# Patient Record
Sex: FEMALE | Race: WHITE | NOT HISPANIC OR LATINO | Employment: OTHER | URBAN - METROPOLITAN AREA
[De-identification: names, ages, dates, MRNs, and addresses within clinical notes are randomized per-mention and may not be internally consistent; named-entity substitution may affect disease eponyms.]

---

## 2017-05-17 ENCOUNTER — HOSPITAL ENCOUNTER (OUTPATIENT)
Dept: FAMILY MEDICINE CLINIC | Facility: CLINIC | Age: 67
Setting detail: SPECIMEN
Discharge: HOME | End: 2017-05-17
Attending: INTERNAL MEDICINE | Admitting: INTERNAL MEDICINE

## 2017-05-17 LAB
ALBUMIN SERPL-MCNC: 3.9 G/DL (ref 3.5–4.8)
ALBUMIN/GLOB SERPL: 1.1 {RATIO} (ref 1–1.7)
ALP SERPL-CCNC: 49 IU/L (ref 32–91)
ALT SERPL-CCNC: 26 IU/L (ref 14–54)
ANION GAP SERPL CALC-SCNC: 17.4 MMOL/L (ref 10–20)
AST SERPL-CCNC: 23 IU/L (ref 15–41)
BILIRUB SERPL-MCNC: 0.3 MG/DL (ref 0.3–1.2)
BUN SERPL-MCNC: 27 MG/DL (ref 8–20)
BUN/CREAT SERPL: 20.8 (ref 5.4–26.2)
CALCIUM SERPL-MCNC: 9.5 MG/DL (ref 8.9–10.3)
CHLORIDE SERPL-SCNC: 101 MMOL/L (ref 101–111)
CONV CO2: 24 MMOL/L (ref 22–32)
CONV TOTAL PROTEIN: 7.3 G/DL (ref 6.1–7.9)
CREAT UR-MCNC: 1.3 MG/DL (ref 0.4–1)
GLOBULIN UR ELPH-MCNC: 3.4 G/DL (ref 2.5–3.8)
GLUCOSE SERPL-MCNC: 134 MG/DL (ref 65–99)
POTASSIUM SERPL-SCNC: 4.4 MMOL/L (ref 3.6–5.1)
SODIUM SERPL-SCNC: 138 MMOL/L (ref 136–144)

## 2017-08-22 ENCOUNTER — HOSPITAL ENCOUNTER (OUTPATIENT)
Dept: FAMILY MEDICINE CLINIC | Facility: CLINIC | Age: 67
Setting detail: SPECIMEN
Discharge: HOME | End: 2017-08-22
Attending: INTERNAL MEDICINE | Admitting: INTERNAL MEDICINE

## 2017-08-22 LAB
ALBUMIN SERPL-MCNC: 3.9 G/DL (ref 3.5–4.8)
ALBUMIN/GLOB SERPL: 1.3 {RATIO} (ref 1–1.7)
ALP SERPL-CCNC: 56 IU/L (ref 32–91)
ALT SERPL-CCNC: 24 IU/L (ref 14–54)
ANION GAP SERPL CALC-SCNC: 16.5 MMOL/L (ref 10–20)
AST SERPL-CCNC: 23 IU/L (ref 15–41)
BASOPHILS # BLD AUTO: 0.1 10*3/UL (ref 0–0.2)
BASOPHILS NFR BLD AUTO: 1 % (ref 0–2)
BILIRUB SERPL-MCNC: 0.5 MG/DL (ref 0.3–1.2)
BUN SERPL-MCNC: 24 MG/DL (ref 8–20)
BUN/CREAT SERPL: 18.5 (ref 5.4–26.2)
CALCIUM SERPL-MCNC: 9.1 MG/DL (ref 8.9–10.3)
CHLORIDE SERPL-SCNC: 97 MMOL/L (ref 101–111)
CONV CO2: 26 MMOL/L (ref 22–32)
CONV TOTAL PROTEIN: 6.8 G/DL (ref 6.1–7.9)
CREAT UR-MCNC: 1.3 MG/DL (ref 0.4–1)
DIFFERENTIAL METHOD BLD: (no result)
EOSINOPHIL # BLD AUTO: 0.3 10*3/UL (ref 0–0.3)
EOSINOPHIL # BLD AUTO: 4 % (ref 0–3)
ERYTHROCYTE [DISTWIDTH] IN BLOOD BY AUTOMATED COUNT: 20.2 % (ref 11.5–14.5)
GLOBULIN UR ELPH-MCNC: 2.9 G/DL (ref 2.5–3.8)
GLUCOSE SERPL-MCNC: 164 MG/DL (ref 65–99)
HCT VFR BLD AUTO: 30.1 % (ref 35–49)
HGB BLD-MCNC: 9.3 G/DL (ref 12–15)
LYMPHOCYTES # BLD AUTO: 1.8 10*3/UL (ref 0.8–4.8)
LYMPHOCYTES NFR BLD AUTO: 21 % (ref 18–42)
MCH RBC QN AUTO: 23.1 PG (ref 26–32)
MCHC RBC AUTO-ENTMCNC: 30.8 G/DL (ref 32–36)
MCV RBC AUTO: 74.9 FL (ref 80–94)
MONOCYTES # BLD AUTO: 0.8 10*3/UL (ref 0.1–1.3)
MONOCYTES NFR BLD AUTO: 10 % (ref 2–11)
NEUTROPHILS # BLD AUTO: 5.6 10*3/UL (ref 2.3–8.6)
NEUTROPHILS NFR BLD AUTO: 64 % (ref 50–75)
NRBC BLD AUTO-RTO: 0 /100{WBCS}
NRBC/RBC NFR BLD MANUAL: 0 10*3/UL
PLATELET # BLD AUTO: 529 10*3/UL (ref 150–450)
PMV BLD AUTO: 7.6 FL (ref 7.4–10.4)
POTASSIUM SERPL-SCNC: 4.5 MMOL/L (ref 3.6–5.1)
RBC # BLD AUTO: 4.01 10*6/UL (ref 4–5.4)
SODIUM SERPL-SCNC: 135 MMOL/L (ref 136–144)
WBC # BLD AUTO: 8.7 10*3/UL (ref 4.5–11.5)

## 2017-09-27 ENCOUNTER — HOSPITAL ENCOUNTER (OUTPATIENT)
Dept: FAMILY MEDICINE CLINIC | Facility: CLINIC | Age: 67
Setting detail: SPECIMEN
Discharge: HOME | End: 2017-09-27
Attending: INTERNAL MEDICINE | Admitting: INTERNAL MEDICINE

## 2017-10-30 ENCOUNTER — HOSPITAL ENCOUNTER (OUTPATIENT)
Dept: INFUSION THERAPY | Facility: HOSPITAL | Age: 67
Discharge: HOME | End: 2017-10-30
Attending: INTERNAL MEDICINE | Admitting: INTERNAL MEDICINE

## 2017-11-03 ENCOUNTER — HOSPITAL ENCOUNTER (OUTPATIENT)
Dept: INFUSION THERAPY | Facility: HOSPITAL | Age: 67
Discharge: HOME | End: 2017-11-03
Attending: INTERNAL MEDICINE | Admitting: INTERNAL MEDICINE

## 2018-02-01 ENCOUNTER — HOSPITAL ENCOUNTER (OUTPATIENT)
Dept: FAMILY MEDICINE CLINIC | Facility: CLINIC | Age: 68
Setting detail: SPECIMEN
Discharge: HOME | End: 2018-02-01
Attending: INTERNAL MEDICINE | Admitting: INTERNAL MEDICINE

## 2018-02-01 LAB
ALBUMIN SERPL-MCNC: 3.8 G/DL (ref 3.5–4.8)
ALBUMIN/GLOB SERPL: 1.2 {RATIO} (ref 1–1.7)
ALP SERPL-CCNC: 56 IU/L (ref 32–91)
ALT SERPL-CCNC: 23 IU/L (ref 14–54)
ANION GAP SERPL CALC-SCNC: 12.6 MMOL/L (ref 10–20)
AST SERPL-CCNC: 22 IU/L (ref 15–41)
BASOPHILS # BLD AUTO: 0.1 10*3/UL (ref 0–0.2)
BASOPHILS NFR BLD AUTO: 1 % (ref 0–2)
BILIRUB SERPL-MCNC: 0.6 MG/DL (ref 0.3–1.2)
BUN SERPL-MCNC: 20 MG/DL (ref 8–20)
BUN/CREAT SERPL: 13.3 (ref 5.4–26.2)
CALCIUM SERPL-MCNC: 9.4 MG/DL (ref 8.9–10.3)
CHLORIDE SERPL-SCNC: 102 MMOL/L (ref 101–111)
CONV ANISOCYTES: (no result)
CONV CO2: 27 MMOL/L (ref 22–32)
CONV PLATELETS GIANT IN BLOOD BY LIGHT MICROSCOPY: (no result)
CONV SMEAR REVIEW: (no result)
CONV TOTAL PROTEIN: 7.1 G/DL (ref 6.1–7.9)
CREAT UR-MCNC: 1.5 MG/DL (ref 0.4–1)
DIFFERENTIAL METHOD BLD: (no result)
EOSINOPHIL # BLD AUTO: 0.2 10*3/UL (ref 0–0.3)
EOSINOPHIL # BLD AUTO: 3 % (ref 0–3)
ERYTHROCYTE [DISTWIDTH] IN BLOOD BY AUTOMATED COUNT: 20.6 % (ref 11.5–14.5)
GLOBULIN UR ELPH-MCNC: 3.3 G/DL (ref 2.5–3.8)
GLUCOSE SERPL-MCNC: 177 MG/DL (ref 65–99)
HCT VFR BLD AUTO: 42.8 % (ref 35–49)
HGB BLD-MCNC: 14.2 G/DL (ref 12–15)
IRON SATN MFR SERPL: 27 % (ref 15–50)
IRON SERPL-MCNC: 97 UG/DL (ref 28–170)
LYMPHOCYTES # BLD AUTO: 1.6 10*3/UL (ref 0.8–4.8)
LYMPHOCYTES NFR BLD AUTO: 21 % (ref 18–42)
MCH RBC QN AUTO: 31.8 PG (ref 26–32)
MCHC RBC AUTO-ENTMCNC: 33.2 G/DL (ref 32–36)
MCV RBC AUTO: 95.7 FL (ref 80–94)
MONOCYTES # BLD AUTO: 0.6 10*3/UL (ref 0.1–1.3)
MONOCYTES NFR BLD AUTO: 8 % (ref 2–11)
NEUTROPHILS # BLD AUTO: 5.1 10*3/UL (ref 2.3–8.6)
NEUTROPHILS NFR BLD AUTO: 67 % (ref 50–75)
NRBC BLD AUTO-RTO: 0 /100{WBCS}
PLATELET # BLD AUTO: 331 10*3/UL (ref 150–450)
PMV BLD AUTO: 9 FL (ref 7.4–10.4)
POTASSIUM SERPL-SCNC: 4.6 MMOL/L (ref 3.6–5.1)
RBC # BLD AUTO: 4.48 10*6/UL (ref 4–5.4)
SODIUM SERPL-SCNC: 137 MMOL/L (ref 136–144)
TIBC SERPL-MCNC: 364 UG/DL (ref 228–428)
WBC # BLD AUTO: 7.6 10*3/UL (ref 4.5–11.5)

## 2018-11-15 ENCOUNTER — HOSPITAL ENCOUNTER (OUTPATIENT)
Dept: FAMILY MEDICINE CLINIC | Facility: CLINIC | Age: 68
Setting detail: SPECIMEN
Discharge: HOME | End: 2018-11-15
Attending: INTERNAL MEDICINE | Admitting: INTERNAL MEDICINE

## 2018-11-15 LAB
ALBUMIN SERPL-MCNC: 3.7 G/DL (ref 3.5–4.8)
ALBUMIN/GLOB SERPL: 1.3 {RATIO} (ref 1–1.7)
ALP SERPL-CCNC: 60 IU/L (ref 32–91)
ALT SERPL-CCNC: 16 IU/L (ref 14–54)
ANION GAP SERPL CALC-SCNC: 15.8 MMOL/L (ref 10–20)
AST SERPL-CCNC: 25 IU/L (ref 15–41)
BASOPHILS # BLD AUTO: 0.1 10*3/UL (ref 0–0.2)
BASOPHILS NFR BLD AUTO: 1 % (ref 0–2)
BILIRUB SERPL-MCNC: 0.6 MG/DL (ref 0.3–1.2)
BUN SERPL-MCNC: 21 MG/DL (ref 8–20)
BUN/CREAT SERPL: 12.4 (ref 5.4–26.2)
CALCIUM SERPL-MCNC: 9.1 MG/DL (ref 8.9–10.3)
CHLORIDE SERPL-SCNC: 105 MMOL/L (ref 101–111)
CHOLEST SERPL-MCNC: 236 MG/DL
CHOLEST/HDLC SERPL: 4.3 {RATIO}
CONV CO2: 24 MMOL/L (ref 22–32)
CONV LDL CHOLESTEROL DIRECT: 160 MG/DL (ref 0–100)
CONV TOTAL PROTEIN: 6.6 G/DL (ref 6.1–7.9)
CREAT UR-MCNC: 1.7 MG/DL (ref 0.4–1)
DIFFERENTIAL METHOD BLD: (no result)
EOSINOPHIL # BLD AUTO: 0.3 10*3/UL (ref 0–0.3)
EOSINOPHIL # BLD AUTO: 3 % (ref 0–3)
ERYTHROCYTE [DISTWIDTH] IN BLOOD BY AUTOMATED COUNT: 13.7 % (ref 11.5–14.5)
GLOBULIN UR ELPH-MCNC: 2.9 G/DL (ref 2.5–3.8)
GLUCOSE SERPL-MCNC: 112 MG/DL (ref 65–99)
HCT VFR BLD AUTO: 40.9 % (ref 35–49)
HDLC SERPL-MCNC: 55 MG/DL
HGB BLD-MCNC: 13.6 G/DL (ref 12–15)
IRON SATN MFR SERPL: 25 % (ref 15–50)
IRON SERPL-MCNC: 86 UG/DL (ref 28–170)
LDLC/HDLC SERPL: 2.9 {RATIO}
LIPID INTERPRETATION: ABNORMAL
LYMPHOCYTES # BLD AUTO: 1.7 10*3/UL (ref 0.8–4.8)
LYMPHOCYTES NFR BLD AUTO: 20 % (ref 18–42)
MCH RBC QN AUTO: 33.2 PG (ref 26–32)
MCHC RBC AUTO-ENTMCNC: 33.2 G/DL (ref 32–36)
MCV RBC AUTO: 100.2 FL (ref 80–94)
MONOCYTES # BLD AUTO: 0.7 10*3/UL (ref 0.1–1.3)
MONOCYTES NFR BLD AUTO: 8 % (ref 2–11)
NEUTROPHILS # BLD AUTO: 5.7 10*3/UL (ref 2.3–8.6)
NEUTROPHILS NFR BLD AUTO: 68 % (ref 50–75)
NRBC BLD AUTO-RTO: 0 /100{WBCS}
NRBC/RBC NFR BLD MANUAL: 0 10*3/UL
PLATELET # BLD AUTO: 379 10*3/UL (ref 150–450)
PMV BLD AUTO: 8.5 FL (ref 7.4–10.4)
POTASSIUM SERPL-SCNC: 4.8 MMOL/L (ref 3.6–5.1)
RBC # BLD AUTO: 4.09 10*6/UL (ref 4–5.4)
SODIUM SERPL-SCNC: 140 MMOL/L (ref 136–144)
TIBC SERPL-MCNC: 346 UG/DL (ref 228–428)
TRIGL SERPL-MCNC: 244 MG/DL
VLDLC SERPL CALC-MCNC: 20.8 MG/DL
WBC # BLD AUTO: 8.5 10*3/UL (ref 4.5–11.5)

## 2019-01-02 ENCOUNTER — HOSPITAL ENCOUNTER (OUTPATIENT)
Dept: FAMILY MEDICINE CLINIC | Facility: CLINIC | Age: 69
Setting detail: SPECIMEN
Discharge: HOME | End: 2019-01-02
Attending: INTERNAL MEDICINE | Admitting: INTERNAL MEDICINE

## 2019-01-02 LAB
ALBUMIN SERPL-MCNC: 3.7 G/DL (ref 3.5–4.8)
ALBUMIN/GLOB SERPL: 1.2 {RATIO} (ref 1–1.7)
ALP SERPL-CCNC: 59 IU/L (ref 32–91)
ALT SERPL-CCNC: 14 IU/L (ref 14–54)
ANION GAP SERPL CALC-SCNC: 18.1 MMOL/L (ref 10–20)
AST SERPL-CCNC: 22 IU/L (ref 15–41)
BILIRUB SERPL-MCNC: 0.4 MG/DL (ref 0.3–1.2)
BUN SERPL-MCNC: 35 MG/DL (ref 8–20)
BUN/CREAT SERPL: 19.4 (ref 5.4–26.2)
CALCIUM SERPL-MCNC: 8.9 MG/DL (ref 8.9–10.3)
CHLORIDE SERPL-SCNC: 103 MMOL/L (ref 101–111)
CONV CO2: 22 MMOL/L (ref 22–32)
CONV TOTAL PROTEIN: 6.7 G/DL (ref 6.1–7.9)
CREAT UR-MCNC: 1.8 MG/DL (ref 0.4–1)
GLOBULIN UR ELPH-MCNC: 3 G/DL (ref 2.5–3.8)
GLUCOSE SERPL-MCNC: 69 MG/DL (ref 65–99)
IRON SERPL-MCNC: 80 UG/DL (ref 28–170)
POTASSIUM SERPL-SCNC: 5.1 MMOL/L (ref 3.6–5.1)
SODIUM SERPL-SCNC: 138 MMOL/L (ref 136–144)

## 2019-01-04 ENCOUNTER — HOSPITAL ENCOUNTER (OUTPATIENT)
Dept: FAMILY MEDICINE CLINIC | Facility: CLINIC | Age: 69
Setting detail: SPECIMEN
Discharge: HOME | End: 2019-01-04
Attending: INTERNAL MEDICINE | Admitting: INTERNAL MEDICINE

## 2019-01-04 LAB
CHOLEST SERPL-MCNC: 225 MG/DL
CHOLEST/HDLC SERPL: 4.7 {RATIO}
CONV LDL CHOLESTEROL DIRECT: 156 MG/DL (ref 0–100)
HDLC SERPL-MCNC: 48 MG/DL
LDLC/HDLC SERPL: 3.2 {RATIO}
LIPID INTERPRETATION: ABNORMAL
TRIGL SERPL-MCNC: 250 MG/DL
VLDLC SERPL CALC-MCNC: 20.5 MG/DL

## 2019-01-05 ENCOUNTER — HOSPITAL ENCOUNTER (OUTPATIENT)
Dept: LAB | Facility: HOSPITAL | Age: 69
Discharge: HOME | End: 2019-01-05
Attending: INTERNAL MEDICINE | Admitting: INTERNAL MEDICINE

## 2019-01-15 LAB
Lab: 24
PROT 24H UR-MRATE: 504 MG/(24.H) (ref 0–150)
PROT UR-MCNC: 56 MG/DL
URINE VOLUME: 900 ML

## 2019-01-19 ENCOUNTER — HOSPITAL ENCOUNTER (OUTPATIENT)
Dept: ULTRASOUND IMAGING | Facility: HOSPITAL | Age: 69
Discharge: HOME | End: 2019-01-19
Attending: INTERNAL MEDICINE | Admitting: INTERNAL MEDICINE

## 2019-07-11 PROBLEM — J44.9 CHRONIC OBSTRUCTIVE PULMONARY DISEASE: Status: ACTIVE | Noted: 2019-07-11

## 2019-07-11 PROBLEM — E11.21 DIABETIC NEPHROPATHY: Status: ACTIVE | Noted: 2019-01-15

## 2019-07-11 PROBLEM — G25.0 BENIGN ESSENTIAL TREMOR: Status: ACTIVE | Noted: 2019-01-04

## 2019-07-11 PROBLEM — N28.9 RENAL INSUFFICIENCY: Status: ACTIVE | Noted: 2019-01-02

## 2019-07-11 RX ORDER — GLIMEPIRIDE 1 MG/1
TABLET ORAL
COMMUNITY
Start: 2018-11-15 | End: 2019-12-30

## 2019-07-11 RX ORDER — AMLODIPINE BESYLATE 5 MG/1
TABLET ORAL EVERY 24 HOURS
COMMUNITY
Start: 2018-08-16 | End: 2019-11-14 | Stop reason: SDUPTHER

## 2019-07-11 RX ORDER — DULOXETIN HYDROCHLORIDE 60 MG/1
1 CAPSULE, DELAYED RELEASE ORAL EVERY 12 HOURS
COMMUNITY
Start: 2018-08-16 | End: 2019-11-14 | Stop reason: SDUPTHER

## 2019-07-11 RX ORDER — TEMAZEPAM 30 MG/1
CAPSULE ORAL
COMMUNITY
Start: 2015-04-29 | End: 2019-08-09 | Stop reason: SDUPTHER

## 2019-07-11 RX ORDER — TRIAMTERENE AND HYDROCHLOROTHIAZIDE 37.5; 25 MG/1; MG/1
1 CAPSULE ORAL EVERY 24 HOURS
COMMUNITY
Start: 2018-08-16 | End: 2019-11-14 | Stop reason: SDUPTHER

## 2019-07-11 RX ORDER — PRIMIDONE 50 MG/1
TABLET ORAL EVERY 24 HOURS
COMMUNITY
Start: 2019-01-04 | End: 2020-01-14 | Stop reason: SDUPTHER

## 2019-07-11 RX ORDER — MULTIVITAMIN
1 CAPSULE ORAL DAILY
COMMUNITY
Start: 2015-04-29 | End: 2022-05-02

## 2019-07-11 RX ORDER — ROSUVASTATIN CALCIUM 10 MG/1
TABLET, COATED ORAL
COMMUNITY
Start: 2019-01-04 | End: 2019-08-13 | Stop reason: SDUPTHER

## 2019-07-12 ENCOUNTER — OFFICE VISIT (OUTPATIENT)
Dept: FAMILY MEDICINE CLINIC | Facility: CLINIC | Age: 69
End: 2019-07-12

## 2019-07-12 VITALS
OXYGEN SATURATION: 95 % | WEIGHT: 197.8 LBS | RESPIRATION RATE: 24 BRPM | HEART RATE: 97 BPM | DIASTOLIC BLOOD PRESSURE: 92 MMHG | BODY MASS INDEX: 31.79 KG/M2 | HEIGHT: 66 IN | TEMPERATURE: 98 F | SYSTOLIC BLOOD PRESSURE: 134 MMHG

## 2019-07-12 DIAGNOSIS — Z63.4 GRIEF AT LOSS OF CHILD: ICD-10-CM

## 2019-07-12 DIAGNOSIS — D50.8 IRON DEFICIENCY ANEMIA SECONDARY TO INADEQUATE DIETARY IRON INTAKE: ICD-10-CM

## 2019-07-12 DIAGNOSIS — E11.65 TYPE 2 DIABETES MELLITUS WITH HYPERGLYCEMIA, WITHOUT LONG-TERM CURRENT USE OF INSULIN (HCC): ICD-10-CM

## 2019-07-12 DIAGNOSIS — R11.0 NAUSEA: ICD-10-CM

## 2019-07-12 DIAGNOSIS — D50.0 IRON DEFICIENCY ANEMIA DUE TO CHRONIC BLOOD LOSS: Primary | ICD-10-CM

## 2019-07-12 DIAGNOSIS — F43.21 GRIEF AT LOSS OF CHILD: ICD-10-CM

## 2019-07-12 LAB
ALBUMIN SERPL-MCNC: 3.6 G/DL (ref 3.5–4.8)
ALBUMIN UR-MCNC: 234 MG/L
ALBUMIN/GLOB SERPL: 1.2 G/DL (ref 1–1.7)
ALP SERPL-CCNC: 45 U/L (ref 32–91)
ALT SERPL W P-5'-P-CCNC: 65 U/L (ref 14–54)
ANION GAP SERPL CALCULATED.3IONS-SCNC: 17.1 MMOL/L (ref 5–15)
AST SERPL-CCNC: 49 U/L (ref 15–41)
BASOPHILS # BLD AUTO: 0.1 10*3/MM3 (ref 0–0.2)
BASOPHILS NFR BLD AUTO: 1 % (ref 0–1.5)
BILIRUB SERPL-MCNC: 0.6 MG/DL (ref 0.3–1.2)
BUN BLD-MCNC: 24 MG/DL (ref 8–20)
BUN/CREAT SERPL: 14.1 (ref 5.4–26.2)
CALCIUM SPEC-SCNC: 8.8 MG/DL (ref 8.9–10.3)
CHLORIDE SERPL-SCNC: 102 MMOL/L (ref 101–111)
CO2 SERPL-SCNC: 23 MMOL/L (ref 22–32)
CREAT BLD-MCNC: 1.7 MG/DL (ref 0.4–1)
DEPRECATED RDW RBC AUTO: 49 FL (ref 37–54)
EOSINOPHIL # BLD AUTO: 0.2 10*3/MM3 (ref 0–0.4)
EOSINOPHIL NFR BLD AUTO: 2.1 % (ref 0.3–6.2)
ERYTHROCYTE [DISTWIDTH] IN BLOOD BY AUTOMATED COUNT: 14 % (ref 12.3–15.4)
GFR SERPL CREATININE-BSD FRML MDRD: 30 ML/MIN/1.73
GLOBULIN UR ELPH-MCNC: 3 GM/DL (ref 2.5–3.8)
GLUCOSE BLD-MCNC: 113 MG/DL (ref 65–99)
HBA1C MFR BLD: 6.2 % (ref 3.5–5.6)
HCT VFR BLD AUTO: 39.3 % (ref 34–46.6)
HGB BLD-MCNC: 12.9 G/DL (ref 12–15.9)
LIPASE SERPL-CCNC: 35 U/L (ref 22–51)
LYMPHOCYTES # BLD AUTO: 1.7 10*3/MM3 (ref 0.7–3.1)
LYMPHOCYTES NFR BLD AUTO: 21.5 % (ref 19.6–45.3)
MCH RBC QN AUTO: 32.8 PG (ref 26.6–33)
MCHC RBC AUTO-ENTMCNC: 32.8 G/DL (ref 31.5–35.7)
MCV RBC AUTO: 100 FL (ref 79–97)
MONOCYTES # BLD AUTO: 0.7 10*3/MM3 (ref 0.1–0.9)
MONOCYTES NFR BLD AUTO: 8.9 % (ref 5–12)
NEUTROPHILS # BLD AUTO: 5.3 10*3/MM3 (ref 1.7–7)
NEUTROPHILS NFR BLD AUTO: 66.5 % (ref 42.7–76)
NRBC BLD AUTO-RTO: 0.1 /100 WBC (ref 0–0.2)
PLATELET # BLD AUTO: 341 10*3/MM3 (ref 140–450)
PMV BLD AUTO: 8.3 FL (ref 6–12)
POTASSIUM BLD-SCNC: 4.1 MMOL/L (ref 3.6–5.1)
PROT SERPL-MCNC: 6.6 G/DL (ref 6.1–7.9)
RBC # BLD AUTO: 3.93 10*6/MM3 (ref 3.77–5.28)
SODIUM BLD-SCNC: 138 MMOL/L (ref 136–144)
WBC NRBC COR # BLD: 7.9 10*3/MM3 (ref 3.4–10.8)

## 2019-07-12 PROCEDURE — 80053 COMPREHEN METABOLIC PANEL: CPT | Performed by: INTERNAL MEDICINE

## 2019-07-12 PROCEDURE — 82043 UR ALBUMIN QUANTITATIVE: CPT | Performed by: INTERNAL MEDICINE

## 2019-07-12 PROCEDURE — 83036 HEMOGLOBIN GLYCOSYLATED A1C: CPT | Performed by: INTERNAL MEDICINE

## 2019-07-12 PROCEDURE — 85025 COMPLETE CBC W/AUTO DIFF WBC: CPT | Performed by: INTERNAL MEDICINE

## 2019-07-12 PROCEDURE — 99214 OFFICE O/P EST MOD 30 MIN: CPT | Performed by: INTERNAL MEDICINE

## 2019-07-12 PROCEDURE — 83690 ASSAY OF LIPASE: CPT | Performed by: INTERNAL MEDICINE

## 2019-07-12 RX ORDER — RANITIDINE 300 MG/1
300 TABLET ORAL NIGHTLY
Qty: 30 TABLET | Refills: 6 | Status: SHIPPED | OUTPATIENT
Start: 2019-07-12 | End: 2020-01-14

## 2019-07-12 RX ORDER — METFORMIN HYDROCHLORIDE 500 MG/1
500 TABLET, EXTENDED RELEASE ORAL
Qty: 90 TABLET | Refills: 1 | Status: SHIPPED | OUTPATIENT
Start: 2019-07-12 | End: 2019-08-09

## 2019-07-12 SDOH — SOCIAL STABILITY - SOCIAL INSECURITY: DISSAPEARANCE AND DEATH OF FAMILY MEMBER: Z63.4

## 2019-08-09 ENCOUNTER — OFFICE VISIT (OUTPATIENT)
Dept: FAMILY MEDICINE CLINIC | Facility: CLINIC | Age: 69
End: 2019-08-09

## 2019-08-09 VITALS
OXYGEN SATURATION: 96 % | HEIGHT: 66 IN | WEIGHT: 200.2 LBS | RESPIRATION RATE: 16 BRPM | SYSTOLIC BLOOD PRESSURE: 140 MMHG | TEMPERATURE: 97.8 F | BODY MASS INDEX: 32.17 KG/M2 | HEART RATE: 94 BPM | DIASTOLIC BLOOD PRESSURE: 82 MMHG

## 2019-08-09 DIAGNOSIS — E11.21 DIABETIC NEPHROPATHY ASSOCIATED WITH TYPE 2 DIABETES MELLITUS (HCC): ICD-10-CM

## 2019-08-09 DIAGNOSIS — N28.9 RENAL INSUFFICIENCY: ICD-10-CM

## 2019-08-09 DIAGNOSIS — E11.65 TYPE 2 DIABETES MELLITUS WITH HYPERGLYCEMIA, WITHOUT LONG-TERM CURRENT USE OF INSULIN (HCC): Primary | ICD-10-CM

## 2019-08-09 PROCEDURE — 99213 OFFICE O/P EST LOW 20 MIN: CPT | Performed by: INTERNAL MEDICINE

## 2019-08-09 RX ORDER — METFORMIN HYDROCHLORIDE 500 MG/1
1000 TABLET, EXTENDED RELEASE ORAL
Qty: 180 TABLET | Refills: 1 | Status: SHIPPED | OUTPATIENT
Start: 2019-08-09 | End: 2019-10-09 | Stop reason: SDUPTHER

## 2019-08-09 RX ORDER — TEMAZEPAM 30 MG/1
30 CAPSULE ORAL NIGHTLY PRN
Qty: 90 CAPSULE | Refills: 3 | Status: SHIPPED | OUTPATIENT
Start: 2019-08-09 | End: 2019-11-14 | Stop reason: SDUPTHER

## 2019-08-13 RX ORDER — ROSUVASTATIN CALCIUM 10 MG/1
TABLET, COATED ORAL
Qty: 90 TABLET | Refills: 2 | Status: SHIPPED | OUTPATIENT
Start: 2019-08-13 | End: 2020-01-14 | Stop reason: SDUPTHER

## 2019-09-24 ENCOUNTER — OFFICE VISIT (OUTPATIENT)
Dept: FAMILY MEDICINE CLINIC | Facility: CLINIC | Age: 69
End: 2019-09-24

## 2019-09-24 VITALS
HEIGHT: 65 IN | SYSTOLIC BLOOD PRESSURE: 134 MMHG | HEART RATE: 94 BPM | OXYGEN SATURATION: 99 % | DIASTOLIC BLOOD PRESSURE: 78 MMHG | WEIGHT: 194.4 LBS | BODY MASS INDEX: 32.39 KG/M2 | TEMPERATURE: 97.5 F | RESPIRATION RATE: 16 BRPM

## 2019-09-24 DIAGNOSIS — Z12.11 SCREENING FOR COLON CANCER: ICD-10-CM

## 2019-09-24 DIAGNOSIS — E11.65 TYPE 2 DIABETES MELLITUS WITH HYPERGLYCEMIA, WITHOUT LONG-TERM CURRENT USE OF INSULIN (HCC): ICD-10-CM

## 2019-09-24 DIAGNOSIS — Z12.31 ENCOUNTER FOR SCREENING MAMMOGRAM FOR MALIGNANT NEOPLASM OF BREAST: ICD-10-CM

## 2019-09-24 DIAGNOSIS — Z23 NEED FOR IMMUNIZATION AGAINST INFLUENZA: Primary | ICD-10-CM

## 2019-09-24 PROCEDURE — 90653 IIV ADJUVANT VACCINE IM: CPT | Performed by: INTERNAL MEDICINE

## 2019-09-24 PROCEDURE — 99213 OFFICE O/P EST LOW 20 MIN: CPT | Performed by: INTERNAL MEDICINE

## 2019-09-24 PROCEDURE — G0008 ADMIN INFLUENZA VIRUS VAC: HCPCS | Performed by: INTERNAL MEDICINE

## 2019-10-09 ENCOUNTER — TELEPHONE (OUTPATIENT)
Dept: FAMILY MEDICINE CLINIC | Facility: CLINIC | Age: 69
End: 2019-10-09

## 2019-10-09 RX ORDER — METFORMIN HYDROCHLORIDE 500 MG/1
1000 TABLET, EXTENDED RELEASE ORAL
Qty: 180 TABLET | Refills: 3 | Status: SHIPPED | OUTPATIENT
Start: 2019-10-09 | End: 2020-01-14 | Stop reason: SDUPTHER

## 2019-11-15 RX ORDER — TRIAMTERENE AND HYDROCHLOROTHIAZIDE 37.5; 25 MG/1; MG/1
CAPSULE ORAL
Qty: 90 CAPSULE | Refills: 1 | Status: SHIPPED | OUTPATIENT
Start: 2019-11-15 | End: 2020-01-14 | Stop reason: SDUPTHER

## 2019-11-15 RX ORDER — DULOXETIN HYDROCHLORIDE 60 MG/1
CAPSULE, DELAYED RELEASE ORAL
Qty: 180 CAPSULE | Refills: 1 | Status: SHIPPED | OUTPATIENT
Start: 2019-11-15 | End: 2020-04-14

## 2019-11-15 RX ORDER — AMLODIPINE BESYLATE 5 MG/1
TABLET ORAL
Qty: 90 TABLET | Refills: 1 | Status: SHIPPED | OUTPATIENT
Start: 2019-11-15 | End: 2020-04-14

## 2019-11-16 RX ORDER — TEMAZEPAM 30 MG/1
30 CAPSULE ORAL NIGHTLY PRN
Qty: 90 CAPSULE | Refills: 1 | Status: SHIPPED | OUTPATIENT
Start: 2019-11-16 | End: 2020-01-14 | Stop reason: SDUPTHER

## 2019-12-30 ENCOUNTER — TELEPHONE (OUTPATIENT)
Dept: FAMILY MEDICINE CLINIC | Facility: CLINIC | Age: 69
End: 2019-12-30

## 2019-12-30 RX ORDER — GLIMEPIRIDE 2 MG/1
2 TABLET ORAL 2 TIMES DAILY
Qty: 60 TABLET | Refills: 2 | Status: SHIPPED | OUTPATIENT
Start: 2019-12-30 | End: 2020-01-14 | Stop reason: SDUPTHER

## 2020-01-14 ENCOUNTER — OFFICE VISIT (OUTPATIENT)
Dept: FAMILY MEDICINE CLINIC | Facility: CLINIC | Age: 70
End: 2020-01-14

## 2020-01-14 VITALS
HEART RATE: 72 BPM | TEMPERATURE: 97.5 F | OXYGEN SATURATION: 96 % | WEIGHT: 192.4 LBS | SYSTOLIC BLOOD PRESSURE: 146 MMHG | BODY MASS INDEX: 32.02 KG/M2 | RESPIRATION RATE: 16 BRPM | DIASTOLIC BLOOD PRESSURE: 76 MMHG

## 2020-01-14 DIAGNOSIS — J41.0 SIMPLE CHRONIC BRONCHITIS (HCC): ICD-10-CM

## 2020-01-14 DIAGNOSIS — F51.01 PRIMARY INSOMNIA: ICD-10-CM

## 2020-01-14 DIAGNOSIS — H61.21 IMPACTED CERUMEN OF RIGHT EAR: ICD-10-CM

## 2020-01-14 DIAGNOSIS — E11.65 TYPE 2 DIABETES MELLITUS WITH HYPERGLYCEMIA, WITHOUT LONG-TERM CURRENT USE OF INSULIN (HCC): Primary | ICD-10-CM

## 2020-01-14 DIAGNOSIS — E78.41 ELEVATED LIPOPROTEIN(A): ICD-10-CM

## 2020-01-14 DIAGNOSIS — E11.21 DIABETIC NEPHROPATHY ASSOCIATED WITH TYPE 2 DIABETES MELLITUS (HCC): ICD-10-CM

## 2020-01-14 LAB — HBA1C MFR BLD: 9.2 % (ref 3.5–5.6)

## 2020-01-14 PROCEDURE — 83036 HEMOGLOBIN GLYCOSYLATED A1C: CPT | Performed by: INTERNAL MEDICINE

## 2020-01-14 PROCEDURE — 69209 REMOVE IMPACTED EAR WAX UNI: CPT | Performed by: INTERNAL MEDICINE

## 2020-01-14 PROCEDURE — 99214 OFFICE O/P EST MOD 30 MIN: CPT | Performed by: INTERNAL MEDICINE

## 2020-01-14 PROCEDURE — 80053 COMPREHEN METABOLIC PANEL: CPT | Performed by: INTERNAL MEDICINE

## 2020-01-14 RX ORDER — METFORMIN HYDROCHLORIDE 500 MG/1
1000 TABLET, EXTENDED RELEASE ORAL
Qty: 180 TABLET | Refills: 3 | Status: SHIPPED | OUTPATIENT
Start: 2020-01-14 | End: 2021-01-12

## 2020-01-14 RX ORDER — GLIMEPIRIDE 2 MG/1
2 TABLET ORAL 2 TIMES DAILY
Qty: 180 TABLET | Refills: 2 | Status: SHIPPED | OUTPATIENT
Start: 2020-01-14 | End: 2021-01-25 | Stop reason: SDUPTHER

## 2020-01-14 RX ORDER — TRIAMTERENE AND HYDROCHLOROTHIAZIDE 37.5; 25 MG/1; MG/1
1 CAPSULE ORAL DAILY
Qty: 90 CAPSULE | Refills: 3 | Status: SHIPPED | OUTPATIENT
Start: 2020-01-14 | End: 2021-05-03 | Stop reason: HOSPADM

## 2020-01-14 RX ORDER — ROSUVASTATIN CALCIUM 10 MG/1
10 TABLET, COATED ORAL DAILY
Qty: 90 TABLET | Refills: 2 | Status: SHIPPED | OUTPATIENT
Start: 2020-01-14 | End: 2020-09-28

## 2020-01-14 RX ORDER — TEMAZEPAM 30 MG/1
30 CAPSULE ORAL NIGHTLY PRN
Qty: 90 CAPSULE | Refills: 1 | Status: SHIPPED | OUTPATIENT
Start: 2020-01-14 | End: 2020-07-02 | Stop reason: SDUPTHER

## 2020-01-14 RX ORDER — PRIMIDONE 50 MG/1
50 TABLET ORAL NIGHTLY
Qty: 90 TABLET | Refills: 3 | Status: SHIPPED | OUTPATIENT
Start: 2020-01-14 | End: 2020-12-16

## 2020-01-15 LAB
ALBUMIN SERPL-MCNC: 4.1 G/DL (ref 3.5–5.2)
ALBUMIN/GLOB SERPL: 1.2 G/DL
ALP SERPL-CCNC: 58 U/L (ref 39–117)
ALT SERPL W P-5'-P-CCNC: 16 U/L (ref 1–33)
ANION GAP SERPL CALCULATED.3IONS-SCNC: 16 MMOL/L (ref 5–15)
AST SERPL-CCNC: 15 U/L (ref 1–32)
BILIRUB SERPL-MCNC: <0.2 MG/DL (ref 0.2–1.2)
BUN BLD-MCNC: 34 MG/DL (ref 8–23)
BUN/CREAT SERPL: 25.6 (ref 7–25)
CALCIUM SPEC-SCNC: 9.3 MG/DL (ref 8.6–10.5)
CHLORIDE SERPL-SCNC: 100 MMOL/L (ref 98–107)
CO2 SERPL-SCNC: 26 MMOL/L (ref 22–29)
CREAT BLD-MCNC: 1.33 MG/DL (ref 0.57–1)
GFR SERPL CREATININE-BSD FRML MDRD: 39 ML/MIN/1.73
GLOBULIN UR ELPH-MCNC: 3.4 GM/DL
GLUCOSE BLD-MCNC: 150 MG/DL (ref 65–99)
POTASSIUM BLD-SCNC: 4.4 MMOL/L (ref 3.5–5.2)
PROT SERPL-MCNC: 7.5 G/DL (ref 6–8.5)
SODIUM BLD-SCNC: 142 MMOL/L (ref 136–145)

## 2020-02-07 ENCOUNTER — TELEPHONE (OUTPATIENT)
Dept: FAMILY MEDICINE CLINIC | Facility: CLINIC | Age: 70
End: 2020-02-07

## 2020-02-07 RX ORDER — ONDANSETRON 4 MG/1
4 TABLET, FILM COATED ORAL EVERY 8 HOURS PRN
Qty: 20 TABLET | Refills: 0 | Status: SHIPPED | OUTPATIENT
Start: 2020-02-07 | End: 2021-06-03

## 2020-03-01 ENCOUNTER — APPOINTMENT (OUTPATIENT)
Dept: GENERAL RADIOLOGY | Facility: HOSPITAL | Age: 70
End: 2020-03-01

## 2020-03-01 ENCOUNTER — HOSPITAL ENCOUNTER (INPATIENT)
Facility: HOSPITAL | Age: 70
LOS: 6 days | Discharge: NURSING, REHAB OR LONG-TERM CARE | End: 2020-03-08
Attending: INTERNAL MEDICINE | Admitting: INTERNAL MEDICINE

## 2020-03-01 DIAGNOSIS — S72.011A CLOSED SUBCAPITAL FRACTURE OF RIGHT FEMUR, INITIAL ENCOUNTER (HCC): Primary | ICD-10-CM

## 2020-03-01 PROBLEM — D64.9 ANEMIA: Status: ACTIVE | Noted: 2020-03-01

## 2020-03-01 PROBLEM — N18.9 CHRONIC KIDNEY DISEASE: Chronic | Status: ACTIVE | Noted: 2020-03-01

## 2020-03-01 PROBLEM — I10 ESSENTIAL HYPERTENSION: Chronic | Status: ACTIVE | Noted: 2020-03-01

## 2020-03-01 LAB
ALBUMIN SERPL-MCNC: 3.9 G/DL (ref 3.5–5.2)
ALBUMIN/GLOB SERPL: 1.3 G/DL
ALP SERPL-CCNC: 52 U/L (ref 39–117)
ALT SERPL W P-5'-P-CCNC: 14 U/L (ref 1–33)
ANION GAP SERPL CALCULATED.3IONS-SCNC: 14 MMOL/L (ref 5–15)
AST SERPL-CCNC: 16 U/L (ref 1–32)
BASOPHILS # BLD AUTO: 0.1 10*3/MM3 (ref 0–0.2)
BASOPHILS NFR BLD AUTO: 0.4 % (ref 0–1.5)
BILIRUB SERPL-MCNC: 0.2 MG/DL (ref 0.2–1.2)
BUN BLD-MCNC: 39 MG/DL (ref 8–23)
BUN/CREAT SERPL: 25.7 (ref 7–25)
CALCIUM SPEC-SCNC: 8.8 MG/DL (ref 8.6–10.5)
CHLORIDE SERPL-SCNC: 103 MMOL/L (ref 98–107)
CO2 SERPL-SCNC: 25 MMOL/L (ref 22–29)
CREAT BLD-MCNC: 1.52 MG/DL (ref 0.57–1)
DEPRECATED RDW RBC AUTO: 46.8 FL (ref 37–54)
EOSINOPHIL # BLD AUTO: 0.1 10*3/MM3 (ref 0–0.4)
EOSINOPHIL NFR BLD AUTO: 0.8 % (ref 0.3–6.2)
ERYTHROCYTE [DISTWIDTH] IN BLOOD BY AUTOMATED COUNT: 15 % (ref 12.3–15.4)
GFR SERPL CREATININE-BSD FRML MDRD: 34 ML/MIN/1.73
GLOBULIN UR ELPH-MCNC: 3 GM/DL
GLUCOSE BLD-MCNC: 55 MG/DL (ref 65–99)
GLUCOSE BLDC GLUCOMTR-MCNC: 269 MG/DL (ref 70–105)
GLUCOSE BLDC GLUCOMTR-MCNC: 47 MG/DL (ref 70–105)
GLUCOSE BLDC GLUCOMTR-MCNC: 47 MG/DL (ref 70–105)
HCT VFR BLD AUTO: 34.6 % (ref 34–46.6)
HGB BLD-MCNC: 11.6 G/DL (ref 12–15.9)
LYMPHOCYTES # BLD AUTO: 2.4 10*3/MM3 (ref 0.7–3.1)
LYMPHOCYTES NFR BLD AUTO: 16.5 % (ref 19.6–45.3)
MCH RBC QN AUTO: 29.4 PG (ref 26.6–33)
MCHC RBC AUTO-ENTMCNC: 33.5 G/DL (ref 31.5–35.7)
MCV RBC AUTO: 87.6 FL (ref 79–97)
MONOCYTES # BLD AUTO: 1.2 10*3/MM3 (ref 0.1–0.9)
MONOCYTES NFR BLD AUTO: 8.4 % (ref 5–12)
NEUTROPHILS # BLD AUTO: 10.9 10*3/MM3 (ref 1.7–7)
NEUTROPHILS NFR BLD AUTO: 73.9 % (ref 42.7–76)
NRBC BLD AUTO-RTO: 0 /100 WBC (ref 0–0.2)
PLATELET # BLD AUTO: 487 10*3/MM3 (ref 140–450)
PMV BLD AUTO: 7.2 FL (ref 6–12)
POTASSIUM BLD-SCNC: 3.9 MMOL/L (ref 3.5–5.2)
POTASSIUM BLD-SCNC: 4.5 MMOL/L (ref 3.5–5.2)
PROT SERPL-MCNC: 6.9 G/DL (ref 6–8.5)
RBC # BLD AUTO: 3.95 10*6/MM3 (ref 3.77–5.28)
SODIUM BLD-SCNC: 142 MMOL/L (ref 136–145)
WBC NRBC COR # BLD: 14.8 10*3/MM3 (ref 3.4–10.8)

## 2020-03-01 PROCEDURE — 80053 COMPREHEN METABOLIC PANEL: CPT | Performed by: NURSE PRACTITIONER

## 2020-03-01 PROCEDURE — G0378 HOSPITAL OBSERVATION PER HR: HCPCS

## 2020-03-01 PROCEDURE — 25010000002 HYDROMORPHONE PER 4 MG: Performed by: NURSE PRACTITIONER

## 2020-03-01 PROCEDURE — 25010000002 ONDANSETRON PER 1 MG: Performed by: NURSE PRACTITIONER

## 2020-03-01 PROCEDURE — 63710000001 INSULIN LISPRO (HUMAN) PER 5 UNITS: Performed by: PHYSICIAN ASSISTANT

## 2020-03-01 PROCEDURE — 84132 ASSAY OF SERUM POTASSIUM: CPT | Performed by: PHYSICIAN ASSISTANT

## 2020-03-01 PROCEDURE — 25010000002 KETOROLAC TROMETHAMINE PER 15 MG: Performed by: NURSE PRACTITIONER

## 2020-03-01 PROCEDURE — 73502 X-RAY EXAM HIP UNI 2-3 VIEWS: CPT

## 2020-03-01 PROCEDURE — 85025 COMPLETE CBC W/AUTO DIFF WBC: CPT | Performed by: NURSE PRACTITIONER

## 2020-03-01 PROCEDURE — 99283 EMERGENCY DEPT VISIT LOW MDM: CPT

## 2020-03-01 PROCEDURE — 82962 GLUCOSE BLOOD TEST: CPT

## 2020-03-01 PROCEDURE — 99223 1ST HOSP IP/OBS HIGH 75: CPT | Performed by: HOSPITALIST

## 2020-03-01 RX ORDER — DEXTROSE MONOHYDRATE 25 G/50ML
25 INJECTION, SOLUTION INTRAVENOUS
Status: DISCONTINUED | OUTPATIENT
Start: 2020-03-01 | End: 2020-03-02 | Stop reason: HOSPADM

## 2020-03-01 RX ORDER — NICOTINE POLACRILEX 4 MG
15 LOZENGE BUCCAL
Status: DISCONTINUED | OUTPATIENT
Start: 2020-03-01 | End: 2020-03-02 | Stop reason: HOSPADM

## 2020-03-01 RX ORDER — CALCIUM GLUCONATE 20 MG/ML
2 INJECTION, SOLUTION INTRAVENOUS AS NEEDED
Status: DISCONTINUED | OUTPATIENT
Start: 2020-03-01 | End: 2020-03-02

## 2020-03-01 RX ORDER — ROSUVASTATIN CALCIUM 10 MG/1
10 TABLET, COATED ORAL DAILY
Status: DISCONTINUED | OUTPATIENT
Start: 2020-03-02 | End: 2020-03-08 | Stop reason: HOSPADM

## 2020-03-01 RX ORDER — PRIMIDONE 50 MG/1
50 TABLET ORAL NIGHTLY
Status: DISCONTINUED | OUTPATIENT
Start: 2020-03-01 | End: 2020-03-01

## 2020-03-01 RX ORDER — ALUMINA, MAGNESIA, AND SIMETHICONE 2400; 2400; 240 MG/30ML; MG/30ML; MG/30ML
15 SUSPENSION ORAL EVERY 6 HOURS PRN
Status: DISCONTINUED | OUTPATIENT
Start: 2020-03-01 | End: 2020-03-08 | Stop reason: HOSPADM

## 2020-03-01 RX ORDER — AMLODIPINE BESYLATE 5 MG/1
5 TABLET ORAL DAILY
Status: DISCONTINUED | OUTPATIENT
Start: 2020-03-02 | End: 2020-03-08 | Stop reason: HOSPADM

## 2020-03-01 RX ORDER — MAGNESIUM SULFATE HEPTAHYDRATE 40 MG/ML
4 INJECTION, SOLUTION INTRAVENOUS AS NEEDED
Status: DISCONTINUED | OUTPATIENT
Start: 2020-03-01 | End: 2020-03-02 | Stop reason: HOSPADM

## 2020-03-01 RX ORDER — SODIUM CHLORIDE 0.9 % (FLUSH) 0.9 %
10 SYRINGE (ML) INJECTION AS NEEDED
Status: DISCONTINUED | OUTPATIENT
Start: 2020-03-01 | End: 2020-03-02 | Stop reason: HOSPADM

## 2020-03-01 RX ORDER — POTASSIUM CHLORIDE 1.5 G/1.77G
40 POWDER, FOR SOLUTION ORAL AS NEEDED
Status: DISCONTINUED | OUTPATIENT
Start: 2020-03-01 | End: 2020-03-02 | Stop reason: HOSPADM

## 2020-03-01 RX ORDER — CHOLECALCIFEROL (VITAMIN D3) 125 MCG
5 CAPSULE ORAL NIGHTLY PRN
Status: DISCONTINUED | OUTPATIENT
Start: 2020-03-01 | End: 2020-03-08 | Stop reason: HOSPADM

## 2020-03-01 RX ORDER — HYDROMORPHONE HCL 110MG/55ML
0.5 PATIENT CONTROLLED ANALGESIA SYRINGE INTRAVENOUS ONCE
Status: COMPLETED | OUTPATIENT
Start: 2020-03-01 | End: 2020-03-01

## 2020-03-01 RX ORDER — HYDROCODONE BITARTRATE AND ACETAMINOPHEN 5; 325 MG/1; MG/1
1 TABLET ORAL EVERY 6 HOURS PRN
Status: DISCONTINUED | OUTPATIENT
Start: 2020-03-01 | End: 2020-03-02 | Stop reason: HOSPADM

## 2020-03-01 RX ORDER — ONDANSETRON 2 MG/ML
4 INJECTION INTRAMUSCULAR; INTRAVENOUS EVERY 6 HOURS PRN
Status: DISCONTINUED | OUTPATIENT
Start: 2020-03-01 | End: 2020-03-02 | Stop reason: HOSPADM

## 2020-03-01 RX ORDER — CALCIUM GLUCONATE 20 MG/ML
1 INJECTION, SOLUTION INTRAVENOUS AS NEEDED
Status: DISCONTINUED | OUTPATIENT
Start: 2020-03-01 | End: 2020-03-02

## 2020-03-01 RX ORDER — KETOROLAC TROMETHAMINE 30 MG/ML
30 INJECTION, SOLUTION INTRAMUSCULAR; INTRAVENOUS ONCE
Status: COMPLETED | OUTPATIENT
Start: 2020-03-01 | End: 2020-03-01

## 2020-03-01 RX ORDER — ONDANSETRON 4 MG/1
4 TABLET, FILM COATED ORAL EVERY 6 HOURS PRN
Status: DISCONTINUED | OUTPATIENT
Start: 2020-03-01 | End: 2020-03-02 | Stop reason: HOSPADM

## 2020-03-01 RX ORDER — POTASSIUM CHLORIDE 20 MEQ/1
40 TABLET, EXTENDED RELEASE ORAL AS NEEDED
Status: DISCONTINUED | OUTPATIENT
Start: 2020-03-01 | End: 2020-03-02 | Stop reason: HOSPADM

## 2020-03-01 RX ORDER — SODIUM CHLORIDE 0.9 % (FLUSH) 0.9 %
10 SYRINGE (ML) INJECTION EVERY 12 HOURS SCHEDULED
Status: DISCONTINUED | OUTPATIENT
Start: 2020-03-01 | End: 2020-03-02 | Stop reason: HOSPADM

## 2020-03-01 RX ORDER — DOCUSATE SODIUM 100 MG/1
100 CAPSULE, LIQUID FILLED ORAL 2 TIMES DAILY PRN
Status: DISCONTINUED | OUTPATIENT
Start: 2020-03-01 | End: 2020-03-03

## 2020-03-01 RX ORDER — PRIMIDONE 50 MG/1
50 TABLET ORAL NIGHTLY
Status: DISCONTINUED | OUTPATIENT
Start: 2020-03-02 | End: 2020-03-08 | Stop reason: HOSPADM

## 2020-03-01 RX ORDER — TRIAMTERENE AND HYDROCHLOROTHIAZIDE 37.5; 25 MG/1; MG/1
1 TABLET ORAL DAILY
Status: DISCONTINUED | OUTPATIENT
Start: 2020-03-02 | End: 2020-03-08 | Stop reason: HOSPADM

## 2020-03-01 RX ORDER — MAGNESIUM SULFATE HEPTAHYDRATE 40 MG/ML
2 INJECTION, SOLUTION INTRAVENOUS AS NEEDED
Status: DISCONTINUED | OUTPATIENT
Start: 2020-03-01 | End: 2020-03-02 | Stop reason: HOSPADM

## 2020-03-01 RX ORDER — MULTIPLE VITAMINS W/ MINERALS TAB 2148-113
1 TAB ORAL DAILY
Status: DISCONTINUED | OUTPATIENT
Start: 2020-03-02 | End: 2020-03-08 | Stop reason: HOSPADM

## 2020-03-01 RX ORDER — NITROGLYCERIN 0.4 MG/1
0.4 TABLET SUBLINGUAL
Status: DISCONTINUED | OUTPATIENT
Start: 2020-03-01 | End: 2020-03-08 | Stop reason: HOSPADM

## 2020-03-01 RX ORDER — TEMAZEPAM 15 MG/1
30 CAPSULE ORAL NIGHTLY PRN
Status: DISCONTINUED | OUTPATIENT
Start: 2020-03-01 | End: 2020-03-07 | Stop reason: CLARIF

## 2020-03-01 RX ORDER — ONDANSETRON 2 MG/ML
4 INJECTION INTRAMUSCULAR; INTRAVENOUS ONCE
Status: COMPLETED | OUTPATIENT
Start: 2020-03-01 | End: 2020-03-01

## 2020-03-01 RX ORDER — DULOXETIN HYDROCHLORIDE 30 MG/1
60 CAPSULE, DELAYED RELEASE ORAL 2 TIMES DAILY
Status: DISCONTINUED | OUTPATIENT
Start: 2020-03-01 | End: 2020-03-08 | Stop reason: HOSPADM

## 2020-03-01 RX ADMIN — DULOXETINE HYDROCHLORIDE 60 MG: 30 CAPSULE, DELAYED RELEASE ORAL at 22:44

## 2020-03-01 RX ADMIN — INSULIN LISPRO 6 UNITS: 100 INJECTION, SOLUTION INTRAVENOUS; SUBCUTANEOUS at 22:44

## 2020-03-01 RX ADMIN — KETOROLAC TROMETHAMINE 30 MG: 30 INJECTION, SOLUTION INTRAMUSCULAR at 14:22

## 2020-03-01 RX ADMIN — Medication 10 ML: at 15:36

## 2020-03-01 RX ADMIN — HYDROMORPHONE HYDROCHLORIDE 0.5 MG: 2 INJECTION, SOLUTION INTRAMUSCULAR; INTRAVENOUS; SUBCUTANEOUS at 15:37

## 2020-03-01 RX ADMIN — Medication 10 ML: at 22:45

## 2020-03-01 RX ADMIN — ONDANSETRON 4 MG: 2 INJECTION INTRAMUSCULAR; INTRAVENOUS at 15:36

## 2020-03-01 RX ADMIN — HYDROCODONE BITARTRATE AND ACETAMINOPHEN 1 TABLET: 5; 325 TABLET ORAL at 22:50

## 2020-03-01 RX ADMIN — TEMAZEPAM 30 MG: 15 CAPSULE ORAL at 22:50

## 2020-03-02 ENCOUNTER — APPOINTMENT (OUTPATIENT)
Dept: GENERAL RADIOLOGY | Facility: HOSPITAL | Age: 70
End: 2020-03-02

## 2020-03-02 ENCOUNTER — ANESTHESIA EVENT (OUTPATIENT)
Dept: PERIOP | Facility: HOSPITAL | Age: 70
End: 2020-03-02

## 2020-03-02 ENCOUNTER — ANESTHESIA (OUTPATIENT)
Dept: PERIOP | Facility: HOSPITAL | Age: 70
End: 2020-03-02

## 2020-03-02 LAB
ABO GROUP BLD: NORMAL
ALBUMIN SERPL-MCNC: 3.7 G/DL (ref 3.5–5.2)
ALBUMIN/GLOB SERPL: 1.4 G/DL
ALP SERPL-CCNC: 60 U/L (ref 39–117)
ALT SERPL W P-5'-P-CCNC: 53 U/L (ref 1–33)
ANION GAP SERPL CALCULATED.3IONS-SCNC: 12 MMOL/L (ref 5–15)
AST SERPL-CCNC: 73 U/L (ref 1–32)
BASOPHILS # BLD AUTO: 0 10*3/MM3 (ref 0–0.2)
BASOPHILS NFR BLD AUTO: 0.4 % (ref 0–1.5)
BILIRUB SERPL-MCNC: 0.2 MG/DL (ref 0.2–1.2)
BLD GP AB SCN SERPL QL: NEGATIVE
BUN BLD-MCNC: 38 MG/DL (ref 8–23)
BUN/CREAT SERPL: 25.3 (ref 7–25)
CALCIUM SPEC-SCNC: 8.6 MG/DL (ref 8.6–10.5)
CHLORIDE SERPL-SCNC: 102 MMOL/L (ref 98–107)
CK SERPL-CCNC: 38 U/L (ref 20–180)
CO2 SERPL-SCNC: 26 MMOL/L (ref 22–29)
CREAT BLD-MCNC: 1.5 MG/DL (ref 0.57–1)
DEPRECATED RDW RBC AUTO: 46.4 FL (ref 37–54)
EOSINOPHIL # BLD AUTO: 0.2 10*3/MM3 (ref 0–0.4)
EOSINOPHIL NFR BLD AUTO: 1.8 % (ref 0.3–6.2)
ERYTHROCYTE [DISTWIDTH] IN BLOOD BY AUTOMATED COUNT: 14.7 % (ref 12.3–15.4)
FERRITIN SERPL-MCNC: 11.41 NG/ML (ref 13–150)
GFR SERPL CREATININE-BSD FRML MDRD: 34 ML/MIN/1.73
GLOBULIN UR ELPH-MCNC: 2.7 GM/DL
GLUCOSE BLD-MCNC: 60 MG/DL (ref 65–99)
GLUCOSE BLDC GLUCOMTR-MCNC: 103 MG/DL (ref 70–105)
GLUCOSE BLDC GLUCOMTR-MCNC: 105 MG/DL (ref 70–105)
GLUCOSE BLDC GLUCOMTR-MCNC: 110 MG/DL (ref 70–105)
GLUCOSE BLDC GLUCOMTR-MCNC: 111 MG/DL (ref 70–105)
GLUCOSE BLDC GLUCOMTR-MCNC: 278 MG/DL (ref 70–105)
GLUCOSE BLDC GLUCOMTR-MCNC: 55 MG/DL (ref 70–105)
GLUCOSE BLDC GLUCOMTR-MCNC: 60 MG/DL (ref 70–105)
GLUCOSE BLDC GLUCOMTR-MCNC: 60 MG/DL (ref 70–105)
HBA1C MFR BLD: 7.5 % (ref 3.5–5.6)
HCT VFR BLD AUTO: 33.3 % (ref 34–46.6)
HGB BLD-MCNC: 10.8 G/DL (ref 12–15.9)
LYMPHOCYTES # BLD AUTO: 1.4 10*3/MM3 (ref 0.7–3.1)
LYMPHOCYTES NFR BLD AUTO: 13.2 % (ref 19.6–45.3)
MAGNESIUM SERPL-MCNC: 2.3 MG/DL (ref 1.6–2.4)
MCH RBC QN AUTO: 29 PG (ref 26.6–33)
MCHC RBC AUTO-ENTMCNC: 32.4 G/DL (ref 31.5–35.7)
MCV RBC AUTO: 89.4 FL (ref 79–97)
MONOCYTES # BLD AUTO: 1 10*3/MM3 (ref 0.1–0.9)
MONOCYTES NFR BLD AUTO: 9.3 % (ref 5–12)
MRSA DNA SPEC QL NAA+PROBE: NORMAL
NEUTROPHILS # BLD AUTO: 7.9 10*3/MM3 (ref 1.7–7)
NEUTROPHILS NFR BLD AUTO: 75.3 % (ref 42.7–76)
NRBC BLD AUTO-RTO: 0 /100 WBC (ref 0–0.2)
NT-PROBNP SERPL-MCNC: 57.4 PG/ML (ref 5–900)
PHOSPHATE SERPL-MCNC: 5.1 MG/DL (ref 2.5–4.5)
PLATELET # BLD AUTO: 423 10*3/MM3 (ref 140–450)
PMV BLD AUTO: 7.6 FL (ref 6–12)
POTASSIUM BLD-SCNC: 3.9 MMOL/L (ref 3.5–5.2)
PROT SERPL-MCNC: 6.4 G/DL (ref 6–8.5)
RBC # BLD AUTO: 3.73 10*6/MM3 (ref 3.77–5.28)
RH BLD: POSITIVE
SODIUM BLD-SCNC: 140 MMOL/L (ref 136–145)
T&S EXPIRATION DATE: NORMAL
TROPONIN T SERPL-MCNC: <0.01 NG/ML (ref 0–0.03)
WBC NRBC COR # BLD: 10.6 10*3/MM3 (ref 3.4–10.8)

## 2020-03-02 PROCEDURE — 25010000002 FENTANYL CITRATE (PF) 100 MCG/2ML SOLUTION: Performed by: ANESTHESIOLOGY

## 2020-03-02 PROCEDURE — 93005 ELECTROCARDIOGRAM TRACING: CPT | Performed by: ORTHOPAEDIC SURGERY

## 2020-03-02 PROCEDURE — 86900 BLOOD TYPING SEROLOGIC ABO: CPT

## 2020-03-02 PROCEDURE — 80053 COMPREHEN METABOLIC PANEL: CPT | Performed by: PHYSICIAN ASSISTANT

## 2020-03-02 PROCEDURE — 84484 ASSAY OF TROPONIN QUANT: CPT | Performed by: PHYSICIAN ASSISTANT

## 2020-03-02 PROCEDURE — C1713 ANCHOR/SCREW BN/BN,TIS/BN: HCPCS | Performed by: ORTHOPAEDIC SURGERY

## 2020-03-02 PROCEDURE — 83735 ASSAY OF MAGNESIUM: CPT | Performed by: PHYSICIAN ASSISTANT

## 2020-03-02 PROCEDURE — 71045 X-RAY EXAM CHEST 1 VIEW: CPT

## 2020-03-02 PROCEDURE — 0QS604Z REPOSITION RIGHT UPPER FEMUR WITH INTERNAL FIXATION DEVICE, OPEN APPROACH: ICD-10-PCS | Performed by: ORTHOPAEDIC SURGERY

## 2020-03-02 PROCEDURE — 83880 ASSAY OF NATRIURETIC PEPTIDE: CPT | Performed by: PHYSICIAN ASSISTANT

## 2020-03-02 PROCEDURE — 83036 HEMOGLOBIN GLYCOSYLATED A1C: CPT | Performed by: PHYSICIAN ASSISTANT

## 2020-03-02 PROCEDURE — 86901 BLOOD TYPING SEROLOGIC RH(D): CPT

## 2020-03-02 PROCEDURE — 82962 GLUCOSE BLOOD TEST: CPT

## 2020-03-02 PROCEDURE — 73502 X-RAY EXAM HIP UNI 2-3 VIEWS: CPT

## 2020-03-02 PROCEDURE — 99232 SBSQ HOSP IP/OBS MODERATE 35: CPT | Performed by: HOSPITALIST

## 2020-03-02 PROCEDURE — 25010000002 HYDROMORPHONE PER 4 MG: Performed by: ANESTHESIOLOGY

## 2020-03-02 PROCEDURE — 25010000002 MIDAZOLAM PER 1 MG: Performed by: ANESTHESIOLOGY

## 2020-03-02 PROCEDURE — 86850 RBC ANTIBODY SCREEN: CPT | Performed by: ORTHOPAEDIC SURGERY

## 2020-03-02 PROCEDURE — 86901 BLOOD TYPING SEROLOGIC RH(D): CPT | Performed by: ORTHOPAEDIC SURGERY

## 2020-03-02 PROCEDURE — 25010000002 PROPOFOL 10 MG/ML EMULSION: Performed by: ANESTHESIOLOGY

## 2020-03-02 PROCEDURE — 87641 MR-STAPH DNA AMP PROBE: CPT | Performed by: ORTHOPAEDIC SURGERY

## 2020-03-02 PROCEDURE — 82728 ASSAY OF FERRITIN: CPT | Performed by: PHYSICIAN ASSISTANT

## 2020-03-02 PROCEDURE — 82550 ASSAY OF CK (CPK): CPT | Performed by: PHYSICIAN ASSISTANT

## 2020-03-02 PROCEDURE — 86900 BLOOD TYPING SEROLOGIC ABO: CPT | Performed by: ORTHOPAEDIC SURGERY

## 2020-03-02 PROCEDURE — 85025 COMPLETE CBC W/AUTO DIFF WBC: CPT | Performed by: PHYSICIAN ASSISTANT

## 2020-03-02 PROCEDURE — 84100 ASSAY OF PHOSPHORUS: CPT | Performed by: PHYSICIAN ASSISTANT

## 2020-03-02 PROCEDURE — 76000 FLUOROSCOPY <1 HR PHYS/QHP: CPT

## 2020-03-02 DEVICE — CANNULATED SCREW
Type: IMPLANTABLE DEVICE | Site: HIP | Status: FUNCTIONAL
Brand: ASNIS

## 2020-03-02 RX ORDER — MIDAZOLAM HYDROCHLORIDE 1 MG/ML
INJECTION INTRAMUSCULAR; INTRAVENOUS
Status: COMPLETED | OUTPATIENT
Start: 2020-03-02 | End: 2020-03-02

## 2020-03-02 RX ORDER — SODIUM CHLORIDE 9 MG/ML
125 INJECTION, SOLUTION INTRAVENOUS CONTINUOUS
Status: DISPENSED | OUTPATIENT
Start: 2020-03-02 | End: 2020-03-03

## 2020-03-02 RX ORDER — ROCURONIUM BROMIDE 10 MG/ML
INJECTION, SOLUTION INTRAVENOUS AS NEEDED
Status: DISCONTINUED | OUTPATIENT
Start: 2020-03-02 | End: 2020-03-02 | Stop reason: SURG

## 2020-03-02 RX ORDER — FENTANYL CITRATE 50 UG/ML
INJECTION, SOLUTION INTRAMUSCULAR; INTRAVENOUS AS NEEDED
Status: DISCONTINUED | OUTPATIENT
Start: 2020-03-02 | End: 2020-03-02 | Stop reason: SURG

## 2020-03-02 RX ORDER — DIAPER,BRIEF,INFANT-TODD,DISP
EACH MISCELLANEOUS EVERY 12 HOURS SCHEDULED
Status: DISCONTINUED | OUTPATIENT
Start: 2020-03-02 | End: 2020-03-08 | Stop reason: HOSPADM

## 2020-03-02 RX ORDER — KETOROLAC TROMETHAMINE 15 MG/ML
15 INJECTION, SOLUTION INTRAMUSCULAR; INTRAVENOUS EVERY 6 HOURS PRN
Status: DISCONTINUED | OUTPATIENT
Start: 2020-03-02 | End: 2020-03-02 | Stop reason: HOSPADM

## 2020-03-02 RX ORDER — ASPIRIN 325 MG
325 TABLET, DELAYED RELEASE (ENTERIC COATED) ORAL EVERY 12 HOURS SCHEDULED
Status: DISCONTINUED | OUTPATIENT
Start: 2020-03-03 | End: 2020-03-08 | Stop reason: HOSPADM

## 2020-03-02 RX ORDER — LIDOCAINE HYDROCHLORIDE 10 MG/ML
INJECTION, SOLUTION EPIDURAL; INFILTRATION; INTRACAUDAL; PERINEURAL AS NEEDED
Status: DISCONTINUED | OUTPATIENT
Start: 2020-03-02 | End: 2020-03-02 | Stop reason: SURG

## 2020-03-02 RX ORDER — ONDANSETRON 2 MG/ML
4 INJECTION INTRAMUSCULAR; INTRAVENOUS EVERY 6 HOURS PRN
Status: DISCONTINUED | OUTPATIENT
Start: 2020-03-02 | End: 2020-03-08 | Stop reason: HOSPADM

## 2020-03-02 RX ORDER — DOCUSATE SODIUM 100 MG/1
100 CAPSULE, LIQUID FILLED ORAL 2 TIMES DAILY PRN
Status: DISCONTINUED | OUTPATIENT
Start: 2020-03-02 | End: 2020-03-08 | Stop reason: HOSPADM

## 2020-03-02 RX ORDER — SODIUM CHLORIDE, SODIUM LACTATE, POTASSIUM CHLORIDE, CALCIUM CHLORIDE 600; 310; 30; 20 MG/100ML; MG/100ML; MG/100ML; MG/100ML
INJECTION, SOLUTION INTRAVENOUS CONTINUOUS PRN
Status: DISCONTINUED | OUTPATIENT
Start: 2020-03-02 | End: 2020-03-02 | Stop reason: SURG

## 2020-03-02 RX ORDER — BUPIVACAINE HYDROCHLORIDE 2.5 MG/ML
INJECTION, SOLUTION EPIDURAL; INFILTRATION; INTRACAUDAL
Status: COMPLETED | OUTPATIENT
Start: 2020-03-02 | End: 2020-03-02

## 2020-03-02 RX ORDER — BISACODYL 5 MG/1
10 TABLET, DELAYED RELEASE ORAL DAILY PRN
Status: DISCONTINUED | OUTPATIENT
Start: 2020-03-02 | End: 2020-03-08 | Stop reason: HOSPADM

## 2020-03-02 RX ORDER — HYDROMORPHONE HCL 110MG/55ML
0.25 PATIENT CONTROLLED ANALGESIA SYRINGE INTRAVENOUS
Status: DISCONTINUED | OUTPATIENT
Start: 2020-03-02 | End: 2020-03-02 | Stop reason: HOSPADM

## 2020-03-02 RX ORDER — PROPOFOL 10 MG/ML
VIAL (ML) INTRAVENOUS AS NEEDED
Status: DISCONTINUED | OUTPATIENT
Start: 2020-03-02 | End: 2020-03-02 | Stop reason: SURG

## 2020-03-02 RX ORDER — SODIUM CHLORIDE 0.9 % (FLUSH) 0.9 %
1-10 SYRINGE (ML) INJECTION AS NEEDED
Status: DISCONTINUED | OUTPATIENT
Start: 2020-03-02 | End: 2020-03-08 | Stop reason: HOSPADM

## 2020-03-02 RX ORDER — SODIUM CHLORIDE 9 MG/ML
75 INJECTION, SOLUTION INTRAVENOUS CONTINUOUS
Status: DISCONTINUED | OUTPATIENT
Start: 2020-03-02 | End: 2020-03-02 | Stop reason: HOSPADM

## 2020-03-02 RX ORDER — ACETAMINOPHEN 325 MG/1
325 TABLET ORAL EVERY 4 HOURS PRN
Status: DISCONTINUED | OUTPATIENT
Start: 2020-03-02 | End: 2020-03-08 | Stop reason: HOSPADM

## 2020-03-02 RX ORDER — OXYCODONE HYDROCHLORIDE 5 MG/1
10 TABLET ORAL EVERY 4 HOURS PRN
Status: DISCONTINUED | OUTPATIENT
Start: 2020-03-02 | End: 2020-03-08 | Stop reason: HOSPADM

## 2020-03-02 RX ORDER — PHENYLEPHRINE HCL IN 0.9% NACL 0.5 MG/5ML
SYRINGE (ML) INTRAVENOUS AS NEEDED
Status: DISCONTINUED | OUTPATIENT
Start: 2020-03-02 | End: 2020-03-02 | Stop reason: SURG

## 2020-03-02 RX ORDER — LIDOCAINE HYDROCHLORIDE AND EPINEPHRINE 10; 10 MG/ML; UG/ML
INJECTION, SOLUTION INFILTRATION; PERINEURAL AS NEEDED
Status: DISCONTINUED | OUTPATIENT
Start: 2020-03-02 | End: 2020-03-02 | Stop reason: HOSPADM

## 2020-03-02 RX ORDER — ONDANSETRON 4 MG/1
4 TABLET, FILM COATED ORAL EVERY 6 HOURS PRN
Status: DISCONTINUED | OUTPATIENT
Start: 2020-03-02 | End: 2020-03-08 | Stop reason: HOSPADM

## 2020-03-02 RX ORDER — HYDROCODONE BITARTRATE AND ACETAMINOPHEN 7.5; 325 MG/1; MG/1
1 TABLET ORAL EVERY 4 HOURS PRN
Status: DISCONTINUED | OUTPATIENT
Start: 2020-03-02 | End: 2020-03-08 | Stop reason: HOSPADM

## 2020-03-02 RX ORDER — BISACODYL 10 MG
10 SUPPOSITORY, RECTAL RECTAL DAILY PRN
Status: DISCONTINUED | OUTPATIENT
Start: 2020-03-02 | End: 2020-03-08 | Stop reason: HOSPADM

## 2020-03-02 RX ADMIN — DULOXETINE HYDROCHLORIDE 60 MG: 30 CAPSULE, DELAYED RELEASE ORAL at 08:21

## 2020-03-02 RX ADMIN — MIDAZOLAM 2 MG: 1 INJECTION INTRAMUSCULAR; INTRAVENOUS at 16:40

## 2020-03-02 RX ADMIN — CEFAZOLIN SODIUM 2 G: 1 INJECTION, POWDER, FOR SOLUTION INTRAMUSCULAR; INTRAVENOUS at 16:55

## 2020-03-02 RX ADMIN — Medication 10 ML: at 22:03

## 2020-03-02 RX ADMIN — HYDROCODONE BITARTRATE AND ACETAMINOPHEN 1 TABLET: 5; 325 TABLET ORAL at 07:27

## 2020-03-02 RX ADMIN — BUPIVACAINE HYDROCHLORIDE 30 ML: 2.5 INJECTION, SOLUTION EPIDURAL; INFILTRATION; INTRACAUDAL; PERINEURAL at 16:40

## 2020-03-02 RX ADMIN — PROPOFOL 150 MG: 10 INJECTION, EMULSION INTRAVENOUS at 16:49

## 2020-03-02 RX ADMIN — Medication 1 TABLET: at 08:21

## 2020-03-02 RX ADMIN — OXYCODONE HYDROCHLORIDE 10 MG: 5 TABLET ORAL at 22:19

## 2020-03-02 RX ADMIN — TRIAMTERENE AND HYDROCHLOROTHIAZIDE 1 TABLET: 37.5; 25 TABLET ORAL at 08:21

## 2020-03-02 RX ADMIN — HYDROMORPHONE HYDROCHLORIDE 0.5 MG: 2 INJECTION, SOLUTION INTRAMUSCULAR; INTRAVENOUS; SUBCUTANEOUS at 18:10

## 2020-03-02 RX ADMIN — AMLODIPINE BESYLATE 5 MG: 5 TABLET ORAL at 08:21

## 2020-03-02 RX ADMIN — ROSUVASTATIN CALCIUM 10 MG: 10 TABLET, FILM COATED ORAL at 08:21

## 2020-03-02 RX ADMIN — FENTANYL CITRATE 50 MCG: 50 INJECTION, SOLUTION INTRAMUSCULAR; INTRAVENOUS at 16:49

## 2020-03-02 RX ADMIN — TEMAZEPAM 30 MG: 15 CAPSULE ORAL at 22:27

## 2020-03-02 RX ADMIN — PHENYLEPHRINE HYDROCHLORIDE 200 MCG: 10 INJECTION INTRAVENOUS at 17:07

## 2020-03-02 RX ADMIN — LIDOCAINE HYDROCHLORIDE 50 MG: 10 INJECTION, SOLUTION EPIDURAL; INFILTRATION; INTRACAUDAL; PERINEURAL at 16:49

## 2020-03-02 RX ADMIN — DEXTROSE 50 % IN WATER (D50W) INTRAVENOUS SYRINGE 25 G: at 15:30

## 2020-03-02 RX ADMIN — PRIMIDONE 50 MG: 50 TABLET ORAL at 21:12

## 2020-03-02 RX ADMIN — ROCURONIUM BROMIDE 10 MG: 10 INJECTION, SOLUTION INTRAVENOUS at 17:30

## 2020-03-02 RX ADMIN — PHENYLEPHRINE HYDROCHLORIDE 200 MCG: 10 INJECTION INTRAVENOUS at 17:17

## 2020-03-02 RX ADMIN — HYDROMORPHONE HYDROCHLORIDE 0.5 MG: 2 INJECTION, SOLUTION INTRAMUSCULAR; INTRAVENOUS; SUBCUTANEOUS at 18:30

## 2020-03-02 RX ADMIN — SODIUM CHLORIDE 125 ML/HR: 0.9 INJECTION, SOLUTION INTRAVENOUS at 21:59

## 2020-03-02 RX ADMIN — ROCURONIUM BROMIDE 10 MG: 10 INJECTION, SOLUTION INTRAVENOUS at 17:23

## 2020-03-02 RX ADMIN — BACITRACIN 1 APPLICATION: 500 OINTMENT TOPICAL at 22:21

## 2020-03-02 RX ADMIN — ROCURONIUM BROMIDE 30 MG: 10 INJECTION, SOLUTION INTRAVENOUS at 16:49

## 2020-03-02 RX ADMIN — SUGAMMADEX 200 MG: 100 INJECTION, SOLUTION INTRAVENOUS at 17:37

## 2020-03-02 RX ADMIN — DULOXETINE HYDROCHLORIDE 60 MG: 30 CAPSULE, DELAYED RELEASE ORAL at 21:12

## 2020-03-02 RX ADMIN — FENTANYL CITRATE 50 MCG: 50 INJECTION, SOLUTION INTRAMUSCULAR; INTRAVENOUS at 17:39

## 2020-03-02 RX ADMIN — PROPOFOL 150 MCG/KG/MIN: 10 INJECTION, EMULSION INTRAVENOUS at 16:49

## 2020-03-02 RX ADMIN — Medication 10 ML: at 21:12

## 2020-03-02 RX ADMIN — SODIUM CHLORIDE, SODIUM LACTATE, POTASSIUM CHLORIDE, AND CALCIUM CHLORIDE: .6; .31; .03; .02 INJECTION, SOLUTION INTRAVENOUS at 16:45

## 2020-03-02 RX ADMIN — Medication 10 ML: at 08:21

## 2020-03-02 RX ADMIN — DEXTROSE 15 G: 15 GEL ORAL at 07:24

## 2020-03-03 LAB
ANION GAP SERPL CALCULATED.3IONS-SCNC: 11 MMOL/L (ref 5–15)
BUN BLD-MCNC: 31 MG/DL (ref 8–23)
BUN/CREAT SERPL: 22 (ref 7–25)
CALCIUM SPEC-SCNC: 7.8 MG/DL (ref 8.6–10.5)
CHLORIDE SERPL-SCNC: 99 MMOL/L (ref 98–107)
CO2 SERPL-SCNC: 25 MMOL/L (ref 22–29)
CREAT BLD-MCNC: 1.41 MG/DL (ref 0.57–1)
GFR SERPL CREATININE-BSD FRML MDRD: 37 ML/MIN/1.73
GLUCOSE BLD-MCNC: 182 MG/DL (ref 65–99)
GLUCOSE BLDC GLUCOMTR-MCNC: 101 MG/DL (ref 70–105)
GLUCOSE BLDC GLUCOMTR-MCNC: 113 MG/DL (ref 70–105)
GLUCOSE BLDC GLUCOMTR-MCNC: 140 MG/DL (ref 70–105)
GLUCOSE BLDC GLUCOMTR-MCNC: 147 MG/DL (ref 70–105)
GLUCOSE BLDC GLUCOMTR-MCNC: 183 MG/DL (ref 70–105)
HCT VFR BLD AUTO: 31.5 % (ref 34–46.6)
HGB BLD-MCNC: 10.5 G/DL (ref 12–15.9)
POTASSIUM BLD-SCNC: 3.9 MMOL/L (ref 3.5–5.2)
SODIUM BLD-SCNC: 135 MMOL/L (ref 136–145)

## 2020-03-03 PROCEDURE — 82962 GLUCOSE BLOOD TEST: CPT

## 2020-03-03 PROCEDURE — 25010000002 ONDANSETRON PER 1 MG: Performed by: ORTHOPAEDIC SURGERY

## 2020-03-03 PROCEDURE — 99232 SBSQ HOSP IP/OBS MODERATE 35: CPT | Performed by: HOSPITALIST

## 2020-03-03 PROCEDURE — 25010000002 CEFAZOLIN PER 500 MG: Performed by: ORTHOPAEDIC SURGERY

## 2020-03-03 PROCEDURE — 85014 HEMATOCRIT: CPT | Performed by: ORTHOPAEDIC SURGERY

## 2020-03-03 PROCEDURE — 85018 HEMOGLOBIN: CPT | Performed by: ORTHOPAEDIC SURGERY

## 2020-03-03 PROCEDURE — 80048 BASIC METABOLIC PNL TOTAL CA: CPT | Performed by: ORTHOPAEDIC SURGERY

## 2020-03-03 PROCEDURE — 97162 PT EVAL MOD COMPLEX 30 MIN: CPT

## 2020-03-03 PROCEDURE — 97535 SELF CARE MNGMENT TRAINING: CPT

## 2020-03-03 RX ORDER — DOCUSATE SODIUM 100 MG/1
100 CAPSULE, LIQUID FILLED ORAL 2 TIMES DAILY
Status: DISCONTINUED | OUTPATIENT
Start: 2020-03-03 | End: 2020-03-08 | Stop reason: HOSPADM

## 2020-03-03 RX ADMIN — OXYCODONE HYDROCHLORIDE 10 MG: 5 TABLET ORAL at 21:05

## 2020-03-03 RX ADMIN — AMLODIPINE BESYLATE 5 MG: 5 TABLET ORAL at 08:51

## 2020-03-03 RX ADMIN — TRIAMTERENE AND HYDROCHLOROTHIAZIDE 1 TABLET: 37.5; 25 TABLET ORAL at 08:50

## 2020-03-03 RX ADMIN — BACITRACIN: 500 OINTMENT TOPICAL at 21:06

## 2020-03-03 RX ADMIN — Medication 1 TABLET: at 08:50

## 2020-03-03 RX ADMIN — OXYCODONE HYDROCHLORIDE 10 MG: 5 TABLET ORAL at 12:43

## 2020-03-03 RX ADMIN — OXYCODONE HYDROCHLORIDE 10 MG: 5 TABLET ORAL at 07:38

## 2020-03-03 RX ADMIN — DULOXETINE HYDROCHLORIDE 60 MG: 30 CAPSULE, DELAYED RELEASE ORAL at 08:50

## 2020-03-03 RX ADMIN — CEFAZOLIN SODIUM 2 G: 10 INJECTION, POWDER, FOR SOLUTION INTRAVENOUS at 08:50

## 2020-03-03 RX ADMIN — Medication 10 ML: at 08:50

## 2020-03-03 RX ADMIN — POLYETHYLENE GLYCOL 3350 17 G: 17 POWDER, FOR SOLUTION ORAL at 08:50

## 2020-03-03 RX ADMIN — ROSUVASTATIN CALCIUM 10 MG: 10 TABLET, FILM COATED ORAL at 08:50

## 2020-03-03 RX ADMIN — TEMAZEPAM 30 MG: 15 CAPSULE ORAL at 21:13

## 2020-03-03 RX ADMIN — ASPIRIN 325 MG: 325 TABLET ORAL at 21:05

## 2020-03-03 RX ADMIN — ASPIRIN 325 MG: 325 TABLET ORAL at 08:50

## 2020-03-03 RX ADMIN — DULOXETINE HYDROCHLORIDE 60 MG: 30 CAPSULE, DELAYED RELEASE ORAL at 21:06

## 2020-03-03 RX ADMIN — PRIMIDONE 50 MG: 50 TABLET ORAL at 21:06

## 2020-03-03 RX ADMIN — BACITRACIN: 500 OINTMENT TOPICAL at 08:50

## 2020-03-03 RX ADMIN — ONDANSETRON 4 MG: 2 INJECTION INTRAMUSCULAR; INTRAVENOUS at 01:23

## 2020-03-03 RX ADMIN — DOCUSATE SODIUM 100 MG: 100 CAPSULE, LIQUID FILLED ORAL at 21:07

## 2020-03-03 RX ADMIN — DOCUSATE SODIUM 100 MG: 100 CAPSULE, LIQUID FILLED ORAL at 12:43

## 2020-03-03 RX ADMIN — CEFAZOLIN SODIUM 2 G: 10 INJECTION, POWDER, FOR SOLUTION INTRAVENOUS at 01:32

## 2020-03-04 ENCOUNTER — APPOINTMENT (OUTPATIENT)
Dept: CT IMAGING | Facility: HOSPITAL | Age: 70
End: 2020-03-04

## 2020-03-04 ENCOUNTER — APPOINTMENT (OUTPATIENT)
Dept: GENERAL RADIOLOGY | Facility: HOSPITAL | Age: 70
End: 2020-03-04

## 2020-03-04 LAB
ANION GAP SERPL CALCULATED.3IONS-SCNC: 11 MMOL/L (ref 5–15)
ARTERIAL PATENCY WRIST A: POSITIVE
ATMOSPHERIC PRESS: ABNORMAL MM[HG]
BASE EXCESS BLDA CALC-SCNC: 1.8 MMOL/L (ref 0–3)
BASOPHILS # BLD AUTO: 0.1 10*3/MM3 (ref 0–0.2)
BASOPHILS NFR BLD AUTO: 0.8 % (ref 0–1.5)
BDY SITE: ABNORMAL
BUN BLD-MCNC: 27 MG/DL (ref 8–23)
BUN/CREAT SERPL: 19.7 (ref 7–25)
CA-I BLDA-SCNC: 1.13 MMOL/L (ref 1.15–1.33)
CALCIUM SPEC-SCNC: 8 MG/DL (ref 8.6–10.5)
CHLORIDE SERPL-SCNC: 99 MMOL/L (ref 98–107)
CO2 BLDA-SCNC: 28.7 MMOL/L (ref 22–29)
CO2 SERPL-SCNC: 26 MMOL/L (ref 22–29)
CREAT BLD-MCNC: 1.37 MG/DL (ref 0.57–1)
D-LACTATE SERPL-SCNC: 1 MMOL/L (ref 0.5–2)
DEPRECATED RDW RBC AUTO: 47.3 FL (ref 37–54)
EOSINOPHIL # BLD AUTO: 0.4 10*3/MM3 (ref 0–0.4)
EOSINOPHIL NFR BLD AUTO: 4.3 % (ref 0.3–6.2)
ERYTHROCYTE [DISTWIDTH] IN BLOOD BY AUTOMATED COUNT: 15.2 % (ref 12.3–15.4)
GFR SERPL CREATININE-BSD FRML MDRD: 38 ML/MIN/1.73
GLUCOSE BLD-MCNC: 151 MG/DL (ref 65–99)
GLUCOSE BLDC GLUCOMTR-MCNC: 124 MG/DL (ref 70–105)
GLUCOSE BLDC GLUCOMTR-MCNC: 131 MG/DL (ref 70–105)
GLUCOSE BLDC GLUCOMTR-MCNC: 133 MG/DL (ref 74–100)
GLUCOSE BLDC GLUCOMTR-MCNC: 137 MG/DL (ref 70–105)
GLUCOSE BLDC GLUCOMTR-MCNC: 154 MG/DL (ref 70–105)
GLUCOSE BLDC GLUCOMTR-MCNC: 192 MG/DL (ref 70–105)
GLUCOSE BLDC GLUCOMTR-MCNC: 97 MG/DL (ref 70–105)
HCO3 BLDA-SCNC: 27.3 MMOL/L (ref 21–28)
HCT VFR BLD AUTO: 31.8 % (ref 34–46.6)
HCT VFR BLDA CALC: 32 % (ref 38–51)
HEMODILUTION: NO
HGB BLD-MCNC: 10.6 G/DL (ref 12–15.9)
HGB BLDA-MCNC: 10.9 G/DL (ref 12–17)
HOROWITZ INDEX BLD+IHG-RTO: <21 %
INR PPP: 1.02 (ref 0.9–1.1)
LYMPHOCYTES # BLD AUTO: 1.5 10*3/MM3 (ref 0.7–3.1)
LYMPHOCYTES NFR BLD AUTO: 17.3 % (ref 19.6–45.3)
MCH RBC QN AUTO: 29.6 PG (ref 26.6–33)
MCHC RBC AUTO-ENTMCNC: 33.5 G/DL (ref 31.5–35.7)
MCV RBC AUTO: 88.4 FL (ref 79–97)
MODALITY: ABNORMAL
MONOCYTES # BLD AUTO: 0.9 10*3/MM3 (ref 0.1–0.9)
MONOCYTES NFR BLD AUTO: 10 % (ref 5–12)
NEUTROPHILS # BLD AUTO: 6 10*3/MM3 (ref 1.7–7)
NEUTROPHILS NFR BLD AUTO: 67.6 % (ref 42.7–76)
NRBC BLD AUTO-RTO: 0 /100 WBC (ref 0–0.2)
PCO2 BLDA: 45.6 MM HG (ref 35–48)
PH BLDA: 7.38 PH UNITS (ref 7.35–7.45)
PLATELET # BLD AUTO: 409 10*3/MM3 (ref 140–450)
PMV BLD AUTO: 8 FL (ref 6–12)
PO2 BLDA: 112.5 MM HG (ref 83–108)
POTASSIUM BLD-SCNC: 4 MMOL/L (ref 3.5–5.2)
POTASSIUM BLDA-SCNC: 3.4 MMOL/L (ref 3.5–4.5)
PROTHROMBIN TIME: 10.7 SECONDS (ref 9.6–11.7)
RBC # BLD AUTO: 3.59 10*6/MM3 (ref 3.77–5.28)
SAO2 % BLDCOA: 98.3 % (ref 94–98)
SODIUM BLD-SCNC: 136 MMOL/L (ref 136–145)
SODIUM BLDA-SCNC: 138 MMOL/L (ref 138–146)
WBC NRBC COR # BLD: 8.8 10*3/MM3 (ref 3.4–10.8)

## 2020-03-04 PROCEDURE — 85610 PROTHROMBIN TIME: CPT | Performed by: HOSPITALIST

## 2020-03-04 PROCEDURE — 99232 SBSQ HOSP IP/OBS MODERATE 35: CPT | Performed by: HOSPITALIST

## 2020-03-04 PROCEDURE — 36600 WITHDRAWAL OF ARTERIAL BLOOD: CPT

## 2020-03-04 PROCEDURE — 63710000001 INSULIN LISPRO (HUMAN) PER 5 UNITS: Performed by: ORTHOPAEDIC SURGERY

## 2020-03-04 PROCEDURE — 82962 GLUCOSE BLOOD TEST: CPT

## 2020-03-04 PROCEDURE — 80051 ELECTROLYTE PANEL: CPT

## 2020-03-04 PROCEDURE — 80048 BASIC METABOLIC PNL TOTAL CA: CPT | Performed by: ORTHOPAEDIC SURGERY

## 2020-03-04 PROCEDURE — 85025 COMPLETE CBC W/AUTO DIFF WBC: CPT | Performed by: HOSPITALIST

## 2020-03-04 PROCEDURE — 70450 CT HEAD/BRAIN W/O DYE: CPT

## 2020-03-04 PROCEDURE — 85018 HEMOGLOBIN: CPT

## 2020-03-04 PROCEDURE — 82803 BLOOD GASES ANY COMBINATION: CPT

## 2020-03-04 PROCEDURE — 82330 ASSAY OF CALCIUM: CPT

## 2020-03-04 PROCEDURE — 97110 THERAPEUTIC EXERCISES: CPT

## 2020-03-04 PROCEDURE — 72125 CT NECK SPINE W/O DYE: CPT

## 2020-03-04 PROCEDURE — 97165 OT EVAL LOW COMPLEX 30 MIN: CPT

## 2020-03-04 PROCEDURE — 94799 UNLISTED PULMONARY SVC/PX: CPT

## 2020-03-04 PROCEDURE — 97530 THERAPEUTIC ACTIVITIES: CPT

## 2020-03-04 PROCEDURE — 97535 SELF CARE MNGMENT TRAINING: CPT

## 2020-03-04 PROCEDURE — 83605 ASSAY OF LACTIC ACID: CPT

## 2020-03-04 RX ORDER — MECLIZINE HYDROCHLORIDE 25 MG/1
25 TABLET ORAL 3 TIMES DAILY PRN
Status: DISCONTINUED | OUTPATIENT
Start: 2020-03-04 | End: 2020-03-08 | Stop reason: HOSPADM

## 2020-03-04 RX ADMIN — POLYETHYLENE GLYCOL 3350 17 G: 17 POWDER, FOR SOLUTION ORAL at 08:17

## 2020-03-04 RX ADMIN — TRIAMTERENE AND HYDROCHLOROTHIAZIDE 1 TABLET: 37.5; 25 TABLET ORAL at 08:17

## 2020-03-04 RX ADMIN — DULOXETINE HYDROCHLORIDE 60 MG: 30 CAPSULE, DELAYED RELEASE ORAL at 21:55

## 2020-03-04 RX ADMIN — ALUMINUM HYDROXIDE, MAGNESIUM HYDROXIDE, AND DIMETHICONE 15 ML: 400; 400; 40 SUSPENSION ORAL at 22:13

## 2020-03-04 RX ADMIN — OXYCODONE HYDROCHLORIDE 10 MG: 5 TABLET ORAL at 21:55

## 2020-03-04 RX ADMIN — OXYCODONE HYDROCHLORIDE 10 MG: 5 TABLET ORAL at 12:53

## 2020-03-04 RX ADMIN — ASPIRIN 325 MG: 325 TABLET ORAL at 08:17

## 2020-03-04 RX ADMIN — ASPIRIN 325 MG: 325 TABLET ORAL at 21:55

## 2020-03-04 RX ADMIN — OXYCODONE HYDROCHLORIDE 10 MG: 5 TABLET ORAL at 03:28

## 2020-03-04 RX ADMIN — DOCUSATE SODIUM 100 MG: 100 CAPSULE, LIQUID FILLED ORAL at 08:16

## 2020-03-04 RX ADMIN — Medication 1 TABLET: at 08:16

## 2020-03-04 RX ADMIN — BACITRACIN: 500 OINTMENT TOPICAL at 21:55

## 2020-03-04 RX ADMIN — BACITRACIN: 500 OINTMENT TOPICAL at 08:17

## 2020-03-04 RX ADMIN — INSULIN LISPRO 2 UNITS: 100 INJECTION, SOLUTION INTRAVENOUS; SUBCUTANEOUS at 22:12

## 2020-03-04 RX ADMIN — ROSUVASTATIN CALCIUM 10 MG: 10 TABLET, FILM COATED ORAL at 08:17

## 2020-03-04 RX ADMIN — AMLODIPINE BESYLATE 5 MG: 5 TABLET ORAL at 08:16

## 2020-03-04 RX ADMIN — DULOXETINE HYDROCHLORIDE 60 MG: 30 CAPSULE, DELAYED RELEASE ORAL at 08:16

## 2020-03-04 RX ADMIN — DOCUSATE SODIUM 100 MG: 100 CAPSULE, LIQUID FILLED ORAL at 21:55

## 2020-03-04 RX ADMIN — Medication 10 ML: at 08:17

## 2020-03-04 RX ADMIN — TEMAZEPAM 30 MG: 15 CAPSULE ORAL at 22:13

## 2020-03-04 RX ADMIN — PRIMIDONE 50 MG: 50 TABLET ORAL at 21:55

## 2020-03-04 RX ADMIN — MECLIZINE HYDROCHLORIDE 25 MG: 25 TABLET ORAL at 16:01

## 2020-03-05 LAB
GLUCOSE BLDC GLUCOMTR-MCNC: 103 MG/DL (ref 70–105)
GLUCOSE BLDC GLUCOMTR-MCNC: 163 MG/DL (ref 70–105)
GLUCOSE BLDC GLUCOMTR-MCNC: 175 MG/DL (ref 70–105)
GLUCOSE BLDC GLUCOMTR-MCNC: 236 MG/DL (ref 70–105)

## 2020-03-05 PROCEDURE — 82962 GLUCOSE BLOOD TEST: CPT

## 2020-03-05 PROCEDURE — 97530 THERAPEUTIC ACTIVITIES: CPT

## 2020-03-05 PROCEDURE — 63710000001 INSULIN LISPRO (HUMAN) PER 5 UNITS: Performed by: ORTHOPAEDIC SURGERY

## 2020-03-05 PROCEDURE — 99232 SBSQ HOSP IP/OBS MODERATE 35: CPT | Performed by: INTERNAL MEDICINE

## 2020-03-05 RX ADMIN — BISACODYL 10 MG: 5 TABLET, COATED ORAL at 21:30

## 2020-03-05 RX ADMIN — HYDROCODONE BITARTRATE AND ACETAMINOPHEN 1 TABLET: 7.5; 325 TABLET ORAL at 09:47

## 2020-03-05 RX ADMIN — ASPIRIN 325 MG: 325 TABLET ORAL at 21:30

## 2020-03-05 RX ADMIN — DULOXETINE HYDROCHLORIDE 60 MG: 30 CAPSULE, DELAYED RELEASE ORAL at 21:31

## 2020-03-05 RX ADMIN — Medication 1 TABLET: at 09:46

## 2020-03-05 RX ADMIN — POLYETHYLENE GLYCOL 3350 17 G: 17 POWDER, FOR SOLUTION ORAL at 09:47

## 2020-03-05 RX ADMIN — OXYCODONE HYDROCHLORIDE 10 MG: 5 TABLET ORAL at 17:20

## 2020-03-05 RX ADMIN — INSULIN LISPRO 2 UNITS: 100 INJECTION, SOLUTION INTRAVENOUS; SUBCUTANEOUS at 17:18

## 2020-03-05 RX ADMIN — ASPIRIN 325 MG: 325 TABLET ORAL at 09:47

## 2020-03-05 RX ADMIN — BACITRACIN: 500 OINTMENT TOPICAL at 21:31

## 2020-03-05 RX ADMIN — INSULIN LISPRO 2 UNITS: 100 INJECTION, SOLUTION INTRAVENOUS; SUBCUTANEOUS at 21:38

## 2020-03-05 RX ADMIN — AMLODIPINE BESYLATE 5 MG: 5 TABLET ORAL at 09:47

## 2020-03-05 RX ADMIN — TRIAMTERENE AND HYDROCHLOROTHIAZIDE 1 TABLET: 37.5; 25 TABLET ORAL at 09:46

## 2020-03-05 RX ADMIN — ROSUVASTATIN CALCIUM 10 MG: 10 TABLET, FILM COATED ORAL at 09:47

## 2020-03-05 RX ADMIN — BACITRACIN: 500 OINTMENT TOPICAL at 09:47

## 2020-03-05 RX ADMIN — PRIMIDONE 50 MG: 50 TABLET ORAL at 21:31

## 2020-03-05 RX ADMIN — DOCUSATE SODIUM 100 MG: 100 CAPSULE, LIQUID FILLED ORAL at 09:47

## 2020-03-05 RX ADMIN — DOCUSATE SODIUM 100 MG: 100 CAPSULE, LIQUID FILLED ORAL at 21:31

## 2020-03-05 RX ADMIN — DULOXETINE HYDROCHLORIDE 60 MG: 30 CAPSULE, DELAYED RELEASE ORAL at 09:47

## 2020-03-05 RX ADMIN — TEMAZEPAM 15 MG: 15 CAPSULE ORAL at 21:39

## 2020-03-06 LAB
GLUCOSE BLDC GLUCOMTR-MCNC: 120 MG/DL (ref 70–105)
GLUCOSE BLDC GLUCOMTR-MCNC: 133 MG/DL (ref 70–105)
GLUCOSE BLDC GLUCOMTR-MCNC: 145 MG/DL (ref 70–105)
GLUCOSE BLDC GLUCOMTR-MCNC: 292 MG/DL (ref 70–105)

## 2020-03-06 PROCEDURE — 97116 GAIT TRAINING THERAPY: CPT

## 2020-03-06 PROCEDURE — 97530 THERAPEUTIC ACTIVITIES: CPT

## 2020-03-06 PROCEDURE — 97535 SELF CARE MNGMENT TRAINING: CPT

## 2020-03-06 PROCEDURE — 82962 GLUCOSE BLOOD TEST: CPT

## 2020-03-06 PROCEDURE — 99231 SBSQ HOSP IP/OBS SF/LOW 25: CPT | Performed by: INTERNAL MEDICINE

## 2020-03-06 PROCEDURE — 63710000001 INSULIN LISPRO (HUMAN) PER 5 UNITS: Performed by: ORTHOPAEDIC SURGERY

## 2020-03-06 PROCEDURE — 63710000001 ONDANSETRON PER 8 MG: Performed by: ORTHOPAEDIC SURGERY

## 2020-03-06 PROCEDURE — 97112 NEUROMUSCULAR REEDUCATION: CPT

## 2020-03-06 RX ADMIN — Medication 10 ML: at 21:22

## 2020-03-06 RX ADMIN — TEMAZEPAM 30 MG: 15 CAPSULE ORAL at 21:38

## 2020-03-06 RX ADMIN — DOCUSATE SODIUM 100 MG: 100 CAPSULE, LIQUID FILLED ORAL at 21:21

## 2020-03-06 RX ADMIN — ASPIRIN 325 MG: 325 TABLET ORAL at 09:31

## 2020-03-06 RX ADMIN — TRIAMTERENE AND HYDROCHLOROTHIAZIDE 1 TABLET: 37.5; 25 TABLET ORAL at 09:31

## 2020-03-06 RX ADMIN — PRIMIDONE 50 MG: 50 TABLET ORAL at 21:20

## 2020-03-06 RX ADMIN — INSULIN LISPRO 6 UNITS: 100 INJECTION, SOLUTION INTRAVENOUS; SUBCUTANEOUS at 23:14

## 2020-03-06 RX ADMIN — HYDROCODONE BITARTRATE AND ACETAMINOPHEN 1 TABLET: 7.5; 325 TABLET ORAL at 21:38

## 2020-03-06 RX ADMIN — BACITRACIN 1 APPLICATION: 500 OINTMENT TOPICAL at 21:39

## 2020-03-06 RX ADMIN — OXYCODONE HYDROCHLORIDE 10 MG: 5 TABLET ORAL at 01:22

## 2020-03-06 RX ADMIN — Medication 1 TABLET: at 09:31

## 2020-03-06 RX ADMIN — ROSUVASTATIN CALCIUM 10 MG: 10 TABLET, FILM COATED ORAL at 09:31

## 2020-03-06 RX ADMIN — POLYETHYLENE GLYCOL 3350 17 G: 17 POWDER, FOR SOLUTION ORAL at 09:31

## 2020-03-06 RX ADMIN — BACITRACIN: 500 OINTMENT TOPICAL at 09:32

## 2020-03-06 RX ADMIN — DULOXETINE HYDROCHLORIDE 60 MG: 30 CAPSULE, DELAYED RELEASE ORAL at 09:31

## 2020-03-06 RX ADMIN — ASPIRIN 325 MG: 325 TABLET ORAL at 21:21

## 2020-03-06 RX ADMIN — DOCUSATE SODIUM 100 MG: 100 CAPSULE, LIQUID FILLED ORAL at 09:31

## 2020-03-06 RX ADMIN — AMLODIPINE BESYLATE 5 MG: 5 TABLET ORAL at 09:31

## 2020-03-06 RX ADMIN — OXYCODONE HYDROCHLORIDE 10 MG: 5 TABLET ORAL at 09:37

## 2020-03-06 RX ADMIN — ONDANSETRON HYDROCHLORIDE 4 MG: 4 TABLET, FILM COATED ORAL at 13:30

## 2020-03-06 RX ADMIN — DULOXETINE HYDROCHLORIDE 60 MG: 30 CAPSULE, DELAYED RELEASE ORAL at 21:20

## 2020-03-07 LAB
GLUCOSE BLDC GLUCOMTR-MCNC: 136 MG/DL (ref 70–105)
GLUCOSE BLDC GLUCOMTR-MCNC: 170 MG/DL (ref 70–105)
GLUCOSE BLDC GLUCOMTR-MCNC: 198 MG/DL (ref 70–105)
GLUCOSE BLDC GLUCOMTR-MCNC: 227 MG/DL (ref 70–105)

## 2020-03-07 PROCEDURE — 63710000001 INSULIN LISPRO (HUMAN) PER 5 UNITS: Performed by: ORTHOPAEDIC SURGERY

## 2020-03-07 PROCEDURE — 97530 THERAPEUTIC ACTIVITIES: CPT

## 2020-03-07 PROCEDURE — 82962 GLUCOSE BLOOD TEST: CPT

## 2020-03-07 PROCEDURE — 97110 THERAPEUTIC EXERCISES: CPT

## 2020-03-07 PROCEDURE — 99232 SBSQ HOSP IP/OBS MODERATE 35: CPT | Performed by: INTERNAL MEDICINE

## 2020-03-07 RX ORDER — GLIPIZIDE 5 MG/1
5 TABLET ORAL
Status: DISCONTINUED | OUTPATIENT
Start: 2020-03-07 | End: 2020-03-08 | Stop reason: HOSPADM

## 2020-03-07 RX ORDER — GLIPIZIDE 5 MG/1
5 TABLET ORAL
Status: DISCONTINUED | OUTPATIENT
Start: 2020-03-08 | End: 2020-03-07

## 2020-03-07 RX ORDER — MAGNESIUM CARB/ALUMINUM HYDROX 105-160MG
296 TABLET,CHEWABLE ORAL ONCE
Status: COMPLETED | OUTPATIENT
Start: 2020-03-07 | End: 2020-03-07

## 2020-03-07 RX ORDER — BISACODYL 10 MG
10 SUPPOSITORY, RECTAL RECTAL ONCE
Status: COMPLETED | OUTPATIENT
Start: 2020-03-07 | End: 2020-03-07

## 2020-03-07 RX ORDER — BISACODYL 5 MG/1
10 TABLET, DELAYED RELEASE ORAL ONCE
Status: COMPLETED | OUTPATIENT
Start: 2020-03-07 | End: 2020-03-07

## 2020-03-07 RX ORDER — ZOLPIDEM TARTRATE 5 MG/1
5 TABLET ORAL NIGHTLY PRN
Status: DISCONTINUED | OUTPATIENT
Start: 2020-03-07 | End: 2020-03-07

## 2020-03-07 RX ORDER — TEMAZEPAM 30 MG/1
30 CAPSULE ORAL NIGHTLY PRN
Status: DISCONTINUED | OUTPATIENT
Start: 2020-03-07 | End: 2020-03-08 | Stop reason: HOSPADM

## 2020-03-07 RX ADMIN — ROSUVASTATIN CALCIUM 10 MG: 10 TABLET, FILM COATED ORAL at 09:00

## 2020-03-07 RX ADMIN — MAGNESIUM CITRATE 296 ML: 1.75 LIQUID ORAL at 10:49

## 2020-03-07 RX ADMIN — OXYCODONE HYDROCHLORIDE 10 MG: 5 TABLET ORAL at 22:15

## 2020-03-07 RX ADMIN — Medication 1 TABLET: at 09:00

## 2020-03-07 RX ADMIN — POLYETHYLENE GLYCOL 3350 17 G: 17 POWDER, FOR SOLUTION ORAL at 10:37

## 2020-03-07 RX ADMIN — BISACODYL 10 MG: 5 TABLET, COATED ORAL at 10:47

## 2020-03-07 RX ADMIN — TRIAMTERENE AND HYDROCHLOROTHIAZIDE 1 TABLET: 37.5; 25 TABLET ORAL at 09:00

## 2020-03-07 RX ADMIN — INSULIN LISPRO 4 UNITS: 100 INJECTION, SOLUTION INTRAVENOUS; SUBCUTANEOUS at 13:16

## 2020-03-07 RX ADMIN — BACITRACIN: 500 OINTMENT TOPICAL at 09:00

## 2020-03-07 RX ADMIN — Medication 10 ML: at 21:28

## 2020-03-07 RX ADMIN — DULOXETINE HYDROCHLORIDE 60 MG: 30 CAPSULE, DELAYED RELEASE ORAL at 09:00

## 2020-03-07 RX ADMIN — GLIPIZIDE 5 MG: 5 TABLET ORAL at 15:52

## 2020-03-07 RX ADMIN — INSULIN LISPRO 2 UNITS: 100 INJECTION, SOLUTION INTRAVENOUS; SUBCUTANEOUS at 18:17

## 2020-03-07 RX ADMIN — TEMAZEPAM 30 MG: 30 CAPSULE ORAL at 23:14

## 2020-03-07 RX ADMIN — BISACODYL 10 MG: 10 SUPPOSITORY RECTAL at 10:47

## 2020-03-07 RX ADMIN — METFORMIN HYDROCHLORIDE 500 MG: 500 TABLET, FILM COATED ORAL at 15:52

## 2020-03-07 RX ADMIN — BACITRACIN 1 APPLICATION: 500 OINTMENT TOPICAL at 21:30

## 2020-03-07 RX ADMIN — PRIMIDONE 50 MG: 50 TABLET ORAL at 21:28

## 2020-03-07 RX ADMIN — ASPIRIN 325 MG: 325 TABLET ORAL at 09:00

## 2020-03-07 RX ADMIN — INSULIN LISPRO 2 UNITS: 100 INJECTION, SOLUTION INTRAVENOUS; SUBCUTANEOUS at 23:12

## 2020-03-07 RX ADMIN — ASPIRIN 325 MG: 325 TABLET ORAL at 21:29

## 2020-03-07 RX ADMIN — DULOXETINE HYDROCHLORIDE 60 MG: 30 CAPSULE, DELAYED RELEASE ORAL at 21:28

## 2020-03-07 RX ADMIN — AMLODIPINE BESYLATE 5 MG: 5 TABLET ORAL at 09:00

## 2020-03-07 RX ADMIN — DOCUSATE SODIUM 100 MG: 100 CAPSULE, LIQUID FILLED ORAL at 09:00

## 2020-03-08 VITALS
HEART RATE: 96 BPM | OXYGEN SATURATION: 94 % | HEIGHT: 66 IN | BODY MASS INDEX: 29.97 KG/M2 | SYSTOLIC BLOOD PRESSURE: 140 MMHG | RESPIRATION RATE: 14 BRPM | TEMPERATURE: 98.5 F | DIASTOLIC BLOOD PRESSURE: 74 MMHG | WEIGHT: 186.51 LBS

## 2020-03-08 LAB
ANION GAP SERPL CALCULATED.3IONS-SCNC: 10 MMOL/L (ref 5–15)
BUN BLD-MCNC: 36 MG/DL (ref 8–23)
BUN/CREAT SERPL: 30 (ref 7–25)
CALCIUM SPEC-SCNC: 8.4 MG/DL (ref 8.6–10.5)
CHLORIDE SERPL-SCNC: 99 MMOL/L (ref 98–107)
CO2 SERPL-SCNC: 29 MMOL/L (ref 22–29)
CREAT BLD-MCNC: 1.2 MG/DL (ref 0.57–1)
GFR SERPL CREATININE-BSD FRML MDRD: 44 ML/MIN/1.73
GLUCOSE BLD-MCNC: 143 MG/DL (ref 65–99)
GLUCOSE BLDC GLUCOMTR-MCNC: 144 MG/DL (ref 70–105)
GLUCOSE BLDC GLUCOMTR-MCNC: 157 MG/DL (ref 70–105)
POTASSIUM BLD-SCNC: 4.1 MMOL/L (ref 3.5–5.2)
SODIUM BLD-SCNC: 138 MMOL/L (ref 136–145)

## 2020-03-08 PROCEDURE — 93010 ELECTROCARDIOGRAM REPORT: CPT | Performed by: INTERNAL MEDICINE

## 2020-03-08 PROCEDURE — 99239 HOSP IP/OBS DSCHRG MGMT >30: CPT | Performed by: INTERNAL MEDICINE

## 2020-03-08 PROCEDURE — 80048 BASIC METABOLIC PNL TOTAL CA: CPT | Performed by: INTERNAL MEDICINE

## 2020-03-08 PROCEDURE — 82962 GLUCOSE BLOOD TEST: CPT

## 2020-03-08 PROCEDURE — 63710000001 INSULIN LISPRO (HUMAN) PER 5 UNITS: Performed by: ORTHOPAEDIC SURGERY

## 2020-03-08 RX ORDER — BISACODYL 10 MG
10 SUPPOSITORY, RECTAL RECTAL DAILY PRN
Start: 2020-03-08 | End: 2020-03-26

## 2020-03-08 RX ORDER — BISACODYL 5 MG/1
10 TABLET, DELAYED RELEASE ORAL DAILY PRN
Start: 2020-03-08 | End: 2020-03-26

## 2020-03-08 RX ORDER — ACETAMINOPHEN 325 MG/1
650 TABLET ORAL EVERY 4 HOURS PRN
Start: 2020-03-08 | End: 2022-07-01 | Stop reason: HOSPADM

## 2020-03-08 RX ORDER — PSEUDOEPHEDRINE HCL 30 MG
100 TABLET ORAL 2 TIMES DAILY
Start: 2020-03-08 | End: 2020-03-26

## 2020-03-08 RX ADMIN — AMLODIPINE BESYLATE 5 MG: 5 TABLET ORAL at 08:39

## 2020-03-08 RX ADMIN — GLIPIZIDE 5 MG: 5 TABLET ORAL at 08:39

## 2020-03-08 RX ADMIN — POLYETHYLENE GLYCOL 3350 17 G: 17 POWDER, FOR SOLUTION ORAL at 08:39

## 2020-03-08 RX ADMIN — HYDROCODONE BITARTRATE AND ACETAMINOPHEN 1 TABLET: 7.5; 325 TABLET ORAL at 01:11

## 2020-03-08 RX ADMIN — ASPIRIN 325 MG: 325 TABLET ORAL at 08:39

## 2020-03-08 RX ADMIN — DULOXETINE HYDROCHLORIDE 60 MG: 30 CAPSULE, DELAYED RELEASE ORAL at 08:39

## 2020-03-08 RX ADMIN — INSULIN LISPRO 2 UNITS: 100 INJECTION, SOLUTION INTRAVENOUS; SUBCUTANEOUS at 12:05

## 2020-03-08 RX ADMIN — Medication 1 TABLET: at 08:40

## 2020-03-08 RX ADMIN — DOCUSATE SODIUM 100 MG: 100 CAPSULE, LIQUID FILLED ORAL at 08:40

## 2020-03-08 RX ADMIN — Medication 10 ML: at 08:40

## 2020-03-08 RX ADMIN — ROSUVASTATIN CALCIUM 10 MG: 10 TABLET, FILM COATED ORAL at 08:39

## 2020-03-08 RX ADMIN — BACITRACIN: 500 OINTMENT TOPICAL at 08:42

## 2020-03-08 RX ADMIN — METFORMIN HYDROCHLORIDE 500 MG: 500 TABLET, FILM COATED ORAL at 08:39

## 2020-03-08 RX ADMIN — TRIAMTERENE AND HYDROCHLOROTHIAZIDE 1 TABLET: 37.5; 25 TABLET ORAL at 08:39

## 2020-03-09 ENCOUNTER — LAB REQUISITION (OUTPATIENT)
Dept: LAB | Facility: HOSPITAL | Age: 70
End: 2020-03-09

## 2020-03-09 DIAGNOSIS — Z00.00 ENCOUNTER FOR GENERAL ADULT MEDICAL EXAMINATION WITHOUT ABNORMAL FINDINGS: ICD-10-CM

## 2020-03-09 LAB
ALBUMIN SERPL-MCNC: 3 G/DL (ref 3.5–5.2)
ALBUMIN/GLOB SERPL: 1 G/DL
ALP SERPL-CCNC: 104 U/L (ref 39–117)
ALT SERPL W P-5'-P-CCNC: 13 U/L (ref 1–33)
ANION GAP SERPL CALCULATED.3IONS-SCNC: 10 MMOL/L (ref 5–15)
AST SERPL-CCNC: 19 U/L (ref 1–32)
BASOPHILS # BLD AUTO: 0.1 10*3/MM3 (ref 0–0.2)
BASOPHILS NFR BLD AUTO: 0.8 % (ref 0–1.5)
BILIRUB SERPL-MCNC: 0.2 MG/DL (ref 0.2–1.2)
BUN BLD-MCNC: 31 MG/DL (ref 8–23)
BUN/CREAT SERPL: 27.2 (ref 7–25)
CALCIUM SPEC-SCNC: 8.6 MG/DL (ref 8.6–10.5)
CHLORIDE SERPL-SCNC: 99 MMOL/L (ref 98–107)
CO2 SERPL-SCNC: 29 MMOL/L (ref 22–29)
CREAT BLD-MCNC: 1.14 MG/DL (ref 0.57–1)
DEPRECATED RDW RBC AUTO: 49.4 FL (ref 37–54)
EOSINOPHIL # BLD AUTO: 0.3 10*3/MM3 (ref 0–0.4)
EOSINOPHIL NFR BLD AUTO: 5 % (ref 0.3–6.2)
ERYTHROCYTE [DISTWIDTH] IN BLOOD BY AUTOMATED COUNT: 15.6 % (ref 12.3–15.4)
GFR SERPL CREATININE-BSD FRML MDRD: 47 ML/MIN/1.73
GLOBULIN UR ELPH-MCNC: 2.9 GM/DL
GLUCOSE BLD-MCNC: 153 MG/DL (ref 65–99)
HCT VFR BLD AUTO: 31.9 % (ref 34–46.6)
HGB BLD-MCNC: 10.2 G/DL (ref 12–15.9)
LYMPHOCYTES # BLD AUTO: 1.3 10*3/MM3 (ref 0.7–3.1)
LYMPHOCYTES NFR BLD AUTO: 19.1 % (ref 19.6–45.3)
MAGNESIUM SERPL-MCNC: 2.5 MG/DL (ref 1.6–2.4)
MCH RBC QN AUTO: 28.9 PG (ref 26.6–33)
MCHC RBC AUTO-ENTMCNC: 32 G/DL (ref 31.5–35.7)
MCV RBC AUTO: 90.2 FL (ref 79–97)
MONOCYTES # BLD AUTO: 0.7 10*3/MM3 (ref 0.1–0.9)
MONOCYTES NFR BLD AUTO: 9.9 % (ref 5–12)
NEUTROPHILS # BLD AUTO: 4.4 10*3/MM3 (ref 1.7–7)
NEUTROPHILS NFR BLD AUTO: 65.2 % (ref 42.7–76)
NRBC BLD AUTO-RTO: 0.1 /100 WBC (ref 0–0.2)
PHOSPHATE SERPL-MCNC: 3.2 MG/DL (ref 2.5–4.5)
PLATELET # BLD AUTO: 490 10*3/MM3 (ref 140–450)
PMV BLD AUTO: 7.8 FL (ref 6–12)
POTASSIUM BLD-SCNC: 4.1 MMOL/L (ref 3.5–5.2)
PROT SERPL-MCNC: 5.9 G/DL (ref 6–8.5)
RBC # BLD AUTO: 3.54 10*6/MM3 (ref 3.77–5.28)
SODIUM BLD-SCNC: 138 MMOL/L (ref 136–145)
WBC NRBC COR # BLD: 6.7 10*3/MM3 (ref 3.4–10.8)

## 2020-03-09 PROCEDURE — 85025 COMPLETE CBC W/AUTO DIFF WBC: CPT

## 2020-03-09 PROCEDURE — 80053 COMPREHEN METABOLIC PANEL: CPT

## 2020-03-09 PROCEDURE — 83735 ASSAY OF MAGNESIUM: CPT

## 2020-03-09 PROCEDURE — 84100 ASSAY OF PHOSPHORUS: CPT

## 2020-03-10 ENCOUNTER — LAB REQUISITION (OUTPATIENT)
Dept: LAB | Facility: HOSPITAL | Age: 70
End: 2020-03-10

## 2020-03-10 DIAGNOSIS — N18.9 CHRONIC KIDNEY DISEASE, UNSPECIFIED: ICD-10-CM

## 2020-03-10 LAB
ANION GAP SERPL CALCULATED.3IONS-SCNC: 13 MMOL/L (ref 5–15)
BUN BLD-MCNC: 34 MG/DL (ref 8–23)
BUN/CREAT SERPL: 26 (ref 7–25)
CALCIUM SPEC-SCNC: 8.8 MG/DL (ref 8.6–10.5)
CHLORIDE SERPL-SCNC: 98 MMOL/L (ref 98–107)
CO2 SERPL-SCNC: 27 MMOL/L (ref 22–29)
CREAT BLD-MCNC: 1.31 MG/DL (ref 0.57–1)
GFR SERPL CREATININE-BSD FRML MDRD: 40 ML/MIN/1.73
GLUCOSE BLD-MCNC: 168 MG/DL (ref 65–99)
POTASSIUM BLD-SCNC: 3.9 MMOL/L (ref 3.5–5.2)
SODIUM BLD-SCNC: 138 MMOL/L (ref 136–145)

## 2020-03-10 PROCEDURE — 80048 BASIC METABOLIC PNL TOTAL CA: CPT

## 2020-03-12 ENCOUNTER — LAB REQUISITION (OUTPATIENT)
Dept: LAB | Facility: HOSPITAL | Age: 70
End: 2020-03-12

## 2020-03-12 DIAGNOSIS — Z00.00 ENCOUNTER FOR GENERAL ADULT MEDICAL EXAMINATION WITHOUT ABNORMAL FINDINGS: ICD-10-CM

## 2020-03-12 LAB
ANION GAP SERPL CALCULATED.3IONS-SCNC: 12 MMOL/L (ref 5–15)
BUN BLD-MCNC: 48 MG/DL (ref 8–23)
BUN/CREAT SERPL: 33.8 (ref 7–25)
CALCIUM SPEC-SCNC: 9 MG/DL (ref 8.6–10.5)
CHLORIDE SERPL-SCNC: 96 MMOL/L (ref 98–107)
CO2 SERPL-SCNC: 29 MMOL/L (ref 22–29)
CREAT BLD-MCNC: 1.42 MG/DL (ref 0.57–1)
GFR SERPL CREATININE-BSD FRML MDRD: 36 ML/MIN/1.73
GLUCOSE BLD-MCNC: 126 MG/DL (ref 65–99)
POTASSIUM BLD-SCNC: 4.4 MMOL/L (ref 3.5–5.2)
SODIUM BLD-SCNC: 137 MMOL/L (ref 136–145)

## 2020-03-12 PROCEDURE — 80048 BASIC METABOLIC PNL TOTAL CA: CPT | Performed by: PHYSICAL MEDICINE & REHABILITATION

## 2020-03-14 ENCOUNTER — LAB REQUISITION (OUTPATIENT)
Dept: LAB | Facility: HOSPITAL | Age: 70
End: 2020-03-14

## 2020-03-14 DIAGNOSIS — D64.9 ANEMIA, UNSPECIFIED: ICD-10-CM

## 2020-03-14 DIAGNOSIS — N18.9 CHRONIC KIDNEY DISEASE, UNSPECIFIED: ICD-10-CM

## 2020-03-14 DIAGNOSIS — S72.011A: ICD-10-CM

## 2020-03-14 LAB
ANION GAP SERPL CALCULATED.3IONS-SCNC: 12 MMOL/L (ref 5–15)
BACTERIA UR QL AUTO: ABNORMAL /HPF
BILIRUB UR QL STRIP: NEGATIVE
BUN BLD-MCNC: 41 MG/DL (ref 8–23)
BUN/CREAT SERPL: 29.9 (ref 7–25)
CALCIUM SPEC-SCNC: 8.8 MG/DL (ref 8.6–10.5)
CHLORIDE SERPL-SCNC: 98 MMOL/L (ref 98–107)
CK SERPL-CCNC: 26 U/L (ref 20–180)
CLARITY UR: ABNORMAL
CO2 SERPL-SCNC: 29 MMOL/L (ref 22–29)
COLOR UR: YELLOW
CREAT BLD-MCNC: 1.37 MG/DL (ref 0.57–1)
DEPRECATED RDW RBC AUTO: 49 FL (ref 37–54)
ERYTHROCYTE [DISTWIDTH] IN BLOOD BY AUTOMATED COUNT: 15.7 % (ref 12.3–15.4)
GFR SERPL CREATININE-BSD FRML MDRD: 38 ML/MIN/1.73
GLUCOSE BLD-MCNC: 147 MG/DL (ref 65–99)
GLUCOSE UR STRIP-MCNC: NEGATIVE MG/DL
HCT VFR BLD AUTO: 30.3 % (ref 34–46.6)
HGB BLD-MCNC: 10.2 G/DL (ref 12–15.9)
HGB UR QL STRIP.AUTO: NEGATIVE
HYALINE CASTS UR QL AUTO: ABNORMAL /LPF
KETONES UR QL STRIP: NEGATIVE
LEUKOCYTE ESTERASE UR QL STRIP.AUTO: ABNORMAL
MCH RBC QN AUTO: 29.9 PG (ref 26.6–33)
MCHC RBC AUTO-ENTMCNC: 33.6 G/DL (ref 31.5–35.7)
MCV RBC AUTO: 89.2 FL (ref 79–97)
NITRITE UR QL STRIP: NEGATIVE
PH UR STRIP.AUTO: 6 [PH] (ref 5–8)
PLATELET # BLD AUTO: 441 10*3/MM3 (ref 140–450)
PMV BLD AUTO: 8 FL (ref 6–12)
POTASSIUM BLD-SCNC: 4 MMOL/L (ref 3.5–5.2)
PROT UR QL STRIP: ABNORMAL
RBC # BLD AUTO: 3.4 10*6/MM3 (ref 3.77–5.28)
RBC # UR: ABNORMAL /HPF
REF LAB TEST METHOD: ABNORMAL
SODIUM BLD-SCNC: 139 MMOL/L (ref 136–145)
SP GR UR STRIP: 1.01 (ref 1–1.03)
SQUAMOUS #/AREA URNS HPF: ABNORMAL /HPF
TSH SERPL DL<=0.05 MIU/L-ACNC: 1.81 UIU/ML (ref 0.27–4.2)
URATE SERPL-MCNC: 7 MG/DL (ref 2.4–5.7)
UROBILINOGEN UR QL STRIP: ABNORMAL
WBC NRBC COR # BLD: 7.5 10*3/MM3 (ref 3.4–10.8)
WBC UR QL AUTO: ABNORMAL /HPF

## 2020-03-14 PROCEDURE — 84443 ASSAY THYROID STIM HORMONE: CPT | Performed by: PHYSICAL MEDICINE & REHABILITATION

## 2020-03-14 PROCEDURE — 87186 SC STD MICRODIL/AGAR DIL: CPT

## 2020-03-14 PROCEDURE — 87086 URINE CULTURE/COLONY COUNT: CPT

## 2020-03-14 PROCEDURE — 87088 URINE BACTERIA CULTURE: CPT

## 2020-03-14 PROCEDURE — 85027 COMPLETE CBC AUTOMATED: CPT | Performed by: PHYSICAL MEDICINE & REHABILITATION

## 2020-03-14 PROCEDURE — 81001 URINALYSIS AUTO W/SCOPE: CPT

## 2020-03-14 PROCEDURE — 84550 ASSAY OF BLOOD/URIC ACID: CPT | Performed by: PHYSICAL MEDICINE & REHABILITATION

## 2020-03-14 PROCEDURE — 82550 ASSAY OF CK (CPK): CPT | Performed by: PHYSICAL MEDICINE & REHABILITATION

## 2020-03-14 PROCEDURE — 80048 BASIC METABOLIC PNL TOTAL CA: CPT | Performed by: PHYSICAL MEDICINE & REHABILITATION

## 2020-03-15 ENCOUNTER — LAB REQUISITION (OUTPATIENT)
Dept: LAB | Facility: HOSPITAL | Age: 70
End: 2020-03-15

## 2020-03-15 DIAGNOSIS — S72.011A: ICD-10-CM

## 2020-03-15 DIAGNOSIS — N18.9 CHRONIC KIDNEY DISEASE, UNSPECIFIED: ICD-10-CM

## 2020-03-15 DIAGNOSIS — D64.9 ANEMIA, UNSPECIFIED: ICD-10-CM

## 2020-03-15 LAB
ANION GAP SERPL CALCULATED.3IONS-SCNC: 13 MMOL/L (ref 5–15)
BUN BLD-MCNC: 42 MG/DL (ref 8–23)
BUN/CREAT SERPL: 29 (ref 7–25)
CALCIUM SPEC-SCNC: 8.3 MG/DL (ref 8.6–10.5)
CHLORIDE SERPL-SCNC: 99 MMOL/L (ref 98–107)
CO2 SERPL-SCNC: 30 MMOL/L (ref 22–29)
CREAT BLD-MCNC: 1.45 MG/DL (ref 0.57–1)
DEPRECATED RDW RBC AUTO: 48.6 FL (ref 37–54)
ERYTHROCYTE [DISTWIDTH] IN BLOOD BY AUTOMATED COUNT: 15.7 % (ref 12.3–15.4)
GFR SERPL CREATININE-BSD FRML MDRD: 36 ML/MIN/1.73
GLUCOSE BLD-MCNC: 139 MG/DL (ref 65–99)
HCT VFR BLD AUTO: 30.6 % (ref 34–46.6)
HGB BLD-MCNC: 9.9 G/DL (ref 12–15.9)
MCH RBC QN AUTO: 28.8 PG (ref 26.6–33)
MCHC RBC AUTO-ENTMCNC: 32.5 G/DL (ref 31.5–35.7)
MCV RBC AUTO: 88.6 FL (ref 79–97)
PLATELET # BLD AUTO: 440 10*3/MM3 (ref 140–450)
PMV BLD AUTO: 7.7 FL (ref 6–12)
POTASSIUM BLD-SCNC: 3.9 MMOL/L (ref 3.5–5.2)
RBC # BLD AUTO: 3.45 10*6/MM3 (ref 3.77–5.28)
SODIUM BLD-SCNC: 142 MMOL/L (ref 136–145)
WBC NRBC COR # BLD: 8 10*3/MM3 (ref 3.4–10.8)

## 2020-03-15 PROCEDURE — 85027 COMPLETE CBC AUTOMATED: CPT | Performed by: PHYSICAL MEDICINE & REHABILITATION

## 2020-03-15 PROCEDURE — 80048 BASIC METABOLIC PNL TOTAL CA: CPT | Performed by: PHYSICAL MEDICINE & REHABILITATION

## 2020-03-16 ENCOUNTER — LAB REQUISITION (OUTPATIENT)
Dept: LAB | Facility: HOSPITAL | Age: 70
End: 2020-03-16

## 2020-03-16 DIAGNOSIS — S72.011A: ICD-10-CM

## 2020-03-16 DIAGNOSIS — N18.9 CHRONIC KIDNEY DISEASE, UNSPECIFIED: ICD-10-CM

## 2020-03-16 DIAGNOSIS — D64.9 ANEMIA, UNSPECIFIED: ICD-10-CM

## 2020-03-16 LAB
ANION GAP SERPL CALCULATED.3IONS-SCNC: 14 MMOL/L (ref 5–15)
BACTERIA SPEC AEROBE CULT: ABNORMAL
BUN BLD-MCNC: 41 MG/DL (ref 8–23)
BUN/CREAT SERPL: 32.3 (ref 7–25)
CALCIUM SPEC-SCNC: 8.8 MG/DL (ref 8.6–10.5)
CHLORIDE SERPL-SCNC: 102 MMOL/L (ref 98–107)
CO2 SERPL-SCNC: 23 MMOL/L (ref 22–29)
CREAT BLD-MCNC: 1.27 MG/DL (ref 0.57–1)
DEPRECATED RDW RBC AUTO: 55.6 FL (ref 37–54)
ERYTHROCYTE [DISTWIDTH] IN BLOOD BY AUTOMATED COUNT: 16.8 % (ref 12.3–15.4)
GFR SERPL CREATININE-BSD FRML MDRD: 41 ML/MIN/1.73
GLUCOSE BLD-MCNC: 145 MG/DL (ref 65–99)
HCT VFR BLD AUTO: 33.1 % (ref 34–46.6)
HGB BLD-MCNC: 10.4 G/DL (ref 12–15.9)
MCH RBC QN AUTO: 29.2 PG (ref 26.6–33)
MCHC RBC AUTO-ENTMCNC: 31.5 G/DL (ref 31.5–35.7)
MCV RBC AUTO: 92.8 FL (ref 79–97)
PLATELET # BLD AUTO: 460 10*3/MM3 (ref 140–450)
PMV BLD AUTO: 8.1 FL (ref 6–12)
POTASSIUM BLD-SCNC: 4.3 MMOL/L (ref 3.5–5.2)
RBC # BLD AUTO: 3.57 10*6/MM3 (ref 3.77–5.28)
SODIUM BLD-SCNC: 139 MMOL/L (ref 136–145)
WBC NRBC COR # BLD: 7.4 10*3/MM3 (ref 3.4–10.8)

## 2020-03-16 PROCEDURE — 80048 BASIC METABOLIC PNL TOTAL CA: CPT

## 2020-03-16 PROCEDURE — 85027 COMPLETE CBC AUTOMATED: CPT

## 2020-03-17 ENCOUNTER — LAB REQUISITION (OUTPATIENT)
Dept: LAB | Facility: HOSPITAL | Age: 70
End: 2020-03-17

## 2020-03-17 DIAGNOSIS — N18.9 CHRONIC KIDNEY DISEASE, UNSPECIFIED: ICD-10-CM

## 2020-03-17 LAB
ANION GAP SERPL CALCULATED.3IONS-SCNC: 10 MMOL/L (ref 5–15)
BUN BLD-MCNC: 42 MG/DL (ref 8–23)
BUN/CREAT SERPL: 30.2 (ref 7–25)
CALCIUM SPEC-SCNC: 8.8 MG/DL (ref 8.6–10.5)
CHLORIDE SERPL-SCNC: 101 MMOL/L (ref 98–107)
CO2 SERPL-SCNC: 29 MMOL/L (ref 22–29)
CREAT BLD-MCNC: 1.39 MG/DL (ref 0.57–1)
DEPRECATED RDW RBC AUTO: 49.9 FL (ref 37–54)
ERYTHROCYTE [DISTWIDTH] IN BLOOD BY AUTOMATED COUNT: 15.8 % (ref 12.3–15.4)
GFR SERPL CREATININE-BSD FRML MDRD: 37 ML/MIN/1.73
GLUCOSE BLD-MCNC: 133 MG/DL (ref 65–99)
HCT VFR BLD AUTO: 30.1 % (ref 34–46.6)
HGB BLD-MCNC: 9.8 G/DL (ref 12–15.9)
MCH RBC QN AUTO: 29 PG (ref 26.6–33)
MCHC RBC AUTO-ENTMCNC: 32.6 G/DL (ref 31.5–35.7)
MCV RBC AUTO: 89.1 FL (ref 79–97)
PLATELET # BLD AUTO: 448 10*3/MM3 (ref 140–450)
PMV BLD AUTO: 8.1 FL (ref 6–12)
POTASSIUM BLD-SCNC: 4.8 MMOL/L (ref 3.5–5.2)
RBC # BLD AUTO: 3.38 10*6/MM3 (ref 3.77–5.28)
SODIUM BLD-SCNC: 140 MMOL/L (ref 136–145)
WBC NRBC COR # BLD: 6.6 10*3/MM3 (ref 3.4–10.8)

## 2020-03-17 PROCEDURE — 80048 BASIC METABOLIC PNL TOTAL CA: CPT

## 2020-03-17 PROCEDURE — 85027 COMPLETE CBC AUTOMATED: CPT

## 2020-03-18 ENCOUNTER — LAB REQUISITION (OUTPATIENT)
Dept: LAB | Facility: HOSPITAL | Age: 70
End: 2020-03-18

## 2020-03-18 DIAGNOSIS — Z00.00 ENCOUNTER FOR GENERAL ADULT MEDICAL EXAMINATION WITHOUT ABNORMAL FINDINGS: ICD-10-CM

## 2020-03-18 LAB
ANION GAP SERPL CALCULATED.3IONS-SCNC: 12 MMOL/L (ref 5–15)
BUN BLD-MCNC: 46 MG/DL (ref 8–23)
BUN/CREAT SERPL: 32.4 (ref 7–25)
CALCIUM SPEC-SCNC: 9 MG/DL (ref 8.6–10.5)
CHLORIDE SERPL-SCNC: 102 MMOL/L (ref 98–107)
CO2 SERPL-SCNC: 26 MMOL/L (ref 22–29)
CREAT BLD-MCNC: 1.42 MG/DL (ref 0.57–1)
DEPRECATED RDW RBC AUTO: 50.8 FL (ref 37–54)
ERYTHROCYTE [DISTWIDTH] IN BLOOD BY AUTOMATED COUNT: 16.1 % (ref 12.3–15.4)
GFR SERPL CREATININE-BSD FRML MDRD: 36 ML/MIN/1.73
GLUCOSE BLD-MCNC: 168 MG/DL (ref 65–99)
HCT VFR BLD AUTO: 30.1 % (ref 34–46.6)
HGB BLD-MCNC: 10.1 G/DL (ref 12–15.9)
MCH RBC QN AUTO: 30.1 PG (ref 26.6–33)
MCHC RBC AUTO-ENTMCNC: 33.7 G/DL (ref 31.5–35.7)
MCV RBC AUTO: 89.5 FL (ref 79–97)
PLATELET # BLD AUTO: 451 10*3/MM3 (ref 140–450)
PMV BLD AUTO: 8.4 FL (ref 6–12)
POTASSIUM BLD-SCNC: 4.4 MMOL/L (ref 3.5–5.2)
RBC # BLD AUTO: 3.36 10*6/MM3 (ref 3.77–5.28)
SODIUM BLD-SCNC: 140 MMOL/L (ref 136–145)
WBC NRBC COR # BLD: 7.7 10*3/MM3 (ref 3.4–10.8)

## 2020-03-18 PROCEDURE — 80048 BASIC METABOLIC PNL TOTAL CA: CPT

## 2020-03-18 PROCEDURE — 85027 COMPLETE CBC AUTOMATED: CPT

## 2020-03-19 ENCOUNTER — LAB REQUISITION (OUTPATIENT)
Dept: LAB | Facility: HOSPITAL | Age: 70
End: 2020-03-19

## 2020-03-19 DIAGNOSIS — Z00.00 ENCOUNTER FOR GENERAL ADULT MEDICAL EXAMINATION WITHOUT ABNORMAL FINDINGS: ICD-10-CM

## 2020-03-19 LAB
ANION GAP SERPL CALCULATED.3IONS-SCNC: 12 MMOL/L (ref 5–15)
BUN BLD-MCNC: 44 MG/DL (ref 8–23)
BUN/CREAT SERPL: 36.4 (ref 7–25)
CALCIUM SPEC-SCNC: 9 MG/DL (ref 8.6–10.5)
CHLORIDE SERPL-SCNC: 104 MMOL/L (ref 98–107)
CO2 SERPL-SCNC: 24 MMOL/L (ref 22–29)
CREAT BLD-MCNC: 1.21 MG/DL (ref 0.57–1)
DEPRECATED RDW RBC AUTO: 52.5 FL (ref 37–54)
ERYTHROCYTE [DISTWIDTH] IN BLOOD BY AUTOMATED COUNT: 16.4 % (ref 12.3–15.4)
GFR SERPL CREATININE-BSD FRML MDRD: 44 ML/MIN/1.73
GLUCOSE BLD-MCNC: 134 MG/DL (ref 65–99)
HCT VFR BLD AUTO: 30.7 % (ref 34–46.6)
HGB BLD-MCNC: 10.2 G/DL (ref 12–15.9)
MCH RBC QN AUTO: 30 PG (ref 26.6–33)
MCHC RBC AUTO-ENTMCNC: 33.3 G/DL (ref 31.5–35.7)
MCV RBC AUTO: 90.2 FL (ref 79–97)
PLATELET # BLD AUTO: 459 10*3/MM3 (ref 140–450)
PMV BLD AUTO: 8.6 FL (ref 6–12)
POTASSIUM BLD-SCNC: 4.7 MMOL/L (ref 3.5–5.2)
RBC # BLD AUTO: 3.41 10*6/MM3 (ref 3.77–5.28)
SODIUM BLD-SCNC: 140 MMOL/L (ref 136–145)
WBC NRBC COR # BLD: 7 10*3/MM3 (ref 3.4–10.8)

## 2020-03-19 PROCEDURE — 85027 COMPLETE CBC AUTOMATED: CPT

## 2020-03-19 PROCEDURE — 80048 BASIC METABOLIC PNL TOTAL CA: CPT

## 2020-03-20 ENCOUNTER — LAB REQUISITION (OUTPATIENT)
Dept: LAB | Facility: HOSPITAL | Age: 70
End: 2020-03-20

## 2020-03-20 ENCOUNTER — TELEPHONE (OUTPATIENT)
Dept: FAMILY MEDICINE CLINIC | Facility: CLINIC | Age: 70
End: 2020-03-20

## 2020-03-20 DIAGNOSIS — N18.9 CHRONIC KIDNEY DISEASE, UNSPECIFIED: ICD-10-CM

## 2020-03-20 LAB
ANION GAP SERPL CALCULATED.3IONS-SCNC: 13 MMOL/L (ref 5–15)
BUN BLD-MCNC: 44 MG/DL (ref 8–23)
BUN/CREAT SERPL: 34.4 (ref 7–25)
CALCIUM SPEC-SCNC: 9.3 MG/DL (ref 8.6–10.5)
CHLORIDE SERPL-SCNC: 102 MMOL/L (ref 98–107)
CO2 SERPL-SCNC: 24 MMOL/L (ref 22–29)
CREAT BLD-MCNC: 1.28 MG/DL (ref 0.57–1)
DEPRECATED RDW RBC AUTO: 50.8 FL (ref 37–54)
ERYTHROCYTE [DISTWIDTH] IN BLOOD BY AUTOMATED COUNT: 16.1 % (ref 12.3–15.4)
GFR SERPL CREATININE-BSD FRML MDRD: 41 ML/MIN/1.73
GLUCOSE BLD-MCNC: 150 MG/DL (ref 65–99)
HCT VFR BLD AUTO: 32.8 % (ref 34–46.6)
HGB BLD-MCNC: 10.9 G/DL (ref 12–15.9)
MCH RBC QN AUTO: 29.2 PG (ref 26.6–33)
MCHC RBC AUTO-ENTMCNC: 33.2 G/DL (ref 31.5–35.7)
MCV RBC AUTO: 88 FL (ref 79–97)
PLATELET # BLD AUTO: 530 10*3/MM3 (ref 140–450)
PMV BLD AUTO: 8.3 FL (ref 6–12)
POTASSIUM BLD-SCNC: 4.5 MMOL/L (ref 3.5–5.2)
RBC # BLD AUTO: 3.72 10*6/MM3 (ref 3.77–5.28)
SODIUM BLD-SCNC: 139 MMOL/L (ref 136–145)
WBC NRBC COR # BLD: 5.7 10*3/MM3 (ref 3.4–10.8)

## 2020-03-20 PROCEDURE — 85027 COMPLETE CBC AUTOMATED: CPT

## 2020-03-20 PROCEDURE — 80048 BASIC METABOLIC PNL TOTAL CA: CPT

## 2020-03-24 ENCOUNTER — TRANSITIONAL CARE MANAGEMENT TELEPHONE ENCOUNTER (OUTPATIENT)
Dept: FAMILY MEDICINE CLINIC | Facility: CLINIC | Age: 70
End: 2020-03-24

## 2020-03-24 ENCOUNTER — TELEPHONE (OUTPATIENT)
Dept: FAMILY MEDICINE CLINIC | Facility: CLINIC | Age: 70
End: 2020-03-24

## 2020-03-25 ENCOUNTER — TELEPHONE (OUTPATIENT)
Dept: FAMILY MEDICINE CLINIC | Facility: CLINIC | Age: 70
End: 2020-03-25

## 2020-03-26 ENCOUNTER — OFFICE VISIT (OUTPATIENT)
Dept: FAMILY MEDICINE CLINIC | Facility: CLINIC | Age: 70
End: 2020-03-26

## 2020-03-26 VITALS
WEIGHT: 192 LBS | SYSTOLIC BLOOD PRESSURE: 136 MMHG | HEART RATE: 106 BPM | BODY MASS INDEX: 30.86 KG/M2 | RESPIRATION RATE: 16 BRPM | OXYGEN SATURATION: 96 % | HEIGHT: 66 IN | DIASTOLIC BLOOD PRESSURE: 82 MMHG | TEMPERATURE: 97.5 F

## 2020-03-26 DIAGNOSIS — F41.9 ANXIETY: ICD-10-CM

## 2020-03-26 DIAGNOSIS — IMO0001 TRANSITION OF CARE PERFORMED WITH SHARING OF CLINICAL SUMMARY: Primary | ICD-10-CM

## 2020-03-26 DIAGNOSIS — S72.011S: ICD-10-CM

## 2020-03-26 DIAGNOSIS — G25.0 BENIGN ESSENTIAL TREMOR: ICD-10-CM

## 2020-03-26 PROCEDURE — 99495 TRANSJ CARE MGMT MOD F2F 14D: CPT | Performed by: INTERNAL MEDICINE

## 2020-03-26 RX ORDER — DOXYCYCLINE HYCLATE 100 MG/1
CAPSULE ORAL
COMMUNITY
Start: 2020-03-21 | End: 2020-08-21

## 2020-03-26 RX ORDER — ALPRAZOLAM 0.5 MG/1
0.5 TABLET ORAL 3 TIMES DAILY PRN
Qty: 21 TABLET | Refills: 0 | Status: SHIPPED | OUTPATIENT
Start: 2020-03-26 | End: 2020-08-21 | Stop reason: SDUPTHER

## 2020-03-26 RX ORDER — HYDROCODONE BITARTRATE AND ACETAMINOPHEN 5; 325 MG/1; MG/1
TABLET ORAL
COMMUNITY
Start: 2020-03-21 | End: 2021-06-03

## 2020-03-26 RX ORDER — ALPRAZOLAM 0.5 MG/1
0.5 TABLET ORAL 3 TIMES DAILY PRN
Qty: 21 TABLET | Refills: 0 | Status: SHIPPED | OUTPATIENT
Start: 2020-03-26 | End: 2020-03-26

## 2020-03-26 RX ORDER — ALPRAZOLAM 0.25 MG/1
TABLET ORAL
COMMUNITY
Start: 2020-03-21 | End: 2020-03-26

## 2020-03-29 PROBLEM — Z78.9 TRANSITION OF CARE PERFORMED WITH SHARING OF CLINICAL SUMMARY: Status: ACTIVE | Noted: 2020-03-29

## 2020-03-29 PROBLEM — IMO0001 TRANSITION OF CARE PERFORMED WITH SHARING OF CLINICAL SUMMARY: Status: ACTIVE | Noted: 2020-03-29

## 2020-03-30 ENCOUNTER — LAB (OUTPATIENT)
Dept: LAB | Facility: HOSPITAL | Age: 70
End: 2020-03-30

## 2020-03-30 ENCOUNTER — TRANSCRIBE ORDERS (OUTPATIENT)
Dept: LAB | Facility: HOSPITAL | Age: 70
End: 2020-03-30

## 2020-03-30 DIAGNOSIS — N28.9 KIDNEY DISEASE: Primary | ICD-10-CM

## 2020-03-30 DIAGNOSIS — N28.9 KIDNEY DISEASE: ICD-10-CM

## 2020-03-30 LAB
ANION GAP SERPL CALCULATED.3IONS-SCNC: 11.2 MMOL/L (ref 5–15)
BUN BLD-MCNC: 39 MG/DL (ref 8–23)
BUN/CREAT SERPL: 28.3 (ref 7–25)
CALCIUM SPEC-SCNC: 9 MG/DL (ref 8.6–10.5)
CHLORIDE SERPL-SCNC: 105 MMOL/L (ref 98–107)
CO2 SERPL-SCNC: 20.8 MMOL/L (ref 22–29)
CREAT BLD-MCNC: 1.38 MG/DL (ref 0.57–1)
GFR SERPL CREATININE-BSD FRML MDRD: 38 ML/MIN/1.73
GLUCOSE BLD-MCNC: 164 MG/DL (ref 65–99)
POTASSIUM BLD-SCNC: 4.9 MMOL/L (ref 3.5–5.2)
SODIUM BLD-SCNC: 137 MMOL/L (ref 136–145)

## 2020-03-30 PROCEDURE — 80048 BASIC METABOLIC PNL TOTAL CA: CPT

## 2020-03-30 PROCEDURE — 36415 COLL VENOUS BLD VENIPUNCTURE: CPT

## 2020-04-14 RX ORDER — AMLODIPINE BESYLATE 5 MG/1
TABLET ORAL
Qty: 90 TABLET | Refills: 1 | Status: SHIPPED | OUTPATIENT
Start: 2020-04-14 | End: 2020-06-30

## 2020-04-14 RX ORDER — DULOXETIN HYDROCHLORIDE 60 MG/1
CAPSULE, DELAYED RELEASE ORAL
Qty: 180 CAPSULE | Refills: 1 | Status: SHIPPED | OUTPATIENT
Start: 2020-04-14 | End: 2020-06-30

## 2020-04-20 ENCOUNTER — TELEPHONE (OUTPATIENT)
Dept: FAMILY MEDICINE CLINIC | Facility: CLINIC | Age: 70
End: 2020-04-20

## 2020-04-20 DIAGNOSIS — S72.011S: Primary | ICD-10-CM

## 2020-04-20 RX ORDER — TRAMADOL HYDROCHLORIDE 50 MG/1
50 TABLET ORAL 2 TIMES DAILY PRN
Qty: 45 TABLET | Refills: 0 | Status: SHIPPED | OUTPATIENT
Start: 2020-04-20 | End: 2021-05-03 | Stop reason: HOSPADM

## 2020-06-30 RX ORDER — AMLODIPINE BESYLATE 5 MG/1
TABLET ORAL
Qty: 90 TABLET | Refills: 1 | Status: SHIPPED | OUTPATIENT
Start: 2020-06-30 | End: 2021-01-25 | Stop reason: SDUPTHER

## 2020-06-30 RX ORDER — DULOXETIN HYDROCHLORIDE 60 MG/1
CAPSULE, DELAYED RELEASE ORAL
Qty: 180 CAPSULE | Refills: 1 | Status: SHIPPED | OUTPATIENT
Start: 2020-06-30 | End: 2021-01-25 | Stop reason: SDUPTHER

## 2020-07-02 DIAGNOSIS — F51.01 PRIMARY INSOMNIA: ICD-10-CM

## 2020-07-07 ENCOUNTER — TELEPHONE (OUTPATIENT)
Dept: FAMILY MEDICINE CLINIC | Facility: CLINIC | Age: 70
End: 2020-07-07

## 2020-07-07 DIAGNOSIS — F51.01 PRIMARY INSOMNIA: ICD-10-CM

## 2020-07-07 RX ORDER — TEMAZEPAM 30 MG/1
30 CAPSULE ORAL NIGHTLY PRN
Qty: 90 CAPSULE | Refills: 1 | Status: SHIPPED | OUTPATIENT
Start: 2020-07-07 | End: 2020-07-08 | Stop reason: SDUPTHER

## 2020-07-08 RX ORDER — TEMAZEPAM 30 MG/1
30 CAPSULE ORAL NIGHTLY PRN
Qty: 90 CAPSULE | Refills: 1 | Status: SHIPPED | OUTPATIENT
Start: 2020-07-08 | End: 2021-03-16 | Stop reason: SDUPTHER

## 2020-08-21 ENCOUNTER — LAB (OUTPATIENT)
Dept: FAMILY MEDICINE CLINIC | Facility: CLINIC | Age: 70
End: 2020-08-21

## 2020-08-21 ENCOUNTER — RESULTS ENCOUNTER (OUTPATIENT)
Dept: FAMILY MEDICINE CLINIC | Facility: CLINIC | Age: 70
End: 2020-08-21

## 2020-08-21 ENCOUNTER — OFFICE VISIT (OUTPATIENT)
Dept: FAMILY MEDICINE CLINIC | Facility: CLINIC | Age: 70
End: 2020-08-21

## 2020-08-21 VITALS
RESPIRATION RATE: 10 BRPM | SYSTOLIC BLOOD PRESSURE: 149 MMHG | HEART RATE: 103 BPM | DIASTOLIC BLOOD PRESSURE: 83 MMHG | WEIGHT: 195.4 LBS | BODY MASS INDEX: 31.54 KG/M2 | TEMPERATURE: 98.2 F

## 2020-08-21 DIAGNOSIS — M81.0 AGE-RELATED OSTEOPOROSIS WITHOUT CURRENT PATHOLOGICAL FRACTURE: ICD-10-CM

## 2020-08-21 DIAGNOSIS — E11.21 DIABETIC NEPHROPATHY ASSOCIATED WITH TYPE 2 DIABETES MELLITUS (HCC): ICD-10-CM

## 2020-08-21 DIAGNOSIS — Z12.31 ENCOUNTER FOR SCREENING MAMMOGRAM FOR MALIGNANT NEOPLASM OF BREAST: ICD-10-CM

## 2020-08-21 DIAGNOSIS — F41.9 ANXIETY: ICD-10-CM

## 2020-08-21 DIAGNOSIS — Z12.11 SCREENING FOR COLON CANCER: ICD-10-CM

## 2020-08-21 DIAGNOSIS — Z12.39 SCREENING FOR BREAST CANCER: ICD-10-CM

## 2020-08-21 DIAGNOSIS — Z00.00 ENCOUNTER FOR GENERAL ADULT MEDICAL EXAMINATION WITHOUT ABNORMAL FINDINGS: Primary | ICD-10-CM

## 2020-08-21 DIAGNOSIS — E55.9 VITAMIN D DEFICIENCY: ICD-10-CM

## 2020-08-21 DIAGNOSIS — E78.41 ELEVATED LIPOPROTEIN(A): ICD-10-CM

## 2020-08-21 PROCEDURE — 99213 OFFICE O/P EST LOW 20 MIN: CPT | Performed by: INTERNAL MEDICINE

## 2020-08-21 PROCEDURE — G0009 ADMIN PNEUMOCOCCAL VACCINE: HCPCS | Performed by: INTERNAL MEDICINE

## 2020-08-21 PROCEDURE — 80061 LIPID PANEL: CPT | Performed by: INTERNAL MEDICINE

## 2020-08-21 PROCEDURE — 82306 VITAMIN D 25 HYDROXY: CPT | Performed by: INTERNAL MEDICINE

## 2020-08-21 PROCEDURE — 84443 ASSAY THYROID STIM HORMONE: CPT | Performed by: INTERNAL MEDICINE

## 2020-08-21 PROCEDURE — 90732 PPSV23 VACC 2 YRS+ SUBQ/IM: CPT | Performed by: INTERNAL MEDICINE

## 2020-08-21 PROCEDURE — G0439 PPPS, SUBSEQ VISIT: HCPCS | Performed by: INTERNAL MEDICINE

## 2020-08-21 PROCEDURE — 80053 COMPREHEN METABOLIC PANEL: CPT | Performed by: INTERNAL MEDICINE

## 2020-08-21 PROCEDURE — 83036 HEMOGLOBIN GLYCOSYLATED A1C: CPT | Performed by: INTERNAL MEDICINE

## 2020-08-21 RX ORDER — ALPRAZOLAM 0.5 MG/1
0.5 TABLET ORAL 3 TIMES DAILY PRN
Qty: 28 TABLET | Refills: 0 | Status: SHIPPED | OUTPATIENT
Start: 2020-08-21 | End: 2020-10-06

## 2020-08-22 LAB
25(OH)D3 SERPL-MCNC: 13.5 NG/ML (ref 30–100)
ALBUMIN SERPL-MCNC: 4.2 G/DL (ref 3.5–5.2)
ALBUMIN/GLOB SERPL: 1.6 G/DL
ALP SERPL-CCNC: 87 U/L (ref 39–117)
ALT SERPL W P-5'-P-CCNC: 123 U/L (ref 1–33)
ANION GAP SERPL CALCULATED.3IONS-SCNC: 11.3 MMOL/L (ref 5–15)
AST SERPL-CCNC: 83 U/L (ref 1–32)
BILIRUB SERPL-MCNC: 0.2 MG/DL (ref 0–1.2)
BUN SERPL-MCNC: 29 MG/DL (ref 8–23)
BUN/CREAT SERPL: 20.9 (ref 7–25)
CALCIUM SPEC-SCNC: 9.2 MG/DL (ref 8.6–10.5)
CHLORIDE SERPL-SCNC: 101 MMOL/L (ref 98–107)
CHOLEST SERPL-MCNC: 151 MG/DL (ref 0–200)
CO2 SERPL-SCNC: 22.7 MMOL/L (ref 22–29)
CREAT SERPL-MCNC: 1.39 MG/DL (ref 0.57–1)
GFR SERPL CREATININE-BSD FRML MDRD: 37 ML/MIN/1.73
GLOBULIN UR ELPH-MCNC: 2.7 GM/DL
GLUCOSE SERPL-MCNC: 95 MG/DL (ref 65–99)
HDLC SERPL-MCNC: 81 MG/DL (ref 40–60)
LDLC SERPL CALC-MCNC: 35 MG/DL (ref 0–100)
LDLC/HDLC SERPL: 0.43 {RATIO}
POTASSIUM SERPL-SCNC: 5 MMOL/L (ref 3.5–5.2)
PROT SERPL-MCNC: 6.9 G/DL (ref 6–8.5)
SODIUM SERPL-SCNC: 135 MMOL/L (ref 136–145)
TRIGL SERPL-MCNC: 177 MG/DL (ref 0–150)
TSH SERPL DL<=0.05 MIU/L-ACNC: 2.19 UIU/ML (ref 0.27–4.2)
VLDLC SERPL-MCNC: 35.4 MG/DL (ref 5–40)

## 2020-08-24 LAB — HBA1C MFR BLD: 6 % (ref 3.5–5.6)

## 2020-08-25 PROBLEM — Z12.31 ENCOUNTER FOR SCREENING MAMMOGRAM FOR MALIGNANT NEOPLASM OF BREAST: Status: ACTIVE | Noted: 2020-08-25

## 2020-08-25 PROBLEM — M81.0 AGE-RELATED OSTEOPOROSIS WITHOUT CURRENT PATHOLOGICAL FRACTURE: Status: ACTIVE | Noted: 2020-08-25

## 2020-08-25 PROBLEM — E55.9 VITAMIN D DEFICIENCY: Status: ACTIVE | Noted: 2020-08-25

## 2020-08-25 PROBLEM — Z12.11 SCREENING FOR COLON CANCER: Status: ACTIVE | Noted: 2020-08-25

## 2020-08-26 ENCOUNTER — TELEPHONE (OUTPATIENT)
Dept: FAMILY MEDICINE CLINIC | Facility: CLINIC | Age: 70
End: 2020-08-26

## 2020-08-26 DIAGNOSIS — E78.41 ELEVATED LIPOPROTEIN(A): ICD-10-CM

## 2020-08-26 DIAGNOSIS — E11.65 TYPE 2 DIABETES MELLITUS WITH HYPERGLYCEMIA, WITHOUT LONG-TERM CURRENT USE OF INSULIN (HCC): Primary | ICD-10-CM

## 2020-09-09 ENCOUNTER — TELEPHONE (OUTPATIENT)
Dept: FAMILY MEDICINE CLINIC | Facility: CLINIC | Age: 70
End: 2020-09-09

## 2020-09-25 ENCOUNTER — HOSPITAL ENCOUNTER (OUTPATIENT)
Dept: MAMMOGRAPHY | Facility: HOSPITAL | Age: 70
Discharge: HOME | End: 2020-09-25

## 2020-09-25 ENCOUNTER — HOSPITAL ENCOUNTER (OUTPATIENT)
Dept: BONE DENSITY | Facility: HOSPITAL | Age: 70
Discharge: HOME | End: 2020-09-25

## 2020-09-25 DIAGNOSIS — M81.0 AGE-RELATED OSTEOPOROSIS WITHOUT CURRENT PATHOLOGICAL FRACTURE: ICD-10-CM

## 2020-09-25 DIAGNOSIS — Z12.31 ENCOUNTER FOR SCREENING MAMMOGRAM FOR MALIGNANT NEOPLASM OF BREAST: ICD-10-CM

## 2020-09-25 DIAGNOSIS — Z12.39 SCREENING FOR BREAST CANCER: ICD-10-CM

## 2020-09-25 PROCEDURE — 77080 DXA BONE DENSITY AXIAL: CPT

## 2020-09-25 PROCEDURE — 77063 BREAST TOMOSYNTHESIS BI: CPT

## 2020-09-25 PROCEDURE — 77067 SCR MAMMO BI INCL CAD: CPT

## 2020-09-28 RX ORDER — ROSUVASTATIN CALCIUM 10 MG/1
10 TABLET, COATED ORAL DAILY
Qty: 90 TABLET | Refills: 3 | Status: SHIPPED | OUTPATIENT
Start: 2020-09-28 | End: 2021-01-25 | Stop reason: SDUPTHER

## 2020-10-05 DIAGNOSIS — F41.9 ANXIETY: ICD-10-CM

## 2020-10-06 RX ORDER — ALPRAZOLAM 0.5 MG/1
TABLET ORAL
Qty: 90 TABLET | Refills: 2 | Status: SHIPPED | OUTPATIENT
Start: 2020-10-06 | End: 2020-11-05

## 2020-11-16 RX ORDER — DULAGLUTIDE 1.5 MG/.5ML
INJECTION, SOLUTION SUBCUTANEOUS
Qty: 2 ML | Refills: 3 | Status: SHIPPED | OUTPATIENT
Start: 2020-11-16 | End: 2021-01-25 | Stop reason: SDUPTHER

## 2020-12-16 RX ORDER — PRIMIDONE 50 MG/1
50 TABLET ORAL NIGHTLY
Qty: 90 TABLET | Refills: 3 | Status: SHIPPED | OUTPATIENT
Start: 2020-12-16 | End: 2021-01-25 | Stop reason: SDUPTHER

## 2021-01-08 ENCOUNTER — TELEPHONE (OUTPATIENT)
Dept: FAMILY MEDICINE CLINIC | Facility: CLINIC | Age: 71
End: 2021-01-08

## 2021-01-08 RX ORDER — CIPROFLOXACIN 250 MG/1
250 TABLET, FILM COATED ORAL 2 TIMES DAILY
Qty: 10 TABLET | Refills: 0 | Status: SHIPPED | OUTPATIENT
Start: 2021-01-08 | End: 2021-01-26

## 2021-01-12 RX ORDER — METFORMIN HYDROCHLORIDE 500 MG/1
TABLET, EXTENDED RELEASE ORAL
Qty: 180 TABLET | Refills: 3 | Status: SHIPPED | OUTPATIENT
Start: 2021-01-12 | End: 2021-01-25 | Stop reason: SDUPTHER

## 2021-01-26 RX ORDER — ROSUVASTATIN CALCIUM 10 MG/1
10 TABLET, COATED ORAL DAILY
Qty: 90 TABLET | Refills: 1 | Status: SHIPPED | OUTPATIENT
Start: 2021-01-26 | End: 2021-10-27

## 2021-01-26 RX ORDER — AMLODIPINE BESYLATE 5 MG/1
5 TABLET ORAL DAILY
Qty: 90 TABLET | Refills: 1 | Status: SHIPPED | OUTPATIENT
Start: 2021-01-26 | End: 2021-05-03 | Stop reason: HOSPADM

## 2021-01-26 RX ORDER — BLOOD GLUCOSE CONTROL HIGH,LOW
EACH MISCELLANEOUS
Qty: 3 EACH | Refills: 1 | Status: SHIPPED | OUTPATIENT
Start: 2021-01-26

## 2021-01-26 RX ORDER — DULOXETIN HYDROCHLORIDE 60 MG/1
60 CAPSULE, DELAYED RELEASE ORAL 2 TIMES DAILY
Qty: 180 CAPSULE | Refills: 1 | Status: SHIPPED | OUTPATIENT
Start: 2021-01-26 | End: 2021-01-26 | Stop reason: SDUPTHER

## 2021-01-26 RX ORDER — AMLODIPINE BESYLATE 5 MG/1
5 TABLET ORAL DAILY
Qty: 90 TABLET | Refills: 1 | Status: SHIPPED | OUTPATIENT
Start: 2021-01-26 | End: 2021-01-26 | Stop reason: SDUPTHER

## 2021-01-26 RX ORDER — BLOOD SUGAR DIAGNOSTIC
STRIP MISCELLANEOUS
Qty: 300 EACH | Refills: 12 | Status: SHIPPED | OUTPATIENT
Start: 2021-01-26 | End: 2022-05-11

## 2021-01-26 RX ORDER — DULAGLUTIDE 1.5 MG/.5ML
1.5 INJECTION, SOLUTION SUBCUTANEOUS WEEKLY
Qty: 7 ML | Refills: 1 | Status: SHIPPED | OUTPATIENT
Start: 2021-01-26 | End: 2021-08-19

## 2021-01-26 RX ORDER — PRIMIDONE 50 MG/1
50 TABLET ORAL NIGHTLY
Qty: 90 TABLET | Refills: 1 | Status: SHIPPED | OUTPATIENT
Start: 2021-01-26 | End: 2021-12-23

## 2021-01-26 RX ORDER — METFORMIN HYDROCHLORIDE 500 MG/1
1000 TABLET, EXTENDED RELEASE ORAL
Qty: 180 TABLET | Refills: 1 | Status: SHIPPED | OUTPATIENT
Start: 2021-01-26 | End: 2021-05-03 | Stop reason: HOSPADM

## 2021-01-26 RX ORDER — GLIMEPIRIDE 2 MG/1
2 TABLET ORAL 2 TIMES DAILY
Qty: 180 TABLET | Refills: 1 | Status: SHIPPED | OUTPATIENT
Start: 2021-01-26 | End: 2021-05-03 | Stop reason: HOSPADM

## 2021-01-26 RX ORDER — DULOXETIN HYDROCHLORIDE 60 MG/1
60 CAPSULE, DELAYED RELEASE ORAL 2 TIMES DAILY
Qty: 180 CAPSULE | Refills: 1 | Status: SHIPPED | OUTPATIENT
Start: 2021-01-26 | End: 2022-02-03

## 2021-01-26 RX ORDER — PRIMIDONE 50 MG/1
50 TABLET ORAL NIGHTLY
Qty: 90 TABLET | Refills: 1 | Status: SHIPPED | OUTPATIENT
Start: 2021-01-26 | End: 2021-01-26 | Stop reason: SDUPTHER

## 2021-01-26 RX ORDER — BLOOD SUGAR DIAGNOSTIC
STRIP MISCELLANEOUS
Qty: 300 EACH | Refills: 3 | Status: SHIPPED | OUTPATIENT
Start: 2021-01-26 | End: 2022-04-12

## 2021-03-16 DIAGNOSIS — F51.01 PRIMARY INSOMNIA: ICD-10-CM

## 2021-03-16 RX ORDER — TEMAZEPAM 30 MG/1
30 CAPSULE ORAL NIGHTLY PRN
Qty: 90 CAPSULE | Refills: 1 | Status: SHIPPED | OUTPATIENT
Start: 2021-03-16 | End: 2021-09-01 | Stop reason: SDUPTHER

## 2021-03-25 ENCOUNTER — APPOINTMENT (OUTPATIENT)
Dept: GENERAL RADIOLOGY | Facility: HOSPITAL | Age: 71
End: 2021-03-25

## 2021-03-25 ENCOUNTER — HOSPITAL ENCOUNTER (EMERGENCY)
Facility: HOSPITAL | Age: 71
Discharge: HOME | End: 2021-03-25
Attending: EMERGENCY MEDICINE | Admitting: EMERGENCY MEDICINE

## 2021-03-25 ENCOUNTER — APPOINTMENT (OUTPATIENT)
Dept: CT IMAGING | Facility: HOSPITAL | Age: 71
End: 2021-03-25

## 2021-03-25 VITALS
OXYGEN SATURATION: 100 % | DIASTOLIC BLOOD PRESSURE: 88 MMHG | BODY MASS INDEX: 32.14 KG/M2 | RESPIRATION RATE: 18 BRPM | HEART RATE: 68 BPM | HEIGHT: 66 IN | TEMPERATURE: 98 F | SYSTOLIC BLOOD PRESSURE: 152 MMHG | WEIGHT: 200 LBS

## 2021-03-25 DIAGNOSIS — S00.03XA CONTUSION OF SCALP, INITIAL ENCOUNTER: ICD-10-CM

## 2021-03-25 DIAGNOSIS — S06.0X0A CONCUSSION WITHOUT LOSS OF CONSCIOUSNESS, INITIAL ENCOUNTER: Primary | ICD-10-CM
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
## 2021-03-25 DIAGNOSIS — S70.01XA CONTUSION OF RIGHT HIP, INITIAL ENCOUNTER: ICD-10-CM

## 2021-03-25 PROCEDURE — 70450 CT HEAD/BRAIN W/O DYE: CPT

## 2021-03-25 PROCEDURE — 73502 X-RAY EXAM HIP UNI 2-3 VIEWS: CPT

## 2021-03-25 PROCEDURE — 99282 EMERGENCY DEPT VISIT SF MDM: CPT

## 2021-03-25 RX ORDER — ACETAMINOPHEN AND CODEINE PHOSPHATE 300; 30 MG/1; MG/1
1 TABLET ORAL EVERY 6 HOURS PRN
Qty: 12 TABLET | Refills: 0 | Status: ON HOLD | OUTPATIENT
Start: 2021-03-25 | End: 2021-04-29 | Stop reason: ALTCHOICE

## 2021-03-26 ENCOUNTER — EPISODE CHANGES (OUTPATIENT)
Dept: CASE MANAGEMENT | Facility: OTHER | Age: 71
End: 2021-03-26

## 2021-03-26 ENCOUNTER — PATIENT OUTREACH (OUTPATIENT)
Dept: CASE MANAGEMENT | Facility: OTHER | Age: 71
End: 2021-03-26

## 2021-04-19 ENCOUNTER — LAB (OUTPATIENT)
Dept: LAB | Facility: HOSPITAL | Age: 71
End: 2021-04-19

## 2021-04-19 ENCOUNTER — HOSPITAL ENCOUNTER (OUTPATIENT)
Dept: GENERAL RADIOLOGY | Facility: HOSPITAL | Age: 71
Discharge: HOME | End: 2021-04-19

## 2021-04-19 ENCOUNTER — HOSPITAL ENCOUNTER (OUTPATIENT)
Dept: CARDIOLOGY | Facility: HOSPITAL | Age: 71
Discharge: HOME | End: 2021-04-19

## 2021-04-19 LAB
ABO GROUP BLD: NORMAL
ANION GAP SERPL CALCULATED.3IONS-SCNC: 10.1 MMOL/L (ref 5–15)
APTT PPP: 28.1 SECONDS (ref 24–31)
BACTERIA UR QL AUTO: ABNORMAL /HPF
BASOPHILS # BLD AUTO: 0.06 10*3/MM3 (ref 0–0.2)
BASOPHILS NFR BLD AUTO: 0.8 % (ref 0–1.5)
BILIRUB UR QL STRIP: NEGATIVE
BLD GP AB SCN SERPL QL: NEGATIVE
BUN SERPL-MCNC: 40 MG/DL (ref 8–23)
BUN/CREAT SERPL: 28.6 (ref 7–25)
CALCIUM SPEC-SCNC: 8.3 MG/DL (ref 8.6–10.5)
CHLORIDE SERPL-SCNC: 105 MMOL/L (ref 98–107)
CLARITY UR: CLEAR
CO2 SERPL-SCNC: 20.9 MMOL/L (ref 22–29)
COLOR UR: YELLOW
CREAT SERPL-MCNC: 1.4 MG/DL (ref 0.57–1)
DEPRECATED RDW RBC AUTO: 48.5 FL (ref 37–54)
EOSINOPHIL # BLD AUTO: 0.29 10*3/MM3 (ref 0–0.4)
EOSINOPHIL NFR BLD AUTO: 4.1 % (ref 0.3–6.2)
ERYTHROCYTE [DISTWIDTH] IN BLOOD BY AUTOMATED COUNT: 16.3 % (ref 12.3–15.4)
GFR SERPL CREATININE-BSD FRML MDRD: 37 ML/MIN/1.73
GLUCOSE SERPL-MCNC: 228 MG/DL (ref 65–99)
GLUCOSE UR STRIP-MCNC: ABNORMAL MG/DL
HCT VFR BLD AUTO: 27.6 % (ref 34–46.6)
HGB BLD-MCNC: 8.4 G/DL (ref 12–15.9)
HGB UR QL STRIP.AUTO: NEGATIVE
HYALINE CASTS UR QL AUTO: ABNORMAL /LPF
IMM GRANULOCYTES # BLD AUTO: 0.03 10*3/MM3 (ref 0–0.05)
IMM GRANULOCYTES NFR BLD AUTO: 0.4 % (ref 0–0.5)
INR PPP: 0.97 (ref 0.93–1.1)
KETONES UR QL STRIP: NEGATIVE
LEUKOCYTE ESTERASE UR QL STRIP.AUTO: NEGATIVE
LYMPHOCYTES # BLD AUTO: 1.24 10*3/MM3 (ref 0.7–3.1)
LYMPHOCYTES NFR BLD AUTO: 17.3 % (ref 19.6–45.3)
MCH RBC QN AUTO: 25.2 PG (ref 26.6–33)
MCHC RBC AUTO-ENTMCNC: 30.4 G/DL (ref 31.5–35.7)
MCV RBC AUTO: 82.9 FL (ref 79–97)
MONOCYTES # BLD AUTO: 0.48 10*3/MM3 (ref 0.1–0.9)
MONOCYTES NFR BLD AUTO: 6.7 % (ref 5–12)
MRSA DNA SPEC QL NAA+PROBE: NORMAL
NEUTROPHILS NFR BLD AUTO: 5.06 10*3/MM3 (ref 1.7–7)
NEUTROPHILS NFR BLD AUTO: 70.7 % (ref 42.7–76)
NITRITE UR QL STRIP: NEGATIVE
NRBC BLD AUTO-RTO: 0.1 /100 WBC (ref 0–0.2)
PH UR STRIP.AUTO: 5.5 [PH] (ref 5–8)
PLATELET # BLD AUTO: 457 10*3/MM3 (ref 140–450)
PMV BLD AUTO: 10.1 FL (ref 6–12)
POTASSIUM SERPL-SCNC: 5.1 MMOL/L (ref 3.5–5.2)
PROT UR QL STRIP: ABNORMAL
PROTHROMBIN TIME: 10.7 SECONDS (ref 9.6–11.7)
RBC # BLD AUTO: 3.33 10*6/MM3 (ref 3.77–5.28)
RBC # UR: ABNORMAL /HPF
REF LAB TEST METHOD: ABNORMAL
RH BLD: POSITIVE
SODIUM SERPL-SCNC: 136 MMOL/L (ref 136–145)
SP GR UR STRIP: 1.02 (ref 1–1.03)
SQUAMOUS #/AREA URNS HPF: ABNORMAL /HPF
T&S EXPIRATION DATE: NORMAL
UROBILINOGEN UR QL STRIP: ABNORMAL
WBC # BLD AUTO: 7.16 10*3/MM3 (ref 3.4–10.8)
WBC UR QL AUTO: ABNORMAL /HPF

## 2021-04-19 PROCEDURE — 86850 RBC ANTIBODY SCREEN: CPT

## 2021-04-19 PROCEDURE — 93010 ELECTROCARDIOGRAM REPORT: CPT | Performed by: INTERNAL MEDICINE

## 2021-04-19 PROCEDURE — 81001 URINALYSIS AUTO W/SCOPE: CPT

## 2021-04-19 PROCEDURE — 80048 BASIC METABOLIC PNL TOTAL CA: CPT

## 2021-04-19 PROCEDURE — 93005 ELECTROCARDIOGRAM TRACING: CPT | Performed by: ORTHOPAEDIC SURGERY

## 2021-04-19 PROCEDURE — 86900 BLOOD TYPING SEROLOGIC ABO: CPT

## 2021-04-19 PROCEDURE — 71046 X-RAY EXAM CHEST 2 VIEWS: CPT

## 2021-04-19 PROCEDURE — 85025 COMPLETE CBC W/AUTO DIFF WBC: CPT

## 2021-04-19 PROCEDURE — 85730 THROMBOPLASTIN TIME PARTIAL: CPT

## 2021-04-19 PROCEDURE — 85610 PROTHROMBIN TIME: CPT

## 2021-04-19 PROCEDURE — 86901 BLOOD TYPING SEROLOGIC RH(D): CPT

## 2021-04-19 PROCEDURE — 87641 MR-STAPH DNA AMP PROBE: CPT

## 2021-04-23 RX ORDER — ALPRAZOLAM 0.5 MG/1
0.5 TABLET ORAL 3 TIMES DAILY PRN
COMMUNITY
End: 2021-06-11

## 2021-04-26 ASSESSMENT — KOOS JR
KOOS JR SCORE: 18
KOOS JR SCORE: 42.281

## 2021-04-27 ENCOUNTER — LAB (OUTPATIENT)
Dept: LAB | Facility: HOSPITAL | Age: 71
End: 2021-04-27

## 2021-04-27 LAB — SARS-COV-2 ORF1AB RESP QL NAA+PROBE: NOT DETECTED

## 2021-04-27 PROCEDURE — U0004 COV-19 TEST NON-CDC HGH THRU: HCPCS

## 2021-04-27 PROCEDURE — C9803 HOPD COVID-19 SPEC COLLECT: HCPCS

## 2021-04-29 ENCOUNTER — APPOINTMENT (OUTPATIENT)
Dept: GENERAL RADIOLOGY | Facility: HOSPITAL | Age: 71
End: 2021-04-29

## 2021-04-29 ENCOUNTER — HOSPITAL ENCOUNTER (OUTPATIENT)
Facility: HOSPITAL | Age: 71
Discharge: NURSING, REHAB OR LONG-TERM CARE | End: 2021-05-03
Attending: ORTHOPAEDIC SURGERY | Admitting: INTERNAL MEDICINE

## 2021-04-29 ENCOUNTER — ANESTHESIA EVENT (OUTPATIENT)
Dept: PERIOP | Facility: HOSPITAL | Age: 71
End: 2021-04-29

## 2021-04-29 ENCOUNTER — ANESTHESIA (OUTPATIENT)
Dept: PERIOP | Facility: HOSPITAL | Age: 71
End: 2021-04-29

## 2021-04-29 DIAGNOSIS — S72.011A CLOSED SUBCAPITAL FRACTURE OF RIGHT FEMUR, INITIAL ENCOUNTER (HCC): ICD-10-CM

## 2021-04-29 DIAGNOSIS — Z96.651 HISTORY OF TOTAL RIGHT KNEE REPLACEMENT: Primary | ICD-10-CM

## 2021-04-29 PROBLEM — Z96.659 HISTORY OF TOTAL KNEE ARTHROPLASTY: Status: ACTIVE | Noted: 2021-04-29

## 2021-04-29 LAB
GLUCOSE BLDC GLUCOMTR-MCNC: 103 MG/DL (ref 70–105)
GLUCOSE BLDC GLUCOMTR-MCNC: 132 MG/DL (ref 70–105)
GLUCOSE BLDC GLUCOMTR-MCNC: 156 MG/DL (ref 70–105)

## 2021-04-29 PROCEDURE — G0378 HOSPITAL OBSERVATION PER HR: HCPCS

## 2021-04-29 PROCEDURE — 63710000001 TEMAZEPAM 15 MG CAPSULE: Performed by: ORTHOPAEDIC SURGERY

## 2021-04-29 PROCEDURE — 97530 THERAPEUTIC ACTIVITIES: CPT

## 2021-04-29 PROCEDURE — 27447 TOTAL KNEE ARTHROPLASTY: CPT | Performed by: NURSE PRACTITIONER

## 2021-04-29 PROCEDURE — 97162 PT EVAL MOD COMPLEX 30 MIN: CPT

## 2021-04-29 PROCEDURE — A9270 NON-COVERED ITEM OR SERVICE: HCPCS | Performed by: ORTHOPAEDIC SURGERY

## 2021-04-29 PROCEDURE — 63710000001 GABAPENTIN 300 MG CAPSULE: Performed by: ORTHOPAEDIC SURGERY

## 2021-04-29 PROCEDURE — 82962 GLUCOSE BLOOD TEST: CPT

## 2021-04-29 PROCEDURE — C1776 JOINT DEVICE (IMPLANTABLE): HCPCS | Performed by: ORTHOPAEDIC SURGERY

## 2021-04-29 PROCEDURE — 63710000001 POLYETHYLENE GLYCOL 17 G PACK: Performed by: ORTHOPAEDIC SURGERY

## 2021-04-29 PROCEDURE — 25010000002 HYDROMORPHONE PER 4 MG: Performed by: ANESTHESIOLOGY

## 2021-04-29 PROCEDURE — 99204 OFFICE O/P NEW MOD 45 MIN: CPT | Performed by: INTERNAL MEDICINE

## 2021-04-29 PROCEDURE — C1713 ANCHOR/SCREW BN/BN,TIS/BN: HCPCS | Performed by: ORTHOPAEDIC SURGERY

## 2021-04-29 PROCEDURE — 63710000001 OXYCODONE 5 MG TABLET: Performed by: ORTHOPAEDIC SURGERY

## 2021-04-29 PROCEDURE — A9270 NON-COVERED ITEM OR SERVICE: HCPCS | Performed by: ANESTHESIOLOGY

## 2021-04-29 PROCEDURE — 25010000002 ROPIVACAINE PER 1 MG: Performed by: ORTHOPAEDIC SURGERY

## 2021-04-29 PROCEDURE — 63710000001 LINAGLIPTIN 5 MG TABLET: Performed by: INTERNAL MEDICINE

## 2021-04-29 PROCEDURE — 73560 X-RAY EXAM OF KNEE 1 OR 2: CPT

## 2021-04-29 PROCEDURE — 25010000002 MIDAZOLAM PER 1 MG: Performed by: ANESTHESIOLOGY

## 2021-04-29 PROCEDURE — 63710000001 DULOXETINE 30 MG CAPSULE DELAYED-RELEASE PARTICLES: Performed by: ORTHOPAEDIC SURGERY

## 2021-04-29 PROCEDURE — 63710000001 OXYCODONE 5 MG TABLET: Performed by: ANESTHESIOLOGY

## 2021-04-29 PROCEDURE — 25010000002 HYDRALAZINE PER 20 MG: Performed by: INTERNAL MEDICINE

## 2021-04-29 PROCEDURE — 83036 HEMOGLOBIN GLYCOSYLATED A1C: CPT | Performed by: INTERNAL MEDICINE

## 2021-04-29 PROCEDURE — 25010000002 CEFAZOLIN PER 500 MG: Performed by: ORTHOPAEDIC SURGERY

## 2021-04-29 PROCEDURE — 25010000002 ONDANSETRON PER 1 MG: Performed by: ANESTHESIOLOGY

## 2021-04-29 PROCEDURE — 25010000002 HYDROMORPHONE PER 4 MG: Performed by: ORTHOPAEDIC SURGERY

## 2021-04-29 PROCEDURE — 25010000002 EPINEPHRINE PER 0.1 MG: Performed by: ORTHOPAEDIC SURGERY

## 2021-04-29 PROCEDURE — 25010000002 PROPOFOL 10 MG/ML EMULSION: Performed by: ANESTHESIOLOGY

## 2021-04-29 PROCEDURE — 63710000001 ASPIRIN EC 325 MG TABLET DELAYED-RELEASE: Performed by: ORTHOPAEDIC SURGERY

## 2021-04-29 PROCEDURE — 63710000001 PRIMIDONE 50 MG TABLET: Performed by: ORTHOPAEDIC SURGERY

## 2021-04-29 PROCEDURE — A9270 NON-COVERED ITEM OR SERVICE: HCPCS | Performed by: INTERNAL MEDICINE

## 2021-04-29 DEVICE — PAT TRIATH SYMM X3 9X31MM: Type: IMPLANTABLE DEVICE | Site: KNEE | Status: FUNCTIONAL

## 2021-04-29 DEVICE — TOTL KN HI DEMAND STRYKER: Type: IMPLANTABLE DEVICE | Site: KNEE | Status: FUNCTIONAL

## 2021-04-29 DEVICE — CMT BONE SIMPLEX/P SPEEDSET 40G: Type: IMPLANTABLE DEVICE | Site: KNEE | Status: FUNCTIONAL

## 2021-04-29 DEVICE — BASEPLT TIB TRIATH CMT NO4: Type: IMPLANTABLE DEVICE | Site: KNEE | Status: FUNCTIONAL

## 2021-04-29 DEVICE — INSRT TIB/KN TRIATHLON PS X3 NMBR4 11MM: Type: IMPLANTABLE DEVICE | Site: KNEE | Status: FUNCTIONAL

## 2021-04-29 DEVICE — COMP FEM TRIATH PS CMT NO5 RT: Type: IMPLANTABLE DEVICE | Site: KNEE | Status: FUNCTIONAL

## 2021-04-29 RX ORDER — SODIUM CHLORIDE 9 MG/ML
500 INJECTION, SOLUTION INTRAVENOUS CONTINUOUS
Status: DISCONTINUED | OUTPATIENT
Start: 2021-04-29 | End: 2021-04-30

## 2021-04-29 RX ORDER — NALBUPHINE HCL 10 MG/ML
10 AMPUL (ML) INJECTION EVERY 4 HOURS PRN
Status: DISCONTINUED | OUTPATIENT
Start: 2021-04-29 | End: 2021-04-29

## 2021-04-29 RX ORDER — ALBUTEROL SULFATE 2.5 MG/3ML
2.5 SOLUTION RESPIRATORY (INHALATION) ONCE AS NEEDED
Status: DISCONTINUED | OUTPATIENT
Start: 2021-04-29 | End: 2021-04-29 | Stop reason: HOSPADM

## 2021-04-29 RX ORDER — ONDANSETRON 2 MG/ML
4 INJECTION INTRAMUSCULAR; INTRAVENOUS ONCE AS NEEDED
Status: DISCONTINUED | OUTPATIENT
Start: 2021-04-29 | End: 2021-04-29

## 2021-04-29 RX ORDER — ACETAMINOPHEN 325 MG/1
650 TABLET ORAL EVERY 4 HOURS PRN
Status: DISCONTINUED | OUTPATIENT
Start: 2021-04-29 | End: 2021-05-03 | Stop reason: HOSPADM

## 2021-04-29 RX ORDER — MIDAZOLAM HYDROCHLORIDE 1 MG/ML
1 INJECTION INTRAMUSCULAR; INTRAVENOUS
Status: DISCONTINUED | OUTPATIENT
Start: 2021-04-29 | End: 2021-04-29 | Stop reason: HOSPADM

## 2021-04-29 RX ORDER — GLIPIZIDE 5 MG/1
5 TABLET ORAL
Status: DISCONTINUED | OUTPATIENT
Start: 2021-04-30 | End: 2021-05-03 | Stop reason: HOSPADM

## 2021-04-29 RX ORDER — DIPHENHYDRAMINE HYDROCHLORIDE 50 MG/ML
12.5 INJECTION INTRAMUSCULAR; INTRAVENOUS
Status: DISCONTINUED | OUTPATIENT
Start: 2021-04-29 | End: 2021-04-29

## 2021-04-29 RX ORDER — FLUMAZENIL 0.1 MG/ML
0.2 INJECTION INTRAVENOUS AS NEEDED
Status: DISCONTINUED | OUTPATIENT
Start: 2021-04-29 | End: 2021-04-29 | Stop reason: HOSPADM

## 2021-04-29 RX ORDER — SODIUM CHLORIDE 0.9 % (FLUSH) 0.9 %
10 SYRINGE (ML) INJECTION AS NEEDED
Status: DISCONTINUED | OUTPATIENT
Start: 2021-04-29 | End: 2021-04-29 | Stop reason: HOSPADM

## 2021-04-29 RX ORDER — METFORMIN HYDROCHLORIDE 500 MG/1
1000 TABLET, EXTENDED RELEASE ORAL
Status: DISCONTINUED | OUTPATIENT
Start: 2021-04-30 | End: 2021-04-29

## 2021-04-29 RX ORDER — SODIUM CHLORIDE 0.9 % (FLUSH) 0.9 %
10 SYRINGE (ML) INJECTION AS NEEDED
Status: DISCONTINUED | OUTPATIENT
Start: 2021-04-29 | End: 2021-05-03 | Stop reason: HOSPADM

## 2021-04-29 RX ORDER — GABAPENTIN 300 MG/1
300 CAPSULE ORAL NIGHTLY
Status: DISCONTINUED | OUTPATIENT
Start: 2021-04-29 | End: 2021-05-03 | Stop reason: HOSPADM

## 2021-04-29 RX ORDER — ALBUTEROL SULFATE 2.5 MG/3ML
2.5 SOLUTION RESPIRATORY (INHALATION) ONCE AS NEEDED
Status: DISCONTINUED | OUTPATIENT
Start: 2021-04-29 | End: 2021-04-29

## 2021-04-29 RX ORDER — PRIMIDONE 50 MG/1
50 TABLET ORAL NIGHTLY
Status: DISCONTINUED | OUTPATIENT
Start: 2021-04-29 | End: 2021-05-03 | Stop reason: HOSPADM

## 2021-04-29 RX ORDER — POLYETHYLENE GLYCOL 3350 17 G/17G
17 POWDER, FOR SOLUTION ORAL DAILY
Status: DISCONTINUED | OUTPATIENT
Start: 2021-04-29 | End: 2021-05-02

## 2021-04-29 RX ORDER — LIDOCAINE HYDROCHLORIDE 10 MG/ML
0.5 INJECTION, SOLUTION INFILTRATION; PERINEURAL ONCE AS NEEDED
Status: DISCONTINUED | OUTPATIENT
Start: 2021-04-29 | End: 2021-04-29 | Stop reason: HOSPADM

## 2021-04-29 RX ORDER — ASPIRIN 325 MG
325 TABLET, DELAYED RELEASE (ENTERIC COATED) ORAL EVERY 12 HOURS SCHEDULED
Status: DISCONTINUED | OUTPATIENT
Start: 2021-04-29 | End: 2021-05-03 | Stop reason: HOSPADM

## 2021-04-29 RX ORDER — EPINEPHRINE 1 MG/ML
INJECTION, SOLUTION, CONCENTRATE INTRAVENOUS AS NEEDED
Status: DISCONTINUED | OUTPATIENT
Start: 2021-04-29 | End: 2021-04-29 | Stop reason: HOSPADM

## 2021-04-29 RX ORDER — SODIUM CHLORIDE 9 MG/ML
125 INJECTION, SOLUTION INTRAVENOUS CONTINUOUS
Status: DISCONTINUED | OUTPATIENT
Start: 2021-04-29 | End: 2021-04-30

## 2021-04-29 RX ORDER — TRAMADOL HYDROCHLORIDE 50 MG/1
50 TABLET ORAL EVERY 8 HOURS PRN
Status: DISCONTINUED | OUTPATIENT
Start: 2021-04-29 | End: 2021-05-02

## 2021-04-29 RX ORDER — LIDOCAINE HYDROCHLORIDE 10 MG/ML
INJECTION, SOLUTION EPIDURAL; INFILTRATION; INTRACAUDAL; PERINEURAL AS NEEDED
Status: DISCONTINUED | OUTPATIENT
Start: 2021-04-29 | End: 2021-04-29 | Stop reason: SURG

## 2021-04-29 RX ORDER — ROPIVACAINE HYDROCHLORIDE 5 MG/ML
INJECTION, SOLUTION EPIDURAL; INFILTRATION; PERINEURAL AS NEEDED
Status: DISCONTINUED | OUTPATIENT
Start: 2021-04-29 | End: 2021-04-29 | Stop reason: HOSPADM

## 2021-04-29 RX ORDER — METOPROLOL TARTRATE 5 MG/5ML
2.5 INJECTION INTRAVENOUS EVERY 4 HOURS
Status: DISCONTINUED | OUTPATIENT
Start: 2021-04-29 | End: 2021-04-29 | Stop reason: HOSPADM

## 2021-04-29 RX ORDER — 0.9 % SODIUM CHLORIDE 0.9 %
VIAL (ML) INJECTION AS NEEDED
Status: DISCONTINUED | OUTPATIENT
Start: 2021-04-29 | End: 2021-04-29 | Stop reason: HOSPADM

## 2021-04-29 RX ORDER — FLUMAZENIL 0.1 MG/ML
0.2 INJECTION INTRAVENOUS AS NEEDED
Status: DISCONTINUED | OUTPATIENT
Start: 2021-04-29 | End: 2021-04-29

## 2021-04-29 RX ORDER — ONDANSETRON 4 MG/1
4 TABLET, FILM COATED ORAL EVERY 6 HOURS PRN
Status: DISCONTINUED | OUTPATIENT
Start: 2021-04-29 | End: 2021-05-03 | Stop reason: HOSPADM

## 2021-04-29 RX ORDER — SODIUM CHLORIDE 0.9 % (FLUSH) 0.9 %
3 SYRINGE (ML) INJECTION EVERY 12 HOURS SCHEDULED
Status: DISCONTINUED | OUTPATIENT
Start: 2021-04-29 | End: 2021-05-03 | Stop reason: HOSPADM

## 2021-04-29 RX ORDER — OXYCODONE HYDROCHLORIDE 5 MG/1
5 TABLET ORAL EVERY 4 HOURS PRN
Status: DISCONTINUED | OUTPATIENT
Start: 2021-04-29 | End: 2021-05-03 | Stop reason: HOSPADM

## 2021-04-29 RX ORDER — HYDROMORPHONE HCL 110MG/55ML
0.5 PATIENT CONTROLLED ANALGESIA SYRINGE INTRAVENOUS
Status: DISCONTINUED | OUTPATIENT
Start: 2021-04-29 | End: 2021-04-29

## 2021-04-29 RX ORDER — MELOXICAM 15 MG/1
15 TABLET ORAL DAILY
Status: DISCONTINUED | OUTPATIENT
Start: 2021-04-29 | End: 2021-04-29

## 2021-04-29 RX ORDER — NALBUPHINE HCL 10 MG/ML
10 AMPUL (ML) INJECTION EVERY 4 HOURS PRN
Status: DISCONTINUED | OUTPATIENT
Start: 2021-04-29 | End: 2021-04-29 | Stop reason: HOSPADM

## 2021-04-29 RX ORDER — HYDROMORPHONE HCL 110MG/55ML
0.5 PATIENT CONTROLLED ANALGESIA SYRINGE INTRAVENOUS
Status: DISCONTINUED | OUTPATIENT
Start: 2021-04-29 | End: 2021-04-29 | Stop reason: HOSPADM

## 2021-04-29 RX ORDER — MIDAZOLAM HYDROCHLORIDE 1 MG/ML
INJECTION INTRAMUSCULAR; INTRAVENOUS AS NEEDED
Status: DISCONTINUED | OUTPATIENT
Start: 2021-04-29 | End: 2021-04-29 | Stop reason: SURG

## 2021-04-29 RX ORDER — NALOXONE HCL 0.4 MG/ML
0.4 VIAL (ML) INJECTION AS NEEDED
Status: DISCONTINUED | OUTPATIENT
Start: 2021-04-29 | End: 2021-04-29 | Stop reason: HOSPADM

## 2021-04-29 RX ORDER — AMLODIPINE BESYLATE 5 MG/1
5 TABLET ORAL DAILY
Status: DISCONTINUED | OUTPATIENT
Start: 2021-04-30 | End: 2021-04-29

## 2021-04-29 RX ORDER — AMLODIPINE BESYLATE 5 MG/1
10 TABLET ORAL DAILY
Status: DISCONTINUED | OUTPATIENT
Start: 2021-04-30 | End: 2021-05-03 | Stop reason: HOSPADM

## 2021-04-29 RX ORDER — BUPIVACAINE HYDROCHLORIDE 7.5 MG/ML
INJECTION, SOLUTION EPIDURAL; RETROBULBAR
Status: COMPLETED | OUTPATIENT
Start: 2021-04-29 | End: 2021-04-29

## 2021-04-29 RX ORDER — HYDRALAZINE HYDROCHLORIDE 20 MG/ML
10 INJECTION INTRAMUSCULAR; INTRAVENOUS EVERY 6 HOURS PRN
Status: DISCONTINUED | OUTPATIENT
Start: 2021-04-29 | End: 2021-04-30

## 2021-04-29 RX ORDER — ACETAMINOPHEN 500 MG
1000 TABLET ORAL ONCE
Status: COMPLETED | OUTPATIENT
Start: 2021-04-29 | End: 2021-04-29

## 2021-04-29 RX ORDER — MEPERIDINE HYDROCHLORIDE 25 MG/ML
12.5 INJECTION INTRAMUSCULAR; INTRAVENOUS; SUBCUTANEOUS
Status: DISCONTINUED | OUTPATIENT
Start: 2021-04-29 | End: 2021-04-29 | Stop reason: HOSPADM

## 2021-04-29 RX ORDER — ONDANSETRON 2 MG/ML
4 INJECTION INTRAMUSCULAR; INTRAVENOUS EVERY 6 HOURS PRN
Status: DISCONTINUED | OUTPATIENT
Start: 2021-04-29 | End: 2021-05-03 | Stop reason: HOSPADM

## 2021-04-29 RX ORDER — TRANEXAMIC ACID 100 MG/ML
1000 INJECTION, SOLUTION INTRAVENOUS ONCE
Status: DISCONTINUED | OUTPATIENT
Start: 2021-04-29 | End: 2021-04-29 | Stop reason: HOSPADM

## 2021-04-29 RX ORDER — NALBUPHINE HCL 10 MG/ML
2 AMPUL (ML) INJECTION EVERY 4 HOURS PRN
Status: DISCONTINUED | OUTPATIENT
Start: 2021-04-29 | End: 2021-04-29

## 2021-04-29 RX ORDER — NALOXONE HCL 0.4 MG/ML
0.1 VIAL (ML) INJECTION
Status: DISCONTINUED | OUTPATIENT
Start: 2021-04-29 | End: 2021-05-01

## 2021-04-29 RX ORDER — MIDAZOLAM HYDROCHLORIDE 1 MG/ML
1 INJECTION INTRAMUSCULAR; INTRAVENOUS
Status: DISCONTINUED | OUTPATIENT
Start: 2021-04-29 | End: 2021-04-29

## 2021-04-29 RX ORDER — TEMAZEPAM 15 MG/1
30 CAPSULE ORAL NIGHTLY PRN
Status: DISCONTINUED | OUTPATIENT
Start: 2021-04-29 | End: 2021-05-03 | Stop reason: HOSPADM

## 2021-04-29 RX ORDER — OXYCODONE HYDROCHLORIDE 5 MG/1
10 TABLET ORAL EVERY 4 HOURS PRN
Status: DISCONTINUED | OUTPATIENT
Start: 2021-04-29 | End: 2021-05-03 | Stop reason: HOSPADM

## 2021-04-29 RX ORDER — MEPERIDINE HYDROCHLORIDE 25 MG/ML
12.5 INJECTION INTRAMUSCULAR; INTRAVENOUS; SUBCUTANEOUS
Status: DISCONTINUED | OUTPATIENT
Start: 2021-04-29 | End: 2021-04-29

## 2021-04-29 RX ORDER — OXYCODONE HYDROCHLORIDE 5 MG/1
7.5 TABLET ORAL EVERY 4 HOURS PRN
Status: DISCONTINUED | OUTPATIENT
Start: 2021-04-29 | End: 2021-04-29 | Stop reason: HOSPADM

## 2021-04-29 RX ORDER — LORAZEPAM 2 MG/ML
1 INJECTION INTRAMUSCULAR
Status: DISCONTINUED | OUTPATIENT
Start: 2021-04-29 | End: 2021-04-29

## 2021-04-29 RX ORDER — NALBUPHINE HCL 10 MG/ML
2 AMPUL (ML) INJECTION EVERY 4 HOURS PRN
Status: DISCONTINUED | OUTPATIENT
Start: 2021-04-29 | End: 2021-04-29 | Stop reason: HOSPADM

## 2021-04-29 RX ORDER — NALOXONE HCL 0.4 MG/ML
0.4 VIAL (ML) INJECTION AS NEEDED
Status: DISCONTINUED | OUTPATIENT
Start: 2021-04-29 | End: 2021-04-29

## 2021-04-29 RX ORDER — ROSUVASTATIN CALCIUM 10 MG/1
10 TABLET, COATED ORAL DAILY
Status: DISCONTINUED | OUTPATIENT
Start: 2021-04-30 | End: 2021-05-03 | Stop reason: HOSPADM

## 2021-04-29 RX ORDER — ACETAMINOPHEN 325 MG/1
325 TABLET ORAL EVERY 4 HOURS PRN
Status: DISCONTINUED | OUTPATIENT
Start: 2021-04-29 | End: 2021-05-03 | Stop reason: HOSPADM

## 2021-04-29 RX ORDER — DIPHENHYDRAMINE HYDROCHLORIDE 50 MG/ML
12.5 INJECTION INTRAMUSCULAR; INTRAVENOUS
Status: DISCONTINUED | OUTPATIENT
Start: 2021-04-29 | End: 2021-04-29 | Stop reason: HOSPADM

## 2021-04-29 RX ORDER — ONDANSETRON 2 MG/ML
4 INJECTION INTRAMUSCULAR; INTRAVENOUS ONCE AS NEEDED
Status: COMPLETED | OUTPATIENT
Start: 2021-04-29 | End: 2021-04-29

## 2021-04-29 RX ORDER — PROPOFOL 10 MG/ML
VIAL (ML) INTRAVENOUS CONTINUOUS PRN
Status: DISCONTINUED | OUTPATIENT
Start: 2021-04-29 | End: 2021-04-29 | Stop reason: SURG

## 2021-04-29 RX ORDER — HYDROMORPHONE HCL 110MG/55ML
0.5 PATIENT CONTROLLED ANALGESIA SYRINGE INTRAVENOUS
Status: DISCONTINUED | OUTPATIENT
Start: 2021-04-29 | End: 2021-05-01

## 2021-04-29 RX ORDER — ALPRAZOLAM 0.5 MG/1
0.5 TABLET ORAL 3 TIMES DAILY PRN
Status: DISCONTINUED | OUTPATIENT
Start: 2021-04-29 | End: 2021-05-03 | Stop reason: HOSPADM

## 2021-04-29 RX ORDER — LORAZEPAM 2 MG/ML
1 INJECTION INTRAMUSCULAR
Status: DISCONTINUED | OUTPATIENT
Start: 2021-04-29 | End: 2021-04-29 | Stop reason: HOSPADM

## 2021-04-29 RX ORDER — DULOXETIN HYDROCHLORIDE 30 MG/1
60 CAPSULE, DELAYED RELEASE ORAL 2 TIMES DAILY
Status: DISCONTINUED | OUTPATIENT
Start: 2021-04-29 | End: 2021-05-03 | Stop reason: HOSPADM

## 2021-04-29 RX ADMIN — GABAPENTIN 300 MG: 300 CAPSULE ORAL at 21:22

## 2021-04-29 RX ADMIN — POLYETHYLENE GLYCOL 3350 17 G: 17 POWDER, FOR SOLUTION ORAL at 19:05

## 2021-04-29 RX ADMIN — OXYCODONE 10 MG: 5 TABLET ORAL at 21:18

## 2021-04-29 RX ADMIN — HYDROMORPHONE HYDROCHLORIDE 0.5 MG: 2 INJECTION, SOLUTION INTRAMUSCULAR; INTRAVENOUS; SUBCUTANEOUS at 14:40

## 2021-04-29 RX ADMIN — SODIUM CHLORIDE 125 ML/HR: 9 INJECTION, SOLUTION INTRAVENOUS at 16:44

## 2021-04-29 RX ADMIN — CEFAZOLIN SODIUM 2 G: 1 INJECTION, POWDER, FOR SOLUTION INTRAMUSCULAR; INTRAVENOUS at 09:41

## 2021-04-29 RX ADMIN — HYDROMORPHONE HYDROCHLORIDE 0.5 MG: 2 INJECTION, SOLUTION INTRAMUSCULAR; INTRAVENOUS; SUBCUTANEOUS at 13:12

## 2021-04-29 RX ADMIN — PROPOFOL 50 MCG/KG/MIN: 10 INJECTION, EMULSION INTRAVENOUS at 09:41

## 2021-04-29 RX ADMIN — HYDROMORPHONE HYDROCHLORIDE 0.5 MG: 2 INJECTION, SOLUTION INTRAMUSCULAR; INTRAVENOUS; SUBCUTANEOUS at 12:56

## 2021-04-29 RX ADMIN — MIDAZOLAM 4 MG: 1 INJECTION INTRAMUSCULAR; INTRAVENOUS at 09:41

## 2021-04-29 RX ADMIN — LINAGLIPTIN 5 MG: 5 TABLET, FILM COATED ORAL at 19:05

## 2021-04-29 RX ADMIN — ONDANSETRON 4 MG: 2 INJECTION INTRAMUSCULAR; INTRAVENOUS at 14:59

## 2021-04-29 RX ADMIN — BUPIVACAINE HYDROCHLORIDE 2 ML: 7.5 INJECTION, SOLUTION EPIDURAL; RETROBULBAR at 10:24

## 2021-04-29 RX ADMIN — DULOXETINE HYDROCHLORIDE 60 MG: 30 CAPSULE, DELAYED RELEASE ORAL at 21:17

## 2021-04-29 RX ADMIN — CEFAZOLIN SODIUM 2 G: 10 INJECTION, POWDER, FOR SOLUTION INTRAVENOUS at 19:05

## 2021-04-29 RX ADMIN — HYDROMORPHONE HYDROCHLORIDE 0.5 MG: 2 INJECTION, SOLUTION INTRAMUSCULAR; INTRAVENOUS; SUBCUTANEOUS at 19:05

## 2021-04-29 RX ADMIN — ACETAMINOPHEN 1000 MG: 500 TABLET, FILM COATED ORAL at 08:11

## 2021-04-29 RX ADMIN — HYDROMORPHONE HYDROCHLORIDE 0.5 MG: 2 INJECTION, SOLUTION INTRAMUSCULAR; INTRAVENOUS; SUBCUTANEOUS at 15:50

## 2021-04-29 RX ADMIN — OXYCODONE 7.5 MG: 5 TABLET ORAL at 08:12

## 2021-04-29 RX ADMIN — TEMAZEPAM 30 MG: 15 CAPSULE ORAL at 21:17

## 2021-04-29 RX ADMIN — PRIMIDONE 50 MG: 50 TABLET ORAL at 21:17

## 2021-04-29 RX ADMIN — ASPIRIN 325 MG: 325 TABLET, COATED ORAL at 21:17

## 2021-04-29 RX ADMIN — Medication 3 ML: at 15:09

## 2021-04-29 RX ADMIN — HYDROMORPHONE HYDROCHLORIDE 0.5 MG: 2 INJECTION, SOLUTION INTRAMUSCULAR; INTRAVENOUS; SUBCUTANEOUS at 13:37

## 2021-04-29 RX ADMIN — SODIUM CHLORIDE 1000 ML: 9 INJECTION, SOLUTION INTRAVENOUS at 08:00

## 2021-04-29 RX ADMIN — HYDRALAZINE HYDROCHLORIDE 10 MG: 20 INJECTION INTRAMUSCULAR; INTRAVENOUS at 21:17

## 2021-04-29 RX ADMIN — OXYCODONE 7.5 MG: 5 TABLET ORAL at 13:35

## 2021-04-29 RX ADMIN — LIDOCAINE HYDROCHLORIDE 50 MG: 10 INJECTION, SOLUTION EPIDURAL; INFILTRATION; INTRACAUDAL; PERINEURAL at 09:41

## 2021-04-29 RX ADMIN — METOPROLOL TARTRATE 2.5 MG: 5 INJECTION INTRAVENOUS at 14:52

## 2021-04-30 LAB
ANION GAP SERPL CALCULATED.3IONS-SCNC: 9 MMOL/L (ref 5–15)
BUN SERPL-MCNC: 18 MG/DL (ref 8–23)
BUN/CREAT SERPL: 12.6 (ref 7–25)
CALCIUM SPEC-SCNC: 8.2 MG/DL (ref 8.6–10.5)
CHLORIDE SERPL-SCNC: 104 MMOL/L (ref 98–107)
CO2 SERPL-SCNC: 23 MMOL/L (ref 22–29)
CREAT SERPL-MCNC: 1.43 MG/DL (ref 0.57–1)
GFR SERPL CREATININE-BSD FRML MDRD: 36 ML/MIN/1.73
GLUCOSE SERPL-MCNC: 197 MG/DL (ref 65–99)
HBA1C MFR BLD: 6.9 % (ref 3.5–5.6)
HCT VFR BLD AUTO: 27.1 % (ref 34–46.6)
HGB BLD-MCNC: 8.3 G/DL (ref 12–15.9)
POTASSIUM SERPL-SCNC: 4.6 MMOL/L (ref 3.5–5.2)
SODIUM SERPL-SCNC: 136 MMOL/L (ref 136–145)

## 2021-04-30 PROCEDURE — 97110 THERAPEUTIC EXERCISES: CPT

## 2021-04-30 PROCEDURE — A9270 NON-COVERED ITEM OR SERVICE: HCPCS | Performed by: ORTHOPAEDIC SURGERY

## 2021-04-30 PROCEDURE — A9270 NON-COVERED ITEM OR SERVICE: HCPCS | Performed by: INTERNAL MEDICINE

## 2021-04-30 PROCEDURE — 99226 PR SBSQ OBSERVATION CARE/DAY 35 MINUTES: CPT | Performed by: INTERNAL MEDICINE

## 2021-04-30 PROCEDURE — 93005 ELECTROCARDIOGRAM TRACING: CPT | Performed by: INTERNAL MEDICINE

## 2021-04-30 PROCEDURE — 63710000001 LISINOPRIL 20 MG TABLET: Performed by: INTERNAL MEDICINE

## 2021-04-30 PROCEDURE — 94799 UNLISTED PULMONARY SVC/PX: CPT

## 2021-04-30 PROCEDURE — 63710000001 GABAPENTIN 300 MG CAPSULE: Performed by: ORTHOPAEDIC SURGERY

## 2021-04-30 PROCEDURE — 63710000001 POLYETHYLENE GLYCOL 17 G PACK: Performed by: ORTHOPAEDIC SURGERY

## 2021-04-30 PROCEDURE — 63710000001 ASPIRIN EC 325 MG TABLET DELAYED-RELEASE: Performed by: ORTHOPAEDIC SURGERY

## 2021-04-30 PROCEDURE — 93010 ELECTROCARDIOGRAM REPORT: CPT | Performed by: INTERNAL MEDICINE

## 2021-04-30 PROCEDURE — 85014 HEMATOCRIT: CPT | Performed by: ORTHOPAEDIC SURGERY

## 2021-04-30 PROCEDURE — 63710000001 AMLODIPINE 5 MG TABLET: Performed by: INTERNAL MEDICINE

## 2021-04-30 PROCEDURE — 63710000001 DULOXETINE 30 MG CAPSULE DELAYED-RELEASE PARTICLES: Performed by: ORTHOPAEDIC SURGERY

## 2021-04-30 PROCEDURE — G0378 HOSPITAL OBSERVATION PER HR: HCPCS

## 2021-04-30 PROCEDURE — 63710000001 LINAGLIPTIN 5 MG TABLET: Performed by: INTERNAL MEDICINE

## 2021-04-30 PROCEDURE — 63710000001 PRIMIDONE 50 MG TABLET: Performed by: ORTHOPAEDIC SURGERY

## 2021-04-30 PROCEDURE — 97530 THERAPEUTIC ACTIVITIES: CPT

## 2021-04-30 PROCEDURE — 85018 HEMOGLOBIN: CPT | Performed by: ORTHOPAEDIC SURGERY

## 2021-04-30 PROCEDURE — 63710000001 ROSUVASTATIN 10 MG TABLET: Performed by: ORTHOPAEDIC SURGERY

## 2021-04-30 PROCEDURE — 80048 BASIC METABOLIC PNL TOTAL CA: CPT | Performed by: ORTHOPAEDIC SURGERY

## 2021-04-30 PROCEDURE — 25010000002 CEFAZOLIN PER 500 MG: Performed by: ORTHOPAEDIC SURGERY

## 2021-04-30 PROCEDURE — 63710000001 OXYCODONE 5 MG TABLET: Performed by: ORTHOPAEDIC SURGERY

## 2021-04-30 PROCEDURE — 25010000002 HYDROMORPHONE PER 4 MG: Performed by: ORTHOPAEDIC SURGERY

## 2021-04-30 PROCEDURE — 63710000001 GLIPIZIDE 5 MG TABLET: Performed by: ORTHOPAEDIC SURGERY

## 2021-04-30 RX ORDER — LABETALOL HYDROCHLORIDE 5 MG/ML
20 INJECTION, SOLUTION INTRAVENOUS EVERY 6 HOURS PRN
Status: DISCONTINUED | OUTPATIENT
Start: 2021-04-30 | End: 2021-05-01

## 2021-04-30 RX ORDER — METOPROLOL TARTRATE 5 MG/5ML
2.5 INJECTION INTRAVENOUS ONCE
Status: COMPLETED | OUTPATIENT
Start: 2021-04-30 | End: 2021-04-30

## 2021-04-30 RX ORDER — LISINOPRIL 20 MG/1
20 TABLET ORAL
Status: DISCONTINUED | OUTPATIENT
Start: 2021-04-30 | End: 2021-05-01

## 2021-04-30 RX ADMIN — DULOXETINE HYDROCHLORIDE 60 MG: 30 CAPSULE, DELAYED RELEASE ORAL at 08:55

## 2021-04-30 RX ADMIN — OXYCODONE 10 MG: 5 TABLET ORAL at 04:38

## 2021-04-30 RX ADMIN — ASPIRIN 325 MG: 325 TABLET, COATED ORAL at 08:55

## 2021-04-30 RX ADMIN — LISINOPRIL 20 MG: 20 TABLET ORAL at 10:32

## 2021-04-30 RX ADMIN — ASPIRIN 325 MG: 325 TABLET, COATED ORAL at 21:16

## 2021-04-30 RX ADMIN — OXYCODONE 10 MG: 5 TABLET ORAL at 14:37

## 2021-04-30 RX ADMIN — LINAGLIPTIN 5 MG: 5 TABLET, FILM COATED ORAL at 08:55

## 2021-04-30 RX ADMIN — HYDROMORPHONE HYDROCHLORIDE 0.5 MG: 2 INJECTION, SOLUTION INTRAMUSCULAR; INTRAVENOUS; SUBCUTANEOUS at 10:32

## 2021-04-30 RX ADMIN — Medication 3 ML: at 21:16

## 2021-04-30 RX ADMIN — GABAPENTIN 300 MG: 300 CAPSULE ORAL at 21:16

## 2021-04-30 RX ADMIN — OXYCODONE 10 MG: 5 TABLET ORAL at 19:19

## 2021-04-30 RX ADMIN — GLIPIZIDE 5 MG: 5 TABLET ORAL at 08:55

## 2021-04-30 RX ADMIN — DULOXETINE HYDROCHLORIDE 60 MG: 30 CAPSULE, DELAYED RELEASE ORAL at 21:16

## 2021-04-30 RX ADMIN — PRIMIDONE 50 MG: 50 TABLET ORAL at 21:16

## 2021-04-30 RX ADMIN — ROSUVASTATIN CALCIUM 10 MG: 10 TABLET, FILM COATED ORAL at 08:55

## 2021-04-30 RX ADMIN — LABETALOL 20 MG/4 ML (5 MG/ML) INTRAVENOUS SYRINGE 20 MG: at 21:15

## 2021-04-30 RX ADMIN — CEFAZOLIN SODIUM 2 G: 10 INJECTION, POWDER, FOR SOLUTION INTRAVENOUS at 04:38

## 2021-04-30 RX ADMIN — OXYCODONE 10 MG: 5 TABLET ORAL at 08:55

## 2021-04-30 RX ADMIN — AMLODIPINE BESYLATE 10 MG: 5 TABLET ORAL at 08:55

## 2021-04-30 RX ADMIN — Medication 3 ML: at 08:55

## 2021-04-30 RX ADMIN — POLYETHYLENE GLYCOL 3350 17 G: 17 POWDER, FOR SOLUTION ORAL at 08:55

## 2021-04-30 RX ADMIN — METOPROLOL TARTRATE 2.5 MG: 5 INJECTION INTRAVENOUS at 01:32

## 2021-05-01 LAB
ANION GAP SERPL CALCULATED.3IONS-SCNC: 10 MMOL/L (ref 5–15)
BUN SERPL-MCNC: 16 MG/DL (ref 8–23)
BUN/CREAT SERPL: 12 (ref 7–25)
CALCIUM SPEC-SCNC: 8.2 MG/DL (ref 8.6–10.5)
CHLORIDE SERPL-SCNC: 103 MMOL/L (ref 98–107)
CO2 SERPL-SCNC: 23 MMOL/L (ref 22–29)
CREAT SERPL-MCNC: 1.33 MG/DL (ref 0.57–1)
GFR SERPL CREATININE-BSD FRML MDRD: 39 ML/MIN/1.73
GLUCOSE BLDC GLUCOMTR-MCNC: 121 MG/DL (ref 70–105)
GLUCOSE BLDC GLUCOMTR-MCNC: 236 MG/DL (ref 70–105)
GLUCOSE BLDC GLUCOMTR-MCNC: 274 MG/DL (ref 70–105)
GLUCOSE SERPL-MCNC: 143 MG/DL (ref 65–99)
POTASSIUM SERPL-SCNC: 4.5 MMOL/L (ref 3.5–5.2)
SODIUM SERPL-SCNC: 136 MMOL/L (ref 136–145)
WHOLE BLOOD HOLD SPECIMEN: NORMAL

## 2021-05-01 PROCEDURE — 63710000001 PRIMIDONE 50 MG TABLET: Performed by: ORTHOPAEDIC SURGERY

## 2021-05-01 PROCEDURE — A9270 NON-COVERED ITEM OR SERVICE: HCPCS | Performed by: INTERNAL MEDICINE

## 2021-05-01 PROCEDURE — 63710000001 GLIPIZIDE 5 MG TABLET: Performed by: ORTHOPAEDIC SURGERY

## 2021-05-01 PROCEDURE — 63710000001 OXYCODONE 5 MG TABLET: Performed by: ORTHOPAEDIC SURGERY

## 2021-05-01 PROCEDURE — 99225 PR SBSQ OBSERVATION CARE/DAY 25 MINUTES: CPT | Performed by: INTERNAL MEDICINE

## 2021-05-01 PROCEDURE — A9270 NON-COVERED ITEM OR SERVICE: HCPCS | Performed by: ORTHOPAEDIC SURGERY

## 2021-05-01 PROCEDURE — 97535 SELF CARE MNGMENT TRAINING: CPT

## 2021-05-01 PROCEDURE — 97110 THERAPEUTIC EXERCISES: CPT

## 2021-05-01 PROCEDURE — 97112 NEUROMUSCULAR REEDUCATION: CPT

## 2021-05-01 PROCEDURE — 63710000001 ASPIRIN EC 325 MG TABLET DELAYED-RELEASE: Performed by: ORTHOPAEDIC SURGERY

## 2021-05-01 PROCEDURE — 63710000001 GABAPENTIN 300 MG CAPSULE: Performed by: ORTHOPAEDIC SURGERY

## 2021-05-01 PROCEDURE — G0378 HOSPITAL OBSERVATION PER HR: HCPCS

## 2021-05-01 PROCEDURE — 94799 UNLISTED PULMONARY SVC/PX: CPT

## 2021-05-01 PROCEDURE — 63710000001 AMLODIPINE 5 MG TABLET: Performed by: INTERNAL MEDICINE

## 2021-05-01 PROCEDURE — 63710000001 LINAGLIPTIN 5 MG TABLET: Performed by: INTERNAL MEDICINE

## 2021-05-01 PROCEDURE — 82962 GLUCOSE BLOOD TEST: CPT

## 2021-05-01 PROCEDURE — 80048 BASIC METABOLIC PNL TOTAL CA: CPT | Performed by: ORTHOPAEDIC SURGERY

## 2021-05-01 PROCEDURE — 63710000001 ROSUVASTATIN 10 MG TABLET: Performed by: ORTHOPAEDIC SURGERY

## 2021-05-01 PROCEDURE — 97530 THERAPEUTIC ACTIVITIES: CPT

## 2021-05-01 PROCEDURE — 63710000001 POLYETHYLENE GLYCOL 17 G PACK: Performed by: ORTHOPAEDIC SURGERY

## 2021-05-01 PROCEDURE — 63710000001 DULOXETINE 30 MG CAPSULE DELAYED-RELEASE PARTICLES: Performed by: ORTHOPAEDIC SURGERY

## 2021-05-01 PROCEDURE — 63710000001 LISINOPRIL 20 MG TABLET: Performed by: INTERNAL MEDICINE

## 2021-05-01 RX ORDER — LISINOPRIL 20 MG/1
40 TABLET ORAL
Status: DISCONTINUED | OUTPATIENT
Start: 2021-05-02 | End: 2021-05-02

## 2021-05-01 RX ADMIN — AMLODIPINE BESYLATE 10 MG: 5 TABLET ORAL at 08:55

## 2021-05-01 RX ADMIN — LINAGLIPTIN 5 MG: 5 TABLET, FILM COATED ORAL at 08:55

## 2021-05-01 RX ADMIN — ASPIRIN 325 MG: 325 TABLET, COATED ORAL at 21:36

## 2021-05-01 RX ADMIN — OXYCODONE 5 MG: 5 TABLET ORAL at 08:55

## 2021-05-01 RX ADMIN — PRIMIDONE 50 MG: 50 TABLET ORAL at 21:36

## 2021-05-01 RX ADMIN — GLIPIZIDE 5 MG: 5 TABLET ORAL at 08:55

## 2021-05-01 RX ADMIN — GABAPENTIN 300 MG: 300 CAPSULE ORAL at 21:36

## 2021-05-01 RX ADMIN — ROSUVASTATIN CALCIUM 10 MG: 10 TABLET, FILM COATED ORAL at 08:55

## 2021-05-01 RX ADMIN — Medication 3 ML: at 21:36

## 2021-05-01 RX ADMIN — POLYETHYLENE GLYCOL 3350 17 G: 17 POWDER, FOR SOLUTION ORAL at 08:56

## 2021-05-01 RX ADMIN — DULOXETINE HYDROCHLORIDE 60 MG: 30 CAPSULE, DELAYED RELEASE ORAL at 08:55

## 2021-05-01 RX ADMIN — DULOXETINE HYDROCHLORIDE 60 MG: 30 CAPSULE, DELAYED RELEASE ORAL at 21:36

## 2021-05-01 RX ADMIN — LISINOPRIL 20 MG: 20 TABLET ORAL at 08:55

## 2021-05-01 RX ADMIN — ASPIRIN 325 MG: 325 TABLET, COATED ORAL at 08:55

## 2021-05-02 LAB
ANION GAP SERPL CALCULATED.3IONS-SCNC: 10 MMOL/L (ref 5–15)
BUN SERPL-MCNC: 26 MG/DL (ref 8–23)
BUN/CREAT SERPL: 14.3 (ref 7–25)
CALCIUM SPEC-SCNC: 8 MG/DL (ref 8.6–10.5)
CHLORIDE SERPL-SCNC: 101 MMOL/L (ref 98–107)
CO2 SERPL-SCNC: 23 MMOL/L (ref 22–29)
CREAT SERPL-MCNC: 1.82 MG/DL (ref 0.57–1)
GFR SERPL CREATININE-BSD FRML MDRD: 27 ML/MIN/1.73
GLUCOSE BLDC GLUCOMTR-MCNC: 135 MG/DL (ref 70–105)
GLUCOSE BLDC GLUCOMTR-MCNC: 215 MG/DL (ref 70–105)
GLUCOSE SERPL-MCNC: 126 MG/DL (ref 65–99)
POTASSIUM SERPL-SCNC: 4.6 MMOL/L (ref 3.5–5.2)
SODIUM SERPL-SCNC: 134 MMOL/L (ref 136–145)
WHOLE BLOOD HOLD SPECIMEN: NORMAL

## 2021-05-02 PROCEDURE — 63710000001 HYDRALAZINE 25 MG TABLET: Performed by: INTERNAL MEDICINE

## 2021-05-02 PROCEDURE — A9270 NON-COVERED ITEM OR SERVICE: HCPCS | Performed by: INTERNAL MEDICINE

## 2021-05-02 PROCEDURE — 82962 GLUCOSE BLOOD TEST: CPT

## 2021-05-02 PROCEDURE — 63710000001 ASPIRIN EC 325 MG TABLET DELAYED-RELEASE: Performed by: ORTHOPAEDIC SURGERY

## 2021-05-02 PROCEDURE — 63710000001 ROSUVASTATIN 10 MG TABLET: Performed by: ORTHOPAEDIC SURGERY

## 2021-05-02 PROCEDURE — 63710000001 POLYETHYLENE GLYCOL 17 G PACK: Performed by: ORTHOPAEDIC SURGERY

## 2021-05-02 PROCEDURE — A9270 NON-COVERED ITEM OR SERVICE: HCPCS | Performed by: ORTHOPAEDIC SURGERY

## 2021-05-02 PROCEDURE — 63710000001 POLYETHYLENE GLYCOL 17 G PACK: Performed by: INTERNAL MEDICINE

## 2021-05-02 PROCEDURE — 63710000001 LINAGLIPTIN 5 MG TABLET: Performed by: INTERNAL MEDICINE

## 2021-05-02 PROCEDURE — 63710000001 GLIPIZIDE 5 MG TABLET: Performed by: ORTHOPAEDIC SURGERY

## 2021-05-02 PROCEDURE — 63710000001 TEMAZEPAM 15 MG CAPSULE: Performed by: ORTHOPAEDIC SURGERY

## 2021-05-02 PROCEDURE — 97112 NEUROMUSCULAR REEDUCATION: CPT

## 2021-05-02 PROCEDURE — 97530 THERAPEUTIC ACTIVITIES: CPT

## 2021-05-02 PROCEDURE — 63710000001 GABAPENTIN 300 MG CAPSULE: Performed by: ORTHOPAEDIC SURGERY

## 2021-05-02 PROCEDURE — 97110 THERAPEUTIC EXERCISES: CPT

## 2021-05-02 PROCEDURE — 80048 BASIC METABOLIC PNL TOTAL CA: CPT | Performed by: INTERNAL MEDICINE

## 2021-05-02 PROCEDURE — 63710000001 DULOXETINE 30 MG CAPSULE DELAYED-RELEASE PARTICLES: Performed by: ORTHOPAEDIC SURGERY

## 2021-05-02 PROCEDURE — G0378 HOSPITAL OBSERVATION PER HR: HCPCS

## 2021-05-02 PROCEDURE — 63710000001 OXYCODONE 5 MG TABLET: Performed by: ORTHOPAEDIC SURGERY

## 2021-05-02 PROCEDURE — 99226 PR SBSQ OBSERVATION CARE/DAY 35 MINUTES: CPT | Performed by: INTERNAL MEDICINE

## 2021-05-02 PROCEDURE — 63710000001 DOCUSATE SODIUM 100 MG CAPSULE: Performed by: INTERNAL MEDICINE

## 2021-05-02 PROCEDURE — 63710000001 PRIMIDONE 50 MG TABLET: Performed by: ORTHOPAEDIC SURGERY

## 2021-05-02 RX ORDER — HYDRALAZINE HYDROCHLORIDE 25 MG/1
25 TABLET, FILM COATED ORAL EVERY 8 HOURS SCHEDULED
Status: DISCONTINUED | OUTPATIENT
Start: 2021-05-03 | End: 2021-05-02

## 2021-05-02 RX ORDER — POLYETHYLENE GLYCOL 3350 17 G/17G
17 POWDER, FOR SOLUTION ORAL 2 TIMES DAILY
Status: DISCONTINUED | OUTPATIENT
Start: 2021-05-02 | End: 2021-05-03 | Stop reason: HOSPADM

## 2021-05-02 RX ORDER — LISINOPRIL 20 MG/1
20 TABLET ORAL
Status: DISCONTINUED | OUTPATIENT
Start: 2021-05-02 | End: 2021-05-02

## 2021-05-02 RX ORDER — HYDRALAZINE HYDROCHLORIDE 25 MG/1
25 TABLET, FILM COATED ORAL EVERY 12 HOURS SCHEDULED
Status: DISCONTINUED | OUTPATIENT
Start: 2021-05-02 | End: 2021-05-03 | Stop reason: HOSPADM

## 2021-05-02 RX ORDER — DOCUSATE SODIUM 100 MG/1
100 CAPSULE, LIQUID FILLED ORAL 2 TIMES DAILY
Status: DISCONTINUED | OUTPATIENT
Start: 2021-05-02 | End: 2021-05-03 | Stop reason: HOSPADM

## 2021-05-02 RX ORDER — SODIUM CHLORIDE 9 MG/ML
75 INJECTION, SOLUTION INTRAVENOUS CONTINUOUS
Status: DISCONTINUED | OUTPATIENT
Start: 2021-05-02 | End: 2021-05-03

## 2021-05-02 RX ADMIN — ASPIRIN 325 MG: 325 TABLET, COATED ORAL at 08:55

## 2021-05-02 RX ADMIN — DULOXETINE HYDROCHLORIDE 60 MG: 30 CAPSULE, DELAYED RELEASE ORAL at 21:22

## 2021-05-02 RX ADMIN — GABAPENTIN 300 MG: 300 CAPSULE ORAL at 21:21

## 2021-05-02 RX ADMIN — LINAGLIPTIN 5 MG: 5 TABLET, FILM COATED ORAL at 08:55

## 2021-05-02 RX ADMIN — HYDRALAZINE HYDROCHLORIDE 25 MG: 25 TABLET, FILM COATED ORAL at 21:21

## 2021-05-02 RX ADMIN — SODIUM CHLORIDE 75 ML/HR: 9 INJECTION, SOLUTION INTRAVENOUS at 12:48

## 2021-05-02 RX ADMIN — POLYETHYLENE GLYCOL 3350 17 G: 17 POWDER, FOR SOLUTION ORAL at 09:00

## 2021-05-02 RX ADMIN — Medication 3 ML: at 09:00

## 2021-05-02 RX ADMIN — PRIMIDONE 50 MG: 50 TABLET ORAL at 21:21

## 2021-05-02 RX ADMIN — DOCUSATE SODIUM 100 MG: 100 CAPSULE, LIQUID FILLED ORAL at 21:21

## 2021-05-02 RX ADMIN — OXYCODONE 5 MG: 5 TABLET ORAL at 18:38

## 2021-05-02 RX ADMIN — GLIPIZIDE 5 MG: 5 TABLET ORAL at 08:56

## 2021-05-02 RX ADMIN — TEMAZEPAM 30 MG: 15 CAPSULE ORAL at 21:32

## 2021-05-02 RX ADMIN — POLYETHYLENE GLYCOL 3350 17 G: 17 POWDER, FOR SOLUTION ORAL at 21:20

## 2021-05-02 RX ADMIN — OXYCODONE 5 MG: 5 TABLET ORAL at 08:56

## 2021-05-02 RX ADMIN — ASPIRIN 325 MG: 325 TABLET, COATED ORAL at 21:21

## 2021-05-02 RX ADMIN — ROSUVASTATIN CALCIUM 10 MG: 10 TABLET, FILM COATED ORAL at 08:55

## 2021-05-02 RX ADMIN — DULOXETINE HYDROCHLORIDE 60 MG: 30 CAPSULE, DELAYED RELEASE ORAL at 08:55

## 2021-05-02 RX ADMIN — Medication 3 ML: at 21:32

## 2021-05-02 RX ADMIN — OXYCODONE 5 MG: 5 TABLET ORAL at 01:57

## 2021-05-03 VITALS
SYSTOLIC BLOOD PRESSURE: 149 MMHG | HEIGHT: 66 IN | RESPIRATION RATE: 14 BRPM | WEIGHT: 219.8 LBS | TEMPERATURE: 98.2 F | OXYGEN SATURATION: 96 % | DIASTOLIC BLOOD PRESSURE: 65 MMHG | HEART RATE: 94 BPM | BODY MASS INDEX: 35.32 KG/M2

## 2021-05-03 LAB
ANION GAP SERPL CALCULATED.3IONS-SCNC: 10 MMOL/L (ref 5–15)
ANION GAP SERPL CALCULATED.3IONS-SCNC: 11 MMOL/L (ref 5–15)
BUN SERPL-MCNC: 35 MG/DL (ref 8–23)
BUN SERPL-MCNC: 35 MG/DL (ref 8–23)
BUN/CREAT SERPL: 18.8 (ref 7–25)
BUN/CREAT SERPL: 19.2 (ref 7–25)
CALCIUM SPEC-SCNC: 7.7 MG/DL (ref 8.6–10.5)
CALCIUM SPEC-SCNC: 7.9 MG/DL (ref 8.6–10.5)
CHLORIDE SERPL-SCNC: 103 MMOL/L (ref 98–107)
CHLORIDE SERPL-SCNC: 106 MMOL/L (ref 98–107)
CO2 SERPL-SCNC: 21 MMOL/L (ref 22–29)
CO2 SERPL-SCNC: 22 MMOL/L (ref 22–29)
CREAT SERPL-MCNC: 1.82 MG/DL (ref 0.57–1)
CREAT SERPL-MCNC: 1.86 MG/DL (ref 0.57–1)
GFR SERPL CREATININE-BSD FRML MDRD: 27 ML/MIN/1.73
GFR SERPL CREATININE-BSD FRML MDRD: 27 ML/MIN/1.73
GLUCOSE SERPL-MCNC: 122 MG/DL (ref 65–99)
GLUCOSE SERPL-MCNC: 197 MG/DL (ref 65–99)
POTASSIUM SERPL-SCNC: 5 MMOL/L (ref 3.5–5.2)
POTASSIUM SERPL-SCNC: 5.3 MMOL/L (ref 3.5–5.2)
SARS-COV-2 RNA PNL SPEC NAA+PROBE: NOT DETECTED
SODIUM SERPL-SCNC: 135 MMOL/L (ref 136–145)
SODIUM SERPL-SCNC: 138 MMOL/L (ref 136–145)

## 2021-05-03 PROCEDURE — 97530 THERAPEUTIC ACTIVITIES: CPT

## 2021-05-03 PROCEDURE — 63710000001 ROSUVASTATIN 10 MG TABLET: Performed by: ORTHOPAEDIC SURGERY

## 2021-05-03 PROCEDURE — 63710000001 OXYCODONE 5 MG TABLET: Performed by: ORTHOPAEDIC SURGERY

## 2021-05-03 PROCEDURE — 97116 GAIT TRAINING THERAPY: CPT

## 2021-05-03 PROCEDURE — 63710000001 ASPIRIN EC 325 MG TABLET DELAYED-RELEASE: Performed by: ORTHOPAEDIC SURGERY

## 2021-05-03 PROCEDURE — A9270 NON-COVERED ITEM OR SERVICE: HCPCS | Performed by: ORTHOPAEDIC SURGERY

## 2021-05-03 PROCEDURE — G0378 HOSPITAL OBSERVATION PER HR: HCPCS

## 2021-05-03 PROCEDURE — 63710000001 SODIUM POLYSTYRENE 15 GM/60ML SUSPENSION 60 ML BOTTLE: Performed by: INTERNAL MEDICINE

## 2021-05-03 PROCEDURE — 80048 BASIC METABOLIC PNL TOTAL CA: CPT | Performed by: INTERNAL MEDICINE

## 2021-05-03 PROCEDURE — A9270 NON-COVERED ITEM OR SERVICE: HCPCS | Performed by: INTERNAL MEDICINE

## 2021-05-03 PROCEDURE — 63710000001 GLIPIZIDE 5 MG TABLET: Performed by: ORTHOPAEDIC SURGERY

## 2021-05-03 PROCEDURE — 63710000001 LINAGLIPTIN 5 MG TABLET: Performed by: INTERNAL MEDICINE

## 2021-05-03 PROCEDURE — 63710000001 HYDRALAZINE 25 MG TABLET: Performed by: INTERNAL MEDICINE

## 2021-05-03 PROCEDURE — 99217 PR OBSERVATION CARE DISCHARGE MANAGEMENT: CPT | Performed by: INTERNAL MEDICINE

## 2021-05-03 PROCEDURE — 63710000001 POLYETHYLENE GLYCOL 17 G PACK: Performed by: INTERNAL MEDICINE

## 2021-05-03 PROCEDURE — 63710000001 DULOXETINE 30 MG CAPSULE DELAYED-RELEASE PARTICLES: Performed by: ORTHOPAEDIC SURGERY

## 2021-05-03 PROCEDURE — 63710000001 SODIUM POLYSTYRENE 15 GM/60ML SUSPENSION 60 ML BOTTLE: Performed by: NURSE PRACTITIONER

## 2021-05-03 PROCEDURE — U0003 INFECTIOUS AGENT DETECTION BY NUCLEIC ACID (DNA OR RNA); SEVERE ACUTE RESPIRATORY SYNDROME CORONAVIRUS 2 (SARS-COV-2) (CORONAVIRUS DISEASE [COVID-19]), AMPLIFIED PROBE TECHNIQUE, MAKING USE OF HIGH THROUGHPUT TECHNOLOGIES AS DESCRIBED BY CMS-2020-01-R: HCPCS | Performed by: INTERNAL MEDICINE

## 2021-05-03 PROCEDURE — 94799 UNLISTED PULMONARY SVC/PX: CPT

## 2021-05-03 PROCEDURE — A9270 NON-COVERED ITEM OR SERVICE: HCPCS | Performed by: NURSE PRACTITIONER

## 2021-05-03 PROCEDURE — 63710000001 DOCUSATE SODIUM 100 MG CAPSULE: Performed by: INTERNAL MEDICINE

## 2021-05-03 PROCEDURE — 63710000001 AMLODIPINE 5 MG TABLET: Performed by: INTERNAL MEDICINE

## 2021-05-03 PROCEDURE — 63710000001: Performed by: ORTHOPAEDIC SURGERY

## 2021-05-03 RX ORDER — SODIUM POLYSTYRENE SULFONATE 15 G/60ML
15 SUSPENSION ORAL; RECTAL ONCE
Status: COMPLETED | OUTPATIENT
Start: 2021-05-03 | End: 2021-05-03

## 2021-05-03 RX ORDER — AMLODIPINE BESYLATE 10 MG/1
10 TABLET ORAL DAILY
Qty: 30 TABLET | Refills: 0 | Status: SHIPPED | OUTPATIENT
Start: 2021-05-04 | End: 2021-06-03

## 2021-05-03 RX ORDER — HYDRALAZINE HYDROCHLORIDE 25 MG/1
25 TABLET, FILM COATED ORAL EVERY 12 HOURS SCHEDULED
Qty: 60 TABLET | Refills: 0 | Status: SHIPPED | OUTPATIENT
Start: 2021-05-03 | End: 2021-08-19

## 2021-05-03 RX ORDER — ASPIRIN 325 MG
325 TABLET, DELAYED RELEASE (ENTERIC COATED) ORAL EVERY 12 HOURS SCHEDULED
Qty: 28 TABLET | Refills: 0 | Status: SHIPPED | OUTPATIENT
Start: 2021-05-03 | End: 2021-05-17

## 2021-05-03 RX ORDER — POLYETHYLENE GLYCOL 3350 17 G/17G
17 POWDER, FOR SOLUTION ORAL 2 TIMES DAILY
Start: 2021-05-03 | End: 2022-05-02

## 2021-05-03 RX ORDER — GLIPIZIDE 5 MG/1
5 TABLET ORAL
Qty: 30 TABLET | Refills: 0 | Status: SHIPPED | OUTPATIENT
Start: 2021-05-04 | End: 2021-08-19 | Stop reason: SDUPTHER

## 2021-05-03 RX ORDER — OXYCODONE HYDROCHLORIDE AND ACETAMINOPHEN 5; 325 MG/1; MG/1
TABLET ORAL
Qty: 30 TABLET | Refills: 0 | Status: SHIPPED | OUTPATIENT
Start: 2021-05-03 | End: 2021-08-19

## 2021-05-03 RX ORDER — GABAPENTIN 300 MG/1
300 CAPSULE ORAL NIGHTLY
Qty: 30 CAPSULE | Refills: 0 | Status: SHIPPED | OUTPATIENT
Start: 2021-05-03 | End: 2021-06-03

## 2021-05-03 RX ORDER — SODIUM PHOSPHATE, DIBASIC AND SODIUM PHOSPHATE, MONOBASIC 7; 19 G/133ML; G/133ML
1 ENEMA RECTAL ONCE
Status: COMPLETED | OUTPATIENT
Start: 2021-05-03 | End: 2021-05-03

## 2021-05-03 RX ADMIN — ROSUVASTATIN CALCIUM 10 MG: 10 TABLET, FILM COATED ORAL at 10:15

## 2021-05-03 RX ADMIN — OXYCODONE 5 MG: 5 TABLET ORAL at 15:48

## 2021-05-03 RX ADMIN — OXYCODONE 5 MG: 5 TABLET ORAL at 12:02

## 2021-05-03 RX ADMIN — POLYETHYLENE GLYCOL 3350 17 G: 17 POWDER, FOR SOLUTION ORAL at 10:19

## 2021-05-03 RX ADMIN — DOCUSATE SODIUM 100 MG: 100 CAPSULE, LIQUID FILLED ORAL at 10:15

## 2021-05-03 RX ADMIN — HYDRALAZINE HYDROCHLORIDE 25 MG: 25 TABLET, FILM COATED ORAL at 10:15

## 2021-05-03 RX ADMIN — SODIUM PHOSPHATE 1 ENEMA: 7; 19 ENEMA RECTAL at 15:06

## 2021-05-03 RX ADMIN — SODIUM CHLORIDE 75 ML/HR: 9 INJECTION, SOLUTION INTRAVENOUS at 02:27

## 2021-05-03 RX ADMIN — AMLODIPINE BESYLATE 10 MG: 5 TABLET ORAL at 10:15

## 2021-05-03 RX ADMIN — GLIPIZIDE 5 MG: 5 TABLET ORAL at 10:15

## 2021-05-03 RX ADMIN — SODIUM POLYSTYRENE SULFONATE 15 G: 15 SUSPENSION ORAL; RECTAL at 05:55

## 2021-05-03 RX ADMIN — LINAGLIPTIN 5 MG: 5 TABLET, FILM COATED ORAL at 10:15

## 2021-05-03 RX ADMIN — DULOXETINE HYDROCHLORIDE 60 MG: 30 CAPSULE, DELAYED RELEASE ORAL at 10:15

## 2021-05-03 RX ADMIN — ASPIRIN 325 MG: 325 TABLET, COATED ORAL at 10:15

## 2021-05-03 RX ADMIN — SODIUM POLYSTYRENE SULFONATE 15 G: 15 SUSPENSION ORAL; RECTAL at 15:07

## 2021-05-05 LAB — QT INTERVAL: 396 MS

## 2021-05-07 LAB — QT INTERVAL: 372 MS

## 2021-05-15 ENCOUNTER — READMISSION MANAGEMENT (OUTPATIENT)
Dept: CALL CENTER | Facility: HOSPITAL | Age: 71
End: 2021-05-15

## 2021-05-17 ENCOUNTER — TRANSITIONAL CARE MANAGEMENT TELEPHONE ENCOUNTER (OUTPATIENT)
Dept: CALL CENTER | Facility: HOSPITAL | Age: 71
End: 2021-05-17

## 2021-06-03 ENCOUNTER — LAB (OUTPATIENT)
Dept: FAMILY MEDICINE CLINIC | Facility: CLINIC | Age: 71
End: 2021-06-03

## 2021-06-03 ENCOUNTER — OFFICE VISIT (OUTPATIENT)
Dept: FAMILY MEDICINE CLINIC | Facility: CLINIC | Age: 71
End: 2021-06-03

## 2021-06-03 VITALS
HEIGHT: 66 IN | SYSTOLIC BLOOD PRESSURE: 155 MMHG | BODY MASS INDEX: 34.17 KG/M2 | WEIGHT: 212.6 LBS | RESPIRATION RATE: 16 BRPM | OXYGEN SATURATION: 94 % | DIASTOLIC BLOOD PRESSURE: 81 MMHG | HEART RATE: 81 BPM

## 2021-06-03 DIAGNOSIS — N28.9 RENAL INSUFFICIENCY: ICD-10-CM

## 2021-06-03 DIAGNOSIS — E11.65 TYPE 2 DIABETES MELLITUS WITH HYPERGLYCEMIA, WITHOUT LONG-TERM CURRENT USE OF INSULIN (HCC): Primary | ICD-10-CM

## 2021-06-03 DIAGNOSIS — D50.8 IRON DEFICIENCY ANEMIA SECONDARY TO INADEQUATE DIETARY IRON INTAKE: ICD-10-CM

## 2021-06-03 DIAGNOSIS — N18.31 STAGE 3A CHRONIC KIDNEY DISEASE (HCC): Chronic | ICD-10-CM

## 2021-06-03 DIAGNOSIS — Z96.651 HISTORY OF TOTAL RIGHT KNEE REPLACEMENT: ICD-10-CM

## 2021-06-03 DIAGNOSIS — N18.9 ANEMIA DUE TO CHRONIC KIDNEY DISEASE, UNSPECIFIED CKD STAGE: ICD-10-CM

## 2021-06-03 DIAGNOSIS — D63.1 ANEMIA DUE TO CHRONIC KIDNEY DISEASE, UNSPECIFIED CKD STAGE: ICD-10-CM

## 2021-06-03 LAB
ALBUMIN SERPL-MCNC: 3.8 G/DL (ref 3.5–5.2)
ALBUMIN/GLOB SERPL: 1.2 G/DL
ALP SERPL-CCNC: 115 U/L (ref 39–117)
ALT SERPL W P-5'-P-CCNC: 43 U/L (ref 1–33)
ANION GAP SERPL CALCULATED.3IONS-SCNC: 9.8 MMOL/L (ref 5–15)
AST SERPL-CCNC: 49 U/L (ref 1–32)
BASOPHILS # BLD AUTO: 0.06 10*3/MM3 (ref 0–0.2)
BASOPHILS NFR BLD AUTO: 0.9 % (ref 0–1.5)
BILIRUB SERPL-MCNC: <0.2 MG/DL (ref 0–1.2)
BUN SERPL-MCNC: 29 MG/DL (ref 8–23)
BUN/CREAT SERPL: 17.7 (ref 7–25)
CALCIUM SPEC-SCNC: 8.5 MG/DL (ref 8.6–10.5)
CHLORIDE SERPL-SCNC: 113 MMOL/L (ref 98–107)
CO2 SERPL-SCNC: 20.2 MMOL/L (ref 22–29)
CREAT SERPL-MCNC: 1.64 MG/DL (ref 0.57–1)
DEPRECATED RDW RBC AUTO: 56.1 FL (ref 37–54)
EOSINOPHIL # BLD AUTO: 0.25 10*3/MM3 (ref 0–0.4)
EOSINOPHIL NFR BLD AUTO: 3.6 % (ref 0.3–6.2)
ERYTHROCYTE [DISTWIDTH] IN BLOOD BY AUTOMATED COUNT: 17.5 % (ref 12.3–15.4)
GFR SERPL CREATININE-BSD FRML MDRD: 31 ML/MIN/1.73
GLOBULIN UR ELPH-MCNC: 3.2 GM/DL
GLUCOSE SERPL-MCNC: 215 MG/DL (ref 65–99)
HCT VFR BLD AUTO: 35 % (ref 34–46.6)
HGB BLD-MCNC: 10.8 G/DL (ref 12–15.9)
IMM GRANULOCYTES # BLD AUTO: 0.01 10*3/MM3 (ref 0–0.05)
IMM GRANULOCYTES NFR BLD AUTO: 0.1 % (ref 0–0.5)
LYMPHOCYTES # BLD AUTO: 1.36 10*3/MM3 (ref 0.7–3.1)
LYMPHOCYTES NFR BLD AUTO: 19.3 % (ref 19.6–45.3)
MCH RBC QN AUTO: 27.6 PG (ref 26.6–33)
MCHC RBC AUTO-ENTMCNC: 30.9 G/DL (ref 31.5–35.7)
MCV RBC AUTO: 89.3 FL (ref 79–97)
MONOCYTES # BLD AUTO: 0.59 10*3/MM3 (ref 0.1–0.9)
MONOCYTES NFR BLD AUTO: 8.4 % (ref 5–12)
NEUTROPHILS NFR BLD AUTO: 4.77 10*3/MM3 (ref 1.7–7)
NEUTROPHILS NFR BLD AUTO: 67.7 % (ref 42.7–76)
NRBC BLD AUTO-RTO: 0 /100 WBC (ref 0–0.2)
PLATELET # BLD AUTO: 400 10*3/MM3 (ref 140–450)
PMV BLD AUTO: 10.8 FL (ref 6–12)
POTASSIUM SERPL-SCNC: 4.8 MMOL/L (ref 3.5–5.2)
PROT SERPL-MCNC: 7 G/DL (ref 6–8.5)
RBC # BLD AUTO: 3.92 10*6/MM3 (ref 3.77–5.28)
SODIUM SERPL-SCNC: 143 MMOL/L (ref 136–145)
WBC # BLD AUTO: 7.04 10*3/MM3 (ref 3.4–10.8)

## 2021-06-03 PROCEDURE — 36415 COLL VENOUS BLD VENIPUNCTURE: CPT

## 2021-06-03 PROCEDURE — 80053 COMPREHEN METABOLIC PANEL: CPT | Performed by: INTERNAL MEDICINE

## 2021-06-03 PROCEDURE — 85025 COMPLETE CBC W/AUTO DIFF WBC: CPT | Performed by: INTERNAL MEDICINE

## 2021-06-03 PROCEDURE — 99214 OFFICE O/P EST MOD 30 MIN: CPT | Performed by: INTERNAL MEDICINE

## 2021-06-03 RX ORDER — MIRABEGRON 25 MG/1
TABLET, FILM COATED, EXTENDED RELEASE ORAL
COMMUNITY
Start: 2021-05-14 | End: 2021-08-19 | Stop reason: SDUPTHER

## 2021-06-10 DIAGNOSIS — F41.9 ANXIETY: Primary | ICD-10-CM

## 2021-06-11 RX ORDER — ALPRAZOLAM 0.5 MG/1
TABLET ORAL
Qty: 90 TABLET | Refills: 3 | Status: SHIPPED | OUTPATIENT
Start: 2021-06-11 | End: 2021-08-19 | Stop reason: SDUPTHER

## 2021-06-22 ENCOUNTER — TELEPHONE (OUTPATIENT)
Dept: FAMILY MEDICINE CLINIC | Facility: CLINIC | Age: 71
End: 2021-06-22

## 2021-06-23 RX ORDER — CIPROFLOXACIN 500 MG/1
500 TABLET, FILM COATED ORAL 2 TIMES DAILY
Qty: 10 TABLET | Refills: 0 | Status: SHIPPED | OUTPATIENT
Start: 2021-06-23 | End: 2021-08-19

## 2021-08-19 ENCOUNTER — OFFICE VISIT (OUTPATIENT)
Dept: FAMILY MEDICINE CLINIC | Facility: CLINIC | Age: 71
End: 2021-08-19

## 2021-08-19 ENCOUNTER — LAB (OUTPATIENT)
Dept: FAMILY MEDICINE CLINIC | Facility: CLINIC | Age: 71
End: 2021-08-19

## 2021-08-19 VITALS
HEIGHT: 66 IN | DIASTOLIC BLOOD PRESSURE: 80 MMHG | RESPIRATION RATE: 16 BRPM | BODY MASS INDEX: 35.58 KG/M2 | SYSTOLIC BLOOD PRESSURE: 158 MMHG | OXYGEN SATURATION: 97 % | HEART RATE: 100 BPM | WEIGHT: 221.4 LBS

## 2021-08-19 DIAGNOSIS — E11.65 TYPE 2 DIABETES MELLITUS WITH HYPERGLYCEMIA, WITHOUT LONG-TERM CURRENT USE OF INSULIN (HCC): ICD-10-CM

## 2021-08-19 DIAGNOSIS — E11.65 TYPE 2 DIABETES MELLITUS WITH HYPERGLYCEMIA, WITHOUT LONG-TERM CURRENT USE OF INSULIN (HCC): Primary | ICD-10-CM

## 2021-08-19 DIAGNOSIS — F41.9 ANXIETY: ICD-10-CM

## 2021-08-19 DIAGNOSIS — D50.8 IRON DEFICIENCY ANEMIA SECONDARY TO INADEQUATE DIETARY IRON INTAKE: ICD-10-CM

## 2021-08-19 DIAGNOSIS — E78.41 ELEVATED LIPOPROTEIN(A): ICD-10-CM

## 2021-08-19 LAB
ALBUMIN SERPL-MCNC: 3.8 G/DL (ref 3.5–5.2)
ALBUMIN/GLOB SERPL: 1.2 G/DL
ALP SERPL-CCNC: 88 U/L (ref 39–117)
ALT SERPL W P-5'-P-CCNC: 38 U/L (ref 1–33)
ANION GAP SERPL CALCULATED.3IONS-SCNC: 11.5 MMOL/L (ref 5–15)
AST SERPL-CCNC: 30 U/L (ref 1–32)
BASOPHILS # BLD AUTO: 0.07 10*3/MM3 (ref 0–0.2)
BASOPHILS NFR BLD AUTO: 0.9 % (ref 0–1.5)
BILIRUB SERPL-MCNC: <0.2 MG/DL (ref 0–1.2)
BUN SERPL-MCNC: 36 MG/DL (ref 8–23)
BUN/CREAT SERPL: 24.2 (ref 7–25)
CALCIUM SPEC-SCNC: 8.7 MG/DL (ref 8.6–10.5)
CHLORIDE SERPL-SCNC: 108 MMOL/L (ref 98–107)
CO2 SERPL-SCNC: 19.5 MMOL/L (ref 22–29)
CREAT SERPL-MCNC: 1.49 MG/DL (ref 0.57–1)
DEPRECATED RDW RBC AUTO: 47.8 FL (ref 37–54)
EOSINOPHIL # BLD AUTO: 0.27 10*3/MM3 (ref 0–0.4)
EOSINOPHIL NFR BLD AUTO: 3.3 % (ref 0.3–6.2)
ERYTHROCYTE [DISTWIDTH] IN BLOOD BY AUTOMATED COUNT: 15.5 % (ref 12.3–15.4)
GFR SERPL CREATININE-BSD FRML MDRD: 34 ML/MIN/1.73
GLOBULIN UR ELPH-MCNC: 3.2 GM/DL
GLUCOSE SERPL-MCNC: 176 MG/DL (ref 65–99)
HBA1C MFR BLD: 7.1 % (ref 3.5–5.6)
HCT VFR BLD AUTO: 32.1 % (ref 34–46.6)
HGB BLD-MCNC: 10 G/DL (ref 12–15.9)
IMM GRANULOCYTES # BLD AUTO: 0.03 10*3/MM3 (ref 0–0.05)
IMM GRANULOCYTES NFR BLD AUTO: 0.4 % (ref 0–0.5)
IRON 24H UR-MRATE: 44 MCG/DL (ref 37–145)
IRON SATN MFR SERPL: 9 % (ref 20–50)
LYMPHOCYTES # BLD AUTO: 1.26 10*3/MM3 (ref 0.7–3.1)
LYMPHOCYTES NFR BLD AUTO: 15.6 % (ref 19.6–45.3)
MCH RBC QN AUTO: 27 PG (ref 26.6–33)
MCHC RBC AUTO-ENTMCNC: 31.2 G/DL (ref 31.5–35.7)
MCV RBC AUTO: 86.5 FL (ref 79–97)
MONOCYTES # BLD AUTO: 0.5 10*3/MM3 (ref 0.1–0.9)
MONOCYTES NFR BLD AUTO: 6.2 % (ref 5–12)
NEUTROPHILS NFR BLD AUTO: 5.94 10*3/MM3 (ref 1.7–7)
NEUTROPHILS NFR BLD AUTO: 73.6 % (ref 42.7–76)
NRBC BLD AUTO-RTO: 0.1 /100 WBC (ref 0–0.2)
PLATELET # BLD AUTO: 406 10*3/MM3 (ref 140–450)
PMV BLD AUTO: 10.3 FL (ref 6–12)
POTASSIUM SERPL-SCNC: 4.6 MMOL/L (ref 3.5–5.2)
PROT SERPL-MCNC: 7 G/DL (ref 6–8.5)
RBC # BLD AUTO: 3.71 10*6/MM3 (ref 3.77–5.28)
SODIUM SERPL-SCNC: 139 MMOL/L (ref 136–145)
TIBC SERPL-MCNC: 481 MCG/DL (ref 298–536)
TRANSFERRIN SERPL-MCNC: 323 MG/DL (ref 200–360)
WBC # BLD AUTO: 8.07 10*3/MM3 (ref 3.4–10.8)

## 2021-08-19 PROCEDURE — 83036 HEMOGLOBIN GLYCOSYLATED A1C: CPT | Performed by: INTERNAL MEDICINE

## 2021-08-19 PROCEDURE — 99214 OFFICE O/P EST MOD 30 MIN: CPT | Performed by: INTERNAL MEDICINE

## 2021-08-19 PROCEDURE — 83540 ASSAY OF IRON: CPT | Performed by: INTERNAL MEDICINE

## 2021-08-19 PROCEDURE — 84466 ASSAY OF TRANSFERRIN: CPT | Performed by: INTERNAL MEDICINE

## 2021-08-19 PROCEDURE — 36415 COLL VENOUS BLD VENIPUNCTURE: CPT | Performed by: INTERNAL MEDICINE

## 2021-08-19 PROCEDURE — 80053 COMPREHEN METABOLIC PANEL: CPT | Performed by: INTERNAL MEDICINE

## 2021-08-19 PROCEDURE — 85025 COMPLETE CBC W/AUTO DIFF WBC: CPT | Performed by: INTERNAL MEDICINE

## 2021-08-19 RX ORDER — GLIPIZIDE 5 MG/1
5 TABLET ORAL
Qty: 30 TABLET | Refills: 0 | Status: CANCELLED | OUTPATIENT
Start: 2021-08-19 | End: 2021-09-18

## 2021-08-19 RX ORDER — MIRABEGRON 25 MG/1
25 TABLET, FILM COATED, EXTENDED RELEASE ORAL 2 TIMES DAILY
Qty: 60 TABLET | Refills: 2 | Status: CANCELLED | OUTPATIENT
Start: 2021-08-19

## 2021-08-19 RX ORDER — MIRABEGRON 25 MG/1
25 TABLET, FILM COATED, EXTENDED RELEASE ORAL DAILY
Qty: 90 TABLET | Refills: 3 | Status: SHIPPED | OUTPATIENT
Start: 2021-08-19 | End: 2021-11-11 | Stop reason: SDUPTHER

## 2021-08-19 RX ORDER — ALPRAZOLAM 0.5 MG/1
0.5 TABLET ORAL 3 TIMES DAILY PRN
Qty: 90 TABLET | Refills: 3 | Status: SHIPPED | OUTPATIENT
Start: 2021-08-19 | End: 2021-11-11 | Stop reason: SDUPTHER

## 2021-08-19 RX ORDER — GLIPIZIDE 5 MG/1
5 TABLET ORAL
Qty: 90 TABLET | Refills: 3 | Status: SHIPPED | OUTPATIENT
Start: 2021-08-19 | End: 2022-02-09 | Stop reason: SDUPTHER

## 2021-08-19 RX ORDER — METFORMIN HYDROCHLORIDE 500 MG/1
500 TABLET, EXTENDED RELEASE ORAL 2 TIMES DAILY
Qty: 180 TABLET | Refills: 2 | Status: SHIPPED | OUTPATIENT
Start: 2021-08-19 | End: 2021-12-23

## 2021-08-19 RX ORDER — METFORMIN HYDROCHLORIDE 500 MG/1
500 TABLET, EXTENDED RELEASE ORAL
COMMUNITY
End: 2021-08-19 | Stop reason: SDUPTHER

## 2021-09-01 DIAGNOSIS — F51.01 PRIMARY INSOMNIA: ICD-10-CM

## 2021-09-01 RX ORDER — TEMAZEPAM 30 MG/1
30 CAPSULE ORAL NIGHTLY PRN
Qty: 90 CAPSULE | Refills: 1 | Status: SHIPPED | OUTPATIENT
Start: 2021-09-01 | End: 2021-12-10 | Stop reason: SDUPTHER

## 2021-10-27 RX ORDER — ROSUVASTATIN CALCIUM 10 MG/1
TABLET, COATED ORAL
Qty: 90 TABLET | Refills: 1 | Status: SHIPPED | OUTPATIENT
Start: 2021-10-27 | End: 2022-04-04

## 2021-11-02 ENCOUNTER — TELEPHONE (OUTPATIENT)
Dept: CASE MANAGEMENT | Facility: OTHER | Age: 71
End: 2021-11-02

## 2021-11-11 DIAGNOSIS — F41.9 ANXIETY: ICD-10-CM

## 2021-11-11 RX ORDER — ALPRAZOLAM 0.5 MG/1
0.5 TABLET ORAL 3 TIMES DAILY PRN
Qty: 90 TABLET | Refills: 3 | Status: SHIPPED | OUTPATIENT
Start: 2021-11-11 | End: 2022-04-12

## 2021-11-11 RX ORDER — MIRABEGRON 25 MG/1
25 TABLET, FILM COATED, EXTENDED RELEASE ORAL DAILY
Qty: 90 TABLET | Refills: 1 | Status: SHIPPED | OUTPATIENT
Start: 2021-11-11 | End: 2021-11-11 | Stop reason: SDUPTHER

## 2021-11-11 RX ORDER — MIRABEGRON 25 MG/1
25 TABLET, FILM COATED, EXTENDED RELEASE ORAL DAILY
Qty: 90 TABLET | Refills: 1 | Status: SHIPPED | OUTPATIENT
Start: 2021-11-11 | End: 2022-05-02

## 2021-12-10 ENCOUNTER — TELEPHONE (OUTPATIENT)
Dept: FAMILY MEDICINE CLINIC | Facility: CLINIC | Age: 71
End: 2021-12-10

## 2021-12-10 DIAGNOSIS — F51.01 PRIMARY INSOMNIA: ICD-10-CM

## 2021-12-10 RX ORDER — TEMAZEPAM 30 MG/1
30 CAPSULE ORAL NIGHTLY PRN
Qty: 90 CAPSULE | Refills: 1 | Status: SHIPPED | OUTPATIENT
Start: 2021-12-10 | End: 2021-12-28 | Stop reason: SDUPTHER

## 2021-12-13 RX ORDER — GLIMEPIRIDE 2 MG/1
TABLET ORAL
Qty: 180 TABLET | Refills: 1 | Status: SHIPPED | OUTPATIENT
Start: 2021-12-13 | End: 2022-05-02

## 2021-12-23 RX ORDER — METFORMIN HYDROCHLORIDE 500 MG/1
TABLET, EXTENDED RELEASE ORAL
Qty: 180 TABLET | Refills: 2 | Status: SHIPPED | OUTPATIENT
Start: 2021-12-23 | End: 2022-05-11 | Stop reason: SDUPTHER

## 2021-12-23 RX ORDER — PRIMIDONE 50 MG/1
50 TABLET ORAL NIGHTLY
Qty: 90 TABLET | Refills: 1 | Status: SHIPPED | OUTPATIENT
Start: 2021-12-23 | End: 2022-04-12

## 2021-12-28 DIAGNOSIS — F51.01 PRIMARY INSOMNIA: ICD-10-CM

## 2021-12-29 RX ORDER — TEMAZEPAM 30 MG/1
30 CAPSULE ORAL NIGHTLY PRN
Qty: 90 CAPSULE | Refills: 1 | Status: SHIPPED | OUTPATIENT
Start: 2021-12-29 | End: 2022-07-11 | Stop reason: SDUPTHER

## 2022-02-03 RX ORDER — AMLODIPINE BESYLATE 5 MG/1
TABLET ORAL
Qty: 90 TABLET | Refills: 1 | Status: SHIPPED | OUTPATIENT
Start: 2022-02-03 | End: 2022-06-16

## 2022-02-03 RX ORDER — DULOXETIN HYDROCHLORIDE 60 MG/1
CAPSULE, DELAYED RELEASE ORAL
Qty: 180 CAPSULE | Refills: 1 | Status: SHIPPED | OUTPATIENT
Start: 2022-02-03 | End: 2022-06-21

## 2022-02-09 RX ORDER — GLIPIZIDE 5 MG/1
5 TABLET ORAL
Qty: 90 TABLET | Refills: 3 | Status: SHIPPED | OUTPATIENT
Start: 2022-02-09 | End: 2022-04-13 | Stop reason: ALTCHOICE

## 2022-04-04 RX ORDER — ROSUVASTATIN CALCIUM 10 MG/1
TABLET, COATED ORAL
Qty: 90 TABLET | Refills: 1 | Status: SHIPPED | OUTPATIENT
Start: 2022-04-04 | End: 2022-07-01 | Stop reason: HOSPADM

## 2022-04-11 DIAGNOSIS — F41.9 ANXIETY: ICD-10-CM

## 2022-04-12 RX ORDER — ALPRAZOLAM 0.5 MG/1
TABLET ORAL
Qty: 90 TABLET | Refills: 1 | Status: SHIPPED | OUTPATIENT
Start: 2022-04-12 | End: 2022-07-14 | Stop reason: SDUPTHER

## 2022-04-12 RX ORDER — PRIMIDONE 50 MG/1
TABLET ORAL
Qty: 90 TABLET | Refills: 1 | Status: SHIPPED | OUTPATIENT
Start: 2022-04-12 | End: 2023-01-19

## 2022-04-12 RX ORDER — LANCETS
EACH MISCELLANEOUS
Qty: 200 EACH | Refills: 1 | Status: SHIPPED | OUTPATIENT
Start: 2022-04-12

## 2022-04-12 RX ORDER — ISOPROPYL ALCOHOL 0.75 G/1
SWAB TOPICAL
Qty: 200 EACH | Refills: 1 | Status: SHIPPED | OUTPATIENT
Start: 2022-04-12

## 2022-04-13 ENCOUNTER — LAB (OUTPATIENT)
Dept: FAMILY MEDICINE CLINIC | Facility: CLINIC | Age: 72
End: 2022-04-13

## 2022-04-13 ENCOUNTER — OFFICE VISIT (OUTPATIENT)
Dept: FAMILY MEDICINE CLINIC | Facility: CLINIC | Age: 72
End: 2022-04-13

## 2022-04-13 VITALS
OXYGEN SATURATION: 100 % | HEIGHT: 66 IN | TEMPERATURE: 97.5 F | WEIGHT: 218.4 LBS | DIASTOLIC BLOOD PRESSURE: 82 MMHG | RESPIRATION RATE: 16 BRPM | HEART RATE: 118 BPM | BODY MASS INDEX: 35.1 KG/M2 | SYSTOLIC BLOOD PRESSURE: 148 MMHG

## 2022-04-13 DIAGNOSIS — D51.9 ANEMIA DUE TO VITAMIN B12 DEFICIENCY, UNSPECIFIED B12 DEFICIENCY TYPE: ICD-10-CM

## 2022-04-13 DIAGNOSIS — I10 ESSENTIAL HYPERTENSION: Chronic | ICD-10-CM

## 2022-04-13 DIAGNOSIS — D50.9 IRON DEFICIENCY ANEMIA, UNSPECIFIED IRON DEFICIENCY ANEMIA TYPE: Primary | ICD-10-CM

## 2022-04-13 DIAGNOSIS — E11.65 TYPE 2 DIABETES MELLITUS WITH HYPERGLYCEMIA, WITHOUT LONG-TERM CURRENT USE OF INSULIN: ICD-10-CM

## 2022-04-13 DIAGNOSIS — R06.09 DYSPNEA ON EXERTION: ICD-10-CM

## 2022-04-13 DIAGNOSIS — I73.9 PAD (PERIPHERAL ARTERY DISEASE): ICD-10-CM

## 2022-04-13 PROCEDURE — 84466 ASSAY OF TRANSFERRIN: CPT | Performed by: INTERNAL MEDICINE

## 2022-04-13 PROCEDURE — 96372 THER/PROPH/DIAG INJ SC/IM: CPT | Performed by: INTERNAL MEDICINE

## 2022-04-13 PROCEDURE — 83540 ASSAY OF IRON: CPT | Performed by: INTERNAL MEDICINE

## 2022-04-13 PROCEDURE — 82607 VITAMIN B-12: CPT | Performed by: INTERNAL MEDICINE

## 2022-04-13 PROCEDURE — 80053 COMPREHEN METABOLIC PANEL: CPT | Performed by: INTERNAL MEDICINE

## 2022-04-13 PROCEDURE — 85025 COMPLETE CBC W/AUTO DIFF WBC: CPT | Performed by: INTERNAL MEDICINE

## 2022-04-13 PROCEDURE — 36415 COLL VENOUS BLD VENIPUNCTURE: CPT | Performed by: INTERNAL MEDICINE

## 2022-04-13 PROCEDURE — 85652 RBC SED RATE AUTOMATED: CPT | Performed by: INTERNAL MEDICINE

## 2022-04-13 PROCEDURE — 82746 ASSAY OF FOLIC ACID SERUM: CPT | Performed by: INTERNAL MEDICINE

## 2022-04-13 PROCEDURE — 93000 ELECTROCARDIOGRAM COMPLETE: CPT | Performed by: INTERNAL MEDICINE

## 2022-04-13 PROCEDURE — 83036 HEMOGLOBIN GLYCOSYLATED A1C: CPT | Performed by: INTERNAL MEDICINE

## 2022-04-13 PROCEDURE — 99214 OFFICE O/P EST MOD 30 MIN: CPT | Performed by: INTERNAL MEDICINE

## 2022-04-13 RX ORDER — CYANOCOBALAMIN 1000 UG/ML
1000 INJECTION, SOLUTION INTRAMUSCULAR; SUBCUTANEOUS
Status: DISCONTINUED | OUTPATIENT
Start: 2022-04-13 | End: 2022-06-26

## 2022-04-13 RX ADMIN — CYANOCOBALAMIN 1000 MCG: 1000 INJECTION, SOLUTION INTRAMUSCULAR; SUBCUTANEOUS at 16:31

## 2022-04-14 DIAGNOSIS — I73.9 PAD (PERIPHERAL ARTERY DISEASE): Primary | ICD-10-CM

## 2022-04-14 PROBLEM — Z98.84 S/P GASTRIC BYPASS: Status: ACTIVE | Noted: 2022-04-14

## 2022-04-14 LAB
ALBUMIN SERPL-MCNC: 3.9 G/DL (ref 3.5–5.2)
ALBUMIN/GLOB SERPL: 1.3 G/DL
ALP SERPL-CCNC: 107 U/L (ref 39–117)
ALT SERPL W P-5'-P-CCNC: 31 U/L (ref 1–33)
ANION GAP SERPL CALCULATED.3IONS-SCNC: 14.3 MMOL/L (ref 5–15)
AST SERPL-CCNC: 17 U/L (ref 1–32)
BASOPHILS # BLD AUTO: 0.05 10*3/MM3 (ref 0–0.2)
BASOPHILS NFR BLD AUTO: 0.6 % (ref 0–1.5)
BILIRUB SERPL-MCNC: 0.2 MG/DL (ref 0–1.2)
BUN SERPL-MCNC: 27 MG/DL (ref 8–23)
BUN/CREAT SERPL: 19.3 (ref 7–25)
CALCIUM SPEC-SCNC: 8.8 MG/DL (ref 8.6–10.5)
CHLORIDE SERPL-SCNC: 106 MMOL/L (ref 98–107)
CO2 SERPL-SCNC: 19.7 MMOL/L (ref 22–29)
CREAT SERPL-MCNC: 1.4 MG/DL (ref 0.57–1)
DEPRECATED RDW RBC AUTO: 51 FL (ref 37–54)
EGFRCR SERPLBLD CKD-EPI 2021: 39.8 ML/MIN/1.73
EOSINOPHIL # BLD AUTO: 0.19 10*3/MM3 (ref 0–0.4)
EOSINOPHIL NFR BLD AUTO: 2.2 % (ref 0.3–6.2)
ERYTHROCYTE [DISTWIDTH] IN BLOOD BY AUTOMATED COUNT: 15.9 % (ref 12.3–15.4)
ERYTHROCYTE [SEDIMENTATION RATE] IN BLOOD: 51 MM/HR (ref 0–30)
FOLATE SERPL-MCNC: 7.59 NG/ML (ref 4.78–24.2)
GLOBULIN UR ELPH-MCNC: 3.1 GM/DL
GLUCOSE SERPL-MCNC: 167 MG/DL (ref 65–99)
HBA1C MFR BLD: 6.8 % (ref 3.5–5.6)
HCT VFR BLD AUTO: 33.1 % (ref 34–46.6)
HGB BLD-MCNC: 10.1 G/DL (ref 12–15.9)
IMM GRANULOCYTES # BLD AUTO: 0.04 10*3/MM3 (ref 0–0.05)
IMM GRANULOCYTES NFR BLD AUTO: 0.5 % (ref 0–0.5)
IRON 24H UR-MRATE: 39 MCG/DL (ref 37–145)
IRON SATN MFR SERPL: 9 % (ref 20–50)
LYMPHOCYTES # BLD AUTO: 1.6 10*3/MM3 (ref 0.7–3.1)
LYMPHOCYTES NFR BLD AUTO: 18.3 % (ref 19.6–45.3)
MCH RBC QN AUTO: 27 PG (ref 26.6–33)
MCHC RBC AUTO-ENTMCNC: 30.5 G/DL (ref 31.5–35.7)
MCV RBC AUTO: 88.5 FL (ref 79–97)
MONOCYTES # BLD AUTO: 0.75 10*3/MM3 (ref 0.1–0.9)
MONOCYTES NFR BLD AUTO: 8.6 % (ref 5–12)
NEUTROPHILS NFR BLD AUTO: 6.12 10*3/MM3 (ref 1.7–7)
NEUTROPHILS NFR BLD AUTO: 69.8 % (ref 42.7–76)
NRBC BLD AUTO-RTO: 0 /100 WBC (ref 0–0.2)
PLATELET # BLD AUTO: 393 10*3/MM3 (ref 140–450)
PMV BLD AUTO: 10.3 FL (ref 6–12)
POTASSIUM SERPL-SCNC: 4.5 MMOL/L (ref 3.5–5.2)
PROT SERPL-MCNC: 7 G/DL (ref 6–8.5)
RBC # BLD AUTO: 3.74 10*6/MM3 (ref 3.77–5.28)
SODIUM SERPL-SCNC: 140 MMOL/L (ref 136–145)
TIBC SERPL-MCNC: 422 MCG/DL (ref 298–536)
TRANSFERRIN SERPL-MCNC: 283 MG/DL (ref 200–360)
VIT B12 BLD-MCNC: >2000 PG/ML (ref 211–946)
WBC NRBC COR # BLD: 8.75 10*3/MM3 (ref 3.4–10.8)

## 2022-04-14 RX ORDER — SODIUM CHLORIDE 9 MG/ML
250 INJECTION, SOLUTION INTRAVENOUS ONCE
OUTPATIENT
Start: 2022-04-19 | End: 2022-04-19

## 2022-04-14 RX ORDER — DIPHENHYDRAMINE HYDROCHLORIDE 50 MG/ML
50 INJECTION INTRAMUSCULAR; INTRAVENOUS AS NEEDED
OUTPATIENT
Start: 2022-04-18

## 2022-04-19 ENCOUNTER — HOSPITAL ENCOUNTER (OUTPATIENT)
Dept: CARDIOLOGY | Facility: HOSPITAL | Age: 72
Discharge: HOME | End: 2022-04-19
Admitting: INTERNAL MEDICINE

## 2022-04-19 DIAGNOSIS — I73.9 PAD (PERIPHERAL ARTERY DISEASE): ICD-10-CM

## 2022-04-19 LAB
BH CV LOWER ARTERIAL LEFT ABI RATIO: 1.19
BH CV LOWER ARTERIAL LEFT DORSALIS PEDIS SYS MAX: 191
BH CV LOWER ARTERIAL LEFT GREAT TOE SYS MAX: 120
BH CV LOWER ARTERIAL LEFT POST TIBIAL SYS MAX: 211
BH CV LOWER ARTERIAL LEFT TBI RATIO: 0.67
BH CV LOWER ARTERIAL RIGHT ABI RATIO: 1.39
BH CV LOWER ARTERIAL RIGHT DORSALIS PEDIS SYS MAX: 206
BH CV LOWER ARTERIAL RIGHT GREAT TOE SYS MAX: 126
BH CV LOWER ARTERIAL RIGHT POST TIBIAL SYS MAX: 248
BH CV LOWER ARTERIAL RIGHT TBI RATIO: 0.71
MAXIMAL PREDICTED HEART RATE: 147 BPM
STRESS TARGET HR: 125 BPM
UPPER ARTERIAL LEFT ARM BRACHIAL SYS MAX: 178
UPPER ARTERIAL RIGHT ARM BRACHIAL SYS MAX: 176

## 2022-04-19 PROCEDURE — 93922 UPR/L XTREMITY ART 2 LEVELS: CPT

## 2022-05-02 ENCOUNTER — OFFICE VISIT (OUTPATIENT)
Dept: CARDIOLOGY | Facility: CLINIC | Age: 72
End: 2022-05-02

## 2022-05-02 VITALS
DIASTOLIC BLOOD PRESSURE: 70 MMHG | WEIGHT: 221 LBS | HEART RATE: 112 BPM | HEIGHT: 66 IN | BODY MASS INDEX: 35.52 KG/M2 | SYSTOLIC BLOOD PRESSURE: 148 MMHG | RESPIRATION RATE: 18 BRPM

## 2022-05-02 DIAGNOSIS — R06.09 DOE (DYSPNEA ON EXERTION): ICD-10-CM

## 2022-05-02 DIAGNOSIS — N18.9 CHRONIC KIDNEY DISEASE, UNSPECIFIED CKD STAGE: Primary | ICD-10-CM

## 2022-05-02 PROCEDURE — 99204 OFFICE O/P NEW MOD 45 MIN: CPT | Performed by: INTERNAL MEDICINE

## 2022-05-02 RX ORDER — CARVEDILOL 6.25 MG/1
6.25 TABLET ORAL 2 TIMES DAILY
Qty: 60 TABLET | Refills: 3 | Status: SHIPPED | OUTPATIENT
Start: 2022-05-02 | End: 2022-05-23 | Stop reason: SDUPTHER

## 2022-05-02 RX ORDER — GLIPIZIDE 5 MG/1
10 TABLET ORAL DAILY
COMMUNITY
Start: 2022-04-28 | End: 2022-07-14

## 2022-05-02 RX ORDER — FUROSEMIDE 40 MG/1
40 TABLET ORAL DAILY
Qty: 30 TABLET | Refills: 3 | Status: SHIPPED | OUTPATIENT
Start: 2022-05-02 | End: 2022-05-23 | Stop reason: SDUPTHER

## 2022-05-02 RX ORDER — FERROUS SULFATE 325(65) MG
325 TABLET ORAL
Qty: 30 TABLET | Refills: 3 | Status: SHIPPED | OUTPATIENT
Start: 2022-05-02 | End: 2022-08-29

## 2022-05-11 RX ORDER — METFORMIN HYDROCHLORIDE 500 MG/1
1000 TABLET, EXTENDED RELEASE ORAL
Qty: 180 TABLET | Refills: 2 | Status: SHIPPED | OUTPATIENT
Start: 2022-05-11 | End: 2022-08-29

## 2022-05-11 RX ORDER — BLOOD SUGAR DIAGNOSTIC
STRIP MISCELLANEOUS
Qty: 300 EACH | Refills: 3 | Status: SHIPPED | OUTPATIENT
Start: 2022-05-11

## 2022-05-23 RX ORDER — FUROSEMIDE 40 MG/1
40 TABLET ORAL DAILY
Qty: 90 TABLET | Refills: 3 | Status: SHIPPED | OUTPATIENT
Start: 2022-05-23 | End: 2022-12-07 | Stop reason: SDUPTHER

## 2022-05-23 RX ORDER — CARVEDILOL 6.25 MG/1
6.25 TABLET ORAL 2 TIMES DAILY
Qty: 180 TABLET | Refills: 3 | Status: SHIPPED | OUTPATIENT
Start: 2022-05-23 | End: 2022-06-07 | Stop reason: DRUGHIGH

## 2022-06-01 ENCOUNTER — HOSPITAL ENCOUNTER (OUTPATIENT)
Dept: CARDIOLOGY | Facility: HOSPITAL | Age: 72
Discharge: HOME | End: 2022-06-01
Admitting: INTERNAL MEDICINE

## 2022-06-01 VITALS
WEIGHT: 221 LBS | HEIGHT: 66 IN | DIASTOLIC BLOOD PRESSURE: 93 MMHG | SYSTOLIC BLOOD PRESSURE: 186 MMHG | BODY MASS INDEX: 35.52 KG/M2

## 2022-06-01 DIAGNOSIS — R06.09 DOE (DYSPNEA ON EXERTION): ICD-10-CM

## 2022-06-01 PROCEDURE — 93306 TTE W/DOPPLER COMPLETE: CPT

## 2022-06-01 PROCEDURE — 93306 TTE W/DOPPLER COMPLETE: CPT | Performed by: INTERNAL MEDICINE

## 2022-06-02 ENCOUNTER — TELEPHONE (OUTPATIENT)
Dept: CARDIOLOGY | Facility: CLINIC | Age: 72
End: 2022-06-02

## 2022-06-02 LAB
BH CV ECHO MEAS - AO MAX PG: 8.2 MMHG
BH CV ECHO MEAS - AO MEAN PG: 5 MMHG
BH CV ECHO MEAS - AO ROOT DIAM: 2.47 CM
BH CV ECHO MEAS - AO V2 MAX: 143.5 CM/SEC
BH CV ECHO MEAS - AO V2 VTI: 29.1 CM
BH CV ECHO MEAS - AVA(I,D): 1.21 CM2
BH CV ECHO MEAS - EDV(CUBED): 10.5 ML
BH CV ECHO MEAS - EDV(MOD-SP4): 58.8 ML
BH CV ECHO MEAS - EF(MOD-BP): 59 %
BH CV ECHO MEAS - EF(MOD-SP4): 58.9 %
BH CV ECHO MEAS - ESV(CUBED): 4.9 ML
BH CV ECHO MEAS - ESV(MOD-SP4): 24.2 ML
BH CV ECHO MEAS - FS: 22.2 %
BH CV ECHO MEAS - IVS/LVPW: 1.17 CM
BH CV ECHO MEAS - IVSD: 1.23 CM
BH CV ECHO MEAS - LV DIASTOLIC VOL/BSA (35-75): 28.2 CM2
BH CV ECHO MEAS - LV MASS(C)D: 66.1 GRAMS
BH CV ECHO MEAS - LV MAX PG: 3 MMHG
BH CV ECHO MEAS - LV MEAN PG: 1.91 MMHG
BH CV ECHO MEAS - LV SYSTOLIC VOL/BSA (12-30): 11.6 CM2
BH CV ECHO MEAS - LV V1 MAX: 86.5 CM/SEC
BH CV ECHO MEAS - LV V1 VTI: 16.2 CM
BH CV ECHO MEAS - LVIDD: 2.19 CM
BH CV ECHO MEAS - LVIDS: 1.7 CM
BH CV ECHO MEAS - LVOT AREA: 2.17 CM2
BH CV ECHO MEAS - LVOT DIAM: 1.66 CM
BH CV ECHO MEAS - LVPWD: 1.05 CM
BH CV ECHO MEAS - MV A MAX VEL: 120.9 CM/SEC
BH CV ECHO MEAS - MV DEC SLOPE: 199.1 CM/SEC2
BH CV ECHO MEAS - MV DEC TIME: 0.37 MSEC
BH CV ECHO MEAS - MV E MAX VEL: 73.4 CM/SEC
BH CV ECHO MEAS - MV E/A: 0.61
BH CV ECHO MEAS - MV MAX PG: 8 MMHG
BH CV ECHO MEAS - MV MEAN PG: 2.5 MMHG
BH CV ECHO MEAS - MV V2 VTI: 33.3 CM
BH CV ECHO MEAS - MVA(VTI): 1.05 CM2
BH CV ECHO MEAS - PA ACC TIME: 0.07 SEC
BH CV ECHO MEAS - PA PR(ACCEL): 46.3 MMHG
BH CV ECHO MEAS - PA V2 MAX: 99.9 CM/SEC
BH CV ECHO MEAS - PULM A REVS DUR: 0.08 SEC
BH CV ECHO MEAS - PULM A REVS VEL: 27.9 CM/SEC
BH CV ECHO MEAS - PULM DIAS VEL: 47.1 CM/SEC
BH CV ECHO MEAS - PULM S/D: 1.28
BH CV ECHO MEAS - PULM SYS VEL: 60.2 CM/SEC
BH CV ECHO MEAS - RV MAX PG: 2.11 MMHG
BH CV ECHO MEAS - RV V1 MAX: 72.7 CM/SEC
BH CV ECHO MEAS - RV V1 VTI: 13.4 CM
BH CV ECHO MEAS - SI(MOD-SP4): 16.6 ML/M2
BH CV ECHO MEAS - SV(LVOT): 35.1 ML
BH CV ECHO MEAS - SV(MOD-SP4): 34.6 ML
MAXIMAL PREDICTED HEART RATE: 147 BPM
STRESS TARGET HR: 125 BPM

## 2022-06-07 ENCOUNTER — OFFICE VISIT (OUTPATIENT)
Dept: CARDIOLOGY | Facility: CLINIC | Age: 72
End: 2022-06-07

## 2022-06-07 VITALS
BODY MASS INDEX: 34.55 KG/M2 | HEIGHT: 66 IN | RESPIRATION RATE: 18 BRPM | DIASTOLIC BLOOD PRESSURE: 88 MMHG | SYSTOLIC BLOOD PRESSURE: 112 MMHG | HEART RATE: 100 BPM | WEIGHT: 215 LBS

## 2022-06-07 DIAGNOSIS — E78.41 ELEVATED LIPOPROTEIN(A): ICD-10-CM

## 2022-06-07 DIAGNOSIS — N18.9 CHRONIC KIDNEY DISEASE, UNSPECIFIED CKD STAGE: ICD-10-CM

## 2022-06-07 DIAGNOSIS — R06.09 DOE (DYSPNEA ON EXERTION): Primary | ICD-10-CM

## 2022-06-07 DIAGNOSIS — I73.9 PAD (PERIPHERAL ARTERY DISEASE): ICD-10-CM

## 2022-06-07 DIAGNOSIS — I10 ESSENTIAL HYPERTENSION: ICD-10-CM

## 2022-06-07 PROCEDURE — 99214 OFFICE O/P EST MOD 30 MIN: CPT | Performed by: INTERNAL MEDICINE

## 2022-06-07 RX ORDER — CARVEDILOL 12.5 MG/1
12.5 TABLET ORAL 2 TIMES DAILY
Qty: 60 TABLET | Refills: 5 | Status: SHIPPED | OUTPATIENT
Start: 2022-06-07 | End: 2022-06-10 | Stop reason: SDUPTHER

## 2022-06-10 ENCOUNTER — TELEPHONE (OUTPATIENT)
Dept: CARDIOLOGY | Facility: CLINIC | Age: 72
End: 2022-06-10

## 2022-06-10 RX ORDER — CARVEDILOL 12.5 MG/1
TABLET ORAL
Qty: 180 TABLET | OUTPATIENT
Start: 2022-06-10

## 2022-06-10 RX ORDER — CARVEDILOL 12.5 MG/1
12.5 TABLET ORAL 2 TIMES DAILY
Qty: 60 TABLET | Refills: 5 | Status: SHIPPED | OUTPATIENT
Start: 2022-06-10 | End: 2022-10-21 | Stop reason: SDUPTHER

## 2022-06-16 RX ORDER — AMLODIPINE BESYLATE 5 MG/1
TABLET ORAL
Qty: 90 TABLET | Refills: 1 | Status: SHIPPED | OUTPATIENT
Start: 2022-06-16 | End: 2023-01-10

## 2022-06-21 RX ORDER — DULOXETIN HYDROCHLORIDE 60 MG/1
CAPSULE, DELAYED RELEASE ORAL
Qty: 180 CAPSULE | Refills: 1 | Status: SHIPPED | OUTPATIENT
Start: 2022-06-21 | End: 2023-02-15

## 2022-06-26 ENCOUNTER — HOSPITAL ENCOUNTER (OUTPATIENT)
Facility: HOSPITAL | Age: 72
Setting detail: OBSERVATION
LOS: 1 days | Discharge: HOME | End: 2022-06-27
Attending: EMERGENCY MEDICINE | Admitting: INTERNAL MEDICINE

## 2022-06-26 ENCOUNTER — APPOINTMENT (OUTPATIENT)
Dept: GENERAL RADIOLOGY | Facility: HOSPITAL | Age: 72
End: 2022-06-26

## 2022-06-26 ENCOUNTER — APPOINTMENT (OUTPATIENT)
Dept: CT IMAGING | Facility: HOSPITAL | Age: 72
End: 2022-06-26

## 2022-06-26 DIAGNOSIS — R53.1 WEAKNESS: Primary | ICD-10-CM

## 2022-06-26 DIAGNOSIS — E86.0 DEHYDRATION: ICD-10-CM

## 2022-06-26 DIAGNOSIS — N39.0 URINARY TRACT INFECTION WITHOUT HEMATURIA, SITE UNSPECIFIED: ICD-10-CM

## 2022-06-26 DIAGNOSIS — U07.1 COVID-19: ICD-10-CM

## 2022-06-26 PROBLEM — J12.82 PNEUMONIA DUE TO COVID-19 VIRUS: Status: ACTIVE | Noted: 2022-06-26

## 2022-06-26 PROBLEM — R79.89 ELEVATED LFTS: Status: ACTIVE | Noted: 2022-06-26

## 2022-06-26 PROBLEM — E66.9 OBESITY (BMI 30-39.9): Chronic | Status: ACTIVE | Noted: 2022-06-26

## 2022-06-26 LAB
ALBUMIN SERPL-MCNC: 3.4 G/DL (ref 3.5–5.2)
ALBUMIN/GLOB SERPL: 1.4 G/DL
ALP SERPL-CCNC: 130 U/L (ref 39–117)
ALT SERPL W P-5'-P-CCNC: 61 U/L (ref 1–33)
ANION GAP SERPL CALCULATED.3IONS-SCNC: 13 MMOL/L (ref 5–15)
AST SERPL-CCNC: 33 U/L (ref 1–32)
B PARAPERT DNA SPEC QL NAA+PROBE: NOT DETECTED
B PERT DNA SPEC QL NAA+PROBE: NOT DETECTED
BACTERIA UR QL AUTO: ABNORMAL /HPF
BASOPHILS # BLD MANUAL: 0.12 10*3/MM3 (ref 0–0.2)
BASOPHILS NFR BLD MANUAL: 2 % (ref 0–1.5)
BILIRUB SERPL-MCNC: <0.2 MG/DL (ref 0–1.2)
BILIRUB UR QL STRIP: NEGATIVE
BUN SERPL-MCNC: 22 MG/DL (ref 8–23)
BUN/CREAT SERPL: 13.7 (ref 7–25)
C PNEUM DNA NPH QL NAA+NON-PROBE: NOT DETECTED
CALCIUM SPEC-SCNC: 7.7 MG/DL (ref 8.6–10.5)
CHLORIDE SERPL-SCNC: 104 MMOL/L (ref 98–107)
CK SERPL-CCNC: 38 U/L (ref 20–180)
CLARITY UR: CLEAR
CO2 SERPL-SCNC: 20 MMOL/L (ref 22–29)
COLOR UR: YELLOW
CREAT SERPL-MCNC: 1.61 MG/DL (ref 0.57–1)
D-LACTATE SERPL-SCNC: 1.4 MMOL/L (ref 0.5–2)
DEPRECATED RDW RBC AUTO: 66.1 FL (ref 37–54)
EGFRCR SERPLBLD CKD-EPI 2021: 33.5 ML/MIN/1.73
ERYTHROCYTE [DISTWIDTH] IN BLOOD BY AUTOMATED COUNT: 20.3 % (ref 12.3–15.4)
FLUAV SUBTYP SPEC NAA+PROBE: NOT DETECTED
FLUBV RNA ISLT QL NAA+PROBE: NOT DETECTED
GLOBULIN UR ELPH-MCNC: 2.4 GM/DL
GLUCOSE BLDC GLUCOMTR-MCNC: 112 MG/DL (ref 70–105)
GLUCOSE SERPL-MCNC: 172 MG/DL (ref 65–99)
GLUCOSE UR STRIP-MCNC: ABNORMAL MG/DL
HADV DNA SPEC NAA+PROBE: NOT DETECTED
HCOV 229E RNA SPEC QL NAA+PROBE: NOT DETECTED
HCOV HKU1 RNA SPEC QL NAA+PROBE: NOT DETECTED
HCOV NL63 RNA SPEC QL NAA+PROBE: NOT DETECTED
HCOV OC43 RNA SPEC QL NAA+PROBE: NOT DETECTED
HCT VFR BLD AUTO: 37.2 % (ref 34–46.6)
HGB BLD-MCNC: 12 G/DL (ref 12–15.9)
HGB UR QL STRIP.AUTO: NEGATIVE
HMPV RNA NPH QL NAA+NON-PROBE: NOT DETECTED
HPIV1 RNA ISLT QL NAA+PROBE: NOT DETECTED
HPIV2 RNA SPEC QL NAA+PROBE: NOT DETECTED
HPIV3 RNA NPH QL NAA+PROBE: NOT DETECTED
HPIV4 P GENE NPH QL NAA+PROBE: NOT DETECTED
HYALINE CASTS UR QL AUTO: ABNORMAL /LPF
KETONES UR QL STRIP: NEGATIVE
L PNEUMO1 AG UR QL IA: NEGATIVE
LEUKOCYTE ESTERASE UR QL STRIP.AUTO: NEGATIVE
LYMPHOCYTES # BLD MANUAL: 0.53 10*3/MM3 (ref 0.7–3.1)
LYMPHOCYTES NFR BLD MANUAL: 10 % (ref 5–12)
M PNEUMO IGG SER IA-ACNC: NOT DETECTED
MAGNESIUM SERPL-MCNC: 2.1 MG/DL (ref 1.6–2.4)
MCH RBC QN AUTO: 30.3 PG (ref 26.6–33)
MCHC RBC AUTO-ENTMCNC: 32.3 G/DL (ref 31.5–35.7)
MCV RBC AUTO: 93.6 FL (ref 79–97)
MONOCYTES # BLD: 0.59 10*3/MM3 (ref 0.1–0.9)
NEUTROPHILS # BLD AUTO: 4.66 10*3/MM3 (ref 1.7–7)
NEUTROPHILS NFR BLD MANUAL: 77 % (ref 42.7–76)
NEUTS BAND NFR BLD MANUAL: 2 % (ref 0–5)
NITRITE UR QL STRIP: NEGATIVE
PH UR STRIP.AUTO: 5.5 [PH] (ref 5–8)
PLAT MORPH BLD: NORMAL
PLATELET # BLD AUTO: 212 10*3/MM3 (ref 140–450)
PMV BLD AUTO: 8.3 FL (ref 6–12)
POTASSIUM SERPL-SCNC: 3.4 MMOL/L (ref 3.5–5.2)
PROT SERPL-MCNC: 5.8 G/DL (ref 6–8.5)
PROT UR QL STRIP: ABNORMAL
RBC # BLD AUTO: 3.97 10*6/MM3 (ref 3.77–5.28)
RBC # UR STRIP: ABNORMAL /HPF
RBC MORPH BLD: NORMAL
REF LAB TEST METHOD: ABNORMAL
RHINOVIRUS RNA SPEC NAA+PROBE: NOT DETECTED
RSV RNA NPH QL NAA+NON-PROBE: NOT DETECTED
S PNEUM AG SPEC QL LA: NEGATIVE
SARS-COV-2 RNA NPH QL NAA+NON-PROBE: DETECTED
SCAN SLIDE: NORMAL
SODIUM SERPL-SCNC: 137 MMOL/L (ref 136–145)
SP GR UR STRIP: 1.01 (ref 1–1.03)
SQUAMOUS #/AREA URNS HPF: ABNORMAL /HPF
TROPONIN T SERPL-MCNC: <0.01 NG/ML (ref 0–0.03)
TSH SERPL DL<=0.05 MIU/L-ACNC: 2.33 UIU/ML (ref 0.27–4.2)
UROBILINOGEN UR QL STRIP: ABNORMAL
VARIANT LYMPHS NFR BLD MANUAL: 9 % (ref 19.6–45.3)
WBC # UR STRIP: ABNORMAL /HPF
WBC MORPH BLD: NORMAL
WBC NRBC COR # BLD: 5.9 10*3/MM3 (ref 3.4–10.8)

## 2022-06-26 PROCEDURE — 85007 BL SMEAR W/DIFF WBC COUNT: CPT | Performed by: EMERGENCY MEDICINE

## 2022-06-26 PROCEDURE — 82550 ASSAY OF CK (CPK): CPT | Performed by: EMERGENCY MEDICINE

## 2022-06-26 PROCEDURE — 74176 CT ABD & PELVIS W/O CONTRAST: CPT

## 2022-06-26 PROCEDURE — 83735 ASSAY OF MAGNESIUM: CPT | Performed by: EMERGENCY MEDICINE

## 2022-06-26 PROCEDURE — 85730 THROMBOPLASTIN TIME PARTIAL: CPT | Performed by: NURSE PRACTITIONER

## 2022-06-26 PROCEDURE — 80048 BASIC METABOLIC PNL TOTAL CA: CPT | Performed by: NURSE PRACTITIONER

## 2022-06-26 PROCEDURE — 85379 FIBRIN DEGRADATION QUANT: CPT | Performed by: NURSE PRACTITIONER

## 2022-06-26 PROCEDURE — 25010000002 CEFTRIAXONE PER 250 MG: Performed by: EMERGENCY MEDICINE

## 2022-06-26 PROCEDURE — 82248 BILIRUBIN DIRECT: CPT | Performed by: EMERGENCY MEDICINE

## 2022-06-26 PROCEDURE — 83605 ASSAY OF LACTIC ACID: CPT

## 2022-06-26 PROCEDURE — 83615 LACTATE (LD) (LDH) ENZYME: CPT | Performed by: NURSE PRACTITIONER

## 2022-06-26 PROCEDURE — 84443 ASSAY THYROID STIM HORMONE: CPT | Performed by: EMERGENCY MEDICINE

## 2022-06-26 PROCEDURE — 71045 X-RAY EXAM CHEST 1 VIEW: CPT

## 2022-06-26 PROCEDURE — 87449 NOS EACH ORGANISM AG IA: CPT | Performed by: NURSE PRACTITIONER

## 2022-06-26 PROCEDURE — 85384 FIBRINOGEN ACTIVITY: CPT | Performed by: NURSE PRACTITIONER

## 2022-06-26 PROCEDURE — 85025 COMPLETE CBC W/AUTO DIFF WBC: CPT | Performed by: NURSE PRACTITIONER

## 2022-06-26 PROCEDURE — 83036 HEMOGLOBIN GLYCOSYLATED A1C: CPT | Performed by: NURSE PRACTITIONER

## 2022-06-26 PROCEDURE — 85025 COMPLETE CBC W/AUTO DIFF WBC: CPT | Performed by: EMERGENCY MEDICINE

## 2022-06-26 PROCEDURE — 80074 ACUTE HEPATITIS PANEL: CPT | Performed by: NURSE PRACTITIONER

## 2022-06-26 PROCEDURE — 0202U NFCT DS 22 TRGT SARS-COV-2: CPT | Performed by: EMERGENCY MEDICINE

## 2022-06-26 PROCEDURE — 87040 BLOOD CULTURE FOR BACTERIA: CPT | Performed by: EMERGENCY MEDICINE

## 2022-06-26 PROCEDURE — 81001 URINALYSIS AUTO W/SCOPE: CPT | Performed by: EMERGENCY MEDICINE

## 2022-06-26 PROCEDURE — 25010000002 ENOXAPARIN PER 10 MG: Performed by: NURSE PRACTITIONER

## 2022-06-26 PROCEDURE — 84484 ASSAY OF TROPONIN QUANT: CPT | Performed by: EMERGENCY MEDICINE

## 2022-06-26 PROCEDURE — 83605 ASSAY OF LACTIC ACID: CPT | Performed by: NURSE PRACTITIONER

## 2022-06-26 PROCEDURE — 82728 ASSAY OF FERRITIN: CPT | Performed by: NURSE PRACTITIONER

## 2022-06-26 PROCEDURE — C9803 HOPD COVID-19 SPEC COLLECT: HCPCS

## 2022-06-26 PROCEDURE — 99284 EMERGENCY DEPT VISIT MOD MDM: CPT

## 2022-06-26 PROCEDURE — 96365 THER/PROPH/DIAG IV INF INIT: CPT

## 2022-06-26 PROCEDURE — 85610 PROTHROMBIN TIME: CPT | Performed by: NURSE PRACTITIONER

## 2022-06-26 PROCEDURE — 87086 URINE CULTURE/COLONY COUNT: CPT | Performed by: EMERGENCY MEDICINE

## 2022-06-26 PROCEDURE — 86140 C-REACTIVE PROTEIN: CPT | Performed by: NURSE PRACTITIONER

## 2022-06-26 PROCEDURE — 80053 COMPREHEN METABOLIC PANEL: CPT | Performed by: EMERGENCY MEDICINE

## 2022-06-26 PROCEDURE — 84145 PROCALCITONIN (PCT): CPT | Performed by: NURSE PRACTITIONER

## 2022-06-26 PROCEDURE — 99219 PR INITIAL OBSERVATION CARE/DAY 50 MINUTES: CPT | Performed by: NURSE PRACTITIONER

## 2022-06-26 PROCEDURE — 82962 GLUCOSE BLOOD TEST: CPT

## 2022-06-26 RX ORDER — BENZONATATE 100 MG/1
100 CAPSULE ORAL 3 TIMES DAILY PRN
Status: DISCONTINUED | OUTPATIENT
Start: 2022-06-26 | End: 2022-06-27 | Stop reason: HOSPADM

## 2022-06-26 RX ORDER — ALUMINA, MAGNESIA, AND SIMETHICONE 2400; 2400; 240 MG/30ML; MG/30ML; MG/30ML
15 SUSPENSION ORAL EVERY 6 HOURS PRN
Status: DISCONTINUED | OUTPATIENT
Start: 2022-06-26 | End: 2022-06-27 | Stop reason: HOSPADM

## 2022-06-26 RX ORDER — SODIUM CHLORIDE 0.9 % (FLUSH) 0.9 %
10 SYRINGE (ML) INJECTION EVERY 12 HOURS SCHEDULED
Status: DISCONTINUED | OUTPATIENT
Start: 2022-06-26 | End: 2022-06-27 | Stop reason: HOSPADM

## 2022-06-26 RX ORDER — ACETAMINOPHEN 500 MG
1000 TABLET ORAL ONCE
Status: DISCONTINUED | OUTPATIENT
Start: 2022-06-26 | End: 2022-06-26

## 2022-06-26 RX ORDER — ENOXAPARIN SODIUM 100 MG/ML
40 INJECTION SUBCUTANEOUS
Status: DISCONTINUED | OUTPATIENT
Start: 2022-06-26 | End: 2022-06-27 | Stop reason: HOSPADM

## 2022-06-26 RX ORDER — ONDANSETRON 4 MG/1
4 TABLET, FILM COATED ORAL EVERY 6 HOURS PRN
Status: DISCONTINUED | OUTPATIENT
Start: 2022-06-26 | End: 2022-06-27 | Stop reason: HOSPADM

## 2022-06-26 RX ORDER — SODIUM CHLORIDE 0.9 % (FLUSH) 0.9 %
10 SYRINGE (ML) INJECTION AS NEEDED
Status: DISCONTINUED | OUTPATIENT
Start: 2022-06-26 | End: 2022-06-27 | Stop reason: HOSPADM

## 2022-06-26 RX ORDER — ONDANSETRON 2 MG/ML
4 INJECTION INTRAMUSCULAR; INTRAVENOUS EVERY 6 HOURS PRN
Status: DISCONTINUED | OUTPATIENT
Start: 2022-06-26 | End: 2022-06-27 | Stop reason: HOSPADM

## 2022-06-26 RX ORDER — DEXTROSE MONOHYDRATE 25 G/50ML
25 INJECTION, SOLUTION INTRAVENOUS
Status: DISCONTINUED | OUTPATIENT
Start: 2022-06-26 | End: 2022-06-27 | Stop reason: HOSPADM

## 2022-06-26 RX ORDER — ZINC SULFATE 50(220)MG
220 CAPSULE ORAL DAILY
Status: DISCONTINUED | OUTPATIENT
Start: 2022-06-27 | End: 2022-06-27 | Stop reason: HOSPADM

## 2022-06-26 RX ORDER — IBUPROFEN 400 MG/1
800 TABLET ORAL ONCE
Status: DISCONTINUED | OUTPATIENT
Start: 2022-06-26 | End: 2022-06-26

## 2022-06-26 RX ORDER — ACETAMINOPHEN 650 MG/1
650 SUPPOSITORY RECTAL EVERY 4 HOURS PRN
Status: DISCONTINUED | OUTPATIENT
Start: 2022-06-26 | End: 2022-06-27 | Stop reason: HOSPADM

## 2022-06-26 RX ORDER — ALBUTEROL SULFATE 90 UG/1
2 AEROSOL, METERED RESPIRATORY (INHALATION) EVERY 6 HOURS PRN
Status: DISCONTINUED | OUTPATIENT
Start: 2022-06-26 | End: 2022-06-27 | Stop reason: HOSPADM

## 2022-06-26 RX ORDER — INSULIN LISPRO 100 [IU]/ML
0-9 INJECTION, SOLUTION INTRAVENOUS; SUBCUTANEOUS AS NEEDED
Status: DISCONTINUED | OUTPATIENT
Start: 2022-06-26 | End: 2022-06-27 | Stop reason: HOSPADM

## 2022-06-26 RX ORDER — INSULIN LISPRO 100 [IU]/ML
0-9 INJECTION, SOLUTION INTRAVENOUS; SUBCUTANEOUS
Status: DISCONTINUED | OUTPATIENT
Start: 2022-06-26 | End: 2022-06-27 | Stop reason: HOSPADM

## 2022-06-26 RX ORDER — OLANZAPINE 10 MG/2ML
1 INJECTION, POWDER, LYOPHILIZED, FOR SOLUTION INTRAMUSCULAR AS NEEDED
Status: DISCONTINUED | OUTPATIENT
Start: 2022-06-26 | End: 2022-06-27 | Stop reason: HOSPADM

## 2022-06-26 RX ORDER — CHOLECALCIFEROL (VITAMIN D3) 125 MCG
5 CAPSULE ORAL NIGHTLY PRN
Status: DISCONTINUED | OUTPATIENT
Start: 2022-06-26 | End: 2022-06-27 | Stop reason: HOSPADM

## 2022-06-26 RX ORDER — ASCORBIC ACID 500 MG
500 TABLET ORAL 2 TIMES DAILY
Status: DISCONTINUED | OUTPATIENT
Start: 2022-06-26 | End: 2022-06-27 | Stop reason: HOSPADM

## 2022-06-26 RX ORDER — NITROGLYCERIN 0.4 MG/1
0.4 TABLET SUBLINGUAL
Status: DISCONTINUED | OUTPATIENT
Start: 2022-06-26 | End: 2022-06-27 | Stop reason: HOSPADM

## 2022-06-26 RX ORDER — SODIUM CHLORIDE AND POTASSIUM CHLORIDE 150; 900 MG/100ML; MG/100ML
75 INJECTION, SOLUTION INTRAVENOUS CONTINUOUS
Status: DISCONTINUED | OUTPATIENT
Start: 2022-06-26 | End: 2022-06-27

## 2022-06-26 RX ORDER — ACETAMINOPHEN 325 MG/1
650 TABLET ORAL EVERY 4 HOURS PRN
Status: DISCONTINUED | OUTPATIENT
Start: 2022-06-26 | End: 2022-06-27 | Stop reason: HOSPADM

## 2022-06-26 RX ORDER — DEXAMETHASONE 4 MG/1
6 TABLET ORAL DAILY
Status: DISCONTINUED | OUTPATIENT
Start: 2022-06-27 | End: 2022-06-27

## 2022-06-26 RX ORDER — SODIUM CHLORIDE 9 MG/ML
100 INJECTION, SOLUTION INTRAVENOUS CONTINUOUS
Status: DISCONTINUED | OUTPATIENT
Start: 2022-06-26 | End: 2022-06-26

## 2022-06-26 RX ORDER — ACETAMINOPHEN 160 MG/5ML
650 SOLUTION ORAL EVERY 4 HOURS PRN
Status: DISCONTINUED | OUTPATIENT
Start: 2022-06-26 | End: 2022-06-27 | Stop reason: HOSPADM

## 2022-06-26 RX ORDER — NICOTINE POLACRILEX 4 MG
15 LOZENGE BUCCAL
Status: DISCONTINUED | OUTPATIENT
Start: 2022-06-26 | End: 2022-06-27 | Stop reason: HOSPADM

## 2022-06-26 RX ORDER — DEXAMETHASONE SODIUM PHOSPHATE 4 MG/ML
6 INJECTION, SOLUTION INTRA-ARTICULAR; INTRALESIONAL; INTRAMUSCULAR; INTRAVENOUS; SOFT TISSUE DAILY
Status: DISCONTINUED | OUTPATIENT
Start: 2022-06-27 | End: 2022-06-27

## 2022-06-26 RX ADMIN — SODIUM CHLORIDE 500 ML: 9 INJECTION, SOLUTION INTRAVENOUS at 17:59

## 2022-06-26 RX ADMIN — OXYCODONE HYDROCHLORIDE AND ACETAMINOPHEN 500 MG: 500 TABLET ORAL at 22:37

## 2022-06-26 RX ADMIN — ENOXAPARIN SODIUM 40 MG: 100 INJECTION SUBCUTANEOUS at 22:37

## 2022-06-26 RX ADMIN — SODIUM CHLORIDE, POTASSIUM CHLORIDE, SODIUM LACTATE AND CALCIUM CHLORIDE 1000 ML: 600; 310; 30; 20 INJECTION, SOLUTION INTRAVENOUS at 19:23

## 2022-06-26 RX ADMIN — CEFTRIAXONE 1 G: 1 INJECTION, POWDER, FOR SOLUTION INTRAMUSCULAR; INTRAVENOUS at 19:01

## 2022-06-26 RX ADMIN — ACETAMINOPHEN 650 MG: 325 TABLET, FILM COATED ORAL at 22:37

## 2022-06-27 ENCOUNTER — READMISSION MANAGEMENT (OUTPATIENT)
Dept: CALL CENTER | Facility: HOSPITAL | Age: 72
End: 2022-06-27

## 2022-06-27 VITALS
WEIGHT: 219.36 LBS | OXYGEN SATURATION: 95 % | HEIGHT: 65 IN | RESPIRATION RATE: 18 BRPM | TEMPERATURE: 99.1 F | DIASTOLIC BLOOD PRESSURE: 93 MMHG | HEART RATE: 91 BPM | BODY MASS INDEX: 36.55 KG/M2 | SYSTOLIC BLOOD PRESSURE: 181 MMHG

## 2022-06-27 PROBLEM — R53.1 WEAKNESS: Status: ACTIVE | Noted: 2022-06-27

## 2022-06-27 LAB
ALBUMIN SERPL-MCNC: 2.8 G/DL (ref 3.5–5.2)
ALBUMIN SERPL-MCNC: 3 G/DL (ref 3.5–5.2)
ALP SERPL-CCNC: 116 U/L (ref 39–117)
ALP SERPL-CCNC: 121 U/L (ref 39–117)
ALT SERPL W P-5'-P-CCNC: 46 U/L (ref 1–33)
ALT SERPL W P-5'-P-CCNC: 51 U/L (ref 1–33)
ANION GAP SERPL CALCULATED.3IONS-SCNC: 15 MMOL/L (ref 5–15)
APTT PPP: 35.7 SECONDS (ref 24–31)
AST SERPL-CCNC: 28 U/L (ref 1–32)
AST SERPL-CCNC: 32 U/L (ref 1–32)
BACTERIA SPEC AEROBE CULT: NO GROWTH
BASOPHILS # BLD AUTO: 0 10*3/MM3 (ref 0–0.2)
BASOPHILS NFR BLD AUTO: 0.7 % (ref 0–1.5)
BILIRUB CONJ SERPL-MCNC: <0.2 MG/DL (ref 0–0.3)
BILIRUB CONJ SERPL-MCNC: <0.2 MG/DL (ref 0–0.3)
BILIRUB INDIRECT SERPL-MCNC: ABNORMAL MG/DL
BILIRUB INDIRECT SERPL-MCNC: ABNORMAL MG/DL
BILIRUB SERPL-MCNC: <0.2 MG/DL (ref 0–1.2)
BILIRUB SERPL-MCNC: <0.2 MG/DL (ref 0–1.2)
BUN SERPL-MCNC: 20 MG/DL (ref 8–23)
BUN/CREAT SERPL: 12.7 (ref 7–25)
CALCIUM SPEC-SCNC: 7.5 MG/DL (ref 8.6–10.5)
CHLORIDE SERPL-SCNC: 105 MMOL/L (ref 98–107)
CO2 SERPL-SCNC: 19 MMOL/L (ref 22–29)
CREAT SERPL-MCNC: 1.57 MG/DL (ref 0.57–1)
CRP SERPL-MCNC: 1.12 MG/DL (ref 0–0.5)
D DIMER PPP FEU-MCNC: 0.5 MG/L (FEU) (ref 0–0.59)
D-LACTATE SERPL-SCNC: 0.9 MMOL/L (ref 0.5–2)
D-LACTATE SERPL-SCNC: 1 MMOL/L (ref 0.5–2)
DEPRECATED RDW RBC AUTO: 65.6 FL (ref 37–54)
EGFRCR SERPLBLD CKD-EPI 2021: 34.5 ML/MIN/1.73
EOSINOPHIL # BLD AUTO: 0 10*3/MM3 (ref 0–0.4)
EOSINOPHIL NFR BLD AUTO: 0.7 % (ref 0.3–6.2)
ERYTHROCYTE [DISTWIDTH] IN BLOOD BY AUTOMATED COUNT: 20.1 % (ref 12.3–15.4)
FERRITIN SERPL-MCNC: 82.21 NG/ML (ref 13–150)
FIBRINOGEN PPP-MCNC: 519 MG/DL (ref 210–450)
GLUCOSE BLDC GLUCOMTR-MCNC: 158 MG/DL (ref 70–105)
GLUCOSE BLDC GLUCOMTR-MCNC: 255 MG/DL (ref 70–105)
GLUCOSE SERPL-MCNC: 220 MG/DL (ref 65–99)
HAV IGM SERPL QL IA: NORMAL
HBA1C MFR BLD: 7.2 % (ref 3.5–5.6)
HBV CORE IGM SERPL QL IA: NORMAL
HBV SURFACE AG SERPL QL IA: NORMAL
HCT VFR BLD AUTO: 35 % (ref 34–46.6)
HCV AB SER DONR QL: NORMAL
HGB BLD-MCNC: 11.4 G/DL (ref 12–15.9)
INR PPP: 1.06 (ref 0.93–1.1)
LDH SERPL-CCNC: 182 U/L (ref 135–214)
LYMPHOCYTES # BLD AUTO: 0.8 10*3/MM3 (ref 0.7–3.1)
LYMPHOCYTES NFR BLD AUTO: 12.8 % (ref 19.6–45.3)
MCH RBC QN AUTO: 30.6 PG (ref 26.6–33)
MCHC RBC AUTO-ENTMCNC: 32.7 G/DL (ref 31.5–35.7)
MCV RBC AUTO: 93.6 FL (ref 79–97)
MONOCYTES # BLD AUTO: 0.9 10*3/MM3 (ref 0.1–0.9)
MONOCYTES NFR BLD AUTO: 13.7 % (ref 5–12)
NEUTROPHILS NFR BLD AUTO: 4.5 10*3/MM3 (ref 1.7–7)
NEUTROPHILS NFR BLD AUTO: 72.1 % (ref 42.7–76)
NRBC BLD AUTO-RTO: 0 /100 WBC (ref 0–0.2)
PLATELET # BLD AUTO: 208 10*3/MM3 (ref 140–450)
PMV BLD AUTO: 8.9 FL (ref 6–12)
POTASSIUM SERPL-SCNC: 3.5 MMOL/L (ref 3.5–5.2)
PROCALCITONIN SERPL-MCNC: 0.28 NG/ML (ref 0–0.25)
PROT SERPL-MCNC: 5.5 G/DL (ref 6–8.5)
PROT SERPL-MCNC: 5.9 G/DL (ref 6–8.5)
PROTHROMBIN TIME: 10.9 SECONDS (ref 9.6–11.7)
RBC # BLD AUTO: 3.73 10*6/MM3 (ref 3.77–5.28)
SODIUM SERPL-SCNC: 139 MMOL/L (ref 136–145)
WBC NRBC COR # BLD: 6.3 10*3/MM3 (ref 3.4–10.8)

## 2022-06-27 PROCEDURE — 82962 GLUCOSE BLOOD TEST: CPT

## 2022-06-27 PROCEDURE — 63710000001 INSULIN LISPRO (HUMAN) PER 5 UNITS: Performed by: NURSE PRACTITIONER

## 2022-06-27 PROCEDURE — 25010000002 ONDANSETRON PER 1 MG: Performed by: NURSE PRACTITIONER

## 2022-06-27 PROCEDURE — 97161 PT EVAL LOW COMPLEX 20 MIN: CPT

## 2022-06-27 PROCEDURE — 97165 OT EVAL LOW COMPLEX 30 MIN: CPT

## 2022-06-27 PROCEDURE — 99217 PR OBSERVATION CARE DISCHARGE MANAGEMENT: CPT | Performed by: INTERNAL MEDICINE

## 2022-06-27 PROCEDURE — G0378 HOSPITAL OBSERVATION PER HR: HCPCS

## 2022-06-27 PROCEDURE — 25010000002 SODIUM CHLORIDE 0.9 % WITH KCL 20 MEQ 20-0.9 MEQ/L-% SOLUTION: Performed by: NURSE PRACTITIONER

## 2022-06-27 PROCEDURE — 80076 HEPATIC FUNCTION PANEL: CPT | Performed by: NURSE PRACTITIONER

## 2022-06-27 PROCEDURE — 83605 ASSAY OF LACTIC ACID: CPT | Performed by: NURSE PRACTITIONER

## 2022-06-27 RX ORDER — CARVEDILOL 6.25 MG/1
12.5 TABLET ORAL 2 TIMES DAILY WITH MEALS
Status: DISCONTINUED | OUTPATIENT
Start: 2022-06-27 | End: 2022-06-27 | Stop reason: HOSPADM

## 2022-06-27 RX ORDER — HYDRALAZINE HYDROCHLORIDE 20 MG/ML
10 INJECTION INTRAMUSCULAR; INTRAVENOUS EVERY 6 HOURS PRN
Status: DISCONTINUED | OUTPATIENT
Start: 2022-06-27 | End: 2022-06-27 | Stop reason: HOSPADM

## 2022-06-27 RX ADMIN — Medication 10 ML: at 08:54

## 2022-06-27 RX ADMIN — Medication 10 ML: at 00:19

## 2022-06-27 RX ADMIN — ONDANSETRON 4 MG: 2 INJECTION INTRAMUSCULAR; INTRAVENOUS at 00:08

## 2022-06-27 RX ADMIN — CARVEDILOL 12.5 MG: 6.25 TABLET, FILM COATED ORAL at 00:56

## 2022-06-27 RX ADMIN — CARVEDILOL 12.5 MG: 6.25 TABLET, FILM COATED ORAL at 08:55

## 2022-06-27 RX ADMIN — Medication 220 MG: at 08:50

## 2022-06-27 RX ADMIN — Medication 5000 UNITS: at 08:50

## 2022-06-27 RX ADMIN — SODIUM CHLORIDE AND POTASSIUM CHLORIDE 75 ML/HR: .9; .15 SOLUTION INTRAVENOUS at 00:21

## 2022-06-27 RX ADMIN — INSULIN LISPRO 6 UNITS: 100 INJECTION, SOLUTION INTRAVENOUS; SUBCUTANEOUS at 12:24

## 2022-06-27 RX ADMIN — ACETAMINOPHEN 650 MG: 325 TABLET, FILM COATED ORAL at 03:21

## 2022-06-27 RX ADMIN — OXYCODONE HYDROCHLORIDE AND ACETAMINOPHEN 500 MG: 500 TABLET ORAL at 08:50

## 2022-06-27 RX ADMIN — ACETAMINOPHEN 650 MG: 325 TABLET, FILM COATED ORAL at 10:52

## 2022-06-27 RX ADMIN — INSULIN LISPRO 2 UNITS: 100 INJECTION, SOLUTION INTRAVENOUS; SUBCUTANEOUS at 08:49

## 2022-06-28 ENCOUNTER — TRANSITIONAL CARE MANAGEMENT TELEPHONE ENCOUNTER (OUTPATIENT)
Dept: CALL CENTER | Facility: HOSPITAL | Age: 72
End: 2022-06-28

## 2022-06-28 ENCOUNTER — HOSPITAL ENCOUNTER (OUTPATIENT)
Facility: HOSPITAL | Age: 72
Setting detail: OBSERVATION
Discharge: NURSING, REHAB OR LONG-TERM CARE | End: 2022-07-01
Attending: EMERGENCY MEDICINE | Admitting: STUDENT IN AN ORGANIZED HEALTH CARE EDUCATION/TRAINING PROGRAM

## 2022-06-28 ENCOUNTER — TELEPHONE (OUTPATIENT)
Dept: CARDIOLOGY | Facility: CLINIC | Age: 72
End: 2022-06-28

## 2022-06-28 ENCOUNTER — APPOINTMENT (OUTPATIENT)
Dept: CT IMAGING | Facility: HOSPITAL | Age: 72
End: 2022-06-28

## 2022-06-28 ENCOUNTER — APPOINTMENT (OUTPATIENT)
Dept: GENERAL RADIOLOGY | Facility: HOSPITAL | Age: 72
End: 2022-06-28

## 2022-06-28 DIAGNOSIS — R73.9 HYPERGLYCEMIA: ICD-10-CM

## 2022-06-28 DIAGNOSIS — R55 SYNCOPE, UNSPECIFIED SYNCOPE TYPE: Primary | ICD-10-CM

## 2022-06-28 DIAGNOSIS — R53.1 WEAKNESS: ICD-10-CM

## 2022-06-28 DIAGNOSIS — U07.1 COVID-19: ICD-10-CM

## 2022-06-28 PROBLEM — M79.89 RIGHT LEG SWELLING: Status: ACTIVE | Noted: 2022-06-28

## 2022-06-28 PROBLEM — N39.0 ACUTE UTI (URINARY TRACT INFECTION): Status: RESOLVED | Noted: 2022-06-26 | Resolved: 2022-06-28

## 2022-06-28 LAB
ALBUMIN SERPL-MCNC: 3.1 G/DL (ref 3.5–5.2)
ALBUMIN/GLOB SERPL: 1.1 G/DL
ALP SERPL-CCNC: 116 U/L (ref 39–117)
ALT SERPL W P-5'-P-CCNC: 59 U/L (ref 1–33)
ANION GAP SERPL CALCULATED.3IONS-SCNC: 13 MMOL/L (ref 5–15)
APTT PPP: 29.8 SECONDS (ref 61–76.5)
ARTERIAL PATENCY WRIST A: POSITIVE
AST SERPL-CCNC: 54 U/L (ref 1–32)
ATMOSPHERIC PRESS: ABNORMAL MM[HG]
BACTERIA UR QL AUTO: ABNORMAL /HPF
BASE EXCESS BLDA CALC-SCNC: -2.9 MMOL/L (ref 0–3)
BASOPHILS # BLD AUTO: 0 10*3/MM3 (ref 0–0.2)
BASOPHILS NFR BLD AUTO: 0.7 % (ref 0–1.5)
BDY SITE: ABNORMAL
BILIRUB SERPL-MCNC: <0.2 MG/DL (ref 0–1.2)
BILIRUB UR QL STRIP: NEGATIVE
BUN SERPL-MCNC: 24 MG/DL (ref 8–23)
BUN/CREAT SERPL: 13.9 (ref 7–25)
CALCIUM SPEC-SCNC: 7.8 MG/DL (ref 8.6–10.5)
CHLORIDE SERPL-SCNC: 103 MMOL/L (ref 98–107)
CLARITY UR: CLEAR
CO2 BLDA-SCNC: 25.1 MMOL/L (ref 22–29)
CO2 SERPL-SCNC: 19 MMOL/L (ref 22–29)
COLOR UR: YELLOW
CREAT SERPL-MCNC: 1.73 MG/DL (ref 0.57–1)
D-LACTATE SERPL-SCNC: 1.4 MMOL/L (ref 0.5–2)
DEPRECATED RDW RBC AUTO: 67.4 FL (ref 37–54)
EGFRCR SERPLBLD CKD-EPI 2021: 30.7 ML/MIN/1.73
EOSINOPHIL # BLD AUTO: 0 10*3/MM3 (ref 0–0.4)
EOSINOPHIL NFR BLD AUTO: 0.5 % (ref 0.3–6.2)
ERYTHROCYTE [DISTWIDTH] IN BLOOD BY AUTOMATED COUNT: 20.6 % (ref 12.3–15.4)
GLOBULIN UR ELPH-MCNC: 2.7 GM/DL
GLUCOSE BLDC GLUCOMTR-MCNC: 155 MG/DL (ref 70–105)
GLUCOSE BLDC GLUCOMTR-MCNC: 203 MG/DL (ref 70–105)
GLUCOSE SERPL-MCNC: 348 MG/DL (ref 65–99)
GLUCOSE UR STRIP-MCNC: ABNORMAL MG/DL
GRAN CASTS URNS QL MICRO: ABNORMAL /LPF
HCO3 BLDA-SCNC: 23.6 MMOL/L (ref 21–28)
HCT VFR BLD AUTO: 36.6 % (ref 34–46.6)
HEMODILUTION: NO
HGB BLD-MCNC: 12 G/DL (ref 12–15.9)
HGB UR QL STRIP.AUTO: NEGATIVE
HOLD SPECIMEN: NORMAL
HYALINE CASTS UR QL AUTO: ABNORMAL /LPF
INHALED O2 CONCENTRATION: 28 %
INR PPP: 1.05 (ref 0.93–1.1)
KETONES UR QL STRIP: NEGATIVE
LEUKOCYTE ESTERASE UR QL STRIP.AUTO: NEGATIVE
LYMPHOCYTES # BLD AUTO: 0.9 10*3/MM3 (ref 0.7–3.1)
LYMPHOCYTES NFR BLD AUTO: 16.3 % (ref 19.6–45.3)
MAGNESIUM SERPL-MCNC: 2.1 MG/DL (ref 1.6–2.4)
MCH RBC QN AUTO: 30.7 PG (ref 26.6–33)
MCHC RBC AUTO-ENTMCNC: 32.8 G/DL (ref 31.5–35.7)
MCV RBC AUTO: 93.5 FL (ref 79–97)
MODALITY: ABNORMAL
MONOCYTES # BLD AUTO: 0.6 10*3/MM3 (ref 0.1–0.9)
MONOCYTES NFR BLD AUTO: 11.2 % (ref 5–12)
NEUTROPHILS NFR BLD AUTO: 4 10*3/MM3 (ref 1.7–7)
NEUTROPHILS NFR BLD AUTO: 71.3 % (ref 42.7–76)
NITRITE UR QL STRIP: NEGATIVE
NRBC BLD AUTO-RTO: 0.1 /100 WBC (ref 0–0.2)
NT-PROBNP SERPL-MCNC: 249.7 PG/ML (ref 0–900)
PCO2 BLDA: 47.3 MM HG (ref 35–48)
PH BLDA: 7.31 PH UNITS (ref 7.35–7.45)
PH UR STRIP.AUTO: 5.5 [PH] (ref 5–8)
PLATELET # BLD AUTO: 191 10*3/MM3 (ref 140–450)
PMV BLD AUTO: 8.4 FL (ref 6–12)
PO2 BLDA: 133.6 MM HG (ref 83–108)
POTASSIUM SERPL-SCNC: 4.6 MMOL/L (ref 3.5–5.2)
PROCALCITONIN SERPL-MCNC: 0.24 NG/ML (ref 0–0.25)
PROT SERPL-MCNC: 5.8 G/DL (ref 6–8.5)
PROT UR QL STRIP: ABNORMAL
PROTHROMBIN TIME: 10.8 SECONDS (ref 9.6–11.7)
RBC # BLD AUTO: 3.92 10*6/MM3 (ref 3.77–5.28)
RBC # UR STRIP: ABNORMAL /HPF
REF LAB TEST METHOD: ABNORMAL
SAO2 % BLDCOA: 98.7 % (ref 94–98)
SODIUM SERPL-SCNC: 135 MMOL/L (ref 136–145)
SP GR UR STRIP: 1.01 (ref 1–1.03)
SQUAMOUS #/AREA URNS HPF: ABNORMAL /HPF
TROPONIN T SERPL-MCNC: <0.01 NG/ML (ref 0–0.03)
UROBILINOGEN UR QL STRIP: ABNORMAL
WBC # UR STRIP: ABNORMAL /HPF
WBC NRBC COR # BLD: 5.6 10*3/MM3 (ref 3.4–10.8)

## 2022-06-28 PROCEDURE — 63710000001 INSULIN REGULAR HUMAN PER 5 UNITS: Performed by: EMERGENCY MEDICINE

## 2022-06-28 PROCEDURE — 81001 URINALYSIS AUTO W/SCOPE: CPT | Performed by: EMERGENCY MEDICINE

## 2022-06-28 PROCEDURE — G0378 HOSPITAL OBSERVATION PER HR: HCPCS

## 2022-06-28 PROCEDURE — 85007 BL SMEAR W/DIFF WBC COUNT: CPT | Performed by: NURSE PRACTITIONER

## 2022-06-28 PROCEDURE — 80053 COMPREHEN METABOLIC PANEL: CPT | Performed by: EMERGENCY MEDICINE

## 2022-06-28 PROCEDURE — 70450 CT HEAD/BRAIN W/O DYE: CPT

## 2022-06-28 PROCEDURE — 83880 ASSAY OF NATRIURETIC PEPTIDE: CPT | Performed by: EMERGENCY MEDICINE

## 2022-06-28 PROCEDURE — 96361 HYDRATE IV INFUSION ADD-ON: CPT

## 2022-06-28 PROCEDURE — 99285 EMERGENCY DEPT VISIT HI MDM: CPT

## 2022-06-28 PROCEDURE — 93005 ELECTROCARDIOGRAM TRACING: CPT | Performed by: EMERGENCY MEDICINE

## 2022-06-28 PROCEDURE — 85730 THROMBOPLASTIN TIME PARTIAL: CPT | Performed by: EMERGENCY MEDICINE

## 2022-06-28 PROCEDURE — 84484 ASSAY OF TROPONIN QUANT: CPT | Performed by: EMERGENCY MEDICINE

## 2022-06-28 PROCEDURE — 84145 PROCALCITONIN (PCT): CPT | Performed by: EMERGENCY MEDICINE

## 2022-06-28 PROCEDURE — 87040 BLOOD CULTURE FOR BACTERIA: CPT | Performed by: EMERGENCY MEDICINE

## 2022-06-28 PROCEDURE — 36600 WITHDRAWAL OF ARTERIAL BLOOD: CPT

## 2022-06-28 PROCEDURE — 99219 PR INITIAL OBSERVATION CARE/DAY 50 MINUTES: CPT | Performed by: NURSE PRACTITIONER

## 2022-06-28 PROCEDURE — 36415 COLL VENOUS BLD VENIPUNCTURE: CPT

## 2022-06-28 PROCEDURE — 82803 BLOOD GASES ANY COMBINATION: CPT

## 2022-06-28 PROCEDURE — 85025 COMPLETE CBC W/AUTO DIFF WBC: CPT | Performed by: NURSE PRACTITIONER

## 2022-06-28 PROCEDURE — 36415 COLL VENOUS BLD VENIPUNCTURE: CPT | Performed by: NURSE PRACTITIONER

## 2022-06-28 PROCEDURE — 71045 X-RAY EXAM CHEST 1 VIEW: CPT

## 2022-06-28 PROCEDURE — 82962 GLUCOSE BLOOD TEST: CPT

## 2022-06-28 PROCEDURE — 85025 COMPLETE CBC W/AUTO DIFF WBC: CPT | Performed by: EMERGENCY MEDICINE

## 2022-06-28 PROCEDURE — P9612 CATHETERIZE FOR URINE SPEC: HCPCS

## 2022-06-28 PROCEDURE — 83605 ASSAY OF LACTIC ACID: CPT

## 2022-06-28 PROCEDURE — 83735 ASSAY OF MAGNESIUM: CPT | Performed by: EMERGENCY MEDICINE

## 2022-06-28 PROCEDURE — 85610 PROTHROMBIN TIME: CPT | Performed by: EMERGENCY MEDICINE

## 2022-06-28 RX ORDER — ONDANSETRON 4 MG/1
4 TABLET, FILM COATED ORAL EVERY 6 HOURS PRN
Status: DISCONTINUED | OUTPATIENT
Start: 2022-06-28 | End: 2022-07-01 | Stop reason: HOSPADM

## 2022-06-28 RX ORDER — ACETAMINOPHEN 160 MG/5ML
650 SOLUTION ORAL EVERY 4 HOURS PRN
Status: DISCONTINUED | OUTPATIENT
Start: 2022-06-28 | End: 2022-07-01 | Stop reason: HOSPADM

## 2022-06-28 RX ORDER — ACETAMINOPHEN 650 MG/1
650 SUPPOSITORY RECTAL EVERY 4 HOURS PRN
Status: DISCONTINUED | OUTPATIENT
Start: 2022-06-28 | End: 2022-07-01 | Stop reason: HOSPADM

## 2022-06-28 RX ORDER — DEXTROSE MONOHYDRATE 25 G/50ML
25 INJECTION, SOLUTION INTRAVENOUS
Status: DISCONTINUED | OUTPATIENT
Start: 2022-06-28 | End: 2022-07-01 | Stop reason: HOSPADM

## 2022-06-28 RX ORDER — INSULIN LISPRO 100 [IU]/ML
0-7 INJECTION, SOLUTION INTRAVENOUS; SUBCUTANEOUS AS NEEDED
Status: DISCONTINUED | OUTPATIENT
Start: 2022-06-28 | End: 2022-07-01

## 2022-06-28 RX ORDER — OLANZAPINE 10 MG/2ML
1 INJECTION, POWDER, LYOPHILIZED, FOR SOLUTION INTRAMUSCULAR AS NEEDED
Status: DISCONTINUED | OUTPATIENT
Start: 2022-06-28 | End: 2022-07-01 | Stop reason: HOSPADM

## 2022-06-28 RX ORDER — ACETAMINOPHEN 325 MG/1
650 TABLET ORAL EVERY 4 HOURS PRN
Status: DISCONTINUED | OUTPATIENT
Start: 2022-06-28 | End: 2022-07-01 | Stop reason: HOSPADM

## 2022-06-28 RX ORDER — NICOTINE POLACRILEX 4 MG
15 LOZENGE BUCCAL
Status: DISCONTINUED | OUTPATIENT
Start: 2022-06-28 | End: 2022-07-01 | Stop reason: HOSPADM

## 2022-06-28 RX ORDER — SODIUM CHLORIDE 0.9 % (FLUSH) 0.9 %
10 SYRINGE (ML) INJECTION AS NEEDED
Status: DISCONTINUED | OUTPATIENT
Start: 2022-06-28 | End: 2022-07-01 | Stop reason: HOSPADM

## 2022-06-28 RX ORDER — INSULIN LISPRO 100 [IU]/ML
0-7 INJECTION, SOLUTION INTRAVENOUS; SUBCUTANEOUS
Status: DISCONTINUED | OUTPATIENT
Start: 2022-06-28 | End: 2022-07-01

## 2022-06-28 RX ORDER — ONDANSETRON 2 MG/ML
4 INJECTION INTRAMUSCULAR; INTRAVENOUS EVERY 6 HOURS PRN
Status: DISCONTINUED | OUTPATIENT
Start: 2022-06-28 | End: 2022-07-01 | Stop reason: HOSPADM

## 2022-06-28 RX ORDER — SODIUM CHLORIDE 9 MG/ML
100 INJECTION, SOLUTION INTRAVENOUS CONTINUOUS
Status: DISCONTINUED | OUTPATIENT
Start: 2022-06-28 | End: 2022-06-29

## 2022-06-28 RX ORDER — ALUMINA, MAGNESIA, AND SIMETHICONE 2400; 2400; 240 MG/30ML; MG/30ML; MG/30ML
15 SUSPENSION ORAL EVERY 6 HOURS PRN
Status: DISCONTINUED | OUTPATIENT
Start: 2022-06-28 | End: 2022-07-01 | Stop reason: HOSPADM

## 2022-06-28 RX ORDER — SODIUM CHLORIDE 0.9 % (FLUSH) 0.9 %
10 SYRINGE (ML) INJECTION EVERY 12 HOURS SCHEDULED
Status: DISCONTINUED | OUTPATIENT
Start: 2022-06-28 | End: 2022-07-01 | Stop reason: HOSPADM

## 2022-06-28 RX ADMIN — INSULIN HUMAN 5 UNITS: 100 INJECTION, SOLUTION PARENTERAL at 18:44

## 2022-06-28 RX ADMIN — Medication 10 ML: at 22:30

## 2022-06-28 RX ADMIN — SODIUM CHLORIDE 500 ML: 9 INJECTION, SOLUTION INTRAVENOUS at 13:25

## 2022-06-28 RX ADMIN — SODIUM CHLORIDE 100 ML/HR: 9 INJECTION, SOLUTION INTRAVENOUS at 22:30

## 2022-06-29 ENCOUNTER — TRANSITIONAL CARE MANAGEMENT TELEPHONE ENCOUNTER (OUTPATIENT)
Dept: CALL CENTER | Facility: HOSPITAL | Age: 72
End: 2022-06-29

## 2022-06-29 ENCOUNTER — APPOINTMENT (OUTPATIENT)
Dept: CARDIOLOGY | Facility: HOSPITAL | Age: 72
End: 2022-06-29

## 2022-06-29 LAB
ANION GAP SERPL CALCULATED.3IONS-SCNC: 13 MMOL/L (ref 5–15)
ANISOCYTOSIS BLD QL: ABNORMAL
BACTERIA UR QL AUTO: ABNORMAL /HPF
BASOPHILS # BLD MANUAL: 0.1 10*3/MM3 (ref 0–0.2)
BASOPHILS NFR BLD MANUAL: 2 % (ref 0–1.5)
BH CV LOWER VASCULAR LEFT COMMON FEMORAL AUGMENT: NORMAL
BH CV LOWER VASCULAR LEFT COMMON FEMORAL COMPETENT: NORMAL
BH CV LOWER VASCULAR LEFT COMMON FEMORAL COMPRESS: NORMAL
BH CV LOWER VASCULAR LEFT COMMON FEMORAL PHASIC: NORMAL
BH CV LOWER VASCULAR LEFT COMMON FEMORAL SPONT: NORMAL
BH CV LOWER VASCULAR LEFT DISTAL FEMORAL COMPRESS: NORMAL
BH CV LOWER VASCULAR LEFT GASTRONEMIUS COMPRESS: NORMAL
BH CV LOWER VASCULAR LEFT GREATER SAPH AK COMPRESS: NORMAL
BH CV LOWER VASCULAR LEFT GREATER SAPH BK COMPRESS: NORMAL
BH CV LOWER VASCULAR LEFT LESSER SAPH COMPRESS: NORMAL
BH CV LOWER VASCULAR LEFT MID FEMORAL AUGMENT: NORMAL
BH CV LOWER VASCULAR LEFT MID FEMORAL COMPETENT: NORMAL
BH CV LOWER VASCULAR LEFT MID FEMORAL COMPRESS: NORMAL
BH CV LOWER VASCULAR LEFT MID FEMORAL PHASIC: NORMAL
BH CV LOWER VASCULAR LEFT MID FEMORAL SPONT: NORMAL
BH CV LOWER VASCULAR LEFT PERONEAL COMPRESS: NORMAL
BH CV LOWER VASCULAR LEFT POPLITEAL AUGMENT: NORMAL
BH CV LOWER VASCULAR LEFT POPLITEAL COMPETENT: NORMAL
BH CV LOWER VASCULAR LEFT POPLITEAL COMPRESS: NORMAL
BH CV LOWER VASCULAR LEFT POPLITEAL PHASIC: NORMAL
BH CV LOWER VASCULAR LEFT POPLITEAL SPONT: NORMAL
BH CV LOWER VASCULAR LEFT POSTERIOR TIBIAL COMPRESS: NORMAL
BH CV LOWER VASCULAR LEFT PROXIMAL FEMORAL COMPRESS: NORMAL
BH CV LOWER VASCULAR LEFT SAPHENOFEMORAL JUNCTION COMPRESS: NORMAL
BH CV LOWER VASCULAR RIGHT COMMON FEMORAL AUGMENT: NORMAL
BH CV LOWER VASCULAR RIGHT COMMON FEMORAL COMPETENT: NORMAL
BH CV LOWER VASCULAR RIGHT COMMON FEMORAL COMPRESS: NORMAL
BH CV LOWER VASCULAR RIGHT COMMON FEMORAL PHASIC: NORMAL
BH CV LOWER VASCULAR RIGHT COMMON FEMORAL SPONT: NORMAL
BH CV LOWER VASCULAR RIGHT DISTAL FEMORAL COMPRESS: NORMAL
BH CV LOWER VASCULAR RIGHT GASTRONEMIUS COMPRESS: NORMAL
BH CV LOWER VASCULAR RIGHT GREATER SAPH AK COMPRESS: NORMAL
BH CV LOWER VASCULAR RIGHT GREATER SAPH BK COMPRESS: NORMAL
BH CV LOWER VASCULAR RIGHT LESSER SAPH COMPRESS: NORMAL
BH CV LOWER VASCULAR RIGHT MID FEMORAL AUGMENT: NORMAL
BH CV LOWER VASCULAR RIGHT MID FEMORAL COMPETENT: NORMAL
BH CV LOWER VASCULAR RIGHT MID FEMORAL COMPRESS: NORMAL
BH CV LOWER VASCULAR RIGHT MID FEMORAL PHASIC: NORMAL
BH CV LOWER VASCULAR RIGHT MID FEMORAL SPONT: NORMAL
BH CV LOWER VASCULAR RIGHT PERONEAL COMPRESS: NORMAL
BH CV LOWER VASCULAR RIGHT POPLITEAL AUGMENT: NORMAL
BH CV LOWER VASCULAR RIGHT POPLITEAL COMPETENT: NORMAL
BH CV LOWER VASCULAR RIGHT POPLITEAL COMPRESS: NORMAL
BH CV LOWER VASCULAR RIGHT POPLITEAL PHASIC: NORMAL
BH CV LOWER VASCULAR RIGHT POPLITEAL SPONT: NORMAL
BH CV LOWER VASCULAR RIGHT POSTERIOR TIBIAL COMPRESS: NORMAL
BH CV LOWER VASCULAR RIGHT PROXIMAL FEMORAL COMPRESS: NORMAL
BH CV LOWER VASCULAR RIGHT SAPHENOFEMORAL JUNCTION COMPRESS: NORMAL
BILIRUB UR QL STRIP: NEGATIVE
BUN SERPL-MCNC: 25 MG/DL (ref 8–23)
BUN/CREAT SERPL: 12.7 (ref 7–25)
CALCIUM SPEC-SCNC: 7.5 MG/DL (ref 8.6–10.5)
CHLORIDE SERPL-SCNC: 106 MMOL/L (ref 98–107)
CLARITY UR: ABNORMAL
CO2 SERPL-SCNC: 22 MMOL/L (ref 22–29)
COLOR UR: YELLOW
CREAT SERPL-MCNC: 1.97 MG/DL (ref 0.57–1)
DEPRECATED RDW RBC AUTO: 66.5 FL (ref 37–54)
EGFRCR SERPLBLD CKD-EPI 2021: 26.3 ML/MIN/1.73
ERYTHROCYTE [DISTWIDTH] IN BLOOD BY AUTOMATED COUNT: 20.6 % (ref 12.3–15.4)
GLUCOSE BLDC GLUCOMTR-MCNC: 129 MG/DL (ref 70–105)
GLUCOSE BLDC GLUCOMTR-MCNC: 235 MG/DL (ref 70–105)
GLUCOSE BLDC GLUCOMTR-MCNC: 288 MG/DL (ref 70–105)
GLUCOSE SERPL-MCNC: 187 MG/DL (ref 65–99)
GLUCOSE UR STRIP-MCNC: ABNORMAL MG/DL
HCT VFR BLD AUTO: 33.9 % (ref 34–46.6)
HGB BLD-MCNC: 10.9 G/DL (ref 12–15.9)
HGB UR QL STRIP.AUTO: ABNORMAL
HYALINE CASTS UR QL AUTO: ABNORMAL /LPF
KETONES UR QL STRIP: ABNORMAL
LEUKOCYTE ESTERASE UR QL STRIP.AUTO: ABNORMAL
LYMPHOCYTES # BLD MANUAL: 1.28 10*3/MM3 (ref 0.7–3.1)
LYMPHOCYTES NFR BLD MANUAL: 9 % (ref 5–12)
MAXIMAL PREDICTED HEART RATE: 146 BPM
MCH RBC QN AUTO: 30.5 PG (ref 26.6–33)
MCHC RBC AUTO-ENTMCNC: 32.3 G/DL (ref 31.5–35.7)
MCV RBC AUTO: 94.5 FL (ref 79–97)
MONOCYTES # BLD: 0.46 10*3/MM3 (ref 0.1–0.9)
NEUTROPHILS # BLD AUTO: 3.26 10*3/MM3 (ref 1.7–7)
NEUTROPHILS NFR BLD MANUAL: 57 % (ref 42.7–76)
NEUTS BAND NFR BLD MANUAL: 7 % (ref 0–5)
NITRITE UR QL STRIP: NEGATIVE
PH UR STRIP.AUTO: <=5 [PH] (ref 5–8)
PLAT MORPH BLD: NORMAL
PLATELET # BLD AUTO: 190 10*3/MM3 (ref 140–450)
PMV BLD AUTO: 9.1 FL (ref 6–12)
POTASSIUM SERPL-SCNC: 4.3 MMOL/L (ref 3.5–5.2)
PROT UR QL STRIP: ABNORMAL
RBC # BLD AUTO: 3.59 10*6/MM3 (ref 3.77–5.28)
RBC # UR STRIP: ABNORMAL /HPF
REF LAB TEST METHOD: ABNORMAL
SCAN SLIDE: NORMAL
SODIUM SERPL-SCNC: 141 MMOL/L (ref 136–145)
SP GR UR STRIP: 1.02 (ref 1–1.03)
SQUAMOUS #/AREA URNS HPF: ABNORMAL /HPF
STRESS TARGET HR: 124 BPM
UROBILINOGEN UR QL STRIP: ABNORMAL
VARIANT LYMPHS NFR BLD MANUAL: 1 % (ref 0–5)
VARIANT LYMPHS NFR BLD MANUAL: 24 % (ref 19.6–45.3)
WBC # UR STRIP: ABNORMAL /HPF
WBC MORPH BLD: NORMAL
WBC NRBC COR # BLD: 5.1 10*3/MM3 (ref 3.4–10.8)

## 2022-06-29 PROCEDURE — 82962 GLUCOSE BLOOD TEST: CPT

## 2022-06-29 PROCEDURE — 63710000001 INSULIN LISPRO (HUMAN) PER 5 UNITS: Performed by: NURSE PRACTITIONER

## 2022-06-29 PROCEDURE — 80048 BASIC METABOLIC PNL TOTAL CA: CPT | Performed by: NURSE PRACTITIONER

## 2022-06-29 PROCEDURE — G0378 HOSPITAL OBSERVATION PER HR: HCPCS

## 2022-06-29 PROCEDURE — 25010000002 CEFTRIAXONE PER 250 MG: Performed by: INTERNAL MEDICINE

## 2022-06-29 PROCEDURE — 99225 PR SBSQ OBSERVATION CARE/DAY 25 MINUTES: CPT | Performed by: INTERNAL MEDICINE

## 2022-06-29 PROCEDURE — 93970 EXTREMITY STUDY: CPT

## 2022-06-29 PROCEDURE — 96361 HYDRATE IV INFUSION ADD-ON: CPT

## 2022-06-29 PROCEDURE — 87086 URINE CULTURE/COLONY COUNT: CPT | Performed by: INTERNAL MEDICINE

## 2022-06-29 PROCEDURE — 87186 SC STD MICRODIL/AGAR DIL: CPT | Performed by: INTERNAL MEDICINE

## 2022-06-29 PROCEDURE — 96365 THER/PROPH/DIAG IV INF INIT: CPT

## 2022-06-29 PROCEDURE — 87088 URINE BACTERIA CULTURE: CPT | Performed by: INTERNAL MEDICINE

## 2022-06-29 PROCEDURE — 81001 URINALYSIS AUTO W/SCOPE: CPT | Performed by: INTERNAL MEDICINE

## 2022-06-29 PROCEDURE — 97161 PT EVAL LOW COMPLEX 20 MIN: CPT

## 2022-06-29 RX ORDER — AMLODIPINE BESYLATE 5 MG/1
5 TABLET ORAL DAILY
Status: DISCONTINUED | OUTPATIENT
Start: 2022-06-29 | End: 2022-07-01 | Stop reason: HOSPADM

## 2022-06-29 RX ORDER — FERROUS SULFATE TAB EC 324 MG (65 MG FE EQUIVALENT) 324 (65 FE) MG
324 TABLET DELAYED RESPONSE ORAL
Refills: 3 | Status: DISCONTINUED | OUTPATIENT
Start: 2022-06-30 | End: 2022-07-01 | Stop reason: HOSPADM

## 2022-06-29 RX ORDER — ALPRAZOLAM 0.5 MG/1
0.5 TABLET ORAL 3 TIMES DAILY PRN
Status: DISCONTINUED | OUTPATIENT
Start: 2022-06-29 | End: 2022-07-01 | Stop reason: HOSPADM

## 2022-06-29 RX ORDER — DULOXETIN HYDROCHLORIDE 30 MG/1
60 CAPSULE, DELAYED RELEASE ORAL 2 TIMES DAILY
Status: DISCONTINUED | OUTPATIENT
Start: 2022-06-29 | End: 2022-07-01 | Stop reason: HOSPADM

## 2022-06-29 RX ORDER — CARVEDILOL 6.25 MG/1
12.5 TABLET ORAL 2 TIMES DAILY
Status: DISCONTINUED | OUTPATIENT
Start: 2022-06-29 | End: 2022-07-01 | Stop reason: HOSPADM

## 2022-06-29 RX ORDER — TEMAZEPAM 15 MG/1
30 CAPSULE ORAL NIGHTLY PRN
Status: DISCONTINUED | OUTPATIENT
Start: 2022-06-29 | End: 2022-07-01 | Stop reason: HOSPADM

## 2022-06-29 RX ORDER — ROSUVASTATIN CALCIUM 10 MG/1
10 TABLET, COATED ORAL DAILY
Status: DISCONTINUED | OUTPATIENT
Start: 2022-06-29 | End: 2022-07-01 | Stop reason: HOSPADM

## 2022-06-29 RX ORDER — PRIMIDONE 50 MG/1
50 TABLET ORAL NIGHTLY
Status: DISCONTINUED | OUTPATIENT
Start: 2022-06-29 | End: 2022-07-01 | Stop reason: HOSPADM

## 2022-06-29 RX ADMIN — ACETAMINOPHEN 650 MG: 325 TABLET, FILM COATED ORAL at 21:28

## 2022-06-29 RX ADMIN — INSULIN LISPRO 4 UNITS: 100 INJECTION, SOLUTION INTRAVENOUS; SUBCUTANEOUS at 12:24

## 2022-06-29 RX ADMIN — ALPRAZOLAM 0.5 MG: 0.5 TABLET ORAL at 21:27

## 2022-06-29 RX ADMIN — AMLODIPINE BESYLATE 5 MG: 5 TABLET ORAL at 16:52

## 2022-06-29 RX ADMIN — CEFTRIAXONE 1 G: 1 INJECTION, POWDER, FOR SOLUTION INTRAMUSCULAR; INTRAVENOUS at 16:53

## 2022-06-29 RX ADMIN — SODIUM CHLORIDE 100 ML/HR: 9 INJECTION, SOLUTION INTRAVENOUS at 09:05

## 2022-06-29 RX ADMIN — CARVEDILOL 12.5 MG: 6.25 TABLET, FILM COATED ORAL at 21:28

## 2022-06-29 RX ADMIN — ACETAMINOPHEN 650 MG: 325 TABLET, FILM COATED ORAL at 09:05

## 2022-06-29 RX ADMIN — ROSUVASTATIN CALCIUM 10 MG: 10 TABLET, FILM COATED ORAL at 16:52

## 2022-06-29 RX ADMIN — Medication 10 ML: at 21:26

## 2022-06-29 RX ADMIN — DULOXETINE 60 MG: 30 CAPSULE, DELAYED RELEASE ORAL at 21:28

## 2022-06-29 RX ADMIN — TEMAZEPAM 30 MG: 15 CAPSULE ORAL at 21:28

## 2022-06-29 RX ADMIN — INSULIN LISPRO 3 UNITS: 100 INJECTION, SOLUTION INTRAVENOUS; SUBCUTANEOUS at 16:53

## 2022-06-29 RX ADMIN — PRIMIDONE 50 MG: 50 TABLET ORAL at 21:29

## 2022-06-30 LAB
ALBUMIN SERPL-MCNC: 2.8 G/DL (ref 3.5–5.2)
ALBUMIN/GLOB SERPL: 1 G/DL
ALP SERPL-CCNC: 96 U/L (ref 39–117)
ALT SERPL W P-5'-P-CCNC: 65 U/L (ref 1–33)
ANION GAP SERPL CALCULATED.3IONS-SCNC: 11 MMOL/L (ref 5–15)
AST SERPL-CCNC: 61 U/L (ref 1–32)
BASOPHILS # BLD AUTO: 0 10*3/MM3 (ref 0–0.2)
BASOPHILS NFR BLD AUTO: 0.3 % (ref 0–1.5)
BILIRUB SERPL-MCNC: 0.2 MG/DL (ref 0–1.2)
BUN SERPL-MCNC: 25 MG/DL (ref 8–23)
BUN/CREAT SERPL: 16.2 (ref 7–25)
CALCIUM SPEC-SCNC: 7.5 MG/DL (ref 8.6–10.5)
CHLORIDE SERPL-SCNC: 108 MMOL/L (ref 98–107)
CO2 SERPL-SCNC: 21 MMOL/L (ref 22–29)
CREAT SERPL-MCNC: 1.54 MG/DL (ref 0.57–1)
DEPRECATED RDW RBC AUTO: 66.5 FL (ref 37–54)
EGFRCR SERPLBLD CKD-EPI 2021: 35.3 ML/MIN/1.73
EOSINOPHIL # BLD AUTO: 0.1 10*3/MM3 (ref 0–0.4)
EOSINOPHIL NFR BLD AUTO: 1.1 % (ref 0.3–6.2)
ERYTHROCYTE [DISTWIDTH] IN BLOOD BY AUTOMATED COUNT: 20.5 % (ref 12.3–15.4)
GLOBULIN UR ELPH-MCNC: 2.9 GM/DL
GLUCOSE BLDC GLUCOMTR-MCNC: 194 MG/DL (ref 70–105)
GLUCOSE BLDC GLUCOMTR-MCNC: 282 MG/DL (ref 70–105)
GLUCOSE BLDC GLUCOMTR-MCNC: 323 MG/DL (ref 70–105)
GLUCOSE BLDC GLUCOMTR-MCNC: 332 MG/DL (ref 70–105)
GLUCOSE BLDC GLUCOMTR-MCNC: 412 MG/DL (ref 70–105)
GLUCOSE SERPL-MCNC: 224 MG/DL (ref 65–99)
HCT VFR BLD AUTO: 34.4 % (ref 34–46.6)
HGB BLD-MCNC: 11.2 G/DL (ref 12–15.9)
LYMPHOCYTES # BLD AUTO: 1.1 10*3/MM3 (ref 0.7–3.1)
LYMPHOCYTES NFR BLD AUTO: 14.1 % (ref 19.6–45.3)
MCH RBC QN AUTO: 30.8 PG (ref 26.6–33)
MCHC RBC AUTO-ENTMCNC: 32.6 G/DL (ref 31.5–35.7)
MCV RBC AUTO: 94.3 FL (ref 79–97)
MONOCYTES # BLD AUTO: 0.6 10*3/MM3 (ref 0.1–0.9)
MONOCYTES NFR BLD AUTO: 7.4 % (ref 5–12)
NEUTROPHILS NFR BLD AUTO: 6.3 10*3/MM3 (ref 1.7–7)
NEUTROPHILS NFR BLD AUTO: 77.1 % (ref 42.7–76)
NRBC BLD AUTO-RTO: 0 /100 WBC (ref 0–0.2)
PLATELET # BLD AUTO: 183 10*3/MM3 (ref 140–450)
PMV BLD AUTO: 8.8 FL (ref 6–12)
POTASSIUM SERPL-SCNC: 4.1 MMOL/L (ref 3.5–5.2)
PROT SERPL-MCNC: 5.7 G/DL (ref 6–8.5)
RBC # BLD AUTO: 3.65 10*6/MM3 (ref 3.77–5.28)
SODIUM SERPL-SCNC: 140 MMOL/L (ref 136–145)
WBC NRBC COR # BLD: 8.1 10*3/MM3 (ref 3.4–10.8)

## 2022-06-30 PROCEDURE — G0378 HOSPITAL OBSERVATION PER HR: HCPCS

## 2022-06-30 PROCEDURE — 96372 THER/PROPH/DIAG INJ SC/IM: CPT

## 2022-06-30 PROCEDURE — 85025 COMPLETE CBC W/AUTO DIFF WBC: CPT | Performed by: STUDENT IN AN ORGANIZED HEALTH CARE EDUCATION/TRAINING PROGRAM

## 2022-06-30 PROCEDURE — 97166 OT EVAL MOD COMPLEX 45 MIN: CPT

## 2022-06-30 PROCEDURE — 82962 GLUCOSE BLOOD TEST: CPT

## 2022-06-30 PROCEDURE — 63710000001 INSULIN GLARGINE PER 5 UNITS: Performed by: STUDENT IN AN ORGANIZED HEALTH CARE EDUCATION/TRAINING PROGRAM

## 2022-06-30 PROCEDURE — 63710000001 INSULIN LISPRO (HUMAN) PER 5 UNITS: Performed by: NURSE PRACTITIONER

## 2022-06-30 PROCEDURE — 25010000002 CEFTRIAXONE PER 250 MG: Performed by: INTERNAL MEDICINE

## 2022-06-30 PROCEDURE — 80053 COMPREHEN METABOLIC PANEL: CPT | Performed by: STUDENT IN AN ORGANIZED HEALTH CARE EDUCATION/TRAINING PROGRAM

## 2022-06-30 PROCEDURE — 99225 PR SBSQ OBSERVATION CARE/DAY 25 MINUTES: CPT | Performed by: STUDENT IN AN ORGANIZED HEALTH CARE EDUCATION/TRAINING PROGRAM

## 2022-06-30 PROCEDURE — 25010000002 ENOXAPARIN PER 10 MG: Performed by: STUDENT IN AN ORGANIZED HEALTH CARE EDUCATION/TRAINING PROGRAM

## 2022-06-30 RX ORDER — ENOXAPARIN SODIUM 100 MG/ML
40 INJECTION SUBCUTANEOUS EVERY 24 HOURS
Status: DISCONTINUED | OUTPATIENT
Start: 2022-06-30 | End: 2022-07-01 | Stop reason: HOSPADM

## 2022-06-30 RX ADMIN — TEMAZEPAM 30 MG: 15 CAPSULE ORAL at 22:06

## 2022-06-30 RX ADMIN — INSULIN LISPRO 2 UNITS: 100 INJECTION, SOLUTION INTRAVENOUS; SUBCUTANEOUS at 08:28

## 2022-06-30 RX ADMIN — CARVEDILOL 12.5 MG: 6.25 TABLET, FILM COATED ORAL at 21:07

## 2022-06-30 RX ADMIN — PRIMIDONE 50 MG: 50 TABLET ORAL at 21:08

## 2022-06-30 RX ADMIN — DULOXETINE 60 MG: 30 CAPSULE, DELAYED RELEASE ORAL at 08:28

## 2022-06-30 RX ADMIN — ACETAMINOPHEN 650 MG: 325 TABLET, FILM COATED ORAL at 14:28

## 2022-06-30 RX ADMIN — ENOXAPARIN SODIUM 40 MG: 100 INJECTION SUBCUTANEOUS at 16:25

## 2022-06-30 RX ADMIN — INSULIN LISPRO 5 UNITS: 100 INJECTION, SOLUTION INTRAVENOUS; SUBCUTANEOUS at 12:44

## 2022-06-30 RX ADMIN — AMLODIPINE BESYLATE 5 MG: 5 TABLET ORAL at 08:28

## 2022-06-30 RX ADMIN — Medication 10 ML: at 08:28

## 2022-06-30 RX ADMIN — CEFTRIAXONE 1 G: 1 INJECTION, POWDER, FOR SOLUTION INTRAMUSCULAR; INTRAVENOUS at 16:26

## 2022-06-30 RX ADMIN — FERROUS SULFATE TAB EC 324 MG (65 MG FE EQUIVALENT) 324 MG: 324 (65 FE) TABLET DELAYED RESPONSE at 08:28

## 2022-06-30 RX ADMIN — INSULIN GLARGINE 5 UNITS: 100 INJECTION, SOLUTION SUBCUTANEOUS at 12:45

## 2022-06-30 RX ADMIN — Medication 10 ML: at 21:08

## 2022-06-30 RX ADMIN — ACETAMINOPHEN 650 MG: 325 TABLET, FILM COATED ORAL at 22:06

## 2022-06-30 RX ADMIN — DULOXETINE 60 MG: 30 CAPSULE, DELAYED RELEASE ORAL at 21:06

## 2022-06-30 RX ADMIN — CARVEDILOL 12.5 MG: 6.25 TABLET, FILM COATED ORAL at 08:28

## 2022-06-30 RX ADMIN — ROSUVASTATIN CALCIUM 10 MG: 10 TABLET, FILM COATED ORAL at 08:28

## 2022-06-30 RX ADMIN — INSULIN LISPRO 7 UNITS: 100 INJECTION, SOLUTION INTRAVENOUS; SUBCUTANEOUS at 16:30

## 2022-06-30 RX ADMIN — ALPRAZOLAM 0.5 MG: 0.5 TABLET ORAL at 22:06

## 2022-07-01 VITALS
HEIGHT: 62 IN | WEIGHT: 220.46 LBS | TEMPERATURE: 97.9 F | OXYGEN SATURATION: 96 % | SYSTOLIC BLOOD PRESSURE: 130 MMHG | HEART RATE: 79 BPM | RESPIRATION RATE: 17 BRPM | BODY MASS INDEX: 40.57 KG/M2 | DIASTOLIC BLOOD PRESSURE: 70 MMHG

## 2022-07-01 LAB
ANION GAP SERPL CALCULATED.3IONS-SCNC: 12 MMOL/L (ref 5–15)
BACTERIA SPEC AEROBE CULT: ABNORMAL
BACTERIA SPEC AEROBE CULT: NORMAL
BACTERIA SPEC AEROBE CULT: NORMAL
BUN SERPL-MCNC: 27 MG/DL (ref 8–23)
BUN/CREAT SERPL: 21.1 (ref 7–25)
CALCIUM SPEC-SCNC: 7.6 MG/DL (ref 8.6–10.5)
CHLORIDE SERPL-SCNC: 109 MMOL/L (ref 98–107)
CO2 SERPL-SCNC: 19 MMOL/L (ref 22–29)
CREAT SERPL-MCNC: 1.28 MG/DL (ref 0.57–1)
DEPRECATED RDW RBC AUTO: 66.9 FL (ref 37–54)
EGFRCR SERPLBLD CKD-EPI 2021: 44.1 ML/MIN/1.73
ERYTHROCYTE [DISTWIDTH] IN BLOOD BY AUTOMATED COUNT: 20.3 % (ref 12.3–15.4)
GLUCOSE BLDC GLUCOMTR-MCNC: 214 MG/DL (ref 70–105)
GLUCOSE BLDC GLUCOMTR-MCNC: 290 MG/DL (ref 70–105)
GLUCOSE SERPL-MCNC: 229 MG/DL (ref 65–99)
HCT VFR BLD AUTO: 32.8 % (ref 34–46.6)
HGB BLD-MCNC: 10.5 G/DL (ref 12–15.9)
MCH RBC QN AUTO: 30.1 PG (ref 26.6–33)
MCHC RBC AUTO-ENTMCNC: 31.9 G/DL (ref 31.5–35.7)
MCV RBC AUTO: 94.5 FL (ref 79–97)
PLATELET # BLD AUTO: 156 10*3/MM3 (ref 140–450)
PMV BLD AUTO: 8.7 FL (ref 6–12)
POTASSIUM SERPL-SCNC: 4 MMOL/L (ref 3.5–5.2)
RBC # BLD AUTO: 3.47 10*6/MM3 (ref 3.77–5.28)
SODIUM SERPL-SCNC: 140 MMOL/L (ref 136–145)
WBC NRBC COR # BLD: 5.5 10*3/MM3 (ref 3.4–10.8)

## 2022-07-01 PROCEDURE — 0 LIDOCAINE 1 % SOLUTION 10 ML VIAL: Performed by: STUDENT IN AN ORGANIZED HEALTH CARE EDUCATION/TRAINING PROGRAM

## 2022-07-01 PROCEDURE — 82962 GLUCOSE BLOOD TEST: CPT

## 2022-07-01 PROCEDURE — 99217 PR OBSERVATION CARE DISCHARGE MANAGEMENT: CPT | Performed by: STUDENT IN AN ORGANIZED HEALTH CARE EDUCATION/TRAINING PROGRAM

## 2022-07-01 PROCEDURE — 63710000001 INSULIN LISPRO (HUMAN) PER 5 UNITS: Performed by: STUDENT IN AN ORGANIZED HEALTH CARE EDUCATION/TRAINING PROGRAM

## 2022-07-01 PROCEDURE — G0378 HOSPITAL OBSERVATION PER HR: HCPCS

## 2022-07-01 PROCEDURE — 80048 BASIC METABOLIC PNL TOTAL CA: CPT | Performed by: STUDENT IN AN ORGANIZED HEALTH CARE EDUCATION/TRAINING PROGRAM

## 2022-07-01 PROCEDURE — 85027 COMPLETE CBC AUTOMATED: CPT | Performed by: STUDENT IN AN ORGANIZED HEALTH CARE EDUCATION/TRAINING PROGRAM

## 2022-07-01 PROCEDURE — 25010000002 ERTAPENEM PER 500 MG: Performed by: STUDENT IN AN ORGANIZED HEALTH CARE EDUCATION/TRAINING PROGRAM

## 2022-07-01 RX ORDER — INSULIN LISPRO 100 [IU]/ML
0-14 INJECTION, SOLUTION INTRAVENOUS; SUBCUTANEOUS AS NEEDED
Status: DISCONTINUED | OUTPATIENT
Start: 2022-07-01 | End: 2022-07-01 | Stop reason: HOSPADM

## 2022-07-01 RX ORDER — NICOTINE POLACRILEX 4 MG
15 LOZENGE BUCCAL
Status: DISCONTINUED | OUTPATIENT
Start: 2022-07-01 | End: 2022-07-01 | Stop reason: SDUPTHER

## 2022-07-01 RX ORDER — OLANZAPINE 10 MG/2ML
1 INJECTION, POWDER, LYOPHILIZED, FOR SOLUTION INTRAMUSCULAR
Status: DISCONTINUED | OUTPATIENT
Start: 2022-07-01 | End: 2022-07-01 | Stop reason: SDUPTHER

## 2022-07-01 RX ORDER — DEXTROSE MONOHYDRATE 25 G/50ML
25 INJECTION, SOLUTION INTRAVENOUS
Status: DISCONTINUED | OUTPATIENT
Start: 2022-07-01 | End: 2022-07-01 | Stop reason: SDUPTHER

## 2022-07-01 RX ORDER — INSULIN LISPRO 100 [IU]/ML
0-14 INJECTION, SOLUTION INTRAVENOUS; SUBCUTANEOUS
Status: DISCONTINUED | OUTPATIENT
Start: 2022-07-01 | End: 2022-07-01 | Stop reason: HOSPADM

## 2022-07-01 RX ORDER — CEFDINIR 300 MG/1
300 CAPSULE ORAL 2 TIMES DAILY
Qty: 6 CAPSULE | Refills: 0
Start: 2022-07-01 | End: 2022-07-01 | Stop reason: HOSPADM

## 2022-07-01 RX ADMIN — DULOXETINE 60 MG: 30 CAPSULE, DELAYED RELEASE ORAL at 08:30

## 2022-07-01 RX ADMIN — AMLODIPINE BESYLATE 5 MG: 5 TABLET ORAL at 08:30

## 2022-07-01 RX ADMIN — CARVEDILOL 12.5 MG: 6.25 TABLET, FILM COATED ORAL at 08:30

## 2022-07-01 RX ADMIN — ROSUVASTATIN CALCIUM 10 MG: 10 TABLET, FILM COATED ORAL at 08:30

## 2022-07-01 RX ADMIN — FERROUS SULFATE TAB EC 324 MG (65 MG FE EQUIVALENT) 324 MG: 324 (65 FE) TABLET DELAYED RESPONSE at 08:30

## 2022-07-01 RX ADMIN — INSULIN LISPRO 8 UNITS: 100 INJECTION, SOLUTION INTRAVENOUS; SUBCUTANEOUS at 11:20

## 2022-07-01 RX ADMIN — Medication 10 ML: at 08:39

## 2022-07-01 RX ADMIN — LIDOCAINE HYDROCHLORIDE 1000 MG: 10 INJECTION, SOLUTION INFILTRATION; PERINEURAL at 15:03

## 2022-07-01 RX ADMIN — INSULIN LISPRO 5 UNITS: 100 INJECTION, SOLUTION INTRAVENOUS; SUBCUTANEOUS at 08:30

## 2022-07-03 LAB
BACTERIA SPEC AEROBE CULT: NORMAL
BACTERIA SPEC AEROBE CULT: NORMAL

## 2022-07-08 LAB — QT INTERVAL: 411 MS

## 2022-07-11 DIAGNOSIS — F51.01 PRIMARY INSOMNIA: ICD-10-CM

## 2022-07-11 RX ORDER — TEMAZEPAM 30 MG/1
30 CAPSULE ORAL NIGHTLY PRN
Qty: 90 CAPSULE | Refills: 1 | Status: SHIPPED | OUTPATIENT
Start: 2022-07-11 | End: 2022-07-14 | Stop reason: SDUPTHER

## 2022-07-14 ENCOUNTER — OFFICE VISIT (OUTPATIENT)
Dept: FAMILY MEDICINE CLINIC | Facility: CLINIC | Age: 72
End: 2022-07-14

## 2022-07-14 ENCOUNTER — LAB (OUTPATIENT)
Dept: FAMILY MEDICINE CLINIC | Facility: CLINIC | Age: 72
End: 2022-07-14

## 2022-07-14 VITALS
OXYGEN SATURATION: 95 % | RESPIRATION RATE: 20 BRPM | DIASTOLIC BLOOD PRESSURE: 80 MMHG | HEIGHT: 62 IN | WEIGHT: 214 LBS | BODY MASS INDEX: 39.38 KG/M2 | TEMPERATURE: 96.8 F | SYSTOLIC BLOOD PRESSURE: 132 MMHG | HEART RATE: 99 BPM

## 2022-07-14 DIAGNOSIS — Z09 HOSPITAL DISCHARGE FOLLOW-UP: ICD-10-CM

## 2022-07-14 DIAGNOSIS — N17.9 ACUTE KIDNEY INJURY SUPERIMPOSED ON CKD: ICD-10-CM

## 2022-07-14 DIAGNOSIS — J12.82 PNEUMONIA DUE TO COVID-19 VIRUS: ICD-10-CM

## 2022-07-14 DIAGNOSIS — U07.1 PNEUMONIA DUE TO COVID-19 VIRUS: ICD-10-CM

## 2022-07-14 DIAGNOSIS — F51.01 PRIMARY INSOMNIA: ICD-10-CM

## 2022-07-14 DIAGNOSIS — I10 ESSENTIAL HYPERTENSION: Primary | Chronic | ICD-10-CM

## 2022-07-14 DIAGNOSIS — N30.00 ACUTE CYSTITIS WITHOUT HEMATURIA: ICD-10-CM

## 2022-07-14 DIAGNOSIS — N18.9 ACUTE KIDNEY INJURY SUPERIMPOSED ON CKD: ICD-10-CM

## 2022-07-14 DIAGNOSIS — R55 SYNCOPE, UNSPECIFIED SYNCOPE TYPE: ICD-10-CM

## 2022-07-14 DIAGNOSIS — F41.9 ANXIETY: ICD-10-CM

## 2022-07-14 DIAGNOSIS — D50.8 IRON DEFICIENCY ANEMIA SECONDARY TO INADEQUATE DIETARY IRON INTAKE: ICD-10-CM

## 2022-07-14 LAB
ALBUMIN SERPL-MCNC: 4 G/DL (ref 3.5–5.2)
ALBUMIN/GLOB SERPL: 1.3 G/DL
ALP SERPL-CCNC: 150 U/L (ref 39–117)
ALT SERPL W P-5'-P-CCNC: 72 U/L (ref 1–33)
ANION GAP SERPL CALCULATED.3IONS-SCNC: 14.6 MMOL/L (ref 5–15)
AST SERPL-CCNC: 47 U/L (ref 1–32)
BACTERIA UR QL AUTO: ABNORMAL /HPF
BASOPHILS # BLD AUTO: 0.09 10*3/MM3 (ref 0–0.2)
BASOPHILS NFR BLD AUTO: 1 % (ref 0–1.5)
BILIRUB SERPL-MCNC: 0.3 MG/DL (ref 0–1.2)
BILIRUB UR QL STRIP: NEGATIVE
BUN SERPL-MCNC: 26 MG/DL (ref 8–23)
BUN/CREAT SERPL: 15.7 (ref 7–25)
CALCIUM SPEC-SCNC: 8.8 MG/DL (ref 8.6–10.5)
CHLORIDE SERPL-SCNC: 104 MMOL/L (ref 98–107)
CLARITY UR: ABNORMAL
CO2 SERPL-SCNC: 23.4 MMOL/L (ref 22–29)
COLOR UR: YELLOW
CREAT SERPL-MCNC: 1.66 MG/DL (ref 0.57–1)
DEPRECATED RDW RBC AUTO: 55.2 FL (ref 37–54)
EGFRCR SERPLBLD CKD-EPI 2021: 32.2 ML/MIN/1.73
EOSINOPHIL # BLD AUTO: 0.18 10*3/MM3 (ref 0–0.4)
EOSINOPHIL NFR BLD AUTO: 2.1 % (ref 0.3–6.2)
ERYTHROCYTE [DISTWIDTH] IN BLOOD BY AUTOMATED COUNT: 16.3 % (ref 12.3–15.4)
GLOBULIN UR ELPH-MCNC: 3 GM/DL
GLUCOSE SERPL-MCNC: 192 MG/DL (ref 65–99)
GLUCOSE UR STRIP-MCNC: ABNORMAL MG/DL
HCT VFR BLD AUTO: 40.6 % (ref 34–46.6)
HGB BLD-MCNC: 13.4 G/DL (ref 12–15.9)
HGB UR QL STRIP.AUTO: NEGATIVE
HYALINE CASTS UR QL AUTO: ABNORMAL /LPF
IMM GRANULOCYTES # BLD AUTO: 0.04 10*3/MM3 (ref 0–0.05)
IMM GRANULOCYTES NFR BLD AUTO: 0.5 % (ref 0–0.5)
KETONES UR QL STRIP: NEGATIVE
LEUKOCYTE ESTERASE UR QL STRIP.AUTO: ABNORMAL
LYMPHOCYTES # BLD AUTO: 1.38 10*3/MM3 (ref 0.7–3.1)
LYMPHOCYTES NFR BLD AUTO: 16 % (ref 19.6–45.3)
MCH RBC QN AUTO: 31.4 PG (ref 26.6–33)
MCHC RBC AUTO-ENTMCNC: 33 G/DL (ref 31.5–35.7)
MCV RBC AUTO: 95.1 FL (ref 79–97)
MONOCYTES # BLD AUTO: 0.51 10*3/MM3 (ref 0.1–0.9)
MONOCYTES NFR BLD AUTO: 5.9 % (ref 5–12)
NEUTROPHILS NFR BLD AUTO: 6.42 10*3/MM3 (ref 1.7–7)
NEUTROPHILS NFR BLD AUTO: 74.5 % (ref 42.7–76)
NITRITE UR QL STRIP: NEGATIVE
NRBC BLD AUTO-RTO: 0 /100 WBC (ref 0–0.2)
PH UR STRIP.AUTO: 5.5 [PH] (ref 5–8)
PLATELET # BLD AUTO: 312 10*3/MM3 (ref 140–450)
PMV BLD AUTO: 11.2 FL (ref 6–12)
POTASSIUM SERPL-SCNC: 4.7 MMOL/L (ref 3.5–5.2)
PROT SERPL-MCNC: 7 G/DL (ref 6–8.5)
PROT UR QL STRIP: ABNORMAL
RBC # BLD AUTO: 4.27 10*6/MM3 (ref 3.77–5.28)
RBC # UR STRIP: ABNORMAL /HPF
REF LAB TEST METHOD: ABNORMAL
SODIUM SERPL-SCNC: 142 MMOL/L (ref 136–145)
SP GR UR STRIP: 1.02 (ref 1–1.03)
SQUAMOUS #/AREA URNS HPF: ABNORMAL /HPF
UROBILINOGEN UR QL STRIP: ABNORMAL
WBC # UR STRIP: ABNORMAL /HPF
WBC NRBC COR # BLD: 8.62 10*3/MM3 (ref 3.4–10.8)

## 2022-07-14 PROCEDURE — 1111F DSCHRG MED/CURRENT MED MERGE: CPT | Performed by: INTERNAL MEDICINE

## 2022-07-14 PROCEDURE — 87186 SC STD MICRODIL/AGAR DIL: CPT

## 2022-07-14 PROCEDURE — 87077 CULTURE AEROBIC IDENTIFY: CPT | Performed by: INTERNAL MEDICINE

## 2022-07-14 PROCEDURE — 87086 URINE CULTURE/COLONY COUNT: CPT | Performed by: INTERNAL MEDICINE

## 2022-07-14 PROCEDURE — 80053 COMPREHEN METABOLIC PANEL: CPT | Performed by: INTERNAL MEDICINE

## 2022-07-14 PROCEDURE — 99495 TRANSJ CARE MGMT MOD F2F 14D: CPT | Performed by: INTERNAL MEDICINE

## 2022-07-14 PROCEDURE — 81001 URINALYSIS AUTO W/SCOPE: CPT | Performed by: INTERNAL MEDICINE

## 2022-07-14 PROCEDURE — 85025 COMPLETE CBC W/AUTO DIFF WBC: CPT | Performed by: INTERNAL MEDICINE

## 2022-07-14 PROCEDURE — 36415 COLL VENOUS BLD VENIPUNCTURE: CPT | Performed by: INTERNAL MEDICINE

## 2022-07-14 RX ORDER — FLUCONAZOLE 200 MG/1
200 TABLET ORAL DAILY
Qty: 3 TABLET | Refills: 1 | Status: SHIPPED | OUTPATIENT
Start: 2022-07-14 | End: 2022-08-26

## 2022-07-14 RX ORDER — TEMAZEPAM 30 MG/1
30 CAPSULE ORAL NIGHTLY PRN
Qty: 90 CAPSULE | Refills: 1 | Status: SHIPPED | OUTPATIENT
Start: 2022-07-14 | End: 2022-08-12 | Stop reason: SDUPTHER

## 2022-07-14 RX ORDER — GLIMEPIRIDE 2 MG/1
2 TABLET ORAL
Qty: 90 TABLET | OUTPATIENT
Start: 2022-07-14

## 2022-07-14 RX ORDER — GLIMEPIRIDE 2 MG/1
2 TABLET ORAL
Qty: 30 TABLET | Refills: 3 | Status: SHIPPED | OUTPATIENT
Start: 2022-07-14 | End: 2022-09-09

## 2022-07-14 RX ORDER — SEMAGLUTIDE 1.34 MG/ML
0.5 INJECTION, SOLUTION SUBCUTANEOUS WEEKLY
Qty: 2 PEN | Refills: 3 | Status: SHIPPED | OUTPATIENT
Start: 2022-07-14 | End: 2022-08-12

## 2022-07-14 RX ORDER — ALPRAZOLAM 0.5 MG/1
0.5 TABLET ORAL 2 TIMES DAILY PRN
Qty: 60 TABLET | Refills: 3 | Status: SHIPPED | OUTPATIENT
Start: 2022-07-14 | End: 2022-08-12 | Stop reason: SDUPTHER

## 2022-07-14 RX ORDER — SODIUM BICARBONATE 325 MG/1
325 TABLET ORAL DAILY
COMMUNITY

## 2022-07-17 LAB — BACTERIA SPEC AEROBE CULT: ABNORMAL

## 2022-07-19 RX ORDER — LEVOFLOXACIN 250 MG/1
250 TABLET ORAL DAILY
Qty: 10 TABLET | Refills: 0 | Status: SHIPPED | OUTPATIENT
Start: 2022-07-19 | End: 2022-08-12

## 2022-08-02 ENCOUNTER — LAB (OUTPATIENT)
Dept: FAMILY MEDICINE CLINIC | Facility: CLINIC | Age: 72
End: 2022-08-02

## 2022-08-02 ENCOUNTER — OFFICE VISIT (OUTPATIENT)
Dept: FAMILY MEDICINE CLINIC | Facility: CLINIC | Age: 72
End: 2022-08-02

## 2022-08-02 VITALS
RESPIRATION RATE: 15 BRPM | SYSTOLIC BLOOD PRESSURE: 142 MMHG | OXYGEN SATURATION: 96 % | WEIGHT: 215 LBS | TEMPERATURE: 97.1 F | HEIGHT: 62 IN | BODY MASS INDEX: 39.56 KG/M2 | DIASTOLIC BLOOD PRESSURE: 86 MMHG | HEART RATE: 89 BPM

## 2022-08-02 DIAGNOSIS — R30.0 DYSURIA: Primary | ICD-10-CM

## 2022-08-02 DIAGNOSIS — N39.0 RECURRENT UTI: ICD-10-CM

## 2022-08-02 PROCEDURE — 99213 OFFICE O/P EST LOW 20 MIN: CPT | Performed by: NURSE PRACTITIONER

## 2022-08-03 ENCOUNTER — LAB (OUTPATIENT)
Dept: FAMILY MEDICINE CLINIC | Facility: CLINIC | Age: 72
End: 2022-08-03

## 2022-08-03 DIAGNOSIS — N39.0 RECURRENT UTI: ICD-10-CM

## 2022-08-03 DIAGNOSIS — R30.0 DYSURIA: ICD-10-CM

## 2022-08-03 PROCEDURE — 81001 URINALYSIS AUTO W/SCOPE: CPT | Performed by: NURSE PRACTITIONER

## 2022-08-03 PROCEDURE — 87186 SC STD MICRODIL/AGAR DIL: CPT

## 2022-08-03 PROCEDURE — 87086 URINE CULTURE/COLONY COUNT: CPT | Performed by: NURSE PRACTITIONER

## 2022-08-03 PROCEDURE — 87077 CULTURE AEROBIC IDENTIFY: CPT | Performed by: NURSE PRACTITIONER

## 2022-08-04 LAB
BACTERIA UR QL AUTO: ABNORMAL /HPF
BILIRUB UR QL STRIP: NEGATIVE
CLARITY UR: ABNORMAL
COLOR UR: YELLOW
GLUCOSE UR STRIP-MCNC: ABNORMAL MG/DL
HGB UR QL STRIP.AUTO: NEGATIVE
HYALINE CASTS UR QL AUTO: ABNORMAL /LPF
KETONES UR QL STRIP: NEGATIVE
LEUKOCYTE ESTERASE UR QL STRIP.AUTO: ABNORMAL
NITRITE UR QL STRIP: NEGATIVE
PH UR STRIP.AUTO: 5.5 [PH] (ref 5–8)
PROT UR QL STRIP: ABNORMAL
RBC # UR STRIP: ABNORMAL /HPF
REF LAB TEST METHOD: ABNORMAL
SP GR UR STRIP: 1.01 (ref 1–1.03)
SQUAMOUS #/AREA URNS HPF: ABNORMAL /HPF
UROBILINOGEN UR QL STRIP: ABNORMAL
WBC # UR STRIP: ABNORMAL /HPF

## 2022-08-05 ENCOUNTER — TELEPHONE (OUTPATIENT)
Dept: FAMILY MEDICINE CLINIC | Facility: CLINIC | Age: 72
End: 2022-08-05

## 2022-08-06 LAB — BACTERIA SPEC AEROBE CULT: ABNORMAL

## 2022-08-07 RX ORDER — CEFDINIR 300 MG/1
300 CAPSULE ORAL 2 TIMES DAILY
Qty: 14 CAPSULE | Refills: 0 | Status: SHIPPED | OUTPATIENT
Start: 2022-08-07 | End: 2022-08-12

## 2022-08-12 ENCOUNTER — LAB (OUTPATIENT)
Dept: FAMILY MEDICINE CLINIC | Facility: CLINIC | Age: 72
End: 2022-08-12

## 2022-08-12 ENCOUNTER — OFFICE VISIT (OUTPATIENT)
Dept: FAMILY MEDICINE CLINIC | Facility: CLINIC | Age: 72
End: 2022-08-12

## 2022-08-12 VITALS
RESPIRATION RATE: 18 BRPM | WEIGHT: 215 LBS | BODY MASS INDEX: 39.32 KG/M2 | DIASTOLIC BLOOD PRESSURE: 88 MMHG | HEART RATE: 100 BPM | OXYGEN SATURATION: 97 % | SYSTOLIC BLOOD PRESSURE: 130 MMHG

## 2022-08-12 DIAGNOSIS — F41.9 ANXIETY: ICD-10-CM

## 2022-08-12 DIAGNOSIS — F51.01 PRIMARY INSOMNIA: ICD-10-CM

## 2022-08-12 DIAGNOSIS — N18.31 STAGE 3A CHRONIC KIDNEY DISEASE: ICD-10-CM

## 2022-08-12 DIAGNOSIS — N39.0 RECURRENT UTI: Primary | ICD-10-CM

## 2022-08-12 DIAGNOSIS — D50.8 IRON DEFICIENCY ANEMIA SECONDARY TO INADEQUATE DIETARY IRON INTAKE: ICD-10-CM

## 2022-08-12 DIAGNOSIS — N39.0 RECURRENT UTI: ICD-10-CM

## 2022-08-12 LAB
BACTERIA UR QL AUTO: ABNORMAL /HPF
BILIRUB UR QL STRIP: NEGATIVE
CLARITY UR: ABNORMAL
COLOR UR: YELLOW
GLUCOSE UR STRIP-MCNC: ABNORMAL MG/DL
HGB UR QL STRIP.AUTO: NEGATIVE
HYALINE CASTS UR QL AUTO: ABNORMAL /LPF
KETONES UR QL STRIP: NEGATIVE
LEUKOCYTE ESTERASE UR QL STRIP.AUTO: ABNORMAL
NITRITE UR QL STRIP: NEGATIVE
PH UR STRIP.AUTO: 6 [PH] (ref 5–8)
PROT UR QL STRIP: ABNORMAL
RBC # UR STRIP: ABNORMAL /HPF
REF LAB TEST METHOD: ABNORMAL
SP GR UR STRIP: 1.01 (ref 1–1.03)
SQUAMOUS #/AREA URNS HPF: ABNORMAL /HPF
UROBILINOGEN UR QL STRIP: ABNORMAL
WBC # UR STRIP: ABNORMAL /HPF

## 2022-08-12 PROCEDURE — 87086 URINE CULTURE/COLONY COUNT: CPT | Performed by: INTERNAL MEDICINE

## 2022-08-12 PROCEDURE — 99214 OFFICE O/P EST MOD 30 MIN: CPT | Performed by: INTERNAL MEDICINE

## 2022-08-12 PROCEDURE — 87186 SC STD MICRODIL/AGAR DIL: CPT

## 2022-08-12 PROCEDURE — 83540 ASSAY OF IRON: CPT | Performed by: INTERNAL MEDICINE

## 2022-08-12 PROCEDURE — 36415 COLL VENOUS BLD VENIPUNCTURE: CPT | Performed by: INTERNAL MEDICINE

## 2022-08-12 PROCEDURE — 87077 CULTURE AEROBIC IDENTIFY: CPT | Performed by: INTERNAL MEDICINE

## 2022-08-12 PROCEDURE — 80053 COMPREHEN METABOLIC PANEL: CPT | Performed by: INTERNAL MEDICINE

## 2022-08-12 PROCEDURE — 84466 ASSAY OF TRANSFERRIN: CPT | Performed by: INTERNAL MEDICINE

## 2022-08-12 PROCEDURE — 81001 URINALYSIS AUTO W/SCOPE: CPT | Performed by: INTERNAL MEDICINE

## 2022-08-12 RX ORDER — TEMAZEPAM 30 MG/1
30 CAPSULE ORAL NIGHTLY PRN
Qty: 30 CAPSULE | Refills: 5 | Status: SHIPPED | OUTPATIENT
Start: 2022-08-12 | End: 2022-10-19

## 2022-08-12 RX ORDER — ALPRAZOLAM 0.5 MG/1
0.5 TABLET ORAL 2 TIMES DAILY PRN
Qty: 60 TABLET | Refills: 3 | Status: SHIPPED | OUTPATIENT
Start: 2022-08-12 | End: 2022-10-19

## 2022-08-13 LAB
ALBUMIN SERPL-MCNC: 4.1 G/DL (ref 3.5–5.2)
ALBUMIN/GLOB SERPL: 1.5 G/DL
ALP SERPL-CCNC: 125 U/L (ref 39–117)
ALT SERPL W P-5'-P-CCNC: 52 U/L (ref 1–33)
ANION GAP SERPL CALCULATED.3IONS-SCNC: 15 MMOL/L (ref 5–15)
AST SERPL-CCNC: 53 U/L (ref 1–32)
BILIRUB SERPL-MCNC: <0.2 MG/DL (ref 0–1.2)
BUN SERPL-MCNC: 35 MG/DL (ref 8–23)
BUN/CREAT SERPL: 17.5 (ref 7–25)
CALCIUM SPEC-SCNC: 8.8 MG/DL (ref 8.6–10.5)
CHLORIDE SERPL-SCNC: 102 MMOL/L (ref 98–107)
CO2 SERPL-SCNC: 21 MMOL/L (ref 22–29)
CREAT SERPL-MCNC: 2 MG/DL (ref 0.57–1)
EGFRCR SERPLBLD CKD-EPI 2021: 25.8 ML/MIN/1.73
GLOBULIN UR ELPH-MCNC: 2.8 GM/DL
GLUCOSE SERPL-MCNC: 100 MG/DL (ref 65–99)
IRON 24H UR-MRATE: 124 MCG/DL (ref 37–145)
IRON SATN MFR SERPL: 34 % (ref 20–50)
POTASSIUM SERPL-SCNC: 5 MMOL/L (ref 3.5–5.2)
PROT SERPL-MCNC: 6.9 G/DL (ref 6–8.5)
SODIUM SERPL-SCNC: 138 MMOL/L (ref 136–145)
TIBC SERPL-MCNC: 361 MCG/DL (ref 298–536)
TRANSFERRIN SERPL-MCNC: 242 MG/DL (ref 200–360)

## 2022-08-14 LAB — BACTERIA SPEC AEROBE CULT: ABNORMAL

## 2022-08-15 RX ORDER — NITROFURANTOIN 25; 75 MG/1; MG/1
100 CAPSULE ORAL 2 TIMES DAILY
Qty: 20 CAPSULE | Refills: 0 | Status: SHIPPED | OUTPATIENT
Start: 2022-08-15 | End: 2022-11-11

## 2022-08-26 RX ORDER — FLUCONAZOLE 200 MG/1
200 TABLET ORAL DAILY
Qty: 3 TABLET | Refills: 0 | Status: SHIPPED | OUTPATIENT
Start: 2022-08-26 | End: 2022-11-11

## 2022-08-29 RX ORDER — METFORMIN HYDROCHLORIDE 500 MG/1
TABLET, EXTENDED RELEASE ORAL
Qty: 180 TABLET | Refills: 0 | Status: SHIPPED | OUTPATIENT
Start: 2022-08-29 | End: 2023-01-10

## 2022-08-29 RX ORDER — FERROUS SULFATE 325(65) MG
TABLET ORAL
Qty: 30 TABLET | Refills: 3 | Status: SHIPPED | OUTPATIENT
Start: 2022-08-29

## 2022-09-09 RX ORDER — GLIMEPIRIDE 2 MG/1
2 TABLET ORAL
Qty: 90 TABLET | Refills: 2 | Status: SHIPPED | OUTPATIENT
Start: 2022-09-09 | End: 2022-10-06

## 2022-10-06 RX ORDER — GLIMEPIRIDE 2 MG/1
2 TABLET ORAL
Qty: 90 TABLET | Refills: 2 | Status: SHIPPED | OUTPATIENT
Start: 2022-10-06 | End: 2022-12-27 | Stop reason: SDUPTHER

## 2022-10-18 DIAGNOSIS — F51.01 PRIMARY INSOMNIA: ICD-10-CM

## 2022-10-18 DIAGNOSIS — F41.9 ANXIETY: ICD-10-CM

## 2022-10-19 RX ORDER — TEMAZEPAM 30 MG/1
CAPSULE ORAL
Qty: 30 CAPSULE | Refills: 1 | Status: SHIPPED | OUTPATIENT
Start: 2022-10-19 | End: 2022-11-11 | Stop reason: SDUPTHER

## 2022-10-19 RX ORDER — ALPRAZOLAM 0.5 MG/1
TABLET ORAL
Qty: 90 TABLET | Refills: 1 | Status: SHIPPED | OUTPATIENT
Start: 2022-10-19 | End: 2023-01-10 | Stop reason: SDUPTHER

## 2022-10-21 RX ORDER — CARVEDILOL 12.5 MG/1
12.5 TABLET ORAL 2 TIMES DAILY
Qty: 180 TABLET | Refills: 0 | Status: SHIPPED | OUTPATIENT
Start: 2022-10-21 | End: 2023-01-05

## 2022-11-11 ENCOUNTER — LAB (OUTPATIENT)
Dept: FAMILY MEDICINE CLINIC | Facility: CLINIC | Age: 72
End: 2022-11-11

## 2022-11-11 ENCOUNTER — OFFICE VISIT (OUTPATIENT)
Dept: FAMILY MEDICINE CLINIC | Facility: CLINIC | Age: 72
End: 2022-11-11

## 2022-11-11 VITALS
DIASTOLIC BLOOD PRESSURE: 80 MMHG | TEMPERATURE: 97.1 F | RESPIRATION RATE: 20 BRPM | HEART RATE: 71 BPM | HEIGHT: 62 IN | OXYGEN SATURATION: 98 % | WEIGHT: 218 LBS | SYSTOLIC BLOOD PRESSURE: 136 MMHG | BODY MASS INDEX: 40.12 KG/M2

## 2022-11-11 DIAGNOSIS — F51.01 PRIMARY INSOMNIA: ICD-10-CM

## 2022-11-11 DIAGNOSIS — N18.31 STAGE 3A CHRONIC KIDNEY DISEASE: Chronic | ICD-10-CM

## 2022-11-11 DIAGNOSIS — Z12.31 SCREENING MAMMOGRAM FOR BREAST CANCER: ICD-10-CM

## 2022-11-11 DIAGNOSIS — D50.8 IRON DEFICIENCY ANEMIA SECONDARY TO INADEQUATE DIETARY IRON INTAKE: ICD-10-CM

## 2022-11-11 DIAGNOSIS — Z78.0 MENOPAUSE: ICD-10-CM

## 2022-11-11 DIAGNOSIS — E11.65 TYPE 2 DIABETES MELLITUS WITH HYPERGLYCEMIA, WITHOUT LONG-TERM CURRENT USE OF INSULIN: Primary | ICD-10-CM

## 2022-11-11 DIAGNOSIS — L73.9 FOLLICULITIS: ICD-10-CM

## 2022-11-11 PROCEDURE — 80053 COMPREHEN METABOLIC PANEL: CPT | Performed by: INTERNAL MEDICINE

## 2022-11-11 PROCEDURE — 84466 ASSAY OF TRANSFERRIN: CPT | Performed by: INTERNAL MEDICINE

## 2022-11-11 PROCEDURE — 83540 ASSAY OF IRON: CPT | Performed by: INTERNAL MEDICINE

## 2022-11-11 PROCEDURE — 99214 OFFICE O/P EST MOD 30 MIN: CPT | Performed by: INTERNAL MEDICINE

## 2022-11-11 PROCEDURE — 36415 COLL VENOUS BLD VENIPUNCTURE: CPT | Performed by: INTERNAL MEDICINE

## 2022-11-11 PROCEDURE — 90662 IIV NO PRSV INCREASED AG IM: CPT | Performed by: INTERNAL MEDICINE

## 2022-11-11 PROCEDURE — 83036 HEMOGLOBIN GLYCOSYLATED A1C: CPT | Performed by: INTERNAL MEDICINE

## 2022-11-11 PROCEDURE — 86140 C-REACTIVE PROTEIN: CPT | Performed by: INTERNAL MEDICINE

## 2022-11-11 PROCEDURE — 85025 COMPLETE CBC W/AUTO DIFF WBC: CPT | Performed by: INTERNAL MEDICINE

## 2022-11-11 PROCEDURE — G0008 ADMIN INFLUENZA VIRUS VAC: HCPCS | Performed by: INTERNAL MEDICINE

## 2022-11-11 RX ORDER — TEMAZEPAM 30 MG/1
30 CAPSULE ORAL NIGHTLY PRN
Qty: 90 CAPSULE | Refills: 1 | Status: SHIPPED | OUTPATIENT
Start: 2022-11-11 | End: 2023-01-10 | Stop reason: SDUPTHER

## 2022-11-12 LAB
ALBUMIN SERPL-MCNC: 4 G/DL (ref 3.5–5.2)
ALBUMIN/GLOB SERPL: 1.4 G/DL
ALP SERPL-CCNC: 129 U/L (ref 39–117)
ALT SERPL W P-5'-P-CCNC: 64 U/L (ref 1–33)
ANION GAP SERPL CALCULATED.3IONS-SCNC: 13 MMOL/L (ref 5–15)
AST SERPL-CCNC: 44 U/L (ref 1–32)
BASOPHILS # BLD AUTO: 0.06 10*3/MM3 (ref 0–0.2)
BASOPHILS NFR BLD AUTO: 0.7 % (ref 0–1.5)
BILIRUB SERPL-MCNC: 0.2 MG/DL (ref 0–1.2)
BUN SERPL-MCNC: 31 MG/DL (ref 8–23)
BUN/CREAT SERPL: 16 (ref 7–25)
CALCIUM SPEC-SCNC: 8.5 MG/DL (ref 8.6–10.5)
CHLORIDE SERPL-SCNC: 106 MMOL/L (ref 98–107)
CO2 SERPL-SCNC: 22 MMOL/L (ref 22–29)
CREAT SERPL-MCNC: 1.94 MG/DL (ref 0.57–1)
CRP SERPL-MCNC: <0.3 MG/DL (ref 0–0.5)
DEPRECATED RDW RBC AUTO: 47.8 FL (ref 37–54)
EGFRCR SERPLBLD CKD-EPI 2021: 26.7 ML/MIN/1.73
EOSINOPHIL # BLD AUTO: 0.23 10*3/MM3 (ref 0–0.4)
EOSINOPHIL NFR BLD AUTO: 2.8 % (ref 0.3–6.2)
ERYTHROCYTE [DISTWIDTH] IN BLOOD BY AUTOMATED COUNT: 12.5 % (ref 12.3–15.4)
GLOBULIN UR ELPH-MCNC: 2.9 GM/DL
GLUCOSE SERPL-MCNC: 189 MG/DL (ref 65–99)
HCT VFR BLD AUTO: 39.8 % (ref 34–46.6)
HGB BLD-MCNC: 12.9 G/DL (ref 12–15.9)
IMM GRANULOCYTES # BLD AUTO: 0.03 10*3/MM3 (ref 0–0.05)
IMM GRANULOCYTES NFR BLD AUTO: 0.4 % (ref 0–0.5)
IRON 24H UR-MRATE: 156 MCG/DL (ref 37–145)
IRON SATN MFR SERPL: 46 % (ref 20–50)
LYMPHOCYTES # BLD AUTO: 1.71 10*3/MM3 (ref 0.7–3.1)
LYMPHOCYTES NFR BLD AUTO: 20.7 % (ref 19.6–45.3)
MCH RBC QN AUTO: 33.6 PG (ref 26.6–33)
MCHC RBC AUTO-ENTMCNC: 32.4 G/DL (ref 31.5–35.7)
MCV RBC AUTO: 103.6 FL (ref 79–97)
MONOCYTES # BLD AUTO: 0.56 10*3/MM3 (ref 0.1–0.9)
MONOCYTES NFR BLD AUTO: 6.8 % (ref 5–12)
NEUTROPHILS NFR BLD AUTO: 5.67 10*3/MM3 (ref 1.7–7)
NEUTROPHILS NFR BLD AUTO: 68.6 % (ref 42.7–76)
NRBC BLD AUTO-RTO: 0 /100 WBC (ref 0–0.2)
PLATELET # BLD AUTO: 253 10*3/MM3 (ref 140–450)
PMV BLD AUTO: 10.9 FL (ref 6–12)
POTASSIUM SERPL-SCNC: 4.2 MMOL/L (ref 3.5–5.2)
PROT SERPL-MCNC: 6.9 G/DL (ref 6–8.5)
RBC # BLD AUTO: 3.84 10*6/MM3 (ref 3.77–5.28)
SODIUM SERPL-SCNC: 141 MMOL/L (ref 136–145)
TIBC SERPL-MCNC: 341 MCG/DL (ref 298–536)
TRANSFERRIN SERPL-MCNC: 229 MG/DL (ref 200–360)
WBC NRBC COR # BLD: 8.26 10*3/MM3 (ref 3.4–10.8)

## 2022-11-14 LAB — HBA1C MFR BLD: 7.3 % (ref 3.5–5.6)

## 2022-11-14 RX ORDER — ROSUVASTATIN CALCIUM 10 MG/1
TABLET, COATED ORAL
Qty: 90 TABLET | Refills: 1 | OUTPATIENT
Start: 2022-11-14

## 2022-11-14 RX ORDER — ROSUVASTATIN CALCIUM 10 MG/1
10 TABLET, COATED ORAL DAILY
Qty: 90 TABLET | Refills: 1 | Status: SHIPPED | OUTPATIENT
Start: 2022-11-14 | End: 2022-12-15

## 2022-12-07 ENCOUNTER — OFFICE VISIT (OUTPATIENT)
Dept: CARDIOLOGY | Facility: CLINIC | Age: 72
End: 2022-12-07

## 2022-12-07 VITALS
BODY MASS INDEX: 36 KG/M2 | HEART RATE: 78 BPM | SYSTOLIC BLOOD PRESSURE: 155 MMHG | WEIGHT: 224 LBS | RESPIRATION RATE: 18 BRPM | DIASTOLIC BLOOD PRESSURE: 97 MMHG | HEIGHT: 66 IN

## 2022-12-07 DIAGNOSIS — I10 ESSENTIAL HYPERTENSION: ICD-10-CM

## 2022-12-07 DIAGNOSIS — N18.9 CHRONIC KIDNEY DISEASE, UNSPECIFIED CKD STAGE: ICD-10-CM

## 2022-12-07 DIAGNOSIS — E78.41 ELEVATED LIPOPROTEIN(A): ICD-10-CM

## 2022-12-07 DIAGNOSIS — R06.09 DOE (DYSPNEA ON EXERTION): Primary | ICD-10-CM

## 2022-12-07 PROCEDURE — 99214 OFFICE O/P EST MOD 30 MIN: CPT | Performed by: INTERNAL MEDICINE

## 2022-12-07 RX ORDER — FUROSEMIDE 40 MG/1
40 TABLET ORAL 2 TIMES DAILY
Qty: 180 TABLET | Refills: 1 | Status: SHIPPED | OUTPATIENT
Start: 2022-12-07

## 2022-12-08 ENCOUNTER — HOSPITAL ENCOUNTER (OUTPATIENT)
Dept: BONE DENSITY | Facility: HOSPITAL | Age: 72
Discharge: HOME | End: 2022-12-08

## 2022-12-08 ENCOUNTER — HOSPITAL ENCOUNTER (OUTPATIENT)
Dept: MAMMOGRAPHY | Facility: HOSPITAL | Age: 72
Discharge: HOME | End: 2022-12-08

## 2022-12-08 DIAGNOSIS — Z12.31 SCREENING MAMMOGRAM FOR BREAST CANCER: ICD-10-CM

## 2022-12-08 DIAGNOSIS — Z78.0 MENOPAUSE: ICD-10-CM

## 2022-12-08 PROCEDURE — 77063 BREAST TOMOSYNTHESIS BI: CPT

## 2022-12-08 PROCEDURE — 77080 DXA BONE DENSITY AXIAL: CPT

## 2022-12-08 PROCEDURE — 77067 SCR MAMMO BI INCL CAD: CPT

## 2022-12-14 RX ORDER — ALENDRONATE SODIUM 70 MG/1
70 TABLET ORAL
Qty: 4 TABLET | Refills: 5 | Status: SHIPPED | OUTPATIENT
Start: 2022-12-14 | End: 2022-12-15

## 2022-12-15 RX ORDER — ALENDRONATE SODIUM 70 MG/1
TABLET ORAL
Qty: 12 TABLET | Refills: 1 | Status: SHIPPED | OUTPATIENT
Start: 2022-12-15

## 2022-12-15 RX ORDER — ROSUVASTATIN CALCIUM 10 MG/1
TABLET, COATED ORAL
Qty: 90 TABLET | Refills: 1 | Status: SHIPPED | OUTPATIENT
Start: 2022-12-15 | End: 2022-12-27 | Stop reason: SDUPTHER

## 2022-12-21 ENCOUNTER — LAB (OUTPATIENT)
Dept: LAB | Facility: HOSPITAL | Age: 72
End: 2022-12-21

## 2022-12-21 ENCOUNTER — TRANSCRIBE ORDERS (OUTPATIENT)
Dept: ADMINISTRATIVE | Facility: HOSPITAL | Age: 72
End: 2022-12-21

## 2022-12-21 DIAGNOSIS — E55.9 VITAMIN D DEFICIENCY: ICD-10-CM

## 2022-12-21 DIAGNOSIS — E11.8 DIABETIC COMPLICATION: ICD-10-CM

## 2022-12-21 DIAGNOSIS — N18.30 STAGE 3 CHRONIC KIDNEY DISEASE, UNSPECIFIED WHETHER STAGE 3A OR 3B CKD: Primary | ICD-10-CM

## 2022-12-21 DIAGNOSIS — N18.30 STAGE 3 CHRONIC KIDNEY DISEASE, UNSPECIFIED WHETHER STAGE 3A OR 3B CKD: ICD-10-CM

## 2022-12-21 LAB
25(OH)D3 SERPL-MCNC: 5.3 NG/ML (ref 30–100)
ANION GAP SERPL CALCULATED.3IONS-SCNC: 13 MMOL/L (ref 5–15)
BACTERIA UR QL AUTO: ABNORMAL /HPF
BASOPHILS # BLD AUTO: 0.07 10*3/MM3 (ref 0–0.2)
BASOPHILS NFR BLD AUTO: 0.8 % (ref 0–1.5)
BILIRUB UR QL STRIP: NEGATIVE
BUN SERPL-MCNC: 48 MG/DL (ref 8–23)
BUN/CREAT SERPL: 18 (ref 7–25)
CALCIUM SPEC-SCNC: 8.4 MG/DL (ref 8.6–10.5)
CHLORIDE SERPL-SCNC: 101 MMOL/L (ref 98–107)
CLARITY UR: ABNORMAL
CO2 SERPL-SCNC: 24 MMOL/L (ref 22–29)
COLOR UR: YELLOW
CREAT SERPL-MCNC: 2.67 MG/DL (ref 0.57–1)
CREAT UR-MCNC: 91 MG/DL
DEPRECATED RDW RBC AUTO: 44 FL (ref 37–54)
EGFRCR SERPLBLD CKD-EPI 2021: 18.2 ML/MIN/1.73
EOSINOPHIL # BLD AUTO: 0.23 10*3/MM3 (ref 0–0.4)
EOSINOPHIL NFR BLD AUTO: 2.6 % (ref 0.3–6.2)
ERYTHROCYTE [DISTWIDTH] IN BLOOD BY AUTOMATED COUNT: 11.7 % (ref 12.3–15.4)
GLUCOSE SERPL-MCNC: 309 MG/DL (ref 65–99)
GLUCOSE UR STRIP-MCNC: ABNORMAL MG/DL
HBA1C MFR BLD: 7.6 % (ref 3.5–5.6)
HCT VFR BLD AUTO: 38.7 % (ref 34–46.6)
HGB BLD-MCNC: 12.7 G/DL (ref 12–15.9)
HGB UR QL STRIP.AUTO: NEGATIVE
HYALINE CASTS UR QL AUTO: ABNORMAL /LPF
IMM GRANULOCYTES # BLD AUTO: 0.03 10*3/MM3 (ref 0–0.05)
IMM GRANULOCYTES NFR BLD AUTO: 0.3 % (ref 0–0.5)
KETONES UR QL STRIP: NEGATIVE
LEUKOCYTE ESTERASE UR QL STRIP.AUTO: ABNORMAL
LYMPHOCYTES # BLD AUTO: 1.55 10*3/MM3 (ref 0.7–3.1)
LYMPHOCYTES NFR BLD AUTO: 17.8 % (ref 19.6–45.3)
MCH RBC QN AUTO: 33.8 PG (ref 26.6–33)
MCHC RBC AUTO-ENTMCNC: 32.8 G/DL (ref 31.5–35.7)
MCV RBC AUTO: 102.9 FL (ref 79–97)
MONOCYTES # BLD AUTO: 0.52 10*3/MM3 (ref 0.1–0.9)
MONOCYTES NFR BLD AUTO: 6 % (ref 5–12)
NEUTROPHILS NFR BLD AUTO: 6.29 10*3/MM3 (ref 1.7–7)
NEUTROPHILS NFR BLD AUTO: 72.5 % (ref 42.7–76)
NITRITE UR QL STRIP: NEGATIVE
NRBC BLD AUTO-RTO: 0 /100 WBC (ref 0–0.2)
PH UR STRIP.AUTO: 6 [PH] (ref 5–8)
PLATELET # BLD AUTO: 259 10*3/MM3 (ref 140–450)
PMV BLD AUTO: 11.2 FL (ref 6–12)
POTASSIUM SERPL-SCNC: 4.7 MMOL/L (ref 3.5–5.2)
PROT ?TM UR-MCNC: 169.2 MG/DL
PROT UR QL STRIP: ABNORMAL
PROT/CREAT UR: 1859.3 MG/G CREA (ref 0–200)
PTH-INTACT SERPL-MCNC: 918 PG/ML (ref 15–65)
RBC # BLD AUTO: 3.76 10*6/MM3 (ref 3.77–5.28)
RBC # UR STRIP: ABNORMAL /HPF
REF LAB TEST METHOD: ABNORMAL
SODIUM SERPL-SCNC: 138 MMOL/L (ref 136–145)
SP GR UR STRIP: >=1.03 (ref 1–1.03)
SQUAMOUS #/AREA URNS HPF: ABNORMAL /HPF
URATE SERPL-MCNC: 7.9 MG/DL (ref 2.4–5.7)
UROBILINOGEN UR QL STRIP: ABNORMAL
WBC # UR STRIP: ABNORMAL /HPF
WBC NRBC COR # BLD: 8.69 10*3/MM3 (ref 3.4–10.8)

## 2022-12-21 PROCEDURE — 36415 COLL VENOUS BLD VENIPUNCTURE: CPT

## 2022-12-21 PROCEDURE — 83036 HEMOGLOBIN GLYCOSYLATED A1C: CPT

## 2022-12-21 PROCEDURE — 84156 ASSAY OF PROTEIN URINE: CPT

## 2022-12-21 PROCEDURE — 85025 COMPLETE CBC W/AUTO DIFF WBC: CPT

## 2022-12-21 PROCEDURE — 82570 ASSAY OF URINE CREATININE: CPT

## 2022-12-21 PROCEDURE — 82306 VITAMIN D 25 HYDROXY: CPT

## 2022-12-21 PROCEDURE — 81001 URINALYSIS AUTO W/SCOPE: CPT

## 2022-12-21 PROCEDURE — 84550 ASSAY OF BLOOD/URIC ACID: CPT

## 2022-12-21 PROCEDURE — 83970 ASSAY OF PARATHORMONE: CPT

## 2022-12-21 PROCEDURE — 80048 BASIC METABOLIC PNL TOTAL CA: CPT

## 2022-12-27 RX ORDER — GLIMEPIRIDE 2 MG/1
2 TABLET ORAL
Qty: 90 TABLET | Refills: 2 | Status: SHIPPED | OUTPATIENT
Start: 2022-12-27 | End: 2023-01-10 | Stop reason: SDUPTHER

## 2022-12-27 RX ORDER — ROSUVASTATIN CALCIUM 10 MG/1
10 TABLET, COATED ORAL DAILY
Qty: 90 TABLET | Refills: 1 | Status: SHIPPED | OUTPATIENT
Start: 2022-12-27

## 2023-01-03 ENCOUNTER — TELEPHONE (OUTPATIENT)
Dept: FAMILY MEDICINE CLINIC | Facility: CLINIC | Age: 73
End: 2023-01-03
Payer: MEDICARE

## 2023-01-03 ENCOUNTER — TRANSCRIBE ORDERS (OUTPATIENT)
Dept: ADMINISTRATIVE | Facility: HOSPITAL | Age: 73
End: 2023-01-03
Payer: MEDICARE

## 2023-01-03 DIAGNOSIS — N18.4 CHRONIC KIDNEY DISEASE, STAGE IV (SEVERE): Primary | ICD-10-CM

## 2023-01-05 RX ORDER — CARVEDILOL 12.5 MG/1
TABLET ORAL
Qty: 180 TABLET | Refills: 0 | Status: SHIPPED | OUTPATIENT
Start: 2023-01-05

## 2023-01-09 ENCOUNTER — HOSPITAL ENCOUNTER (OUTPATIENT)
Dept: CARDIOLOGY | Facility: HOSPITAL | Age: 73
Discharge: HOME | End: 2023-01-09
Admitting: INTERNAL MEDICINE
Payer: MEDICARE

## 2023-01-09 DIAGNOSIS — N18.4 CHRONIC KIDNEY DISEASE, STAGE IV (SEVERE): ICD-10-CM

## 2023-01-09 LAB
BH CV UPPER VENOUS LEFT AXILLARY AUGMENT: NORMAL
BH CV UPPER VENOUS LEFT AXILLARY COMPRESS: NORMAL
BH CV UPPER VENOUS LEFT AXILLARY PHASIC: NORMAL
BH CV UPPER VENOUS LEFT AXILLARY SPONT: NORMAL
BH CV UPPER VENOUS LEFT BASILIC FOREARM COMPRESS: NORMAL
BH CV UPPER VENOUS LEFT BASILIC UPPER COMPRESS: NORMAL
BH CV UPPER VENOUS LEFT CEPHALIC FOREARM COMPRESS: NORMAL
BH CV UPPER VENOUS LEFT CEPHALIC UPPER COMPRESS: NORMAL
BH CV UPPER VENOUS LEFT INTERNAL JUGULAR AUGMENT: NORMAL
BH CV UPPER VENOUS LEFT INTERNAL JUGULAR COMPRESS: NORMAL
BH CV UPPER VENOUS LEFT INTERNAL JUGULAR PHASIC: NORMAL
BH CV UPPER VENOUS LEFT INTERNAL JUGULAR SPONT: NORMAL
BH CV UPPER VENOUS LEFT RADIAL COMPRESS: NORMAL
BH CV UPPER VENOUS LEFT SUBCLAVIAN AUGMENT: NORMAL
BH CV UPPER VENOUS LEFT SUBCLAVIAN COMPRESS: NORMAL
BH CV UPPER VENOUS LEFT SUBCLAVIAN PHASIC: NORMAL
BH CV UPPER VENOUS LEFT SUBCLAVIAN SPONT: NORMAL
BH CV UPPER VENOUS LEFT ULNAR COMPRESS: NORMAL
BH CV UPPER VENOUS RIGHT AXILLARY AUGMENT: NORMAL
BH CV UPPER VENOUS RIGHT AXILLARY COMPRESS: NORMAL
BH CV UPPER VENOUS RIGHT AXILLARY PHASIC: NORMAL
BH CV UPPER VENOUS RIGHT AXILLARY SPONT: NORMAL
BH CV UPPER VENOUS RIGHT BASILIC UPPER COMPRESS: NORMAL
BH CV UPPER VENOUS RIGHT CEPHALIC FOREARM COMPRESS: NORMAL
BH CV UPPER VENOUS RIGHT CEPHALIC UPPER COMPRESS: NORMAL
BH CV UPPER VENOUS RIGHT INTERNAL JUGULAR AUGMENT: NORMAL
BH CV UPPER VENOUS RIGHT INTERNAL JUGULAR COMPRESS: NORMAL
BH CV UPPER VENOUS RIGHT INTERNAL JUGULAR PHASIC: NORMAL
BH CV UPPER VENOUS RIGHT INTERNAL JUGULAR SPONT: NORMAL
BH CV UPPER VENOUS RIGHT RADIAL COMPRESS: NORMAL
BH CV UPPER VENOUS RIGHT SUBCLAVIAN AUGMENT: NORMAL
BH CV UPPER VENOUS RIGHT SUBCLAVIAN COMPRESS: NORMAL
BH CV UPPER VENOUS RIGHT SUBCLAVIAN PHASIC: NORMAL
BH CV UPPER VENOUS RIGHT SUBCLAVIAN SPONT: NORMAL
BH CV UPPER VENOUS RIGHT ULNAR COMPRESS: NORMAL
BH CV VAS MEAS BASILIC ANTECUBITAL FOSSA LEFT: 0.25 CM
BH CV VAS MEAS BASILIC ANTECUBITAL FOSSA RIGHT: 0.18 CM
BH CV VAS MEAS BASILIC FOREARM LEFT - DIST: 0.23 CM
BH CV VAS MEAS BASILIC FOREARM LEFT - MID: 0.24 CM
BH CV VAS MEAS BASILIC FOREARM LEFT - PROX: 0.25 CM
BH CV VAS MEAS BASILIC FOREARM RIGHT - DIST: 0.17 CM
BH CV VAS MEAS BASILIC FOREARM RIGHT - MID: 0.19 CM
BH CV VAS MEAS BASILIC FOREARM RIGHT - PROX: 0.2 CM
BH CV VAS MEAS BASILIC UPPER ARM LEFT - DIST: 0.34 CM
BH CV VAS MEAS BASILIC UPPER ARM LEFT - MID: 0.35 CM
BH CV VAS MEAS BASILIC UPPER ARM LEFT - PROX: 0.37 CM
BH CV VAS MEAS BASILIC UPPER ARM RIGHT - DIST: 0.35 CM
BH CV VAS MEAS BASILIC UPPER ARM RIGHT - MID: 0.29 CM
BH CV VAS MEAS BASILIC UPPER ARM RIGHT - PROX: 0.38 CM
BH CV VAS MEAS CEPHALIC ANTECUBITAL FOSSA LEFT: 0.42 CM
BH CV VAS MEAS CEPHALIC ANTECUBITAL FOSSA RIGHT: 0.17 CM
BH CV VAS MEAS CEPHALIC FOREARM LEFT - DIST: 0.2 CM
BH CV VAS MEAS CEPHALIC FOREARM LEFT - MID: 0.23 CM
BH CV VAS MEAS CEPHALIC FOREARM LEFT - PROX: 0.18 CM
BH CV VAS MEAS CEPHALIC FOREARM RIGHT - DIST: 0.09 CM
BH CV VAS MEAS CEPHALIC FOREARM RIGHT - MID: 0.13 CM
BH CV VAS MEAS CEPHALIC FOREARM RIGHT - PROX: 0.15 CM
BH CV VAS MEAS CEPHALIC UPPER ARM LEFT - DIST: 0.34 CM
BH CV VAS MEAS CEPHALIC UPPER ARM LEFT - MID: 0.35 CM
BH CV VAS MEAS CEPHALIC UPPER ARM LEFT - PROX: 0.43 CM
BH CV VAS MEAS CEPHALIC UPPER ARM RIGHT - DIST: 0.1 CM
BH CV VAS MEAS CEPHALIC UPPER ARM RIGHT - MID: 0.1 CM
BH CV VAS MEAS CEPHALIC UPPER ARM RIGHT - PROX: 0.11 CM
BH CV VAS MEAS RADIAL UPPER ARM LEFT - DIST: 0.15 CM
BH CV VAS MEAS RADIAL UPPER ARM LEFT - MID: 0.19 CM
BH CV VAS MEAS RADIAL UPPER ARM LEFT - PROX: 0.17 CM
BH CV VAS MEAS RADIAL UPPER ARM RIGHT - DIST: 0.15 CM
BH CV VAS MEAS RADIAL UPPER ARM RIGHT - MID: 0.19 CM
BH CV VAS MEAS RADIAL UPPER ARM RIGHT - PROX: 0.2 CM
MAXIMAL PREDICTED HEART RATE: 146 BPM
STRESS TARGET HR: 124 BPM

## 2023-01-09 PROCEDURE — 93970 EXTREMITY STUDY: CPT

## 2023-01-10 ENCOUNTER — OFFICE VISIT (OUTPATIENT)
Dept: FAMILY MEDICINE CLINIC | Facility: CLINIC | Age: 73
End: 2023-01-10
Payer: MEDICARE

## 2023-01-10 VITALS
DIASTOLIC BLOOD PRESSURE: 86 MMHG | WEIGHT: 218 LBS | TEMPERATURE: 97.1 F | SYSTOLIC BLOOD PRESSURE: 140 MMHG | OXYGEN SATURATION: 99 % | RESPIRATION RATE: 18 BRPM | HEIGHT: 66 IN | BODY MASS INDEX: 35.03 KG/M2 | HEART RATE: 58 BPM

## 2023-01-10 DIAGNOSIS — F41.9 ANXIETY: ICD-10-CM

## 2023-01-10 DIAGNOSIS — F51.01 PRIMARY INSOMNIA: ICD-10-CM

## 2023-01-10 DIAGNOSIS — N18.31 STAGE 3A CHRONIC KIDNEY DISEASE: Primary | ICD-10-CM

## 2023-01-10 DIAGNOSIS — E11.65 TYPE 2 DIABETES MELLITUS WITH HYPERGLYCEMIA, WITHOUT LONG-TERM CURRENT USE OF INSULIN: ICD-10-CM

## 2023-01-10 PROCEDURE — 99214 OFFICE O/P EST MOD 30 MIN: CPT | Performed by: INTERNAL MEDICINE

## 2023-01-10 RX ORDER — HYDRALAZINE HYDROCHLORIDE 50 MG/1
50 TABLET, FILM COATED ORAL 3 TIMES DAILY
COMMUNITY

## 2023-01-10 RX ORDER — GLIMEPIRIDE 2 MG/1
4 TABLET ORAL 2 TIMES DAILY
Qty: 120 TABLET | Refills: 3 | Status: SHIPPED | OUTPATIENT
Start: 2023-01-10 | End: 2023-01-19 | Stop reason: SDUPTHER

## 2023-01-10 RX ORDER — CALCITRIOL 0.25 UG/1
0.25 CAPSULE, LIQUID FILLED ORAL EVERY OTHER DAY
COMMUNITY

## 2023-01-10 RX ORDER — ERGOCALCIFEROL 1.25 MG/1
50000 CAPSULE ORAL WEEKLY
COMMUNITY

## 2023-01-10 RX ORDER — ALLOPURINOL 100 MG/1
100 TABLET ORAL DAILY
COMMUNITY

## 2023-01-10 RX ORDER — TEMAZEPAM 30 MG/1
30 CAPSULE ORAL NIGHTLY PRN
Qty: 30 CAPSULE | Refills: 5 | Status: SHIPPED | OUTPATIENT
Start: 2023-01-10 | End: 2023-02-02

## 2023-01-10 RX ORDER — ALPRAZOLAM 0.5 MG/1
0.5 TABLET ORAL 3 TIMES DAILY PRN
Qty: 90 TABLET | Refills: 1 | Status: SHIPPED | OUTPATIENT
Start: 2023-01-10 | End: 2023-02-02

## 2023-01-12 ENCOUNTER — APPOINTMENT (OUTPATIENT)
Dept: VASCULAR SURGERY | Facility: HOSPITAL | Age: 73
End: 2023-01-12
Payer: MEDICARE

## 2023-01-12 PROCEDURE — G0463 HOSPITAL OUTPT CLINIC VISIT: HCPCS

## 2023-01-19 RX ORDER — PRIMIDONE 50 MG/1
TABLET ORAL
Qty: 90 TABLET | Refills: 1 | Status: SHIPPED | OUTPATIENT
Start: 2023-01-19

## 2023-01-19 RX ORDER — GLIMEPIRIDE 2 MG/1
4 TABLET ORAL 2 TIMES DAILY
Qty: 360 TABLET | Refills: 1 | Status: SHIPPED | OUTPATIENT
Start: 2023-01-19

## 2023-01-26 ENCOUNTER — TELEPHONE (OUTPATIENT)
Dept: FAMILY MEDICINE CLINIC | Facility: CLINIC | Age: 73
End: 2023-01-26
Payer: MEDICARE

## 2023-01-26 ENCOUNTER — LAB (OUTPATIENT)
Dept: FAMILY MEDICINE CLINIC | Facility: CLINIC | Age: 73
End: 2023-01-26
Payer: MEDICARE

## 2023-01-26 DIAGNOSIS — N39.0 RECURRENT UTI: ICD-10-CM

## 2023-01-26 DIAGNOSIS — N39.0 RECURRENT UTI: Primary | ICD-10-CM

## 2023-01-26 PROCEDURE — 87088 URINE BACTERIA CULTURE: CPT | Performed by: INTERNAL MEDICINE

## 2023-01-26 PROCEDURE — 81001 URINALYSIS AUTO W/SCOPE: CPT | Performed by: INTERNAL MEDICINE

## 2023-01-26 PROCEDURE — 87086 URINE CULTURE/COLONY COUNT: CPT | Performed by: INTERNAL MEDICINE

## 2023-01-26 PROCEDURE — 87186 SC STD MICRODIL/AGAR DIL: CPT

## 2023-01-26 PROCEDURE — 36415 COLL VENOUS BLD VENIPUNCTURE: CPT | Performed by: INTERNAL MEDICINE

## 2023-01-26 PROCEDURE — 83735 ASSAY OF MAGNESIUM: CPT | Performed by: INTERNAL MEDICINE

## 2023-01-27 LAB
BACTERIA UR QL AUTO: ABNORMAL /HPF
BILIRUB UR QL STRIP: NEGATIVE
CLARITY UR: ABNORMAL
COLOR UR: YELLOW
GLUCOSE UR STRIP-MCNC: ABNORMAL MG/DL
HGB UR QL STRIP.AUTO: ABNORMAL
HYALINE CASTS UR QL AUTO: ABNORMAL /LPF
KETONES UR QL STRIP: NEGATIVE
LEUKOCYTE ESTERASE UR QL STRIP.AUTO: ABNORMAL
MAGNESIUM SERPL-MCNC: 2.8 MG/DL (ref 1.6–2.4)
NITRITE UR QL STRIP: NEGATIVE
PH UR STRIP.AUTO: 5.5 [PH] (ref 5–8)
PROT UR QL STRIP: ABNORMAL
RBC # UR STRIP: ABNORMAL /HPF
REF LAB TEST METHOD: ABNORMAL
SP GR UR STRIP: 1.02 (ref 1–1.03)
SQUAMOUS #/AREA URNS HPF: ABNORMAL /HPF
UROBILINOGEN UR QL STRIP: ABNORMAL
WBC # UR STRIP: ABNORMAL /HPF

## 2023-01-27 RX ORDER — CEFDINIR 300 MG/1
300 CAPSULE ORAL 2 TIMES DAILY
Qty: 14 CAPSULE | Refills: 0 | Status: SHIPPED | OUTPATIENT
Start: 2023-01-27 | End: 2023-03-23 | Stop reason: SDUPTHER

## 2023-01-29 LAB — BACTERIA SPEC AEROBE CULT: ABNORMAL

## 2023-02-01 ENCOUNTER — TRANSCRIBE ORDERS (OUTPATIENT)
Dept: ADMINISTRATIVE | Facility: HOSPITAL | Age: 73
End: 2023-02-01
Payer: MEDICARE

## 2023-02-01 ENCOUNTER — LAB (OUTPATIENT)
Dept: LAB | Facility: HOSPITAL | Age: 73
End: 2023-02-01
Payer: MEDICARE

## 2023-02-01 DIAGNOSIS — N17.9 ACUTE RENAL FAILURE, UNSPECIFIED ACUTE RENAL FAILURE TYPE: ICD-10-CM

## 2023-02-01 DIAGNOSIS — E79.0 HYPERURICEMIA: ICD-10-CM

## 2023-02-01 DIAGNOSIS — E55.9 VITAMIN D INSUFFICIENCY: ICD-10-CM

## 2023-02-01 DIAGNOSIS — R60.9 EDEMA, UNSPECIFIED TYPE: ICD-10-CM

## 2023-02-01 DIAGNOSIS — N18.4 CHRONIC KIDNEY DISEASE, STAGE IV (SEVERE): Primary | ICD-10-CM

## 2023-02-01 DIAGNOSIS — N18.4 CHRONIC KIDNEY DISEASE, STAGE IV (SEVERE): ICD-10-CM

## 2023-02-01 DIAGNOSIS — R80.9 PROTEINURIA, UNSPECIFIED TYPE: ICD-10-CM

## 2023-02-01 LAB
25(OH)D3 SERPL-MCNC: 42.3 NG/ML (ref 30–100)
ALBUMIN SERPL-MCNC: 4.4 G/DL (ref 3.5–5.2)
ALBUMIN/GLOB SERPL: 1.3 G/DL
ALP SERPL-CCNC: 158 U/L (ref 39–117)
ALT SERPL W P-5'-P-CCNC: 54 U/L (ref 1–33)
ANION GAP SERPL CALCULATED.3IONS-SCNC: 15.2 MMOL/L (ref 5–15)
AST SERPL-CCNC: 42 U/L (ref 1–32)
BACTERIA UR QL AUTO: ABNORMAL /HPF
BASOPHILS # BLD AUTO: 0.07 10*3/MM3 (ref 0–0.2)
BASOPHILS NFR BLD AUTO: 0.9 % (ref 0–1.5)
BILIRUB SERPL-MCNC: 0.3 MG/DL (ref 0–1.2)
BILIRUB UR QL STRIP: NEGATIVE
BUN SERPL-MCNC: 49 MG/DL (ref 8–23)
BUN/CREAT SERPL: 20.7 (ref 7–25)
CALCIUM SPEC-SCNC: 8.9 MG/DL (ref 8.6–10.5)
CHLORIDE SERPL-SCNC: 96 MMOL/L (ref 98–107)
CK SERPL-CCNC: 50 U/L (ref 20–180)
CLARITY UR: CLEAR
CO2 SERPL-SCNC: 26.8 MMOL/L (ref 22–29)
COLOR UR: YELLOW
CREAT SERPL-MCNC: 2.37 MG/DL (ref 0.57–1)
CREAT UR-MCNC: 56.2 MG/DL
DEPRECATED RDW RBC AUTO: 43.4 FL (ref 37–54)
EGFRCR SERPLBLD CKD-EPI 2021: 21 ML/MIN/1.73
EOSINOPHIL # BLD AUTO: 0.19 10*3/MM3 (ref 0–0.4)
EOSINOPHIL NFR BLD AUTO: 2.4 % (ref 0.3–6.2)
ERYTHROCYTE [DISTWIDTH] IN BLOOD BY AUTOMATED COUNT: 11.7 % (ref 12.3–15.4)
GLOBULIN UR ELPH-MCNC: 3.3 GM/DL
GLUCOSE SERPL-MCNC: 196 MG/DL (ref 65–99)
GLUCOSE UR STRIP-MCNC: ABNORMAL MG/DL
HBA1C MFR BLD: 7.7 % (ref 3.5–5.6)
HCT VFR BLD AUTO: 39.9 % (ref 34–46.6)
HGB BLD-MCNC: 13.4 G/DL (ref 12–15.9)
HGB UR QL STRIP.AUTO: NEGATIVE
HYALINE CASTS UR QL AUTO: ABNORMAL /LPF
IMM GRANULOCYTES # BLD AUTO: 0.03 10*3/MM3 (ref 0–0.05)
IMM GRANULOCYTES NFR BLD AUTO: 0.4 % (ref 0–0.5)
KETONES UR QL STRIP: NEGATIVE
LEUKOCYTE ESTERASE UR QL STRIP.AUTO: ABNORMAL
LYMPHOCYTES # BLD AUTO: 1.29 10*3/MM3 (ref 0.7–3.1)
LYMPHOCYTES NFR BLD AUTO: 16.6 % (ref 19.6–45.3)
MCH RBC QN AUTO: 33.8 PG (ref 26.6–33)
MCHC RBC AUTO-ENTMCNC: 33.6 G/DL (ref 31.5–35.7)
MCV RBC AUTO: 100.8 FL (ref 79–97)
MONOCYTES # BLD AUTO: 0.54 10*3/MM3 (ref 0.1–0.9)
MONOCYTES NFR BLD AUTO: 6.9 % (ref 5–12)
NEUTROPHILS NFR BLD AUTO: 5.67 10*3/MM3 (ref 1.7–7)
NEUTROPHILS NFR BLD AUTO: 72.8 % (ref 42.7–76)
NITRITE UR QL STRIP: NEGATIVE
NRBC BLD AUTO-RTO: 0 /100 WBC (ref 0–0.2)
PH UR STRIP.AUTO: 6.5 [PH] (ref 5–8)
PHOSPHATE SERPL-MCNC: 3.9 MG/DL (ref 2.5–4.5)
PLATELET # BLD AUTO: 314 10*3/MM3 (ref 140–450)
PMV BLD AUTO: 10.4 FL (ref 6–12)
POTASSIUM SERPL-SCNC: 3.7 MMOL/L (ref 3.5–5.2)
PROT ?TM UR-MCNC: 118.4 MG/DL
PROT SERPL-MCNC: 7.7 G/DL (ref 6–8.5)
PROT UR QL STRIP: ABNORMAL
PROT/CREAT UR: 2106.8 MG/G CREA (ref 0–200)
RBC # BLD AUTO: 3.96 10*6/MM3 (ref 3.77–5.28)
RBC # UR STRIP: ABNORMAL /HPF
REF LAB TEST METHOD: ABNORMAL
SODIUM SERPL-SCNC: 138 MMOL/L (ref 136–145)
SP GR UR STRIP: 1.01 (ref 1–1.03)
SQUAMOUS #/AREA URNS HPF: ABNORMAL /HPF
TSH SERPL DL<=0.05 MIU/L-ACNC: 2.16 UIU/ML (ref 0.27–4.2)
URATE SERPL-MCNC: 4.6 MG/DL (ref 2.4–5.7)
UROBILINOGEN UR QL STRIP: ABNORMAL
WBC # UR STRIP: ABNORMAL /HPF
WBC NRBC COR # BLD: 7.79 10*3/MM3 (ref 3.4–10.8)

## 2023-02-01 PROCEDURE — 84156 ASSAY OF PROTEIN URINE: CPT

## 2023-02-01 PROCEDURE — 80053 COMPREHEN METABOLIC PANEL: CPT

## 2023-02-01 PROCEDURE — 84100 ASSAY OF PHOSPHORUS: CPT

## 2023-02-01 PROCEDURE — 83036 HEMOGLOBIN GLYCOSYLATED A1C: CPT

## 2023-02-01 PROCEDURE — 82570 ASSAY OF URINE CREATININE: CPT

## 2023-02-01 PROCEDURE — 87086 URINE CULTURE/COLONY COUNT: CPT

## 2023-02-01 PROCEDURE — 82306 VITAMIN D 25 HYDROXY: CPT

## 2023-02-01 PROCEDURE — 84550 ASSAY OF BLOOD/URIC ACID: CPT

## 2023-02-01 PROCEDURE — 84443 ASSAY THYROID STIM HORMONE: CPT

## 2023-02-01 PROCEDURE — 82550 ASSAY OF CK (CPK): CPT

## 2023-02-01 PROCEDURE — 36415 COLL VENOUS BLD VENIPUNCTURE: CPT

## 2023-02-01 PROCEDURE — 81001 URINALYSIS AUTO W/SCOPE: CPT

## 2023-02-01 PROCEDURE — 85025 COMPLETE CBC W/AUTO DIFF WBC: CPT

## 2023-02-02 DIAGNOSIS — F51.01 PRIMARY INSOMNIA: ICD-10-CM

## 2023-02-02 DIAGNOSIS — F41.9 ANXIETY: ICD-10-CM

## 2023-02-02 LAB — BACTERIA SPEC AEROBE CULT: NO GROWTH

## 2023-02-02 RX ORDER — TEMAZEPAM 30 MG/1
CAPSULE ORAL
Qty: 90 CAPSULE | Refills: 1 | Status: SHIPPED | OUTPATIENT
Start: 2023-02-02

## 2023-02-02 RX ORDER — ALPRAZOLAM 0.5 MG/1
TABLET ORAL
Qty: 90 TABLET | Refills: 0 | Status: SHIPPED | OUTPATIENT
Start: 2023-02-02

## 2023-02-02 RX ORDER — FUROSEMIDE 40 MG/1
40 TABLET ORAL SEE ADMIN INSTRUCTIONS
COMMUNITY

## 2023-02-13 ENCOUNTER — LAB (OUTPATIENT)
Dept: LAB | Facility: HOSPITAL | Age: 73
End: 2023-02-13
Payer: MEDICARE

## 2023-02-13 ENCOUNTER — HOSPITAL ENCOUNTER (OUTPATIENT)
Dept: CARDIOLOGY | Facility: HOSPITAL | Age: 73
Discharge: HOME | End: 2023-02-13
Payer: MEDICARE

## 2023-02-13 LAB
ALBUMIN SERPL-MCNC: 4.2 G/DL (ref 3.5–5.2)
ALBUMIN/GLOB SERPL: 1.4 G/DL
ALP SERPL-CCNC: 133 U/L (ref 39–117)
ALT SERPL W P-5'-P-CCNC: 46 U/L (ref 1–33)
ANION GAP SERPL CALCULATED.3IONS-SCNC: 9.8 MMOL/L (ref 5–15)
AST SERPL-CCNC: 33 U/L (ref 1–32)
BILIRUB SERPL-MCNC: 0.2 MG/DL (ref 0–1.2)
BUN SERPL-MCNC: 46 MG/DL (ref 8–23)
BUN/CREAT SERPL: 22.2 (ref 7–25)
CALCIUM SPEC-SCNC: 8.8 MG/DL (ref 8.6–10.5)
CHLORIDE SERPL-SCNC: 105 MMOL/L (ref 98–107)
CO2 SERPL-SCNC: 24.2 MMOL/L (ref 22–29)
CREAT SERPL-MCNC: 2.07 MG/DL (ref 0.57–1)
EGFRCR SERPLBLD CKD-EPI 2021: 24.7 ML/MIN/1.73
GLOBULIN UR ELPH-MCNC: 3 GM/DL
GLUCOSE SERPL-MCNC: 160 MG/DL (ref 65–99)
POTASSIUM SERPL-SCNC: 4 MMOL/L (ref 3.5–5.2)
PROT SERPL-MCNC: 7.2 G/DL (ref 6–8.5)
SODIUM SERPL-SCNC: 139 MMOL/L (ref 136–145)

## 2023-02-13 PROCEDURE — 36415 COLL VENOUS BLD VENIPUNCTURE: CPT | Performed by: SURGERY

## 2023-02-13 PROCEDURE — 80053 COMPREHEN METABOLIC PANEL: CPT | Performed by: SURGERY

## 2023-02-13 PROCEDURE — 93005 ELECTROCARDIOGRAM TRACING: CPT

## 2023-02-13 PROCEDURE — 93010 ELECTROCARDIOGRAM REPORT: CPT | Performed by: INTERNAL MEDICINE

## 2023-02-14 LAB — QT INTERVAL: 382 MS

## 2023-02-15 ENCOUNTER — OFFICE VISIT (OUTPATIENT)
Dept: FAMILY MEDICINE CLINIC | Facility: CLINIC | Age: 73
End: 2023-02-15
Payer: MEDICARE

## 2023-02-15 VITALS
DIASTOLIC BLOOD PRESSURE: 86 MMHG | HEART RATE: 103 BPM | WEIGHT: 217 LBS | SYSTOLIC BLOOD PRESSURE: 160 MMHG | HEIGHT: 66 IN | RESPIRATION RATE: 16 BRPM | TEMPERATURE: 97.1 F | BODY MASS INDEX: 34.87 KG/M2 | OXYGEN SATURATION: 96 %

## 2023-02-15 DIAGNOSIS — E11.21 DIABETIC NEPHROPATHY ASSOCIATED WITH TYPE 2 DIABETES MELLITUS: ICD-10-CM

## 2023-02-15 DIAGNOSIS — E11.65 TYPE 2 DIABETES MELLITUS WITH HYPERGLYCEMIA, WITHOUT LONG-TERM CURRENT USE OF INSULIN: ICD-10-CM

## 2023-02-15 DIAGNOSIS — I10 ESSENTIAL HYPERTENSION: Primary | Chronic | ICD-10-CM

## 2023-02-15 PROCEDURE — 3051F HG A1C>EQUAL 7.0%<8.0%: CPT | Performed by: INTERNAL MEDICINE

## 2023-02-15 PROCEDURE — 99214 OFFICE O/P EST MOD 30 MIN: CPT | Performed by: INTERNAL MEDICINE

## 2023-02-15 RX ORDER — DULOXETIN HYDROCHLORIDE 60 MG/1
60 CAPSULE, DELAYED RELEASE ORAL 2 TIMES DAILY
Qty: 180 CAPSULE | Refills: 1 | Status: SHIPPED | OUTPATIENT
Start: 2023-02-15

## 2023-02-24 ENCOUNTER — ANESTHESIA (OUTPATIENT)
Dept: PERIOP | Facility: HOSPITAL | Age: 73
End: 2023-02-24
Payer: MEDICARE

## 2023-02-24 ENCOUNTER — HOSPITAL ENCOUNTER (OUTPATIENT)
Facility: HOSPITAL | Age: 73
Setting detail: HOSPITAL OUTPATIENT SURGERY
Discharge: HOME | End: 2023-02-24
Attending: SURGERY | Admitting: SURGERY
Payer: MEDICARE

## 2023-02-24 ENCOUNTER — ANESTHESIA EVENT (OUTPATIENT)
Dept: PERIOP | Facility: HOSPITAL | Age: 73
End: 2023-02-24
Payer: MEDICARE

## 2023-02-24 VITALS
TEMPERATURE: 96.2 F | SYSTOLIC BLOOD PRESSURE: 156 MMHG | HEIGHT: 66 IN | DIASTOLIC BLOOD PRESSURE: 78 MMHG | RESPIRATION RATE: 16 BRPM | WEIGHT: 205 LBS | OXYGEN SATURATION: 98 % | HEART RATE: 67 BPM | BODY MASS INDEX: 32.95 KG/M2

## 2023-02-24 DIAGNOSIS — N18.9 ACUTE KIDNEY INJURY SUPERIMPOSED ON CKD: Primary | ICD-10-CM

## 2023-02-24 DIAGNOSIS — N17.9 ACUTE KIDNEY INJURY SUPERIMPOSED ON CKD: Primary | ICD-10-CM

## 2023-02-24 LAB
GLUCOSE BLDC GLUCOMTR-MCNC: 202 MG/DL (ref 70–105)
GLUCOSE BLDC GLUCOMTR-MCNC: 219 MG/DL (ref 70–105)

## 2023-02-24 PROCEDURE — 25010000002 CEFAZOLIN PER 500 MG: Performed by: SURGERY

## 2023-02-24 PROCEDURE — 25010000002 FENTANYL CITRATE (PF) 50 MCG/ML SOLUTION: Performed by: ANESTHESIOLOGY

## 2023-02-24 PROCEDURE — 25010000002 PROPOFOL 200 MG/20ML EMULSION: Performed by: NURSE ANESTHETIST, CERTIFIED REGISTERED

## 2023-02-24 PROCEDURE — 25010000002 MEPIVACAINE PF 1 % SOLUTION: Performed by: ANESTHESIOLOGY

## 2023-02-24 PROCEDURE — 25010000002 HEPARIN (PORCINE) PER 1000 UNITS: Performed by: NURSE ANESTHETIST, CERTIFIED REGISTERED

## 2023-02-24 PROCEDURE — 25010000002 PROTAMINE SULFATE PER 10 MG: Performed by: NURSE ANESTHETIST, CERTIFIED REGISTERED

## 2023-02-24 PROCEDURE — 25010000002 HEPARIN (PORCINE) PER 1000 UNITS: Performed by: SURGERY

## 2023-02-24 PROCEDURE — 25010000002 MIDAZOLAM PER 1 MG: Performed by: ANESTHESIOLOGY

## 2023-02-24 PROCEDURE — 25010000002 ROPIVACAINE PER 1 MG: Performed by: ANESTHESIOLOGY

## 2023-02-24 PROCEDURE — 82962 GLUCOSE BLOOD TEST: CPT

## 2023-02-24 DEVICE — LIGACLIP MCA MULTIPLE CLIP APPLIERS, 20 SMALL CLIPS
Type: IMPLANTABLE DEVICE | Site: ARM | Status: FUNCTIONAL
Brand: LIGACLIP

## 2023-02-24 RX ORDER — PROTAMINE SULFATE 10 MG/ML
INJECTION, SOLUTION INTRAVENOUS AS NEEDED
Status: DISCONTINUED | OUTPATIENT
Start: 2023-02-24 | End: 2023-02-24 | Stop reason: SURG

## 2023-02-24 RX ORDER — HYDRALAZINE HYDROCHLORIDE 20 MG/ML
5 INJECTION INTRAMUSCULAR; INTRAVENOUS
Status: DISCONTINUED | OUTPATIENT
Start: 2023-02-24 | End: 2023-02-24 | Stop reason: HOSPADM

## 2023-02-24 RX ORDER — LABETALOL HYDROCHLORIDE 5 MG/ML
5 INJECTION, SOLUTION INTRAVENOUS
Status: DISCONTINUED | OUTPATIENT
Start: 2023-02-24 | End: 2023-02-24 | Stop reason: HOSPADM

## 2023-02-24 RX ORDER — LIDOCAINE HYDROCHLORIDE 10 MG/ML
INJECTION, SOLUTION EPIDURAL; INFILTRATION; INTRACAUDAL; PERINEURAL AS NEEDED
Status: DISCONTINUED | OUTPATIENT
Start: 2023-02-24 | End: 2023-02-24 | Stop reason: SURG

## 2023-02-24 RX ORDER — MIDAZOLAM HYDROCHLORIDE 1 MG/ML
INJECTION INTRAMUSCULAR; INTRAVENOUS
Status: COMPLETED | OUTPATIENT
Start: 2023-02-24 | End: 2023-02-24

## 2023-02-24 RX ORDER — NALOXONE HCL 0.4 MG/ML
0.4 VIAL (ML) INJECTION AS NEEDED
Status: DISCONTINUED | OUTPATIENT
Start: 2023-02-24 | End: 2023-02-24 | Stop reason: HOSPADM

## 2023-02-24 RX ORDER — HYDROCODONE BITARTRATE AND ACETAMINOPHEN 7.5; 325 MG/1; MG/1
2 TABLET ORAL EVERY 4 HOURS PRN
Status: DISCONTINUED | OUTPATIENT
Start: 2023-02-24 | End: 2023-02-24 | Stop reason: HOSPADM

## 2023-02-24 RX ORDER — FLUMAZENIL 0.1 MG/ML
0.1 INJECTION INTRAVENOUS AS NEEDED
Status: DISCONTINUED | OUTPATIENT
Start: 2023-02-24 | End: 2023-02-24 | Stop reason: HOSPADM

## 2023-02-24 RX ORDER — SODIUM CHLORIDE 0.9 % (FLUSH) 0.9 %
10 SYRINGE (ML) INJECTION AS NEEDED
Status: DISCONTINUED | OUTPATIENT
Start: 2023-02-24 | End: 2023-02-24 | Stop reason: HOSPADM

## 2023-02-24 RX ORDER — IPRATROPIUM BROMIDE AND ALBUTEROL SULFATE 2.5; .5 MG/3ML; MG/3ML
3 SOLUTION RESPIRATORY (INHALATION) ONCE AS NEEDED
Status: DISCONTINUED | OUTPATIENT
Start: 2023-02-24 | End: 2023-02-24 | Stop reason: HOSPADM

## 2023-02-24 RX ORDER — LIDOCAINE HYDROCHLORIDE 10 MG/ML
0.5 INJECTION, SOLUTION INFILTRATION; PERINEURAL ONCE AS NEEDED
Status: DISCONTINUED | OUTPATIENT
Start: 2023-02-24 | End: 2023-02-24 | Stop reason: HOSPADM

## 2023-02-24 RX ORDER — FENTANYL CITRATE 50 UG/ML
INJECTION, SOLUTION INTRAMUSCULAR; INTRAVENOUS
Status: COMPLETED | OUTPATIENT
Start: 2023-02-24 | End: 2023-02-24

## 2023-02-24 RX ORDER — HEPARIN SODIUM 1000 [USP'U]/ML
INJECTION, SOLUTION INTRAVENOUS; SUBCUTANEOUS AS NEEDED
Status: DISCONTINUED | OUTPATIENT
Start: 2023-02-24 | End: 2023-02-24 | Stop reason: SURG

## 2023-02-24 RX ORDER — SODIUM CHLORIDE 9 MG/ML
1000 INJECTION, SOLUTION INTRAVENOUS ONCE
Status: COMPLETED | OUTPATIENT
Start: 2023-02-24 | End: 2023-02-24

## 2023-02-24 RX ORDER — SODIUM CHLORIDE 9 MG/ML
INJECTION, SOLUTION INTRAVENOUS CONTINUOUS PRN
Status: DISCONTINUED | OUTPATIENT
Start: 2023-02-24 | End: 2023-02-24 | Stop reason: SURG

## 2023-02-24 RX ORDER — ROPIVACAINE HYDROCHLORIDE 5 MG/ML
INJECTION, SOLUTION EPIDURAL; INFILTRATION; PERINEURAL
Status: COMPLETED | OUTPATIENT
Start: 2023-02-24 | End: 2023-02-24

## 2023-02-24 RX ORDER — DIPHENHYDRAMINE HYDROCHLORIDE 50 MG/ML
12.5 INJECTION INTRAMUSCULAR; INTRAVENOUS
Status: DISCONTINUED | OUTPATIENT
Start: 2023-02-24 | End: 2023-02-24 | Stop reason: HOSPADM

## 2023-02-24 RX ORDER — FENTANYL CITRATE 50 UG/ML
50 INJECTION, SOLUTION INTRAMUSCULAR; INTRAVENOUS
Status: DISCONTINUED | OUTPATIENT
Start: 2023-02-24 | End: 2023-02-24 | Stop reason: HOSPADM

## 2023-02-24 RX ORDER — HYDROCODONE BITARTRATE AND ACETAMINOPHEN 5; 325 MG/1; MG/1
1 TABLET ORAL ONCE AS NEEDED
Status: DISCONTINUED | OUTPATIENT
Start: 2023-02-24 | End: 2023-02-24 | Stop reason: HOSPADM

## 2023-02-24 RX ORDER — HYDROCODONE BITARTRATE AND ACETAMINOPHEN 5; 325 MG/1; MG/1
2 TABLET ORAL EVERY 6 HOURS PRN
Qty: 10 TABLET | Refills: 0 | Status: SHIPPED | OUTPATIENT
Start: 2023-02-24

## 2023-02-24 RX ORDER — PROPOFOL 10 MG/ML
INJECTION, EMULSION INTRAVENOUS AS NEEDED
Status: DISCONTINUED | OUTPATIENT
Start: 2023-02-24 | End: 2023-02-24 | Stop reason: SURG

## 2023-02-24 RX ORDER — PROCHLORPERAZINE EDISYLATE 5 MG/ML
10 INJECTION INTRAMUSCULAR; INTRAVENOUS ONCE AS NEEDED
Status: DISCONTINUED | OUTPATIENT
Start: 2023-02-24 | End: 2023-02-24 | Stop reason: HOSPADM

## 2023-02-24 RX ORDER — ONDANSETRON 2 MG/ML
4 INJECTION INTRAMUSCULAR; INTRAVENOUS ONCE AS NEEDED
Status: DISCONTINUED | OUTPATIENT
Start: 2023-02-24 | End: 2023-02-24 | Stop reason: HOSPADM

## 2023-02-24 RX ADMIN — PROTAMINE SULFATE 30 MG: 10 INJECTION, SOLUTION INTRAVENOUS at 09:22

## 2023-02-24 RX ADMIN — ROPIVACAINE HYDROCHLORIDE 15 ML: 5 INJECTION EPIDURAL; INFILTRATION; PERINEURAL at 08:03

## 2023-02-24 RX ADMIN — FENTANYL CITRATE 100 MCG: 50 INJECTION, SOLUTION INTRAMUSCULAR; INTRAVENOUS at 08:03

## 2023-02-24 RX ADMIN — MIDAZOLAM 2 MG: 1 INJECTION INTRAMUSCULAR; INTRAVENOUS at 08:03

## 2023-02-24 RX ADMIN — PROPOFOL 20 MG: 10 INJECTION, EMULSION INTRAVENOUS at 08:42

## 2023-02-24 RX ADMIN — LIDOCAINE HYDROCHLORIDE 40 MG: 10 INJECTION, SOLUTION EPIDURAL; INFILTRATION; INTRACAUDAL; PERINEURAL at 08:42

## 2023-02-24 RX ADMIN — SODIUM CHLORIDE 1000 ML: 9 INJECTION, SOLUTION INTRAVENOUS at 07:48

## 2023-02-24 RX ADMIN — PROPOFOL 30 MG: 10 INJECTION, EMULSION INTRAVENOUS at 09:20

## 2023-02-24 RX ADMIN — CEFAZOLIN 2 G: 2 INJECTION, POWDER, FOR SOLUTION INTRAMUSCULAR; INTRAVENOUS at 08:17

## 2023-02-24 RX ADMIN — SODIUM CHLORIDE: 0.9 INJECTION, SOLUTION INTRAVENOUS at 08:17

## 2023-02-24 RX ADMIN — HEPARIN SODIUM 5000 UNITS: 1000 INJECTION INTRAVENOUS; SUBCUTANEOUS at 08:57

## 2023-02-24 RX ADMIN — MEPIVACAINE HYDROCHLORIDE 15 ML: 10 INJECTION, SOLUTION EPIDURAL; INFILTRATION at 08:03

## 2023-03-08 ENCOUNTER — TRANSCRIBE ORDERS (OUTPATIENT)
Dept: ADMINISTRATIVE | Facility: HOSPITAL | Age: 73
End: 2023-03-08
Payer: MEDICARE

## 2023-03-08 ENCOUNTER — OFFICE VISIT (OUTPATIENT)
Dept: VASCULAR SURGERY | Facility: HOSPITAL | Age: 73
End: 2023-03-08
Payer: MEDICARE

## 2023-03-08 DIAGNOSIS — Z99.2 ENCOUNTER FOR EXTRACORPOREAL DIALYSIS: ICD-10-CM

## 2023-03-08 DIAGNOSIS — N18.6 END STAGE RENAL DISEASE: Primary | ICD-10-CM

## 2023-03-08 PROCEDURE — G0463 HOSPITAL OUTPT CLINIC VISIT: HCPCS

## 2023-03-23 ENCOUNTER — TELEPHONE (OUTPATIENT)
Dept: FAMILY MEDICINE CLINIC | Facility: CLINIC | Age: 73
End: 2023-03-23
Payer: MEDICARE

## 2023-03-23 DIAGNOSIS — N39.0 RECURRENT UTI: Primary | ICD-10-CM

## 2023-03-23 RX ORDER — CEFDINIR 300 MG/1
300 CAPSULE ORAL 2 TIMES DAILY
Qty: 14 CAPSULE | Refills: 0 | Status: SHIPPED | OUTPATIENT
Start: 2023-03-23

## 2023-03-24 ENCOUNTER — LAB (OUTPATIENT)
Dept: FAMILY MEDICINE CLINIC | Facility: CLINIC | Age: 73
End: 2023-03-24
Payer: MEDICARE

## 2023-03-24 DIAGNOSIS — N39.0 RECURRENT UTI: ICD-10-CM

## 2023-03-24 LAB
BACTERIA UR QL AUTO: ABNORMAL /HPF
BILIRUB UR QL STRIP: NEGATIVE
CLARITY UR: CLEAR
COLOR UR: YELLOW
GLUCOSE UR STRIP-MCNC: ABNORMAL MG/DL
HGB UR QL STRIP.AUTO: NEGATIVE
HYALINE CASTS UR QL AUTO: ABNORMAL /LPF
KETONES UR QL STRIP: NEGATIVE
LEUKOCYTE ESTERASE UR QL STRIP.AUTO: ABNORMAL
NITRITE UR QL STRIP: NEGATIVE
PH UR STRIP.AUTO: 6 [PH] (ref 5–8)
PROT UR QL STRIP: ABNORMAL
RBC # UR STRIP: ABNORMAL /HPF
REF LAB TEST METHOD: ABNORMAL
SP GR UR STRIP: 1.01 (ref 1–1.03)
SQUAMOUS #/AREA URNS HPF: ABNORMAL /HPF
UROBILINOGEN UR QL STRIP: ABNORMAL
WBC # UR STRIP: ABNORMAL /HPF

## 2023-03-24 PROCEDURE — 81001 URINALYSIS AUTO W/SCOPE: CPT | Performed by: INTERNAL MEDICINE

## 2023-03-24 PROCEDURE — 87086 URINE CULTURE/COLONY COUNT: CPT | Performed by: INTERNAL MEDICINE

## 2023-03-27 LAB — BACTERIA SPEC AEROBE CULT: NORMAL

## 2023-04-23 DIAGNOSIS — F41.9 ANXIETY: ICD-10-CM

## 2023-04-24 RX ORDER — ALPRAZOLAM 0.5 MG/1
TABLET ORAL
Qty: 270 TABLET | Refills: 1 | Status: SHIPPED | OUTPATIENT
Start: 2023-04-24

## 2023-05-01 ENCOUNTER — TRANSCRIBE ORDERS (OUTPATIENT)
Dept: ADMINISTRATIVE | Facility: HOSPITAL | Age: 73
End: 2023-05-01
Payer: MEDICARE

## 2023-05-01 ENCOUNTER — LAB (OUTPATIENT)
Dept: LAB | Facility: HOSPITAL | Age: 73
End: 2023-05-01
Payer: MEDICARE

## 2023-05-01 DIAGNOSIS — N18.4 CHRONIC KIDNEY DISEASE, STAGE IV (SEVERE): ICD-10-CM

## 2023-05-01 DIAGNOSIS — E11.8 TYPE II DIABETES MELLITUS WITH MANIFESTATIONS: ICD-10-CM

## 2023-05-01 DIAGNOSIS — E55.9 VITAMIN D DEFICIENCY: ICD-10-CM

## 2023-05-01 DIAGNOSIS — N18.4 CHRONIC KIDNEY DISEASE, STAGE IV (SEVERE): Primary | ICD-10-CM

## 2023-05-01 LAB
25(OH)D3 SERPL-MCNC: 60.6 NG/ML (ref 30–100)
ANION GAP SERPL CALCULATED.3IONS-SCNC: 12 MMOL/L (ref 5–15)
BACTERIA UR QL AUTO: ABNORMAL /HPF
BILIRUB UR QL STRIP: NEGATIVE
BUN SERPL-MCNC: 49 MG/DL (ref 8–23)
BUN/CREAT SERPL: 23.4 (ref 7–25)
CALCIUM SPEC-SCNC: 8.7 MG/DL (ref 8.6–10.5)
CHLORIDE SERPL-SCNC: 105 MMOL/L (ref 98–107)
CLARITY UR: CLEAR
CO2 SERPL-SCNC: 24 MMOL/L (ref 22–29)
COLOR UR: YELLOW
CREAT SERPL-MCNC: 2.09 MG/DL (ref 0.57–1)
DEPRECATED RDW RBC AUTO: 53.8 FL (ref 37–54)
EGFRCR SERPLBLD CKD-EPI 2021: 24.5 ML/MIN/1.73
EOSINOPHIL # BLD MANUAL: 0.14 10*3/MM3 (ref 0–0.4)
EOSINOPHIL NFR BLD MANUAL: 2 % (ref 0.3–6.2)
ERYTHROCYTE [DISTWIDTH] IN BLOOD BY AUTOMATED COUNT: 13.9 % (ref 12.3–15.4)
GLUCOSE SERPL-MCNC: 166 MG/DL (ref 65–99)
GLUCOSE UR STRIP-MCNC: ABNORMAL MG/DL
HBA1C MFR BLD: 7.5 % (ref 4.8–5.6)
HBV SURFACE AG SERPL QL IA: NORMAL
HCT VFR BLD AUTO: 38.9 % (ref 34–46.6)
HGB BLD-MCNC: 12.4 G/DL (ref 12–15.9)
HGB UR QL STRIP.AUTO: NEGATIVE
HYALINE CASTS UR QL AUTO: ABNORMAL /LPF
KETONES UR QL STRIP: NEGATIVE
LEUKOCYTE ESTERASE UR QL STRIP.AUTO: ABNORMAL
LYMPHOCYTES # BLD MANUAL: 1.54 10*3/MM3 (ref 0.7–3.1)
LYMPHOCYTES NFR BLD MANUAL: 6 % (ref 5–12)
MCH RBC QN AUTO: 33.3 PG (ref 26.6–33)
MCHC RBC AUTO-ENTMCNC: 32 G/DL (ref 31.5–35.7)
MCV RBC AUTO: 104.2 FL (ref 79–97)
MONOCYTES # BLD: 0.42 10*3/MM3 (ref 0.1–0.9)
NEUTROPHILS # BLD AUTO: 4.9 10*3/MM3 (ref 1.7–7)
NEUTROPHILS NFR BLD MANUAL: 70 % (ref 42.7–76)
NITRITE UR QL STRIP: NEGATIVE
PH UR STRIP.AUTO: <=5 [PH] (ref 5–8)
PLAT MORPH BLD: NORMAL
PLATELET # BLD AUTO: 289 10*3/MM3 (ref 140–450)
PMV BLD AUTO: 8.6 FL (ref 6–12)
POTASSIUM SERPL-SCNC: 4.3 MMOL/L (ref 3.5–5.2)
PROT UR QL STRIP: ABNORMAL
PTH-INTACT SERPL-MCNC: 750.5 PG/ML (ref 15–65)
RBC # BLD AUTO: 3.74 10*6/MM3 (ref 3.77–5.28)
RBC # UR STRIP: ABNORMAL /HPF
RBC MORPH BLD: NORMAL
REF LAB TEST METHOD: ABNORMAL
SCAN SLIDE: NORMAL
SODIUM SERPL-SCNC: 141 MMOL/L (ref 136–145)
SP GR UR STRIP: 1.02 (ref 1–1.03)
SQUAMOUS #/AREA URNS HPF: ABNORMAL /HPF
URATE SERPL-MCNC: 4.4 MG/DL (ref 2.4–5.7)
UROBILINOGEN UR QL STRIP: ABNORMAL
VARIANT LYMPHS NFR BLD MANUAL: 22 % (ref 19.6–45.3)
WBC # UR STRIP: ABNORMAL /HPF
WBC MORPH BLD: NORMAL
WBC NRBC COR # BLD: 7 10*3/MM3 (ref 3.4–10.8)

## 2023-05-01 PROCEDURE — 83970 ASSAY OF PARATHORMONE: CPT

## 2023-05-01 PROCEDURE — 87340 HEPATITIS B SURFACE AG IA: CPT

## 2023-05-01 PROCEDURE — 82306 VITAMIN D 25 HYDROXY: CPT

## 2023-05-01 PROCEDURE — 87077 CULTURE AEROBIC IDENTIFY: CPT

## 2023-05-01 PROCEDURE — 85025 COMPLETE CBC W/AUTO DIFF WBC: CPT

## 2023-05-01 PROCEDURE — 81001 URINALYSIS AUTO W/SCOPE: CPT

## 2023-05-01 PROCEDURE — 80048 BASIC METABOLIC PNL TOTAL CA: CPT

## 2023-05-01 PROCEDURE — 85007 BL SMEAR W/DIFF WBC COUNT: CPT

## 2023-05-01 PROCEDURE — 83036 HEMOGLOBIN GLYCOSYLATED A1C: CPT

## 2023-05-01 PROCEDURE — 36415 COLL VENOUS BLD VENIPUNCTURE: CPT

## 2023-05-01 PROCEDURE — 87086 URINE CULTURE/COLONY COUNT: CPT

## 2023-05-01 PROCEDURE — 84550 ASSAY OF BLOOD/URIC ACID: CPT

## 2023-05-01 PROCEDURE — 87186 SC STD MICRODIL/AGAR DIL: CPT

## 2023-05-01 RX ORDER — FUROSEMIDE 40 MG/1
TABLET ORAL
Qty: 180 TABLET | Refills: 1 | Status: SHIPPED | OUTPATIENT
Start: 2023-05-01

## 2023-05-03 LAB — BACTERIA SPEC AEROBE CULT: ABNORMAL

## 2023-05-11 ENCOUNTER — APPOINTMENT (OUTPATIENT)
Dept: VASCULAR SURGERY | Facility: HOSPITAL | Age: 73
End: 2023-05-11
Payer: MEDICARE

## 2023-05-11 ENCOUNTER — TRANSCRIBE ORDERS (OUTPATIENT)
Dept: ADMINISTRATIVE | Facility: HOSPITAL | Age: 73
End: 2023-05-11
Payer: MEDICARE

## 2023-05-11 ENCOUNTER — HOSPITAL ENCOUNTER (OUTPATIENT)
Dept: CARDIOLOGY | Facility: HOSPITAL | Age: 73
Discharge: HOME | End: 2023-05-11
Payer: MEDICARE

## 2023-05-11 DIAGNOSIS — N18.6 END STAGE RENAL DISEASE: Primary | ICD-10-CM

## 2023-05-11 DIAGNOSIS — Z99.2 ENCOUNTER FOR EXTRACORPOREAL DIALYSIS: ICD-10-CM

## 2023-05-11 DIAGNOSIS — N18.6 END STAGE RENAL DISEASE: ICD-10-CM

## 2023-05-11 PROCEDURE — 93990 DOPPLER FLOW TESTING: CPT

## 2023-05-11 PROCEDURE — G0463 HOSPITAL OUTPT CLINIC VISIT: HCPCS

## 2023-05-12 ENCOUNTER — TRANSCRIBE ORDERS (OUTPATIENT)
Dept: ADMINISTRATIVE | Facility: HOSPITAL | Age: 73
End: 2023-05-12
Payer: MEDICARE

## 2023-05-12 DIAGNOSIS — N25.81 SECONDARY HYPERPARATHYROIDISM: Primary | ICD-10-CM

## 2023-05-12 LAB
BH CV VAS DIALYSIS ARTERIAL ANASTOMOSIS DIAMETER: 0.18 CM
BH CV VAS DIALYSIS ARTERIAL ANASTOMOSIS EDV: 357 CM/SEC
BH CV VAS DIALYSIS ARTERIAL ANASTOMOSIS PSV: 659 CM/SEC
BH CV VAS DIALYSIS CONDUIT DIST DEPTH: 1.1 CM
BH CV VAS DIALYSIS CONDUIT DIST DIAMETER: 0.66 CM
BH CV VAS DIALYSIS CONDUIT DIST EDV: 46 CM/SEC
BH CV VAS DIALYSIS CONDUIT DIST FLOW VOL: 749 ML/MIN
BH CV VAS DIALYSIS CONDUIT DIST PSV: 86 CM/SEC
BH CV VAS DIALYSIS CONDUIT MID DEPTH: 0.45 CM
BH CV VAS DIALYSIS CONDUIT MID DIAMETER: 0.71 CM
BH CV VAS DIALYSIS CONDUIT MID EDV: 50 CM/SEC
BH CV VAS DIALYSIS CONDUIT MID FLOW VOL: 621 ML/MIN
BH CV VAS DIALYSIS CONDUIT MID PSV: 104 CM/SEC
BH CV VAS DIALYSIS CONDUIT MID/DIST DEPTH: 0.64 CM
BH CV VAS DIALYSIS CONDUIT MID/DIST DIAMETER: 0.68 CM
BH CV VAS DIALYSIS CONDUIT MID/DIST EDV: 53 CM/SEC
BH CV VAS DIALYSIS CONDUIT MID/DIST FLOW VOL: 631 ML/MIN
BH CV VAS DIALYSIS CONDUIT MID/DIST PSV: 120 CM/SEC
BH CV VAS DIALYSIS CONDUIT PROX DEPTH: 0.26 CM
BH CV VAS DIALYSIS CONDUIT PROX DIAMETER: 0.57 CM
BH CV VAS DIALYSIS CONDUIT PROX EDV: 188 CM/SEC
BH CV VAS DIALYSIS CONDUIT PROX FLOW VOL: 740 ML/MIN
BH CV VAS DIALYSIS CONDUIT PROX PSV: 355 CM/SEC
BH CV VAS DIALYSIS CONDUIT PROX/MID DEPTH: 0.41 CM
BH CV VAS DIALYSIS CONDUIT PROX/MID DIAMETER: 0.77 CM
BH CV VAS DIALYSIS CONDUIT PROX/MID EDV: 46 CM/SEC
BH CV VAS DIALYSIS CONDUIT PROX/MID FLOW VOL: 819 ML/MIN
BH CV VAS DIALYSIS CONDUIT PROX/MID PSV: 91 CM/SEC
BH CV VAS DIALYSIS LEFT BRANCH 1 DIAMETER: 0.15 CM
BH CV VAS DIALYSIS LEFT BRANCH 2 DIAMETER: 0.12 CM
BH CV VAS DIALYSIS LEFT BRANCH 3 DIAMETER: 0.17 CM
BH CV VAS DIALYSIS PRE-INFLOW BRACHIAL DIAMETER: 0.43 CM
BH CV VAS DIALYSIS PRE-INFLOW BRACHIAL EDV: 85 CM/SEC
BH CV VAS DIALYSIS PRE-INFLOW BRACHIAL FLOW VOL: 551 ML/MIN
BH CV VAS DIALYSIS PRE-INFLOW BRACHIAL PSV: 183 CM/SEC
BH CV VAS DIALYSIS VENOUS OUTFLOW CEPHALIC ARCH DIAMETE: 0.52 CM
BH CV VAS DIALYSIS VENOUS OUTFLOW CEPHALIC ARCH EDV: 89 CM/SEC
BH CV VAS DIALYSIS VENOUS OUTFLOW CEPHALIC ARCH PSV: 173 CM/SEC
BH CV VAS DIALYSIS VENOUS OUTFLOW SUBCLAVIAN DIAMETER: 0.88 CM
BH CV VAS DIALYSIS VENOUS OUTFLOW SUBCLAVIAN EDV: 103 CM/SEC
BH CV VAS DIALYSIS VENOUS OUTFLOW SUBCLAVIAN PSV: 203 CM/SEC
MAXIMAL PREDICTED HEART RATE: 146 BPM
STRESS TARGET HR: 124 BPM

## 2023-05-19 ENCOUNTER — HOSPITAL ENCOUNTER (OUTPATIENT)
Dept: NUCLEAR MEDICINE | Facility: HOSPITAL | Age: 73
Discharge: HOME | End: 2023-05-19
Payer: MEDICARE

## 2023-05-22 RX ORDER — ALENDRONATE SODIUM 70 MG/1
70 TABLET ORAL
Qty: 12 TABLET | Refills: 1 | Status: SHIPPED | OUTPATIENT
Start: 2023-05-22

## 2023-05-31 RX ORDER — CARVEDILOL 12.5 MG/1
TABLET ORAL
Qty: 180 TABLET | Refills: 0 | Status: SHIPPED | OUTPATIENT
Start: 2023-05-31

## 2023-06-07 ENCOUNTER — OFFICE VISIT (OUTPATIENT)
Dept: CARDIOLOGY | Facility: CLINIC | Age: 73
End: 2023-06-07
Payer: MEDICARE

## 2023-06-07 VITALS
BODY MASS INDEX: 34.72 KG/M2 | RESPIRATION RATE: 18 BRPM | HEIGHT: 66 IN | SYSTOLIC BLOOD PRESSURE: 128 MMHG | HEART RATE: 95 BPM | WEIGHT: 216 LBS | DIASTOLIC BLOOD PRESSURE: 71 MMHG

## 2023-06-07 DIAGNOSIS — I10 ESSENTIAL HYPERTENSION: ICD-10-CM

## 2023-06-07 DIAGNOSIS — I73.9 PAD (PERIPHERAL ARTERY DISEASE): ICD-10-CM

## 2023-06-07 DIAGNOSIS — E78.41 ELEVATED LIPOPROTEIN(A): ICD-10-CM

## 2023-06-07 DIAGNOSIS — R06.09 DOE (DYSPNEA ON EXERTION): Primary | ICD-10-CM

## 2023-06-07 DIAGNOSIS — R06.09 DYSPNEA ON EXERTION: ICD-10-CM

## 2023-06-07 PROCEDURE — 3078F DIAST BP <80 MM HG: CPT | Performed by: INTERNAL MEDICINE

## 2023-06-07 PROCEDURE — 3074F SYST BP LT 130 MM HG: CPT | Performed by: INTERNAL MEDICINE

## 2023-06-07 PROCEDURE — 99214 OFFICE O/P EST MOD 30 MIN: CPT | Performed by: INTERNAL MEDICINE

## 2023-06-07 RX ORDER — EMPAGLIFLOZIN 25 MG/1
25 TABLET, FILM COATED ORAL EVERY MORNING
COMMUNITY
Start: 2023-05-20

## 2023-06-07 RX ORDER — CARVEDILOL 25 MG/1
25 TABLET ORAL 2 TIMES DAILY
Qty: 180 TABLET | Refills: 1 | Status: SHIPPED | OUTPATIENT
Start: 2023-06-07

## 2023-06-15 RX ORDER — GLIMEPIRIDE 2 MG/1
TABLET ORAL
Qty: 360 TABLET | Refills: 1 | Status: SHIPPED | OUTPATIENT
Start: 2023-06-15

## 2023-07-24 ENCOUNTER — OFFICE VISIT (OUTPATIENT)
Dept: FAMILY MEDICINE CLINIC | Facility: CLINIC | Age: 73
End: 2023-07-24
Payer: MEDICARE

## 2023-07-24 VITALS
SYSTOLIC BLOOD PRESSURE: 108 MMHG | RESPIRATION RATE: 16 BRPM | HEIGHT: 66 IN | BODY MASS INDEX: 34.75 KG/M2 | DIASTOLIC BLOOD PRESSURE: 58 MMHG | HEART RATE: 81 BPM | TEMPERATURE: 97.3 F | OXYGEN SATURATION: 97 % | WEIGHT: 216.2 LBS

## 2023-07-24 DIAGNOSIS — M25.552 ACUTE HIP PAIN, LEFT: Primary | ICD-10-CM

## 2023-07-24 DIAGNOSIS — F51.01 PRIMARY INSOMNIA: ICD-10-CM

## 2023-07-24 DIAGNOSIS — E66.9 OBESITY (BMI 30-39.9): Chronic | ICD-10-CM

## 2023-07-24 DIAGNOSIS — F41.9 ANXIETY: ICD-10-CM

## 2023-07-24 DIAGNOSIS — E11.65 TYPE 2 DIABETES MELLITUS WITH HYPERGLYCEMIA, WITHOUT LONG-TERM CURRENT USE OF INSULIN: ICD-10-CM

## 2023-07-24 PROBLEM — U07.1 PNEUMONIA DUE TO COVID-19 VIRUS: Status: RESOLVED | Noted: 2022-06-26 | Resolved: 2023-07-24

## 2023-07-24 PROBLEM — J12.82 PNEUMONIA DUE TO COVID-19 VIRUS: Status: RESOLVED | Noted: 2022-06-26 | Resolved: 2023-07-24

## 2023-07-24 PROBLEM — R11.0 NAUSEA: Status: RESOLVED | Noted: 2019-07-12 | Resolved: 2023-07-24

## 2023-07-24 PROCEDURE — 3074F SYST BP LT 130 MM HG: CPT | Performed by: STUDENT IN AN ORGANIZED HEALTH CARE EDUCATION/TRAINING PROGRAM

## 2023-07-24 PROCEDURE — 99214 OFFICE O/P EST MOD 30 MIN: CPT | Performed by: STUDENT IN AN ORGANIZED HEALTH CARE EDUCATION/TRAINING PROGRAM

## 2023-07-24 PROCEDURE — 3078F DIAST BP <80 MM HG: CPT | Performed by: STUDENT IN AN ORGANIZED HEALTH CARE EDUCATION/TRAINING PROGRAM

## 2023-07-24 PROCEDURE — 3051F HG A1C>EQUAL 7.0%<8.0%: CPT | Performed by: STUDENT IN AN ORGANIZED HEALTH CARE EDUCATION/TRAINING PROGRAM

## 2023-07-24 RX ORDER — TRAZODONE HYDROCHLORIDE 50 MG/1
50 TABLET ORAL NIGHTLY
Qty: 30 TABLET | Refills: 1 | Status: SHIPPED | OUTPATIENT
Start: 2023-07-24

## 2023-07-25 ENCOUNTER — HOSPITAL ENCOUNTER (OUTPATIENT)
Dept: GENERAL RADIOLOGY | Facility: HOSPITAL | Age: 73
Discharge: HOME | End: 2023-07-25
Admitting: STUDENT IN AN ORGANIZED HEALTH CARE EDUCATION/TRAINING PROGRAM
Payer: MEDICARE

## 2023-07-25 PROCEDURE — 73502 X-RAY EXAM HIP UNI 2-3 VIEWS: CPT

## 2023-07-26 ENCOUNTER — PATIENT ROUNDING (BHMG ONLY) (OUTPATIENT)
Dept: FAMILY MEDICINE CLINIC | Facility: CLINIC | Age: 73
End: 2023-07-26
Payer: MEDICARE

## 2023-07-27 ENCOUNTER — TELEPHONE (OUTPATIENT)
Dept: FAMILY MEDICINE CLINIC | Facility: CLINIC | Age: 73
End: 2023-07-27
Payer: MEDICARE

## 2023-07-31 ENCOUNTER — APPOINTMENT (OUTPATIENT)
Dept: CT IMAGING | Facility: HOSPITAL | Age: 73
End: 2023-07-31
Payer: MEDICARE

## 2023-07-31 ENCOUNTER — HOSPITAL ENCOUNTER (OUTPATIENT)
Facility: HOSPITAL | Age: 73
Setting detail: OBSERVATION
Discharge: HOME | End: 2023-08-02
Attending: EMERGENCY MEDICINE | Admitting: STUDENT IN AN ORGANIZED HEALTH CARE EDUCATION/TRAINING PROGRAM
Payer: MEDICARE

## 2023-07-31 DIAGNOSIS — R11.0 NAUSEA: ICD-10-CM

## 2023-07-31 DIAGNOSIS — N39.0 ACUTE UTI: Primary | ICD-10-CM

## 2023-07-31 LAB
ALBUMIN SERPL-MCNC: 3.9 G/DL (ref 3.5–5.2)
ALBUMIN/GLOB SERPL: 1.1 G/DL
ALP SERPL-CCNC: 76 U/L (ref 39–117)
ALT SERPL W P-5'-P-CCNC: 68 U/L (ref 1–33)
ANION GAP SERPL CALCULATED.3IONS-SCNC: 13 MMOL/L (ref 5–15)
AST SERPL-CCNC: 49 U/L (ref 1–32)
BACTERIA UR QL AUTO: ABNORMAL /HPF
BASOPHILS # BLD AUTO: 0.1 10*3/MM3 (ref 0–0.2)
BASOPHILS NFR BLD AUTO: 1 % (ref 0–1.5)
BILIRUB SERPL-MCNC: <0.2 MG/DL (ref 0–1.2)
BILIRUB UR QL STRIP: NEGATIVE
BUN SERPL-MCNC: 58 MG/DL (ref 8–23)
BUN/CREAT SERPL: 21.7 (ref 7–25)
CALCIUM SPEC-SCNC: 8.3 MG/DL (ref 8.6–10.5)
CHLORIDE SERPL-SCNC: 102 MMOL/L (ref 98–107)
CLARITY UR: ABNORMAL
CO2 SERPL-SCNC: 22 MMOL/L (ref 22–29)
COLOR UR: YELLOW
CREAT SERPL-MCNC: 2.67 MG/DL (ref 0.57–1)
D-LACTATE SERPL-SCNC: 0.5 MMOL/L (ref 0.3–2)
DEPRECATED RDW RBC AUTO: 52.9 FL (ref 37–54)
EGFRCR SERPLBLD CKD-EPI 2021: 18.1 ML/MIN/1.73
EOSINOPHIL # BLD AUTO: 0.2 10*3/MM3 (ref 0–0.4)
EOSINOPHIL NFR BLD AUTO: 1.9 % (ref 0.3–6.2)
ERYTHROCYTE [DISTWIDTH] IN BLOOD BY AUTOMATED COUNT: 14.2 % (ref 12.3–15.4)
GLOBULIN UR ELPH-MCNC: 3.6 GM/DL
GLUCOSE SERPL-MCNC: 235 MG/DL (ref 65–99)
GLUCOSE UR STRIP-MCNC: ABNORMAL MG/DL
HCT VFR BLD AUTO: 34.5 % (ref 34–46.6)
HGB BLD-MCNC: 11.1 G/DL (ref 12–15.9)
HGB UR QL STRIP.AUTO: ABNORMAL
HOLD SPECIMEN: NORMAL
HYALINE CASTS UR QL AUTO: ABNORMAL /LPF
KETONES UR QL STRIP: NEGATIVE
LEUKOCYTE ESTERASE UR QL STRIP.AUTO: ABNORMAL
LIPASE SERPL-CCNC: 30 U/L (ref 13–60)
LYMPHOCYTES # BLD AUTO: 1 10*3/MM3 (ref 0.7–3.1)
LYMPHOCYTES NFR BLD AUTO: 10 % (ref 19.6–45.3)
MCH RBC QN AUTO: 33.7 PG (ref 26.6–33)
MCHC RBC AUTO-ENTMCNC: 32.1 G/DL (ref 31.5–35.7)
MCV RBC AUTO: 105.2 FL (ref 79–97)
MONOCYTES # BLD AUTO: 0.5 10*3/MM3 (ref 0.1–0.9)
MONOCYTES NFR BLD AUTO: 4.9 % (ref 5–12)
NEUTROPHILS NFR BLD AUTO: 8.1 10*3/MM3 (ref 1.7–7)
NEUTROPHILS NFR BLD AUTO: 82.2 % (ref 42.7–76)
NITRITE UR QL STRIP: POSITIVE
NRBC BLD AUTO-RTO: 0 /100 WBC (ref 0–0.2)
PH UR STRIP.AUTO: <=5 [PH] (ref 5–8)
PLATELET # BLD AUTO: 250 10*3/MM3 (ref 140–450)
PMV BLD AUTO: 8.7 FL (ref 6–12)
POTASSIUM SERPL-SCNC: 4.8 MMOL/L (ref 3.5–5.2)
PROT SERPL-MCNC: 7.5 G/DL (ref 6–8.5)
PROT UR QL STRIP: ABNORMAL
RBC # BLD AUTO: 3.28 10*6/MM3 (ref 3.77–5.28)
RBC # UR STRIP: ABNORMAL /HPF
REF LAB TEST METHOD: ABNORMAL
SODIUM SERPL-SCNC: 137 MMOL/L (ref 136–145)
SP GR UR STRIP: 1.01 (ref 1–1.03)
SQUAMOUS #/AREA URNS HPF: ABNORMAL /HPF
TROPONIN T SERPL HS-MCNC: 26 NG/L
UROBILINOGEN UR QL STRIP: ABNORMAL
WBC # UR STRIP: ABNORMAL /HPF
WBC NRBC COR # BLD: 9.9 10*3/MM3 (ref 3.4–10.8)
WHOLE BLOOD HOLD COAG: NORMAL

## 2023-07-31 PROCEDURE — 96365 THER/PROPH/DIAG IV INF INIT: CPT

## 2023-07-31 PROCEDURE — 74176 CT ABD & PELVIS W/O CONTRAST: CPT

## 2023-07-31 PROCEDURE — 25010000002 CEFTRIAXONE PER 250 MG: Performed by: NURSE PRACTITIONER

## 2023-07-31 PROCEDURE — 87040 BLOOD CULTURE FOR BACTERIA: CPT | Performed by: NURSE PRACTITIONER

## 2023-07-31 PROCEDURE — 36415 COLL VENOUS BLD VENIPUNCTURE: CPT

## 2023-07-31 PROCEDURE — 70450 CT HEAD/BRAIN W/O DYE: CPT

## 2023-07-31 PROCEDURE — 80053 COMPREHEN METABOLIC PANEL: CPT | Performed by: NURSE PRACTITIONER

## 2023-07-31 PROCEDURE — 83690 ASSAY OF LIPASE: CPT | Performed by: NURSE PRACTITIONER

## 2023-07-31 PROCEDURE — 83605 ASSAY OF LACTIC ACID: CPT

## 2023-07-31 PROCEDURE — 87086 URINE CULTURE/COLONY COUNT: CPT | Performed by: NURSE PRACTITIONER

## 2023-07-31 PROCEDURE — 87186 SC STD MICRODIL/AGAR DIL: CPT | Performed by: NURSE PRACTITIONER

## 2023-07-31 PROCEDURE — 81001 URINALYSIS AUTO W/SCOPE: CPT | Performed by: NURSE PRACTITIONER

## 2023-07-31 PROCEDURE — 93005 ELECTROCARDIOGRAM TRACING: CPT | Performed by: NURSE PRACTITIONER

## 2023-07-31 PROCEDURE — 25010000002 ONDANSETRON PER 1 MG: Performed by: NURSE PRACTITIONER

## 2023-07-31 PROCEDURE — 84484 ASSAY OF TROPONIN QUANT: CPT | Performed by: NURSE PRACTITIONER

## 2023-07-31 PROCEDURE — 85025 COMPLETE CBC W/AUTO DIFF WBC: CPT | Performed by: NURSE PRACTITIONER

## 2023-07-31 PROCEDURE — 99284 EMERGENCY DEPT VISIT MOD MDM: CPT

## 2023-07-31 PROCEDURE — 96375 TX/PRO/DX INJ NEW DRUG ADDON: CPT

## 2023-07-31 RX ORDER — SODIUM CHLORIDE 0.9 % (FLUSH) 0.9 %
10 SYRINGE (ML) INJECTION AS NEEDED
Status: DISCONTINUED | OUTPATIENT
Start: 2023-07-31 | End: 2023-08-02 | Stop reason: HOSPADM

## 2023-07-31 RX ORDER — ONDANSETRON 2 MG/ML
4 INJECTION INTRAMUSCULAR; INTRAVENOUS ONCE
Status: COMPLETED | OUTPATIENT
Start: 2023-07-31 | End: 2023-07-31

## 2023-07-31 RX ADMIN — ONDANSETRON 4 MG: 2 INJECTION INTRAMUSCULAR; INTRAVENOUS at 22:13

## 2023-07-31 RX ADMIN — CEFTRIAXONE 2000 MG: 2 INJECTION, POWDER, FOR SOLUTION INTRAMUSCULAR; INTRAVENOUS at 23:49

## 2023-08-01 PROBLEM — N28.9 ACUTE ON CHRONIC RENAL INSUFFICIENCY: Status: ACTIVE | Noted: 2023-08-01

## 2023-08-01 PROBLEM — N39.0 ACUTE UTI: Status: ACTIVE | Noted: 2023-08-01

## 2023-08-01 PROBLEM — N18.9 ACUTE ON CHRONIC RENAL INSUFFICIENCY: Status: ACTIVE | Noted: 2023-08-01

## 2023-08-01 PROBLEM — R79.89 ELEVATED TROPONIN: Status: ACTIVE | Noted: 2023-08-01

## 2023-08-01 PROBLEM — R77.8 ELEVATED TROPONIN: Status: ACTIVE | Noted: 2023-08-01

## 2023-08-01 LAB
GEN 5 2HR TROPONIN T REFLEX: 26 NG/L
GLUCOSE BLDC GLUCOMTR-MCNC: 122 MG/DL (ref 70–105)
GLUCOSE BLDC GLUCOMTR-MCNC: 151 MG/DL (ref 70–105)
GLUCOSE BLDC GLUCOMTR-MCNC: 211 MG/DL (ref 70–105)
GLUCOSE BLDC GLUCOMTR-MCNC: 307 MG/DL (ref 70–105)
QT INTERVAL: 444 MS
TROPONIN T DELTA: 0 NG/L

## 2023-08-01 PROCEDURE — 96361 HYDRATE IV INFUSION ADD-ON: CPT

## 2023-08-01 PROCEDURE — 25010000002 CEFTRIAXONE PER 250 MG: Performed by: NURSE PRACTITIONER

## 2023-08-01 PROCEDURE — 63710000001 INSULIN LISPRO (HUMAN) PER 5 UNITS: Performed by: NURSE PRACTITIONER

## 2023-08-01 PROCEDURE — 82948 REAGENT STRIP/BLOOD GLUCOSE: CPT

## 2023-08-01 PROCEDURE — 25010000002 PROCHLORPERAZINE 10 MG/2ML SOLUTION: Performed by: NURSE PRACTITIONER

## 2023-08-01 PROCEDURE — 84484 ASSAY OF TROPONIN QUANT: CPT | Performed by: NURSE PRACTITIONER

## 2023-08-01 PROCEDURE — G0378 HOSPITAL OBSERVATION PER HR: HCPCS

## 2023-08-01 PROCEDURE — C1751 CATH, INF, PER/CENT/MIDLINE: HCPCS

## 2023-08-01 PROCEDURE — 63710000001 ONDANSETRON PER 8 MG: Performed by: STUDENT IN AN ORGANIZED HEALTH CARE EDUCATION/TRAINING PROGRAM

## 2023-08-01 PROCEDURE — 96375 TX/PRO/DX INJ NEW DRUG ADDON: CPT

## 2023-08-01 RX ORDER — ONDANSETRON 4 MG/1
4 TABLET, FILM COATED ORAL EVERY 6 HOURS PRN
Status: DISCONTINUED | OUTPATIENT
Start: 2023-08-01 | End: 2023-08-02 | Stop reason: HOSPADM

## 2023-08-01 RX ORDER — DEXTROSE MONOHYDRATE 25 G/50ML
25 INJECTION, SOLUTION INTRAVENOUS
Status: DISCONTINUED | OUTPATIENT
Start: 2023-08-01 | End: 2023-08-02 | Stop reason: HOSPADM

## 2023-08-01 RX ORDER — PROCHLORPERAZINE EDISYLATE 5 MG/ML
5 INJECTION INTRAMUSCULAR; INTRAVENOUS EVERY 8 HOURS PRN
Status: DISCONTINUED | OUTPATIENT
Start: 2023-08-01 | End: 2023-08-01

## 2023-08-01 RX ORDER — ONDANSETRON 2 MG/ML
4 INJECTION INTRAMUSCULAR; INTRAVENOUS EVERY 6 HOURS PRN
Status: DISCONTINUED | OUTPATIENT
Start: 2023-08-01 | End: 2023-08-02 | Stop reason: HOSPADM

## 2023-08-01 RX ORDER — SODIUM CHLORIDE 9 MG/ML
100 INJECTION, SOLUTION INTRAVENOUS CONTINUOUS
Status: DISCONTINUED | OUTPATIENT
Start: 2023-08-01 | End: 2023-08-02 | Stop reason: HOSPADM

## 2023-08-01 RX ORDER — TRAZODONE HYDROCHLORIDE 50 MG/1
50 TABLET ORAL NIGHTLY
Status: DISCONTINUED | OUTPATIENT
Start: 2023-08-01 | End: 2023-08-02 | Stop reason: HOSPADM

## 2023-08-01 RX ORDER — PROCHLORPERAZINE EDISYLATE 5 MG/ML
5 INJECTION INTRAMUSCULAR; INTRAVENOUS ONCE
Status: COMPLETED | OUTPATIENT
Start: 2023-08-01 | End: 2023-08-01

## 2023-08-01 RX ORDER — ACETAMINOPHEN 325 MG/1
650 TABLET ORAL ONCE
Status: COMPLETED | OUTPATIENT
Start: 2023-08-01 | End: 2023-08-01

## 2023-08-01 RX ORDER — NICOTINE POLACRILEX 4 MG
15 LOZENGE BUCCAL
Status: DISCONTINUED | OUTPATIENT
Start: 2023-08-01 | End: 2023-08-02 | Stop reason: HOSPADM

## 2023-08-01 RX ORDER — ERGOCALCIFEROL 1.25 MG/1
50000 CAPSULE ORAL WEEKLY
COMMUNITY

## 2023-08-01 RX ORDER — TEMAZEPAM 30 MG/1
30 CAPSULE ORAL NIGHTLY PRN
COMMUNITY
End: 2023-08-14

## 2023-08-01 RX ORDER — INSULIN LISPRO 100 [IU]/ML
2-7 INJECTION, SOLUTION INTRAVENOUS; SUBCUTANEOUS
Status: DISCONTINUED | OUTPATIENT
Start: 2023-08-01 | End: 2023-08-02 | Stop reason: HOSPADM

## 2023-08-01 RX ORDER — IBUPROFEN 600 MG/1
1 TABLET ORAL
Status: DISCONTINUED | OUTPATIENT
Start: 2023-08-01 | End: 2023-08-02 | Stop reason: HOSPADM

## 2023-08-01 RX ADMIN — INSULIN LISPRO 5 UNITS: 100 INJECTION, SOLUTION INTRAVENOUS; SUBCUTANEOUS at 07:58

## 2023-08-01 RX ADMIN — SODIUM CHLORIDE 100 ML/HR: 9 INJECTION, SOLUTION INTRAVENOUS at 00:48

## 2023-08-01 RX ADMIN — PROCHLORPERAZINE EDISYLATE 5 MG: 5 INJECTION INTRAMUSCULAR; INTRAVENOUS at 22:08

## 2023-08-01 RX ADMIN — INSULIN LISPRO 2 UNITS: 100 INJECTION, SOLUTION INTRAVENOUS; SUBCUTANEOUS at 12:22

## 2023-08-01 RX ADMIN — ACETAMINOPHEN 650 MG: 325 TABLET, FILM COATED ORAL at 04:48

## 2023-08-01 RX ADMIN — CEFTRIAXONE 2000 MG: 2 INJECTION, POWDER, FOR SOLUTION INTRAMUSCULAR; INTRAVENOUS at 22:08

## 2023-08-01 RX ADMIN — ONDANSETRON HYDROCHLORIDE 4 MG: 4 TABLET, FILM COATED ORAL at 16:05

## 2023-08-01 RX ADMIN — INSULIN LISPRO 3 UNITS: 100 INJECTION, SOLUTION INTRAVENOUS; SUBCUTANEOUS at 22:08

## 2023-08-01 RX ADMIN — TRAZODONE HYDROCHLORIDE 50 MG: 50 TABLET ORAL at 22:08

## 2023-08-02 ENCOUNTER — READMISSION MANAGEMENT (OUTPATIENT)
Dept: CALL CENTER | Facility: HOSPITAL | Age: 73
End: 2023-08-02
Payer: MEDICARE

## 2023-08-02 VITALS
BODY MASS INDEX: 34.55 KG/M2 | OXYGEN SATURATION: 98 % | WEIGHT: 215 LBS | RESPIRATION RATE: 15 BRPM | SYSTOLIC BLOOD PRESSURE: 158 MMHG | HEART RATE: 82 BPM | DIASTOLIC BLOOD PRESSURE: 73 MMHG | HEIGHT: 66 IN | TEMPERATURE: 97.6 F

## 2023-08-02 LAB
ANION GAP SERPL CALCULATED.3IONS-SCNC: 10 MMOL/L (ref 5–15)
BASOPHILS # BLD AUTO: 0.1 10*3/MM3 (ref 0–0.2)
BASOPHILS NFR BLD AUTO: 0.8 % (ref 0–1.5)
BUN SERPL-MCNC: 46 MG/DL (ref 8–23)
BUN/CREAT SERPL: 18.2 (ref 7–25)
CALCIUM SPEC-SCNC: 8.4 MG/DL (ref 8.6–10.5)
CHLORIDE SERPL-SCNC: 107 MMOL/L (ref 98–107)
CO2 SERPL-SCNC: 23 MMOL/L (ref 22–29)
CREAT SERPL-MCNC: 2.53 MG/DL (ref 0.57–1)
DEPRECATED RDW RBC AUTO: 52.9 FL (ref 37–54)
EGFRCR SERPLBLD CKD-EPI 2021: 19.3 ML/MIN/1.73
EOSINOPHIL # BLD AUTO: 0.1 10*3/MM3 (ref 0–0.4)
EOSINOPHIL NFR BLD AUTO: 1.1 % (ref 0.3–6.2)
ERYTHROCYTE [DISTWIDTH] IN BLOOD BY AUTOMATED COUNT: 14.1 % (ref 12.3–15.4)
GLUCOSE BLDC GLUCOMTR-MCNC: 139 MG/DL (ref 70–105)
GLUCOSE BLDC GLUCOMTR-MCNC: 205 MG/DL (ref 70–105)
GLUCOSE BLDC GLUCOMTR-MCNC: 216 MG/DL (ref 70–105)
GLUCOSE SERPL-MCNC: 167 MG/DL (ref 65–99)
HCT VFR BLD AUTO: 33.4 % (ref 34–46.6)
HGB BLD-MCNC: 10.5 G/DL (ref 12–15.9)
LYMPHOCYTES # BLD AUTO: 1.8 10*3/MM3 (ref 0.7–3.1)
LYMPHOCYTES NFR BLD AUTO: 17.1 % (ref 19.6–45.3)
MCH RBC QN AUTO: 33.7 PG (ref 26.6–33)
MCHC RBC AUTO-ENTMCNC: 31.6 G/DL (ref 31.5–35.7)
MCV RBC AUTO: 106.7 FL (ref 79–97)
MONOCYTES # BLD AUTO: 0.7 10*3/MM3 (ref 0.1–0.9)
MONOCYTES NFR BLD AUTO: 6.3 % (ref 5–12)
NEUTROPHILS NFR BLD AUTO: 7.9 10*3/MM3 (ref 1.7–7)
NEUTROPHILS NFR BLD AUTO: 74.7 % (ref 42.7–76)
NRBC BLD AUTO-RTO: 0.1 /100 WBC (ref 0–0.2)
PLAT MORPH BLD: NORMAL
PLATELET # BLD AUTO: 281 10*3/MM3 (ref 140–450)
PMV BLD AUTO: 9.1 FL (ref 6–12)
POTASSIUM SERPL-SCNC: 4.9 MMOL/L (ref 3.5–5.2)
RBC # BLD AUTO: 3.13 10*6/MM3 (ref 3.77–5.28)
RBC MORPH BLD: NORMAL
SODIUM SERPL-SCNC: 140 MMOL/L (ref 136–145)
WBC MORPH BLD: NORMAL
WBC NRBC COR # BLD: 10.6 10*3/MM3 (ref 3.4–10.8)

## 2023-08-02 PROCEDURE — 96361 HYDRATE IV INFUSION ADD-ON: CPT

## 2023-08-02 PROCEDURE — 96376 TX/PRO/DX INJ SAME DRUG ADON: CPT

## 2023-08-02 PROCEDURE — 80048 BASIC METABOLIC PNL TOTAL CA: CPT | Performed by: NURSE PRACTITIONER

## 2023-08-02 PROCEDURE — G0378 HOSPITAL OBSERVATION PER HR: HCPCS

## 2023-08-02 PROCEDURE — 97162 PT EVAL MOD COMPLEX 30 MIN: CPT

## 2023-08-02 PROCEDURE — 85025 COMPLETE CBC W/AUTO DIFF WBC: CPT | Performed by: NURSE PRACTITIONER

## 2023-08-02 PROCEDURE — 25010000002 ONDANSETRON PER 1 MG: Performed by: STUDENT IN AN ORGANIZED HEALTH CARE EDUCATION/TRAINING PROGRAM

## 2023-08-02 PROCEDURE — 85007 BL SMEAR W/DIFF WBC COUNT: CPT | Performed by: NURSE PRACTITIONER

## 2023-08-02 PROCEDURE — 63710000001 INSULIN LISPRO (HUMAN) PER 5 UNITS: Performed by: NURSE PRACTITIONER

## 2023-08-02 PROCEDURE — 82948 REAGENT STRIP/BLOOD GLUCOSE: CPT

## 2023-08-02 PROCEDURE — 36415 COLL VENOUS BLD VENIPUNCTURE: CPT | Performed by: NURSE PRACTITIONER

## 2023-08-02 RX ORDER — ERGOCALCIFEROL 1.25 MG/1
50000 CAPSULE ORAL WEEKLY
Status: DISCONTINUED | OUTPATIENT
Start: 2023-08-03 | End: 2023-08-02 | Stop reason: HOSPADM

## 2023-08-02 RX ORDER — HYDRALAZINE HYDROCHLORIDE 25 MG/1
50 TABLET, FILM COATED ORAL 3 TIMES DAILY
Status: DISCONTINUED | OUTPATIENT
Start: 2023-08-02 | End: 2023-08-02 | Stop reason: HOSPADM

## 2023-08-02 RX ORDER — CALCITRIOL 0.25 UG/1
0.25 CAPSULE, LIQUID FILLED ORAL DAILY
Status: DISCONTINUED | OUTPATIENT
Start: 2023-08-02 | End: 2023-08-02 | Stop reason: HOSPADM

## 2023-08-02 RX ORDER — ALLOPURINOL 100 MG/1
100 TABLET ORAL DAILY
Status: DISCONTINUED | OUTPATIENT
Start: 2023-08-02 | End: 2023-08-02 | Stop reason: HOSPADM

## 2023-08-02 RX ORDER — CEFDINIR 300 MG/1
300 CAPSULE ORAL 2 TIMES DAILY
Qty: 10 CAPSULE | Refills: 0 | Status: SHIPPED | OUTPATIENT
Start: 2023-08-02 | End: 2023-08-14

## 2023-08-02 RX ORDER — DULOXETIN HYDROCHLORIDE 30 MG/1
60 CAPSULE, DELAYED RELEASE ORAL 2 TIMES DAILY
Status: DISCONTINUED | OUTPATIENT
Start: 2023-08-02 | End: 2023-08-02 | Stop reason: HOSPADM

## 2023-08-02 RX ORDER — CARVEDILOL 25 MG/1
25 TABLET ORAL 2 TIMES DAILY WITH MEALS
Status: DISCONTINUED | OUTPATIENT
Start: 2023-08-02 | End: 2023-08-02 | Stop reason: HOSPADM

## 2023-08-02 RX ORDER — FUROSEMIDE 40 MG/1
40 TABLET ORAL
Status: DISCONTINUED | OUTPATIENT
Start: 2023-08-02 | End: 2023-08-02 | Stop reason: HOSPADM

## 2023-08-02 RX ORDER — ALPRAZOLAM 0.5 MG/1
0.5 TABLET ORAL 3 TIMES DAILY PRN
Status: DISCONTINUED | OUTPATIENT
Start: 2023-08-02 | End: 2023-08-02 | Stop reason: HOSPADM

## 2023-08-02 RX ORDER — MECLIZINE HYDROCHLORIDE 25 MG/1
25 TABLET ORAL 3 TIMES DAILY PRN
Status: DISCONTINUED | OUTPATIENT
Start: 2023-08-02 | End: 2023-08-02 | Stop reason: HOSPADM

## 2023-08-02 RX ORDER — SODIUM BICARBONATE 650 MG/1
650 TABLET ORAL DAILY
Status: DISCONTINUED | OUTPATIENT
Start: 2023-08-02 | End: 2023-08-02 | Stop reason: HOSPADM

## 2023-08-02 RX ORDER — PRIMIDONE 50 MG/1
50 TABLET ORAL NIGHTLY
Status: DISCONTINUED | OUTPATIENT
Start: 2023-08-02 | End: 2023-08-02 | Stop reason: HOSPADM

## 2023-08-02 RX ADMIN — ALLOPURINOL 100 MG: 100 TABLET ORAL at 12:58

## 2023-08-02 RX ADMIN — INSULIN LISPRO 3 UNITS: 100 INJECTION, SOLUTION INTRAVENOUS; SUBCUTANEOUS at 13:07

## 2023-08-02 RX ADMIN — ONDANSETRON 4 MG: 2 INJECTION INTRAMUSCULAR; INTRAVENOUS at 00:50

## 2023-08-02 RX ADMIN — SODIUM BICARBONATE 650 MG: 650 TABLET ORAL at 12:58

## 2023-08-02 RX ADMIN — DULOXETINE HYDROCHLORIDE 60 MG: 30 CAPSULE, DELAYED RELEASE ORAL at 12:58

## 2023-08-02 RX ADMIN — ONDANSETRON 4 MG: 2 INJECTION INTRAMUSCULAR; INTRAVENOUS at 10:57

## 2023-08-02 RX ADMIN — CARVEDILOL 25 MG: 25 TABLET, FILM COATED ORAL at 12:58

## 2023-08-02 RX ADMIN — FUROSEMIDE 40 MG: 40 TABLET ORAL at 12:58

## 2023-08-02 RX ADMIN — CALCITRIOL CAPSULES 0.25 MCG 0.25 MCG: 0.25 CAPSULE ORAL at 12:58

## 2023-08-02 RX ADMIN — SODIUM CHLORIDE 100 ML/HR: 9 INJECTION, SOLUTION INTRAVENOUS at 10:56

## 2023-08-02 RX ADMIN — ALPRAZOLAM 0.5 MG: 0.5 TABLET ORAL at 12:58

## 2023-08-03 ENCOUNTER — TRANSITIONAL CARE MANAGEMENT TELEPHONE ENCOUNTER (OUTPATIENT)
Dept: CALL CENTER | Facility: HOSPITAL | Age: 73
End: 2023-08-03
Payer: MEDICARE

## 2023-08-03 LAB — BACTERIA SPEC AEROBE CULT: ABNORMAL

## 2023-08-04 ENCOUNTER — TRANSITIONAL CARE MANAGEMENT TELEPHONE ENCOUNTER (OUTPATIENT)
Dept: CALL CENTER | Facility: HOSPITAL | Age: 73
End: 2023-08-04
Payer: MEDICARE

## 2023-08-05 LAB
BACTERIA SPEC AEROBE CULT: NORMAL
BACTERIA SPEC AEROBE CULT: NORMAL

## 2023-08-10 ENCOUNTER — TREATMENT (OUTPATIENT)
Dept: PHYSICAL THERAPY | Facility: CLINIC | Age: 73
End: 2023-08-10
Payer: MEDICARE

## 2023-08-10 DIAGNOSIS — M25.552 PAIN OF LEFT HIP: Primary | ICD-10-CM

## 2023-08-10 DIAGNOSIS — Z74.09 IMPAIRED FUNCTIONAL MOBILITY, BALANCE, GAIT, AND ENDURANCE: ICD-10-CM

## 2023-08-14 ENCOUNTER — OFFICE VISIT (OUTPATIENT)
Dept: FAMILY MEDICINE CLINIC | Facility: CLINIC | Age: 73
End: 2023-08-14
Payer: MEDICARE

## 2023-08-14 VITALS
OXYGEN SATURATION: 96 % | DIASTOLIC BLOOD PRESSURE: 70 MMHG | HEIGHT: 66 IN | RESPIRATION RATE: 18 BRPM | HEART RATE: 95 BPM | TEMPERATURE: 98.1 F | WEIGHT: 217.2 LBS | SYSTOLIC BLOOD PRESSURE: 136 MMHG | BODY MASS INDEX: 34.91 KG/M2

## 2023-08-14 DIAGNOSIS — F41.9 ANXIETY: ICD-10-CM

## 2023-08-14 DIAGNOSIS — R89.9 ABNORMAL LABORATORY TEST RESULT: ICD-10-CM

## 2023-08-14 DIAGNOSIS — M25.552 LEFT HIP PAIN: ICD-10-CM

## 2023-08-14 DIAGNOSIS — E66.9 OBESITY (BMI 30-39.9): Chronic | ICD-10-CM

## 2023-08-14 DIAGNOSIS — N39.0 FREQUENT UTI: ICD-10-CM

## 2023-08-14 DIAGNOSIS — N30.01 ACUTE CYSTITIS WITH HEMATURIA: ICD-10-CM

## 2023-08-14 DIAGNOSIS — F51.01 PRIMARY INSOMNIA: ICD-10-CM

## 2023-08-14 DIAGNOSIS — Z09 HOSPITAL DISCHARGE FOLLOW-UP: Primary | ICD-10-CM

## 2023-08-14 LAB
BILIRUB BLD-MCNC: ABNORMAL MG/DL
CLARITY, POC: ABNORMAL
COLOR UR: YELLOW
GLUCOSE UR STRIP-MCNC: ABNORMAL MG/DL
KETONES UR QL: NEGATIVE
LEUKOCYTE EST, POC: ABNORMAL
NITRITE UR-MCNC: NEGATIVE MG/ML
PH UR: 5 [PH] (ref 5–8)
PROT UR STRIP-MCNC: ABNORMAL MG/DL
RBC # UR STRIP: NEGATIVE /UL
SP GR UR: 1.01 (ref 1–1.03)
UROBILINOGEN UR QL: NORMAL

## 2023-08-14 RX ORDER — HYDROXYZINE HYDROCHLORIDE 10 MG/1
10 TABLET, FILM COATED ORAL 3 TIMES DAILY PRN
Qty: 90 TABLET | Refills: 1 | Status: SHIPPED | OUTPATIENT
Start: 2023-08-14

## 2023-08-14 RX ORDER — LEVOFLOXACIN 250 MG/1
250 TABLET ORAL DAILY
Qty: 5 TABLET | Refills: 0 | Status: SHIPPED | OUTPATIENT
Start: 2023-08-14

## 2023-08-14 RX ORDER — TRAZODONE HYDROCHLORIDE 100 MG/1
100 TABLET ORAL NIGHTLY
Qty: 30 TABLET | Refills: 1 | Status: SHIPPED | OUTPATIENT
Start: 2023-08-14

## 2023-08-15 ENCOUNTER — TREATMENT (OUTPATIENT)
Dept: PHYSICAL THERAPY | Facility: CLINIC | Age: 73
End: 2023-08-15
Payer: MEDICARE

## 2023-08-15 DIAGNOSIS — M25.552 PAIN OF LEFT HIP: Primary | ICD-10-CM

## 2023-08-15 DIAGNOSIS — Z74.09 IMPAIRED FUNCTIONAL MOBILITY, BALANCE, GAIT, AND ENDURANCE: ICD-10-CM

## 2023-08-15 LAB
ALBUMIN SERPL-MCNC: 4.2 G/DL (ref 3.8–4.8)
ALBUMIN/GLOB SERPL: 1.8 {RATIO} (ref 1.2–2.2)
ALP SERPL-CCNC: 71 IU/L (ref 44–121)
ALT SERPL-CCNC: 73 IU/L (ref 0–32)
AST SERPL-CCNC: 55 IU/L (ref 0–40)
BASOPHILS # BLD AUTO: 0.1 X10E3/UL (ref 0–0.2)
BASOPHILS NFR BLD AUTO: 1 %
BILIRUB SERPL-MCNC: <0.2 MG/DL (ref 0–1.2)
BUN SERPL-MCNC: 44 MG/DL (ref 8–27)
BUN/CREAT SERPL: 16 (ref 12–28)
CALCIUM SERPL-MCNC: 8 MG/DL (ref 8.7–10.3)
CHLORIDE SERPL-SCNC: 102 MMOL/L (ref 96–106)
CO2 SERPL-SCNC: 22 MMOL/L (ref 20–29)
CREAT SERPL-MCNC: 2.75 MG/DL (ref 0.57–1)
EGFRCR SERPLBLD CKD-EPI 2021: 17 ML/MIN/1.73
EOSINOPHIL # BLD AUTO: 0.2 X10E3/UL (ref 0–0.4)
EOSINOPHIL NFR BLD AUTO: 2 %
ERYTHROCYTE [DISTWIDTH] IN BLOOD BY AUTOMATED COUNT: 12.8 % (ref 11.7–15.4)
GLOBULIN SER CALC-MCNC: 2.4 G/DL (ref 1.5–4.5)
GLUCOSE SERPL-MCNC: 180 MG/DL (ref 70–99)
HCT VFR BLD AUTO: 31.1 % (ref 34–46.6)
HGB BLD-MCNC: 10.3 G/DL (ref 11.1–15.9)
IMM GRANULOCYTES # BLD AUTO: 0.1 X10E3/UL (ref 0–0.1)
IMM GRANULOCYTES NFR BLD AUTO: 1 %
LYMPHOCYTES # BLD AUTO: 1.2 X10E3/UL (ref 0.7–3.1)
LYMPHOCYTES NFR BLD AUTO: 14 %
MCH RBC QN AUTO: 33.3 PG (ref 26.6–33)
MCHC RBC AUTO-ENTMCNC: 33.1 G/DL (ref 31.5–35.7)
MCV RBC AUTO: 101 FL (ref 79–97)
MONOCYTES # BLD AUTO: 0.7 X10E3/UL (ref 0.1–0.9)
MONOCYTES NFR BLD AUTO: 8 %
NEUTROPHILS # BLD AUTO: 6.5 X10E3/UL (ref 1.4–7)
NEUTROPHILS NFR BLD AUTO: 74 %
PLATELET # BLD AUTO: 257 X10E3/UL (ref 150–450)
POTASSIUM SERPL-SCNC: 4.6 MMOL/L (ref 3.5–5.2)
PROT SERPL-MCNC: 6.6 G/DL (ref 6–8.5)
RBC # BLD AUTO: 3.09 X10E6/UL (ref 3.77–5.28)
SODIUM SERPL-SCNC: 142 MMOL/L (ref 134–144)
WBC # BLD AUTO: 8.7 X10E3/UL (ref 3.4–10.8)

## 2023-08-16 LAB
BACTERIA UR CULT: NORMAL
BACTERIA UR CULT: NORMAL

## 2023-08-17 ENCOUNTER — APPOINTMENT (OUTPATIENT)
Dept: VASCULAR SURGERY | Facility: HOSPITAL | Age: 73
End: 2023-08-17
Payer: MEDICARE

## 2023-08-17 ENCOUNTER — TRANSCRIBE ORDERS (OUTPATIENT)
Dept: ADMINISTRATIVE | Facility: HOSPITAL | Age: 73
End: 2023-08-17
Payer: MEDICARE

## 2023-08-17 ENCOUNTER — TELEPHONE (OUTPATIENT)
Dept: FAMILY MEDICINE CLINIC | Facility: CLINIC | Age: 73
End: 2023-08-17
Payer: MEDICARE

## 2023-08-17 ENCOUNTER — HOSPITAL ENCOUNTER (OUTPATIENT)
Dept: CARDIOLOGY | Facility: HOSPITAL | Age: 73
Discharge: HOME | End: 2023-08-17
Payer: MEDICARE

## 2023-08-17 DIAGNOSIS — N18.6 END STAGE RENAL DISEASE: Primary | ICD-10-CM

## 2023-08-17 DIAGNOSIS — Z99.2 ENCOUNTER FOR EXTRACORPOREAL DIALYSIS: ICD-10-CM

## 2023-08-17 DIAGNOSIS — N18.6 END STAGE RENAL DISEASE: ICD-10-CM

## 2023-08-17 LAB
BH CV VAS DIALYSIS ARTERIAL ANASTOMOSIS DIAMETER: 0.29 CM
BH CV VAS DIALYSIS ARTERIAL ANASTOMOSIS EDV: 284 CM/SEC
BH CV VAS DIALYSIS ARTERIAL ANASTOMOSIS PSV: 708 CM/SEC
BH CV VAS DIALYSIS CONDUIT DIST DEPTH: 1.09 CM
BH CV VAS DIALYSIS CONDUIT DIST DIAMETER: 0.66 CM
BH CV VAS DIALYSIS CONDUIT DIST EDV: 62 CM/SEC
BH CV VAS DIALYSIS CONDUIT DIST FLOW VOL: 781 ML/MIN
BH CV VAS DIALYSIS CONDUIT DIST PSV: 127 CM/SEC
BH CV VAS DIALYSIS CONDUIT MID DEPTH: 0.43 CM
BH CV VAS DIALYSIS CONDUIT MID DIAMETER: 0.6 CM
BH CV VAS DIALYSIS CONDUIT MID EDV: 67 CM/SEC
BH CV VAS DIALYSIS CONDUIT MID FLOW VOL: 694 ML/MIN
BH CV VAS DIALYSIS CONDUIT MID PSV: 131 CM/SEC
BH CV VAS DIALYSIS CONDUIT PROX DEPTH: 0.34 CM
BH CV VAS DIALYSIS CONDUIT PROX DIAMETER: 0.81 CM
BH CV VAS DIALYSIS CONDUIT PROX EDV: 87 CM/SEC
BH CV VAS DIALYSIS CONDUIT PROX FLOW VOL: 918 ML/MIN
BH CV VAS DIALYSIS CONDUIT PROX PSV: 243 CM/SEC
BH CV VAS DIALYSIS LEFT BRANCH 1 DIAMETER: 0.18 CM
BH CV VAS DIALYSIS LEFT BRANCH 2 DIAMETER: 0.16 CM
BH CV VAS DIALYSIS LEFT BRANCH 3 DIAMETER: 0.2 CM
BH CV VAS DIALYSIS PRE-INFLOW BRACHIAL DIAMETER: 0.46 CM
BH CV VAS DIALYSIS PRE-INFLOW BRACHIAL EDV: 122 CM/SEC
BH CV VAS DIALYSIS PRE-INFLOW BRACHIAL FLOW VOL: 967 ML/MIN
BH CV VAS DIALYSIS PRE-INFLOW BRACHIAL PSV: 246 CM/SEC
BH CV VAS DIALYSIS VENOUS OUTFLOW CEPHALIC ARCH DIAMETE: 0.61 CM
BH CV VAS DIALYSIS VENOUS OUTFLOW CEPHALIC ARCH EDV: 120 CM/SEC
BH CV VAS DIALYSIS VENOUS OUTFLOW CEPHALIC ARCH PSV: 223 CM/SEC
BH CV VAS DIALYSIS VENOUS OUTFLOW SUBCLAVIAN DIAMETER: 0.97 CM
BH CV VAS DIALYSIS VENOUS OUTFLOW SUBCLAVIAN EDV: 22 CM/SEC
BH CV VAS DIALYSIS VENOUS OUTFLOW SUBCLAVIAN PSV: 159 CM/SEC

## 2023-08-17 PROCEDURE — 93990 DOPPLER FLOW TESTING: CPT

## 2023-08-17 PROCEDURE — G0463 HOSPITAL OUTPT CLINIC VISIT: HCPCS

## 2023-08-18 ENCOUNTER — TREATMENT (OUTPATIENT)
Dept: PHYSICAL THERAPY | Facility: CLINIC | Age: 73
End: 2023-08-18
Payer: MEDICARE

## 2023-08-18 DIAGNOSIS — M25.552 PAIN OF LEFT HIP: Primary | ICD-10-CM

## 2023-08-18 DIAGNOSIS — Z74.09 IMPAIRED FUNCTIONAL MOBILITY, BALANCE, GAIT, AND ENDURANCE: ICD-10-CM

## 2023-08-21 ENCOUNTER — TELEPHONE (OUTPATIENT)
Dept: FAMILY MEDICINE CLINIC | Facility: CLINIC | Age: 73
End: 2023-08-21
Payer: MEDICARE

## 2023-08-21 DIAGNOSIS — D53.9 ANEMIA ASSOCIATED WITH NUTRITIONAL DEFICIENCY: Primary | ICD-10-CM

## 2023-08-22 ENCOUNTER — TREATMENT (OUTPATIENT)
Dept: PHYSICAL THERAPY | Facility: CLINIC | Age: 73
End: 2023-08-22
Payer: MEDICARE

## 2023-08-22 DIAGNOSIS — Z74.09 IMPAIRED FUNCTIONAL MOBILITY, BALANCE, GAIT, AND ENDURANCE: ICD-10-CM

## 2023-08-22 DIAGNOSIS — M25.552 PAIN OF LEFT HIP: Primary | ICD-10-CM

## 2023-08-23 DIAGNOSIS — E11.65 TYPE 2 DIABETES MELLITUS WITH HYPERGLYCEMIA, WITHOUT LONG-TERM CURRENT USE OF INSULIN: Primary | ICD-10-CM

## 2023-08-24 ENCOUNTER — TREATMENT (OUTPATIENT)
Dept: PHYSICAL THERAPY | Facility: CLINIC | Age: 73
End: 2023-08-24
Payer: MEDICARE

## 2023-08-24 DIAGNOSIS — M25.552 PAIN OF LEFT HIP: Primary | ICD-10-CM

## 2023-08-24 DIAGNOSIS — Z74.09 IMPAIRED FUNCTIONAL MOBILITY, BALANCE, GAIT, AND ENDURANCE: ICD-10-CM

## 2023-08-25 LAB
FERRITIN SERPL-MCNC: 43 NG/ML (ref 15–150)
FOLATE SERPL-MCNC: 12.6 NG/ML
IRON SATN MFR SERPL: 25 % (ref 15–55)
IRON SERPL-MCNC: 73 UG/DL (ref 27–139)
TIBC SERPL-MCNC: 289 UG/DL (ref 250–450)
UIBC SERPL-MCNC: 216 UG/DL (ref 118–369)
VIT B12 SERPL-MCNC: 404 PG/ML (ref 232–1245)

## 2023-08-25 RX ORDER — EMPAGLIFLOZIN 25 MG/1
25 TABLET, FILM COATED ORAL EVERY MORNING
Qty: 30 TABLET | Status: CANCELLED | OUTPATIENT
Start: 2023-08-25

## 2023-08-26 RX ORDER — EMPAGLIFLOZIN 25 MG/1
25 TABLET, FILM COATED ORAL EVERY MORNING
Qty: 90 TABLET | Refills: 3 | Status: SHIPPED | OUTPATIENT
Start: 2023-08-26

## 2023-08-28 ENCOUNTER — TELEPHONE (OUTPATIENT)
Dept: FAMILY MEDICINE CLINIC | Facility: CLINIC | Age: 73
End: 2023-08-28
Payer: MEDICARE

## 2023-08-28 DIAGNOSIS — F51.01 PRIMARY INSOMNIA: ICD-10-CM

## 2023-08-28 DIAGNOSIS — F41.9 ANXIETY: ICD-10-CM

## 2023-08-28 DIAGNOSIS — G25.81 RESTLESS LEG: Primary | ICD-10-CM

## 2023-08-28 RX ORDER — PRIMIDONE 50 MG/1
TABLET ORAL
Qty: 30 TABLET | Refills: 0 | Status: SHIPPED | OUTPATIENT
Start: 2023-08-28

## 2023-08-29 RX ORDER — ROPINIROLE 0.25 MG/1
0.25 TABLET, FILM COATED ORAL NIGHTLY
Qty: 30 TABLET | Refills: 1 | Status: SHIPPED | OUTPATIENT
Start: 2023-08-29

## 2023-08-29 RX ORDER — ARIPIPRAZOLE 2 MG/1
2 TABLET ORAL DAILY
Qty: 30 TABLET | Refills: 1 | Status: SHIPPED | OUTPATIENT
Start: 2023-08-29

## 2023-08-29 RX ORDER — AMITRIPTYLINE HYDROCHLORIDE 10 MG/1
10 TABLET, FILM COATED ORAL NIGHTLY
Qty: 30 TABLET | Refills: 1 | Status: SHIPPED | OUTPATIENT
Start: 2023-08-29

## 2023-08-30 DIAGNOSIS — G25.81 RESTLESS LEG: ICD-10-CM

## 2023-08-31 ENCOUNTER — TELEPHONE (OUTPATIENT)
Dept: PHYSICAL THERAPY | Facility: CLINIC | Age: 73
End: 2023-08-31

## 2023-09-01 ENCOUNTER — LAB (OUTPATIENT)
Dept: LAB | Facility: HOSPITAL | Age: 73
End: 2023-09-01
Payer: MEDICARE

## 2023-09-01 ENCOUNTER — TRANSCRIBE ORDERS (OUTPATIENT)
Dept: ADMINISTRATIVE | Facility: HOSPITAL | Age: 73
End: 2023-09-01
Payer: MEDICARE

## 2023-09-01 DIAGNOSIS — E11.8 TYPE II DIABETES MELLITUS WITH COMPLICATION: ICD-10-CM

## 2023-09-01 DIAGNOSIS — E55.9 VITAMIN D DEFICIENCY, UNSPECIFIED: ICD-10-CM

## 2023-09-01 DIAGNOSIS — E79.0 URICACIDEMIA: ICD-10-CM

## 2023-09-01 DIAGNOSIS — N18.4 CHRONIC KIDNEY DISEASE, STAGE IV (SEVERE): Primary | ICD-10-CM

## 2023-09-01 DIAGNOSIS — R80.9 PROTEINURIA, UNSPECIFIED TYPE: ICD-10-CM

## 2023-09-01 DIAGNOSIS — N25.81 SECONDARY HYPERPARATHYROIDISM OF RENAL ORIGIN: ICD-10-CM

## 2023-09-01 DIAGNOSIS — E11.8 DIABETIC COMPLICATION: ICD-10-CM

## 2023-09-01 DIAGNOSIS — N18.4 CHRONIC KIDNEY DISEASE, STAGE IV (SEVERE): ICD-10-CM

## 2023-09-01 LAB
25(OH)D3 SERPL-MCNC: 57.3 NG/ML (ref 30–100)
ANION GAP SERPL CALCULATED.3IONS-SCNC: 13 MMOL/L (ref 5–15)
BACTERIA UR QL AUTO: ABNORMAL /HPF
BASOPHILS # BLD AUTO: 0.04 10*3/MM3 (ref 0–0.2)
BASOPHILS NFR BLD AUTO: 0.6 % (ref 0–1.5)
BILIRUB UR QL STRIP: NEGATIVE
BUN SERPL-MCNC: 36 MG/DL (ref 8–23)
BUN/CREAT SERPL: 16.2 (ref 7–25)
CALCIUM SPEC-SCNC: 8.5 MG/DL (ref 8.6–10.5)
CHLORIDE SERPL-SCNC: 101 MMOL/L (ref 98–107)
CLARITY UR: CLEAR
CO2 SERPL-SCNC: 25 MMOL/L (ref 22–29)
COLOR UR: YELLOW
CREAT SERPL-MCNC: 2.22 MG/DL (ref 0.57–1)
CREAT UR-MCNC: 62 MG/DL
DEPRECATED RDW RBC AUTO: 46 FL (ref 37–54)
EGFRCR SERPLBLD CKD-EPI 2021: 22.6 ML/MIN/1.73
EOSINOPHIL # BLD AUTO: 0.24 10*3/MM3 (ref 0–0.4)
EOSINOPHIL NFR BLD AUTO: 3.7 % (ref 0.3–6.2)
ERYTHROCYTE [DISTWIDTH] IN BLOOD BY AUTOMATED COUNT: 12.4 % (ref 12.3–15.4)
GLUCOSE SERPL-MCNC: 248 MG/DL (ref 65–99)
GLUCOSE UR STRIP-MCNC: ABNORMAL MG/DL
HBA1C MFR BLD: 7.8 % (ref 4.8–5.6)
HBV SURFACE AG SERPL QL IA: NORMAL
HCT VFR BLD AUTO: 33.2 % (ref 34–46.6)
HGB BLD-MCNC: 10.9 G/DL (ref 12–15.9)
HGB UR QL STRIP.AUTO: NEGATIVE
HYALINE CASTS UR QL AUTO: ABNORMAL /LPF
IMM GRANULOCYTES # BLD AUTO: 0.02 10*3/MM3 (ref 0–0.05)
IMM GRANULOCYTES NFR BLD AUTO: 0.3 % (ref 0–0.5)
KETONES UR QL STRIP: NEGATIVE
LEUKOCYTE ESTERASE UR QL STRIP.AUTO: ABNORMAL
LYMPHOCYTES # BLD AUTO: 1.17 10*3/MM3 (ref 0.7–3.1)
LYMPHOCYTES NFR BLD AUTO: 18.1 % (ref 19.6–45.3)
MCH RBC QN AUTO: 33.3 PG (ref 26.6–33)
MCHC RBC AUTO-ENTMCNC: 32.8 G/DL (ref 31.5–35.7)
MCV RBC AUTO: 101.5 FL (ref 79–97)
MONOCYTES # BLD AUTO: 0.47 10*3/MM3 (ref 0.1–0.9)
MONOCYTES NFR BLD AUTO: 7.3 % (ref 5–12)
NEUTROPHILS NFR BLD AUTO: 4.54 10*3/MM3 (ref 1.7–7)
NEUTROPHILS NFR BLD AUTO: 70 % (ref 42.7–76)
NITRITE UR QL STRIP: NEGATIVE
NRBC BLD AUTO-RTO: 0 /100 WBC (ref 0–0.2)
PH UR STRIP.AUTO: 6 [PH] (ref 5–8)
PHOSPHATE SERPL-MCNC: 3.8 MG/DL (ref 2.5–4.5)
PLATELET # BLD AUTO: 295 10*3/MM3 (ref 140–450)
PMV BLD AUTO: 10.3 FL (ref 6–12)
POTASSIUM SERPL-SCNC: 3.8 MMOL/L (ref 3.5–5.2)
PROT ?TM UR-MCNC: 114 MG/DL
PROT UR QL STRIP: ABNORMAL
PROT/CREAT UR: 1838.7 MG/G CREA (ref 0–200)
PTH-INTACT SERPL-MCNC: 518 PG/ML (ref 15–65)
RBC # BLD AUTO: 3.27 10*6/MM3 (ref 3.77–5.28)
RBC # UR STRIP: ABNORMAL /HPF
REF LAB TEST METHOD: ABNORMAL
SODIUM SERPL-SCNC: 139 MMOL/L (ref 136–145)
SP GR UR STRIP: 1.02 (ref 1–1.03)
SQUAMOUS #/AREA URNS HPF: ABNORMAL /HPF
URATE SERPL-MCNC: 3.7 MG/DL (ref 2.4–5.7)
UROBILINOGEN UR QL STRIP: ABNORMAL
WBC # UR STRIP: ABNORMAL /HPF
WBC NRBC COR # BLD: 6.48 10*3/MM3 (ref 3.4–10.8)

## 2023-09-01 PROCEDURE — 84550 ASSAY OF BLOOD/URIC ACID: CPT

## 2023-09-01 PROCEDURE — 82570 ASSAY OF URINE CREATININE: CPT

## 2023-09-01 PROCEDURE — 84100 ASSAY OF PHOSPHORUS: CPT

## 2023-09-01 PROCEDURE — 36415 COLL VENOUS BLD VENIPUNCTURE: CPT

## 2023-09-01 PROCEDURE — 83036 HEMOGLOBIN GLYCOSYLATED A1C: CPT

## 2023-09-01 PROCEDURE — 87340 HEPATITIS B SURFACE AG IA: CPT

## 2023-09-01 PROCEDURE — 84156 ASSAY OF PROTEIN URINE: CPT

## 2023-09-01 PROCEDURE — 80048 BASIC METABOLIC PNL TOTAL CA: CPT

## 2023-09-01 PROCEDURE — 83970 ASSAY OF PARATHORMONE: CPT

## 2023-09-01 PROCEDURE — 82306 VITAMIN D 25 HYDROXY: CPT

## 2023-09-01 PROCEDURE — 81001 URINALYSIS AUTO W/SCOPE: CPT

## 2023-09-01 PROCEDURE — 87086 URINE CULTURE/COLONY COUNT: CPT

## 2023-09-01 PROCEDURE — 85025 COMPLETE CBC W/AUTO DIFF WBC: CPT

## 2023-09-03 LAB — BACTERIA SPEC AEROBE CULT: NORMAL

## 2023-09-05 ENCOUNTER — TREATMENT (OUTPATIENT)
Dept: PHYSICAL THERAPY | Facility: CLINIC | Age: 73
End: 2023-09-05
Payer: MEDICARE

## 2023-09-05 DIAGNOSIS — Z74.09 IMPAIRED FUNCTIONAL MOBILITY, BALANCE, GAIT, AND ENDURANCE: ICD-10-CM

## 2023-09-05 DIAGNOSIS — M25.552 PAIN OF LEFT HIP: Primary | ICD-10-CM

## 2023-09-05 RX ORDER — ROPINIROLE 0.25 MG/1
TABLET, FILM COATED ORAL
Qty: 90 TABLET | OUTPATIENT
Start: 2023-09-05

## 2023-09-07 ENCOUNTER — TREATMENT (OUTPATIENT)
Dept: PHYSICAL THERAPY | Facility: CLINIC | Age: 73
End: 2023-09-07
Payer: MEDICARE

## 2023-09-07 DIAGNOSIS — M25.552 PAIN OF LEFT HIP: Primary | ICD-10-CM

## 2023-09-07 DIAGNOSIS — Z74.09 IMPAIRED FUNCTIONAL MOBILITY, BALANCE, GAIT, AND ENDURANCE: ICD-10-CM

## 2023-09-11 RX ORDER — ROSUVASTATIN CALCIUM 10 MG/1
TABLET, COATED ORAL
Qty: 90 TABLET | Refills: 1 | OUTPATIENT
Start: 2023-09-11

## 2023-09-12 ENCOUNTER — TREATMENT (OUTPATIENT)
Dept: PHYSICAL THERAPY | Facility: CLINIC | Age: 73
End: 2023-09-12
Payer: MEDICARE

## 2023-09-12 DIAGNOSIS — M25.552 PAIN OF LEFT HIP: Primary | ICD-10-CM

## 2023-09-12 DIAGNOSIS — Z74.09 IMPAIRED FUNCTIONAL MOBILITY, BALANCE, GAIT, AND ENDURANCE: ICD-10-CM

## 2023-09-14 ENCOUNTER — OFFICE VISIT (OUTPATIENT)
Dept: FAMILY MEDICINE CLINIC | Facility: CLINIC | Age: 73
End: 2023-09-14
Payer: MEDICARE

## 2023-09-14 VITALS
SYSTOLIC BLOOD PRESSURE: 130 MMHG | HEIGHT: 66 IN | TEMPERATURE: 98.1 F | RESPIRATION RATE: 18 BRPM | WEIGHT: 221.1 LBS | DIASTOLIC BLOOD PRESSURE: 70 MMHG | HEART RATE: 83 BPM | OXYGEN SATURATION: 94 % | BODY MASS INDEX: 35.53 KG/M2

## 2023-09-14 DIAGNOSIS — R79.89 ABNORMAL CBC: ICD-10-CM

## 2023-09-14 DIAGNOSIS — M25.552 LEFT HIP PAIN: ICD-10-CM

## 2023-09-14 DIAGNOSIS — F51.01 PRIMARY INSOMNIA: ICD-10-CM

## 2023-09-14 DIAGNOSIS — F41.9 ANXIETY: ICD-10-CM

## 2023-09-14 DIAGNOSIS — E66.9 OBESITY (BMI 30-39.9): Chronic | ICD-10-CM

## 2023-09-14 DIAGNOSIS — E78.2 MIXED HYPERLIPIDEMIA: ICD-10-CM

## 2023-09-14 DIAGNOSIS — D64.9 ANEMIA, UNSPECIFIED TYPE: ICD-10-CM

## 2023-09-14 DIAGNOSIS — E11.65 TYPE 2 DIABETES MELLITUS WITH HYPERGLYCEMIA, WITHOUT LONG-TERM CURRENT USE OF INSULIN: ICD-10-CM

## 2023-09-14 DIAGNOSIS — R77.8 ELEVATED TROPONIN: ICD-10-CM

## 2023-09-14 DIAGNOSIS — Z00.00 MEDICARE ANNUAL WELLNESS VISIT, SUBSEQUENT: Primary | ICD-10-CM

## 2023-09-14 DIAGNOSIS — Z98.84 H/O GASTRIC BYPASS: ICD-10-CM

## 2023-09-14 PROBLEM — N39.0 ACUTE UTI: Status: RESOLVED | Noted: 2023-08-01 | Resolved: 2023-09-14

## 2023-09-14 RX ORDER — TRAZODONE HYDROCHLORIDE 150 MG/1
150 TABLET ORAL NIGHTLY
Qty: 90 TABLET | Refills: 1 | Status: SHIPPED | OUTPATIENT
Start: 2023-09-14

## 2023-09-14 RX ORDER — TRAZODONE HYDROCHLORIDE 100 MG/1
100 TABLET ORAL NIGHTLY
COMMUNITY
Start: 2023-09-07 | End: 2023-09-14 | Stop reason: DRUGHIGH

## 2023-09-15 ENCOUNTER — TREATMENT (OUTPATIENT)
Dept: PHYSICAL THERAPY | Facility: CLINIC | Age: 73
End: 2023-09-15
Payer: MEDICARE

## 2023-09-15 DIAGNOSIS — Z74.09 IMPAIRED FUNCTIONAL MOBILITY, BALANCE, GAIT, AND ENDURANCE: ICD-10-CM

## 2023-09-15 DIAGNOSIS — M25.552 PAIN OF LEFT HIP: Primary | ICD-10-CM

## 2023-09-20 ENCOUNTER — TELEPHONE (OUTPATIENT)
Dept: FAMILY MEDICINE CLINIC | Facility: CLINIC | Age: 73
End: 2023-09-20
Payer: MEDICARE

## 2023-09-20 LAB
ALBUMIN SERPL-MCNC: 4.1 G/DL (ref 3.8–4.8)
ALBUMIN/GLOB SERPL: 1.6 {RATIO} (ref 1.2–2.2)
ALP SERPL-CCNC: 70 IU/L (ref 44–121)
ALT SERPL-CCNC: 15 IU/L (ref 0–32)
AST SERPL-CCNC: 15 IU/L (ref 0–40)
BILIRUB SERPL-MCNC: <0.2 MG/DL (ref 0–1.2)
BUN SERPL-MCNC: 53 MG/DL (ref 8–27)
BUN/CREAT SERPL: 21 (ref 12–28)
CALCIUM SERPL-MCNC: 8.6 MG/DL (ref 8.7–10.3)
CHLORIDE SERPL-SCNC: 102 MMOL/L (ref 96–106)
CHOLEST SERPL-MCNC: 127 MG/DL (ref 100–199)
CHOLEST/HDLC SERPL: 2.4 RATIO (ref 0–4.4)
CO2 SERPL-SCNC: 23 MMOL/L (ref 20–29)
CREAT SERPL-MCNC: 2.57 MG/DL (ref 0.57–1)
EGFRCR SERPLBLD CKD-EPI 2021: 19 ML/MIN/1.73
GLOBULIN SER CALC-MCNC: 2.5 G/DL (ref 1.5–4.5)
GLUCOSE SERPL-MCNC: 169 MG/DL (ref 70–99)
HDLC SERPL-MCNC: 52 MG/DL
LDLC SERPL CALC-MCNC: 49 MG/DL (ref 0–99)
POTASSIUM SERPL-SCNC: 4.6 MMOL/L (ref 3.5–5.2)
PROT SERPL-MCNC: 6.6 G/DL (ref 6–8.5)
SODIUM SERPL-SCNC: 140 MMOL/L (ref 134–144)
TRIGL SERPL-MCNC: 156 MG/DL (ref 0–149)
TROPONIN T SERPL HS-MCNC: 28 NG/L (ref 0–14)
VLDLC SERPL CALC-MCNC: 26 MG/DL (ref 5–40)

## 2023-09-21 ENCOUNTER — TREATMENT (OUTPATIENT)
Dept: PHYSICAL THERAPY | Facility: CLINIC | Age: 73
End: 2023-09-21
Payer: MEDICARE

## 2023-09-21 DIAGNOSIS — M25.552 PAIN OF LEFT HIP: Primary | ICD-10-CM

## 2023-09-21 DIAGNOSIS — Z74.09 IMPAIRED FUNCTIONAL MOBILITY, BALANCE, GAIT, AND ENDURANCE: ICD-10-CM

## 2023-09-21 LAB — SELENIUM SERPL-MCNC: 104 UG/L (ref 93–198)

## 2023-09-22 ENCOUNTER — TELEPHONE (OUTPATIENT)
Dept: FAMILY MEDICINE CLINIC | Facility: CLINIC | Age: 73
End: 2023-09-22
Payer: MEDICARE

## 2023-09-23 LAB
A-TOCOPHEROL VIT E SERPL-MCNC: 9.9 MG/L (ref 9–29)
GAMMA TOCOPHEROL SERPL-MCNC: 1.6 MG/L (ref 0.5–4.9)
VIT A SERPL-MCNC: 61.3 UG/DL (ref 22–69.5)

## 2023-09-26 LAB
COPPER SERPL-MCNC: 128 UG/DL (ref 80–158)
VIT B1 BLD-SCNC: 120 NMOL/L (ref 66.5–200)

## 2023-09-29 DIAGNOSIS — F51.01 PRIMARY INSOMNIA: ICD-10-CM

## 2023-10-02 RX ORDER — TRAZODONE HYDROCHLORIDE 50 MG/1
TABLET ORAL
Qty: 60 TABLET | OUTPATIENT
Start: 2023-10-02

## 2023-10-03 ENCOUNTER — FLU SHOT (OUTPATIENT)
Dept: FAMILY MEDICINE CLINIC | Facility: CLINIC | Age: 73
End: 2023-10-03
Payer: MEDICARE

## 2023-10-03 DIAGNOSIS — Z23 NEED FOR INFLUENZA VACCINATION: Primary | ICD-10-CM

## 2023-10-03 PROCEDURE — 90662 IIV NO PRSV INCREASED AG IM: CPT | Performed by: STUDENT IN AN ORGANIZED HEALTH CARE EDUCATION/TRAINING PROGRAM

## 2023-10-03 PROCEDURE — G0008 ADMIN INFLUENZA VIRUS VAC: HCPCS | Performed by: STUDENT IN AN ORGANIZED HEALTH CARE EDUCATION/TRAINING PROGRAM

## 2023-10-05 ENCOUNTER — CLINICAL SUPPORT (OUTPATIENT)
Dept: FAMILY MEDICINE CLINIC | Facility: CLINIC | Age: 73
End: 2023-10-05
Payer: MEDICARE

## 2023-10-05 DIAGNOSIS — D64.9 ANEMIA, UNSPECIFIED TYPE: Primary | ICD-10-CM

## 2023-10-05 LAB
DEVELOPER EXPIRATION DATE: NORMAL
DEVELOPER LOT NUMBER: NORMAL
EXPIRATION DATE: NORMAL
FECAL OCCULT BLOOD SCREEN, POC: NEGATIVE
Lab: 512
NEGATIVE CONTROL: NEGATIVE
POSITIVE CONTROL: POSITIVE

## 2023-10-05 PROCEDURE — 82270 OCCULT BLOOD FECES: CPT | Performed by: STUDENT IN AN ORGANIZED HEALTH CARE EDUCATION/TRAINING PROGRAM

## 2023-10-12 ENCOUNTER — HOSPITAL ENCOUNTER (OUTPATIENT)
Dept: NUCLEAR MEDICINE | Facility: HOSPITAL | Age: 73
Discharge: HOME | End: 2023-10-12
Payer: MEDICARE

## 2023-10-12 DIAGNOSIS — N25.81 SECONDARY HYPERPARATHYROIDISM: ICD-10-CM

## 2023-10-12 PROCEDURE — A9500 TC99M SESTAMIBI: HCPCS | Performed by: INTERNAL MEDICINE

## 2023-10-12 PROCEDURE — 0 TECHNETIUM SESTAMIBI: Performed by: INTERNAL MEDICINE

## 2023-10-12 PROCEDURE — 78070 PARATHYROID PLANAR IMAGING: CPT

## 2023-10-12 RX ADMIN — TECHNETIUM TC 99M SESTAMIBI 1 DOSE: 1 INJECTION INTRAVENOUS at 13:42

## 2023-10-19 RX ORDER — PRIMIDONE 50 MG/1
TABLET ORAL
Qty: 60 TABLET | Refills: 10 | OUTPATIENT
Start: 2023-10-19

## 2023-11-16 ENCOUNTER — DOCUMENTATION (OUTPATIENT)
Dept: PHYSICAL THERAPY | Facility: CLINIC | Age: 73
End: 2023-11-16
Payer: MEDICARE

## 2023-11-30 DIAGNOSIS — G25.81 RESTLESS LEG: ICD-10-CM

## 2023-11-30 RX ORDER — CARVEDILOL 25 MG/1
25 TABLET ORAL 2 TIMES DAILY
Qty: 180 TABLET | Refills: 3 | Status: SHIPPED | OUTPATIENT
Start: 2023-11-30

## 2023-12-01 DIAGNOSIS — G25.81 RESTLESS LEG: ICD-10-CM

## 2023-12-01 RX ORDER — ROPINIROLE 0.25 MG/1
0.25 TABLET, FILM COATED ORAL NIGHTLY
Qty: 30 TABLET | Refills: 0 | Status: SHIPPED | OUTPATIENT
Start: 2023-12-01

## 2023-12-03 DIAGNOSIS — F41.9 ANXIETY: ICD-10-CM

## 2023-12-05 RX ORDER — ROPINIROLE 0.25 MG/1
0.25 TABLET, FILM COATED ORAL NIGHTLY
Qty: 90 TABLET | OUTPATIENT
Start: 2023-12-05

## 2023-12-05 RX ORDER — HYDROXYZINE HYDROCHLORIDE 10 MG/1
10 TABLET, FILM COATED ORAL 3 TIMES DAILY PRN
Qty: 90 TABLET | Refills: 1 | OUTPATIENT
Start: 2023-12-05

## 2023-12-13 DIAGNOSIS — F51.01 PRIMARY INSOMNIA: ICD-10-CM

## 2023-12-13 DIAGNOSIS — E11.65 TYPE 2 DIABETES MELLITUS WITH HYPERGLYCEMIA, WITHOUT LONG-TERM CURRENT USE OF INSULIN: ICD-10-CM

## 2023-12-13 DIAGNOSIS — F41.9 ANXIETY: ICD-10-CM

## 2023-12-14 RX ORDER — TRAZODONE HYDROCHLORIDE 150 MG/1
150 TABLET ORAL NIGHTLY
Qty: 90 TABLET | Refills: 1 | OUTPATIENT
Start: 2023-12-14

## 2023-12-14 RX ORDER — HYDROXYZINE HYDROCHLORIDE 10 MG/1
10 TABLET, FILM COATED ORAL 3 TIMES DAILY PRN
Qty: 90 TABLET | Refills: 1 | OUTPATIENT
Start: 2023-12-14

## 2023-12-14 RX ORDER — EMPAGLIFLOZIN 25 MG/1
25 TABLET, FILM COATED ORAL EVERY MORNING
Qty: 90 TABLET | Refills: 3 | Status: SHIPPED | OUTPATIENT
Start: 2023-12-14

## 2023-12-21 ENCOUNTER — APPOINTMENT (OUTPATIENT)
Dept: VASCULAR SURGERY | Facility: HOSPITAL | Age: 73
End: 2023-12-21
Payer: MEDICARE

## 2023-12-21 ENCOUNTER — HOSPITAL ENCOUNTER (OUTPATIENT)
Dept: CARDIOLOGY | Facility: HOSPITAL | Age: 73
Discharge: HOME | End: 2023-12-21
Payer: MEDICARE

## 2023-12-21 DIAGNOSIS — Z99.2 ENCOUNTER FOR EXTRACORPOREAL DIALYSIS: ICD-10-CM

## 2023-12-21 DIAGNOSIS — N18.6 END STAGE RENAL DISEASE: ICD-10-CM

## 2023-12-21 LAB
BH CV VAS DIALYSIS ARTERIAL ANASTOMOSIS DIAMETER: 0.36 CM
BH CV VAS DIALYSIS ARTERIAL ANASTOMOSIS EDV: 180 CM/SEC
BH CV VAS DIALYSIS ARTERIAL ANASTOMOSIS PSV: 382 CM/SEC
BH CV VAS DIALYSIS CONDUIT DIST DEPTH: 1.95 CM
BH CV VAS DIALYSIS CONDUIT DIST DIAMETER: 0.58 CM
BH CV VAS DIALYSIS CONDUIT DIST EDV: 35 CM/SEC
BH CV VAS DIALYSIS CONDUIT DIST FLOW VOL: 425 ML/MIN
BH CV VAS DIALYSIS CONDUIT DIST PSV: 62 CM/SEC
BH CV VAS DIALYSIS CONDUIT MID DEPTH: 0.59 CM
BH CV VAS DIALYSIS CONDUIT MID DIAMETER: 0.63 CM
BH CV VAS DIALYSIS CONDUIT MID EDV: 39 CM/SEC
BH CV VAS DIALYSIS CONDUIT MID FLOW VOL: 595 ML/MIN
BH CV VAS DIALYSIS CONDUIT MID PSV: 91 CM/SEC
BH CV VAS DIALYSIS CONDUIT MID/DIST DEPTH: 0.93 CM
BH CV VAS DIALYSIS CONDUIT MID/DIST DIAMETER: 0.67 CM
BH CV VAS DIALYSIS CONDUIT MID/DIST EDV: 53 CM/SEC
BH CV VAS DIALYSIS CONDUIT MID/DIST FLOW VOL: 703 ML/MIN
BH CV VAS DIALYSIS CONDUIT MID/DIST PSV: 107 CM/SEC
BH CV VAS DIALYSIS CONDUIT PROX DEPTH: 0.15 CM
BH CV VAS DIALYSIS CONDUIT PROX DIAMETER: 1.06 CM
BH CV VAS DIALYSIS CONDUIT PROX EDV: 31 CM/SEC
BH CV VAS DIALYSIS CONDUIT PROX FLOW VOL: 157 ML/MIN
BH CV VAS DIALYSIS CONDUIT PROX PSV: 92 CM/SEC
BH CV VAS DIALYSIS CONDUIT PROX/MID DEPTH: 0.47 CM
BH CV VAS DIALYSIS CONDUIT PROX/MID DIAMETER: 0.7 CM
BH CV VAS DIALYSIS CONDUIT PROX/MID EDV: 41 CM/SEC
BH CV VAS DIALYSIS CONDUIT PROX/MID FLOW VOL: 590 ML/MIN
BH CV VAS DIALYSIS CONDUIT PROX/MID PSV: 87 CM/SEC
BH CV VAS DIALYSIS LEFT BRANCH 1 DIAMETER: 0.32 CM
BH CV VAS DIALYSIS LEFT BRANCH 2 DIAMETER: 0.22 CM
BH CV VAS DIALYSIS LEFT BRANCH 3 DIAMETER: 0.23 CM
BH CV VAS DIALYSIS PRE-INFLOW BRACHIAL DIAMETER: 0.47 CM
BH CV VAS DIALYSIS PRE-INFLOW BRACHIAL EDV: 67 CM/SEC
BH CV VAS DIALYSIS PRE-INFLOW BRACHIAL FLOW VOL: 571 ML/MIN
BH CV VAS DIALYSIS PRE-INFLOW BRACHIAL PSV: 188 CM/SEC
BH CV VAS DIALYSIS VENOUS OUTFLOW CEPHALIC ARCH DIAMETE: 0.35 CM
BH CV VAS DIALYSIS VENOUS OUTFLOW CEPHALIC ARCH EDV: 19 CM/SEC
BH CV VAS DIALYSIS VENOUS OUTFLOW CEPHALIC ARCH PSV: 73 CM/SEC
BH CV VAS DIALYSIS VENOUS OUTFLOW CEPHALIC VEIN DIAMETE: 0.69 CM
BH CV VAS DIALYSIS VENOUS OUTFLOW CEPHALIC VEIN EDV: 26 CM/SEC
BH CV VAS DIALYSIS VENOUS OUTFLOW CEPHALIC VEIN PSV: 49 CM/SEC
BH CV VAS DIALYSIS VENOUS OUTFLOW SUBCLAVIAN DIAMETER: 0.81 CM
BH CV VAS DIALYSIS VENOUS OUTFLOW SUBCLAVIAN EDV: 39 CM/SEC
BH CV VAS DIALYSIS VENOUS OUTFLOW SUBCLAVIAN PSV: 109 CM/SEC

## 2023-12-21 PROCEDURE — 93990 DOPPLER FLOW TESTING: CPT

## 2023-12-21 PROCEDURE — G0463 HOSPITAL OUTPT CLINIC VISIT: HCPCS

## 2024-01-08 DIAGNOSIS — E55.9 VITAMIN D DEFICIENCY: Primary | ICD-10-CM

## 2024-01-11 PROBLEM — I25.10 CORONARY ARTERY DISEASE INVOLVING NATIVE CORONARY ARTERY OF NATIVE HEART WITHOUT ANGINA PECTORIS: Status: ACTIVE | Noted: 2024-01-11

## 2024-01-11 RX ORDER — ALLOPURINOL 100 MG/1
100 TABLET ORAL DAILY
OUTPATIENT
Start: 2024-01-11

## 2024-01-11 RX ORDER — ERGOCALCIFEROL 1.25 MG/1
50000 CAPSULE ORAL WEEKLY
Qty: 12 CAPSULE | Refills: 3 | Status: SHIPPED | OUTPATIENT
Start: 2024-01-11

## 2024-01-22 RX ORDER — GLIMEPIRIDE 2 MG/1
TABLET ORAL
Qty: 360 TABLET | Refills: 3 | OUTPATIENT
Start: 2024-01-22

## 2024-02-22 ENCOUNTER — TRANSCRIBE ORDERS (OUTPATIENT)
Age: 74
End: 2024-02-22
Payer: MEDICARE

## 2024-02-22 ENCOUNTER — APPOINTMENT (OUTPATIENT)
Dept: VASCULAR SURGERY | Facility: HOSPITAL | Age: 74
End: 2024-02-22
Payer: MEDICARE

## 2024-02-22 DIAGNOSIS — N18.6 END STAGE RENAL DISEASE: Primary | ICD-10-CM

## 2024-02-22 PROCEDURE — G0463 HOSPITAL OUTPT CLINIC VISIT: HCPCS

## 2024-05-24 RX ORDER — FUROSEMIDE 40 MG/1
40 TABLET ORAL 2 TIMES DAILY
Qty: 180 TABLET | Refills: 3 | Status: SHIPPED | OUTPATIENT
Start: 2024-05-24

## 2024-06-05 ENCOUNTER — HOSPITAL ENCOUNTER (OUTPATIENT)
Dept: CARDIOLOGY | Facility: HOSPITAL | Age: 74
Discharge: HOME | End: 2024-06-05
Payer: MEDICARE

## 2024-06-05 ENCOUNTER — OFFICE VISIT (OUTPATIENT)
Age: 74
End: 2024-06-05
Payer: MEDICARE

## 2024-06-05 VITALS
DIASTOLIC BLOOD PRESSURE: 57 MMHG | HEART RATE: 77 BPM | SYSTOLIC BLOOD PRESSURE: 154 MMHG | BODY MASS INDEX: 31.34 KG/M2 | HEIGHT: 66 IN | WEIGHT: 195 LBS

## 2024-06-05 DIAGNOSIS — N18.4 STAGE 4 CHRONIC KIDNEY DISEASE: Primary | Chronic | ICD-10-CM

## 2024-06-05 DIAGNOSIS — I77.0 A-V FISTULA: ICD-10-CM

## 2024-06-05 DIAGNOSIS — Z01.818 ENCOUNTER FOR OTHER PREPROCEDURAL EXAMINATION: ICD-10-CM

## 2024-06-05 DIAGNOSIS — N18.6 END STAGE RENAL DISEASE: ICD-10-CM

## 2024-06-05 DIAGNOSIS — E66.9 OBESITY (BMI 30.0-34.9): ICD-10-CM

## 2024-06-05 LAB
BH CV VAS DIALYSIS ARTERIAL ANASTOMOSIS DIAMETER: 0.41 CM
BH CV VAS DIALYSIS ARTERIAL ANASTOMOSIS EDV: 0 CM/SEC
BH CV VAS DIALYSIS ARTERIAL ANASTOMOSIS PSV: 73 CM/SEC
BH CV VAS DIALYSIS CONDUIT DIST PSV: 0 CM/SEC
BH CV VAS DIALYSIS CONDUIT MID PSV: 0 CM/SEC
BH CV VAS DIALYSIS CONDUIT PROX PSV: 0 CM/SEC
BH CV VAS DIALYSIS PRE-INFLOW BRACHIAL EDV: 0 CM/SEC
BH CV VAS DIALYSIS PRE-INFLOW BRACHIAL PSV: 86 CM/SEC
BH CV VAS DIALYSIS VENOUS OUTFLOW SUBCLAVIAN DIAMETER: 1.02 CM

## 2024-06-05 PROCEDURE — 93990 DOPPLER FLOW TESTING: CPT

## 2024-06-05 RX ORDER — CYCLOBENZAPRINE HCL 5 MG
5 TABLET ORAL 3 TIMES DAILY PRN
COMMUNITY

## 2024-06-05 RX ORDER — ISOPROPYL ALCOHOL 0.75 G/1
SWAB TOPICAL
COMMUNITY
Start: 2024-04-22

## 2024-06-05 RX ORDER — GABAPENTIN 100 MG/1
CAPSULE ORAL
COMMUNITY
Start: 2023-12-20

## 2024-06-05 RX ORDER — BLOOD-GLUCOSE METER
EACH MISCELLANEOUS
COMMUNITY
Start: 2024-04-22

## 2024-06-05 RX ORDER — ALPRAZOLAM 0.25 MG/1
0.25 TABLET ORAL 3 TIMES DAILY
COMMUNITY

## 2024-08-01 ENCOUNTER — OFFICE VISIT (OUTPATIENT)
Age: 74
End: 2024-08-01
Payer: MEDICARE

## 2024-08-01 ENCOUNTER — HOSPITAL ENCOUNTER (OUTPATIENT)
Dept: CARDIOLOGY | Facility: HOSPITAL | Age: 74
Discharge: HOME | End: 2024-08-01
Admitting: NURSE PRACTITIONER
Payer: MEDICARE

## 2024-08-01 VITALS
WEIGHT: 195 LBS | BODY MASS INDEX: 31.34 KG/M2 | SYSTOLIC BLOOD PRESSURE: 157 MMHG | DIASTOLIC BLOOD PRESSURE: 68 MMHG | HEIGHT: 66 IN

## 2024-08-01 DIAGNOSIS — N18.4 STAGE 4 CHRONIC KIDNEY DISEASE: Primary | Chronic | ICD-10-CM

## 2024-08-01 DIAGNOSIS — Z01.818 ENCOUNTER FOR OTHER PREPROCEDURAL EXAMINATION: ICD-10-CM

## 2024-08-01 DIAGNOSIS — N18.4 STAGE 4 CHRONIC KIDNEY DISEASE: Chronic | ICD-10-CM

## 2024-08-01 DIAGNOSIS — I77.0 A-V FISTULA: ICD-10-CM

## 2024-08-01 LAB
BH CV UPPER VENOUS LEFT AXILLARY AUGMENT: NORMAL
BH CV UPPER VENOUS LEFT AXILLARY COMPRESS: NORMAL
BH CV UPPER VENOUS LEFT AXILLARY PHASIC: NORMAL
BH CV UPPER VENOUS LEFT AXILLARY SPONT: NORMAL
BH CV UPPER VENOUS LEFT BASILIC FOREARM COMPRESS: NORMAL
BH CV UPPER VENOUS LEFT BASILIC UPPER COMPRESS: NORMAL
BH CV UPPER VENOUS LEFT BRACHIAL COMPRESS: NORMAL
BH CV UPPER VENOUS LEFT CEPHALIC FOREARM COMPRESS: NORMAL
BH CV UPPER VENOUS LEFT CEPHALIC UPPER COLOR: 1
BH CV UPPER VENOUS LEFT CEPHALIC UPPER COMPRESS: NORMAL
BH CV UPPER VENOUS LEFT CEPHALIC UPPER THROMBUS: NORMAL
BH CV UPPER VENOUS LEFT INTERNAL JUGULAR AUGMENT: NORMAL
BH CV UPPER VENOUS LEFT INTERNAL JUGULAR COMPRESS: NORMAL
BH CV UPPER VENOUS LEFT INTERNAL JUGULAR PHASIC: NORMAL
BH CV UPPER VENOUS LEFT INTERNAL JUGULAR SPONT: NORMAL
BH CV UPPER VENOUS LEFT SUBCLAVIAN AUGMENT: NORMAL
BH CV UPPER VENOUS LEFT SUBCLAVIAN COMPRESS: NORMAL
BH CV UPPER VENOUS LEFT SUBCLAVIAN PHASIC: NORMAL
BH CV UPPER VENOUS LEFT SUBCLAVIAN SPONT: NORMAL
BH CV UPPER VENOUS RIGHT AXILLARY AUGMENT: NORMAL
BH CV UPPER VENOUS RIGHT AXILLARY COMPRESS: NORMAL
BH CV UPPER VENOUS RIGHT AXILLARY PHASIC: NORMAL
BH CV UPPER VENOUS RIGHT AXILLARY SPONT: NORMAL
BH CV UPPER VENOUS RIGHT BASILIC FOREARM COMPRESS: NORMAL
BH CV UPPER VENOUS RIGHT BASILIC UPPER COMPRESS: NORMAL
BH CV UPPER VENOUS RIGHT BRACHIAL COMPRESS: NORMAL
BH CV UPPER VENOUS RIGHT CEPHALIC FOREARM COMPRESS: NORMAL
BH CV UPPER VENOUS RIGHT CEPHALIC UPPER COMPRESS: NORMAL
BH CV UPPER VENOUS RIGHT INTERNAL JUGULAR AUGMENT: NORMAL
BH CV UPPER VENOUS RIGHT INTERNAL JUGULAR COMPRESS: NORMAL
BH CV UPPER VENOUS RIGHT INTERNAL JUGULAR PHASIC: NORMAL
BH CV UPPER VENOUS RIGHT INTERNAL JUGULAR SPONT: NORMAL
BH CV UPPER VENOUS RIGHT SUBCLAVIAN AUGMENT: NORMAL
BH CV UPPER VENOUS RIGHT SUBCLAVIAN COMPRESS: NORMAL
BH CV UPPER VENOUS RIGHT SUBCLAVIAN PHASIC: NORMAL
BH CV UPPER VENOUS RIGHT SUBCLAVIAN SPONT: NORMAL
BH CV VAS MEAS BASILIC ANTECUBITAL FOSSA LEFT: 0.42 CM
BH CV VAS MEAS BASILIC ANTECUBITAL FOSSA RIGHT: 0.3 CM
BH CV VAS MEAS BASILIC FOREARM LEFT - DIST: 0.33 CM
BH CV VAS MEAS BASILIC FOREARM LEFT - MID: 0.33 CM
BH CV VAS MEAS BASILIC FOREARM LEFT - PROX: 0.32 CM
BH CV VAS MEAS BASILIC FOREARM RIGHT - DIST: 0.3 CM
BH CV VAS MEAS BASILIC FOREARM RIGHT - MID: 0.32 CM
BH CV VAS MEAS BASILIC FOREARM RIGHT - PROX: 0.32 CM
BH CV VAS MEAS BASILIC UPPER ARM LEFT - DIST: 0.5 CM
BH CV VAS MEAS BASILIC UPPER ARM LEFT - MID: 0.51 CM
BH CV VAS MEAS BASILIC UPPER ARM LEFT - PROX: 0.55 CM
BH CV VAS MEAS BASILIC UPPER ARM RIGHT - DIST: 0.47 CM
BH CV VAS MEAS BASILIC UPPER ARM RIGHT - MID: 0.43 CM
BH CV VAS MEAS BASILIC UPPER ARM RIGHT - PROX: 0.41 CM
BH CV VAS MEAS CEPHALIC ANTECUBITAL FOSSA RIGHT: 0.3 CM
BH CV VAS MEAS CEPHALIC FOREARM LEFT - DIST: 0.34 CM
BH CV VAS MEAS CEPHALIC FOREARM LEFT - MID: 0.35 CM
BH CV VAS MEAS CEPHALIC FOREARM LEFT - PROX: 0.34 CM
BH CV VAS MEAS CEPHALIC FOREARM RIGHT - DIST: 0.21 CM
BH CV VAS MEAS CEPHALIC FOREARM RIGHT - MID: 0.25 CM
BH CV VAS MEAS CEPHALIC FOREARM RIGHT - PROX: 0.24 CM
BH CV VAS MEAS CEPHALIC UPPER ARM RIGHT - DIST: 0.14 CM
BH CV VAS MEAS CEPHALIC UPPER ARM RIGHT - MID: 0.14 CM
BH CV VAS MEAS CEPHALIC UPPER ARM RIGHT - PROX: 0.14 CM
BH CV VAS MEAS RADIAL UPPER ARM LEFT - DIST: 0.19 CM
BH CV VAS MEAS RADIAL UPPER ARM LEFT - MID: 0.2 CM
BH CV VAS MEAS RADIAL UPPER ARM LEFT - PROX: 0.18 CM
BH CV VAS MEAS RADIAL UPPER ARM RIGHT - DIST: 0.15 CM
BH CV VAS MEAS RADIAL UPPER ARM RIGHT - MID: 0.17 CM
BH CV VAS MEAS RADIAL UPPER ARM RIGHT - PROX: 0.18 CM

## 2024-08-01 PROCEDURE — 93970 EXTREMITY STUDY: CPT

## 2024-08-01 PROCEDURE — 3078F DIAST BP <80 MM HG: CPT | Performed by: STUDENT IN AN ORGANIZED HEALTH CARE EDUCATION/TRAINING PROGRAM

## 2024-08-01 PROCEDURE — 1160F RVW MEDS BY RX/DR IN RCRD: CPT | Performed by: STUDENT IN AN ORGANIZED HEALTH CARE EDUCATION/TRAINING PROGRAM

## 2024-08-01 PROCEDURE — 1159F MED LIST DOCD IN RCRD: CPT | Performed by: STUDENT IN AN ORGANIZED HEALTH CARE EDUCATION/TRAINING PROGRAM

## 2024-08-01 PROCEDURE — 99214 OFFICE O/P EST MOD 30 MIN: CPT | Performed by: STUDENT IN AN ORGANIZED HEALTH CARE EDUCATION/TRAINING PROGRAM

## 2024-08-01 PROCEDURE — 3077F SYST BP >= 140 MM HG: CPT | Performed by: STUDENT IN AN ORGANIZED HEALTH CARE EDUCATION/TRAINING PROGRAM

## 2024-08-01 RX ORDER — LIDOCAINE 50 MG/G
PATCH TOPICAL
COMMUNITY
Start: 2024-05-10

## 2024-08-01 RX ORDER — TRAZODONE HYDROCHLORIDE 100 MG/1
1 TABLET ORAL NIGHTLY
COMMUNITY

## 2024-08-01 RX ORDER — TEMAZEPAM 30 MG/1
CAPSULE ORAL
COMMUNITY

## 2024-08-01 RX ORDER — SITAGLIPTIN 25 MG/1
25 TABLET, FILM COATED ORAL DAILY
COMMUNITY

## 2024-08-01 RX ORDER — ARIPIPRAZOLE 2 MG/1
1 TABLET ORAL DAILY
COMMUNITY

## 2024-09-19 ENCOUNTER — HOSPITAL ENCOUNTER (EMERGENCY)
Facility: HOSPITAL | Age: 74
Discharge: HOME | End: 2024-09-19
Attending: EMERGENCY MEDICINE
Payer: MEDICARE

## 2024-09-19 VITALS
HEART RATE: 70 BPM | SYSTOLIC BLOOD PRESSURE: 170 MMHG | RESPIRATION RATE: 11 BRPM | HEIGHT: 66 IN | TEMPERATURE: 98.6 F | WEIGHT: 204.81 LBS | BODY MASS INDEX: 32.92 KG/M2 | OXYGEN SATURATION: 96 % | DIASTOLIC BLOOD PRESSURE: 75 MMHG

## 2024-09-19 DIAGNOSIS — I10 HYPERTENSION, UNSPECIFIED TYPE: Primary | ICD-10-CM

## 2024-09-19 LAB
ANION GAP SERPL CALCULATED.3IONS-SCNC: 11.7 MMOL/L (ref 5–15)
BASOPHILS # BLD AUTO: 0.05 10*3/MM3 (ref 0–0.2)
BASOPHILS NFR BLD AUTO: 0.7 % (ref 0–1.5)
BUN SERPL-MCNC: 40 MG/DL (ref 8–23)
BUN/CREAT SERPL: 16.5 (ref 7–25)
CALCIUM SPEC-SCNC: 9 MG/DL (ref 8.6–10.5)
CHLORIDE SERPL-SCNC: 101 MMOL/L (ref 98–107)
CO2 SERPL-SCNC: 26.3 MMOL/L (ref 22–29)
CREAT SERPL-MCNC: 2.42 MG/DL (ref 0.57–1)
DEPRECATED RDW RBC AUTO: 54 FL (ref 37–54)
EGFRCR SERPLBLD CKD-EPI 2021: 20.3 ML/MIN/1.73
EOSINOPHIL # BLD AUTO: 0.14 10*3/MM3 (ref 0–0.4)
EOSINOPHIL NFR BLD AUTO: 1.8 % (ref 0.3–6.2)
ERYTHROCYTE [DISTWIDTH] IN BLOOD BY AUTOMATED COUNT: 14.1 % (ref 12.3–15.4)
GLUCOSE SERPL-MCNC: 212 MG/DL (ref 65–99)
HCT VFR BLD AUTO: 35.6 % (ref 34–46.6)
HGB BLD-MCNC: 11.1 G/DL (ref 12–15.9)
IMM GRANULOCYTES # BLD AUTO: 0.02 10*3/MM3 (ref 0–0.05)
IMM GRANULOCYTES NFR BLD AUTO: 0.3 % (ref 0–0.5)
LYMPHOCYTES # BLD AUTO: 2.2 10*3/MM3 (ref 0.7–3.1)
LYMPHOCYTES NFR BLD AUTO: 28.9 % (ref 19.6–45.3)
MCH RBC QN AUTO: 32.2 PG (ref 26.6–33)
MCHC RBC AUTO-ENTMCNC: 31.2 G/DL (ref 31.5–35.7)
MCV RBC AUTO: 103.2 FL (ref 79–97)
MONOCYTES # BLD AUTO: 0.67 10*3/MM3 (ref 0.1–0.9)
MONOCYTES NFR BLD AUTO: 8.8 % (ref 5–12)
NEUTROPHILS NFR BLD AUTO: 4.52 10*3/MM3 (ref 1.7–7)
NEUTROPHILS NFR BLD AUTO: 59.5 % (ref 42.7–76)
NRBC BLD AUTO-RTO: 0 /100 WBC (ref 0–0.2)
PLATELET # BLD AUTO: 304 10*3/MM3 (ref 140–450)
PMV BLD AUTO: 9.8 FL (ref 6–12)
POTASSIUM SERPL-SCNC: 5 MMOL/L (ref 3.5–5.2)
RBC # BLD AUTO: 3.45 10*6/MM3 (ref 3.77–5.28)
SODIUM SERPL-SCNC: 139 MMOL/L (ref 136–145)
WBC NRBC COR # BLD AUTO: 7.6 10*3/MM3 (ref 3.4–10.8)

## 2024-09-19 PROCEDURE — 25010000002 LABETALOL 5 MG/ML SOLUTION: Performed by: EMERGENCY MEDICINE

## 2024-09-19 PROCEDURE — 85025 COMPLETE CBC W/AUTO DIFF WBC: CPT | Performed by: EMERGENCY MEDICINE

## 2024-09-19 PROCEDURE — 96374 THER/PROPH/DIAG INJ IV PUSH: CPT

## 2024-09-19 PROCEDURE — 99283 EMERGENCY DEPT VISIT LOW MDM: CPT

## 2024-09-19 PROCEDURE — 80048 BASIC METABOLIC PNL TOTAL CA: CPT | Performed by: EMERGENCY MEDICINE

## 2024-09-19 RX ORDER — SODIUM CHLORIDE 0.9 % (FLUSH) 0.9 %
10 SYRINGE (ML) INJECTION AS NEEDED
Status: DISCONTINUED | OUTPATIENT
Start: 2024-09-19 | End: 2024-09-20 | Stop reason: HOSPADM

## 2024-09-19 RX ORDER — LABETALOL HYDROCHLORIDE 5 MG/ML
10 INJECTION, SOLUTION INTRAVENOUS ONCE
Status: COMPLETED | OUTPATIENT
Start: 2024-09-19 | End: 2024-09-19

## 2024-09-19 RX ADMIN — LABETALOL HYDROCHLORIDE 10 MG: 5 INJECTION, SOLUTION INTRAVENOUS at 21:03

## 2024-11-06 DIAGNOSIS — E55.9 VITAMIN D DEFICIENCY: ICD-10-CM

## 2024-11-06 RX ORDER — ERGOCALCIFEROL 1.25 MG/1
50000 CAPSULE, LIQUID FILLED ORAL
Qty: 12 CAPSULE | Refills: 3 | OUTPATIENT
Start: 2024-11-06

## 2024-11-07 ENCOUNTER — OFFICE VISIT (OUTPATIENT)
Age: 74
End: 2024-11-07
Payer: MEDICARE

## 2024-11-07 VITALS
SYSTOLIC BLOOD PRESSURE: 98 MMHG | TEMPERATURE: 98 F | WEIGHT: 204 LBS | DIASTOLIC BLOOD PRESSURE: 43 MMHG | HEART RATE: 71 BPM | RESPIRATION RATE: 16 BRPM | BODY MASS INDEX: 32.93 KG/M2 | OXYGEN SATURATION: 96 %

## 2024-11-07 DIAGNOSIS — N18.4 CKD (CHRONIC KIDNEY DISEASE), STAGE IV: Primary | ICD-10-CM

## 2024-11-07 NOTE — PROGRESS NOTES
Chief Complaint  3 m, no studies    Subjective        Lynne Cade presents to [facility] VASCULAR SURGERY  History of Present Illness  74 y.o. female returns for follow up and to discuss indwelling AV access.  She has not been initiated on dialysis.  She has stable CKD stage IV on her last labs from September 2024.      Past Medical History:   Diagnosis Date    Anemia     Hx of anemia    Anxiety     Arthritis     Conditions. Abstracted from [facility].    Cataract     bilateral    CHF (congestive heart failure) 6/01/22    Low iron    Cholelithiasis 30 yrs ago    Had gb removed    Chronic obstructive pulmonary disease, unspecified COPD type     CKD (chronic kidney disease), stage IV 01/13/2023    Colon polyp     Depression 1995    Take cymbalta    Diabetes mellitus     type 2    Diarrhea     on and off    Edema of right foot 04/2021    Healthcare maintenance 2008    PAP. Abstracted from [facility].    Hip pain, bilateral     HL (hearing loss) 1/1/19    Have hearing aids    Hyperlipidemia     Hypertension     Knee pain, bilateral     Obesity     Osteopenia 11/1/22    On med    Other forms of dyspnea     Other specified soft tissue disorders     Pneumonia due to COVID-19 virus 06/26/2022    Renal insufficiency Stage 3    Has gone up to stage 4    Slow to wake up after anesthesia     Spinal headache     Stress headaches     Tremor     Benign    Tremor of both hands     Urinary tract infection 07/01/22    [facility]    Visual impairment 6/1/2017    Cataracts    Vitamin D deficiency        Past Surgical History:   Procedure Laterality Date    ARTERIOVENOUS FISTULA/SHUNT SURGERY Left 02/24/2023    Procedure: BRACHIOCEPHALIC ARTERIOVENOUS FISTULA FORMATION;  Surgeon: Vignesh Riley MD;  Location: [facility] MAIN OR;  Service: Vascular;  Laterality: Left;    BREAST BIOPSY Right     CHOLECYSTECTOMY  1990    COLONOSCOPY      This year 2022    EYE SURGERY Bilateral     cataract removal    GASTRIC BYPASS   2002    HERNIA REPAIR  2005    umbilical hernia    HIP OPEN REDUCTION Right 03/02/2020    Procedure: FEMORAL NECK OPEN REDUCTION INTERNAL FIXATION;  Surgeon: Yfn Sifuentes MD;  Location: [facility] MAIN OR;  Service: Orthopedics;  Laterality: Right;  Cannulated screw fixation right femoral neck    JOINT REPLACEMENT  2020    R knee    LAPAROTOMY OOPHERECTOMY Bilateral 2010    OTHER SURGICAL HISTORY  04/20/2015    Lexiscan stress test, normal. [facility].    PANNICULECTOMY      TOTAL KNEE ARTHROPLASTY Right 04/29/2021    Procedure: RIGHT TOTAL KNEE REPLACEMENT;  Surgeon: Yfn Sifuentes MD;  Location: [facility] MAIN OR;  Service: Orthopedics;  Laterality: Right;       Family History   Problem Relation Age of Onset    Asthma Mother     Depression Mother     Kidney disease Mother         Kidney stones    Miscarriages / Stillbirths Mother     Hyperlipidemia Father     Hypertension Father     Diabetes Father     Anxiety disorder Daughter     Early death Daughter     Miscarriages / Stillbirths Daughter     Diabetes Daughter          Social History     Tobacco Use    Smoking status: Never     Passive exposure: Never    Smokeless tobacco: Never   Vaping Use    Vaping status: Never Used   Substance Use Topics    Alcohol use: Never    Drug use: Never       Allergies: Morphine, Sulfa antibiotics, Hydrocodone, and Diphenhydramine    Prior to Admission medications    Medication Sig Start Date End Date Taking? Authorizing Provider   Accu-Chek Softclix Lancets lancets TEST BLOOD SUGAR TWICE DAILY 4/12/22  Yes Rose Robb MD   Alcohol Swabs (B-D SINGLE USE SWABS REGULAR) pads TO USE WHEN CHECKING BLOOD SUGAR TWICE A DAY 4/12/22  Yes Rose Robb MD   Alcohol Swabs (B-D SINGLE USE SWABS REGULAR) pads BD Single Use Swab, See Instructions, Use 1 swab to check blood sugar up to TID, # 300 EA, 1 Refill(s), Pharmacy: [facility], E11.9, Use 1 swab to check blood sugar up to TID, Supply,  168, cm, 03/25/24 11:10:00 EDT, Height, 89.9, kg, 03/25/24 11:16:00 EDT, Weight Dosing 4/22/24  Yes Suleman Hensley MD   alendronate (FOSAMAX) 70 MG tablet Take 1 tablet by mouth Every 7 (Seven) Days. 5/22/23  Yes Rose Robb MD   allopurinol (ZYLOPRIM) 100 MG tablet Take 1 tablet by mouth Daily.   Yes Suleman Hensley MD   ALPRAZolam (XANAX) 0.25 MG tablet Take 1 tablet by mouth 3 (Three) Times a Day. PRN up to 3 times a day   Yes Suleman Hensley MD   ARIPiprazole (ABILIFY) 2 MG tablet Take 1 tablet by mouth Daily.   Yes Suleman Hensley MD   Blood Glucose Calibration (Accu-Chek Megan) solution Use as directed 1/26/21  Yes Rose Robb MD   Blood Glucose Monitoring Suppl (Accu-Chek Megan) device Use to test twice daily   DX: E11.9 1/26/21  Yes Rose Robb MD   Blood Glucose Monitoring Suppl (True Metrix Air Glucose Meter) device True Metrix Air Glucose Meter, See Instructions, use to check blood sugar up to TID, # 1 EA, 0 Refill(s), Pharmacy: [facility], E11.9, use to check blood sugar up to TID, Supply, 168, cm, 03/25/24 11:10:00 EDT, Height, 89.9, kg, 03/25/24 11:16:00 EDT, Weight Dosing 4/22/24  Yes Suleman Hensley MD   calcitriol (ROCALTROL) 0.25 MCG capsule Take 1 capsule by mouth Daily.   Yes Suleman Hensley MD   carvedilol (COREG) 25 MG tablet TAKE 1 TABLET TWICE DAILY 11/30/23  Yes Fredi Pierce MD   cyclobenzaprine (FLEXERIL) 5 MG tablet Take 1 tablet by mouth 3 (Three) Times a Day As Needed.   Yes Suleman Hensley MD   DULoxetine (CYMBALTA) 60 MG capsule Take 1 capsule by mouth 2 (Two) Times a Day. 2/15/23  Yes Rose Robb MD   empagliflozin (Jardiance) 25 MG tablet tablet Take 1 tablet by mouth Every Morning.   Yes Provider, MD Suleman   furosemide (LASIX) 40 MG tablet TAKE 1 TABLET TWICE DAILY 5/24/24  Yes Fredi Pierce MD   gabapentin (NEURONTIN) 100 MG capsule Take  by mouth.  12/20/23  Yes Suleman Hensley MD   glimepiride (AMARYL) 2 MG tablet TAKE 2 TABLETS TWICE DAILY 6/15/23  Yes Rose Robb MD   glucose blood (Accu-Chek Megan Plus) test strip TEST BLOOD SUGAR TWICE DAILY AS DIRECTED   DX  E11.9 5/11/22  Yes Rose Robb MD   hydrALAZINE (APRESOLINE) 50 MG tablet Take 1 tablet by mouth 3 (Three) Times a Day. Take DOS   Yes Suleman Hensley MD   hydrOXYzine (ATARAX) 10 MG tablet Take 1 tablet by mouth 3 (Three) Times a Day As Needed for Itching. 8/14/23  Yes Becky Greco DO   Januvia 25 MG tablet Take 1 tablet by mouth Daily.   Yes Suleman Hensley MD   Lancets (accu-chek soft touch) lancets Check blood sugars twice daily as directed. DX:E11.9 1/28/21  Yes Rose Robb MD   lidocaine (Lidoderm) 5 % Apply  topically to the appropriate area as directed. 5/10/24  Yes Suleman Hensley MD   NON FORMULARY Take 3 tablets by mouth 2 (Two) Times a Day. Balance of Nature- (vegetable and fruit)   Yes Suleman Hensley MD   primidone (MYSOLINE) 50 MG tablet TAKE 1 TABLET EVERY NIGHT. 8/28/23  Yes Irene Khoury APRN   rosuvastatin (CRESTOR) 10 MG tablet Take 1 tablet by mouth Daily. 12/27/22  Yes Rose Robb MD   sodium bicarbonate 650 MG tablet Take 1 tablet by mouth Daily. 5/11/23  Yes Suleman Hensley MD   temazepam (RESTORIL) 30 MG capsule    Yes Suleman Hensley MD   traZODone (DESYREL) 100 MG tablet Take 1 tablet by mouth Every Night.   Yes Suleman Hensley MD   Vibegron (GEMTESA PO)    Yes Suleman Hensley MD   vitamin D (ERGOCALCIFEROL) 1.25 MG (72578 UT) capsule capsule Take 1 capsule by mouth 1 (One) Time Per Week. 1/11/24  Yes Becky Greco DO         Objective   Vital Signs:  BP 98/43 (BP Location: Right arm, Patient Position: Sitting, Cuff Size: Adult)   Pulse 71   Temp 98 °F (36.7 °C) (Oral)   Resp 16   Wt 92.5 kg (204 lb)   SpO2 96%   BMI 32.93 kg/m²   Estimated body mass index is 32.93  "kg/m² as calculated from the following:    Height as of 9/19/24: 167.6 cm (66\").    Weight as of this encounter: 92.5 kg (204 lb).               Physical Exam  Vitals reviewed.   Constitutional:       General: She is not in acute distress.     Appearance: Normal appearance.   HENT:      Head: Normocephalic and atraumatic.      Right Ear: External ear normal.      Left Ear: External ear normal.      Nose: Nose normal.      Mouth/Throat:      Mouth: Mucous membranes are moist.      Pharynx: Oropharynx is clear.   Eyes:      Extraocular Movements: Extraocular movements intact.      Conjunctiva/sclera: Conjunctivae normal.      Pupils: Pupils are equal, round, and reactive to light.   Cardiovascular:      Rate and Rhythm: Normal rate and regular rhythm.      Pulses:           Carotid pulses are 2+ on the right side and 2+ on the left side.       Radial pulses are 2+ on the right side and 2+ on the left side.      Comments: Well-healed left arm incision, no thrill over left arm AV fistula, palpable left brachial and radial pulses, no swelling of the bilateral upper extremities  Pulmonary:      Comments: Non labored respirations on room air, equal chest rise  Abdominal:      General: Abdomen is flat. There is no distension.      Palpations: Abdomen is soft.   Musculoskeletal:      Cervical back: Normal range of motion. No rigidity.      Right lower leg: No edema.      Left lower leg: No edema.   Skin:     General: Skin is warm and dry.      Capillary Refill: Capillary refill takes less than 2 seconds.   Neurological:      General: No focal deficit present.      Mental Status: She is alert and oriented to person, place, and time.   Psychiatric:         Mood and Affect: Mood normal.         Behavior: Behavior normal.        Result Review :            Last Echo  Results for orders placed during the hospital encounter of 06/01/22    Adult Transthoracic Echo Complete W/ Cont if Necessary Per Protocol    Interpretation " Summary  · Estimated left ventricular EF was in agreement with the calculated left ventricular EF. Left ventricular ejection fraction appears to be 56 - 60%. Left ventricular systolic function is normal.  · Left ventricular diastolic function is consistent with (grade I) impaired relaxation.  · Estimated right ventricular systolic pressure from tricuspid regurgitation is normal (<35 mmHg).                 Assessment and Plan     Diagnoses and all orders for this visit:    1. CKD (chronic kidney disease), stage IV (Primary)    Lynne Cade returns for follow up of her CKD stage IV and discussion of indwelling access.  At her last visit, we discussed her last visit and ultimately decided to proceed with a left axillary to axillary graft for AV access should she progress to CKD V.  Her last labs were on 9/19/24, creatinine was 2.4 mg/dl and GFR was 20 ml/min, which is stable from prior.  She is to see Dr. Melendez next week.  We again discussed graft placement should she progress to CKD V, but I would not place a graft prior to that.  She understands and will contact me should that occur to schedule her left axillary axillary graft placement.          Follow Up     Return if symptoms worsen or fail to improve.  Patient was given instructions and counseling regarding her condition or for health maintenance advice. Please see specific information pulled into the AVS if appropriate.

## 2024-11-15 ENCOUNTER — TELEPHONE (OUTPATIENT)
Age: 74
End: 2024-11-15

## 2024-11-18 DIAGNOSIS — I77.0 A-V FISTULA: Primary | ICD-10-CM

## 2024-11-18 DIAGNOSIS — N18.4 CKD (CHRONIC KIDNEY DISEASE), STAGE IV: ICD-10-CM

## 2025-01-08 ENCOUNTER — TRANSCRIBE ORDERS (OUTPATIENT)
Dept: LAB | Facility: HOSPITAL | Age: 75
End: 2025-01-08
Payer: MEDICARE

## 2025-01-08 ENCOUNTER — LAB (OUTPATIENT)
Dept: LAB | Facility: HOSPITAL | Age: 75
End: 2025-01-08
Payer: MEDICARE

## 2025-01-08 DIAGNOSIS — N18.4 CHRONIC KIDNEY DISEASE, STAGE IV (SEVERE): Primary | ICD-10-CM

## 2025-01-08 DIAGNOSIS — N18.4 CHRONIC KIDNEY DISEASE, STAGE IV (SEVERE): ICD-10-CM

## 2025-01-08 DIAGNOSIS — N25.81 SECONDARY HYPERPARATHYROIDISM OF RENAL ORIGIN: ICD-10-CM

## 2025-01-08 LAB
ALBUMIN SERPL-MCNC: 3.7 G/DL (ref 3.5–5.2)
ALBUMIN/GLOB SERPL: 1.1 G/DL
ALP SERPL-CCNC: 52 U/L (ref 39–117)
ALT SERPL W P-5'-P-CCNC: 14 U/L (ref 1–33)
ANION GAP SERPL CALCULATED.3IONS-SCNC: 11.4 MMOL/L (ref 5–15)
AST SERPL-CCNC: 18 U/L (ref 1–32)
BACTERIA UR QL AUTO: ABNORMAL /HPF
BASOPHILS # BLD AUTO: 0.04 10*3/MM3 (ref 0–0.2)
BASOPHILS NFR BLD AUTO: 0.6 % (ref 0–1.5)
BILIRUB SERPL-MCNC: 0.2 MG/DL (ref 0–1.2)
BILIRUB UR QL STRIP: NEGATIVE
BUN SERPL-MCNC: 43 MG/DL (ref 8–23)
BUN/CREAT SERPL: 16 (ref 7–25)
CALCIUM SPEC-SCNC: 9 MG/DL (ref 8.6–10.5)
CHLORIDE SERPL-SCNC: 102 MMOL/L (ref 98–107)
CLARITY UR: ABNORMAL
CO2 SERPL-SCNC: 24.6 MMOL/L (ref 22–29)
COLOR UR: YELLOW
CREAT SERPL-MCNC: 2.68 MG/DL (ref 0.57–1)
CREAT UR-MCNC: 53.4 MG/DL
DEPRECATED RDW RBC AUTO: 58 FL (ref 37–54)
EGFRCR SERPLBLD CKD-EPI 2021: 17.9 ML/MIN/1.73
EOSINOPHIL # BLD AUTO: 0.16 10*3/MM3 (ref 0–0.4)
EOSINOPHIL NFR BLD AUTO: 2.3 % (ref 0.3–6.2)
ERYTHROCYTE [DISTWIDTH] IN BLOOD BY AUTOMATED COUNT: 14.9 % (ref 12.3–15.4)
GLOBULIN UR ELPH-MCNC: 3.4 GM/DL
GLUCOSE SERPL-MCNC: 160 MG/DL (ref 65–99)
GLUCOSE UR STRIP-MCNC: NEGATIVE MG/DL
HBV SURFACE AG SERPL QL IA: NORMAL
HCT VFR BLD AUTO: 34.1 % (ref 34–46.6)
HGB BLD-MCNC: 10.3 G/DL (ref 12–15.9)
HGB UR QL STRIP.AUTO: ABNORMAL
HOLD SPECIMEN: NORMAL
HYALINE CASTS UR QL AUTO: ABNORMAL /LPF
IMM GRANULOCYTES # BLD AUTO: 0.03 10*3/MM3 (ref 0–0.05)
IMM GRANULOCYTES NFR BLD AUTO: 0.4 % (ref 0–0.5)
KETONES UR QL STRIP: NEGATIVE
LEUKOCYTE ESTERASE UR QL STRIP.AUTO: ABNORMAL
LYMPHOCYTES # BLD AUTO: 1.27 10*3/MM3 (ref 0.7–3.1)
LYMPHOCYTES NFR BLD AUTO: 18.3 % (ref 19.6–45.3)
MCH RBC QN AUTO: 31.9 PG (ref 26.6–33)
MCHC RBC AUTO-ENTMCNC: 30.2 G/DL (ref 31.5–35.7)
MCV RBC AUTO: 105.6 FL (ref 79–97)
MONOCYTES # BLD AUTO: 0.53 10*3/MM3 (ref 0.1–0.9)
MONOCYTES NFR BLD AUTO: 7.6 % (ref 5–12)
NEUTROPHILS NFR BLD AUTO: 4.9 10*3/MM3 (ref 1.7–7)
NEUTROPHILS NFR BLD AUTO: 70.8 % (ref 42.7–76)
NITRITE UR QL STRIP: NEGATIVE
NRBC BLD AUTO-RTO: 0 /100 WBC (ref 0–0.2)
PH UR STRIP.AUTO: 6 [PH] (ref 5–8)
PHOSPHATE SERPL-MCNC: 4.8 MG/DL (ref 2.5–4.5)
PLATELET # BLD AUTO: 279 10*3/MM3 (ref 140–450)
PMV BLD AUTO: 9.8 FL (ref 6–12)
POTASSIUM SERPL-SCNC: 4.1 MMOL/L (ref 3.5–5.2)
PROT ?TM UR-MCNC: 55.6 MG/DL
PROT SERPL-MCNC: 7.1 G/DL (ref 6–8.5)
PROT UR QL STRIP: ABNORMAL
PROT/CREAT UR: 1041.2 MG/G CREA (ref 0–200)
PTH-INTACT SERPL-MCNC: 348 PG/ML (ref 15–65)
RBC # BLD AUTO: 3.23 10*6/MM3 (ref 3.77–5.28)
RBC # UR STRIP: ABNORMAL /HPF
REF LAB TEST METHOD: ABNORMAL
SODIUM SERPL-SCNC: 138 MMOL/L (ref 136–145)
SP GR UR STRIP: 1.01 (ref 1–1.03)
SQUAMOUS #/AREA URNS HPF: ABNORMAL /HPF
UROBILINOGEN UR QL STRIP: ABNORMAL
WBC # UR STRIP: ABNORMAL /HPF
WBC CLUMPS # UR AUTO: ABNORMAL /HPF
WBC NRBC COR # BLD AUTO: 6.93 10*3/MM3 (ref 3.4–10.8)

## 2025-01-08 PROCEDURE — 80053 COMPREHEN METABOLIC PANEL: CPT

## 2025-01-08 PROCEDURE — 87077 CULTURE AEROBIC IDENTIFY: CPT

## 2025-01-08 PROCEDURE — 81001 URINALYSIS AUTO W/SCOPE: CPT

## 2025-01-08 PROCEDURE — 82570 ASSAY OF URINE CREATININE: CPT

## 2025-01-08 PROCEDURE — 84156 ASSAY OF PROTEIN URINE: CPT

## 2025-01-08 PROCEDURE — 87340 HEPATITIS B SURFACE AG IA: CPT

## 2025-01-08 PROCEDURE — 83970 ASSAY OF PARATHORMONE: CPT

## 2025-01-08 PROCEDURE — 85025 COMPLETE CBC W/AUTO DIFF WBC: CPT

## 2025-01-08 PROCEDURE — 36415 COLL VENOUS BLD VENIPUNCTURE: CPT

## 2025-01-08 PROCEDURE — 87186 SC STD MICRODIL/AGAR DIL: CPT

## 2025-01-08 PROCEDURE — 84100 ASSAY OF PHOSPHORUS: CPT

## 2025-01-08 PROCEDURE — 87086 URINE CULTURE/COLONY COUNT: CPT

## 2025-01-10 LAB — BACTERIA SPEC AEROBE CULT: ABNORMAL

## 2025-02-14 RX ORDER — ALBUTEROL SULFATE 90 UG/1
2 INHALANT RESPIRATORY (INHALATION) EVERY 6 HOURS
COMMUNITY
Start: 2024-12-30

## 2025-02-16 ENCOUNTER — APPOINTMENT (OUTPATIENT)
Dept: GENERAL RADIOLOGY | Facility: HOSPITAL | Age: 75
End: 2025-02-16
Payer: MEDICARE

## 2025-02-16 ENCOUNTER — HOSPITAL ENCOUNTER (INPATIENT)
Facility: HOSPITAL | Age: 75
LOS: 1 days | Discharge: HOME | End: 2025-02-19
Attending: EMERGENCY MEDICINE | Admitting: INTERNAL MEDICINE
Payer: MEDICARE

## 2025-02-16 DIAGNOSIS — N18.4 CKD (CHRONIC KIDNEY DISEASE) STAGE 4, GFR 15-29 ML/MIN: ICD-10-CM

## 2025-02-16 DIAGNOSIS — R06.00 DYSPNEA, UNSPECIFIED TYPE: Primary | ICD-10-CM

## 2025-02-16 LAB
ALBUMIN SERPL-MCNC: 3.9 G/DL (ref 3.5–5.2)
ALBUMIN/GLOB SERPL: 1.1 G/DL
ALP SERPL-CCNC: 52 U/L (ref 39–117)
ALT SERPL W P-5'-P-CCNC: 28 U/L (ref 1–33)
ANION GAP SERPL CALCULATED.3IONS-SCNC: 15.6 MMOL/L (ref 5–15)
AST SERPL-CCNC: 25 U/L (ref 1–32)
BASOPHILS # BLD AUTO: 0.05 10*3/MM3 (ref 0–0.2)
BASOPHILS NFR BLD AUTO: 0.8 % (ref 0–1.5)
BILIRUB SERPL-MCNC: 0.2 MG/DL (ref 0–1.2)
BUN SERPL-MCNC: 53 MG/DL (ref 8–23)
BUN/CREAT SERPL: 22 (ref 7–25)
CALCIUM SPEC-SCNC: 8.7 MG/DL (ref 8.6–10.5)
CHLORIDE SERPL-SCNC: 103 MMOL/L (ref 98–107)
CO2 SERPL-SCNC: 18.4 MMOL/L (ref 22–29)
CREAT SERPL-MCNC: 2.41 MG/DL (ref 0.57–1)
DEPRECATED RDW RBC AUTO: 56.9 FL (ref 37–54)
EGFRCR SERPLBLD CKD-EPI 2021: 20.4 ML/MIN/1.73
EOSINOPHIL # BLD AUTO: 0.19 10*3/MM3 (ref 0–0.4)
EOSINOPHIL NFR BLD AUTO: 2.9 % (ref 0.3–6.2)
ERYTHROCYTE [DISTWIDTH] IN BLOOD BY AUTOMATED COUNT: 14.6 % (ref 12.3–15.4)
GEN 5 1HR TROPONIN T REFLEX: 32 NG/L
GLOBULIN UR ELPH-MCNC: 3.6 GM/DL
GLUCOSE BLDC GLUCOMTR-MCNC: 173 MG/DL (ref 70–105)
GLUCOSE SERPL-MCNC: 281 MG/DL (ref 65–99)
HCT VFR BLD AUTO: 35 % (ref 34–46.6)
HGB BLD-MCNC: 10.6 G/DL (ref 12–15.9)
HOLD SPECIMEN: NORMAL
HOLD SPECIMEN: NORMAL
IMM GRANULOCYTES # BLD AUTO: 0.03 10*3/MM3 (ref 0–0.05)
IMM GRANULOCYTES NFR BLD AUTO: 0.5 % (ref 0–0.5)
LYMPHOCYTES # BLD AUTO: 1.52 10*3/MM3 (ref 0.7–3.1)
LYMPHOCYTES NFR BLD AUTO: 23.1 % (ref 19.6–45.3)
MCH RBC QN AUTO: 31.9 PG (ref 26.6–33)
MCHC RBC AUTO-ENTMCNC: 30.3 G/DL (ref 31.5–35.7)
MCV RBC AUTO: 105.4 FL (ref 79–97)
MONOCYTES # BLD AUTO: 0.49 10*3/MM3 (ref 0.1–0.9)
MONOCYTES NFR BLD AUTO: 7.4 % (ref 5–12)
NEUTROPHILS NFR BLD AUTO: 4.3 10*3/MM3 (ref 1.7–7)
NEUTROPHILS NFR BLD AUTO: 65.3 % (ref 42.7–76)
NRBC BLD AUTO-RTO: 0 /100 WBC (ref 0–0.2)
PLATELET # BLD AUTO: 271 10*3/MM3 (ref 140–450)
PMV BLD AUTO: 9.6 FL (ref 6–12)
POTASSIUM SERPL-SCNC: 3.9 MMOL/L (ref 3.5–5.2)
PROT SERPL-MCNC: 7.5 G/DL (ref 6–8.5)
RBC # BLD AUTO: 3.32 10*6/MM3 (ref 3.77–5.28)
SODIUM SERPL-SCNC: 137 MMOL/L (ref 136–145)
TROPONIN T % DELTA: -9
TROPONIN T NUMERIC DELTA: -3 NG/L
TROPONIN T SERPL HS-MCNC: 35 NG/L
WBC NRBC COR # BLD AUTO: 6.58 10*3/MM3 (ref 3.4–10.8)
WHOLE BLOOD HOLD COAG: NORMAL
WHOLE BLOOD HOLD SPECIMEN: NORMAL

## 2025-02-16 PROCEDURE — 84439 ASSAY OF FREE THYROXINE: CPT | Performed by: NURSE PRACTITIONER

## 2025-02-16 PROCEDURE — 93005 ELECTROCARDIOGRAM TRACING: CPT | Performed by: EMERGENCY MEDICINE

## 2025-02-16 PROCEDURE — 82550 ASSAY OF CK (CPK): CPT | Performed by: INTERNAL MEDICINE

## 2025-02-16 PROCEDURE — 93005 ELECTROCARDIOGRAM TRACING: CPT

## 2025-02-16 PROCEDURE — 83036 HEMOGLOBIN GLYCOSYLATED A1C: CPT | Performed by: NURSE PRACTITIONER

## 2025-02-16 PROCEDURE — 82728 ASSAY OF FERRITIN: CPT | Performed by: NURSE PRACTITIONER

## 2025-02-16 PROCEDURE — 80053 COMPREHEN METABOLIC PANEL: CPT | Performed by: EMERGENCY MEDICINE

## 2025-02-16 PROCEDURE — 71046 X-RAY EXAM CHEST 2 VIEWS: CPT

## 2025-02-16 PROCEDURE — 84466 ASSAY OF TRANSFERRIN: CPT | Performed by: NURSE PRACTITIONER

## 2025-02-16 PROCEDURE — 36415 COLL VENOUS BLD VENIPUNCTURE: CPT

## 2025-02-16 PROCEDURE — 82948 REAGENT STRIP/BLOOD GLUCOSE: CPT

## 2025-02-16 PROCEDURE — 99285 EMERGENCY DEPT VISIT HI MDM: CPT

## 2025-02-16 PROCEDURE — 85025 COMPLETE CBC W/AUTO DIFF WBC: CPT | Performed by: EMERGENCY MEDICINE

## 2025-02-16 PROCEDURE — 83880 ASSAY OF NATRIURETIC PEPTIDE: CPT | Performed by: NURSE PRACTITIONER

## 2025-02-16 PROCEDURE — 83540 ASSAY OF IRON: CPT | Performed by: NURSE PRACTITIONER

## 2025-02-16 PROCEDURE — 80061 LIPID PANEL: CPT | Performed by: NURSE PRACTITIONER

## 2025-02-16 PROCEDURE — G0378 HOSPITAL OBSERVATION PER HR: HCPCS

## 2025-02-16 PROCEDURE — 84484 ASSAY OF TROPONIN QUANT: CPT | Performed by: EMERGENCY MEDICINE

## 2025-02-16 PROCEDURE — 84443 ASSAY THYROID STIM HORMONE: CPT | Performed by: NURSE PRACTITIONER

## 2025-02-16 PROCEDURE — 84550 ASSAY OF BLOOD/URIC ACID: CPT | Performed by: INTERNAL MEDICINE

## 2025-02-16 RX ORDER — HYDRALAZINE HYDROCHLORIDE 25 MG/1
50 TABLET, FILM COATED ORAL 2 TIMES DAILY
Status: DISCONTINUED | OUTPATIENT
Start: 2025-02-16 | End: 2025-02-17

## 2025-02-16 RX ORDER — SODIUM CHLORIDE 0.9 % (FLUSH) 0.9 %
10 SYRINGE (ML) INJECTION AS NEEDED
Status: DISCONTINUED | OUTPATIENT
Start: 2025-02-16 | End: 2025-02-19 | Stop reason: HOSPADM

## 2025-02-16 RX ORDER — SODIUM CHLORIDE 0.9 % (FLUSH) 0.9 %
10 SYRINGE (ML) INJECTION EVERY 12 HOURS SCHEDULED
Status: DISCONTINUED | OUTPATIENT
Start: 2025-02-16 | End: 2025-02-19 | Stop reason: HOSPADM

## 2025-02-16 RX ORDER — PRIMIDONE 50 MG/1
50 TABLET ORAL NIGHTLY
Status: DISCONTINUED | OUTPATIENT
Start: 2025-02-16 | End: 2025-02-17

## 2025-02-16 RX ORDER — AMOXICILLIN 250 MG
2 CAPSULE ORAL 2 TIMES DAILY PRN
Status: DISCONTINUED | OUTPATIENT
Start: 2025-02-16 | End: 2025-02-19 | Stop reason: HOSPADM

## 2025-02-16 RX ORDER — CARVEDILOL 25 MG/1
25 TABLET ORAL 2 TIMES DAILY WITH MEALS
Status: DISCONTINUED | OUTPATIENT
Start: 2025-02-16 | End: 2025-02-17

## 2025-02-16 RX ORDER — POLYETHYLENE GLYCOL 3350 17 G/17G
17 POWDER, FOR SOLUTION ORAL DAILY PRN
Status: DISCONTINUED | OUTPATIENT
Start: 2025-02-16 | End: 2025-02-19 | Stop reason: HOSPADM

## 2025-02-16 RX ORDER — BISACODYL 10 MG
10 SUPPOSITORY, RECTAL RECTAL DAILY PRN
Status: DISCONTINUED | OUTPATIENT
Start: 2025-02-16 | End: 2025-02-19 | Stop reason: HOSPADM

## 2025-02-16 RX ORDER — BISACODYL 5 MG/1
5 TABLET, DELAYED RELEASE ORAL DAILY PRN
Status: DISCONTINUED | OUTPATIENT
Start: 2025-02-16 | End: 2025-02-19 | Stop reason: HOSPADM

## 2025-02-16 RX ORDER — CYCLOBENZAPRINE HCL 10 MG
5 TABLET ORAL 3 TIMES DAILY PRN
Status: DISCONTINUED | OUTPATIENT
Start: 2025-02-16 | End: 2025-02-17

## 2025-02-16 RX ORDER — SODIUM CHLORIDE 9 MG/ML
40 INJECTION, SOLUTION INTRAVENOUS AS NEEDED
Status: DISCONTINUED | OUTPATIENT
Start: 2025-02-16 | End: 2025-02-19 | Stop reason: HOSPADM

## 2025-02-16 RX ORDER — NITROGLYCERIN 0.4 MG/1
0.4 TABLET SUBLINGUAL
Status: DISCONTINUED | OUTPATIENT
Start: 2025-02-16 | End: 2025-02-19 | Stop reason: HOSPADM

## 2025-02-16 RX ORDER — VIBEGRON 75 MG/1
75 TABLET, FILM COATED ORAL DAILY
COMMUNITY

## 2025-02-16 RX ORDER — ONDANSETRON 2 MG/ML
4 INJECTION INTRAMUSCULAR; INTRAVENOUS EVERY 6 HOURS PRN
Status: DISCONTINUED | OUTPATIENT
Start: 2025-02-16 | End: 2025-02-19 | Stop reason: HOSPADM

## 2025-02-16 RX ORDER — GABAPENTIN 100 MG/1
100 CAPSULE ORAL NIGHTLY
Status: DISCONTINUED | OUTPATIENT
Start: 2025-02-16 | End: 2025-02-17

## 2025-02-16 RX ORDER — ALBUTEROL SULFATE 0.83 MG/ML
2.5 SOLUTION RESPIRATORY (INHALATION) EVERY 6 HOURS PRN
Status: DISCONTINUED | OUTPATIENT
Start: 2025-02-16 | End: 2025-02-17

## 2025-02-16 RX ADMIN — CARVEDILOL 25 MG: 25 TABLET, FILM COATED ORAL at 22:50

## 2025-02-16 RX ADMIN — CYCLOBENZAPRINE HYDROCHLORIDE 5 MG: 10 TABLET, FILM COATED ORAL at 22:50

## 2025-02-16 RX ADMIN — HYDRALAZINE HYDROCHLORIDE 50 MG: 25 TABLET ORAL at 22:50

## 2025-02-16 RX ADMIN — PRIMIDONE 50 MG: 50 TABLET ORAL at 22:50

## 2025-02-16 RX ADMIN — GABAPENTIN 100 MG: 100 CAPSULE ORAL at 22:50

## 2025-02-16 NOTE — ED PROVIDER NOTES
Subjective   History of Present Illness  Chief complaint shortness of breath    History of present illness 74-year-old female with several health problems progressively worsening history of shortness of breath over the last couple months.  She states she cannot even get up to walk across the room without being short of breath there is some tightness in the chest but no chest pain neck arm jaw pain.  Denies any fever chills sweats cough congestion dizziness or syncope.  Patient has seen her providers she is had some pulmonary function test done and she is unsure of the results.  She has bad kidneys and she is scheduled to 25 to get a fistula placed by the vascular surgeon.  She denies any leg pain or swelling no recent long car ride plane ride immobilization foreign travels antibiotic use or hospitalizations or injury no one in the home with similar illness.      Review of Systems   Constitutional:  Negative for chills and fever.   HENT:  Negative for congestion.    Respiratory:  Positive for cough, chest tightness and shortness of breath.    Cardiovascular:  Negative for chest pain, palpitations and leg swelling.   Gastrointestinal:  Negative for abdominal pain and vomiting.   Genitourinary:  Negative for difficulty urinating and dysuria.   Musculoskeletal:  Negative for back pain.   Skin:  Negative for rash.   Neurological:  Positive for weakness. Negative for dizziness and light-headedness.   Psychiatric/Behavioral:  Negative for confusion.        Past Medical History:   Diagnosis Date    Anemia     Hx of anemia    Anxiety     Arthritis     Conditions. Abstracted from [facility].    Cataract     bilateral    CHF (congestive heart failure) 6/01/22    Low iron    Cholelithiasis 30 yrs ago    Had gb removed    Chronic obstructive pulmonary disease, unspecified COPD type     CKD (chronic kidney disease), stage IV 01/13/2023    Colon polyp     Depression 1995    Take cymbalta    Diabetes mellitus     type 2    Diarrhea      on and off    Edema of right foot 04/2021    Healthcare maintenance 2008    PAP. Abstracted from [facility].    Hip pain, bilateral     HL (hearing loss) 1/1/19    Have hearing aids    Hyperlipidemia     Hypertension     Knee pain, bilateral     Obesity     Osteopenia 11/1/22    On med    Other forms of dyspnea     Other specified soft tissue disorders     Pneumonia due to COVID-19 virus 06/26/2022    Renal insufficiency Stage 3    Has gone up to stage 4    Slow to wake up after anesthesia     Spinal headache     Stress headaches     Tremor     Benign    Tremor of both hands     Urinary tract infection 07/01/22    [place]    Visual impairment 6/1/2017    Cataracts    Vitamin D deficiency        Allergies   Allergen Reactions    Morphine Itching    Sulfa Antibiotics GI Intolerance    Hydrocodone Nausea Only    Diphenhydramine Anxiety       Past Surgical History:   Procedure Laterality Date    ARTERIOVENOUS FISTULA/SHUNT SURGERY Left 02/24/2023    Procedure: BRACHIOCEPHALIC ARTERIOVENOUS FISTULA FORMATION;  Surgeon: Vignesh Riley MD;  Location: [facility] MAIN OR;  Service: Vascular;  Laterality: Left;    BREAST BIOPSY Right     CHOLECYSTECTOMY  1990    COLONOSCOPY      This year 2022    EYE SURGERY Bilateral     cataract removal    GASTRIC BYPASS  2002    HERNIA REPAIR  2005    umbilical hernia    HIP OPEN REDUCTION Right 03/02/2020    Procedure: FEMORAL NECK OPEN REDUCTION INTERNAL FIXATION;  Surgeon: Yfn Sifuentes MD;  Location: [facility] MAIN OR;  Service: Orthopedics;  Laterality: Right;  Cannulated screw fixation right femoral neck    JOINT REPLACEMENT  2020    R knee    LAPAROTOMY OOPHERECTOMY Bilateral 2010    OTHER SURGICAL HISTORY  04/20/2015    Lexiscan stress test, normal. Abstracted from [facility].    PANNICULECTOMY      TOTAL KNEE ARTHROPLASTY Right 04/29/2021    Procedure: RIGHT TOTAL KNEE REPLACEMENT;  Surgeon: Yfn Sifuentes MD;  Location: [facility] MAIN OR;  Service: Orthopedics;  Laterality:  Right;       Family History   Problem Relation Age of Onset    Asthma Mother     Depression Mother     Kidney disease Mother         Kidney stones    Miscarriages / Stillbirths Mother     Hyperlipidemia Father     Hypertension Father     Diabetes Father     Anxiety disorder Daughter     Early death Daughter     Miscarriages / Stillbirths Daughter     Diabetes Daughter        Social History     Socioeconomic History    Marital status:    Tobacco Use    Smoking status: Never     Passive exposure: Never    Smokeless tobacco: Never   Vaping Use    Vaping status: Never Used   Substance and Sexual Activity    Alcohol use: Never    Drug use: Never    Sexual activity: Not Currently     Partners: Male     Birth control/protection: Condom, Post-menopausal, Bilateral salpingectomy      Comment: monogamous with      Prior to Admission medications    Medication Sig Start Date End Date Taking? Authorizing Provider   Accu-Chek Softclix Lancets lancets TEST BLOOD SUGAR TWICE DAILY 4/12/22   Rose Robb MD   albuterol sulfate  (90 Base) MCG/ACT inhaler Inhale 2 puffs Every 6 (Six) Hours. 12/30/24   Suleman Hensley MD   Alcohol Swabs (B-D SINGLE USE SWABS REGULAR) pads TO USE WHEN CHECKING BLOOD SUGAR TWICE A DAY 4/12/22   Rose Robb MD   Alcohol Swabs (B-D SINGLE USE SWABS REGULAR) pads BD Single Use Swab, See Instructions, Use 1 swab to check blood sugar up to TID, # 300 EA, 1 Refill(s), Pharmacy: [facility], E11.9, Use 1 swab to check blood sugar up to TID, Supply, 168, cm, 03/25/24 11:10:00 EDT, Height, 89.9, kg, 03/25/24 11:16:00 EDT, Weight Dosing 4/22/24   ProviderSuleman MD   alendronate (FOSAMAX) 70 MG tablet Take 1 tablet by mouth Every 7 (Seven) Days.  Patient taking differently: Take 1 tablet by mouth Every 7 (Seven) Days. Sat 5/22/23   Rose Robb MD   allopurinol (ZYLOPRIM) 100 MG tablet Take 1 tablet by mouth Daily.    Provider,  MD Suleman   Blood Glucose Calibration (Accu-Chek Megan) solution Use as directed 1/26/21   Rose Robb MD   Blood Glucose Monitoring Suppl (Accu-Chek Megan) device Use to test twice daily   DX: E11.9 1/26/21   Rose Robb MD   Blood Glucose Monitoring Suppl (True Metrix Air Glucose Meter) device True Metrix Air Glucose Meter, See Instructions, use to check blood sugar up to TID, # 1 EA, 0 Refill(s), Pharmacy: [facility], E11.9, use to check blood sugar up to TID, Supply, 168, cm, 03/25/24 11:10:00 EDT, Height, 89.9, kg, 03/25/24 11:16:00 EDT, Weight Dosing 4/22/24   Suleman Hensley MD   calcitriol (ROCALTROL) 0.25 MCG capsule Take 2 capsules by mouth Daily.    Suleman Hensley MD   carvedilol (COREG) 25 MG tablet TAKE 1 TABLET TWICE DAILY 11/30/23   Fredi Pierce MD   cyclobenzaprine (FLEXERIL) 5 MG tablet Take 1 tablet by mouth 3 (Three) Times a Day As Needed.    Suleman Hensley MD   DULoxetine (CYMBALTA) 60 MG capsule Take 1 capsule by mouth 2 (Two) Times a Day. 2/15/23   Rose Robb MD   furosemide (LASIX) 40 MG tablet TAKE 1 TABLET TWICE DAILY 5/24/24   Fredi Pierce MD   gabapentin (NEURONTIN) 100 MG capsule Take 1 capsule by mouth every night at bedtime. 12/20/23   Suleman Hensley MD   glimepiride (AMARYL) 2 MG tablet TAKE 2 TABLETS TWICE DAILY  Patient taking differently: Take 2 tablets by mouth 2 (Two) Times a Day. 6/15/23   Rose Robb MD   glucose blood (Accu-Chek Megan Plus) test strip TEST BLOOD SUGAR TWICE DAILY AS DIRECTED   DX  E11.9 5/11/22   Rose Robb MD   hydrALAZINE (APRESOLINE) 50 MG tablet Take 1 tablet by mouth 2 (Two) Times a Day.    Suleman Hensley MD   hydrOXYzine (ATARAX) 10 MG tablet Take 1 tablet by mouth 3 (Three) Times a Day As Needed for Itching. 8/14/23   Becky Greco,    Lancets (accu-chek soft touch) lancets Check blood sugars twice daily as  directed. DX:E11.9 1/28/21   Rose Robb MD   lidocaine (Lidoderm) 5 % Apply  topically to the appropriate area as directed. 5/10/24   Suleman Hensley MD   primidone (MYSOLINE) 50 MG tablet TAKE 1 TABLET EVERY NIGHT.  Patient taking differently: Take 1 tablet by mouth Every Night. 8/28/23   Irene Khoury APRN   rosuvastatin (CRESTOR) 10 MG tablet Take 1 tablet by mouth Daily. 12/27/22   Rose Robb MD   sodium bicarbonate 650 MG tablet Take 1 tablet by mouth Daily. 5/11/23   Provider, MD Suleman          Objective   Physical Exam  Constitutional this is a 74-year-old female awake alert no acute distress triage vital signs reviewed.  HEENT extraocular muscles are intact pupils equal round reactive there is no photophobia mouth clear neck supple no adenopathy no JV no bruits no meningeal signs lungs were a few rales in the bases no retraction no use of accessory heart regular without murmur rub abdomen soft nontender good bowel sounds no peritoneal findings or pulsatile masses extremities pulses equal upper and lower extremities no edema no cords no Homans' sign no evidence of DVT skin is warm and dry without rashes or cellulitic changes neurologic awake alert and follows commands motor strength normal without focal weak  Procedures           ED Course      Results for orders placed or performed during the hospital encounter of 02/16/25   ECG 12 Lead Dyspnea    Collection Time: 02/16/25  3:07 PM   Result Value Ref Range    QT Interval 417 ms    QTC Interval 469 ms   Comprehensive Metabolic Panel    Collection Time: 02/16/25  3:43 PM    Specimen: Blood   Result Value Ref Range    Glucose 281 (H) 65 - 99 mg/dL    BUN 53 (H) 8 - 23 mg/dL    Creatinine 2.41 (H) 0.57 - 1.00 mg/dL    Sodium 137 136 - 145 mmol/L    Potassium 3.9 3.5 - 5.2 mmol/L    Chloride 103 98 - 107 mmol/L    CO2 18.4 (L) 22.0 - 29.0 mmol/L    Calcium 8.7 8.6 - 10.5 mg/dL    Total Protein 7.5 6.0 - 8.5 g/dL    Albumin  3.9 3.5 - 5.2 g/dL    ALT (SGPT) 28 1 - 33 U/L    AST (SGOT) 25 1 - 32 U/L    Alkaline Phosphatase 52 39 - 117 U/L    Total Bilirubin 0.2 0.0 - 1.2 mg/dL    Globulin 3.6 gm/dL    A/G Ratio 1.1 g/dL    BUN/Creatinine Ratio 22.0 7.0 - 25.0    Anion Gap 15.6 (H) 5.0 - 15.0 mmol/L    eGFR 20.4 (L) >60.0 mL/min/1.73   High Sensitivity Troponin T    Collection Time: 02/16/25  3:43 PM    Specimen: Blood   Result Value Ref Range    HS Troponin T 35 (H) <14 ng/L   CBC Auto Differential    Collection Time: 02/16/25  3:43 PM    Specimen: Blood   Result Value Ref Range    WBC 6.58 3.40 - 10.80 10*3/mm3    RBC 3.32 (L) 3.77 - 5.28 10*6/mm3    Hemoglobin 10.6 (L) 12.0 - 15.9 g/dL    Hematocrit 35.0 34.0 - 46.6 %    .4 (H) 79.0 - 97.0 fL    MCH 31.9 26.6 - 33.0 pg    MCHC 30.3 (L) 31.5 - 35.7 g/dL    RDW 14.6 12.3 - 15.4 %    RDW-SD 56.9 (H) 37.0 - 54.0 fl    MPV 9.6 6.0 - 12.0 fL    Platelets 271 140 - 450 10*3/mm3    Neutrophil % 65.3 42.7 - 76.0 %    Lymphocyte % 23.1 19.6 - 45.3 %    Monocyte % 7.4 5.0 - 12.0 %    Eosinophil % 2.9 0.3 - 6.2 %    Basophil % 0.8 0.0 - 1.5 %    Immature Grans % 0.5 0.0 - 0.5 %    Neutrophils, Absolute 4.30 1.70 - 7.00 10*3/mm3    Lymphocytes, Absolute 1.52 0.70 - 3.10 10*3/mm3    Monocytes, Absolute 0.49 0.10 - 0.90 10*3/mm3    Eosinophils, Absolute 0.19 0.00 - 0.40 10*3/mm3    Basophils, Absolute 0.05 0.00 - 0.20 10*3/mm3    Immature Grans, Absolute 0.03 0.00 - 0.05 10*3/mm3    nRBC 0.0 0.0 - 0.2 /100 WBC   Green Top (Gel)    Collection Time: 02/16/25  3:43 PM   Result Value Ref Range    Extra Tube Hold for add-ons.    Lavender Top    Collection Time: 02/16/25  3:43 PM   Result Value Ref Range    Extra Tube hold for add-on    Gold Top - SST    Collection Time: 02/16/25  3:43 PM   Result Value Ref Range    Extra Tube Hold for add-ons.    Light Blue Top    Collection Time: 02/16/25  3:43 PM   Result Value Ref Range    Extra Tube Hold for add-ons.    High Sensitivity Troponin T 1Hr     Collection Time: 02/16/25  5:16 PM    Specimen: Arm, Left; Blood   Result Value Ref Range    HS Troponin T 32 (H) <14 ng/L    Troponin T Numeric Delta -3 ng/L    Troponin T % Delta -9 Abnormal if >/= 20%     XR Chest 2 View    Result Date: 2/16/2025  Impression: No acute process identified Electronically Signed: Antonio Ceballos MD  2/16/2025 4:13 PM EST  Workstation ID: [id number]   Medications   sodium chloride 0.9 % flush 10 mL (has no administration in time range)   sodium chloride 0.9 % flush 10 mL (has no administration in time range)                                            EKG my interpretation normal sinus rhythm rate of 76 normal axis hypertrophy QTc of 469 no acute ST elevation really do not see any significant change from previous on 7/31/2023 otherwise unremarkable EKG            Medical Decision Making  Medical decision making.  IV established monitor placed my review of sinus rhythm.  Patient had an EKG obtained my interpretation normal sinus rhythm rate 76 normal axis hypertrophy no acute ST elevation QTc 469 otherwise unremarkable unchanged from 7/31/2023 chest x-ray my independent view no pneumonia pneumothorax failure acute findings.  Labs obtained my independent review comprehensive metabolic profile unremarkable and a glucose 281 BUN 53 creatinine 2.4 which is baseline for patient.  CBC unremarkable and hemoglobin of 10.6 patient's initial troponin 35 with an hour repeat at 32.  Chest x-ray my independent review no pneumonia pneumothorax failure acute findings radiology review without acute findings.  The patient repeat exam is resting comfortably also evidence of acute ST elevation myocardial infarction DVT pulmonary embolism and dissection myocarditis pericardial effusion or sepsis based on my history and physical clinical findings at this time.  Not complete list of all possibilities.  She has had shortness of breath she came in well across the room.  To be placed in observation and echo in the  morning cardiac monitoring further workup cardiac consultation patient agreeable stable otherwise unremarkable ER course.    Problems Addressed:  Dyspnea, unspecified type: complicated acute illness or injury    Amount and/or Complexity of Data Reviewed  Labs: ordered. Decision-making details documented in ED Course.  Radiology: ordered and independent interpretation performed. Decision-making details documented in ED Course.  ECG/medicine tests: ordered and independent interpretation performed. Decision-making details documented in ED Course.    Risk  Decision regarding hospitalization.        Final diagnoses:   Dyspnea, unspecified type       ED Disposition  ED Disposition       ED Disposition   Decision to Admit    Condition   --    Comment   --               No follow-up provider specified.       Medication List      No changes were made to your prescriptions during this visit.            Yfn Solano MD  02/16/25 [id number]

## 2025-02-17 ENCOUNTER — APPOINTMENT (OUTPATIENT)
Dept: CT IMAGING | Facility: HOSPITAL | Age: 75
End: 2025-02-17
Payer: MEDICARE

## 2025-02-17 ENCOUNTER — APPOINTMENT (OUTPATIENT)
Dept: CARDIOLOGY | Facility: HOSPITAL | Age: 75
End: 2025-02-17
Payer: MEDICARE

## 2025-02-17 LAB
ANION GAP SERPL CALCULATED.3IONS-SCNC: 13.8 MMOL/L (ref 5–15)
AORTIC DIMENSIONLESS INDEX: 0.86 (DI)
AV MEAN PRESS GRAD SYS DOP V1V2: 4 MMHG
AV VMAX SYS DOP: 138 CM/SEC
B PARAPERT DNA SPEC QL NAA+PROBE: NOT DETECTED
B PERT DNA SPEC QL NAA+PROBE: NOT DETECTED
BACTERIA UR QL AUTO: ABNORMAL /HPF
BASOPHILS # BLD AUTO: 0.04 10*3/MM3 (ref 0–0.2)
BASOPHILS NFR BLD AUTO: 0.5 % (ref 0–1.5)
BH CV ECHO LEFT VENTRICLE GLOBAL LONGITUDINAL STRAIN: -19.5 %
BH CV ECHO MEAS - AO MAX PG: 7.6 MMHG
BH CV ECHO MEAS - AO V2 VTI: 28.2 CM
BH CV ECHO MEAS - AVA(I,D): 2.21 CM2
BH CV ECHO MEAS - EDV(MOD-SP2): 55.8 ML
BH CV ECHO MEAS - EDV(MOD-SP4): 56 ML
BH CV ECHO MEAS - EF(MOD-SP2): 62.4 %
BH CV ECHO MEAS - EF(MOD-SP4): 63.4 %
BH CV ECHO MEAS - ESV(MOD-SP2): 21 ML
BH CV ECHO MEAS - ESV(MOD-SP4): 20.5 ML
BH CV ECHO MEAS - LA DIMENSION: 4.3 CM
BH CV ECHO MEAS - LAT PEAK E' VEL: 8.8 CM/SEC
BH CV ECHO MEAS - LV DIASTOLIC VOL/BSA (35-75): 27.9 CM2
BH CV ECHO MEAS - LV MAX PG: 5.6 MMHG
BH CV ECHO MEAS - LV MEAN PG: 3 MMHG
BH CV ECHO MEAS - LV SYSTOLIC VOL/BSA (12-30): 10.2 CM2
BH CV ECHO MEAS - LV V1 MAX: 118 CM/SEC
BH CV ECHO MEAS - LV V1 VTI: 22 CM
BH CV ECHO MEAS - LVOT AREA: 2.8 CM2
BH CV ECHO MEAS - LVOT DIAM: 1.9 CM
BH CV ECHO MEAS - MED PEAK E' VEL: 5.2 CM/SEC
BH CV ECHO MEAS - MV A MAX VEL: 135 CM/SEC
BH CV ECHO MEAS - MV DEC SLOPE: 545 CM/SEC2
BH CV ECHO MEAS - MV DEC TIME: 0.21 SEC
BH CV ECHO MEAS - MV E MAX VEL: 66.8 CM/SEC
BH CV ECHO MEAS - MV E/A: 0.49
BH CV ECHO MEAS - MV MAX PG: 10.8 MMHG
BH CV ECHO MEAS - MV MEAN PG: 5 MMHG
BH CV ECHO MEAS - MV P1/2T: 47.4 MSEC
BH CV ECHO MEAS - MV V2 VTI: 38.4 CM
BH CV ECHO MEAS - MVA(P1/2T): 4.6 CM2
BH CV ECHO MEAS - MVA(VTI): 1.62 CM2
BH CV ECHO MEAS - PA ACC TIME: 0.11 SEC
BH CV ECHO MEAS - PA V2 MAX: 102 CM/SEC
BH CV ECHO MEAS - PULM A REVS DUR: 0.14 SEC
BH CV ECHO MEAS - PULM A REVS VEL: 31.7 CM/SEC
BH CV ECHO MEAS - PULM DIAS VEL: 41.7 CM/SEC
BH CV ECHO MEAS - PULM S/D: 1.35
BH CV ECHO MEAS - PULM SYS VEL: 56.4 CM/SEC
BH CV ECHO MEAS - RAP SYSTOLE: 8 MMHG
BH CV ECHO MEAS - RV MAX PG: 2.9 MMHG
BH CV ECHO MEAS - RV V1 MAX: 85.4 CM/SEC
BH CV ECHO MEAS - RV V1 VTI: 17.4 CM
BH CV ECHO MEAS - RVSP: 25.1 MMHG
BH CV ECHO MEAS - SV(LVOT): 62.4 ML
BH CV ECHO MEAS - SV(MOD-SP2): 34.8 ML
BH CV ECHO MEAS - SV(MOD-SP4): 35.5 ML
BH CV ECHO MEAS - SVI(LVOT): 31.1 ML/M2
BH CV ECHO MEAS - SVI(MOD-SP2): 17.3 ML/M2
BH CV ECHO MEAS - SVI(MOD-SP4): 17.7 ML/M2
BH CV ECHO MEAS - TAPSE (>1.6): 1.27 CM
BH CV ECHO MEAS - TR MAX PG: 17.1 MMHG
BH CV ECHO MEAS - TR MAX VEL: 207 CM/SEC
BH CV ECHO MEASUREMENTS AVERAGE E/E' RATIO: 9.54
BH CV XLRA - RV BASE: 2.6 CM
BH CV XLRA - RV MID: 2.4 CM
BH CV XLRA - TDI S': 11.5 CM/SEC
BILIRUB UR QL STRIP: NEGATIVE
BUN SERPL-MCNC: 51 MG/DL (ref 8–23)
BUN/CREAT SERPL: 19.2 (ref 7–25)
C PNEUM DNA NPH QL NAA+NON-PROBE: NOT DETECTED
CALCIUM SPEC-SCNC: 8.9 MG/DL (ref 8.6–10.5)
CHLORIDE SERPL-SCNC: 105 MMOL/L (ref 98–107)
CHOLEST SERPL-MCNC: 145 MG/DL (ref 0–200)
CK SERPL-CCNC: 27 U/L (ref 20–180)
CLARITY UR: CLEAR
CO2 SERPL-SCNC: 22.2 MMOL/L (ref 22–29)
COLOR UR: YELLOW
CREAT SERPL-MCNC: 2.65 MG/DL (ref 0.57–1)
D DIMER PPP FEU-MCNC: 0.38 MCGFEU/ML (ref 0–0.76)
DEPRECATED RDW RBC AUTO: 57.1 FL (ref 37–54)
EGFRCR SERPLBLD CKD-EPI 2021: 18.2 ML/MIN/1.73
EOSINOPHIL # BLD AUTO: 0.19 10*3/MM3 (ref 0–0.4)
EOSINOPHIL NFR BLD AUTO: 2.4 % (ref 0.3–6.2)
ERYTHROCYTE [DISTWIDTH] IN BLOOD BY AUTOMATED COUNT: 14.7 % (ref 12.3–15.4)
FERRITIN SERPL-MCNC: 13.3 NG/ML (ref 13–150)
FLUAV SUBTYP SPEC NAA+PROBE: NOT DETECTED
FLUBV RNA ISLT QL NAA+PROBE: NOT DETECTED
GLUCOSE BLDC GLUCOMTR-MCNC: 182 MG/DL (ref 70–105)
GLUCOSE BLDC GLUCOMTR-MCNC: 209 MG/DL (ref 70–105)
GLUCOSE BLDC GLUCOMTR-MCNC: 234 MG/DL (ref 70–105)
GLUCOSE BLDC GLUCOMTR-MCNC: 246 MG/DL (ref 70–105)
GLUCOSE SERPL-MCNC: 315 MG/DL (ref 65–99)
GLUCOSE UR STRIP-MCNC: ABNORMAL MG/DL
HADV DNA SPEC NAA+PROBE: NOT DETECTED
HBA1C MFR BLD: 7.72 % (ref 4.8–5.6)
HCOV 229E RNA SPEC QL NAA+PROBE: NOT DETECTED
HCOV HKU1 RNA SPEC QL NAA+PROBE: NOT DETECTED
HCOV NL63 RNA SPEC QL NAA+PROBE: NOT DETECTED
HCOV OC43 RNA SPEC QL NAA+PROBE: NOT DETECTED
HCT VFR BLD AUTO: 32.3 % (ref 34–46.6)
HDLC SERPL-MCNC: 65 MG/DL (ref 40–60)
HGB BLD-MCNC: 10 G/DL (ref 12–15.9)
HGB UR QL STRIP.AUTO: NEGATIVE
HMPV RNA NPH QL NAA+NON-PROBE: NOT DETECTED
HPIV1 RNA ISLT QL NAA+PROBE: NOT DETECTED
HPIV2 RNA SPEC QL NAA+PROBE: NOT DETECTED
HPIV3 RNA NPH QL NAA+PROBE: NOT DETECTED
HPIV4 P GENE NPH QL NAA+PROBE: NOT DETECTED
HYALINE CASTS UR QL AUTO: ABNORMAL /LPF
IMM GRANULOCYTES # BLD AUTO: 0.03 10*3/MM3 (ref 0–0.05)
IMM GRANULOCYTES NFR BLD AUTO: 0.4 % (ref 0–0.5)
IRON 24H UR-MRATE: 53 MCG/DL (ref 37–145)
IRON SATN MFR SERPL: 16 % (ref 20–50)
KETONES UR QL STRIP: NEGATIVE
LDLC SERPL CALC-MCNC: 51 MG/DL (ref 0–100)
LDLC/HDLC SERPL: 0.69 {RATIO}
LEFT ATRIUM VOLUME INDEX: 19.7 ML/M2
LEUKOCYTE ESTERASE UR QL STRIP.AUTO: ABNORMAL
LV EF 3D SEGMENTATION: 63 %
LV EF BIPLANE MOD: 62.2 %
LYMPHOCYTES # BLD AUTO: 1.34 10*3/MM3 (ref 0.7–3.1)
LYMPHOCYTES NFR BLD AUTO: 17 % (ref 19.6–45.3)
M PNEUMO IGG SER IA-ACNC: NOT DETECTED
MCH RBC QN AUTO: 32.5 PG (ref 26.6–33)
MCHC RBC AUTO-ENTMCNC: 31 G/DL (ref 31.5–35.7)
MCV RBC AUTO: 104.9 FL (ref 79–97)
MONOCYTES # BLD AUTO: 0.73 10*3/MM3 (ref 0.1–0.9)
MONOCYTES NFR BLD AUTO: 9.3 % (ref 5–12)
NEUTROPHILS NFR BLD AUTO: 5.53 10*3/MM3 (ref 1.7–7)
NEUTROPHILS NFR BLD AUTO: 70.4 % (ref 42.7–76)
NITRITE UR QL STRIP: NEGATIVE
NRBC BLD AUTO-RTO: 0 /100 WBC (ref 0–0.2)
NT-PROBNP SERPL-MCNC: 347 PG/ML (ref 0–1800)
PH UR STRIP.AUTO: 5.5 [PH] (ref 5–8)
PLATELET # BLD AUTO: 268 10*3/MM3 (ref 140–450)
PMV BLD AUTO: 9.9 FL (ref 6–12)
POTASSIUM SERPL-SCNC: 4 MMOL/L (ref 3.5–5.2)
PROT UR QL STRIP: ABNORMAL
QT INTERVAL: 417 MS
QTC INTERVAL: 469 MS
RBC # BLD AUTO: 3.08 10*6/MM3 (ref 3.77–5.28)
RBC # UR STRIP: ABNORMAL /HPF
REF LAB TEST METHOD: ABNORMAL
RHINOVIRUS RNA SPEC NAA+PROBE: NOT DETECTED
RSV RNA NPH QL NAA+NON-PROBE: NOT DETECTED
SARS-COV-2 RNA RESP QL NAA+PROBE: NOT DETECTED
SINUS: 2.6 CM
SODIUM SERPL-SCNC: 141 MMOL/L (ref 136–145)
SP GR UR STRIP: 1.01 (ref 1–1.03)
SQUAMOUS #/AREA URNS HPF: ABNORMAL /HPF
STJ: 2.4 CM
T4 FREE SERPL-MCNC: 0.77 NG/DL (ref 0.93–1.7)
TIBC SERPL-MCNC: 325 MCG/DL (ref 298–536)
TRANSFERRIN SERPL-MCNC: 218 MG/DL (ref 200–360)
TRIGL SERPL-MCNC: 176 MG/DL (ref 0–150)
TSH SERPL DL<=0.05 MIU/L-ACNC: 1.62 UIU/ML (ref 0.27–4.2)
URATE SERPL-MCNC: 5.7 MG/DL (ref 2.4–5.7)
UROBILINOGEN UR QL STRIP: ABNORMAL
VLDLC SERPL-MCNC: 29 MG/DL (ref 5–40)
WBC # UR STRIP: ABNORMAL /HPF
WBC NRBC COR # BLD AUTO: 7.86 10*3/MM3 (ref 3.4–10.8)

## 2025-02-17 PROCEDURE — 25010000002 METHYLPREDNISOLONE PER 40 MG: Performed by: NURSE PRACTITIONER

## 2025-02-17 PROCEDURE — 87086 URINE CULTURE/COLONY COUNT: CPT | Performed by: NURSE PRACTITIONER

## 2025-02-17 PROCEDURE — 82948 REAGENT STRIP/BLOOD GLUCOSE: CPT

## 2025-02-17 PROCEDURE — 81001 URINALYSIS AUTO W/SCOPE: CPT | Performed by: NURSE PRACTITIONER

## 2025-02-17 PROCEDURE — 94640 AIRWAY INHALATION TREATMENT: CPT

## 2025-02-17 PROCEDURE — 99204 OFFICE O/P NEW MOD 45 MIN: CPT | Performed by: INTERNAL MEDICINE

## 2025-02-17 PROCEDURE — 97161 PT EVAL LOW COMPLEX 20 MIN: CPT

## 2025-02-17 PROCEDURE — 94761 N-INVAS EAR/PLS OXIMETRY MLT: CPT

## 2025-02-17 PROCEDURE — 94799 UNLISTED PULMONARY SVC/PX: CPT

## 2025-02-17 PROCEDURE — G0378 HOSPITAL OBSERVATION PER HR: HCPCS

## 2025-02-17 PROCEDURE — 93306 TTE W/DOPPLER COMPLETE: CPT

## 2025-02-17 PROCEDURE — 63710000001 INSULIN LISPRO (HUMAN) PER 5 UNITS: Performed by: NURSE PRACTITIONER

## 2025-02-17 PROCEDURE — 94664 DEMO&/EVAL PT USE INHALER: CPT

## 2025-02-17 PROCEDURE — 82948 REAGENT STRIP/BLOOD GLUCOSE: CPT | Performed by: EMERGENCY MEDICINE

## 2025-02-17 PROCEDURE — 93356 MYOCRD STRAIN IMG SPCKL TRCK: CPT | Performed by: INTERNAL MEDICINE

## 2025-02-17 PROCEDURE — 85379 FIBRIN DEGRADATION QUANT: CPT | Performed by: NURSE PRACTITIONER

## 2025-02-17 PROCEDURE — 93356 MYOCRD STRAIN IMG SPCKL TRCK: CPT

## 2025-02-17 PROCEDURE — 80048 BASIC METABOLIC PNL TOTAL CA: CPT | Performed by: EMERGENCY MEDICINE

## 2025-02-17 PROCEDURE — 93306 TTE W/DOPPLER COMPLETE: CPT | Performed by: INTERNAL MEDICINE

## 2025-02-17 PROCEDURE — 99214 OFFICE O/P EST MOD 30 MIN: CPT | Performed by: INTERNAL MEDICINE

## 2025-02-17 PROCEDURE — 0202U NFCT DS 22 TRGT SARS-COV-2: CPT | Performed by: NURSE PRACTITIONER

## 2025-02-17 PROCEDURE — 85025 COMPLETE CBC W/AUTO DIFF WBC: CPT | Performed by: EMERGENCY MEDICINE

## 2025-02-17 PROCEDURE — 82948 REAGENT STRIP/BLOOD GLUCOSE: CPT | Performed by: NURSE PRACTITIONER

## 2025-02-17 RX ORDER — ACETAMINOPHEN 500 MG
1000 TABLET ORAL EVERY 6 HOURS PRN
Status: DISCONTINUED | OUTPATIENT
Start: 2025-02-17 | End: 2025-02-19 | Stop reason: HOSPADM

## 2025-02-17 RX ORDER — SODIUM BICARBONATE 650 MG/1
1300 TABLET ORAL 3 TIMES DAILY
Status: DISCONTINUED | OUTPATIENT
Start: 2025-02-17 | End: 2025-02-18

## 2025-02-17 RX ORDER — NICOTINE POLACRILEX 4 MG
15 LOZENGE BUCCAL
Status: DISCONTINUED | OUTPATIENT
Start: 2025-02-17 | End: 2025-02-17

## 2025-02-17 RX ORDER — INSULIN LISPRO 100 [IU]/ML
4-24 INJECTION, SOLUTION INTRAVENOUS; SUBCUTANEOUS
Status: DISCONTINUED | OUTPATIENT
Start: 2025-02-17 | End: 2025-02-19 | Stop reason: HOSPADM

## 2025-02-17 RX ORDER — GABAPENTIN 100 MG/1
100 CAPSULE ORAL NIGHTLY
Status: DISCONTINUED | OUTPATIENT
Start: 2025-02-17 | End: 2025-02-19 | Stop reason: HOSPADM

## 2025-02-17 RX ORDER — DILTIAZEM HYDROCHLORIDE 240 MG/1
240 CAPSULE, COATED, EXTENDED RELEASE ORAL
Status: DISCONTINUED | OUTPATIENT
Start: 2025-02-18 | End: 2025-02-19 | Stop reason: HOSPADM

## 2025-02-17 RX ORDER — IBUPROFEN 600 MG/1
1 TABLET ORAL
Status: DISCONTINUED | OUTPATIENT
Start: 2025-02-17 | End: 2025-02-17

## 2025-02-17 RX ORDER — ALBUTEROL SULFATE 0.83 MG/ML
2.5 SOLUTION RESPIRATORY (INHALATION) EVERY 6 HOURS PRN
Status: DISCONTINUED | OUTPATIENT
Start: 2025-02-17 | End: 2025-02-19 | Stop reason: HOSPADM

## 2025-02-17 RX ORDER — CALCITRIOL 0.25 UG/1
0.5 CAPSULE, LIQUID FILLED ORAL DAILY
Status: DISCONTINUED | OUTPATIENT
Start: 2025-02-17 | End: 2025-02-19 | Stop reason: HOSPADM

## 2025-02-17 RX ORDER — INSULIN LISPRO 100 [IU]/ML
3-14 INJECTION, SOLUTION INTRAVENOUS; SUBCUTANEOUS
Status: DISCONTINUED | OUTPATIENT
Start: 2025-02-17 | End: 2025-02-17

## 2025-02-17 RX ORDER — HYDROXYZINE HYDROCHLORIDE 10 MG/1
10 TABLET, FILM COATED ORAL 3 TIMES DAILY PRN
Status: DISCONTINUED | OUTPATIENT
Start: 2025-02-17 | End: 2025-02-19 | Stop reason: HOSPADM

## 2025-02-17 RX ORDER — METOLAZONE 2.5 MG/1
2.5 TABLET ORAL ONCE
Status: COMPLETED | OUTPATIENT
Start: 2025-02-17 | End: 2025-02-17

## 2025-02-17 RX ORDER — IPRATROPIUM BROMIDE AND ALBUTEROL SULFATE 2.5; .5 MG/3ML; MG/3ML
3 SOLUTION RESPIRATORY (INHALATION)
Status: DISCONTINUED | OUTPATIENT
Start: 2025-02-17 | End: 2025-02-19 | Stop reason: HOSPADM

## 2025-02-17 RX ORDER — PRIMIDONE 50 MG/1
50 TABLET ORAL NIGHTLY
Status: DISCONTINUED | OUTPATIENT
Start: 2025-02-17 | End: 2025-02-19 | Stop reason: HOSPADM

## 2025-02-17 RX ORDER — SODIUM BICARBONATE 650 MG/1
650 TABLET ORAL DAILY
Status: DISCONTINUED | OUTPATIENT
Start: 2025-02-17 | End: 2025-02-17

## 2025-02-17 RX ORDER — ALLOPURINOL 100 MG/1
100 TABLET ORAL DAILY
Status: DISCONTINUED | OUTPATIENT
Start: 2025-02-17 | End: 2025-02-19 | Stop reason: HOSPADM

## 2025-02-17 RX ORDER — OXYBUTYNIN CHLORIDE 10 MG/1
10 TABLET, EXTENDED RELEASE ORAL DAILY
Status: DISCONTINUED | OUTPATIENT
Start: 2025-02-17 | End: 2025-02-19 | Stop reason: HOSPADM

## 2025-02-17 RX ORDER — CYCLOBENZAPRINE HCL 10 MG
5 TABLET ORAL 3 TIMES DAILY PRN
Status: DISCONTINUED | OUTPATIENT
Start: 2025-02-17 | End: 2025-02-19 | Stop reason: HOSPADM

## 2025-02-17 RX ORDER — FUROSEMIDE 40 MG/1
40 TABLET ORAL DAILY
Status: DISCONTINUED | OUTPATIENT
Start: 2025-02-18 | End: 2025-02-19 | Stop reason: HOSPADM

## 2025-02-17 RX ORDER — METHYLPREDNISOLONE SODIUM SUCCINATE 40 MG/ML
40 INJECTION, POWDER, LYOPHILIZED, FOR SOLUTION INTRAMUSCULAR; INTRAVENOUS EVERY 12 HOURS
Status: DISCONTINUED | OUTPATIENT
Start: 2025-02-17 | End: 2025-02-18

## 2025-02-17 RX ORDER — GUAIFENESIN 600 MG/1
1200 TABLET, EXTENDED RELEASE ORAL EVERY 12 HOURS SCHEDULED
Status: DISCONTINUED | OUTPATIENT
Start: 2025-02-17 | End: 2025-02-19 | Stop reason: HOSPADM

## 2025-02-17 RX ORDER — FUROSEMIDE 40 MG/1
40 TABLET ORAL 2 TIMES DAILY
Status: DISCONTINUED | OUTPATIENT
Start: 2025-02-17 | End: 2025-02-17

## 2025-02-17 RX ORDER — LIDOCAINE 4 G/G
1 PATCH TOPICAL
Status: DISCONTINUED | OUTPATIENT
Start: 2025-02-17 | End: 2025-02-19 | Stop reason: HOSPADM

## 2025-02-17 RX ORDER — CARVEDILOL 25 MG/1
25 TABLET ORAL 2 TIMES DAILY
Status: DISCONTINUED | OUTPATIENT
Start: 2025-02-17 | End: 2025-02-17

## 2025-02-17 RX ORDER — IBUPROFEN 600 MG/1
1 TABLET ORAL
Status: DISCONTINUED | OUTPATIENT
Start: 2025-02-17 | End: 2025-02-19 | Stop reason: HOSPADM

## 2025-02-17 RX ORDER — DEXTROSE MONOHYDRATE 25 G/50ML
25 INJECTION, SOLUTION INTRAVENOUS
Status: DISCONTINUED | OUTPATIENT
Start: 2025-02-17 | End: 2025-02-17

## 2025-02-17 RX ORDER — HYDRALAZINE HYDROCHLORIDE 25 MG/1
50 TABLET, FILM COATED ORAL 2 TIMES DAILY
Status: DISCONTINUED | OUTPATIENT
Start: 2025-02-17 | End: 2025-02-18

## 2025-02-17 RX ORDER — ROSUVASTATIN CALCIUM 10 MG/1
10 TABLET, COATED ORAL DAILY
Status: DISCONTINUED | OUTPATIENT
Start: 2025-02-17 | End: 2025-02-19 | Stop reason: HOSPADM

## 2025-02-17 RX ORDER — DULOXETIN HYDROCHLORIDE 30 MG/1
60 CAPSULE, DELAYED RELEASE ORAL DAILY
Status: DISCONTINUED | OUTPATIENT
Start: 2025-02-17 | End: 2025-02-19 | Stop reason: HOSPADM

## 2025-02-17 RX ORDER — DEXTROSE MONOHYDRATE 25 G/50ML
25 INJECTION, SOLUTION INTRAVENOUS
Status: DISCONTINUED | OUTPATIENT
Start: 2025-02-17 | End: 2025-02-19 | Stop reason: HOSPADM

## 2025-02-17 RX ORDER — NICOTINE POLACRILEX 4 MG
15 LOZENGE BUCCAL
Status: DISCONTINUED | OUTPATIENT
Start: 2025-02-17 | End: 2025-02-19 | Stop reason: HOSPADM

## 2025-02-17 RX ORDER — BUDESONIDE AND FORMOTEROL FUMARATE DIHYDRATE 160; 4.5 UG/1; UG/1
2 AEROSOL RESPIRATORY (INHALATION)
Status: DISCONTINUED | OUTPATIENT
Start: 2025-02-17 | End: 2025-02-19 | Stop reason: HOSPADM

## 2025-02-17 RX ADMIN — HYDRALAZINE HYDROCHLORIDE 50 MG: 25 TABLET ORAL at 20:06

## 2025-02-17 RX ADMIN — METHYLPREDNISOLONE SODIUM SUCCINATE 40 MG: 40 INJECTION, POWDER, FOR SOLUTION INTRAMUSCULAR; INTRAVENOUS at 22:56

## 2025-02-17 RX ADMIN — SODIUM BICARBONATE 1300 MG: 650 TABLET ORAL at 18:10

## 2025-02-17 RX ADMIN — CYCLOBENZAPRINE HYDROCHLORIDE 5 MG: 10 TABLET, FILM COATED ORAL at 20:06

## 2025-02-17 RX ADMIN — GUAIFENESIN 1200 MG: 600 TABLET, MULTILAYER, EXTENDED RELEASE ORAL at 20:05

## 2025-02-17 RX ADMIN — DULOXETINE 60 MG: 30 CAPSULE, DELAYED RELEASE ORAL at 14:50

## 2025-02-17 RX ADMIN — METOLAZONE 2.5 MG: 2.5 TABLET ORAL at 14:50

## 2025-02-17 RX ADMIN — CALCITRIOL CAPSULES 0.25 MCG 0.5 MCG: 0.25 CAPSULE ORAL at 14:50

## 2025-02-17 RX ADMIN — SODIUM BICARBONATE 1300 MG: 650 TABLET ORAL at 14:50

## 2025-02-17 RX ADMIN — HYDRALAZINE HYDROCHLORIDE 50 MG: 25 TABLET ORAL at 14:50

## 2025-02-17 RX ADMIN — INSULIN LISPRO 8 UNITS: 100 INJECTION, SOLUTION INTRAVENOUS; SUBCUTANEOUS at 20:05

## 2025-02-17 RX ADMIN — PRIMIDONE 50 MG: 50 TABLET ORAL at 20:06

## 2025-02-17 RX ADMIN — ROSUVASTATIN CALCIUM 10 MG: 10 TABLET, FILM COATED ORAL at 14:51

## 2025-02-17 RX ADMIN — Medication 10 ML: at 20:11

## 2025-02-17 RX ADMIN — BUDESONIDE AND FORMOTEROL FUMARATE DIHYDRATE 2 PUFF: 160; 4.5 AEROSOL RESPIRATORY (INHALATION) at 19:05

## 2025-02-17 RX ADMIN — OXYBUTYNIN CHLORIDE 10 MG: 10 TABLET, EXTENDED RELEASE ORAL at 14:50

## 2025-02-17 RX ADMIN — GABAPENTIN 100 MG: 100 CAPSULE ORAL at 20:06

## 2025-02-17 RX ADMIN — ALLOPURINOL 100 MG: 100 TABLET ORAL at 14:50

## 2025-02-17 RX ADMIN — INSULIN LISPRO 8 UNITS: 100 INJECTION, SOLUTION INTRAVENOUS; SUBCUTANEOUS at 17:28

## 2025-02-17 RX ADMIN — METHYLPREDNISOLONE SODIUM SUCCINATE 40 MG: 40 INJECTION, POWDER, FOR SOLUTION INTRAMUSCULAR; INTRAVENOUS at 16:33

## 2025-02-17 RX ADMIN — IPRATROPIUM BROMIDE AND ALBUTEROL SULFATE 3 ML: .5; 3 SOLUTION RESPIRATORY (INHALATION) at 15:28

## 2025-02-17 RX ADMIN — FUROSEMIDE 40 MG: 40 TABLET ORAL at 14:50

## 2025-02-17 RX ADMIN — SODIUM BICARBONATE 1300 MG: 650 TABLET ORAL at 20:11

## 2025-02-17 RX ADMIN — IPRATROPIUM BROMIDE AND ALBUTEROL SULFATE 3 ML: .5; 3 SOLUTION RESPIRATORY (INHALATION) at 19:05

## 2025-02-17 NOTE — CONSULTS
Group: [facility]         CONSULT NOTE    Patient Identification:  Lynne Cade  74 y.o.  female  1950  [id number]            Requesting physician: Attending physician    Reason for Consultation: Shortness of breath and hypoxia      History of Present Illness:  74-year-old female with history of CKD, DM, HTN, HLD and CHF who presented 2/16/2025 with complaints of progressive shortness of breath and wheezing for the last month, mild cough but nonproductive.    XR Chest 2 View    Result Date: 2/16/2025  Impression: No acute process identified Electronically Signed: Antonio Ceballos MD  2/16/2025 4:13 PM EST  Workstation ID: [id number]     Results for orders placed during the hospital encounter of 06/01/22    Adult Transthoracic Echo Complete W/ Cont if Necessary Per Protocol    Interpretation Summary  · Estimated left ventricular EF was in agreement with the calculated left ventricular EF. Left ventricular ejection fraction appears to be 56 - 60%. Left ventricular systolic function is normal.  · Left ventricular diastolic function is consistent with (grade I) impaired relaxation.  · Estimated right ventricular systolic pressure from tricuspid regurgitation is normal (<35 mmHg).        Assessment:  Dyspnea  Wheezing  CKD  DM  HTN  HLD  HFpEF      Recommendations:  DC carvedilol and switch to diltiazem  Add Symbicort  CT scan of the chest    Oxygen supplement and titration to maintain saturation 90 to 95%  Bronchodilators  IV Solu-Medrol    Diuresis/electrolyte management/BP control as per nephrology          Review of Sytems:  Constitutional: Negative for chills, and fever and positive for malaise/fatigue.   HENT: Negative.    Eyes: Negative.    Cardiovascular: Negative.    Respiratory: Positive for cough and shortness of breath.    Skin: Negative.    Musculoskeletal: Negative.    Gastrointestinal: Negative.    Genitourinary: Negative.    Neurological: Negative.    Psychiatric/Behavioral:  Negative.    Past Medical History:  Past Medical History:   Diagnosis Date    Anemia     Hx of anemia    Anxiety     Arthritis     [facility]    Cataract     bilateral    CHF (congestive heart failure) 6/01/22    Low iron    Cholelithiasis 30 yrs ago    Had gb removed    Chronic obstructive pulmonary disease, unspecified COPD type     CKD (chronic kidney disease), stage IV 01/13/2023    Colon polyp     Depression 1995    Take cymbalta    Diabetes mellitus     type 2    Diarrhea     on and off    Edema of right foot 04/2021    Healthcare maintenance 2008    PAP. [facility].    Hip pain, bilateral     HL (hearing loss) 1/1/19    Have hearing aids    Hyperlipidemia     Hypertension     Knee pain, bilateral     Obesity     Osteopenia 11/1/22    On med    Other forms of dyspnea     Other specified soft tissue disorders     Pneumonia due to COVID-19 virus 06/26/2022    Renal insufficiency Stage 3    Has gone up to stage 4    Slow to wake up after anesthesia     Spinal headache     Stress headaches     Tremor     Benign    Tremor of both hands     Urinary tract infection 07/01/22    [place]    Visual impairment 6/1/2017    Cataracts    Vitamin D deficiency        Past Surgical History:  Past Surgical History:   Procedure Laterality Date    ARTERIOVENOUS FISTULA/SHUNT SURGERY Left 02/24/2023    Procedure: BRACHIOCEPHALIC ARTERIOVENOUS FISTULA FORMATION;  Surgeon: Vignesh Riley MD;  Location: [facility] MAIN OR;  Service: Vascular;  Laterality: Left;    BREAST BIOPSY Right     CHOLECYSTECTOMY  1990    COLONOSCOPY      This year 2022    EYE SURGERY Bilateral     cataract removal    GASTRIC BYPASS  2002    HERNIA REPAIR  2005    umbilical hernia    HIP OPEN REDUCTION Right 03/02/2020    Procedure: FEMORAL NECK OPEN REDUCTION INTERNAL FIXATION;  Surgeon: Yfn Sifuentes MD;  Location: [facility] MAIN OR;  Service: Orthopedics;  Laterality: Right;  Cannulated screw fixation right  femoral neck    JOINT REPLACEMENT  2020    R knee    LAPAROTOMY OOPHERECTOMY Bilateral 2010    OTHER SURGICAL HISTORY  04/20/2015    Lexiscan stress test, normal. [facility].    PANNICULECTOMY      TOTAL KNEE ARTHROPLASTY Right 04/29/2021    Procedure: RIGHT TOTAL KNEE REPLACEMENT;  Surgeon: Yfn Sifuentes MD;  Location: [facility] OR;  Service: Orthopedics;  Laterality: Right;        Home Meds:  Medications Prior to Admission   Medication Sig Dispense Refill Last Dose/Taking    albuterol sulfate  (90 Base) MCG/ACT inhaler Inhale 2 puffs Every 6 (Six) Hours.   2/16/2025    alendronate (FOSAMAX) 70 MG tablet Take 1 tablet by mouth Every 7 (Seven) Days. (Patient taking differently: Take 1 tablet by mouth Every 7 (Seven) Days. Sat) 12 tablet 1 2/15/2025    allopurinol (ZYLOPRIM) 100 MG tablet Take 1 tablet by mouth Daily.   2/16/2025    calcitriol (ROCALTROL) 0.25 MCG capsule Take 2 capsules by mouth Daily.   2/16/2025    carvedilol (COREG) 25 MG tablet TAKE 1 TABLET TWICE DAILY 180 tablet 3 2/16/2025    cyclobenzaprine (FLEXERIL) 5 MG tablet Take 1 tablet by mouth 3 (Three) Times a Day As Needed.   2/15/2025    DULoxetine (CYMBALTA) 60 MG capsule Take 1 capsule by mouth 2 (Two) Times a Day. 180 capsule 1 2/16/2025    furosemide (LASIX) 40 MG tablet TAKE 1 TABLET TWICE DAILY (Patient taking differently: Take 1 tablet by mouth Daily.) 180 tablet 3 2/16/2025    gabapentin (NEURONTIN) 100 MG capsule Take 1 capsule by mouth every night at bedtime.   2/15/2025    hydrALAZINE (APRESOLINE) 50 MG tablet Take 1 tablet by mouth 2 (Two) Times a Day.   2/16/2025    hydrOXYzine (ATARAX) 10 MG tablet Take 1 tablet by mouth 3 (Three) Times a Day As Needed for Itching. 90 tablet 1 2/16/2025    primidone (MYSOLINE) 50 MG tablet TAKE 1 TABLET EVERY NIGHT. (Patient taking differently: Take 1 tablet by mouth Every Night.) 30 tablet 0 2/15/2025    rosuvastatin (CRESTOR) 10 MG tablet Take 1 tablet by mouth Daily. 90  tablet 1 2/16/2025    sodium bicarbonate 650 MG tablet Take 1 tablet by mouth Daily.   2/16/2025    Accu-Chek Softclix Lancets lancets TEST BLOOD SUGAR TWICE DAILY 200 each 1     Alcohol Swabs (B-D SINGLE USE SWABS REGULAR) pads TO USE WHEN CHECKING BLOOD SUGAR TWICE A  each 1     Alcohol Swabs (B-D SINGLE USE SWABS REGULAR) pads BD Single Use Swab, See Instructions, Use 1 swab to check blood sugar up to TID, # 300 EA, 1 Refill(s), Pharmacy: [facility], E11.9, Use 1 swab to check blood sugar up to TID, Supply, 168, cm, 03/25/24 11:10:00 EDT, Height, 89.9, kg, 03/25/24 11:16:00 EDT, Weight Dosing       Blood Glucose Calibration (Accu-Chek Megan) solution Use as directed 3 each 1     Blood Glucose Monitoring Suppl (Accu-Chek Megan) device Use to test twice daily   DX: E11.9 1 each 0     Blood Glucose Monitoring Suppl (True Metrix Air Glucose Meter) device True Metrix Air Glucose Meter, See Instructions, use to check blood sugar up to TID, # 1 EA, 0 Refill(s), Pharmacy: [facility], E11.9, use to check blood sugar up to TID, Supply, 168, cm, 03/25/24 11:10:00 EDT, Height, 89.9, kg, 03/25/24 11:16:00 EDT, Weight Dosing       glimepiride (AMARYL) 2 MG tablet TAKE 2 TABLETS TWICE DAILY (Patient taking differently: Take 2 tablets by mouth 2 (Two) Times a Day.) 360 tablet 1     glucose blood (Accu-Chek Megan Plus) test strip TEST BLOOD SUGAR TWICE DAILY AS DIRECTED   DX  E11.9 300 each 3     Lancets (accu-chek soft touch) lancets Check blood sugars twice daily as directed. DX:E11.9 300 each 12     lidocaine (Lidoderm) 5 % Apply  topically to the appropriate area as directed.       Vibegron (Gemtesa) 75 MG tablet Take 1 tablet by mouth Daily.          Allergies:  Allergies   Allergen Reactions    Morphine Itching    Sulfa Antibiotics GI Intolerance    Hydrocodone Nausea Only    Diphenhydramine Anxiety       Social History:   Social History     Socioeconomic History     "Marital status:    Tobacco Use    Smoking status: Never     Passive exposure: Never    Smokeless tobacco: Never   Vaping Use    Vaping status: Never Used   Substance and Sexual Activity    Alcohol use: Never    Drug use: Never    Sexual activity: Not Currently     Partners: Male     Birth control/protection: Condom, Post-menopausal, Bilateral salpingectomy      Comment: monogamous with        Family History:  Family History   Problem Relation Age of Onset    Asthma Mother     Depression Mother     Kidney disease Mother         Kidney stones    Miscarriages / Stillbirths Mother     Hyperlipidemia Father     Hypertension Father     Diabetes Father     Anxiety disorder Daughter     Early death Daughter     Miscarriages / Stillbirths Daughter     Diabetes Daughter        Physical Exam:  /62 (BP Location: Right arm, Patient Position: Lying)   Pulse 67   Temp 97.6 °F (36.4 °C) (Oral)   Resp 20   Ht 167 cm (65.75\")   Wt 94.3 kg (207 lb 14.3 oz)   SpO2 98%   BMI 33.81 kg/m²  Body mass index is 33.81 kg/m². 98% 94.3 kg (207 lb 14.3 oz)  General Appearance:  Alert   HEENT:  Normocephalic, without obvious abnormality, Conjunctiva/corneas clear,.   Nares normal, no drainage     Neck:  Supple, symmetrical, trachea midline. No JVD.  Lungs /Chest wall:   Mild wheezing and few scattered bilateral basal rhonchi, respirations unlabored, symmetrical wall movement.     Heart:  Regular rate and rhythm, S1 S2 normal  Abdomen: Soft, non-tender, no masses, no organomegaly.    Extremities: No edema, no clubbing or cyanosis    LABS:  Lab Results   Component Value Date    CALCIUM 8.7 02/16/2025    PHOS 4.8 (H) 01/08/2025     Results from last 7 days   Lab Units 02/17/25  1521 02/16/25  1716 02/16/25  1543   SODIUM mmol/L  --   --  137   POTASSIUM mmol/L  --   --  3.9   CHLORIDE mmol/L  --   --  103   CO2 mmol/L  --   --  18.4*   BUN mg/dL  --   --  53*   CREATININE mg/dL  --   --  2.41*   GLUCOSE mg/dL  --   --  " 281*   CALCIUM mg/dL  --   --  8.7   WBC 10*3/mm3 7.86  --  6.58   HEMOGLOBIN g/dL 10.0*  --  10.6*   PLATELETS 10*3/mm3 268  --  271   ALT (SGPT) U/L  --   --  28   AST (SGOT) U/L  --   --  25   PROBNP pg/mL  --  347.0  --      Lab Results   Component Value Date    CKTOTAL 27 02/16/2025    TROPONINT 32 (H) 02/16/2025     Results from last 7 days   Lab Units 02/16/25  1716 02/16/25  1543   CK TOTAL U/L 27  --    HSTROP T ng/L 32* 35*                 Results from last 7 days   Lab Units 02/17/25  1522   ADENOVIRUS DETECTION BY PCR  Not Detected   CORONAVIRUS 229E  Not Detected   CORONAVIRUS HKU1  Not Detected   CORONAVIRUS NL63  Not Detected   CORONAVIRUS OC43  Not Detected   HUMAN METAPNEUMOVIRUS  Not Detected   HUMAN RHINOVIRUS/ENTEROVIRUS  Not Detected   INFLUENZA B PCR  Not Detected   PARAINFLUENZA 1  Not Detected   PARAINFLUENZA VIRUS 2  Not Detected   PARAINFLUENZA VIRUS 3  Not Detected   PARAINFLUENZA VIRUS 4  Not Detected   BORDETELLA PERTUSSIS PCR  Not Detected   CHLAMYDOPHILA PNEUMONIAE PCR  Not Detected   MYCOPLAMA PNEUMO PCR  Not Detected   INFLUENZA A PCR  Not Detected   RSV, PCR  Not Detected             Lab Results   Component Value Date    TSH 1.620 02/16/2025     Estimated Creatinine Clearance: 22.9 mL/min (A) (by C-G formula based on SCr of 2.41 mg/dL (H)).  Results from last 7 days   Lab Units 02/17/25  1600   NITRITE UA  Negative   WBC UA /HPF 11-20*   BACTERIA UA /HPF None Seen   SQUAM EPITHEL UA /HPF 3-6*        Imaging:  Imaging Results (Last 24 Hours)       ** No results found for the last 24 hours. **              Current Meds:   SCHEDULE  allopurinol, 100 mg, Oral, Daily  budesonide-formoterol, 2 puff, Inhalation, BID - RT  calcitriol, 0.5 mcg, Oral, Daily  [START ON 2/18/2025] dilTIAZem CD, 240 mg, Oral, Q24H  DULoxetine, 60 mg, Oral, Daily  furosemide, 40 mg, Oral, BID  gabapentin, 100 mg, Oral, Nightly  guaiFENesin, 1,200 mg, Oral, Q12H  hydrALAZINE, 50 mg, Oral, BID  insulin lispro, 4-24  Units, Subcutaneous, 4x Daily AC & at Bedtime  ipratropium-albuterol, 3 mL, Nebulization, 4x Daily - RT  Lidocaine, 1 patch, Transdermal, Q24H  methylPREDNISolone sodium succinate, 40 mg, Intravenous, Q12H  oxybutynin XL, 10 mg, Oral, Daily  primidone, 50 mg, Oral, Nightly  rosuvastatin, 10 mg, Oral, Daily  sodium bicarbonate, 1,300 mg, Oral, TID  sodium chloride, 10 mL, Intravenous, Q12H      Infusions  Pharmacy Consult - Steroid Insulin Protocol,       PRNs    albuterol    senna-docusate sodium **AND** polyethylene glycol **AND** bisacodyl **AND** bisacodyl    cyclobenzaprine    dextrose    dextrose    glucagon (human recombinant)    hydrOXYzine    influenza vaccine    melatonin    nitroglycerin    ondansetron    Pharmacy Consult - Steroid Insulin Protocol    sodium chloride    [COMPLETED] Insert Peripheral IV **AND** sodium chloride    sodium chloride    sodium chloride        Patel Peralta MD  2/17/2025  17:03 EST      Much of this encounter note is an electronic transcription/translation of spoken language to printed text using Dragon Software.

## 2025-02-17 NOTE — H&P
[facility] H&P    Patient Name: Lynne Cade  : 1950  MRN: [id number]  Primary Care Physician: Lorraine Agarwal APRN  Date of admission: 2025     Patient Care Team:  Lorraine Agarwal APRN as PCP - General (Nurse Practitioner)  Rick Melendez MD as Consulting Physician (Nephrology)  Mayo Fregoso MD as Consulting Physician (Urology)  Fredi iPerce MD as Consulting Physician (Cardiology)  Cony Sanchez APRN as Nurse Practitioner (Vascular Surgery)          Subjective   History Present Illness     Chief Complaint:   Chief Complaint   Patient presents with    Shortness of Breath     SOB for a few months     Shortness of Breath     Ms. Cade is a 74 y.o.  presents to [facility] complaining of shortness of breath       History of Present Illness    ED 25: 74-year-old female with several health problems progressively worsening history of shortness of breath over the last couple months. She states she cannot even get up to walk across the room without being short of breath there is some tightness in the chest but no chest pain neck arm jaw pain. Denies any fever chills sweats cough congestion dizziness or syncope. Patient has seen her providers she is had some pulmonary function test done and she is unsure of the results. She has bad kidneys and she is scheduled to 27 to get a fistula placed by the vascular surgeon. She denies any leg pain or swelling no recent long car ride plane ride immobilization foreign travels antibiotic use or hospitalizations or injury no one in the home with similar illness.     Observation 25: Patient 74-year-old female presenting to the hospital with complaints of shortness of breath has been progressing over the past month.  Patient states she has tightness and wheezing.  Patient states she takes albuterol inhaler as needed but has noted no difference in shortness of breath.  Patient reports  increased fatigue and unable to do activities of daily living that she normally wants could do due to increased fatigue.  Patient states she is scheduled to have fistula placed on February 27.  Patient states she also had pulmonary function test done at [facility] outpatient but unsure results.  Patient denies respiratory illness recently. Patient currently denies chest pain, nausea, vomiting, or fever.     Review of Systems   Constitutional: Positive for malaise/fatigue. Negative for fever.   HENT: Negative.     Eyes: Negative.    Cardiovascular:  Positive for chest pain and dyspnea on exertion.   Respiratory:  Positive for cough, shortness of breath and wheezing. Negative for sputum production.    Skin: Negative.    Musculoskeletal:  Positive for back pain.   Gastrointestinal: Negative.    Genitourinary: Negative.    Neurological:  Positive for weakness.   Psychiatric/Behavioral: Negative.             Personal History     Past Medical History:   Past Medical History:   Diagnosis Date    Anemia     Hx of anemia    Anxiety     Arthritis     Conditions. Abstracted from [facility].    Cataract     bilateral    CHF (congestive heart failure) 6/01/22    Low iron    Cholelithiasis 30 yrs ago    Had gb removed    Chronic obstructive pulmonary disease, unspecified COPD type     CKD (chronic kidney disease), stage IV 01/13/2023    Colon polyp     Depression 1995    Take cymbalta    Diabetes mellitus     type 2    Diarrhea     on and off    Edema of right foot 04/2021    Healthcare maintenance 2008    PAP. Abstracted from [facility].    Hip pain, bilateral     HL (hearing loss) 1/1/19    Have hearing aids    Hyperlipidemia     Hypertension     Knee pain, bilateral     Obesity     Osteopenia 11/1/22    On med    Other forms of dyspnea     Other specified soft tissue disorders     Pneumonia due to COVID-19 virus 06/26/2022    Renal insufficiency Stage 3    Has gone up to stage 4    Slow to wake up after anesthesia      Spinal headache     Stress headaches     Tremor     Benign    Tremor of both hands     Urinary tract infection 07/01/22    [place]    Visual impairment 6/1/2017    Cataracts    Vitamin D deficiency        Surgical History:      Past Surgical History:   Procedure Laterality Date    ARTERIOVENOUS FISTULA/SHUNT SURGERY Left 02/24/2023    Procedure: BRACHIOCEPHALIC ARTERIOVENOUS FISTULA FORMATION;  Surgeon: Vignesh Riley MD;  Location: [facility] OR;  Service: Vascular;  Laterality: Left;    BREAST BIOPSY Right     CHOLECYSTECTOMY  1990    COLONOSCOPY      This year 2022    EYE SURGERY Bilateral     cataract removal    GASTRIC BYPASS  2002    HERNIA REPAIR  2005    umbilical hernia    HIP OPEN REDUCTION Right 03/02/2020    Procedure: FEMORAL NECK OPEN REDUCTION INTERNAL FIXATION;  Surgeon: Yfn Sifuentes MD;  Location: [facility] OR;  Service: Orthopedics;  Laterality: Right;  Cannulated screw fixation right femoral neck    JOINT REPLACEMENT  2020    R knee    LAPAROTOMY OOPHERECTOMY Bilateral 2010    OTHER SURGICAL HISTORY  04/20/2015    Lexiscan stress test, normal. Abstracted from [facility].    PANNICULECTOMY      TOTAL KNEE ARTHROPLASTY Right 04/29/2021    Procedure: RIGHT TOTAL KNEE REPLACEMENT;  Surgeon: Yfn Sifuentes MD;  Location: [facility] OR;  Service: Orthopedics;  Laterality: Right;           Family History: family history includes Anxiety disorder in her daughter; Asthma in her mother; Depression in her mother; Diabetes in her daughter and father; Early death in her daughter; Hyperlipidemia in her father; Hypertension in her father; Kidney disease in her mother; Miscarriages / Stillbirths in her daughter and mother. Otherwise pertinent FHx was reviewed and unremarkable.     Social History:  reports that she has never smoked. She has never been exposed to tobacco smoke. She has never used smokeless tobacco. She reports that she does not drink alcohol and does not use  drugs.      Medications:  Prior to Admission medications    Medication Sig Start Date End Date Taking? Authorizing Provider   albuterol sulfate  (90 Base) MCG/ACT inhaler Inhale 2 puffs Every 6 (Six) Hours. 12/30/24  Yes Suleman Hensley MD   alendronate (FOSAMAX) 70 MG tablet Take 1 tablet by mouth Every 7 (Seven) Days.  Patient taking differently: Take 1 tablet by mouth Every 7 (Seven) Days. Sat 5/22/23  Yes Rose Robb MD   allopurinol (ZYLOPRIM) 100 MG tablet Take 1 tablet by mouth Daily.   Yes Suleman Hensley MD   calcitriol (ROCALTROL) 0.25 MCG capsule Take 2 capsules by mouth Daily.   Yes Suleman Hensley MD   carvedilol (COREG) 25 MG tablet TAKE 1 TABLET TWICE DAILY 11/30/23  Yes Fredi Pierce MD   cyclobenzaprine (FLEXERIL) 5 MG tablet Take 1 tablet by mouth 3 (Three) Times a Day As Needed.   Yes Suleman Hensley MD   DULoxetine (CYMBALTA) 60 MG capsule Take 1 capsule by mouth 2 (Two) Times a Day. 2/15/23  Yes Rose Robb MD   furosemide (LASIX) 40 MG tablet TAKE 1 TABLET TWICE DAILY  Patient taking differently: Take 1 tablet by mouth Daily. 5/24/24  Yes Fredi Pierce MD   gabapentin (NEURONTIN) 100 MG capsule Take 1 capsule by mouth every night at bedtime. 12/20/23  Yes Suleman Hensley MD   hydrALAZINE (APRESOLINE) 50 MG tablet Take 1 tablet by mouth 2 (Two) Times a Day.   Yes Suleman Hensley MD   hydrOXYzine (ATARAX) 10 MG tablet Take 1 tablet by mouth 3 (Three) Times a Day As Needed for Itching. 8/14/23  Yes Becky Greco DO   primidone (MYSOLINE) 50 MG tablet TAKE 1 TABLET EVERY NIGHT.  Patient taking differently: Take 1 tablet by mouth Every Night. 8/28/23  Yes Irene Khoury APRN   rosuvastatin (CRESTOR) 10 MG tablet Take 1 tablet by mouth Daily. 12/27/22  Yes Rose Robb MD   sodium bicarbonate 650 MG tablet Take 1 tablet by mouth Daily. 5/11/23  Yes Suleman Hensley MD   Accu-Chek Softclix Lancets  lancets TEST BLOOD SUGAR TWICE DAILY 4/12/22   Rose Robb MD   Alcohol Swabs (B-D SINGLE USE SWABS REGULAR) pads TO USE WHEN CHECKING BLOOD SUGAR TWICE A DAY 4/12/22   Rose Robb MD   Alcohol Swabs (B-D SINGLE USE SWABS REGULAR) pads BD Single Use Swab, See Instructions, Use 1 swab to check blood sugar up to TID, # 300 EA, 1 Refill(s), Pharmacy: [facility], E11.9, Use 1 swab to check blood sugar up to TID, Supply, 168, cm, 03/25/24 11:10:00 EDT, Height, 89.9, kg, 03/25/24 11:16:00 EDT, Weight Dosing 4/22/24   ProviderSuleman MD   Blood Glucose Calibration (Accu-Chek Megan) solution Use as directed 1/26/21   Rose Robb MD   Blood Glucose Monitoring Suppl (Accu-Chek Megan) device Use to test twice daily   DX: E11.9 1/26/21   Rose Robb MD   Blood Glucose Monitoring Suppl (True Metrix Air Glucose Meter) device True Metrix Air Glucose Meter, See Instructions, use to check blood sugar up to TID, # 1 EA, 0 Refill(s), Pharmacy: [facility], E11.9, use to check blood sugar up to TID, Supply, 168, cm, 03/25/24 11:10:00 EDT, Height, 89.9, kg, 03/25/24 11:16:00 EDT, Weight Dosing 4/22/24   Provider, MD Suleman   glimepiride (AMARYL) 2 MG tablet TAKE 2 TABLETS TWICE DAILY  Patient taking differently: Take 2 tablets by mouth 2 (Two) Times a Day. 6/15/23   Rose Robb MD   glucose blood (Accu-Chek Megan Plus) test strip TEST BLOOD SUGAR TWICE DAILY AS DIRECTED   DX  E11.9 5/11/22   Rose Robb MD   Lancets (accu-chek soft touch) lancets Check blood sugars twice daily as directed. DX:E11.9 1/28/21   Rose Robb MD   lidocaine (Lidoderm) 5 % Apply  topically to the appropriate area as directed. 5/10/24   Provider, Suleman, MD   Vibegron (Gemtesa) 75 MG tablet Take 1 tablet by mouth Daily.    Provider, Suleman, MD       Allergies:    Allergies   Allergen Reactions    Morphine Itching    Sulfa  Antibiotics GI Intolerance    Hydrocodone Nausea Only    Diphenhydramine Anxiety       Objective   Objective     Vital Signs  Temp:  [97.3 °F (36.3 °C)-97.9 °F (36.6 °C)] 97.8 °F (36.6 °C)  Heart Rate:  [62-80] 76  Resp:  [13-22] 16  BP: (132-195)/(69-96) 150/80  SpO2:  [90 %-100 %] 98 %  on   ;   Device (Oxygen Therapy): room air  Body mass index is 33.81 kg/m².    Physical Exam  Vitals and nursing note reviewed.   Constitutional:       Appearance: Normal appearance. She is obese.   HENT:      Head: Normocephalic and atraumatic.      Right Ear: External ear normal.      Left Ear: External ear normal.      Nose: Nose normal.      Mouth/Throat:      Pharynx: Oropharynx is clear.   Eyes:      Conjunctiva/sclera: Conjunctivae normal.      Pupils: Pupils are equal, round, and reactive to light.   Cardiovascular:      Rate and Rhythm: Normal rate and regular rhythm.      Pulses: Normal pulses.      Heart sounds: Normal heart sounds.   Pulmonary:      Breath sounds: Wheezing present.   Abdominal:      General: Bowel sounds are normal.      Palpations: Abdomen is soft.   Musculoskeletal:         General: Tenderness present.      Cervical back: Normal range of motion.   Skin:     General: Skin is warm.      Capillary Refill: Capillary refill takes less than 2 seconds.   Neurological:      Mental Status: She is alert and oriented to person, place, and time.      Motor: Weakness present.   Psychiatric:         Mood and Affect: Mood normal.         Behavior: Behavior normal.         Thought Content: Thought content normal.         Judgment: Judgment normal.           Results Review:  I have personally reviewed most recent cardiac tracings, lab results, microbiology results, and radiology images and interpretations and agree with findings, most notably: CBC, CMP, troponin, EKG, CXR.    Results from last 7 days   Lab Units 02/16/25  1543   WBC 10*3/mm3 6.58   HEMOGLOBIN g/dL 10.6*   HEMATOCRIT % 35.0   PLATELETS 10*3/mm3 271      Results from last 7 days   Lab Units 02/16/25  1716 02/16/25  1543   SODIUM mmol/L  --  137   POTASSIUM mmol/L  --  3.9   CHLORIDE mmol/L  --  103   CO2 mmol/L  --  18.4*   BUN mg/dL  --  53*   CREATININE mg/dL  --  2.41*   GLUCOSE mg/dL  --  281*   CALCIUM mg/dL  --  8.7   ALK PHOS U/L  --  52   ALT (SGPT) U/L  --  28   AST (SGOT) U/L  --  25   HSTROP T ng/L 32* 35*   PROBNP pg/mL 347.0  --      Estimated Creatinine Clearance: 22.9 mL/min (A) (by C-G formula based on SCr of 2.41 mg/dL (H)).  Brief Urine Lab Results  (Last result in the past 365 days)        Color   Clarity   Blood   Leuk Est   Nitrite   Protein   CREAT   Urine HCG        01/08/25 1039             53.4         01/08/25 1039 Yellow   Turbid   Trace   Large (3+)   Negative   100 mg/dL (2+)                   Microbiology Results (last 10 days)       ** No results found for the last 240 hours. **            ECG/EMG Results (most recent)       Procedure Component Value Units Date/Time    ECG 12 Lead Dyspnea [[id number]] Collected: 02/16/25 1507     Updated: 02/17/25 0611     QT Interval 417 ms      QTC Interval 469 ms     Narrative:      HEART RATE=76  bpm  RR Fhnhejxi=565  ms  WI Jstqduod=237  ms  P Horizontal Axis=-50  deg  P Front Axis=-16  deg  QRSD Interval=89  ms  QT Rvfdrdvj=649  ms  MCgR=084  ms  QRS Axis=2  deg  T Wave Axis=50  deg  - BORDERLINE ECG -  Sinus rhythm  Low voltage, extremity and precordial leads  When compared with ECG of 31-Jul-2023 22:00:47,  Significant change in rhythm  Electronically Signed By: Yfn Solano (ALEXANDRA) 2025-02-17 06:11:34  Date and Time of Study:2025-02-16 15:07:09            Results for orders placed during the hospital encounter of 08/01/24    Duplex Vein Mapping Upper Extremity - Bilateral CAR    Interpretation Summary    Sub-acute left upper extremity superficial thrombophlebitis noted in the cephalic (upper arm).    All other vessels appear normal.    Right basilic vein adequate throughout its course.  Right  cephalic vein inadequate.    Left cephalic vein inadequate.  Left basilic vein adequate throughout its course.      Results for orders placed during the hospital encounter of 06/01/22    Adult Transthoracic Echo Complete W/ Cont if Necessary Per Protocol    Interpretation Summary  · Estimated left ventricular EF was in agreement with the calculated left ventricular EF. Left ventricular ejection fraction appears to be 56 - 60%. Left ventricular systolic function is normal.  · Left ventricular diastolic function is consistent with (grade I) impaired relaxation.  · Estimated right ventricular systolic pressure from tricuspid regurgitation is normal (<35 mmHg).      XR Chest 2 View    Result Date: 2/16/2025  Impression: No acute process identified Electronically Signed: Antonio Ceballos MD  2/16/2025 4:13 PM EST  Workstation ID: [id number]       Estimated Creatinine Clearance: 22.9 mL/min (A) (by C-G formula based on SCr of 2.41 mg/dL (H)).    Assessment & Plan   Assessment/Plan       Active Hospital Problems    Diagnosis  POA    **Dyspnea [R06.00]  Yes      Resolved Hospital Problems   No resolved problems to display.     Dyspnea   Lab Results   Component Value Date    TROPONINT 32 (H) 02/16/2025    TROPONINT 35 (H) 02/16/2025    TROPONINT 28 (H) 09/19/2023   -, CK 27  -Duo-nebs, IV Solu-medrol  -Oxygenation for 02 below 90%  -Chest X-ray: no acute cardiopulmonary findings   -EKG: sinus rhythm, HR 76  -Respiratory viral panel pending   -Telemetry  -Echocardiogram ordered   -Cardiology consulted     CKD (Stage IV)  Lab Results   Component Value Date    CREATININE 2.41 (H) 02/16/2025    BUN 53 (H) 02/16/2025    BCR 22.0 02/16/2025    EGFR 20.4 (L) 02/16/2025   -Continue sodium bicarbonate, Calcitrol, metolazone   -Avoid nephrotoxic medication IV dye unless urgently needed  -Monitor BMP and I's and O's while admitted  -Nephrology consulted     Diabetes mellitus type II with neuropathy  Lab Results   Component Value Date     GLUCOSE 281 (H) 02/16/2025    GLUCOSE 160 (H) 01/08/2025    GLUCOSE 212 (H) 09/19/2024    GLUCOSE 169 (H) 09/19/2023   -Continue gabapentin  -Hold glimepiride  -A1c 7.72  -Sliding scale insulin and Lantus  -Diabetic diet  -Monitor before meals and at bedtime    Hypertension  BP Readings from Last 1 Encounters:   02/17/25 150/80   - Continue hydralazine, Lasix, carvedilol  - Monitor while admitted    Hyperlipidemia  -Continue statin    Generalized anxiety disorder  -Continue Cymbalta    Essential tremors  -Continue primidone    Overactive bladder   -Gemtesa nonformulary switched to oxybutynin      VTE Prophylaxis - Active VTE Prophylaxis  Mechanical:        Start        02/16/25 2040  Maintain Sequential Compression Device  Continuous                          Select Pharmacologic VTE Prophylaxis if Desired & Appropriate      CODE STATUS:    Code Status and Medical Interventions: CPR (Attempt to Resuscitate); Full Support   Ordered at: 02/17/25 0716     Code Status (Patient has no pulse and is not breathing):    CPR (Attempt to Resuscitate)     Medical Interventions (Patient has pulse or is breathing):    Full Support       This patient has been examined wearing personal protective equipment.     I discussed the patient's findings and my recommendations with patient, family, nursing staff, primary care team, and consulting provider.      Signature:Electronically signed by EVERARDO Jefferson, 02/17/25, 10:06 AM EST.          I spent 35 minutes caring for Lynne on this date of service. This time includes time spent by me in the following activities: reviewing tests, performing a medically appropriate examination and/or evaluation, counseling and educating the patient/family/caregiver, referring and communicating with other health care professionals, documenting information in the medical record, independently interpreting results and communicating that information with the patient/family/caregiver, care  coordination, ordering medications, ordering test(s), ordering procedure(s), obtaining a separately obtained history, and reviewing a separately obtained history.

## 2025-02-17 NOTE — CASE MANAGEMENT/SOCIAL WORK
Discharge Planning Assessment   Thanh     Patient Name: Lynne Cade  MRN: [id number]  Today's Date: 2/17/2025    Admit Date: 2/16/2025    Plan: Routine home   Discharge Needs Assessment       Row Name 02/17/25 1528       Living Environment    People in Home spouse    Name(s) of People in Home  Gordy    Current Living Arrangements home    Potentially Unsafe Housing Conditions none    In the past 12 months has the electric, gas, oil, or water company threatened to shut off services in your home? No    Primary Care Provided by self    Provides Primary Care For no one    Family Caregiver if Needed spouse    Family Caregiver Names [facility]    Quality of Family Relationships helpful;involved;supportive    Able to Return to Prior Arrangements yes       Resource/Environmental Concerns    Resource/Environmental Concerns none    Transportation Concerns none       Transportation Needs    In the past 12 months, has lack of transportation kept you from medical appointments or from getting medications? no    In the past 12 months, has lack of transportation kept you from meetings, work, or from getting things needed for daily living? No       Food Insecurity    Within the past 12 months, you worried that your food would run out before you got the money to buy more. Never true    Within the past 12 months, the food you bought just didn't last and you didn't have money to get more. Never true       Transition Planning    Patient/Family Anticipates Transition to home with family    Patient/Family Anticipated Services at Transition none    Transportation Anticipated car, drives self;family or friend will provide       Discharge Needs Assessment    Readmission Within the Last 30 Days no previous admission in last 30 days    Equipment Currently Used at Home bp cuff;cane, straight;wheelchair;walker, rolling;glucometer    Concerns to be Addressed denies needs/concerns at this time    Anticipated Changes Related to Illness none     Equipment Needed After Discharge none                   Discharge Plan       Row Name 02/17/25 1530       Plan    Plan Routine home    Plan Comments CM met with patient and spouse Gordy at the bedside. Confirmed PCP, insurance, and pharmacy. Patient agreeable in M2B. Patient denies any difficulty affording medications. Patient is not current with any HHC/OPPT/OT services. Patient lives at home with her , is independent with ADLS/IADLS, and does not drive. Patient's spouse Gordy will provide DC transport. DC Barriers: Nephrology and Cardiology following, duo nebs, IV solu-medrol, echo pending.                  Continued Care and Services - Admitted Since 2/16/2025    No active coordination exists for this encounter.       Expected Discharge Date and Time       Expected Discharge Date Expected Discharge Time    Feb 19, 2025            Demographic Summary       Row Name 02/17/25 1528       General Information    Admission Type observation    Arrived From emergency department    Required Notices Provided Observation Status Notice    Referral Source admission list    Reason for Consult discharge planning    Preferred Language English       Contact Information    Permission Granted to Share Info With     Contact Information Obtained for                    Functional Status       Row Name 02/17/25 1528       Functional Status    Usual Activity Tolerance good    Current Activity Tolerance good       Functional Status, IADL    Medications independent    Meal Preparation independent    Housekeeping independent    Laundry independent    Shopping independent           William Nolan RN     Cell number [phone]  Office number [phone]

## 2025-02-17 NOTE — THERAPY EVALUATION
Patient Name: Lynne Cade  : 1950    MRN: [id number]                              Today's Date: 2025       Admit Date: 2025    Visit Dx:     ICD-10-CM ICD-9-CM   1. Dyspnea, unspecified type  R06.00 786.09     Patient Active Problem List   Diagnosis    Acute kidney injury superimposed on CKD    Absolute anemia    Anxiety    Benign essential tremor    Chronic obstructive pulmonary disease    Family tension    Hyperlipidemia    Insomnia    Iron deficiency anemia    Other amnesia    Diabetic nephropathy    Renal insufficiency    Type 2 diabetes mellitus with hyperglycemia    Grief at loss of child    Closed subcapital fracture of right femur    Essential hypertension    CKD (chronic kidney disease), stage IV    Transition of care performed with sharing of clinical summary    Vitamin D deficiency    Age-related osteoporosis without current pathological fracture    History of total knee arthroplasty    Dyspnea on exertion    S/P gastric bypass    Obesity (BMI 30-39.9)    Elevated LFTs    Weakness    Syncope, unspecified syncope type    Right leg swelling    Folliculitis    Elevated troponin    Coronary artery disease involving native coronary artery of native heart without angina pectoris    A-V fistula    Dyspnea     Past Medical History:   Diagnosis Date    Anemia     Hx of anemia    Anxiety     Arthritis     Conditions. Abstracted from [facility].    Cataract     bilateral    CHF (congestive heart failure) 22    Low iron    Cholelithiasis 30 yrs ago    Had gb removed    Chronic obstructive pulmonary disease, unspecified COPD type     CKD (chronic kidney disease), stage IV 2023    Colon polyp     Depression     Take cymbalta    Diabetes mellitus     type 2    Diarrhea     on and off    Edema of right foot 2021    Healthcare maintenance 2008    PAP. Abstracted from [facility].    Hip pain, bilateral     HL (hearing loss) 19    Have hearing aids    Hyperlipidemia      Hypertension     Knee pain, bilateral     Obesity     Osteopenia 11/1/22    On med    Other forms of dyspnea     Other specified soft tissue disorders     Pneumonia due to COVID-19 virus 06/26/2022    Renal insufficiency Stage 3    Has gone up to stage 4    Slow to wake up after anesthesia     Spinal headache     Stress headaches     Tremor     Benign    Tremor of both hands     Urinary tract infection 07/01/22    [place]    Visual impairment 6/1/2017    Cataracts    Vitamin D deficiency      Past Surgical History:   Procedure Laterality Date    ARTERIOVENOUS FISTULA/SHUNT SURGERY Left 02/24/2023    Procedure: BRACHIOCEPHALIC ARTERIOVENOUS FISTULA FORMATION;  Surgeon: Vignesh Riley MD;  Location: [facility] MAIN OR;  Service: Vascular;  Laterality: Left;    BREAST BIOPSY Right     CHOLECYSTECTOMY  1990    COLONOSCOPY      This year 2022    EYE SURGERY Bilateral     cataract removal    GASTRIC BYPASS  2002    HERNIA REPAIR  2005    umbilical hernia    HIP OPEN REDUCTION Right 03/02/2020    Procedure: FEMORAL NECK OPEN REDUCTION INTERNAL FIXATION;  Surgeon: Yfn Sifuentes MD;  Location: [facility] OR;  Service: Orthopedics;  Laterality: Right;  Cannulated screw fixation right femoral neck    JOINT REPLACEMENT  2020    R knee    LAPAROTOMY OOPHERECTOMY Bilateral 2010    OTHER SURGICAL HISTORY  04/20/2015    Lexiscan stress test, normal. Abstracted from [facility].    PANNICULECTOMY      TOTAL KNEE ARTHROPLASTY Right 04/29/2021    Procedure: RIGHT TOTAL KNEE REPLACEMENT;  Surgeon: Yfn Sifuentes MD;  Location: [facility] OR;  Service: Orthopedics;  Laterality: Right;      General Information       Row Name 02/17/25 1338          Physical Therapy Time and Intention    Document Type evaluation  -     Mode of Treatment physical therapy  -       Row Name 02/17/25 1338          General Information    Patient Profile Reviewed yes  -AH     Prior Level of Function independent:;all household mobility;community  mobility;ADL's;home management  -     Existing Precautions/Restrictions no known precautions/restrictions  -     Barriers to Rehab none identified  -[facility] Name 02/17/25 1338          Living Environment    People in Home spouse  -[facility] Name 02/17/25 1338          Home Main Entrance    Number of Stairs, Main Entrance none  -[facility] Name 02/17/25 1338          Stairs Within Home, Primary    Number of Stairs, Within Home, Primary none  -[facility] Name 02/17/25 1338          Cognition    Orientation Status (Cognition) oriented x 4  -[facility] Name 02/17/25 1338          Safety Issues/Impairments Affecting Functional Mobility    Impairments Affecting Function (Mobility) shortness of breath  -               User Key  (r) = Recorded By, (t) = Taken By, (c) = Cosigned By      Initials Name Provider Type    Suly Ratliff, PT Physical Therapist                   Mobility       [facility] Name 02/17/25 1338          Bed Mobility    Bed Mobility bed mobility (all) activities  -     All Activities, [place] (Bed Mobility) independent  -AH       Row Name 02/17/25 1338          Bed-Chair Transfer    Bed-Chair [place] (Transfers) supervision  -[facility] Name 02/17/25 1338          Sit-Stand Transfer    Sit-Stand [place] (Transfers) supervision  -[facility] Name 02/17/25 1338          Gait/Stairs (Locomotion)    [place] Level (Gait) supervision  -     Patient was able to Ambulate yes  -     Distance in Feet (Gait) 75  -               User Key  (r) = Recorded By, (t) = Taken By, (c) = Cosigned By      Initials Name Provider Type    Suly Ratliff, PT Physical Therapist                   Obj/Interventions       [facility] Name 02/17/25 1339          Range of Motion Comprehensive    General Range of Motion no range of motion deficits identified  -AH       Row Name 02/17/25 1339          Strength Comprehensive (MMT)    General Manual Muscle Testing (MMT)  Assessment no strength deficits identified  -[facility] Name 02/17/25 5775          Balance    Balance Assessment sitting static balance;sitting dynamic balance;standing static balance;standing dynamic balance  -     Static Sitting Balance independent  -     Dynamic Sitting Balance independent  -     Position, Sitting Balance unsupported;sitting edge of bed  -     Static Standing Balance supervision  -     Dynamic Standing Balance supervision  -       Row Name 02/17/25 4532          Sensory Assessment (Somatosensory)    Sensory Assessment (Somatosensory) sensation intact  -               User Key  (r) = Recorded By, (t) = Taken By, (c) = Cosigned By      Initials Name Provider Type     Suly King, TOBIAS Physical Therapist                   Goals/Plan    No documentation.                  Clinical Impression       [facility] Name 02/17/25 1340          Pain    Pretreatment Pain Rating 0/10 - no pain  -     Posttreatment Pain Rating 0/10 - no pain  -[facility] Name 02/17/25 1342          Plan of Care Review    Plan of Care Reviewed With patient;spouse  -     Outcome Evaluation Lynne Cade is a 75 yo F who presented to [facility] on 2/16 with c/o shortness of breath over the last couple months. She states she cannot even get up to walk across the room without being short of breath. Chest X-ray: no acute cardiopulmonary findings. Pt is A&Ox4 and reports she is typically IND with ADLs and mobility at baseline. She has a cane and R/W and w/c but doesn't use them all the time. She lives with her spouse in a [facility]. Pt is SUP fr all mobility this date w/o AD. She is on RA at 97% O2 sat upon room entry. She desats with light activity with 85%, recovering quickly with PLB, with noted dyspnea and wheezing upon exertion. Pt appears to be near functional baseline. Pt would benefit from 6MWT prior to d/c to assess home O2 needs. PT will complete orders.  -       Row Name 02/17/25 8794          Therapy  Assessment/Plan (PT)    Criteria for Skilled Interventions Met (PT) no  -AH     Therapy Frequency (PT) evaluation only  -       Row Name 02/17/25 1340          Vital Signs    Pre Systolic BP Rehab 153  -AH     Pre Treatment Diastolic BP 58  -AH     Pretreatment Heart Rate (beats/min) 86  -AH     Intratreatment Heart Rate (beats/min) 135  -AH     Posttreatment Heart Rate (beats/min) 90  -AH     Pretreatment Resp Rate (breaths/min) 12  -AH     Posttreatment Resp Rate (breaths/min) 16  -AH     Pre SpO2 (%) 97  -AH     O2 Delivery Pre Treatment room air  -AH     Intra SpO2 (%) 85  -AH     O2 Delivery Intra Treatment room air  -AH     Post SpO2 (%) 94  -AH     O2 Delivery Post Treatment room air  -AH     Pre Patient Position Sitting  -AH     Intra Patient Position Standing  -     Post Patient Position Sitting  -       Row Name 02/17/25 1340          Positioning and Restraints    Pre-Treatment Position in bed  -     Post Treatment Position chair  -     In Chair notified nsg;reclined;call light within reach;with family/caregiver  -               User Key  (r) = Recorded By, (t) = Taken By, (c) = Cosigned By      Initials Name Provider Type    Suly Ratliff, PT Physical Therapist                   Outcome Measures       Row Name 02/17/25 0815          How much help from another person do you currently need...    Turning from your back to your side while in flat bed without using bedrails? 4  -MR     Moving from lying on back to sitting on the side of a flat bed without bedrails? 4  -MR     Moving to and from a bed to a chair (including a wheelchair)? 4  -MR     Standing up from a chair using your arms (e.g., wheelchair, bedside chair)? 3  -MR     Climbing 3-5 steps with a railing? 3  -MR     To walk in hospital room? 3  -MR     AM-PAC 6 Clicks Score (PT) 21  -MR               User Key  (r) = Recorded By, (t) = Taken By, (c) = Cosigned By      Initials Name Provider Type    Sirisha Thornton,  RN Registered Nurse                                 Physical Therapy Education       Title: PT OT SLP Therapies (Done)       Topic: Physical Therapy (Done)       Point: Mobility training (Done)       Learning Progress Summary            Patient Acceptance, E, VU by  at 2/17/2025 1349                      Point: Body mechanics (Done)       Learning Progress Summary            Patient Acceptance, E, VU by  at 2/17/2025 1349                                      User Key       Initials Effective Dates Name Provider Type [facility] 08/12/24 -  Suly King, TOBIAS Physical Therapist PT                  PT Recommendation and Plan     Outcome Evaluation: Lynne Cade is a 75 yo F who presented to [facility] on 2/16 with c/o shortness of breath over the last couple months. She states she cannot even get up to walk across the room without being short of breath. Chest X-ray: no acute cardiopulmonary findings. Pt is A&Ox4 and reports she is typically IND with ADLs and mobility at baseline. She has a cane and R/W and w/c but doesn't use them all the time. She lives with her spouse in a [facility]. Pt is SUP fr all mobility this date w/o AD. She is on RA at 97% O2 sat upon room entry. She desats with light activity with 85%, recovering quickly with PLB, with noted dyspnea and wheezing upon exertion. Pt appears to be near functional baseline. Pt would benefit from 6MWT prior to d/c to assess home O2 needs. PT will complete orders.     Time Calculation:         PT Charges       Row Name 02/17/25 1350             Time Calculation    Start Time 1314  -      Stop Time 1333  -      Time Calculation (min) 19 min  -      PT Received On 02/17/25  -                User Key  (r) = Recorded By, (t) = Taken By, (c) = Cosigned By      Initials Name Provider Type     Suly King, PT Physical Therapist                  Therapy Charges for Today       Code Description Service Date Service Provider Modifiers Qty     [id number]  PT EVAL LOW COMPLEXITY 4 2/17/2025 Suly King, PT GP 1            PT G-Codes  AM-PAC 6 Clicks Score (PT): 21  PT Discharge Summary  Anticipated Discharge Disposition (PT): home    Suly King, PT  2/17/2025

## 2025-02-17 NOTE — PLAN OF CARE
Goal Outcome Evaluation:    Patient sleeping between care. Denies chest pain/discomfort. Awaiting cardiology consult in the AM

## 2025-02-17 NOTE — PLAN OF CARE
Goal Outcome Evaluation:  Plan of Care Reviewed With: patient, spouse           Outcome Evaluation: Lynne Cade is a 75 yo F who presented to [facility] on 2/16 with c/o shortness of breath over the last couple months. She states she cannot even get up to walk across the room without being short of breath. Chest X-ray: no acute cardiopulmonary findings. Pt is A&Ox4 and reports she is typically IND with ADLs and mobility at baseline. She has a cane and R/W and w/c but doesn't use them all the time. She lives with her spouse in a [facility]. Pt is SUP fr all mobility this date w/o AD. She is on RA at 97% O2 sat upon room entry. She desats with light activity with 85%, recovering quickly with PLB, with noted dyspnea and wheezing upon exertion. Pt appears to be near functional baseline. Pt would benefit from 6MWT prior to d/c to assess home O2 needs. PT will complete orders.    Anticipated Discharge Disposition (PT): home

## 2025-02-17 NOTE — CONSULTS
NEPHROLOGY CONSULTATION-----[facility]    [facility]  [phone]  Feilsa Melendez MD    Patient Care Team:  Lorraine Agarwal APRN as PCP - General (Nurse Practitioner)  Rick Melendez MD as Consulting Physician (Nephrology)  Mayo Fregoso MD as Consulting Physician (Urology)  Fredi Pierce MD as Consulting Physician (Cardiology)  Cony Sanchez APRN as Nurse Practitioner (Vascular Surgery)    CC/REASON FOR CONSULTATION: RENAL FAILURE/ELEVATED SERUM CREATININE    PHYSICIAN REQUESTING CONSULTATION: Yfn Solano MD     History of Present Illness    HPI    Patient is a 74 y.o. WF, well known to me, whom I was asked to see in consultation for evaluation and management of renal failure/elevated serum creatinine. Patient was admitted after presenting to ER with c/o SOB, WOODALL.  Patient with known CRF/CKD. No NSAIDs or recent IV dye exposure. No known h/o hepatitis, TB, rheumatic fever, jaundice, SLE, bleeding/bruising disorders.  No urinary sx. No edema or fluid retention.  +Compliance with home meds. Was on diuretics in the form of Lasix  prior to admission. Was not on ACE-I/ARB  prior to admission. No herbal med use.    Review of Systems   Constitutional:  Positive for activity change, appetite change and fatigue. Negative for chills, diaphoresis, fever and unexpected weight change.   HENT:  Negative for congestion, dental problem, drooling, ear discharge, ear pain, facial swelling, hearing loss, mouth sores, nosebleeds, postnasal drip, rhinorrhea, sinus pressure, sinus pain, sneezing, sore throat, tinnitus, trouble swallowing and voice change.    Eyes:  Negative for photophobia, pain, discharge, redness, itching and visual disturbance.   Respiratory:  Positive for shortness of breath. Negative for apnea, cough, choking, chest tightness, wheezing and stridor.    Cardiovascular:  Negative for chest pain,  palpitations and leg swelling.   Gastrointestinal:  Negative for abdominal distention, abdominal pain, anal bleeding, blood in stool, constipation, diarrhea, nausea, rectal pain and vomiting.   Endocrine: Negative for cold intolerance, heat intolerance, polydipsia, polyphagia and polyuria.   Genitourinary:  Positive for frequency. Negative for decreased urine volume, difficulty urinating, dysuria, enuresis, flank pain, genital sores, hematuria and urgency.   Musculoskeletal:  Positive for back pain. Negative for arthralgias, gait problem, joint swelling, myalgias, neck pain and neck stiffness.   Skin:  Negative for color change, pallor, rash and wound.   Allergic/Immunologic: Negative for environmental allergies, food allergies and immunocompromised state.   Neurological:  Positive for weakness. Negative for dizziness, tremors, seizures, syncope, facial asymmetry, speech difficulty, light-headedness, numbness and headaches.   Hematological:  Negative for adenopathy. Does not bruise/bleed easily.   Psychiatric/Behavioral:  Negative for agitation, behavioral problems, confusion, decreased concentration, dysphoric mood, hallucinations, self-injury, sleep disturbance and suicidal ideas. The patient is not nervous/anxious and is not hyperactive.           Past Medical History:   Diagnosis Date    Anemia     Hx of anemia    Anxiety     Arthritis     Conditions. Abstracted from [facility].    Cataract     bilateral    CHF (congestive heart failure) 6/01/22    Low iron    Cholelithiasis 30 yrs ago    Had gb removed    Chronic obstructive pulmonary disease, unspecified COPD type     CKD (chronic kidney disease), stage IV 01/13/2023    Colon polyp     Depression 1995    Take cymbalta    Diabetes mellitus     type 2    Diarrhea     on and off    Edema of right foot 04/2021    Healthcare maintenance 2008    PAP. Abstracted from [facility].    Hip pain, bilateral     HL (hearing loss) 1/1/19    Have hearing aids     Hyperlipidemia     Hypertension     Knee pain, bilateral     Obesity     Osteopenia 11/1/22    On med    Other forms of dyspnea     Other specified soft tissue disorders     Pneumonia due to COVID-19 virus 06/26/2022    Renal insufficiency Stage 3    Has gone up to stage 4    Slow to wake up after anesthesia     Spinal headache     Stress headaches     Tremor     Benign    Tremor of both hands     Urinary tract infection 07/01/22    [facility]    Visual impairment 6/1/2017    Cataracts    Vitamin D deficiency        Past Surgical History:   Procedure Laterality Date    ARTERIOVENOUS FISTULA/SHUNT SURGERY Left 02/24/2023    Procedure: BRACHIOCEPHALIC ARTERIOVENOUS FISTULA FORMATION;  Surgeon: Vignesh Riley MD;  Location: [facility] MAIN OR;  Service: Vascular;  Laterality: Left;    BREAST BIOPSY Right     CHOLECYSTECTOMY  1990    COLONOSCOPY      This year 2022    EYE SURGERY Bilateral     cataract removal    GASTRIC BYPASS  2002    HERNIA REPAIR  2005    umbilical hernia    HIP OPEN REDUCTION Right 03/02/2020    Procedure: FEMORAL NECK OPEN REDUCTION INTERNAL FIXATION;  Surgeon: Yfn Sifuentes MD;  Location: [facility] MAIN OR;  Service: Orthopedics;  Laterality: Right;  Cannulated screw fixation right femoral neck    JOINT REPLACEMENT  2020    R knee    LAPAROTOMY OOPHERECTOMY Bilateral 2010    OTHER SURGICAL HISTORY  04/20/2015    Lexiscan stress test, normal. Abstracted from [facility].    PANNICULECTOMY      TOTAL KNEE ARTHROPLASTY Right 04/29/2021    Procedure: RIGHT TOTAL KNEE REPLACEMENT;  Surgeon: Yfn Sifuentes MD;  Location: [facility] MAIN OR;  Service: Orthopedics;  Laterality: Right;       Family History   Problem Relation Age of Onset    Asthma Mother     Depression Mother     Kidney disease Mother         Kidney stones    Miscarriages / Stillbirths Mother     Hyperlipidemia Father     Hypertension Father     Diabetes Father     Anxiety disorder Daughter     Early death Daughter     Miscarriages /  Stillbirths Daughter     Diabetes Daughter        Social History     Tobacco Use    Smoking status: Never     Passive exposure: Never    Smokeless tobacco: Never   Vaping Use    Vaping status: Never Used   Substance Use Topics    Alcohol use: Never    Drug use: Never       Home Meds:   Medications Prior to Admission   Medication Sig Dispense Refill Last Dose/Taking    albuterol sulfate  (90 Base) MCG/ACT inhaler Inhale 2 puffs Every 6 (Six) Hours.   2/16/2025    alendronate (FOSAMAX) 70 MG tablet Take 1 tablet by mouth Every 7 (Seven) Days. (Patient taking differently: Take 1 tablet by mouth Every 7 (Seven) Days. Sat) 12 tablet 1 2/15/2025    allopurinol (ZYLOPRIM) 100 MG tablet Take 1 tablet by mouth Daily.   2/16/2025    calcitriol (ROCALTROL) 0.25 MCG capsule Take 2 capsules by mouth Daily.   2/16/2025    carvedilol (COREG) 25 MG tablet TAKE 1 TABLET TWICE DAILY 180 tablet 3 2/16/2025    cyclobenzaprine (FLEXERIL) 5 MG tablet Take 1 tablet by mouth 3 (Three) Times a Day As Needed.   2/15/2025    DULoxetine (CYMBALTA) 60 MG capsule Take 1 capsule by mouth 2 (Two) Times a Day. 180 capsule 1 2/16/2025    furosemide (LASIX) 40 MG tablet TAKE 1 TABLET TWICE DAILY (Patient taking differently: Take 1 tablet by mouth Daily.) 180 tablet 3 2/16/2025    gabapentin (NEURONTIN) 100 MG capsule Take 1 capsule by mouth every night at bedtime.   2/15/2025    hydrALAZINE (APRESOLINE) 50 MG tablet Take 1 tablet by mouth 2 (Two) Times a Day.   2/16/2025    hydrOXYzine (ATARAX) 10 MG tablet Take 1 tablet by mouth 3 (Three) Times a Day As Needed for Itching. 90 tablet 1 2/16/2025    primidone (MYSOLINE) 50 MG tablet TAKE 1 TABLET EVERY NIGHT. (Patient taking differently: Take 1 tablet by mouth Every Night.) 30 tablet 0 2/15/2025    rosuvastatin (CRESTOR) 10 MG tablet Take 1 tablet by mouth Daily. 90 tablet 1 2/16/2025    sodium bicarbonate 650 MG tablet Take 1 tablet by mouth Daily.   2/16/2025    Accu-Chek Softclix Lancets  lancets TEST BLOOD SUGAR TWICE DAILY 200 each 1     Alcohol Swabs (B-D SINGLE USE SWABS REGULAR) pads TO USE WHEN CHECKING BLOOD SUGAR TWICE A  each 1     Alcohol Swabs (B-D SINGLE USE SWABS REGULAR) pads BD Single Use Swab, See Instructions, Use 1 swab to check blood sugar up to TID, # 300 EA, 1 Refill(s), Pharmacy: [facility], E11.9, Use 1 swab to check blood sugar up to TID, Supply, 168, cm, 03/25/24 11:10:00 EDT, Height, 89.9, kg, 03/25/24 11:16:00 EDT, Weight Dosing       Blood Glucose Calibration (Accu-Chek Megan) solution Use as directed 3 each 1     Blood Glucose Monitoring Suppl (Accu-Chek Megan) device Use to test twice daily   DX: E11.9 1 each 0     Blood Glucose Monitoring Suppl (True Metrix Air Glucose Meter) device True Metrix Air Glucose Meter, See Instructions, use to check blood sugar up to TID, # 1 EA, 0 Refill(s), Pharmacy: [facility], E11.9, use to check blood sugar up to TID, Supply, 168, cm, 03/25/24 11:10:00 EDT, Height, 89.9, kg, 03/25/24 11:16:00 EDT, Weight Dosing       glimepiride (AMARYL) 2 MG tablet TAKE 2 TABLETS TWICE DAILY (Patient taking differently: Take 2 tablets by mouth 2 (Two) Times a Day.) 360 tablet 1     glucose blood (Accu-Chek Megan Plus) test strip TEST BLOOD SUGAR TWICE DAILY AS DIRECTED   DX  E11.9 300 each 3     Lancets (accu-chek soft touch) lancets Check blood sugars twice daily as directed. DX:E11.9 300 each 12     lidocaine (Lidoderm) 5 % Apply  topically to the appropriate area as directed.       Vibegron (Gemtesa) 75 MG tablet Take 1 tablet by mouth Daily.          Scheduled Meds:  allopurinol, 100 mg, Oral, Daily  calcitriol, 0.5 mcg, Oral, Daily  [Held by provider] carvedilol, 25 mg, Oral, BID  DULoxetine, 60 mg, Oral, BID  furosemide, 40 mg, Oral, BID  gabapentin, 100 mg, Oral, Nightly  hydrALAZINE, 50 mg, Oral, BID  Lidocaine, 1 patch, Transdermal, Q24H  oxybutynin XL, 10 mg, Oral, Daily  primidone, 50 mg,  "Oral, Nightly  rosuvastatin, 10 mg, Oral, Daily  sodium bicarbonate, 650 mg, Oral, Daily  sodium chloride, 10 mL, Intravenous, Q12H        Continuous Infusions:       PRN Meds:    albuterol    senna-docusate sodium **AND** polyethylene glycol **AND** bisacodyl **AND** bisacodyl    cyclobenzaprine    hydrOXYzine    influenza vaccine    melatonin    nitroglycerin    ondansetron    sodium chloride    [COMPLETED] Insert Peripheral IV **AND** sodium chloride    sodium chloride    sodium chloride    Allergies:  Morphine, Sulfa antibiotics, Hydrocodone, and Diphenhydramine    OBJECTIVE    Vital Signs  Temp:  [97.3 °F (36.3 °C)-97.9 °F (36.6 °C)] 97.3 °F (36.3 °C)  Heart Rate:  [62-80] 68  Resp:  [13-22] 13  BP: (132-195)/(69-96) 132/80    No intake/output data recorded.  I/O last 3 completed shifts:  In: 240 [P.O.:240]  Out: -     Physical Exam:  General Appearance: alert, appears stated age and cooperative  Head: normocephalic, without obvious abnormality and atraumatic  Eyes: conjunctivae and sclerae normal and no icterus  Neck: supple and no JVD  Lungs: +FEW SCATTERED RHONCHI AND FEW FINE END EXP WHEEZES  Heart: regular rhythm & normal rate and normal S1, S2 +CYNDY  Chest Wall: no abnormalities observed  Abdomen: normal bowel sounds and soft, nontender  Extremities: moves extremities well, no edema, no cyanosis  Skin: no bleeding, bruising or rash  Neurologic: Alert, and oriented. No focal deficits    Results Review:    I reviewed the patient's new clinical results.    WBC WBC   Date Value Ref Range Status   02/16/2025 6.58 3.40 - 10.80 10*3/mm3 Final      HGB Hemoglobin   Date Value Ref Range Status   02/16/2025 10.6 (L) 12.0 - 15.9 g/dL Final      HCT Hematocrit   Date Value Ref Range Status   02/16/2025 35.0 34.0 - 46.6 % Final      Platelets No results found for: \"LABPLAT\"   MCV MCV   Date Value Ref Range Status   02/16/2025 105.4 (H) 79.0 - 97.0 fL Final          Sodium Sodium   Date Value Ref Range Status " "  02/16/2025 137 136 - 145 mmol/L Final      Potassium Potassium   Date Value Ref Range Status   02/16/2025 3.9 3.5 - 5.2 mmol/L Final     Comment:     Slight hemolysis detected by analyzer. Result may be falsely elevated.      Chloride Chloride   Date Value Ref Range Status   02/16/2025 103 98 - 107 mmol/L Final      CO2 CO2   Date Value Ref Range Status   02/16/2025 18.4 (L) 22.0 - 29.0 mmol/L Final      BUN BUN   Date Value Ref Range Status   02/16/2025 53 (H) 8 - 23 mg/dL Final      Creatinine Creatinine   Date Value Ref Range Status   02/16/2025 2.41 (H) 0.57 - 1.00 mg/dL Final      Calcium Calcium   Date Value Ref Range Status   02/16/2025 8.7 8.6 - 10.5 mg/dL Final      PO4 No results found for: \"CAPO4\"   Albumin Albumin   Date Value Ref Range Status   02/16/2025 3.9 3.5 - 5.2 g/dL Final      Magnesium No results found for: \"MG\"   Uric Acid No results found for: \"URICACID\"       Imaging Results (Last 72 Hours)       Procedure Component Value Units Date/Time    XR Chest 2 View [[id number]] Collected: 02/16/25 1612     Updated: 02/16/25 1615    Narrative:      XR CHEST 2 VW    Date of Exam: 2/16/2025 4:12 PM EST    Indication: Short of breath    Comparison: 2/5/2025    Findings:  Cardiomediastinal silhouette is unremarkable. Similar elevation of the right hemidiaphragm. No airspace disease, pneumothorax, nor pleural effusion. No acute osseous abnormality identified.      Impression:      Impression:  No acute process identified      Electronically Signed: Antonio Ceballos MD    2/16/2025 4:13 PM EST    Workstation ID: [id number]              Results for orders placed during the hospital encounter of 02/16/25    XR Chest 2 View    Narrative  XR CHEST 2 VW    Date of Exam: 2/16/2025 4:12 PM EST    Indication: Short of breath    Comparison: 2/5/2025    Findings:  Cardiomediastinal silhouette is unremarkable. Similar elevation of the right hemidiaphragm. No airspace disease, pneumothorax, nor pleural effusion. No acute " osseous abnormality identified.    Impression  Impression:  No acute process identified      Electronically Signed: Antonio Ceballos MD  2/16/2025 4:13 PM EST  Workstation ID: [id number]      Results for orders placed in visit on 01/09/24    SCANNED - IMAGING      Results for orders placed in visit on 07/24/23    XR Hip With or Without Pelvis 2 - 3 View Left    Narrative  XR HIP W OR WO PELVIS 2-3 VIEW LEFT    Date of Exam: 7/25/2023 1:02 PM EDT    Indication: Acute left hip pain    Comparison: Pelvis and right hip radiographs dated 3/25/2021    Findings:  There is no acute fracture or dislocation. There is mild acetabular osteophyte formation. The joint space appears relatively well-preserved. The ilioischial and iliopectineal lines appear intact. There are calcified pelvic phleboliths and a large rectal  stool burden. There is no osseous erosion.    Impression  Impression:  1. No acute osseous abnormality of the left hip.  2. Mild degenerative osteophyte formation.      Electronically Signed: Hammad Owens  7/26/2023 1:19 PM EDT  Workstation ID: [id number]        Results for orders placed during the hospital encounter of 08/01/24    Duplex Vein Mapping Upper Extremity - Bilateral CAR    Interpretation Summary    Sub-acute left upper extremity superficial thrombophlebitis noted in the cephalic (upper arm).    All other vessels appear normal.    Right basilic vein adequate throughout its course.  Right cephalic vein inadequate.    Left cephalic vein inadequate.  Left basilic vein adequate throughout its course.      ASSESSMENT / PLAN      Dyspnea    RENAL FAILURE-------Nonoliguric. Known CRF/CKD STG 4 secondary to DGS/HTN NS. Current serum creatinine near baseline. Follow with slightly increased diuresis. No uremia. No indication for HD yet. Dose meds for CrCl 15-30 cc/min    2. VOLUME EXCESS------Give Metolazone x one. Continue po Lasix and follow. ECHO pending. TSH ok    3. HTN WITH CKD-------BP up. Gradual reduction.  No ACE-I/ARB/DRI    4. DMII WITH RENAL MANIFESTATIONS------BS up from steroids. Glucometers, SSI    5. SOB/HYPOXIA-------ECHO pending. Cardiology eval underway. Cautiously diuresing. Get Pulmonary eval too    6. HYPERLIPIDEMIA-----On Statin.  CK ok. TSH ok    7. OA/DJD------No NSAIDs. Uric ok    8. ACIDOSIS----Metabolic. No elevation in AGAP. +Type 4 RTA. Give po NaHCO3 and follow    9. SECONDARY HYPERPARATHYROIDISM OF RENAL ORIGIN------On Calcitriol. Follow Phos    10. DEPRESSION---------On Cymbalta    11. DM PERIPHERAL NEUROPATHY-------On Neurontin    12. BENIGN ESSENTIAL TREMOR------On Mysoline    13. ANEMIA OF CKD-------Fe ok. HgB above threshold for EPO        I discussed the patient's findings and my recommendations with patient and nursing staff    Will follow along closely. Thank you for allowing us to see this patient in renal consultation.    [facility]  [phone]  MD Felisa Waldron MD  02/17/25  07:39 EST

## 2025-02-17 NOTE — CONSULTS
Cardiology [place]    Subjective:     Encounter Date:02/16/2025      Patient ID: Lynne Cade is a 74 y.o. female     Referring Physician: Russ    Chief Complaint: Shortness of breath    Reason for Consult: Shortness of breath  Seen and examined on date of service, greater than 50% of total encounter time in medical decision making performed by me as discussed with nurse practitioner after face-to-face encounter    HPI:  Lynne Cade is a 74 y.o. female patient previously saw Dr. Pierce for shortness of breath with a history of paroxysmal atrial tachycardia with frequent PACs/sinus tachycardia, essential hypertension, hyperlipidemia, [id number], benign essential tremor, anxiety, COPD, CKD and anemia who presents with shortness of breath.     Patient last saw Dr. Pierce in office June 2023.  He had noted patient had no previous history of cardiac disease, heart attacks, strokes, stents, bypass surgery, valve disease or other.  She had a noted previous ischemic eval from 15 years prior which was unremarkable.  Echo at that time showed normal EF with grade 1 diastolic dysfunction, no significant abnormalities, no elevated pulmonary pressures.    Patient states she saw internal medicine recently and was referred to [facility] for pulmonary function testing which was completed on Thursday.  She does not have the results.  Spoke to bedside RN to obtain.    Patient came to the ED 2/16/2025 with complaints of progressive shortness of breath over the last couple of months.  She reported that she cannot even get up to walk across the room without shortness of breath and chest tightness.  No reported chest pain or pain radiating into neck, lower jaw.  No complaints of fever, chills, diaphoresis, cough, congestion, dizziness or syncope.  Chest x-ray no acute process.  EKG sinus rhythm with no acute ST elevations.  Troponins trended: 35, 32.  proBNP 347.  Sodium 137, CO2 18.4, BUN 53, Creatinine at  baseline 2.4.  A1c 7.17, TSH 1.62, free T4 0.77.  LDL 51, triglycerides 176.    On exam, patient has just returned from ambulating to the bathroom.  Patient relates to me her main complaint is wheezing and dyspnea on exertion.  She denies chest pain, palpitations, orthopnea, or PND.  She is in no acute distress, she is on room air.  BP stable 120-140/60s.  She is sinus rhythm on the monitor.      Review of systems otherwise negative x 14 point review of systems except as mentioned above  Historical data copied forward from previous encounters in EMR is unchanged    Past Medical History:   Diagnosis Date    Anemia     Hx of anemia    Anxiety     Arthritis     Conditions. Abstracted from [facility].    Cataract     bilateral    CHF (congestive heart failure) 6/01/22    Low iron    Cholelithiasis 30 yrs ago    Had gb removed    Chronic obstructive pulmonary disease, unspecified COPD type     CKD (chronic kidney disease), stage IV 01/13/2023    Colon polyp     Depression 1995    Take cymbalta    Diabetes mellitus     type 2    Diarrhea     on and off    Edema of right foot 04/2021    Healthcare maintenance 2008    PAP. Abstracted from [facility].    Hip pain, bilateral     HL (hearing loss) 1/1/19    Have hearing aids    Hyperlipidemia     Hypertension     Knee pain, bilateral     Obesity     Osteopenia 11/1/22    On med    Other forms of dyspnea     Other specified soft tissue disorders     Pneumonia due to COVID-19 virus 06/26/2022    Renal insufficiency Stage 3    Has gone up to stage 4    Slow to wake up after anesthesia     Spinal headache     Stress headaches     Tremor     Benign    Tremor of both hands     Urinary tract infection 07/01/22    [place]    Visual impairment 6/1/2017    Cataracts    Vitamin D deficiency        Past Surgical History:   Procedure Laterality Date    ARTERIOVENOUS FISTULA/SHUNT SURGERY Left 02/24/2023    Procedure: BRACHIOCEPHALIC ARTERIOVENOUS FISTULA FORMATION;  Surgeon: Vignesh Riley  MD José Miguel;  Location: [facility] MAIN OR;  Service: Vascular;  Laterality: Left;    BREAST BIOPSY Right     CHOLECYSTECTOMY  1990    COLONOSCOPY      This year 2022    EYE SURGERY Bilateral     cataract removal    GASTRIC BYPASS  2002    HERNIA REPAIR  2005    umbilical hernia    HIP OPEN REDUCTION Right 03/02/2020    Procedure: FEMORAL NECK OPEN REDUCTION INTERNAL FIXATION;  Surgeon: Yfn Siufentes MD;  Location: [facility] MAIN OR;  Service: Orthopedics;  Laterality: Right;  Cannulated screw fixation right femoral neck    JOINT REPLACEMENT  2020    R knee    LAPAROTOMY OOPHERECTOMY Bilateral 2010    OTHER SURGICAL HISTORY  04/20/2015    Lexiscan stress test, normal. Abstracted from [facility].    PANNICULECTOMY      TOTAL KNEE ARTHROPLASTY Right 04/29/2021    Procedure: RIGHT TOTAL KNEE REPLACEMENT;  Surgeon: Yfn Sifuentes MD;  Location: [facility] MAIN OR;  Service: Orthopedics;  Laterality: Right;       Family History   Problem Relation Age of Onset    Asthma Mother     Depression Mother     Kidney disease Mother         Kidney stones    Miscarriages / Stillbirths Mother     Hyperlipidemia Father     Hypertension Father     Diabetes Father     Anxiety disorder Daughter     Early death Daughter     Miscarriages / Stillbirths Daughter     Diabetes Daughter        Social History     Socioeconomic History    Marital status:    Tobacco Use    Smoking status: Never     Passive exposure: Never    Smokeless tobacco: Never   Vaping Use    Vaping status: Never Used   Substance and Sexual Activity    Alcohol use: Never    Drug use: Never    Sexual activity: Not Currently     Partners: Male     Birth control/protection: Condom, Post-menopausal, Bilateral salpingectomy      Comment: monogamous with          Review of Systems   Constitutional: Negative for chills, decreased appetite, diaphoresis, fever, malaise/fatigue, weight gain and weight loss.   HENT:  Negative for sore throat.    Eyes:  Negative for visual  "disturbance.   Cardiovascular:  Positive for dyspnea on exertion. Negative for chest pain, irregular heartbeat, leg swelling, near-syncope, orthopnea, palpitations, paroxysmal nocturnal dyspnea and syncope.   Respiratory:  Positive for cough. Negative for sleep disturbances due to breathing and sputum production.    Neurological:  Negative for dizziness, focal weakness, headaches, light-headedness, loss of balance, numbness, paresthesias and weakness.   Psychiatric/Behavioral:  Negative for altered mental status.             Objective:         /62 (BP Location: Right arm, Patient Position: Lying)   Pulse 67   Temp 97.6 °F (36.4 °C) (Oral)   Resp 20   Ht 167 cm (65.75\")   Wt 94.3 kg (207 lb 14.3 oz)   SpO2 98%   BMI 33.81 kg/m²     Physical Exam:  Physical Exam  Vitals and nursing note reviewed.   Constitutional:       General: She is not in acute distress.     Appearance: Normal appearance. She is obese. She is not diaphoretic.   HENT:      Head: Normocephalic and atraumatic.   Eyes:      Extraocular Movements: Extraocular movements intact.      Pupils: Pupils are equal, round, and reactive to light.   Cardiovascular:      Rate and Rhythm: Normal rate and regular rhythm.      Pulses: Normal pulses.      Heart sounds: Normal heart sounds.   Pulmonary:      Effort: Pulmonary effort is normal. No respiratory distress.      Breath sounds: Normal breath sounds. No stridor. No wheezing.   Abdominal:      General: There is no distension.      Palpations: Abdomen is soft.      Tenderness: There is no abdominal tenderness. There is no guarding.   Musculoskeletal:         General: Normal range of motion.      Cervical back: Neck supple.      Right lower leg: No edema.      Left lower leg: No edema.   Skin:     General: Skin is warm and dry.   Neurological:      General: No focal deficit present.      Mental Status: She is alert and oriented to person, place, and time.   Psychiatric:         Mood and Affect: Mood " normal.         Behavior: Behavior normal.         Thought Content: Thought content normal.         Judgment: Judgment normal.         Scribe findings above agree        ASCVD Risk Score::  The 10-year ASCVD risk score (Leanne OSEI, et al., 2019) is: 45.7%    Values used to calculate the score:      Age: 74 years      Sex: Female      Is Non- : No      Diabetic: Yes      Tobacco smoker: No      Systolic Blood Pressure: 140 mmHg      Is BP treated: Yes      HDL Cholesterol: 65 mg/dL      Total Cholesterol: 145 mg/dL      Lab Review:     Results from last 7 days   Lab Units 02/16/25  1543   SODIUM mmol/L 137   POTASSIUM mmol/L 3.9   CHLORIDE mmol/L 103   CO2 mmol/L 18.4*   BUN mg/dL 53*   CREATININE mg/dL 2.41*   GLUCOSE mg/dL 281*   CALCIUM mg/dL 8.7   AST (SGOT) U/L 25   ALT (SGPT) U/L 28     Results from last 7 days   Lab Units 02/16/25  1716 02/16/25  1543   CK TOTAL U/L 27  --    HSTROP T ng/L 32* 35*     Results from last 7 days   Lab Units 02/17/25  1521 02/16/25  1543   WBC 10*3/mm3 7.86 6.58   HEMOGLOBIN g/dL 10.0* 10.6*   HEMATOCRIT % 32.3* 35.0   PLATELETS 10*3/mm3 268 271             Results from last 7 days   Lab Units 02/16/25  1716   CHOLESTEROL mg/dL 145   TRIGLYCERIDES mg/dL 176*   HDL CHOL mg/dL 65*     Results from last 7 days   Lab Units 02/16/25  1716   PROBNP pg/mL 347.0     Results from last 7 days   Lab Units 02/16/25  1716   TSH uIU/mL 1.620       Recent Radiology:  Imaging Results (Most Recent)       Procedure Component Value Units Date/Time    XR Chest 2 View [[id number]] Collected: 02/16/25 1612     Updated: 02/16/25 1615    Narrative:      XR CHEST 2 VW    Date of Exam: 2/16/2025 4:12 PM EST    Indication: Short of breath    Comparison: 2/5/2025    Findings:  Cardiomediastinal silhouette is unremarkable. Similar elevation of the right hemidiaphragm. No airspace disease, pneumothorax, nor pleural effusion. No acute osseous abnormality identified.      Impression:       Impression:  No acute process identified      Electronically Signed: Antonio Ceballos MD    2/16/2025 4:13 PM EST    Workstation ID: [id number]              ECHOCARDIOGRAM:  Results for orders placed during the hospital encounter of 06/01/22    Adult Transthoracic Echo Complete W/ Cont if Necessary Per Protocol    Interpretation Summary  · Estimated left ventricular EF was in agreement with the calculated left ventricular EF. Left ventricular ejection fraction appears to be 56 - 60%. Left ventricular systolic function is normal.  · Left ventricular diastolic function is consistent with (grade I) impaired relaxation.  · Estimated right ventricular systolic pressure from tricuspid regurgitation is normal (<35 mmHg).      Stress Test:        Cardiac Catheterization:  No results found for this or any previous visit.      Results Review:  I have personally reviewed the results from the time of this admission to 2/17/2025 17:11 EST and agree with these findings:  []  Laboratory  []  Microbiology  []  Radiology  []  EKG/Telemetry   []  Cardiology/Vascular   []  Pathology  []  Old records  []  Other:    Most notable findings include:     Allergies   Allergen Reactions    Morphine Itching    Sulfa Antibiotics GI Intolerance    Hydrocodone Nausea Only    Diphenhydramine Anxiety       Current Medications:   Scheduled Meds:allopurinol, 100 mg, Oral, Daily  budesonide-formoterol, 2 puff, Inhalation, BID - RT  calcitriol, 0.5 mcg, Oral, Daily  [START ON 2/18/2025] dilTIAZem CD, 240 mg, Oral, Q24H  DULoxetine, 60 mg, Oral, Daily  furosemide, 40 mg, Oral, BID  gabapentin, 100 mg, Oral, Nightly  guaiFENesin, 1,200 mg, Oral, Q12H  hydrALAZINE, 50 mg, Oral, BID  insulin lispro, 4-24 Units, Subcutaneous, 4x Daily AC & at Bedtime  ipratropium-albuterol, 3 mL, Nebulization, 4x Daily - RT  Lidocaine, 1 patch, Transdermal, Q24H  methylPREDNISolone sodium succinate, 40 mg, Intravenous, Q12H  oxybutynin XL, 10 mg, Oral, Daily  primidone, 50 mg,  Oral, Nightly  rosuvastatin, 10 mg, Oral, Daily  sodium bicarbonate, 1,300 mg, Oral, TID  sodium chloride, 10 mL, Intravenous, Q12H      Continuous Infusions:Pharmacy Consult - Steroid Insulin Protocol,             Assessment:         Active Hospital Problems    Diagnosis  POA    **Dyspnea [R06.00]  Yes          Plan:   Dyspnea on exertion  Chest x-ray no acute process.    EKG sinus rhythm with no acute ST elevations.    Troponins trended: 35, 32.    proBNP 347.  No clinical signs of heart failure, no peripheral edema, no JVD, no rales, no orthopnea/PND  Will try to obtain PFTs performed on Thursday at her sink in hospital  Pulmonary following    Hx paroxysmal atrial tachycardia with frequent PACs/sinus tachycardia  Currently sinus rhythm with PACs on telemetry  Was on Coreg at home  Currently on Cardizem  daily    Essential hypertension  Patient states Dr. Melendez manages her hypertensive medications, Coreg 25 mg twice daily, hydralazine 50 mg twice daily  Patient states that pulmonology took her off Coreg on this admission with concerns it was causing WOODALL  And started on Cardizem  daily     Hyperlipidemia   Crestor   LDL 51, triglycerides 176.    [id number]  A1c 7.17    CKD   Lasix 40 mg twice daily Home medication  Dr. Melendez follows  Plan HD soon     PLAN:  Echocardiogram today for evaluation of LV systolic function and filling pressures estimated noninvasively  Monitor blood pressures and heart rate with Coreg discontinued, continue uninterrupted telemetry pulse oximetry  Blood pressures and heart rates currently controlled  No complaints of chest pain, EKG changes, troponins flat  No clinical symptoms of CHF, BNP normal  Pulmonology following, CT chest, inhalers, bronchodilators, steroids ordered  Dr. Melendez following, no indication for HD at this time, continues p.o. Lasix, metolazone x 1-cautious diuresis, no ACE/ARB/DRI  Monitor electrolytes and replace to goal potassium 4.0 magnesium  2.0  Daily standing weights and strict I&O   Continuous uninterrupted telemetry      Further recommendations to follow findings and clinical course  Please call with any questions or concerns  Fredi Pierce MD, PhD    Rose Estrella, EVERARDO  02/17/25  17:11 EST

## 2025-02-17 NOTE — PROGRESS NOTES
Lynne Cade is a 74 y.o. female admitted with dyspnea.     Recent Labs     02/16/25  1543   CREATININE 2.41*   BUN 53*      K 3.9      CO2 18.4*     Estimated Creatinine Clearance: 22.9 mL/min (A) (by C-G formula based on SCr of 2.41 mg/dL (H)).    Assessment/Plan  Duloxetine 60 mg PO BID renally adjusted to duloxetine 60 mg QD per the Adult Renal Dosing of Medication policy for estCrCl < 30 mL/min    Leanne Elder [facility]  2/17/2025

## 2025-02-18 ENCOUNTER — APPOINTMENT (OUTPATIENT)
Dept: CT IMAGING | Facility: HOSPITAL | Age: 75
End: 2025-02-18
Payer: MEDICARE

## 2025-02-18 PROBLEM — R06.89 DYSPNEA AND RESPIRATORY ABNORMALITIES: Status: ACTIVE | Noted: 2025-02-18

## 2025-02-18 PROBLEM — R06.00 DYSPNEA AND RESPIRATORY ABNORMALITIES: Status: ACTIVE | Noted: 2025-02-18

## 2025-02-18 LAB
ALBUMIN SERPL-MCNC: 3.5 G/DL (ref 3.5–5.2)
ALBUMIN/GLOB SERPL: 1.2 G/DL
ALP SERPL-CCNC: 48 U/L (ref 39–117)
ALT SERPL W P-5'-P-CCNC: 24 U/L (ref 1–33)
ANION GAP SERPL CALCULATED.3IONS-SCNC: 11.3 MMOL/L (ref 5–15)
AST SERPL-CCNC: 22 U/L (ref 1–32)
BACTERIA SPEC AEROBE CULT: NORMAL
BILIRUB SERPL-MCNC: <0.2 MG/DL (ref 0–1.2)
BUN SERPL-MCNC: 52 MG/DL (ref 8–23)
BUN/CREAT SERPL: 20 (ref 7–25)
CA-I SERPL ISE-MCNC: 1.07 MMOL/L (ref 1.15–1.3)
CALCIUM SPEC-SCNC: 8.7 MG/DL (ref 8.6–10.5)
CHLORIDE SERPL-SCNC: 105 MMOL/L (ref 98–107)
CO2 SERPL-SCNC: 21.7 MMOL/L (ref 22–29)
CREAT SERPL-MCNC: 2.6 MG/DL (ref 0.57–1)
DEPRECATED RDW RBC AUTO: 56.4 FL (ref 37–54)
EGFRCR SERPLBLD CKD-EPI 2021: 18.6 ML/MIN/1.73
ERYTHROCYTE [DISTWIDTH] IN BLOOD BY AUTOMATED COUNT: 14.6 % (ref 12.3–15.4)
GLOBULIN UR ELPH-MCNC: 3 GM/DL
GLUCOSE BLDC GLUCOMTR-MCNC: 119 MG/DL (ref 70–105)
GLUCOSE BLDC GLUCOMTR-MCNC: 173 MG/DL (ref 70–105)
GLUCOSE BLDC GLUCOMTR-MCNC: 249 MG/DL (ref 70–105)
GLUCOSE BLDC GLUCOMTR-MCNC: 355 MG/DL (ref 70–105)
GLUCOSE BLDC GLUCOMTR-MCNC: 91 MG/DL (ref 70–105)
GLUCOSE SERPL-MCNC: 485 MG/DL (ref 65–99)
HCT VFR BLD AUTO: 30.3 % (ref 34–46.6)
HGB BLD-MCNC: 9.4 G/DL (ref 12–15.9)
MAGNESIUM SERPL-MCNC: 2.1 MG/DL (ref 1.6–2.4)
MCH RBC QN AUTO: 32.6 PG (ref 26.6–33)
MCHC RBC AUTO-ENTMCNC: 31 G/DL (ref 31.5–35.7)
MCV RBC AUTO: 105.2 FL (ref 79–97)
PHOSPHATE SERPL-MCNC: 4.3 MG/DL (ref 2.5–4.5)
PLATELET # BLD AUTO: 252 10*3/MM3 (ref 140–450)
PMV BLD AUTO: 10.3 FL (ref 6–12)
POTASSIUM SERPL-SCNC: 5 MMOL/L (ref 3.5–5.2)
PROT SERPL-MCNC: 6.5 G/DL (ref 6–8.5)
RBC # BLD AUTO: 2.88 10*6/MM3 (ref 3.77–5.28)
SODIUM SERPL-SCNC: 138 MMOL/L (ref 136–145)
WBC NRBC COR # BLD AUTO: 7.22 10*3/MM3 (ref 3.4–10.8)

## 2025-02-18 PROCEDURE — 82948 REAGENT STRIP/BLOOD GLUCOSE: CPT

## 2025-02-18 PROCEDURE — 80053 COMPREHEN METABOLIC PANEL: CPT | Performed by: INTERNAL MEDICINE

## 2025-02-18 PROCEDURE — 94761 N-INVAS EAR/PLS OXIMETRY MLT: CPT

## 2025-02-18 PROCEDURE — 94799 UNLISTED PULMONARY SVC/PX: CPT

## 2025-02-18 PROCEDURE — 85027 COMPLETE CBC AUTOMATED: CPT | Performed by: INTERNAL MEDICINE

## 2025-02-18 PROCEDURE — 63710000001 INSULIN LISPRO (HUMAN) PER 5 UNITS: Performed by: NURSE PRACTITIONER

## 2025-02-18 PROCEDURE — 25010000002 CALCIUM GLUCONATE 2-0.675 GM/100ML-% SOLUTION: Performed by: INTERNAL MEDICINE

## 2025-02-18 PROCEDURE — 83735 ASSAY OF MAGNESIUM: CPT | Performed by: INTERNAL MEDICINE

## 2025-02-18 PROCEDURE — 94664 DEMO&/EVAL PT USE INHALER: CPT

## 2025-02-18 PROCEDURE — 82330 ASSAY OF CALCIUM: CPT | Performed by: INTERNAL MEDICINE

## 2025-02-18 PROCEDURE — 63710000001 INSULIN REGULAR HUMAN PER 5 UNITS: Performed by: EMERGENCY MEDICINE

## 2025-02-18 PROCEDURE — 84100 ASSAY OF PHOSPHORUS: CPT | Performed by: INTERNAL MEDICINE

## 2025-02-18 PROCEDURE — 99232 SBSQ HOSP IP/OBS MODERATE 35: CPT | Performed by: INTERNAL MEDICINE

## 2025-02-18 PROCEDURE — 71250 CT THORAX DX C-: CPT

## 2025-02-18 RX ORDER — PREDNISONE 10 MG/1
10 TABLET ORAL DAILY
Status: DISCONTINUED | OUTPATIENT
Start: 2025-02-23 | End: 2025-02-19 | Stop reason: HOSPADM

## 2025-02-18 RX ORDER — CALCIUM GLUCONATE 20 MG/ML
2000 INJECTION, SOLUTION INTRAVENOUS EVERY 12 HOURS
Status: COMPLETED | OUTPATIENT
Start: 2025-02-18 | End: 2025-02-18

## 2025-02-18 RX ORDER — SODIUM BICARBONATE 650 MG/1
1300 TABLET ORAL 4 TIMES DAILY
Status: DISCONTINUED | OUTPATIENT
Start: 2025-02-18 | End: 2025-02-19

## 2025-02-18 RX ORDER — HYDRALAZINE HYDROCHLORIDE 25 MG/1
100 TABLET, FILM COATED ORAL 3 TIMES DAILY
Status: DISCONTINUED | OUTPATIENT
Start: 2025-02-18 | End: 2025-02-19 | Stop reason: HOSPADM

## 2025-02-18 RX ORDER — GLIPIZIDE 5 MG/1
5 TABLET ORAL
Status: DISCONTINUED | OUTPATIENT
Start: 2025-02-18 | End: 2025-02-19 | Stop reason: HOSPADM

## 2025-02-18 RX ORDER — METHYLPREDNISOLONE SODIUM SUCCINATE 40 MG/ML
20 INJECTION, POWDER, LYOPHILIZED, FOR SOLUTION INTRAMUSCULAR; INTRAVENOUS EVERY 12 HOURS
Status: DISCONTINUED | OUTPATIENT
Start: 2025-02-18 | End: 2025-02-18

## 2025-02-18 RX ORDER — PREDNISONE 20 MG/1
20 TABLET ORAL DAILY
Status: DISCONTINUED | OUTPATIENT
Start: 2025-02-21 | End: 2025-02-19 | Stop reason: HOSPADM

## 2025-02-18 RX ADMIN — CALCIUM GLUCONATE 2000 MG: 20 INJECTION, SOLUTION INTRAVENOUS at 08:22

## 2025-02-18 RX ADMIN — INSULIN LISPRO 16 UNITS: 100 INJECTION, SOLUTION INTRAVENOUS; SUBCUTANEOUS at 08:05

## 2025-02-18 RX ADMIN — ACETAMINOPHEN 1000 MG: 500 TABLET, FILM COATED ORAL at 00:30

## 2025-02-18 RX ADMIN — OXYBUTYNIN CHLORIDE 10 MG: 10 TABLET, EXTENDED RELEASE ORAL at 08:06

## 2025-02-18 RX ADMIN — HYDRALAZINE HYDROCHLORIDE 100 MG: 25 TABLET ORAL at 21:06

## 2025-02-18 RX ADMIN — ROSUVASTATIN CALCIUM 10 MG: 10 TABLET, FILM COATED ORAL at 08:06

## 2025-02-18 RX ADMIN — INSULIN HUMAN 2 UNITS: 100 INJECTION, SOLUTION PARENTERAL at 10:35

## 2025-02-18 RX ADMIN — GUAIFENESIN 1200 MG: 600 TABLET, MULTILAYER, EXTENDED RELEASE ORAL at 21:06

## 2025-02-18 RX ADMIN — FUROSEMIDE 40 MG: 40 TABLET ORAL at 08:06

## 2025-02-18 RX ADMIN — HYDRALAZINE HYDROCHLORIDE 100 MG: 25 TABLET ORAL at 08:22

## 2025-02-18 RX ADMIN — GABAPENTIN 100 MG: 100 CAPSULE ORAL at 21:06

## 2025-02-18 RX ADMIN — SODIUM BICARBONATE 1300 MG: 650 TABLET ORAL at 12:39

## 2025-02-18 RX ADMIN — IPRATROPIUM BROMIDE AND ALBUTEROL SULFATE 3 ML: .5; 3 SOLUTION RESPIRATORY (INHALATION) at 06:45

## 2025-02-18 RX ADMIN — DILTIAZEM HYDROCHLORIDE 240 MG: 240 CAPSULE, EXTENDED RELEASE ORAL at 08:06

## 2025-02-18 RX ADMIN — INSULIN HUMAN 2 UNITS: 100 INJECTION, SOLUTION PARENTERAL at 21:55

## 2025-02-18 RX ADMIN — Medication 10 ML: at 08:23

## 2025-02-18 RX ADMIN — DULOXETINE 60 MG: 30 CAPSULE, DELAYED RELEASE ORAL at 08:06

## 2025-02-18 RX ADMIN — CALCITRIOL CAPSULES 0.25 MCG 0.5 MCG: 0.25 CAPSULE ORAL at 08:05

## 2025-02-18 RX ADMIN — BUDESONIDE AND FORMOTEROL FUMARATE DIHYDRATE 2 PUFF: 160; 4.5 AEROSOL RESPIRATORY (INHALATION) at 19:45

## 2025-02-18 RX ADMIN — SODIUM BICARBONATE 1300 MG: 650 TABLET ORAL at 21:06

## 2025-02-18 RX ADMIN — CYCLOBENZAPRINE HYDROCHLORIDE 5 MG: 10 TABLET, FILM COATED ORAL at 21:55

## 2025-02-18 RX ADMIN — GUAIFENESIN 1200 MG: 600 TABLET, MULTILAYER, EXTENDED RELEASE ORAL at 08:06

## 2025-02-18 RX ADMIN — ACETAMINOPHEN 1000 MG: 500 TABLET, FILM COATED ORAL at 17:38

## 2025-02-18 RX ADMIN — SODIUM BICARBONATE 1300 MG: 650 TABLET ORAL at 17:33

## 2025-02-18 RX ADMIN — GLIPIZIDE 5 MG: 5 TABLET ORAL at 10:35

## 2025-02-18 RX ADMIN — Medication 10 ML: at 21:07

## 2025-02-18 RX ADMIN — BUDESONIDE AND FORMOTEROL FUMARATE DIHYDRATE 2 PUFF: 160; 4.5 AEROSOL RESPIRATORY (INHALATION) at 06:49

## 2025-02-18 RX ADMIN — PRIMIDONE 50 MG: 50 TABLET ORAL at 21:06

## 2025-02-18 RX ADMIN — IPRATROPIUM BROMIDE AND ALBUTEROL SULFATE 3 ML: .5; 3 SOLUTION RESPIRATORY (INHALATION) at 19:41

## 2025-02-18 RX ADMIN — INSULIN LISPRO 4 UNITS: 100 INJECTION, SOLUTION INTRAVENOUS; SUBCUTANEOUS at 21:23

## 2025-02-18 RX ADMIN — INSULIN LISPRO 8 UNITS: 100 INJECTION, SOLUTION INTRAVENOUS; SUBCUTANEOUS at 12:38

## 2025-02-18 RX ADMIN — HYDRALAZINE HYDROCHLORIDE 100 MG: 25 TABLET ORAL at 15:42

## 2025-02-18 RX ADMIN — CALCIUM GLUCONATE 2000 MG: 20 INJECTION, SOLUTION INTRAVENOUS at 21:23

## 2025-02-18 RX ADMIN — ALLOPURINOL 100 MG: 100 TABLET ORAL at 08:05

## 2025-02-18 NOTE — PROGRESS NOTES
Daily Progress Note          Assessment    Dyspnea  Wheezing  CKD  DM  HTN  HLD  HFpEF    XR Chest 2 View    Result Date: 2/16/2025  Impression: No acute process identified Electronically Signed: Antonio Ceballos MD  2/16/2025 4:13 PM EST  Workstation ID: [id number]           Recommendations:  Respiratory status is improving and patient can be discharged home follow-up in the pulmonary clinic in 3 weeks     This admission I stopped carvedilol and switched to diltiazem, the dose might need to be adjusted in few days  Added Symbicort       Oxygen supplement and titration to maintain saturation 90 to 95%: Currently on room air    Bronchodilators  IV Solu-Medrol switched to tapering dose of prednisone     Diuresis/electrolyte management/BP control as per nephrology           LOS: 0 days     Subjective     Reports less shortness of breath    Objective     Vital signs for last 24 hours:  Vitals:    02/18/25 0648 02/18/25 0649 02/18/25 0650 02/18/25 0743   BP:    178/85   BP Location:    Right arm   Patient Position:    Lying   Pulse: 72 78 75 78   Resp: 12 12 12 19   Temp:    98.4 °F (36.9 °C)   TempSrc:    Oral   SpO2: 95% 94% 95% 97%   Weight:       Height:           Intake/Output last 3 shifts:  I/O last 3 completed shifts:  In: 720 [P.O.:720]  Out: -   Intake/Output this shift:  No intake/output data recorded.      Radiology  Imaging Results (Last 24 Hours)       ** No results found for the last 24 hours. **            Labs:  Results from last 7 days   Lab Units 02/18/25  0032   WBC 10*3/mm3 7.22   HEMOGLOBIN g/dL 9.4*   HEMATOCRIT % 30.3*   PLATELETS 10*3/mm3 252     Results from last 7 days   Lab Units 02/18/25  0032   SODIUM mmol/L 138   POTASSIUM mmol/L 5.0   CHLORIDE mmol/L 105   CO2 mmol/L 21.7*   BUN mg/dL 52*   CREATININE mg/dL 2.60*   CALCIUM mg/dL 8.7   BILIRUBIN mg/dL <0.2   ALK PHOS U/L 48   ALT (SGPT) U/L 24   AST (SGOT) U/L 22   GLUCOSE mg/dL 485*         Results from last 7 days   Lab Units 02/18/25 0032  02/16/25  1543   ALBUMIN g/dL 3.5 3.9     Results from last 7 days   Lab Units 02/16/25  1716 02/16/25  1543   CK TOTAL U/L 27  --    HSTROP T ng/L 32* 35*         Results from last 7 days   Lab Units 02/18/25  0032   MAGNESIUM mg/dL 2.1         Results from last 7 days   Lab Units 02/16/25  1716   TSH uIU/mL 1.620   FREE T4 ng/dL 0.77*           Meds:   SCHEDULE  allopurinol, 100 mg, Oral, Daily  budesonide-formoterol, 2 puff, Inhalation, BID - RT  calcitriol, 0.5 mcg, Oral, Daily  calcium gluconate, 2,000 mg, Intravenous, Q12H  dilTIAZem CD, 240 mg, Oral, Q24H  DULoxetine, 60 mg, Oral, Daily  furosemide, 40 mg, Oral, Daily  gabapentin, 100 mg, Oral, Nightly  guaiFENesin, 1,200 mg, Oral, Q12H  hydrALAZINE, 100 mg, Oral, TID  insulin lispro, 4-24 Units, Subcutaneous, 4x Daily AC & at Bedtime  insulin regular, 2 Units, Subcutaneous, Q12H  ipratropium-albuterol, 3 mL, Nebulization, 4x Daily - RT  Lidocaine, 1 patch, Transdermal, Q24H  methylPREDNISolone sodium succinate, 20 mg, Intravenous, Q12H  oxybutynin XL, 10 mg, Oral, Daily  primidone, 50 mg, Oral, Nightly  rosuvastatin, 10 mg, Oral, Daily  sodium bicarbonate, 1,300 mg, Oral, 4x Daily  sodium chloride, 10 mL, Intravenous, Q12H      Infusions  Pharmacy Consult - Steroid Insulin Protocol,       PRNs    acetaminophen    albuterol    senna-docusate sodium **AND** polyethylene glycol **AND** bisacodyl **AND** bisacodyl    cyclobenzaprine    dextrose    dextrose    glucagon (human recombinant)    hydrOXYzine    influenza vaccine    melatonin    nitroglycerin    ondansetron    Pharmacy Consult - Steroid Insulin Protocol    sodium chloride    [COMPLETED] Insert Peripheral IV **AND** sodium chloride    sodium chloride    sodium chloride    Physical Exam:  General Appearance:  Alert   HEENT:  Normocephalic, without obvious abnormality, Conjunctiva/corneas clear,.   Nares normal, no drainage     Neck:  Supple, symmetrical, trachea midline.   Lungs /Chest wall:   Improved  air entry, no wheezing, minimal bilateral basal rhonchi, respirations unlabored, symmetrical wall movement.     Heart:  Regular rate and rhythm, S1 S2 normal  Abdomen: Soft, non-tender, no masses, no organomegaly.    Extremities: No edema, no clubbing or cyanosis     ROS  Constitutional: Negative for chills, fever and malaise/fatigue.   HENT: Negative.    Eyes: Negative.    Cardiovascular: Negative.    Respiratory: Positive for improving exertional shortness of breath.    Skin: Negative.    Musculoskeletal: Negative.    Gastrointestinal: Negative.    Genitourinary: Negative.    Neurological: Negative.    Psychiatric/Behavioral: Negative.      I reviewed the recent clinical results  I personally reviewed the latest radiological studies    Part of this note may be an electronic transcription/translation of spoken language to printed text using the Dragon Dictation System.

## 2025-02-18 NOTE — PLAN OF CARE
Goal Outcome Evaluation:              Outcome Evaluation: Pt's blood sugar has decreased. Pulmonoly and cardiology signed off, f/u outpatient. Nephrology following. Will continue to monitor.

## 2025-02-18 NOTE — CASE MANAGEMENT/SOCIAL WORK
Continued Stay Note  SHELDON Law     Patient Name: Lynne Cade  MRN: [id number]  Today's Date: 2/18/2025    Admit Date: 2/16/2025    Plan: Routine home   Discharge Plan       Row Name 02/18/25 0947       Plan    Plan Comments DC barriers: Pulmonology, Nephrology, and Cardiology following, CT of the chest ordered, urine cultures pending, IV calcium gluconate,  this am.           William Nolan RN     Cell number [phone]  Office number [phone]

## 2025-02-18 NOTE — PROGRESS NOTES
"NEPHROLOGY PROGRESS NOTE------[facility]    [facility]  [phone]  Felisa Melendez MD      Patient Care Team:  Lorraine Agarwal APRN as PCP - General (Nurse Practitioner)  Rick Melendez MD as Consulting Physician (Nephrology)  Mayo Fregoso MD as Consulting Physician (Urology)  Fredi Pierce MD as Consulting Physician (Cardiology)  Cony Sanchez APRN as Nurse Practitioner (Vascular Surgery)      Provider:  Felisa Melendez MD  Patient Name: Lynne Cade  :  1950    SUBJECTIVE:    F/U CRF/CKD    Feeling and breathing better this AM. Blood sugars very elevated    Medication:  allopurinol, 100 mg, Oral, Daily  budesonide-formoterol, 2 puff, Inhalation, BID - RT  calcitriol, 0.5 mcg, Oral, Daily  dilTIAZem CD, 240 mg, Oral, Q24H  DULoxetine, 60 mg, Oral, Daily  furosemide, 40 mg, Oral, Daily  gabapentin, 100 mg, Oral, Nightly  guaiFENesin, 1,200 mg, Oral, Q12H  hydrALAZINE, 50 mg, Oral, BID  insulin lispro, 4-24 Units, Subcutaneous, 4x Daily AC & at Bedtime  insulin regular, 2 Units, Subcutaneous, Q12H  ipratropium-albuterol, 3 mL, Nebulization, 4x Daily - RT  Lidocaine, 1 patch, Transdermal, Q24H  methylPREDNISolone sodium succinate, 20 mg, Intravenous, Q12H  oxybutynin XL, 10 mg, Oral, Daily  primidone, 50 mg, Oral, Nightly  rosuvastatin, 10 mg, Oral, Daily  sodium bicarbonate, 1,300 mg, Oral, TID  sodium chloride, 10 mL, Intravenous, Q12H      Pharmacy Consult - Steroid Insulin Protocol,         OBJECTIVE    Vital Sign Min/Max for last 24 hours  Temp  Min: 97.5 °F (36.4 °C)  Max: 98.4 °F (36.9 °C)   BP  Min: 124/65  Max: 178/85   Pulse  Min: 66  Max: 101   Resp  Min: 12  Max: 20   SpO2  Min: 91 %  Max: 100 %   No data recorded   Weight  Min: 94.3 kg (207 lb 14.3 oz)  Max: 94.3 kg (207 lb 14.3 oz)     Flowsheet Rows      Flowsheet Row First Filed Value   Admission Height 167 cm (65.75\") " "Documented at 02/16/2025 1502   Admission Weight 94.3 kg (207 lb 14.3 oz) Documented at 02/16/2025 1502            No intake/output data recorded.  I/O last 3 completed shifts:  In: 720 [P.O.:720]  Out: -     Physical Exam:  General Appearance: alert, appears stated age and cooperative  Head: normocephalic, without obvious abnormality and atraumatic  Eyes: conjunctivae and sclerae normal and no icterus  Neck: supple and no JVD  Lungs: +FEW SCATTERED RHONCHI AND FEW FINE END EXP WHEEZES  Heart: regular rhythm & normal rate and normal S1, S2 +CYNDY  Chest Wall: no abnormalities observed  Abdomen: normal bowel sounds and soft, nontender  Extremities: moves extremities well, no edema, no cyanosis  Skin: no bleeding, bruising or rash  Neurologic: Alert, and oriented. No focal deficits    Labs:    WBC WBC   Date Value Ref Range Status   02/18/2025 7.22 3.40 - 10.80 10*3/mm3 Final   02/17/2025 7.86 3.40 - 10.80 10*3/mm3 Final   02/16/2025 6.58 3.40 - 10.80 10*3/mm3 Final      HGB Hemoglobin   Date Value Ref Range Status   02/18/2025 9.4 (L) 12.0 - 15.9 g/dL Final   02/17/2025 10.0 (L) 12.0 - 15.9 g/dL Final   02/16/2025 10.6 (L) 12.0 - 15.9 g/dL Final      HCT Hematocrit   Date Value Ref Range Status   02/18/2025 30.3 (L) 34.0 - 46.6 % Final   02/17/2025 32.3 (L) 34.0 - 46.6 % Final   02/16/2025 35.0 34.0 - 46.6 % Final      Platelets No results found for: \"LABPLAT\"   MCV MCV   Date Value Ref Range Status   02/18/2025 105.2 (H) 79.0 - 97.0 fL Final   02/17/2025 104.9 (H) 79.0 - 97.0 fL Final   02/16/2025 105.4 (H) 79.0 - 97.0 fL Final          Sodium Sodium   Date Value Ref Range Status   02/18/2025 138 136 - 145 mmol/L Final   02/17/2025 141 136 - 145 mmol/L Final   02/16/2025 137 136 - 145 mmol/L Final      Potassium Potassium   Date Value Ref Range Status   02/18/2025 5.0 3.5 - 5.2 mmol/L Final   02/17/2025 4.0 3.5 - 5.2 mmol/L Final     Comment:     Specimen hemolyzed.  Result may be falsely elevated.   02/16/2025 3.9 " "3.5 - 5.2 mmol/L Final     Comment:     Slight hemolysis detected by analyzer. Result may be falsely elevated.      Chloride Chloride   Date Value Ref Range Status   02/18/2025 105 98 - 107 mmol/L Final   02/17/2025 105 98 - 107 mmol/L Final   02/16/2025 103 98 - 107 mmol/L Final      CO2 CO2   Date Value Ref Range Status   02/18/2025 21.7 (L) 22.0 - 29.0 mmol/L Final   02/17/2025 22.2 22.0 - 29.0 mmol/L Final   02/16/2025 18.4 (L) 22.0 - 29.0 mmol/L Final      BUN BUN   Date Value Ref Range Status   02/18/2025 52 (H) 8 - 23 mg/dL Final   02/17/2025 51 (H) 8 - 23 mg/dL Final   02/16/2025 53 (H) 8 - 23 mg/dL Final      Creatinine Creatinine   Date Value Ref Range Status   02/18/2025 2.60 (H) 0.57 - 1.00 mg/dL Final   02/17/2025 2.65 (H) 0.57 - 1.00 mg/dL Final   02/16/2025 2.41 (H) 0.57 - 1.00 mg/dL Final      Calcium Calcium   Date Value Ref Range Status   02/18/2025 8.7 8.6 - 10.5 mg/dL Final   02/17/2025 8.9 8.6 - 10.5 mg/dL Final   02/16/2025 8.7 8.6 - 10.5 mg/dL Final      PO4 No components found for: \"PO4\"   Albumin Albumin   Date Value Ref Range Status   02/18/2025 3.5 3.5 - 5.2 g/dL Final   02/16/2025 3.9 3.5 - 5.2 g/dL Final      Magnesium Magnesium   Date Value Ref Range Status   02/18/2025 2.1 1.6 - 2.4 mg/dL Final      Uric Acid No components found for: \"URIC ACID\"     Imaging Results (Last 72 Hours)       Procedure Component Value Units Date/Time    XR Chest 2 View [[id number]] Collected: 02/16/25 1612     Updated: 02/16/25 1615    Narrative:      XR CHEST 2 VW    Date of Exam: 2/16/2025 4:12 PM EST    Indication: Short of breath    Comparison: 2/5/2025    Findings:  Cardiomediastinal silhouette is unremarkable. Similar elevation of the right hemidiaphragm. No airspace disease, pneumothorax, nor pleural effusion. No acute osseous abnormality identified.      Impression:      Impression:  No acute process identified      Electronically Signed: Antonio Ceballos MD    2/16/2025 4:13 PM EST    Workstation ID: " [id number]            Results for orders placed during the hospital encounter of 02/16/25    XR Chest 2 View    Narrative  XR CHEST 2 VW    Date of Exam: 2/16/2025 4:12 PM EST    Indication: Short of breath    Comparison: 2/5/2025    Findings:  Cardiomediastinal silhouette is unremarkable. Similar elevation of the right hemidiaphragm. No airspace disease, pneumothorax, nor pleural effusion. No acute osseous abnormality identified.    Impression  Impression:  No acute process identified      Electronically Signed: Antonio Ceballos MD  2/16/2025 4:13 PM EST  Workstation ID: [id number]      Results for orders placed in visit on 01/09/24    SCANNED - IMAGING      Results for orders placed in visit on 07/24/23    XR Hip With or Without Pelvis 2 - 3 View Left    Narrative  XR HIP W OR WO PELVIS 2-3 VIEW LEFT    Date of Exam: 7/25/2023 1:02 PM EDT    Indication: Acute left hip pain    Comparison: Pelvis and right hip radiographs dated 3/25/2021    Findings:  There is no acute fracture or dislocation. There is mild acetabular osteophyte formation. The joint space appears relatively well-preserved. The ilioischial and iliopectineal lines appear intact. There are calcified pelvic phleboliths and a large rectal  stool burden. There is no osseous erosion.    Impression  Impression:  1. No acute osseous abnormality of the left hip.  2. Mild degenerative osteophyte formation.      Electronically Signed: Hammad Owens  7/26/2023 1:19 PM EDT  Workstation ID: [id number]      Results for orders placed during the hospital encounter of 08/01/24    Duplex Vein Mapping Upper Extremity - Bilateral CAR    Interpretation Summary    Sub-acute left upper extremity superficial thrombophlebitis noted in the cephalic (upper arm).    All other vessels appear normal.    Right basilic vein adequate throughout its course.  Right cephalic vein inadequate.    Left cephalic vein inadequate.  Left basilic vein adequate throughout its course.        ASSESSMENT  / PLAN      Dyspnea    CRF/CKD-------Nonoliguric. Known CRF/CKD STG 4 secondary to DGS/HTN NS. Current serum creatinine near baseline. Follow with slightly increased diuresis. No uremia. No indication for HD yet. Dose meds for CrCl 15-30 cc/min     2. VOLUME EXCESS------S/P Metolazone x one. Continue po Lasix and follow.  TSH ok     3. HTN WITH CKD-------BP up. Gradual reduction. No ACE-I/ARB/DRI. Increase Hydralazine and follow     4. DMII WITH RENAL MANIFESTATIONS------BS up from steroids. Glucometers, SSI. Add Glucotrol     5. SOB/HYPOXIA------Imprvoing     6. HYPERLIPIDEMIA-----On Statin.  CK ok. TSH ok     7. OA/DJD------No NSAIDs. Uric ok     8. ACIDOSIS----Metabolic. No elevation in AGAP. +Type 4 RTA. Increased po NaHCO3 and follow     9. SECONDARY HYPERPARATHYROIDISM OF RENAL ORIGIN------On Calcitriol. Follow Phos     10. DEPRESSION---------On Cymbalta     11. DM PERIPHERAL NEUROPATHY-------On Neurontin     12. BENIGN ESSENTIAL TREMOR------On Mysoline     13. ANEMIA OF CKD-------Fe ok. HgB above threshold for EPO        Felisa Melendez MD  [facility]  [phone]  02/18/25  07:51 EST

## 2025-02-18 NOTE — PROGRESS NOTES
[facility]        LOS:  LOS: 0 days   Patient Name: Lynne Cade  Age/Sex: 74 y.o. female  : 1950  MRN: [id number]    Day of Service: 25   Length of Stay: 0  Encounter Provider: EVERARDO Sales  Place of Service: [facility] CARDIOLOGY  Patient Care Team:  Lorraine Agarwal APRN as PCP - General (Nurse Practitioner)  Rick Melendez MD as Consulting Physician (Nephrology)  Mayo Fregoso MD as Consulting Physician (Urology)  Fredi Pierce MD as Consulting Physician (Cardiology)  Cony Sanchez APRN as Nurse Practitioner (Vascular Surgery)    Subjective:     Chief Complaint: fu shortness of breath    Seen and examined on date of service agree with narrative as discussed with nurse practitioner after face-to-face encounter with scribed findings below, greater than 50% of total encounter time in medical decision making performed by me      Subjective: patient going down for CT scan. No acute CV events  Volume appears stable  Regular rhythm  Overnight events discussed with staff      Current Medications:   Scheduled Meds:allopurinol, 100 mg, Oral, Daily  budesonide-formoterol, 2 puff, Inhalation, BID - RT  calcitriol, 0.5 mcg, Oral, Daily  calcium gluconate, 2,000 mg, Intravenous, Q12H  dilTIAZem CD, 240 mg, Oral, Q24H  DULoxetine, 60 mg, Oral, Daily  furosemide, 40 mg, Oral, Daily  gabapentin, 100 mg, Oral, Nightly  glipizide, 5 mg, Oral, QAM AC  guaiFENesin, 1,200 mg, Oral, Q12H  hydrALAZINE, 100 mg, Oral, TID  insulin lispro, 4-24 Units, Subcutaneous, 4x Daily AC & at Bedtime  insulin regular, 2 Units, Subcutaneous, Q12H  ipratropium-albuterol, 3 mL, Nebulization, 4x Daily - RT  Lidocaine, 1 patch, Transdermal, Q24H  oxybutynin XL, 10 mg, Oral, Daily  [START ON 2025] predniSONE, 30 mg, Oral, Daily   Followed by  [START ON 2025] predniSONE, 20 mg, Oral, Daily   Followed by  [START ON 2025] predniSONE, 10  mg, Oral, Daily  primidone, 50 mg, Oral, Nightly  rosuvastatin, 10 mg, Oral, Daily  sodium bicarbonate, 1,300 mg, Oral, 4x Daily  sodium chloride, 10 mL, Intravenous, Q12H      Continuous Infusions:Pharmacy Consult - Steroid Insulin Protocol,         Allergies:  Allergies   Allergen Reactions    Morphine Itching    Sulfa Antibiotics GI Intolerance    Hydrocodone Nausea Only    Diphenhydramine Anxiety       Review of Systems   Constitutional: Negative for chills, diaphoresis and malaise/fatigue.   Cardiovascular:  Positive for dyspnea on exertion. Negative for chest pain, irregular heartbeat, leg swelling, near-syncope, orthopnea, palpitations, paroxysmal nocturnal dyspnea and syncope.   Respiratory:  Negative for cough, shortness of breath, sleep disturbances due to breathing and sputum production.    Gastrointestinal:  Negative for change in bowel habit.   Genitourinary:  Negative for urgency.   Neurological:  Negative for dizziness and headaches.   Psychiatric/Behavioral:  Negative for altered mental status.        Objective:     Temp:  [97.5 °F (36.4 °C)-98.4 °F (36.9 °C)] 97.5 °F (36.4 °C)  Heart Rate:  [] 92  Resp:  [12-21] 21  BP: (124-179)/(65-85) 179/85     Intake/Output Summary (Last 24 hours) at 2/18/2025 1321  Last data filed at 2/18/2025 0600  Gross per 24 hour   Intake 480 ml   Output --   Net 480 ml     Body mass index is 33.81 kg/m².      02/16/25  1502 02/17/25  1118   Weight: 94.3 kg (207 lb 14.3 oz) 94.3 kg (207 lb 14.3 oz)         General Appearance:    Alert, cooperative, in no acute distress                                Head: Atraumatic, normocephalic, PERRLA               Neck:   supple, no JVD   Lungs:     Expiratory wheezing    Heart:    Regular rhythm and normal rate, normal S1 and S2   Abdomen:     Normal bowel sounds, no masses, no organomegaly, soft  nontender, nondistended, no guarding, no rebound  tenderness   Extremities:   Moves all extremities well, no edema, no cyanosis, no   redness   Pulses:   Pulses palpable and equal bilaterally   Skin:   No bleeding, bruising or rash   Neurologic:   Awake, alert, oriented x3     Scribe findings above agree      Lab Review:   Results from last 7 days   Lab Units 02/18/25  0032 02/17/25  1745 02/16/25  1543   SODIUM mmol/L 138 141 137   POTASSIUM mmol/L 5.0 4.0 3.9   CHLORIDE mmol/L 105 105 103   CO2 mmol/L 21.7* 22.2 18.4*   BUN mg/dL 52* 51* 53*   CREATININE mg/dL 2.60* 2.65* 2.41*   GLUCOSE mg/dL 485* 315* 281*   CALCIUM mg/dL 8.7 8.9 8.7   AST (SGOT) U/L 22  --  25   ALT (SGPT) U/L 24  --  28     Results from last 7 days   Lab Units 02/16/25  1716 02/16/25  1543   CK TOTAL U/L 27  --    HSTROP T ng/L 32* 35*     Results from last 7 days   Lab Units 02/18/25  0032 02/17/25  1521   WBC 10*3/mm3 7.22 7.86   HEMOGLOBIN g/dL 9.4* 10.0*   HEMATOCRIT % 30.3* 32.3*   PLATELETS 10*3/mm3 252 268         Results from last 7 days   Lab Units 02/18/25  0032   MAGNESIUM mg/dL 2.1     Results from last 7 days   Lab Units 02/16/25  1716   CHOLESTEROL mg/dL 145   TRIGLYCERIDES mg/dL 176*   HDL CHOL mg/dL 65*     Results from last 7 days   Lab Units 02/16/25  1716   PROBNP pg/mL 347.0     Results from last 7 days   Lab Units 02/16/25  1716   TSH uIU/mL 1.620       Recent Radiology:  Imaging Results (Most Recent)       Procedure Component Value Units Date/Time    CT Chest Without Contrast Diagnostic [[id number]] Collected: 02/18/25 1012     Updated: 02/18/25 1017    Narrative:      CT CHEST WO CONTRAST DIAGNOSTIC    Date of Exam: 2/18/2025 10:02 AM EST    Indication: SOA.    Comparison: Chest CT 7/24/2014, chest radiograph 2/16/2025    Technique: Axial CT images were obtained of the chest without contrast administration.  Sagittal and coronal reconstructions were performed.  Automated exposure control and iterative reconstruction methods were used.      Findings:  The right hemidiaphragm is elevated. There is right lower lobe and right middle lobe bronchiectasis.  There is mild atelectasis or fibrosis in the right lower lobe with chronic volume loss. The right upper lobe appears clear. There is some volume loss in   the right middle lobe. The left lung is clear. No acute infiltrates are identified. There is no significant mediastinal or hilar lymphadenopathy. There are coronary artery calcifications. There is no pleural or pericardial fluid.    There are postop changes of prior cholecystectomy. Postop changes are present at the EG junction. There are no acute findings in the upper abdomen.    There is thoracic spondylosis. No fractures or destructive bone lesions are identified.      Impression:      Impression:  1.Right lower lobe and right middle lobe bronchiectasis with chronic volume loss in the right lower lobe and right middle lobe.  2.No acute infiltrates.  3.Coronary artery calcifications.  4.Cholecystectomy. Postop changes at the EG junction.        Electronically Signed: Yfn Luna MD    2/18/2025 10:15 AM EST    Workstation ID: [id number]    XR Chest 2 View [[id number]] Collected: 02/16/25 1612     Updated: 02/16/25 1615    Narrative:      XR CHEST 2 VW    Date of Exam: 2/16/2025 4:12 PM EST    Indication: Short of breath    Comparison: 2/5/2025    Findings:  Cardiomediastinal silhouette is unremarkable. Similar elevation of the right hemidiaphragm. No airspace disease, pneumothorax, nor pleural effusion. No acute osseous abnormality identified.      Impression:      Impression:  No acute process identified      Electronically Signed: Antonio Ceballos MD    2/16/2025 4:13 PM EST    Workstation ID: [id number]            ECHOCARDIOGRAM:    Results for orders placed during the hospital encounter of 02/16/25    Adult Transthoracic Echo Complete W/ Cont if Necessary Per Protocol    Interpretation Summary    Left ventricular systolic function is normal. Left ventricular ejection fraction appears to be 56 - 60%.    Left ventricular diastolic function is consistent with (grade  I) impaired relaxation.    Estimated right ventricular systolic pressure from tricuspid regurgitation is normal (<35 mmHg).        I reviewed the patient's new clinical results.    EKG:      Assessment:       Dyspnea    Dyspnea on exertion  Chest x-ray no acute process.    EKG sinus rhythm with no acute ST elevations.    Troponins trended: 35, 32.    proBNP 347.  No clinical signs of heart failure, no peripheral edema, no JVD, no rales, no orthopnea/PND  Will try to obtain PFTs performed on Thursday at her sink in hospital  Pulmonary following     Hx paroxysmal atrial tachycardia with frequent PACs/sinus tachycardia  Currently sinus rhythm with PACs on telemetry  Was on Coreg at home  Currently on Cardizem  daily     Essential hypertension  Patient states Dr. Melendez manages her hypertensive medications, Coreg 25 mg twice daily, hydralazine 50 mg twice daily  Patient states that pulmonology took her off Coreg on this admission with concerns it was causing WOODALL  And started on Cardizem  daily      Hyperlipidemia   Crestor   LDL 51, triglycerides 176.     [id number]  A1c 7.17     CKD   Lasix 40 mg twice daily Home medication  Dr. Melendez follows  Plan HD soon       Plan:   CT chest this am showed right lower lobe and right middle lobe bronchiectasis with chronic volume loss in the right lower lobe and right middle lobe, no acute infiltrates  Volume status appears stable at this time  Nephrology following with additional dosing of metolazone, scheduled diuretics additionally  ECHO showed normal LVEF no significant VHD  Appreciate pulmonary involvement    Adjust antihypertensives as needed goal blood pressure less than 140 systolic  Continue to follow for multisystem workup  Courage out of bed    EVERARDO Sales  02/18/25  13:21 EST

## 2025-02-18 NOTE — DISCHARGE PLACEMENT REQUEST
"Lynne Valentino (74 y.o. Female)       Date of Birth   1950    Social Security Number       Address   [address] [place] [address] [place] [address] [facility]    Home Phone   [phone]    MRN   [id number]       [facility]    Marital Status                               Admission Date   2/16/25    Admission Type   Emergency    Admitting Provider   Yfn Solano MD    Attending Provider   Yfn Solano MD    Department, Room/Bed   [facility] OBSERVATION, 110/1       Discharge Date       Discharge Disposition       Discharge Destination                                 Attending Provider: Yfn Solano MD    Allergies: Morphine, Sulfa Antibiotics, Hydrocodone, Diphenhydramine    Isolation: None   Infection: None   Code Status: CPR    Ht: 167 cm (65.75\")   Wt: 94.3 kg (207 lb 14.3 oz)    Admission Cmt: None   Principal Problem: Dyspnea [R06.00]                   Active Insurance as of 2/16/2025       Primary Coverage       Payor Plan Insurance Group Employer/Plan Group    ANTHEM MEDICARE REPLACEMENT ANTHEM MED ADV HMO [id number]       Payor Plan Address Payor Plan Phone Number Payor Plan Fax Number Effective Dates    [address] [phone]  1/1/2025 - None Entered    [facility] [id number]         Subscriber Name Subscriber Birth Date Member ID       LYNNE VALENTINO 1950 [id number]                     Emergency Contacts        (Rel.) Home Phone Work Phone Mobile Phone    CHICHOAMINTA (Spouse) -- -- [phone]                "

## 2025-02-18 NOTE — PROGRESS NOTES
[facility] Progress Note     Patient Name: Lynne Cade  : 1950  MRN: [id number]  Primary Care Physician: Lorraine Agarwal APRN  Date of admission: 2025     Patient Care Team:  Lorraine Agarwal APRN as PCP - General (Nurse Practitioner)  Rick Melendez MD as Consulting Physician (Nephrology)  Mayo Fregoso MD as Consulting Physician (Urology)  Fredi Pierce MD as Consulting Physician (Cardiology)  Cony Sanchez APRN as Nurse Practitioner (Vascular Surgery)          Subjective   History Present Illness     Chief Complaint:   Chief Complaint   Patient presents with    Shortness of Breath     SOB for a few months       Shortness of breath     Ms. Cade is a 74 y.o.  presents to [facility] complaining of shortness of breath       History of Present Illness    ED 25: 74-year-old female with several health problems progressively worsening history of shortness of breath over the last couple months. She states she cannot even get up to walk across the room without being short of breath there is some tightness in the chest but no chest pain neck arm jaw pain. Denies any fever chills sweats cough congestion dizziness or syncope. Patient has seen her providers she is had some pulmonary function test done and she is unsure of the results. She has bad kidneys and she is scheduled to 27 to get a fistula placed by the vascular surgeon. She denies any leg pain or swelling no recent long car ride plane ride immobilization foreign travels antibiotic use or hospitalizations or injury no one in the home with similar illness.      Observation 25: Patient 74-year-old female presenting to the hospital with complaints of shortness of breath has been progressing over the past month.  Patient states she has tightness and wheezing.  Patient states she takes albuterol inhaler as needed but has noted no difference in shortness of breath.  Patient  reports increased fatigue and unable to do activities of daily living that she normally wants could do due to increased fatigue.  Patient states she is scheduled to have fistula placed on February 27.  Patient states she also had pulmonary function test done at [facility] outpatient but unsure results.  Patient denies respiratory illness recently. Patient currently denies chest pain, nausea, vomiting, or fever.     Observation 2/18/25: Patient is doing much better today with improvement in breathing and less chest tightness. Patient reports ability to rest overnight without discomfort. Patient reports continued nonproductive cough but denies fevers, nausea, or vomiting.     Review of Systems   Constitutional: Positive for malaise/fatigue. Negative for fever.   HENT: Negative.     Eyes: Negative.    Cardiovascular:  Positive for dyspnea on exertion. Negative for chest pain.   Respiratory:  Positive for cough and shortness of breath. Negative for sputum production.    Endocrine: Negative.    Skin: Negative.    Musculoskeletal: Negative.    Gastrointestinal: Negative.    Genitourinary: Negative.    Neurological:  Positive for weakness.   Psychiatric/Behavioral: Negative.             Personal History     Past Medical History:   Past Medical History:   Diagnosis Date    Anemia     Hx of anemia    Anxiety     Arthritis     Conditions. Abstracted from [employer]    Cataract     bilateral    CHF (congestive heart failure) 6/01/22    Low iron    Cholelithiasis 30 yrs ago    Had gb removed    Chronic obstructive pulmonary disease, unspecified COPD type     CKD (chronic kidney disease), stage IV 01/13/2023    Colon polyp     Depression 1995    Take cymbalta    Diabetes mellitus     type 2    Diarrhea     on and off    Edema of right foot 04/2021    Healthcare maintenance 2008    PAP. Abstracted from [employer]    Hip pain, bilateral     HL (hearing loss) 1/1/19    Have hearing aids    Hyperlipidemia     Hypertension      Knee pain, bilateral     Obesity     Osteopenia 11/1/22    On med    Other forms of dyspnea     Other specified soft tissue disorders     Pneumonia due to COVID-19 virus 06/26/2022    Renal insufficiency Stage 3    Has gone up to stage 4    Slow to wake up after anesthesia     Spinal headache     Stress headaches     Tremor     Benign    Tremor of both hands     Urinary tract infection 07/01/22    [place]    Visual impairment 6/1/2017    Cataracts    Vitamin D deficiency        Surgical History:      Past Surgical History:   Procedure Laterality Date    ARTERIOVENOUS FISTULA/SHUNT SURGERY Left 02/24/2023    Procedure: BRACHIOCEPHALIC ARTERIOVENOUS FISTULA FORMATION;  Surgeon: Vignesh Riley MD;  Location: [facility] OR;  Service: Vascular;  Laterality: Left;    BREAST BIOPSY Right     CHOLECYSTECTOMY  1990    COLONOSCOPY      This year 2022    EYE SURGERY Bilateral     cataract removal    GASTRIC BYPASS  2002    HERNIA REPAIR  2005    umbilical hernia    HIP OPEN REDUCTION Right 03/02/2020    Procedure: FEMORAL NECK OPEN REDUCTION INTERNAL FIXATION;  Surgeon: Yfn Sifuentes MD;  Location: [facility] OR;  Service: Orthopedics;  Laterality: Right;  Cannulated screw fixation right femoral neck    JOINT REPLACEMENT  2020    R knee    LAPAROTOMY OOPHERECTOMY Bilateral 2010    OTHER SURGICAL HISTORY  04/20/2015    Lexiscan stress test, normal. Abstracted from [facility].    PANNICULECTOMY      TOTAL KNEE ARTHROPLASTY Right 04/29/2021    Procedure: RIGHT TOTAL KNEE REPLACEMENT;  Surgeon: Yfn Sifuentes MD;  Location: [facility] OR;  Service: Orthopedics;  Laterality: Right;           Family History: family history includes Anxiety disorder in her daughter; Asthma in her mother; Depression in her mother; Diabetes in her daughter and father; Early death in her daughter; Hyperlipidemia in her father; Hypertension in her father; Kidney disease in her mother; Miscarriages / Stillbirths in her daughter and mother.  Otherwise pertinent FHx was reviewed and unremarkable.     Social History:  reports that she has never smoked. She has never been exposed to tobacco smoke. She has never used smokeless tobacco. She reports that she does not drink alcohol and does not use drugs.      Medications:  Prior to Admission medications    Medication Sig Start Date End Date Taking? Authorizing Provider   albuterol sulfate  (90 Base) MCG/ACT inhaler Inhale 2 puffs Every 6 (Six) Hours. 12/30/24  Yes Suleman Hensley MD   alendronate (FOSAMAX) 70 MG tablet Take 1 tablet by mouth Every 7 (Seven) Days.  Patient taking differently: Take 1 tablet by mouth Every 7 (Seven) Days. Sat 5/22/23  Yes Rose Robb MD   allopurinol (ZYLOPRIM) 100 MG tablet Take 1 tablet by mouth Daily.   Yes Suleman Hensley MD   calcitriol (ROCALTROL) 0.25 MCG capsule Take 2 capsules by mouth Daily.   Yes Suleman Hensley MD   carvedilol (COREG) 25 MG tablet TAKE 1 TABLET TWICE DAILY 11/30/23  Yes Fredi Pierce MD   cyclobenzaprine (FLEXERIL) 5 MG tablet Take 1 tablet by mouth 3 (Three) Times a Day As Needed.   Yes Suleman Hensley MD   DULoxetine (CYMBALTA) 60 MG capsule Take 1 capsule by mouth 2 (Two) Times a Day. 2/15/23  Yes Rose Robb MD   furosemide (LASIX) 40 MG tablet TAKE 1 TABLET TWICE DAILY  Patient taking differently: Take 1 tablet by mouth Daily. 5/24/24  Yes Fredi Pierce MD   gabapentin (NEURONTIN) 100 MG capsule Take 1 capsule by mouth every night at bedtime. 12/20/23  Yes Suleman Hensley MD   hydrALAZINE (APRESOLINE) 50 MG tablet Take 1 tablet by mouth 2 (Two) Times a Day.   Yes Suleman Hensley MD   hydrOXYzine (ATARAX) 10 MG tablet Take 1 tablet by mouth 3 (Three) Times a Day As Needed for Itching. 8/14/23  Yes Becky Greco DO   primidone (MYSOLINE) 50 MG tablet TAKE 1 TABLET EVERY NIGHT.  Patient taking differently: Take 1 tablet by mouth Every Night. 8/28/23  Yes Iraida  EVERARDO Crump   rosuvastatin (CRESTOR) 10 MG tablet Take 1 tablet by mouth Daily. 12/27/22  Yes Rose Robb MD   sodium bicarbonate 650 MG tablet Take 1 tablet by mouth Daily. 5/11/23  Yes Suleman Hensley MD   Accu-Chek Softclix Lancets lancets TEST BLOOD SUGAR TWICE DAILY 4/12/22   Rose Robb MD   Alcohol Swabs (B-D SINGLE USE SWABS REGULAR) pads TO USE WHEN CHECKING BLOOD SUGAR TWICE A DAY 4/12/22   Rose Robb MD   Alcohol Swabs (B-D SINGLE USE SWABS REGULAR) pads BD Single Use Swab, See Instructions, Use 1 swab to check blood sugar up to TID, # 300 EA, 1 Refill(s), Pharmacy: [facility], E11.9, Use 1 swab to check blood sugar up to TID, Supply, 168, cm, 03/25/24 11:10:00 EDT, Height, 89.9, kg, 03/25/24 11:16:00 EDT, Weight Dosing 4/22/24   ProviderSuleman MD   Blood Glucose Calibration (Accu-Chek Megan) solution Use as directed 1/26/21   Rose Robb MD   Blood Glucose Monitoring Suppl (Accu-Chek Megan) device Use to test twice daily   DX: E11.9 1/26/21   Rose Robb MD   Blood Glucose Monitoring Suppl (True Metrix Air Glucose Meter) device True Metrix Air Glucose Meter, See Instructions, use to check blood sugar up to TID, # 1 EA, 0 Refill(s), Pharmacy: [facility], E11.9, use to check blood sugar up to TID, Supply, 168, cm, 03/25/24 11:10:00 EDT, Height, 89.9, kg, 03/25/24 11:16:00 EDT, Weight Dosing 4/22/24   ProviderSuleman MD   glimepiride (AMARYL) 2 MG tablet TAKE 2 TABLETS TWICE DAILY  Patient taking differently: Take 2 tablets by mouth 2 (Two) Times a Day. 6/15/23   Rose Robb MD   glucose blood (Accu-Chek Megan Plus) test strip TEST BLOOD SUGAR TWICE DAILY AS DIRECTED   DX  E11.9 5/11/22   Rose Robb MD   Lancets (accu-chek soft touch) lancets Check blood sugars twice daily as directed. DX:E11.9 1/28/21   Rose Robb MD   lidocaine (Lidoderm) 5 % Apply   topically to the appropriate area as directed. 5/10/24   Provider, MD Suleman   Vibegron (Gemtesa) 75 MG tablet Take 1 tablet by mouth Daily.    Provider, MD Suleman       Allergies:    Allergies   Allergen Reactions    Morphine Itching    Sulfa Antibiotics GI Intolerance    Hydrocodone Nausea Only    Diphenhydramine Anxiety       Objective   Objective     Vital Signs  Temp:  [97.5 °F (36.4 °C)-98.4 °F (36.9 °C)] 97.5 °F (36.4 °C)  Heart Rate:  [] 92  Resp:  [12-21] 21  BP: (124-179)/(65-85) 179/85  SpO2:  [91 %-100 %] 100 %  on   ;   Device (Oxygen Therapy): room air  Body mass index is 33.81 kg/m².    Physical Exam  Vitals and nursing note reviewed.   Constitutional:       Appearance: Normal appearance.   HENT:      Head: Normocephalic and atraumatic.      Right Ear: External ear normal.      Left Ear: External ear normal.      Nose: Nose normal.      Mouth/Throat:      Pharynx: Oropharynx is clear.   Eyes:      Extraocular Movements: Extraocular movements intact.      Pupils: Pupils are equal, round, and reactive to light.   Cardiovascular:      Rate and Rhythm: Normal rate and regular rhythm.      Pulses: Normal pulses.   Pulmonary:      Effort: Pulmonary effort is normal.      Breath sounds: Wheezing present.   Abdominal:      General: Bowel sounds are normal.      Palpations: Abdomen is soft.   Musculoskeletal:         General: Normal range of motion.      Cervical back: Normal range of motion.   Skin:     General: Skin is warm.      Capillary Refill: Capillary refill takes less than 2 seconds.   Neurological:      Mental Status: She is alert and oriented to person, place, and time.      Motor: Weakness present.   Psychiatric:         Mood and Affect: Mood normal.         Behavior: Behavior normal.         Thought Content: Thought content normal.         Judgment: Judgment normal.           Results Review:  I have personally reviewed most recent cardiac tracings, lab results, microbiology  results, and radiology images and interpretations and agree with findings, most notably: CBC, CMP, EKG, CT chest.    Results from last 7 days   Lab Units 02/18/25  0032   WBC 10*3/mm3 7.22   HEMOGLOBIN g/dL 9.4*   HEMATOCRIT % 30.3*   PLATELETS 10*3/mm3 252     Results from last 7 days   Lab Units 02/18/25  0032 02/17/25  1745 02/16/25  1716 02/16/25  1543   SODIUM mmol/L 138   < >  --  137   POTASSIUM mmol/L 5.0   < >  --  3.9   CHLORIDE mmol/L 105   < >  --  103   CO2 mmol/L 21.7*   < >  --  18.4*   BUN mg/dL 52*   < >  --  53*   CREATININE mg/dL 2.60*   < >  --  2.41*   GLUCOSE mg/dL 485*   < >  --  281*   CALCIUM mg/dL 8.7   < >  --  8.7   ALK PHOS U/L 48  --   --  52   ALT (SGPT) U/L 24  --   --  28   AST (SGOT) U/L 22  --   --  25   HSTROP T ng/L  --   --  32* 35*   PROBNP pg/mL  --   --  347.0  --     < > = values in this interval not displayed.     Estimated Creatinine Clearance: 21.2 mL/min (A) (by C-G formula based on SCr of 2.6 mg/dL (H)).  Brief Urine Lab Results  (Last result in the past 365 days)        Color   Clarity   Blood   Leuk Est   Nitrite   Protein   CREAT   Urine HCG        02/17/25 1600 Yellow   Clear   Negative   Small (1+)   Negative   100 mg/dL (2+)                   Microbiology Results (last 10 days)       Procedure Component Value - Date/Time    Respiratory Panel PCR w/COVID-19(SARS-CoV-2) CLAY/AASHISH/ALEXANDRA/PAD/COR/RAFAEL In-House, NP Swab in UTM/VTM, 2 HR TAT - Swab, Nasopharynx [[id number]]  (Normal) Collected: 02/17/25 1522    Lab Status: Final result Specimen: Swab from Nasopharynx Updated: 02/17/25 1622     ADENOVIRUS, PCR Not Detected     Coronavirus 229E Not Detected     Coronavirus HKU1 Not Detected     Coronavirus NL63 Not Detected     Coronavirus OC43 Not Detected     COVID19 Not Detected     Human Metapneumovirus Not Detected     Human Rhinovirus/Enterovirus Not Detected     Influenza A PCR Not Detected     Influenza B PCR Not Detected     Parainfluenza Virus 1 Not Detected      Parainfluenza Virus 2 Not Detected     Parainfluenza Virus 3 Not Detected     Parainfluenza Virus 4 Not Detected     RSV, PCR Not Detected     Bordetella pertussis pcr Not Detected     Bordetella parapertussis PCR Not Detected     Chlamydophila pneumoniae PCR Not Detected     Mycoplasma pneumo by PCR Not Detected    Narrative:      In the setting of a positive respiratory panel with a viral infection PLUS a negative procalcitonin without other underlying concern for bacterial infection, consider observing off antibiotics or discontinuation of antibiotics and continue supportive care. If the respiratory panel is positive for atypical bacterial infection (Bordetella pertussis, Chlamydophila pneumoniae, or Mycoplasma pneumoniae), consider antibiotic de-escalation to target atypical bacterial infection.            ECG/EMG Results (most recent)       Procedure Component Value Units Date/Time    ECG 12 Lead Dyspnea [[id number]] Collected: 02/16/25 1507     Updated: 02/17/25 0611     QT Interval 417 ms      QTC Interval 469 ms     Narrative:      HEART RATE=76  bpm  RR Xwvmhmbm=577  ms  OK Dvlktdni=654  ms  P Horizontal Axis=-50  deg  P Front Axis=-16  deg  QRSD Interval=89  ms  QT Mfwvbwqk=906  ms  YBvV=967  ms  QRS Axis=2  deg  T Wave Axis=50  deg  - BORDERLINE ECG -  Sinus rhythm  Low voltage, extremity and precordial leads  When compared with ECG of 31-Jul-2023 22:00:47,  Significant change in rhythm  Electronically Signed By: Yfn Solano (ALEXANDRA) 2025-02-17 06:11:34  Date and Time of Study:2025-02-16 15:07:09    Telemetry Scan [[id number]] Resulted: 02/16/25     Updated: 02/17/25 1153    Telemetry Scan [[id number]] Resulted: 02/16/25     Updated: 02/17/25 1202    Telemetry Scan [[id number]] Resulted: 02/16/25     Updated: 02/17/25 1216    Telemetry Scan [[id number]] Resulted: 02/16/25     Updated: 02/17/25 1306    Telemetry Scan [[id number]] Resulted: 02/16/25     Updated: 02/17/25 1306    Telemetry Scan [[id number]] Resulted:  02/16/25     Updated: 02/17/25 1548    Adult Transthoracic Echo Complete W/ Cont if Necessary Per Protocol [[id number]] Resulted: 02/17/25 1734     Updated: 02/17/25 1734     EF(MOD-bp) 62.2 %      EF_3D-VOL 63.0 %      LV GLOBAL STRAIN  -19.5 %      LV Sys Vol (BSA corrected) 10.2 cm2      LV Cui Vol (BSA corrected) 27.9 cm2      LVOT area 2.8 cm2      LVOT diam 1.90 cm      EDV(MOD-sp2) 55.8 ml      EDV(MOD-sp4) 56.0 ml      ESV(MOD-sp2) 21.0 ml      ESV(MOD-sp4) 20.5 ml      SV(MOD-sp2) 34.8 ml      SV(MOD-sp4) 35.5 ml      SVi(MOD-SP2) 17.3 ml/m2      SVi(MOD-SP4) 17.7 ml/m2      SVi (LVOT) 31.1 ml/m2      EF(MOD-sp2) 62.4 %      EF(MOD-sp4) 63.4 %      MV E max martin 66.8 cm/sec      MV A max martin 135.0 cm/sec      MV dec time 0.21 sec      MV E/A 0.49     Pulm A Revs Dur 0.14 sec      LA ESV Index (BP) 19.7 ml/m2      Med Peak E' Martin 5.2 cm/sec      Lat Peak E' Martin 8.8 cm/sec      TR max martin 207.0 cm/sec      Avg E/e' ratio 9.54     SV(LVOT) 62.4 ml      RV Base 2.6 cm      RV Mid 2.40 cm      TAPSE (>1.6) 1.27 cm      RV S' 11.5 cm/sec      LA dimension (2D)  4.3 cm      Pulm Sys Martin 56.4 cm/sec      Pulm Cui Martin 41.7 cm/sec      Pulm S/D 1.35     Pulm A Revs Martin 31.7 cm/sec      LV V1 max 118.0 cm/sec      LV V1 max PG 5.6 mmHg      LV V1 mean PG 3.0 mmHg      LV V1 VTI 22.0 cm      Ao pk martin 138.0 cm/sec      Ao max PG 7.6 mmHg      Ao mean PG 4.0 mmHg      Ao V2 VTI 28.2 cm      CARMEN(I,D) 2.21 cm2      MV max PG 10.8 mmHg      MV mean PG 5.0 mmHg      MV V2 VTI 38.4 cm      MV P1/2t 47.4 msec      MVA(P1/2t) 4.6 cm2      MVA(VTI) 1.62 cm2      MV dec slope 545.0 cm/sec2      TR max PG 17.1 mmHg      RVSP(TR) 25.1 mmHg      RAP systole 8.0 mmHg      RV V1 max PG 2.9 mmHg      RV V1 max 85.4 cm/sec      RV V1 VTI 17.4 cm      PA V2 max 102.0 cm/sec      PA acc time 0.11 sec      Sinus 2.6 cm      STJ 2.40 cm      Dimensionless Index 0.86 (DI)     Narrative:        Left ventricular systolic function is normal.  Left ventricular ejection   fraction appears to be 56 - 60%.    Left ventricular diastolic function is consistent with (grade I)   impaired relaxation.    Estimated right ventricular systolic pressure from tricuspid   regurgitation is normal (<35 mmHg).      Telemetry Scan [[id number]] Resulted: 02/16/25     Updated: 02/18/25 0820    Telemetry Scan [[id number]] Resulted: 02/16/25     Updated: 02/18/25 0831    Telemetry Scan [[id number]] Resulted: 02/16/25     Updated: 02/18/25 0838    Telemetry Scan [[id number]] Resulted: 02/16/25     Updated: 02/18/25 0856    Telemetry Scan [[id number]] Resulted: 02/16/25     Updated: 02/18/25 1217            Results for orders placed during the hospital encounter of 08/01/24    Duplex Vein Mapping Upper Extremity - Bilateral CAR    Interpretation Summary    Sub-acute left upper extremity superficial thrombophlebitis noted in the cephalic (upper arm).    All other vessels appear normal.    Right basilic vein adequate throughout its course.  Right cephalic vein inadequate.    Left cephalic vein inadequate.  Left basilic vein adequate throughout its course.      Results for orders placed during the hospital encounter of 02/16/25    Adult Transthoracic Echo Complete W/ Cont if Necessary Per Protocol    Interpretation Summary    Left ventricular systolic function is normal. Left ventricular ejection fraction appears to be 56 - 60%.    Left ventricular diastolic function is consistent with (grade I) impaired relaxation.    Estimated right ventricular systolic pressure from tricuspid regurgitation is normal (<35 mmHg).      CT Chest Without Contrast Diagnostic    Result Date: 2/18/2025  Impression: 1.Right lower lobe and right middle lobe bronchiectasis with chronic volume loss in the right lower lobe and right middle lobe. 2.No acute infiltrates. 3.Coronary artery calcifications. 4.Cholecystectomy. Postop changes at the EG junction. Electronically Signed: Yfn Luna MD  2/18/2025 10:15 AM  EST  Workstation ID: [id number]    XR Chest 2 View    Result Date: 2/16/2025  Impression: No acute process identified Electronically Signed: Antonio Ceballos MD  2/16/2025 4:13 PM EST  Workstation ID: [id number]       Estimated Creatinine Clearance: 21.2 mL/min (A) (by C-G formula based on SCr of 2.6 mg/dL (H)).    Assessment & Plan   Assessment/Plan       Active Hospital Problems    Diagnosis  POA    **Dyspnea [R06.00]  Yes      Resolved Hospital Problems   No resolved problems to display.     Dyspnea         Lab Results   Component Value Date     TROPONINT 32 (H) 02/16/2025     TROPONINT 35 (H) 02/16/2025     TROPONINT 28 (H) 09/19/2023   -, CK 27  -Duo-nebs, IV Solu-medrol  -Oxygenation for 02 below 90%  -Chest X-ray: no acute cardiopulmonary findings   -EKG: sinus rhythm, HR 76  -Respiratory viral panel negative   -Telemetry  -Echocardiogram: LVEF 56-60%, grade 1 diastolic dysfunction  -CT chest: right lower lobe and right middle lobe bronchiectasis with chronic volume loss in the right lower lobe and right middle lobe, no acute infiltrates.  -Cardiology and pulmonology consulted      CKD (Stage IV)        Lab Results   Component Value Date     CREATININE 2.60 (H) 02/16/2025     BUN 52 (H) 02/16/2025     BCR 22.0 02/16/2025     EGFR 20.4 (L) 02/16/2025   -Continue sodium bicarbonate, Calcitrol, metolazone   -Avoid nephrotoxic medication IV dye unless urgently needed  -Monitor BMP and I's and O's while admitted  -Nephrology consulted      Diabetes mellitus type II with neuropathy        Lab Results   Component Value Date     GLUCOSE 281 (H) 02/16/2025     GLUCOSE 160 (H) 01/08/2025     GLUCOSE 212 (H) 09/19/2024     GLUCOSE 169 (H) 09/19/2023   -Continue gabapentin  -Hold glimepiride  -A1c 7.72  -Sliding scale insulin and Lantus  -Diabetic diet  -Monitor before meals and at bedtime     Hypertension      BP Readings from Last 1 Encounters:   02/18/25 179/85   - Increase hydralazine, continue Lasix  - DC Carvedilol  switch to diltiazem, may need to adjust based upon pressures   - Monitor while admitted     Hyperlipidemia  -Continue statin     Generalized anxiety disorder  -Continue Cymbalta     Essential tremors  -Continue primidone     Overactive bladder   -Gemtesa nonformulary switched to oxybutynin        VTE Prophylaxis - Active VTE Prophylaxis  Mechanical:        Start        02/16/25 2040  Maintain Sequential Compression Device  Continuous                          Select Pharmacologic VTE Prophylaxis if Desired & Appropriate      CODE STATUS:    Code Status and Medical Interventions: CPR (Attempt to Resuscitate); Full Support   Ordered at: 02/17/25 0716     Code Status (Patient has no pulse and is not breathing):    CPR (Attempt to Resuscitate)     Medical Interventions (Patient has pulse or is breathing):    Full Support       This patient has been examined wearing personal protective equipment.     I discussed the patient's findings and my recommendations with patient, family, nursing staff, primary care team, and consulting provider.      Signature:Electronically signed by EVERARDO Jefferson, 02/18/25, 12:46 PM EST.          I spent 35 minutes caring for Lynne on this date of service. This time includes time spent by me in the following activities: reviewing tests, performing a medically appropriate examination and/or evaluation, counseling and educating the patient/family/caregiver, referring and communicating with other health care professionals, documenting information in the medical record, independently interpreting results and communicating that information with the patient/family/caregiver, care coordination, ordering medications, ordering test(s), ordering procedure(s), obtaining a separately obtained history, and reviewing a separately obtained history.

## 2025-02-18 NOTE — CONSULTS
"Diabetes Education  Assessment/Teaching    Patient Name:  Lynne Cade  YOB: 1950  MRN: [id number]  Admit Date:  2/16/2025      Assessment Date:  2/18/2025  Flowsheet Row Most Recent Value   General Information     Referral From: MD order  [Consult received due to bs >200. Adm bs 281. A1c this adm 7.72%.]   Height 167 cm (65.75\")   Weight 94.3 kg (207 lb 14.3 oz)   Weight Method Standing scale   Pregnancy Assessment    Diabetes History    What type of diabetes do you have? Type 2   Length of Diabetes Diagnosis --  [Pt with long diabetes hx.]   Have you had diabetes education/teaching in the past? yes   When and where was your diabetes education? Last seen by inpatient diabetes educator at [facility] on 3/2/2020.   Do you test your blood sugar at home? yes   Frequency of checks daily in am   Meter type Accuchek Megan, 1 years old   Who performs the test? self   Typical readings 100-150   Have you had low blood sugar? (<70mg/dl) yes   How often do you have low blood sugar? rare   Education Preferences    Nutrition Information    Assessment Topics    DM Goals             Flowsheet Row Most Recent Value   DM Education Needs    Meter Has own   Meter Type Accuchek   Frequency of Testing Daily  [Discussed importance of checking bs daily and rotating the times when she checks (ac and hs). Discussed importance fo sharing readings with PCP if bs running outside healthy range.]   Blood Glucose Target Range Discussed A1c result of 7.72%. Discussed healthy bs range and healthy A1c target. Discussed importance of bs control.   Medication Oral  [Pt taking Glimepiride 2 mg pill, pt taking 2 pills bid. Pt has been prescribed Januvia but has not started taking.]   Problem Solving Hypoglycemia, Hyperglycemia, Signs, Symptoms, Treatment   Reducing Risks A1C testing  [Gave A1c info sheet.]   Healthy Eating --  [Pt eating 3 meals/day plus hs snack.]   Motivation Engaged   Teaching Method Explanation, Discussion, Handouts "   Patient Response Verbalized understanding              Other Comments:  Met with pt and spouse at bedside. Pt states has PCP and sees routinely. Discussed pt receiving Humulin R to help with covering the steroids this adm. Discussed pt also on sliding scale insulin. Pt agreeable to checking bs daily and rotating the times when she checks. Pt states she will talk with MD about taking the Januvia. Pt understands that her A1c should be less than 7%. Pt states she has her diabetes meds and bs monitoring supplies. Pt with no additional questions at this time.       Electronically signed by:  Clarisse Valles RN  02/18/25 14:37 EST

## 2025-02-19 ENCOUNTER — READMISSION MANAGEMENT (OUTPATIENT)
Dept: CALL CENTER | Facility: HOSPITAL | Age: 75
End: 2025-02-19
Payer: MEDICARE

## 2025-02-19 VITALS
RESPIRATION RATE: 16 BRPM | HEIGHT: 66 IN | OXYGEN SATURATION: 94 % | TEMPERATURE: 98.3 F | BODY MASS INDEX: 33.41 KG/M2 | DIASTOLIC BLOOD PRESSURE: 68 MMHG | SYSTOLIC BLOOD PRESSURE: 129 MMHG | WEIGHT: 207.89 LBS | HEART RATE: 82 BPM

## 2025-02-19 LAB
ANION GAP SERPL CALCULATED.3IONS-SCNC: 15.5 MMOL/L (ref 5–15)
BUN SERPL-MCNC: 61 MG/DL (ref 8–23)
BUN/CREAT SERPL: 22.4 (ref 7–25)
CALCIUM SPEC-SCNC: 10.3 MG/DL (ref 8.6–10.5)
CHLORIDE SERPL-SCNC: 98 MMOL/L (ref 98–107)
CO2 SERPL-SCNC: 24.5 MMOL/L (ref 22–29)
CREAT SERPL-MCNC: 2.72 MG/DL (ref 0.57–1)
DEPRECATED RDW RBC AUTO: 54.7 FL (ref 37–54)
EGFRCR SERPLBLD CKD-EPI 2021: 17.6 ML/MIN/1.73
ERYTHROCYTE [DISTWIDTH] IN BLOOD BY AUTOMATED COUNT: 14.6 % (ref 12.3–15.4)
GLUCOSE BLDC GLUCOMTR-MCNC: 195 MG/DL (ref 70–105)
GLUCOSE BLDC GLUCOMTR-MCNC: 293 MG/DL (ref 70–105)
GLUCOSE SERPL-MCNC: 205 MG/DL (ref 65–99)
HCT VFR BLD AUTO: 32.2 % (ref 34–46.6)
HGB BLD-MCNC: 10.2 G/DL (ref 12–15.9)
MAGNESIUM SERPL-MCNC: 2.5 MG/DL (ref 1.6–2.4)
MCH RBC QN AUTO: 32.3 PG (ref 26.6–33)
MCHC RBC AUTO-ENTMCNC: 31.7 G/DL (ref 31.5–35.7)
MCV RBC AUTO: 101.9 FL (ref 79–97)
PHOSPHATE SERPL-MCNC: 5.7 MG/DL (ref 2.5–4.5)
PLATELET # BLD AUTO: 302 10*3/MM3 (ref 140–450)
PMV BLD AUTO: 10.3 FL (ref 6–12)
POTASSIUM SERPL-SCNC: 4 MMOL/L (ref 3.5–5.2)
RBC # BLD AUTO: 3.16 10*6/MM3 (ref 3.77–5.28)
SODIUM SERPL-SCNC: 138 MMOL/L (ref 136–145)
WBC NRBC COR # BLD AUTO: 11.64 10*3/MM3 (ref 3.4–10.8)

## 2025-02-19 PROCEDURE — 63710000001 INSULIN REGULAR HUMAN PER 5 UNITS

## 2025-02-19 PROCEDURE — 80048 BASIC METABOLIC PNL TOTAL CA: CPT | Performed by: INTERNAL MEDICINE

## 2025-02-19 PROCEDURE — 63710000001 PREDNISONE PER 1 MG: Performed by: INTERNAL MEDICINE

## 2025-02-19 PROCEDURE — 83735 ASSAY OF MAGNESIUM: CPT | Performed by: INTERNAL MEDICINE

## 2025-02-19 PROCEDURE — 63710000001 PREDNISONE PER 5 MG: Performed by: INTERNAL MEDICINE

## 2025-02-19 PROCEDURE — 84100 ASSAY OF PHOSPHORUS: CPT | Performed by: INTERNAL MEDICINE

## 2025-02-19 PROCEDURE — 82948 REAGENT STRIP/BLOOD GLUCOSE: CPT | Performed by: NURSE PRACTITIONER

## 2025-02-19 PROCEDURE — 99232 SBSQ HOSP IP/OBS MODERATE 35: CPT | Performed by: INTERNAL MEDICINE

## 2025-02-19 PROCEDURE — 94761 N-INVAS EAR/PLS OXIMETRY MLT: CPT

## 2025-02-19 PROCEDURE — 94799 UNLISTED PULMONARY SVC/PX: CPT

## 2025-02-19 PROCEDURE — 63710000001 INSULIN LISPRO (HUMAN) PER 5 UNITS: Performed by: NURSE PRACTITIONER

## 2025-02-19 PROCEDURE — 85027 COMPLETE CBC AUTOMATED: CPT | Performed by: INTERNAL MEDICINE

## 2025-02-19 RX ORDER — DILTIAZEM HYDROCHLORIDE 240 MG/1
240 CAPSULE, COATED, EXTENDED RELEASE ORAL
Qty: 30 CAPSULE | Refills: 0 | Status: SHIPPED | OUTPATIENT
Start: 2025-02-20 | End: 2025-03-22

## 2025-02-19 RX ORDER — SEVELAMER HYDROCHLORIDE 400 MG/1
400 TABLET, FILM COATED ORAL
Qty: 42 TABLET | Refills: 0 | Status: SHIPPED | OUTPATIENT
Start: 2025-02-19 | End: 2025-03-05

## 2025-02-19 RX ORDER — SODIUM BICARBONATE 650 MG/1
1300 TABLET ORAL 3 TIMES DAILY
Status: DISCONTINUED | OUTPATIENT
Start: 2025-02-19 | End: 2025-02-19 | Stop reason: HOSPADM

## 2025-02-19 RX ORDER — HYDRALAZINE HYDROCHLORIDE 100 MG/1
100 TABLET, FILM COATED ORAL 3 TIMES DAILY
Qty: 90 TABLET | Refills: 0 | Status: SHIPPED | OUTPATIENT
Start: 2025-02-19 | End: 2025-05-30

## 2025-02-19 RX ORDER — FUROSEMIDE 40 MG/1
40 TABLET ORAL DAILY
Qty: 30 TABLET | Refills: 0 | Status: SHIPPED | OUTPATIENT
Start: 2025-02-20 | End: 2025-03-22

## 2025-02-19 RX ORDER — PREDNISONE 10 MG/1
TABLET ORAL
Qty: 9 TABLET | Refills: 0 | Status: SHIPPED | OUTPATIENT
Start: 2025-02-20 | End: 2025-02-24

## 2025-02-19 RX ADMIN — INSULIN LISPRO 12 UNITS: 100 INJECTION, SOLUTION INTRAVENOUS; SUBCUTANEOUS at 11:56

## 2025-02-19 RX ADMIN — LIDOCAINE 1 PATCH: 4 PATCH TOPICAL at 08:57

## 2025-02-19 RX ADMIN — DULOXETINE 60 MG: 30 CAPSULE, DELAYED RELEASE ORAL at 08:59

## 2025-02-19 RX ADMIN — BUDESONIDE AND FORMOTEROL FUMARATE DIHYDRATE 2 PUFF: 160; 4.5 AEROSOL RESPIRATORY (INHALATION) at 09:49

## 2025-02-19 RX ADMIN — OXYBUTYNIN CHLORIDE 10 MG: 10 TABLET, EXTENDED RELEASE ORAL at 08:59

## 2025-02-19 RX ADMIN — SODIUM BICARBONATE 1300 MG: 650 TABLET ORAL at 08:59

## 2025-02-19 RX ADMIN — ROSUVASTATIN CALCIUM 10 MG: 10 TABLET, FILM COATED ORAL at 09:00

## 2025-02-19 RX ADMIN — HYDRALAZINE HYDROCHLORIDE 100 MG: 25 TABLET ORAL at 08:59

## 2025-02-19 RX ADMIN — GUAIFENESIN 1200 MG: 600 TABLET, MULTILAYER, EXTENDED RELEASE ORAL at 08:59

## 2025-02-19 RX ADMIN — IPRATROPIUM BROMIDE AND ALBUTEROL SULFATE 3 ML: .5; 3 SOLUTION RESPIRATORY (INHALATION) at 09:48

## 2025-02-19 RX ADMIN — Medication 10 ML: at 09:00

## 2025-02-19 RX ADMIN — FUROSEMIDE 40 MG: 40 TABLET ORAL at 09:00

## 2025-02-19 RX ADMIN — INSULIN HUMAN 4 UNITS: 100 INJECTION, SOLUTION PARENTERAL at 11:57

## 2025-02-19 RX ADMIN — CALCITRIOL CAPSULES 0.25 MCG 0.5 MCG: 0.25 CAPSULE ORAL at 08:59

## 2025-02-19 RX ADMIN — PREDNISONE 30 MG: 20 TABLET ORAL at 09:00

## 2025-02-19 RX ADMIN — GLIPIZIDE 5 MG: 5 TABLET ORAL at 08:59

## 2025-02-19 RX ADMIN — INSULIN LISPRO 4 UNITS: 100 INJECTION, SOLUTION INTRAVENOUS; SUBCUTANEOUS at 08:57

## 2025-02-19 RX ADMIN — ALLOPURINOL 100 MG: 100 TABLET ORAL at 08:59

## 2025-02-19 RX ADMIN — DILTIAZEM HYDROCHLORIDE 240 MG: 240 CAPSULE, EXTENDED RELEASE ORAL at 08:59

## 2025-02-19 NOTE — PROGRESS NOTES
NEPHROLOGY PROGRESS NOTE------[facility]    [facility]  [phone]  Felisa Melendez MD      Patient Care Team:  Lorraine Agarwal APRN as PCP - General (Nurse Practitioner)  Rick Melendez MD as Consulting Physician (Nephrology)  Mayo Fregoso MD as Consulting Physician (Urology)  Fredi Pierce MD as Consulting Physician (Cardiology)  Cony Sanchez APRN as Nurse Practitioner (Vascular Surgery)      Provider:  Felisa Melendez MD  Patient Name: Lynne Cade  :  1950    SUBJECTIVE:    F/U CRF/CKD    Breathing ok. No angina. No dysuria. No edema.     Medication:  allopurinol, 100 mg, Oral, Daily  budesonide-formoterol, 2 puff, Inhalation, BID - RT  calcitriol, 0.5 mcg, Oral, Daily  dilTIAZem CD, 240 mg, Oral, Q24H  DULoxetine, 60 mg, Oral, Daily  furosemide, 40 mg, Oral, Daily  gabapentin, 100 mg, Oral, Nightly  glipizide, 5 mg, Oral, QAM AC  guaiFENesin, 1,200 mg, Oral, Q12H  hydrALAZINE, 100 mg, Oral, TID  insulin lispro, 4-24 Units, Subcutaneous, 4x Daily AC & at Bedtime  insulin regular, 2 Units, Subcutaneous, Q12H  ipratropium-albuterol, 3 mL, Nebulization, 4x Daily - RT  Lidocaine, 1 patch, Transdermal, Q24H  oxybutynin XL, 10 mg, Oral, Daily  predniSONE, 30 mg, Oral, Daily   Followed by  [START ON 2025] predniSONE, 20 mg, Oral, Daily   Followed by  [START ON 2025] predniSONE, 10 mg, Oral, Daily  primidone, 50 mg, Oral, Nightly  rosuvastatin, 10 mg, Oral, Daily  sodium bicarbonate, 1,300 mg, Oral, 4x Daily  sodium chloride, 10 mL, Intravenous, Q12H      Pharmacy Consult - Steroid Insulin Protocol,         OBJECTIVE    Vital Sign Min/Max for last 24 hours  Temp  Min: 97.3 °F (36.3 °C)  Max: 98.4 °F (36.9 °C)   BP  Min: 144/74  Max: 179/85   Pulse  Min: 78  Max: 99   Resp  Min: 16  Max: 21   SpO2  Min: 92 %  Max: 100 %   No data recorded   No data recorded     Flowsheet Rows   "    Flowsheet Row First Filed Value   Admission Height 167 cm (65.75\") Documented at 02/16/2025 1502   Admission Weight 94.3 kg (207 lb 14.3 oz) Documented at 02/16/2025 1502            No intake/output data recorded.  I/O last 3 completed shifts:  In: 720 [P.O.:720]  Out: -     Physical Exam:  General Appearance: alert, appears stated age and cooperative  Head: normocephalic, without obvious abnormality and atraumatic  Eyes: conjunctivae and sclerae normal and no icterus  Neck: supple and no JVD  Lungs: +FEW SCATTERED RHONCHI AND FEW FINE END EXP WHEEZES  Heart: regular rhythm & normal rate and normal S1, S2 +CYNDY  Chest Wall: no abnormalities observed  Abdomen: normal bowel sounds and soft, nontender  Extremities: moves extremities well, no edema, no cyanosis  Skin: no bleeding, bruising or rash  Neurologic: Alert, and oriented. No focal deficits    Labs:    WBC WBC   Date Value Ref Range Status   02/19/2025 11.64 (H) 3.40 - 10.80 10*3/mm3 Final   02/18/2025 7.22 3.40 - 10.80 10*3/mm3 Final   02/17/2025 7.86 3.40 - 10.80 10*3/mm3 Final   02/16/2025 6.58 3.40 - 10.80 10*3/mm3 Final      HGB Hemoglobin   Date Value Ref Range Status   02/19/2025 10.2 (L) 12.0 - 15.9 g/dL Final   02/18/2025 9.4 (L) 12.0 - 15.9 g/dL Final   02/17/2025 10.0 (L) 12.0 - 15.9 g/dL Final   02/16/2025 10.6 (L) 12.0 - 15.9 g/dL Final      HCT Hematocrit   Date Value Ref Range Status   02/19/2025 32.2 (L) 34.0 - 46.6 % Final   02/18/2025 30.3 (L) 34.0 - 46.6 % Final   02/17/2025 32.3 (L) 34.0 - 46.6 % Final   02/16/2025 35.0 34.0 - 46.6 % Final      Platelets No results found for: \"LABPLAT\"   MCV MCV   Date Value Ref Range Status   02/19/2025 101.9 (H) 79.0 - 97.0 fL Final   02/18/2025 105.2 (H) 79.0 - 97.0 fL Final   02/17/2025 104.9 (H) 79.0 - 97.0 fL Final   02/16/2025 105.4 (H) 79.0 - 97.0 fL Final          Sodium Sodium   Date Value Ref Range Status   02/19/2025 138 136 - 145 mmol/L Final   02/18/2025 138 136 - 145 mmol/L Final " "  02/17/2025 141 136 - 145 mmol/L Final   02/16/2025 137 136 - 145 mmol/L Final      Potassium Potassium   Date Value Ref Range Status   02/19/2025 4.0 3.5 - 5.2 mmol/L Final   02/18/2025 5.0 3.5 - 5.2 mmol/L Final   02/17/2025 4.0 3.5 - 5.2 mmol/L Final     Comment:     Specimen hemolyzed.  Result may be falsely elevated.   02/16/2025 3.9 3.5 - 5.2 mmol/L Final     Comment:     Slight hemolysis detected by analyzer. Result may be falsely elevated.      Chloride Chloride   Date Value Ref Range Status   02/19/2025 98 98 - 107 mmol/L Final   02/18/2025 105 98 - 107 mmol/L Final   02/17/2025 105 98 - 107 mmol/L Final   02/16/2025 103 98 - 107 mmol/L Final      CO2 CO2   Date Value Ref Range Status   02/19/2025 24.5 22.0 - 29.0 mmol/L Final   02/18/2025 21.7 (L) 22.0 - 29.0 mmol/L Final   02/17/2025 22.2 22.0 - 29.0 mmol/L Final   02/16/2025 18.4 (L) 22.0 - 29.0 mmol/L Final      BUN BUN   Date Value Ref Range Status   02/19/2025 61 (H) 8 - 23 mg/dL Final   02/18/2025 52 (H) 8 - 23 mg/dL Final   02/17/2025 51 (H) 8 - 23 mg/dL Final   02/16/2025 53 (H) 8 - 23 mg/dL Final      Creatinine Creatinine   Date Value Ref Range Status   02/19/2025 2.72 (H) 0.57 - 1.00 mg/dL Final   02/18/2025 2.60 (H) 0.57 - 1.00 mg/dL Final   02/17/2025 2.65 (H) 0.57 - 1.00 mg/dL Final   02/16/2025 2.41 (H) 0.57 - 1.00 mg/dL Final      Calcium Calcium   Date Value Ref Range Status   02/19/2025 10.3 8.6 - 10.5 mg/dL Final   02/18/2025 8.7 8.6 - 10.5 mg/dL Final   02/17/2025 8.9 8.6 - 10.5 mg/dL Final   02/16/2025 8.7 8.6 - 10.5 mg/dL Final      PO4 No components found for: \"PO4\"   Albumin Albumin   Date Value Ref Range Status   02/18/2025 3.5 3.5 - 5.2 g/dL Final   02/16/2025 3.9 3.5 - 5.2 g/dL Final      Magnesium Magnesium   Date Value Ref Range Status   02/19/2025 2.5 (H) 1.6 - 2.4 mg/dL Final   02/18/2025 2.1 1.6 - 2.4 mg/dL Final      Uric Acid No components found for: \"URIC ACID\"     Imaging Results (Last 72 Hours)       Procedure " Component Value Units Date/Time    CT Chest Without Contrast Diagnostic [[id number]] Collected: 02/18/25 1012     Updated: 02/18/25 1017    Narrative:      CT CHEST WO CONTRAST DIAGNOSTIC    Date of Exam: 2/18/2025 10:02 AM EST    Indication: SOA.    Comparison: Chest CT 7/24/2014, chest radiograph 2/16/2025    Technique: Axial CT images were obtained of the chest without contrast administration.  Sagittal and coronal reconstructions were performed.  Automated exposure control and iterative reconstruction methods were used.      Findings:  The right hemidiaphragm is elevated. There is right lower lobe and right middle lobe bronchiectasis. There is mild atelectasis or fibrosis in the right lower lobe with chronic volume loss. The right upper lobe appears clear. There is some volume loss in   the right middle lobe. The left lung is clear. No acute infiltrates are identified. There is no significant mediastinal or hilar lymphadenopathy. There are coronary artery calcifications. There is no pleural or pericardial fluid.    There are postop changes of prior cholecystectomy. Postop changes are present at the EG junction. There are no acute findings in the upper abdomen.    There is thoracic spondylosis. No fractures or destructive bone lesions are identified.      Impression:      Impression:  1.Right lower lobe and right middle lobe bronchiectasis with chronic volume loss in the right lower lobe and right middle lobe.  2.No acute infiltrates.  3.Coronary artery calcifications.  4.Cholecystectomy. Postop changes at the EG junction.        Electronically Signed: Yfn Luna MD    2/18/2025 10:15 AM EST    Workstation ID: [id number]    XR Chest 2 View [[id number]] Collected: 02/16/25 1612     Updated: 02/16/25 1615    Narrative:      XR CHEST 2 VW    Date of Exam: 2/16/2025 4:12 PM EST    Indication: Short of breath    Comparison: 2/5/2025    Findings:  Cardiomediastinal silhouette is unremarkable. Similar elevation of the  right hemidiaphragm. No airspace disease, pneumothorax, nor pleural effusion. No acute osseous abnormality identified.      Impression:      Impression:  No acute process identified      Electronically Signed: Antonio Ceballos MD    2/16/2025 4:13 PM EST    Workstation ID: [id number]            Results for orders placed during the hospital encounter of 02/16/25    XR Chest 2 View    Narrative  XR CHEST 2 VW    Date of Exam: 2/16/2025 4:12 PM EST    Indication: Short of breath    Comparison: 2/5/2025    Findings:  Cardiomediastinal silhouette is unremarkable. Similar elevation of the right hemidiaphragm. No airspace disease, pneumothorax, nor pleural effusion. No acute osseous abnormality identified.    Impression  Impression:  No acute process identified      Electronically Signed: Antonio Ceballos MD  2/16/2025 4:13 PM EST  Workstation ID: [id number]      Results for orders placed in visit on 01/09/24    SCANNED - IMAGING      Results for orders placed in visit on 07/24/23    XR Hip With or Without Pelvis 2 - 3 View Left    Narrative  XR HIP W OR WO PELVIS 2-3 VIEW LEFT    Date of Exam: 7/25/2023 1:02 PM EDT    Indication: Acute left hip pain    Comparison: Pelvis and right hip radiographs dated 3/25/2021    Findings:  There is no acute fracture or dislocation. There is mild acetabular osteophyte formation. The joint space appears relatively well-preserved. The ilioischial and iliopectineal lines appear intact. There are calcified pelvic phleboliths and a large rectal  stool burden. There is no osseous erosion.    Impression  Impression:  1. No acute osseous abnormality of the left hip.  2. Mild degenerative osteophyte formation.      Electronically Signed: Hammad Owens  7/26/2023 1:19 PM EDT  Workstation ID: [id number]      Results for orders placed during the hospital encounter of 08/01/24    Duplex Vein Mapping Upper Extremity - Bilateral CAR    Interpretation Summary    Sub-acute left upper extremity superficial  thrombophlebitis noted in the cephalic (upper arm).    All other vessels appear normal.    Right basilic vein adequate throughout its course.  Right cephalic vein inadequate.    Left cephalic vein inadequate.  Left basilic vein adequate throughout its course.        ASSESSMENT / PLAN      Dyspnea    Dyspnea and respiratory abnormalities    CRF/CKD-------Nonoliguric. Known CRF/CKD STG 4 secondary to DGS/HTN NS. Bun/Cr up more with diuresis. Follow with slightly increased diuresis. No uremia. No indication for HD yet. Dose meds for CrCl 15-30 cc/min     2. VOLUME EXCESS----- Continue po Lasix and follow.  TSH ok     3. HTN WITH CKD-------BP better.  No ACE-I/ARB/DRI. Increased Hydralazine and follow     4. DMII WITH RENAL MANIFESTATIONS------BS up from steroids. Glucometers, SSI. Add Glucotrol     5. SOB/HYPOXIA------Imprvoing     6. HYPERLIPIDEMIA-----On Statin.  CK ok. TSH ok     7. OA/DJD------No NSAIDs. Uric ok     8. ACIDOSIS----Metabolic. No elevation in AGAP. +Type 4 RTA. Increased po NaHCO3 and follow     9. SECONDARY HYPERPARATHYROIDISM OF RENAL ORIGIN------On Calcitriol. Follow Phos     10. DEPRESSION---------On Cymbalta     11. DM PERIPHERAL NEUROPATHY-------On Neurontin     12. BENIGN ESSENTIAL TREMOR------On Mysoline     13. ANEMIA OF CKD-------Fe ok. HgB above threshold for EPO    Okay from RENAL standpoint to d/c with close f/u as ordered but on Lasix daily only    Felisa Melendez MD  [facility]  [phone]  02/19/25  07:26 EST

## 2025-02-19 NOTE — DISCHARGE SUMMARY
"             [facility] Medicine Services  Discharge Summary    Date of Service: 2025  Patient Name: Lynne Cade  : 1950  MRN: [id number]    Date of Admission: 2025  Discharge Diagnosis: Dyspnea  Date of Discharge: 2025  Primary Care Physician: Lorraine Agarwal APRN      Presenting Problem:   Dyspnea [R06.00]  Dyspnea, unspecified type [R06.00]  Dyspnea and respiratory abnormalities [R06.00, R06.89]    Active and Resolved Hospital Problems:  Active Hospital Problems    Diagnosis POA    **Dyspnea [R06.00] Yes    Dyspnea and respiratory abnormalities [R06.00, R06.89] Yes      Resolved Hospital Problems   No resolved problems to display.         Hospital Course     HPI:    \"Lynne Cade is a 74 y.o. female with a CMH of COPD, CAD, hyperlipidemia, diabetes mellitus who presented to [facility] on 2025 with shortness of breath over the last couple of months.  She also complains of shortness and tightness of breath.,  Does not radiate to neck or jaw.  Patient presented to the emergency department for increasing worsening shortness of breath.  Patient is also with worsening kidney function and is scheduled on 2025 for fistula placement by vascular surgeon.And was admitted to ED observation.       Patient was being transferred to inpatient services due to continued worsening kidney function\"    Hospital Course:  COPD exacerbation  Dyspnea  -Troponin 28, 35, 32  -, CK20 7-  -CXR: No acute cardiopulmonary finding  -EKG: Sinus rhythm, HR 76  -RPP negative  -Echo: LVEF 56 to 60%, grade 1 diastolic dysfunction  -CT chest: Right lower and right middle lobe bronchiectasis, chronic volume loss right lower lobe, right middle lobe.  No acute infiltrate  -Patient was seen by pulmonology during her hospital course who stated that patient can be discharged and to follow-up outpatient in clinic in 3 weeks to consider bronchoscopy.  Discontinued patient's carvedilol and " switch to diltiazem to prevent bronchospasm.  -Cardiology was also consulted during her hospital course who stated that patient is okay to discharge and recommended to monitor blood pressure at home and to follow-up closely in office as she was discontinued on her Coreg and switch to Cardizem     CKD  -BUN 52, creatinine 2.6  -eGFR 20.4  -Continue sodium bicarbonate, Calcitrol, metolazone   -Avoid nephrotoxic medication IV dye unless urgently needed  -Monitor BMP and I's and O's while admitted  -Nephrology follows following during hospital course who stated patient is cleared for discharge on daily Lasix and to follow-up outpatient.  Will send patient home on sevelamer as discussed with nephrology for her high phosphate levels in setting of CKD.     HTN  -Better controlled.  /74  -Hydralazine dose increased by nephrology and patient switched to diltiazem by pulmonology  -Continue telemetry monitoring     HLD  -Continue statin     GERD-  -continue Cymbalta     Tremors  -Continue primidone     OAB  -Continue oxybutynin        DISCHARGE Follow Up Recommendations for labs and diagnostics: Follow-up with PCP in 1 week, nephrology and cardiology in the outpatient setting        Day of Discharge     Vital Signs:  Temp:  [97.3 °F (36.3 °C)-98 °F (36.7 °C)] 98 °F (36.7 °C)  Heart Rate:  [78-99] 88  Resp:  [14-19] 18  BP: (142-168)/(61-79) 142/72    Physical Exam:  Physical Exam  Constitutional:       Comments: NAD    Cardiovascular:      Comments:  RRR, S1 & S2   Pulmonary:      Comments:  Lungs CTA   Abdominal:      Comments:  ABD soft, NT             Pertinent  and/or Most Recent Results     LAB RESULTS:      Lab 02/19/25  0442 02/18/25  0032 02/17/25  1521 02/16/25  1543   WBC 11.64* 7.22 7.86 6.58   HEMOGLOBIN 10.2* 9.4* 10.0* 10.6*   HEMATOCRIT 32.2* 30.3* 32.3* 35.0   PLATELETS 302 252 268 271   NEUTROS ABS  --   --  5.53 4.30   IMMATURE GRANS (ABS)  --   --  0.03 0.03   LYMPHS ABS  --   --  1.34 1.52   MONOS ABS   --   --  0.73 0.49   EOS ABS  --   --  0.19 0.19   .9* 105.2* 104.9* 105.4*   D DIMER QUANT  --   --  0.38  --          Lab 02/19/25  0442 02/18/25  0032 02/17/25  1745 02/16/25  1716 02/16/25  1543   SODIUM 138 138 141  --  137   POTASSIUM 4.0 5.0 4.0  --  3.9   CHLORIDE 98 105 105  --  103   CO2 24.5 21.7* 22.2  --  18.4*   ANION GAP 15.5* 11.3 13.8  --  15.6*   BUN 61* 52* 51*  --  53*   CREATININE 2.72* 2.60* 2.65*  --  2.41*   EGFR 17.6* 18.6* 18.2*  --  20.4*   GLUCOSE 205* 485* 315*  --  281*   CALCIUM 10.3 8.7 8.9  --  8.7   IONIZED CALCIUM  --  1.07*  --   --   --    MAGNESIUM 2.5* 2.1  --   --   --    PHOSPHORUS 5.7* 4.3  --   --   --    HEMOGLOBIN A1C  --   --   --   --  7.72*   TSH  --   --   --  1.620  --          Lab 02/18/25  0032 02/16/25  1543   TOTAL PROTEIN 6.5 7.5   ALBUMIN 3.5 3.9   GLOBULIN 3.0 3.6   ALT (SGPT) 24 28   AST (SGOT) 22 25   BILIRUBIN <0.2 0.2   ALK PHOS 48 52         Lab 02/16/25  1716 02/16/25  1543   PROBNP 347.0  --    HSTROP T 32* 35*         Lab 02/16/25  1716   CHOLESTEROL 145   LDL CHOL 51   HDL CHOL 65*   TRIGLYCERIDES 176*         Lab 02/16/25  1716   IRON 53   IRON SATURATION (TSAT) 16*   TIBC 325   TRANSFERRIN 218   FERRITIN 13.30         Brief Urine Lab Results  (Last result in the past 365 days)        Color   Clarity   Blood   Leuk Est   Nitrite   Protein   CREAT   Urine HCG        02/17/25 1600 Yellow   Clear   Negative   Small (1+)   Negative   100 mg/dL (2+)                 Microbiology Results (last 10 days)       Procedure Component Value - Date/Time    Urine Culture - Urine, Urine, Clean Catch [[id number]] Collected: 02/17/25 1600    Lab Status: Final result Specimen: Urine, Clean Catch Updated: 02/18/25 1251     Urine Culture <25,000 CFU/mL Normal Urogenital Annalise    Narrative:      Colonization of the urinary tract without infection is common. Treatment is discouraged unless the patient is symptomatic, pregnant, or undergoing an invasive urologic  procedure.    Respiratory Panel PCR w/COVID-19(SARS-CoV-2) CLAY/AASHISH/ALEXANDRA/PAD/COR/RAFAEL In-House, NP Swab in UTM/VTM, 2 HR TAT - Swab, Nasopharynx [[id number]]  (Normal) Collected: 02/17/25 1522    Lab Status: Final result Specimen: Swab from Nasopharynx Updated: 02/17/25 1622     ADENOVIRUS, PCR Not Detected     Coronavirus 229E Not Detected     Coronavirus HKU1 Not Detected     Coronavirus NL63 Not Detected     Coronavirus OC43 Not Detected     COVID19 Not Detected     Human Metapneumovirus Not Detected     Human Rhinovirus/Enterovirus Not Detected     Influenza A PCR Not Detected     Influenza B PCR Not Detected     Parainfluenza Virus 1 Not Detected     Parainfluenza Virus 2 Not Detected     Parainfluenza Virus 3 Not Detected     Parainfluenza Virus 4 Not Detected     RSV, PCR Not Detected     Bordetella pertussis pcr Not Detected     Bordetella parapertussis PCR Not Detected     Chlamydophila pneumoniae PCR Not Detected     Mycoplasma pneumo by PCR Not Detected    Narrative:      In the setting of a positive respiratory panel with a viral infection PLUS a negative procalcitonin without other underlying concern for bacterial infection, consider observing off antibiotics or discontinuation of antibiotics and continue supportive care. If the respiratory panel is positive for atypical bacterial infection (Bordetella pertussis, Chlamydophila pneumoniae, or Mycoplasma pneumoniae), consider antibiotic de-escalation to target atypical bacterial infection.            CT Chest Without Contrast Diagnostic    Result Date: 2/18/2025  Impression: Impression: 1.Right lower lobe and right middle lobe bronchiectasis with chronic volume loss in the right lower lobe and right middle lobe. 2.No acute infiltrates. 3.Coronary artery calcifications. 4.Cholecystectomy. Postop changes at the EG junction. Electronically Signed: Yfn Luna MD  2/18/2025 10:15 AM EST  Workstation ID: [id number]    XR Chest 2 View    Result Date:  2/16/2025  Impression: Impression: No acute process identified Electronically Signed: Antonio Ceballos MD  2/16/2025 4:13 PM EST  Workstation ID: [id number]     Results for orders placed during the hospital encounter of 08/01/24    Duplex Vein Mapping Upper Extremity - Bilateral CAR    Interpretation Summary    Sub-acute left upper extremity superficial thrombophlebitis noted in the cephalic (upper arm).    All other vessels appear normal.    Right basilic vein adequate throughout its course.  Right cephalic vein inadequate.    Left cephalic vein inadequate.  Left basilic vein adequate throughout its course.      Results for orders placed during the hospital encounter of 08/01/24    Duplex Vein Mapping Upper Extremity - Bilateral CAR    Interpretation Summary    Sub-acute left upper extremity superficial thrombophlebitis noted in the cephalic (upper arm).    All other vessels appear normal.    Right basilic vein adequate throughout its course.  Right cephalic vein inadequate.    Left cephalic vein inadequate.  Left basilic vein adequate throughout its course.      Results for orders placed during the hospital encounter of 02/16/25    Adult Transthoracic Echo Complete W/ Cont if Necessary Per Protocol    Interpretation Summary    Left ventricular systolic function is normal. Left ventricular ejection fraction appears to be 56 - 60%.    Left ventricular diastolic function is consistent with (grade I) impaired relaxation.    Estimated right ventricular systolic pressure from tricuspid regurgitation is normal (<35 mmHg).      Labs Pending at Discharge:  Pending Results       None            Procedures Performed           Consults:   Consults       Date and Time Order Name Status Description    2/18/2025  3:29 PM Inpatient Hospitalist Consult      2/17/2025 10:48 AM Inpatient Pulmonology Consult Completed     2/17/2025  7:24 AM Inpatient Nephrology Consult Completed     2/16/2025  8:39 PM Inpatient Cardiology Consult  Completed               Discharge Details        Discharge Medications        Continue These Medications        Instructions Start Date   Accu-Chek Megan device   Use to test twice daily   DX: E11.9      True Metrix Air Glucose Meter device   True Metrix Air Glucose Meter, See Instructions, use to check blood sugar up to TID, # 1 EA, 0 Refill(s), Pharmacy: [facility], E11.9, use to check blood sugar up to TID, Supply, 168, cm, 03/25/24 11:10:00 EDT, Height, 89.9, kg, 03/25/24 11:16:00 EDT, Weight Dosing      Accu-Chek Megan solution   Use as directed      accu-chek soft touch lancets   Check blood sugars twice daily as directed. DX:E11.9      Accu-Chek Softclix Lancets lancets   TEST BLOOD SUGAR TWICE DAILY      B-D SINGLE USE SWABS REGULAR pads   TO USE WHEN CHECKING BLOOD SUGAR TWICE A DAY      B-D SINGLE USE SWABS REGULAR pads   BD Single Use Swab, See Instructions, Use 1 swab to check blood sugar up to TID, # 300 EA, 1 Refill(s), Pharmacy: [facility], E11.9, Use 1 swab to check blood sugar up to TID, Supply, 168, cm, 03/25/24 11:10:00 EDT, Height, 89.9, kg, 03/25/24 11:16:00 EDT, Weight Dosing             ASK your doctor about these medications        Instructions Start Date   Accu-Chek Megan Plus test strip  Generic drug: glucose blood   TEST BLOOD SUGAR TWICE DAILY AS DIRECTED   DX  E11.9      albuterol sulfate  (90 Base) MCG/ACT inhaler  Commonly known as: PROVENTIL HFA;VENTOLIN HFA;PROAIR HFA   2 puffs, Every 6 Hours      alendronate 70 MG tablet  Commonly known as: FOSAMAX   70 mg, Oral, Every 7 Days      allopurinol 100 MG tablet  Commonly known as: ZYLOPRIM   100 mg, Daily      calcitriol 0.25 MCG capsule  Commonly known as: ROCALTROL   0.5 mcg, Daily      carvedilol 25 MG tablet  Commonly known as: COREG   25 mg, Oral, 2 Times Daily      cyclobenzaprine 5 MG tablet  Commonly known as: FLEXERIL   5 mg, 3 Times Daily PRN      DULoxetine 60 MG  capsule  Commonly known as: CYMBALTA   60 mg, Oral, 2 Times Daily      furosemide 40 MG tablet  Commonly known as: LASIX   40 mg, Oral, 2 Times Daily      gabapentin 100 MG capsule  Commonly known as: NEURONTIN   100 mg, Every Night at Bedtime      Gemtesa 75 MG tablet  Generic drug: Vibegron   75 mg, Daily      glimepiride 2 MG tablet  Commonly known as: AMARYL   TAKE 2 TABLETS TWICE DAILY      hydrALAZINE 50 MG tablet  Commonly known as: APRESOLINE   50 mg, 2 Times Daily      hydrOXYzine 10 MG tablet  Commonly known as: ATARAX   10 mg, Oral, 3 Times Daily PRN      Lidoderm 5 %  Generic drug: lidocaine   Apply  topically to the appropriate area as directed.      primidone 50 MG tablet  Commonly known as: MYSOLINE   TAKE 1 TABLET EVERY NIGHT.      rosuvastatin 10 MG tablet  Commonly known as: CRESTOR   10 mg, Oral, Daily      sodium bicarbonate 650 MG tablet   Take 1 tablet by mouth Daily.               Allergies   Allergen Reactions    Morphine Itching    Sulfa Antibiotics GI Intolerance    Hydrocodone Nausea Only    Diphenhydramine Anxiety         Discharge Disposition:  home      Diet:  Hospital:  Diet Order   Procedures    Diet: Cardiac; Low Sodium (2g); Fluid Consistency: Thin (IDDSI 0)         Discharge Activity:         CODE STATUS:  Code Status and Medical Interventions: CPR (Attempt to Resuscitate); Full Support   Ordered at: 02/17/25 0716     Code Status (Patient has no pulse and is not breathing):    CPR (Attempt to Resuscitate)     Medical Interventions (Patient has pulse or is breathing):    Full Support         No future appointments.    Additional Instructions for the Follow-ups that You Need to Schedule       Basic Metabolic Panel    Feb 26, 2025 (Approximate)      Release to patient: Routine Release                Time spent on Discharge including face to face service:  35 minutes    Signature: Electronically signed by True Cyr MD, 02/19/25, 11:48 EST.  [facility] Hospitalist  Team

## 2025-02-19 NOTE — PLAN OF CARE
Problem: Adult Inpatient Plan of Care  Goal: Plan of Care Review  Outcome: Met  Goal: Patient-Specific Goal (Individualized)  Outcome: Met  Goal: Absence of Hospital-Acquired Illness or Injury  Outcome: Met  Intervention: Identify and Manage Fall Risk  Recent Flowsheet Documentation  Taken 2/19/2025 1200 by Cristin Gould RN  Safety Promotion/Fall Prevention:   safety round/check completed   assistive device/personal items within reach   clutter free environment maintained   fall prevention program maintained   gait belt   nonskid shoes/slippers when out of bed   room organization consistent  Taken 2/19/2025 1000 by Critsin Gould RN  Safety Promotion/Fall Prevention:   safety round/check completed   assistive device/personal items within reach   clutter free environment maintained   fall prevention program maintained   gait belt   nonskid shoes/slippers when out of bed   room organization consistent  Taken 2/19/2025 0850 by Cristin Gould RN  Safety Promotion/Fall Prevention:   safety round/check completed   assistive device/personal items within reach   clutter free environment maintained   fall prevention program maintained   gait belt   nonskid shoes/slippers when out of bed   room organization consistent  Intervention: Prevent Skin Injury  Recent Flowsheet Documentation  Taken 2/19/2025 0850 by Cristin Gould RN  Body Position: position changed independently  Skin Protection: transparent dressing maintained  Intervention: Prevent and Manage VTE (Venous Thromboembolism) Risk  Recent Flowsheet Documentation  Taken 2/19/2025 0850 by Cristin Gould RN  VTE Prevention/Management: SCDs (sequential compression devices) off  Intervention: Prevent Infection  Recent Flowsheet Documentation  Taken 2/19/2025 0850 by Cristin Gould RN  Infection Prevention:   hand hygiene promoted   rest/sleep promoted   single patient room provided  Goal: Optimal Comfort and Wellbeing  Outcome: Met  Intervention: Provide Person-Centered  Care  Recent Flowsheet Documentation  Taken 2/19/2025 0850 by Cristin Gould, RN  Trust Relationship/Rapport:   care explained   questions answered  Goal: Readiness for Transition of Care  Outcome: Met   Goal Outcome Evaluation:   D/c to home

## 2025-02-19 NOTE — CASE MANAGEMENT/SOCIAL WORK
Continued Stay Note  [facility]     Patient Name: Lynne Cade  MRN: [id number]  Today's Date: 2/19/2025    Admit Date: 2/16/2025    Plan: Home with spouse.   Discharge Plan       Row Name 02/19/25 1229       Plan    Plan Home with spouse.    Patient/Family in Agreement with Plan yes    Plan Comments Patient will discharge home with spouse. Nebulizer delivered at bedside per Sulaiman. CM asked COPD Navigator to review patient for the pulm clinic. IMM reviewed, signed, and copy given at bedside.             WENDY HinojosaN, RN, [facility]  Care Coordination Supervisor   [address] [address]  Phone: [phone]  Fax: [phone]

## 2025-02-19 NOTE — H&P
[facility] Medicine Services  History & Physical    Patient Name: Lynne Cade  : 1950  MRN: [id number]  Primary Care Physician:  Lorraine Agarwal APRN  Date of admission: 2025  Date and Time of Service: 2025 at 7:14 PM EST      Subjective      Chief Complaint: Dyspnea    History of Present Illness: Lynne Cade is a 74 y.o. female with a CMH of COPD, CAD, hyperlipidemia, diabetes mellitus who presented to [facility] on 2025 with shortness of breath over the last couple of months.  She also complains of shortness and tightness of breath.,  Does not radiate to neck or jaw.  Patient presented to the emergency department for increasing worsening shortness of breath.  Patient is also with worsening kidney function and is scheduled on 2025 for fistula placement by vascular surgeon.And was admitted to ED observation.      Patient was being transferred to inpatient services due to continued worsening kidney function    Review of Systems  Review of Systems   Constitutional: Positive for malaise/fatigue. Negative for fever.   HENT: Negative.     Eyes: Negative.    Cardiovascular:  Positive for dyspnea on exertion. Negative for chest pain.   Respiratory:  Positive for cough and shortness of breath. Negative for sputum production.    Endocrine: Negative.    Skin: Negative.    Musculoskeletal: Negative.    Gastrointestinal: Negative.    Genitourinary: Negative.    Neurological:  Positive for weakness.   Psychiatric/Behavioral: Negative.     Personal History     Past Medical History:   Diagnosis Date    Anemia     Hx of anemia    Anxiety     Arthritis     Conditions. Abstracted from [facility].    Cataract     bilateral    CHF (congestive heart failure) 22    Low iron    Cholelithiasis 30 yrs ago    Had gb removed    Chronic obstructive pulmonary disease, unspecified COPD type     CKD (chronic kidney disease), stage IV 2023    Colon polyp     Depression      Take cymbalta    Diabetes mellitus     type 2    Diarrhea     on and off    Edema of right foot 04/2021    Healthcare maintenance 2008    PAP. Abstracted from [facility].    Hip pain, bilateral     HL (hearing loss) 1/1/19    Have hearing aids    Hyperlipidemia     Hypertension     Knee pain, bilateral     Obesity     Osteopenia 11/1/22    On med    Other forms of dyspnea     Other specified soft tissue disorders     Pneumonia due to COVID-19 virus 06/26/2022    Renal insufficiency Stage 3    Has gone up to stage 4    Slow to wake up after anesthesia     Spinal headache     Stress headaches     Tremor     Benign    Tremor of both hands     Urinary tract infection 07/01/22    [place]    Visual impairment 6/1/2017    Cataracts    Vitamin D deficiency        Past Surgical History:   Procedure Laterality Date    ARTERIOVENOUS FISTULA/SHUNT SURGERY Left 02/24/2023    Procedure: BRACHIOCEPHALIC ARTERIOVENOUS FISTULA FORMATION;  Surgeon: Vignesh Riley MD;  Location: [facility] OR;  Service: Vascular;  Laterality: Left;    BREAST BIOPSY Right     CHOLECYSTECTOMY  1990    COLONOSCOPY      This year 2022    EYE SURGERY Bilateral     cataract removal    GASTRIC BYPASS  2002    HERNIA REPAIR  2005    umbilical hernia    HIP OPEN REDUCTION Right 03/02/2020    Procedure: FEMORAL NECK OPEN REDUCTION INTERNAL FIXATION;  Surgeon: Yfn Sifuentes MD;  Location: [facility] OR;  Service: Orthopedics;  Laterality: Right;  Cannulated screw fixation right femoral neck    JOINT REPLACEMENT  2020    R knee    LAPAROTOMY OOPHERECTOMY Bilateral 2010    OTHER SURGICAL HISTORY  04/20/2015    Lexiscan stress test, normal. Abstracted from [facility].    PANNICULECTOMY      TOTAL KNEE ARTHROPLASTY Right 04/29/2021    Procedure: RIGHT TOTAL KNEE REPLACEMENT;  Surgeon: Yfn Sifuentes MD;  Location: [facility] OR;  Service: Orthopedics;  Laterality: Right;       Family History: family history includes Anxiety disorder in her daughter;  Asthma in her mother; Depression in her mother; Diabetes in her daughter and father; Early death in her daughter; Hyperlipidemia in her father; Hypertension in her father; Kidney disease in her mother; Miscarriages / Stillbirths in her daughter and mother. Otherwise pertinent FHx was reviewed and not pertinent to current issue.    Social History:  reports that she has never smoked. She has never been exposed to tobacco smoke. She has never used smokeless tobacco. She reports that she does not drink alcohol and does not use drugs.    Home Medications:  Prior to Admission Medications       Prescriptions Last Dose Informant Patient Reported? Taking?    Accu-Chek Softclix Lancets lancets   No No    TEST BLOOD SUGAR TWICE DAILY    albuterol sulfate  (90 Base) MCG/ACT inhaler 2/16/2025  Yes Yes    Inhale 2 puffs Every 6 (Six) Hours.    Alcohol Swabs (B-D SINGLE USE SWABS REGULAR) pads   No No    TO USE WHEN CHECKING BLOOD SUGAR TWICE A DAY    Alcohol Swabs (B-D SINGLE USE SWABS REGULAR) pads   Yes No    BD Single Use Swab, See Instructions, Use 1 swab to check blood sugar up to TID, # 300 EA, 1 Refill(s), Pharmacy: [facility], E11.9, Use 1 swab to check blood sugar up to TID, Supply, 168, cm, 03/25/24 11:10:00 EDT, Height, 89.9, kg, 03/25/24 11:16:00 EDT, Weight Dosing    alendronate (FOSAMAX) 70 MG tablet 2/15/2025  No Yes    Take 1 tablet by mouth Every 7 (Seven) Days.    Patient taking differently:  Take 1 tablet by mouth Every 7 (Seven) Days. Sat    allopurinol (ZYLOPRIM) 100 MG tablet 2/16/2025  Yes Yes    Take 1 tablet by mouth Daily.    Blood Glucose Calibration (Accu-Chek Megan) solution   No No    Use as directed    Blood Glucose Monitoring Suppl (Accu-Chek Megan) device   No No    Use to test twice daily   DX: E11.9    Blood Glucose Monitoring Suppl (True Metrix Air Glucose Meter) device   Yes No    True Metrix Air Glucose Meter, See Instructions, use to check blood sugar up to TID,  # 1 EA, 0 Refill(s), Pharmacy: [facility], E11.9, use to check blood sugar up to TID, Supply, 168, cm, 03/25/24 11:10:00 EDT, Height, 89.9, kg, 03/25/24 11:16:00 EDT, Weight Dosing    calcitriol (ROCALTROL) 0.25 MCG capsule 2/16/2025  Yes Yes    Take 2 capsules by mouth Daily.    carvedilol (COREG) 25 MG tablet 2/16/2025  No Yes    TAKE 1 TABLET TWICE DAILY    cyclobenzaprine (FLEXERIL) 5 MG tablet 2/15/2025  Yes Yes    Take 1 tablet by mouth 3 (Three) Times a Day As Needed.    DULoxetine (CYMBALTA) 60 MG capsule 2/16/2025  No Yes    Take 1 capsule by mouth 2 (Two) Times a Day.    furosemide (LASIX) 40 MG tablet 2/16/2025  No Yes    TAKE 1 TABLET TWICE DAILY    Patient taking differently:  Take 1 tablet by mouth Daily.    gabapentin (NEURONTIN) 100 MG capsule 2/15/2025  Yes Yes    Take 1 capsule by mouth every night at bedtime.    glimepiride (AMARYL) 2 MG tablet   No No    TAKE 2 TABLETS TWICE DAILY    Patient taking differently:  Take 2 tablets by mouth 2 (Two) Times a Day.    glucose blood (Accu-Chek Megan Plus) test strip   No No    TEST BLOOD SUGAR TWICE DAILY AS DIRECTED   DX  E11.9    hydrALAZINE (APRESOLINE) 50 MG tablet 2/16/2025  Yes Yes    Take 1 tablet by mouth 2 (Two) Times a Day.    hydrOXYzine (ATARAX) 10 MG tablet 2/16/2025  No Yes    Take 1 tablet by mouth 3 (Three) Times a Day As Needed for Itching.    Lancets (accu-chek soft touch) lancets   No No    Check blood sugars twice daily as directed. DX:E11.9    lidocaine (Lidoderm) 5 %   Yes No    Apply  topically to the appropriate area as directed.    primidone (MYSOLINE) 50 MG tablet 2/15/2025  No Yes    TAKE 1 TABLET EVERY NIGHT.    Patient taking differently:  Take 1 tablet by mouth Every Night.    rosuvastatin (CRESTOR) 10 MG tablet 2/16/2025  No Yes    Take 1 tablet by mouth Daily.    sodium bicarbonate 650 MG tablet 2/16/2025  Yes Yes    Take 1 tablet by mouth Daily.    Vibegron (Gemtesa) 75 MG tablet   Yes No    Take 1  tablet by mouth Daily.              Allergies:  Allergies   Allergen Reactions    Morphine Itching    Sulfa Antibiotics GI Intolerance    Hydrocodone Nausea Only    Diphenhydramine Anxiety       Objective      Vitals:   Temp:  [97.5 °F (36.4 °C)-98.4 °F (36.9 °C)] 97.5 °F (36.4 °C)  Heart Rate:  [] 78  Resp:  [12-21] 18  BP: (144-179)/(74-85) 144/74  Body mass index is 33.81 kg/m².  Physical Exam    Diagnostic Data:  Lab Results (last 24 hours)       Procedure Component Value Units Date/Time    POC Glucose Once [[id number]]  (Abnormal) Collected: 02/18/25 1732    Specimen: Blood Updated: 02/18/25 1734     Glucose 119 mg/dL      Comment: Serial Number: [id number]:  [id number]       POC Glucose Once [[id number]]  (Normal) Collected: 02/18/25 1555    Specimen: Blood Updated: 02/18/25 1558     Glucose 91 mg/dL      Comment: Serial Number: [id number]:  [id number]       Urine Culture - Urine, Urine, Clean Catch [[id number]] Collected: 02/17/25 1600    Specimen: Urine, Clean Catch Updated: 02/18/25 1251     Urine Culture <25,000 CFU/mL Normal Urogenital Annalise    Narrative:      Colonization of the urinary tract without infection is common. Treatment is discouraged unless the patient is symptomatic, pregnant, or undergoing an invasive urologic procedure.    POC Glucose Once [[id number]]  (Abnormal) Collected: 02/18/25 1055    Specimen: Blood Updated: 02/18/25 1101     Glucose 249 mg/dL      Comment: Serial Number: [id number]:  [id number]       POC Glucose Once [[id number]]  (Abnormal) Collected: 02/18/25 0742    Specimen: Blood Updated: 02/18/25 0744     Glucose 355 mg/dL      Comment: Serial Number: [id number]:  [id number]       Comprehensive Metabolic Panel [[id number]]  (Abnormal) Collected: 02/18/25 0032    Specimen: Blood from Arm, Left Updated: 02/18/25 0158     Glucose 485 mg/dL      BUN 52 mg/dL      Creatinine 2.60 mg/dL      Sodium 138 mmol/L      Potassium 5.0 mmol/L      Chloride 105  mmol/L      CO2 21.7 mmol/L      Calcium 8.7 mg/dL      Total Protein 6.5 g/dL      Albumin 3.5 g/dL      ALT (SGPT) 24 U/L      AST (SGOT) 22 U/L      Alkaline Phosphatase 48 U/L      Total Bilirubin <0.2 mg/dL      Globulin 3.0 gm/dL      A/G Ratio 1.2 g/dL      BUN/Creatinine Ratio 20.0     Anion Gap 11.3 mmol/L      eGFR 18.6 mL/min/1.73     Narrative:      GFR Categories in Chronic Kidney Disease (CKD)      GFR Category          GFR (mL/min/1.73)    Interpretation  G1                     90 or greater         Normal or high (1)  G2                      60-89                Mild decrease (1)  G3a                   45-59                Mild to moderate decrease  G3b                   30-44                Moderate to severe decrease  G4                    15-29                Severe decrease  G5                    14 or less           Kidney failure          (1)In the absence of evidence of kidney disease, neither GFR category G1 or G2 fulfill the criteria for CKD.    eGFR calculation 2021 CKD-EPI creatinine equation, which does not include race as a factor    Magnesium [[id number]]  (Normal) Collected: 02/18/25 0032    Specimen: Blood from Arm, Left Updated: 02/18/25 0158     Magnesium 2.1 mg/dL     Phosphorus [[id number]]  (Normal) Collected: 02/18/25 0032    Specimen: Blood from Arm, Left Updated: 02/18/25 0137     Phosphorus 4.3 mg/dL     Calcium, Ionized [[id number]]  (Abnormal) Collected: 02/18/25 0032    Specimen: Blood from Arm, Left Updated: 02/18/25 0128     Ionized Calcium 1.07 mmol/L     CBC (No Diff) [[id number]]  (Abnormal) Collected: 02/18/25 0032    Specimen: Blood from Arm, Left Updated: 02/18/25 0110     WBC 7.22 10*3/mm3      RBC 2.88 10*6/mm3      Hemoglobin 9.4 g/dL      Hematocrit 30.3 %      .2 fL      MCH 32.6 pg      MCHC 31.0 g/dL      RDW 14.6 %      RDW-SD 56.4 fl      MPV 10.3 fL      Platelets 252 10*3/mm3              Imaging Results (Last 24 Hours)       Procedure Component Value  Units Date/Time    CT Chest Without Contrast Diagnostic [[id number]] Collected: 02/18/25 1012     Updated: 02/18/25 1017    Narrative:      CT CHEST WO CONTRAST DIAGNOSTIC    Date of Exam: 2/18/2025 10:02 AM EST    Indication: SOA.    Comparison: Chest CT 7/24/2014, chest radiograph 2/16/2025    Technique: Axial CT images were obtained of the chest without contrast administration.  Sagittal and coronal reconstructions were performed.  Automated exposure control and iterative reconstruction methods were used.      Findings:  The right hemidiaphragm is elevated. There is right lower lobe and right middle lobe bronchiectasis. There is mild atelectasis or fibrosis in the right lower lobe with chronic volume loss. The right upper lobe appears clear. There is some volume loss in   the right middle lobe. The left lung is clear. No acute infiltrates are identified. There is no significant mediastinal or hilar lymphadenopathy. There are coronary artery calcifications. There is no pleural or pericardial fluid.    There are postop changes of prior cholecystectomy. Postop changes are present at the EG junction. There are no acute findings in the upper abdomen.    There is thoracic spondylosis. No fractures or destructive bone lesions are identified.      Impression:      Impression:  1.Right lower lobe and right middle lobe bronchiectasis with chronic volume loss in the right lower lobe and right middle lobe.  2.No acute infiltrates.  3.Coronary artery calcifications.  4.Cholecystectomy. Postop changes at the EG junction.        Electronically Signed: Yfn Luna MD    2/18/2025 10:15 AM EST    Workstation ID: [id number]              Assessment & Plan        This is a 74 y.o. female with:    Active and Resolved Problems  Active Hospital Problems    Diagnosis  POA    **Dyspnea [R06.00]  Yes    Dyspnea and respiratory abnormalities [R06.00, R06.89]  Yes      Resolved Hospital Problems   No resolved problems to display.       COPD  exacerbation  Dyspnea  -Troponin 28, 35, 32  -, CK20 7-  -CXR: No acute cardiopulmonary finding  -EKG: Sinus rhythm, HR 76  -RPP negative  -Echo: LVEF 56 to 60%, grade 1 diastolic dysfunction  -CT chest: Right lower and right middle lobe bronchiectasis, chronic volume loss right lower lobe, right middle lobe.  No acute infiltrate  -Pulmonology has signed off  -Cardiology following    CKD  -BUN 52, creatinine 2.6  -eGFR 20.4  -Continue sodium bicarbonate, Calcitrol, metolazone   -Avoid nephrotoxic medication IV dye unless urgently needed  -Monitor BMP and I's and O's while admitted  -Nephrology consulted     DM type II  -Continue gabapentin for neuropathy  -Hold glimepiride  -Checks before meals and at bedtime  -Diabetic diet  -Lantus, and ISS    HTN  -Better controlled.  /74  -Continue with current dosages of hydralazine, Lasix  -Continue telemetry monitoring    HLD  -Continue statin    GERD-  -continue Cymbalta    Tremors  -Continue primidone    OAB  -Continue oxybutynin    VTE Prophylaxis:  Mechanical VTE prophylaxis orders are present.        The patient desires to be as follows:    CODE STATUS:    Code Status (Patient has no pulse and is not breathing): CPR (Attempt to Resuscitate)  Medical Interventions (Patient has pulse or is breathing): Full Support        Goryd Cade, who can be contacted at [phone], is the designated person to make medical decisions on the patient's behalf if She is incapable of doing so. This was clarified with patient and/or next of kin on 2/16/2025 during the course of this H&P.    Admission Status:  I believe this patient meets inpatient status.    Expected Length of Stay: 1    PDMP and Medication Dispenses via Sidebar reviewed and consistent with patient reported medications.    I discussed the patient's findings and my recommendations with patient.      Signature:     This document has been electronically signed by EVERARDO Burr on February 18, 2025 20:10  EST   [facility] Hospitalist Team

## 2025-02-19 NOTE — PROGRESS NOTES
Daily Progress Note          Assessment    Dyspnea  Wheezing  Bronchiectasis in right lower lobe and right middle lobe with volume loss  Elevated right hemidiaphragm  CKD  DM  HTN  HLD  HFpEF  Non-smoker    CT Chest Without Contrast Diagnostic    Result Date: 2/18/2025  Impression: 1.Right lower lobe and right middle lobe bronchiectasis with chronic volume loss in the right lower lobe and right middle lobe. 2.No acute infiltrates. 3.Coronary artery calcifications. 4.Cholecystectomy. Postop changes at the EG junction. Electronically Signed: Yfn Luna MD  2/18/2025 10:15 AM EST  Workstation ID: [id number]           Recommendations:  Respiratory status is improving and patient can be discharged home follow-up in the pulmonary clinic in 3 weeks, if symptoms recur in the future to consider bronchoscopy    This admission I stopped carvedilol and switched to diltiazem, monitor BP and HR as an outpatient and the dose might need to be adjusted if either is elevated    Continue Symbicort       Oxygen supplement and titration to maintain saturation 90 to 95%: Currently on room air    Bronchodilators  IV Solu-Medrol switched to tapering dose of prednisone     Diuresis/electrolyte management/BP control as per nephrology           LOS: 1 day     Subjective     Reports less shortness of breath    Objective     Vital signs for last 24 hours:  Vitals:    02/18/25 2054 02/18/25 2340 02/19/25 0436 02/19/25 0819   BP: 149/61 168/78 150/79 142/72   BP Location: Right arm Right arm Right arm Left arm   Patient Position: Lying Lying Lying Lying   Pulse: 78 82 99 80   Resp: 18 19 18 14   Temp: 97.9 °F (36.6 °C) 97.9 °F (36.6 °C) 97.3 °F (36.3 °C) 98 °F (36.7 °C)   TempSrc: Oral Oral Axillary Oral   SpO2: 95% 96% 92% 97%   Weight:       Height:           Intake/Output last 3 shifts:  I/O last 3 completed shifts:  In: 720 [P.O.:720]  Out: -   Intake/Output this shift:  No intake/output data recorded.      Radiology  Imaging Results (Last  24 Hours)       Procedure Component Value Units Date/Time    CT Chest Without Contrast Diagnostic [[id number]] Collected: 02/18/25 1012     Updated: 02/18/25 1017    Narrative:      CT CHEST WO CONTRAST DIAGNOSTIC    Date of Exam: 2/18/2025 10:02 AM EST    Indication: SOA.    Comparison: Chest CT 7/24/2014, chest radiograph 2/16/2025    Technique: Axial CT images were obtained of the chest without contrast administration.  Sagittal and coronal reconstructions were performed.  Automated exposure control and iterative reconstruction methods were used.      Findings:  The right hemidiaphragm is elevated. There is right lower lobe and right middle lobe bronchiectasis. There is mild atelectasis or fibrosis in the right lower lobe with chronic volume loss. The right upper lobe appears clear. There is some volume loss in   the right middle lobe. The left lung is clear. No acute infiltrates are identified. There is no significant mediastinal or hilar lymphadenopathy. There are coronary artery calcifications. There is no pleural or pericardial fluid.    There are postop changes of prior cholecystectomy. Postop changes are present at the EG junction. There are no acute findings in the upper abdomen.    There is thoracic spondylosis. No fractures or destructive bone lesions are identified.      Impression:      Impression:  1.Right lower lobe and right middle lobe bronchiectasis with chronic volume loss in the right lower lobe and right middle lobe.  2.No acute infiltrates.  3.Coronary artery calcifications.  4.Cholecystectomy. Postop changes at the EG junction.        Electronically Signed: Yfn Luna MD    2/18/2025 10:15 AM EST    Workstation ID: [id number]            Labs:  Results from last 7 days   Lab Units 02/19/25  0442   WBC 10*3/mm3 11.64*   HEMOGLOBIN g/dL 10.2*   HEMATOCRIT % 32.2*   PLATELETS 10*3/mm3 302     Results from last 7 days   Lab Units 02/19/25  0442 02/18/25  0032   SODIUM mmol/L 138 138   POTASSIUM  mmol/L 4.0 5.0   CHLORIDE mmol/L 98 105   CO2 mmol/L 24.5 21.7*   BUN mg/dL 61* 52*   CREATININE mg/dL 2.72* 2.60*   CALCIUM mg/dL 10.3 8.7   BILIRUBIN mg/dL  --  <0.2   ALK PHOS U/L  --  48   ALT (SGPT) U/L  --  24   AST (SGOT) U/L  --  22   GLUCOSE mg/dL 205* 485*         Results from last 7 days   Lab Units 02/18/25  0032 02/16/25  1543   ALBUMIN g/dL 3.5 3.9     Results from last 7 days   Lab Units 02/16/25  1716 02/16/25  1543   CK TOTAL U/L 27  --    HSTROP T ng/L 32* 35*         Results from last 7 days   Lab Units 02/19/25  0442   MAGNESIUM mg/dL 2.5*         Results from last 7 days   Lab Units 02/16/25  1716   TSH uIU/mL 1.620   FREE T4 ng/dL 0.77*           Meds:   SCHEDULE  allopurinol, 100 mg, Oral, Daily  budesonide-formoterol, 2 puff, Inhalation, BID - RT  calcitriol, 0.5 mcg, Oral, Daily  dilTIAZem CD, 240 mg, Oral, Q24H  DULoxetine, 60 mg, Oral, Daily  furosemide, 40 mg, Oral, Daily  gabapentin, 100 mg, Oral, Nightly  glipizide, 5 mg, Oral, QAM AC  guaiFENesin, 1,200 mg, Oral, Q12H  hydrALAZINE, 100 mg, Oral, TID  insulin lispro, 4-24 Units, Subcutaneous, 4x Daily AC & at Bedtime  insulin regular, 2 Units, Subcutaneous, Q12H  ipratropium-albuterol, 3 mL, Nebulization, 4x Daily - RT  Lidocaine, 1 patch, Transdermal, Q24H  oxybutynin XL, 10 mg, Oral, Daily  predniSONE, 30 mg, Oral, Daily   Followed by  [START ON 2/21/2025] predniSONE, 20 mg, Oral, Daily   Followed by  [START ON 2/23/2025] predniSONE, 10 mg, Oral, Daily  primidone, 50 mg, Oral, Nightly  rosuvastatin, 10 mg, Oral, Daily  sodium bicarbonate, 1,300 mg, Oral, TID  sodium chloride, 10 mL, Intravenous, Q12H      Infusions  Pharmacy Consult - Steroid Insulin Protocol,       PRNs    acetaminophen    albuterol    senna-docusate sodium **AND** polyethylene glycol **AND** bisacodyl **AND** bisacodyl    cyclobenzaprine    dextrose    dextrose    glucagon (human recombinant)    hydrOXYzine    influenza vaccine    melatonin    nitroglycerin     ondansetron    Pharmacy Consult - Steroid Insulin Protocol    sodium chloride    [COMPLETED] Insert Peripheral IV **AND** sodium chloride    sodium chloride    sodium chloride    Physical Exam:  General Appearance:  Alert   HEENT:  Normocephalic, without obvious abnormality, Conjunctiva/corneas clear,.   Nares normal, no drainage     Neck:  Supple, symmetrical, trachea midline.   Lungs /Chest wall:   Improved air entry, no wheezing, minimal bilateral basal rhonchi, respirations unlabored, symmetrical wall movement.     Heart:  Regular rate and rhythm, S1 S2 normal  Abdomen: Soft, non-tender, no masses, no organomegaly.    Extremities: No edema, no clubbing or cyanosis     ROS  Constitutional: Negative for chills, fever and malaise/fatigue.   HENT: Negative.    Eyes: Negative.    Cardiovascular: Negative.    Respiratory: Positive for improving exertional shortness of breath.    Skin: Negative.    Musculoskeletal: Negative.    Gastrointestinal: Negative.    Genitourinary: Negative.    Neurological: Negative.    Psychiatric/Behavioral: Negative.      I reviewed the recent clinical results  I personally reviewed the latest radiological studies    Part of this note may be an electronic transcription/translation of spoken language to printed text using the Dragon Dictation System.

## 2025-02-19 NOTE — PROGRESS NOTES
Cardiology [place]        LOS:  LOS: 1 day   Patient Name: Lynne Cade  Age/Sex: 74 y.o. female  : 1950  MRN: [id number]    Day of Service: 25   Length of Stay: 1  Encounter Provider: EVERARDO Razo  Place of Service: [facility] CARDIOLOGY  Patient Care Team:  Lorraine Agarwal APRN as PCP - General (Nurse Practitioner)  Rick Melendez MD as Consulting Physician (Nephrology)  Mayo Fregoso MD as Consulting Physician (Urology)  Fredi Pierce MD as Consulting Physician (Cardiology)  Cony Sanchez APRN as Nurse Practitioner (Vascular Surgery)    Subjective:     Chief Complaint: fu shortness of breath    Subjective: Patient reports no adverse overnight events.  She states she is feeling better and anticipates going home today  No new fevers  Chest pain-free  Has been out of bed    Current Medications:   Scheduled Meds:allopurinol, 100 mg, Oral, Daily  budesonide-formoterol, 2 puff, Inhalation, BID - RT  calcitriol, 0.5 mcg, Oral, Daily  dilTIAZem CD, 240 mg, Oral, Q24H  DULoxetine, 60 mg, Oral, Daily  furosemide, 40 mg, Oral, Daily  gabapentin, 100 mg, Oral, Nightly  glipizide, 5 mg, Oral, QAM AC  guaiFENesin, 1,200 mg, Oral, Q12H  hydrALAZINE, 100 mg, Oral, TID  insulin lispro, 4-24 Units, Subcutaneous, 4x Daily AC & at Bedtime  insulin regular, 4 Units, Subcutaneous, Daily  ipratropium-albuterol, 3 mL, Nebulization, 4x Daily - RT  Lidocaine, 1 patch, Transdermal, Q24H  oxybutynin XL, 10 mg, Oral, Daily  predniSONE, 30 mg, Oral, Daily   Followed by  [START ON 2025] predniSONE, 20 mg, Oral, Daily   Followed by  [START ON 2025] predniSONE, 10 mg, Oral, Daily  primidone, 50 mg, Oral, Nightly  rosuvastatin, 10 mg, Oral, Daily  sodium bicarbonate, 1,300 mg, Oral, TID  sodium chloride, 10 mL, Intravenous, Q12H      Continuous Infusions:Pharmacy Consult - Steroid Insulin Protocol,         Allergies:  Allergies    Allergen Reactions    Morphine Itching    Sulfa Antibiotics GI Intolerance    Hydrocodone Nausea Only    Diphenhydramine Anxiety       Review of Systems   Constitutional: Negative for chills, diaphoresis and malaise/fatigue.   Cardiovascular:  Positive for dyspnea on exertion. Negative for chest pain, irregular heartbeat, leg swelling, near-syncope, orthopnea, palpitations, paroxysmal nocturnal dyspnea and syncope.   Respiratory:  Negative for cough, shortness of breath, sleep disturbances due to breathing and sputum production.    Gastrointestinal:  Negative for change in bowel habit.   Genitourinary:  Negative for urgency.   Neurological:  Negative for dizziness and headaches.   Psychiatric/Behavioral:  Negative for altered mental status.        Objective:     Temp:  [97.3 °F (36.3 °C)-98 °F (36.7 °C)] 98 °F (36.7 °C)  Heart Rate:  [78-99] 88  Resp:  [14-21] 18  BP: (142-179)/(61-85) 142/72     Intake/Output Summary (Last 24 hours) at 2/19/2025 1005  Last data filed at 2/18/2025 1925  Gross per 24 hour   Intake 240 ml   Output --   Net 240 ml     Body mass index is 33.81 kg/m².      02/16/25  1502 02/17/25  1118   Weight: 94.3 kg (207 lb 14.3 oz) 94.3 kg (207 lb 14.3 oz)         General Appearance:    Alert, cooperative, in no acute distress                                Head: Atraumatic, normocephalic, PERRLA               Neck:   supple, no JVD   Lungs:   Rales rhonchi bilateral bases/room air    Heart:    Regular rhythm and normal rate, normal S1 and S2   Abdomen:     Normal bowel sounds, no masses, no organomegaly, soft  nontender, nondistended, no guarding, no rebound  tenderness   Extremities:   Moves all extremities well, no edema, no cyanosis, no  redness   Pulses:   Pulses palpable and equal bilaterally   Skin:   No bleeding, bruising or rash   Neurologic:   Awake, alert, oriented x3     Scribe findings above agree    Lab Review:   Results from last 7 days   Lab Units 02/19/25  0442 02/18/25  0032  02/17/25  1745 02/16/25  1543   SODIUM mmol/L 138 138   < > 137   POTASSIUM mmol/L 4.0 5.0   < > 3.9   CHLORIDE mmol/L 98 105   < > 103   CO2 mmol/L 24.5 21.7*   < > 18.4*   BUN mg/dL 61* 52*   < > 53*   CREATININE mg/dL 2.72* 2.60*   < > 2.41*   GLUCOSE mg/dL 205* 485*   < > 281*   CALCIUM mg/dL 10.3 8.7   < > 8.7   AST (SGOT) U/L  --  22  --  25   ALT (SGPT) U/L  --  24  --  28    < > = values in this interval not displayed.     Results from last 7 days   Lab Units 02/16/25  1716 02/16/25  1543   CK TOTAL U/L 27  --    HSTROP T ng/L 32* 35*     Results from last 7 days   Lab Units 02/19/25  0442 02/18/25  0032   WBC 10*3/mm3 11.64* 7.22   HEMOGLOBIN g/dL 10.2* 9.4*   HEMATOCRIT % 32.2* 30.3*   PLATELETS 10*3/mm3 302 252         Results from last 7 days   Lab Units 02/19/25  0442 02/18/25  0032   MAGNESIUM mg/dL 2.5* 2.1     Results from last 7 days   Lab Units 02/16/25  1716   CHOLESTEROL mg/dL 145   TRIGLYCERIDES mg/dL 176*   HDL CHOL mg/dL 65*     Results from last 7 days   Lab Units 02/16/25  1716   PROBNP pg/mL 347.0     Results from last 7 days   Lab Units 02/16/25  1716   TSH uIU/mL 1.620       Recent Radiology:  Imaging Results (Most Recent)       Procedure Component Value Units Date/Time    CT Chest Without Contrast Diagnostic [[id number]] Collected: 02/18/25 1012     Updated: 02/18/25 1017    Narrative:      CT CHEST WO CONTRAST DIAGNOSTIC    Date of Exam: 2/18/2025 10:02 AM EST    Indication: SOA.    Comparison: Chest CT 7/24/2014, chest radiograph 2/16/2025    Technique: Axial CT images were obtained of the chest without contrast administration.  Sagittal and coronal reconstructions were performed.  Automated exposure control and iterative reconstruction methods were used.      Findings:  The right hemidiaphragm is elevated. There is right lower lobe and right middle lobe bronchiectasis. There is mild atelectasis or fibrosis in the right lower lobe with chronic volume loss. The right upper lobe appears  clear. There is some volume loss in   the right middle lobe. The left lung is clear. No acute infiltrates are identified. There is no significant mediastinal or hilar lymphadenopathy. There are coronary artery calcifications. There is no pleural or pericardial fluid.    There are postop changes of prior cholecystectomy. Postop changes are present at the EG junction. There are no acute findings in the upper abdomen.    There is thoracic spondylosis. No fractures or destructive bone lesions are identified.      Impression:      Impression:  1.Right lower lobe and right middle lobe bronchiectasis with chronic volume loss in the right lower lobe and right middle lobe.  2.No acute infiltrates.  3.Coronary artery calcifications.  4.Cholecystectomy. Postop changes at the EG junction.        Electronically Signed: Yfn Lnua MD    2/18/2025 10:15 AM EST    Workstation ID: [id number]    XR Chest 2 View [[id number]] Collected: 02/16/25 1612     Updated: 02/16/25 1615    Narrative:      XR CHEST 2 VW    Date of Exam: 2/16/2025 4:12 PM EST    Indication: Short of breath    Comparison: 2/5/2025    Findings:  Cardiomediastinal silhouette is unremarkable. Similar elevation of the right hemidiaphragm. No airspace disease, pneumothorax, nor pleural effusion. No acute osseous abnormality identified.      Impression:      Impression:  No acute process identified      Electronically Signed: Antonio Ceballos MD    2/16/2025 4:13 PM EST    Workstation ID: [id number]            ECHOCARDIOGRAM:    Results for orders placed during the hospital encounter of 02/16/25    Adult Transthoracic Echo Complete W/ Cont if Necessary Per Protocol    Interpretation Summary    Left ventricular systolic function is normal. Left ventricular ejection fraction appears to be 56 - 60%.    Left ventricular diastolic function is consistent with (grade I) impaired relaxation.    Estimated right ventricular systolic pressure from tricuspid regurgitation is normal (<35  mmHg).        I reviewed the patient's new clinical results.    EKG:      Assessment:       Dyspnea    Dyspnea and respiratory abnormalities    Dyspnea on exertion  Chest x-ray no acute process.    EKG sinus rhythm with no acute ST elevations.    Troponins trended: 35, 32.    proBNP 347.  No clinical signs of heart failure, no peripheral edema, no JVD, no rales, no orthopnea/PND  Will try to obtain [facility]  Pulmonary following     Hx paroxysmal atrial tachycardia with frequent PACs/sinus tachycardia  Currently sinus rhythm with PACs on telemetry  Was on Coreg at home  Currently on Cardizem  daily     Essential hypertension  Patient states Dr. Melendez manages her hypertensive medications, Coreg 25 mg twice daily, hydralazine 50 mg twice daily  Patient states that pulmonology took her off Coreg on this admission with concerns it was causing WOODALL  And started on Cardizem  daily      Hyperlipidemia   Crestor   LDL 51, triglycerides 176.     [id number]  A1c 7.17     CKD   Lasix 40 mg daily  Dr. Melendez follows  Plan HD soon       Plan:   CT chest this am showed right lower lobe and right middle lobe bronchiectasis with chronic volume loss in the right lower lobe and right middle lobe, no acute infiltrates   Pulmonary following-they have okayed her for discharge with follow-up in 3 weeks on Symbicort and prednisone  Nephrology on board-they have okayed to discharge on Lasix daily only, increased her hydralazine  Creatinine trending up secondary to diuresis, will need outpatient labs after discharge, can follow with nephrology for this  ECHO showed normal LVEF no significant VHD, no indication for ischemic evaluation at this time or further cardiac testing    Follow-up closely in office as Coreg has been changed to Cardizem, monitor home blood pressure/heart rate logs to bring to hospital follow-up appointment  Currently 140/50 systolic with heart rates  70-90    Okay to discharge from cardiac standpoint  Multiple CV comorbidities reviewed individually and care and plan discussed with nursing staff along with medication review  Recommended home blood pressure logs twice daily  Diet and exercise per HA guidelines as allowed by functional status  Needs weight loss  Sugars are better    Rose Estrella, EVERARDO  02/19/25  10:05 EST

## 2025-02-19 NOTE — PAT
Message sent to Dr. Bernard's office about hgb = 9.4, glucose = 485.  Rubi from Dr. Bernard's office said she sent her a text, said ok to proceed with surgery on 2/27/2025.

## 2025-02-19 NOTE — PLAN OF CARE
Problem: Adult Inpatient Plan of Care  Goal: Plan of Care Review  Outcome: Not Progressing  Flowsheets (Taken 2/18/2025 1952)  Progress: no change  Plan of Care Reviewed With: patient  Goal: Patient-Specific Goal (Individualized)  Outcome: Not Progressing  Goal: Absence of Hospital-Acquired Illness or Injury  Outcome: Not Progressing  Goal: Optimal Comfort and Wellbeing  Outcome: Not Progressing  Goal: Readiness for Transition of Care  Outcome: Not Progressing     Problem: Comorbidity Management  Goal: Blood Glucose Level Within Target Range  Outcome: Not Progressing  Goal: Blood Pressure in Desired Range  Outcome: Not Progressing     Problem: Chest Pain  Goal: Resolution of Chest Pain Symptoms  Outcome: Not Progressing     Problem: Chronic Kidney Disease  Goal: Optimal Coping with Chronic Illness  Outcome: Not Progressing  Goal: Electrolyte Balance  Outcome: Not Progressing  Goal: Fluid Balance  Outcome: Not Progressing  Goal: Optimal Functional Ability  Outcome: Not Progressing  Goal: Absence of Anemia Signs and Symptoms  Outcome: Not Progressing  Goal: Optimal Oral Intake  Outcome: Not Progressing  Goal: Acceptable Pain Control  Outcome: Not Progressing  Goal: Minimize Renal Failure Effects  Outcome: Not Progressing     Problem: Fall Injury Risk  Goal: Absence of Fall and Fall-Related Injury  Outcome: Not Progressing     Problem: Pain Acute  Goal: Optimal Pain Control and Function  Outcome: Not Progressing     Problem: Breathing Pattern Ineffective  Goal: Effective Breathing Pattern  Outcome: Not Progressing   Goal Outcome Evaluation:  Plan of Care Reviewed With: patient        Progress: no change

## 2025-02-20 NOTE — CASE MANAGEMENT/SOCIAL WORK
Case Management Discharge Note      Final Note: Routine home         Selected Continued Care - Discharged on 2/19/2025 Admission date: 2/16/2025 - Discharge disposition: Home or Self Care        Durable Medical Equipment Coordination complete.      Service Provider Services Address Phone Fax Patient Preferred    [facility] [employer] [id number] [address] [phone] --               Transportation Services  Private: Car    Final Discharge Disposition Code: 01 - home or self-care

## 2025-02-20 NOTE — OUTREACH NOTE
Prep Survey      Flowsheet Row Responses   [facility] facility patient discharged from? [facility]   Is LACE score < 7 ? No   Eligibility Readm Mgmt   Discharge diagnosis Dyspnea   Does the patient have one of the following disease processes/diagnoses(primary or secondary)? Other   Does the patient have Home health ordered? No   Is there a DME ordered? Yes   What DME was ordered? [place] for nebulizer   Prep survey completed? Yes            ELAN CONKLIN - Registered Nurse

## 2025-02-24 ENCOUNTER — READMISSION MANAGEMENT (OUTPATIENT)
Dept: CALL CENTER | Facility: HOSPITAL | Age: 75
End: 2025-02-24
Payer: MEDICARE

## 2025-02-24 NOTE — OUTREACH NOTE
Medical Week 1 Survey      Flowsheet Row Responses   [facility] facility patient discharged from? [facility]   Does the patient have one of the following disease processes/diagnoses(primary or secondary)? Other   Week 1 attempt successful? No   Unsuccessful attempts Attempt 1            Hallie DUARTE - Registered Nurse

## 2025-02-26 ENCOUNTER — ANESTHESIA EVENT (OUTPATIENT)
Dept: PERIOP | Facility: HOSPITAL | Age: 75
End: 2025-02-26
Payer: MEDICARE

## 2025-02-27 ENCOUNTER — READMISSION MANAGEMENT (OUTPATIENT)
Dept: CALL CENTER | Facility: HOSPITAL | Age: 75
End: 2025-02-27
Payer: MEDICARE

## 2025-02-27 ENCOUNTER — HOSPITAL ENCOUNTER (OUTPATIENT)
Facility: HOSPITAL | Age: 75
Setting detail: HOSPITAL OUTPATIENT SURGERY
Discharge: HOME | End: 2025-02-27
Attending: STUDENT IN AN ORGANIZED HEALTH CARE EDUCATION/TRAINING PROGRAM | Admitting: STUDENT IN AN ORGANIZED HEALTH CARE EDUCATION/TRAINING PROGRAM
Payer: MEDICARE

## 2025-02-27 ENCOUNTER — ANESTHESIA (OUTPATIENT)
Dept: PERIOP | Facility: HOSPITAL | Age: 75
End: 2025-02-27
Payer: MEDICARE

## 2025-02-27 VITALS
BODY MASS INDEX: 35.51 KG/M2 | HEART RATE: 78 BPM | SYSTOLIC BLOOD PRESSURE: 115 MMHG | WEIGHT: 208 LBS | RESPIRATION RATE: 17 BRPM | DIASTOLIC BLOOD PRESSURE: 65 MMHG | OXYGEN SATURATION: 95 % | HEIGHT: 64 IN | TEMPERATURE: 97.6 F

## 2025-02-27 DIAGNOSIS — N18.4 CKD (CHRONIC KIDNEY DISEASE), STAGE IV: Primary | ICD-10-CM

## 2025-02-27 LAB
GLUCOSE BLDC GLUCOMTR-MCNC: 191 MG/DL (ref 70–105)
GLUCOSE BLDC GLUCOMTR-MCNC: 263 MG/DL (ref 70–105)

## 2025-02-27 PROCEDURE — 25810000003 SODIUM CHLORIDE 0.9 % SOLUTION: Performed by: NURSE ANESTHETIST, CERTIFIED REGISTERED

## 2025-02-27 PROCEDURE — 63710000001 INSULIN REGULAR HUMAN PER 5 UNITS: Performed by: ANESTHESIOLOGY

## 2025-02-27 PROCEDURE — S0260 H&P FOR SURGERY: HCPCS | Performed by: STUDENT IN AN ORGANIZED HEALTH CARE EDUCATION/TRAINING PROGRAM

## 2025-02-27 PROCEDURE — 82948 REAGENT STRIP/BLOOD GLUCOSE: CPT

## 2025-02-27 PROCEDURE — 36830 ARTERY-VEIN NONAUTOGRAFT: CPT | Performed by: STUDENT IN AN ORGANIZED HEALTH CARE EDUCATION/TRAINING PROGRAM

## 2025-02-27 PROCEDURE — C1768 GRAFT, VASCULAR: HCPCS | Performed by: STUDENT IN AN ORGANIZED HEALTH CARE EDUCATION/TRAINING PROGRAM

## 2025-02-27 PROCEDURE — 25010000002 VASOPRESSIN 20 UNIT/ML SOLUTION: Performed by: NURSE ANESTHETIST, CERTIFIED REGISTERED

## 2025-02-27 PROCEDURE — 25010000002 CEFAZOLIN PER 500 MG: Performed by: STUDENT IN AN ORGANIZED HEALTH CARE EDUCATION/TRAINING PROGRAM

## 2025-02-27 PROCEDURE — 25010000002 HEPARIN (PORCINE) PER 1000 UNITS: Performed by: STUDENT IN AN ORGANIZED HEALTH CARE EDUCATION/TRAINING PROGRAM

## 2025-02-27 PROCEDURE — 25010000002 MEPIVACAINE PF 1 % SOLUTION: Performed by: ANESTHESIOLOGY

## 2025-02-27 PROCEDURE — 25010000002 MIDAZOLAM PER 1 MG: Performed by: ANESTHESIOLOGY

## 2025-02-27 PROCEDURE — 25810000003 SODIUM CHLORIDE 0.9 % SOLUTION: Performed by: ANESTHESIOLOGY

## 2025-02-27 PROCEDURE — 25010000002 BUPIVACAINE 0.25 % SOLUTION: Performed by: STUDENT IN AN ORGANIZED HEALTH CARE EDUCATION/TRAINING PROGRAM

## 2025-02-27 PROCEDURE — 25010000002 ROPIVACAINE PER 1 MG: Performed by: ANESTHESIOLOGY

## 2025-02-27 PROCEDURE — 25010000002 PROPOFOL 10 MG/ML EMULSION: Performed by: NURSE ANESTHETIST, CERTIFIED REGISTERED

## 2025-02-27 PROCEDURE — 25010000002 HEPARIN (PORCINE) PER 1000 UNITS: Performed by: NURSE ANESTHETIST, CERTIFIED REGISTERED

## 2025-02-27 DEVICE — GRFT VASC PROPAT HEP IR 4/7 38 45CM: Type: IMPLANTABLE DEVICE | Site: ARM | Status: FUNCTIONAL

## 2025-02-27 DEVICE — LIGACLIP MCA MULTIPLE CLIP APPLIERS, 20 SMALL CLIPS
Type: IMPLANTABLE DEVICE | Site: ARM | Status: FUNCTIONAL
Brand: LIGACLIP

## 2025-02-27 DEVICE — ABSORBABLE HEMOSTAT (OXIDIZED REGENERATED CELLULOSE, U.S.P.)
Type: IMPLANTABLE DEVICE | Site: ARM | Status: FUNCTIONAL
Brand: SURGICEL FIBRILLAR

## 2025-02-27 RX ORDER — CEFAZOLIN SODIUM 1 G/3ML
INJECTION, POWDER, FOR SOLUTION INTRAMUSCULAR; INTRAVENOUS
Status: DISCONTINUED
Start: 2025-02-27 | End: 2025-02-27 | Stop reason: HOSPADM

## 2025-02-27 RX ORDER — PROPOFOL 10 MG/ML
VIAL (ML) INTRAVENOUS CONTINUOUS PRN
Status: DISCONTINUED | OUTPATIENT
Start: 2025-02-27 | End: 2025-02-27 | Stop reason: SURG

## 2025-02-27 RX ORDER — SODIUM CHLORIDE 9 MG/ML
9 INJECTION, SOLUTION INTRAVENOUS CONTINUOUS PRN
Status: DISCONTINUED | OUTPATIENT
Start: 2025-02-27 | End: 2025-02-27 | Stop reason: HOSPADM

## 2025-02-27 RX ORDER — SODIUM CHLORIDE 0.9 % (FLUSH) 0.9 %
10 SYRINGE (ML) INJECTION AS NEEDED
Status: DISCONTINUED | OUTPATIENT
Start: 2025-02-27 | End: 2025-02-27 | Stop reason: HOSPADM

## 2025-02-27 RX ORDER — OXYCODONE HYDROCHLORIDE 5 MG/1
5 TABLET ORAL EVERY 4 HOURS PRN
Qty: 20 TABLET | Refills: 0 | Status: SHIPPED | OUTPATIENT
Start: 2025-02-27

## 2025-02-27 RX ORDER — DEXMEDETOMIDINE HYDROCHLORIDE 100 UG/ML
INJECTION, SOLUTION INTRAVENOUS
Status: COMPLETED | OUTPATIENT
Start: 2025-02-27 | End: 2025-02-27

## 2025-02-27 RX ORDER — OXYCODONE HYDROCHLORIDE 5 MG/1
10 TABLET ORAL EVERY 4 HOURS PRN
Status: DISCONTINUED | OUTPATIENT
Start: 2025-02-27 | End: 2025-02-27 | Stop reason: HOSPADM

## 2025-02-27 RX ORDER — OXYCODONE HYDROCHLORIDE 5 MG/1
5 TABLET ORAL ONCE AS NEEDED
Status: COMPLETED | OUTPATIENT
Start: 2025-02-27 | End: 2025-02-27

## 2025-02-27 RX ORDER — SODIUM CHLORIDE 0.9 % (FLUSH) 0.9 %
10 SYRINGE (ML) INJECTION EVERY 12 HOURS SCHEDULED
Status: DISCONTINUED | OUTPATIENT
Start: 2025-02-27 | End: 2025-02-27 | Stop reason: HOSPADM

## 2025-02-27 RX ORDER — SODIUM CHLORIDE 9 MG/ML
INJECTION, SOLUTION INTRAVENOUS CONTINUOUS PRN
Status: DISCONTINUED | OUTPATIENT
Start: 2025-02-27 | End: 2025-02-27 | Stop reason: SURG

## 2025-02-27 RX ORDER — BUPIVACAINE HYDROCHLORIDE 2.5 MG/ML
INJECTION, SOLUTION INFILTRATION; PERINEURAL AS NEEDED
Status: DISCONTINUED | OUTPATIENT
Start: 2025-02-27 | End: 2025-02-27 | Stop reason: HOSPADM

## 2025-02-27 RX ORDER — DIPHENHYDRAMINE HYDROCHLORIDE 50 MG/ML
12.5 INJECTION INTRAMUSCULAR; INTRAVENOUS
Status: DISCONTINUED | OUTPATIENT
Start: 2025-02-27 | End: 2025-02-27 | Stop reason: HOSPADM

## 2025-02-27 RX ORDER — HYDRALAZINE HYDROCHLORIDE 20 MG/ML
5 INJECTION INTRAMUSCULAR; INTRAVENOUS
Status: DISCONTINUED | OUTPATIENT
Start: 2025-02-27 | End: 2025-02-27 | Stop reason: HOSPADM

## 2025-02-27 RX ORDER — VASOPRESSIN 20 [USP'U]/ML
INJECTION, SOLUTION INTRAVENOUS AS NEEDED
Status: DISCONTINUED | OUTPATIENT
Start: 2025-02-27 | End: 2025-02-27 | Stop reason: SURG

## 2025-02-27 RX ORDER — HEPARIN SODIUM 5000 [USP'U]/ML
INJECTION, SOLUTION INTRAVENOUS; SUBCUTANEOUS
Status: DISCONTINUED
Start: 2025-02-27 | End: 2025-02-27 | Stop reason: HOSPADM

## 2025-02-27 RX ORDER — EPHEDRINE SULFATE 5 MG/ML
5 INJECTION INTRAVENOUS ONCE AS NEEDED
Status: DISCONTINUED | OUTPATIENT
Start: 2025-02-27 | End: 2025-02-27 | Stop reason: HOSPADM

## 2025-02-27 RX ORDER — IPRATROPIUM BROMIDE AND ALBUTEROL SULFATE 2.5; .5 MG/3ML; MG/3ML
3 SOLUTION RESPIRATORY (INHALATION) ONCE AS NEEDED
Status: DISCONTINUED | OUTPATIENT
Start: 2025-02-27 | End: 2025-02-27 | Stop reason: HOSPADM

## 2025-02-27 RX ORDER — MIDAZOLAM HYDROCHLORIDE 1 MG/ML
INJECTION, SOLUTION INTRAMUSCULAR; INTRAVENOUS AS NEEDED
Status: DISCONTINUED | OUTPATIENT
Start: 2025-02-27 | End: 2025-02-27 | Stop reason: SURG

## 2025-02-27 RX ORDER — NALOXONE HCL 0.4 MG/ML
0.4 VIAL (ML) INJECTION AS NEEDED
Status: DISCONTINUED | OUTPATIENT
Start: 2025-02-27 | End: 2025-02-27 | Stop reason: HOSPADM

## 2025-02-27 RX ORDER — DIPHENHYDRAMINE HYDROCHLORIDE 50 MG/ML
12.5 INJECTION INTRAMUSCULAR; INTRAVENOUS ONCE AS NEEDED
Status: DISCONTINUED | OUTPATIENT
Start: 2025-02-27 | End: 2025-02-27 | Stop reason: HOSPADM

## 2025-02-27 RX ORDER — ACETAMINOPHEN 500 MG
1000 TABLET ORAL EVERY 8 HOURS SCHEDULED
Qty: 30 TABLET | Refills: 0 | Status: SHIPPED | OUTPATIENT
Start: 2025-02-27 | End: 2025-03-04

## 2025-02-27 RX ORDER — BUPIVACAINE HYDROCHLORIDE 2.5 MG/ML
INJECTION, SOLUTION EPIDURAL; INFILTRATION; INTRACAUDAL
Status: DISCONTINUED
Start: 2025-02-27 | End: 2025-02-27 | Stop reason: HOSPADM

## 2025-02-27 RX ORDER — FENTANYL CITRATE 50 UG/ML
50 INJECTION, SOLUTION INTRAMUSCULAR; INTRAVENOUS
Status: DISCONTINUED | OUTPATIENT
Start: 2025-02-27 | End: 2025-02-27 | Stop reason: HOSPADM

## 2025-02-27 RX ORDER — HEPARIN SODIUM 1000 [USP'U]/ML
INJECTION, SOLUTION INTRAVENOUS; SUBCUTANEOUS AS NEEDED
Status: DISCONTINUED | OUTPATIENT
Start: 2025-02-27 | End: 2025-02-27 | Stop reason: SURG

## 2025-02-27 RX ORDER — PHENYLEPHRINE HCL IN 0.9% NACL 1 MG/10 ML
SYRINGE (ML) INTRAVENOUS AS NEEDED
Status: DISCONTINUED | OUTPATIENT
Start: 2025-02-27 | End: 2025-02-27 | Stop reason: SURG

## 2025-02-27 RX ORDER — ONDANSETRON 2 MG/ML
4 INJECTION INTRAMUSCULAR; INTRAVENOUS ONCE AS NEEDED
Status: DISCONTINUED | OUTPATIENT
Start: 2025-02-27 | End: 2025-02-27 | Stop reason: HOSPADM

## 2025-02-27 RX ORDER — ROPIVACAINE HYDROCHLORIDE 5 MG/ML
INJECTION, SOLUTION EPIDURAL; INFILTRATION; PERINEURAL
Status: COMPLETED | OUTPATIENT
Start: 2025-02-27 | End: 2025-02-27

## 2025-02-27 RX ORDER — FLUMAZENIL 0.1 MG/ML
0.2 INJECTION INTRAVENOUS AS NEEDED
Status: DISCONTINUED | OUTPATIENT
Start: 2025-02-27 | End: 2025-02-27 | Stop reason: HOSPADM

## 2025-02-27 RX ADMIN — INSULIN HUMAN 8 UNITS: 100 INJECTION, SOLUTION PARENTERAL at 07:15

## 2025-02-27 RX ADMIN — SODIUM CHLORIDE 9 ML/HR: 9 INJECTION, SOLUTION INTRAVENOUS at 06:23

## 2025-02-27 RX ADMIN — Medication 100 MCG: at 09:46

## 2025-02-27 RX ADMIN — Medication 200 MCG: at 08:59

## 2025-02-27 RX ADMIN — OXYCODONE HYDROCHLORIDE 5 MG: 5 TABLET ORAL at 11:52

## 2025-02-27 RX ADMIN — Medication 100 MCG: at 07:49

## 2025-02-27 RX ADMIN — HEPARIN SODIUM 10000 UNITS: 1000 INJECTION, SOLUTION INTRAVENOUS; SUBCUTANEOUS at 08:32

## 2025-02-27 RX ADMIN — Medication 100 MCG: at 08:32

## 2025-02-27 RX ADMIN — CEFAZOLIN 2000 MG: 2 INJECTION, POWDER, FOR SOLUTION INTRAMUSCULAR; INTRAVENOUS at 07:22

## 2025-02-27 RX ADMIN — Medication 100 MCG: at 09:40

## 2025-02-27 RX ADMIN — DEXMEDETOMIDINE HYDROCHLORIDE 40 MCG: 100 INJECTION, SOLUTION INTRAVENOUS at 07:22

## 2025-02-27 RX ADMIN — PROPOFOL 70 MCG/KG/MIN: 10 INJECTION, EMULSION INTRAVENOUS at 07:39

## 2025-02-27 RX ADMIN — MIDAZOLAM 2 MG: 1 INJECTION INTRAMUSCULAR; INTRAVENOUS at 07:18

## 2025-02-27 RX ADMIN — Medication 100 MCG: at 09:30

## 2025-02-27 RX ADMIN — Medication 100 MCG: at 08:14

## 2025-02-27 RX ADMIN — Medication 100 MCG: at 08:06

## 2025-02-27 RX ADMIN — SODIUM CHLORIDE: 9 INJECTION, SOLUTION INTRAVENOUS at 07:30

## 2025-02-27 RX ADMIN — VASOPRESSIN 1 UNITS: 20 INJECTION INTRAVENOUS at 09:52

## 2025-02-27 RX ADMIN — MEPIVACAINE HYDROCHLORIDE 15 ML: 10 INJECTION, SOLUTION EPIDURAL; INFILTRATION at 07:22

## 2025-02-27 RX ADMIN — Medication 100 MCG: at 09:21

## 2025-02-27 RX ADMIN — PROPOFOL 30 MG: 10 INJECTION, EMULSION INTRAVENOUS at 08:00

## 2025-02-27 RX ADMIN — ROPIVACAINE HYDROCHLORIDE 15 ML: 5 INJECTION EPIDURAL; INFILTRATION; PERINEURAL at 07:22

## 2025-02-27 NOTE — ANESTHESIA POSTPROCEDURE EVALUATION
Patient: Lynne Cade    Procedure Summary       Date: 02/27/25 Room / Location: [facility] OR 04 / [facility] MAIN OR    Anesthesia Start: 0730 Anesthesia Stop: 1010    Procedure: ARTERIOVENOUS SHUNT GRAFT (Left: Head) Diagnosis:       A-V fistula      CKD (chronic kidney disease), stage IV      (A-V fistula [I77.0])      (CKD (chronic kidney disease), stage IV [N18.4])    Surgeons: Zaina Bernard MD Provider: Osvaldo Irene MD    Anesthesia Type: general with block ASA Status: 4            Anesthesia Type: general with block    Vitals  Vitals Value Taken Time   /54 02/27/25 1050   Temp     Pulse 76 02/27/25 1054   Resp 12 02/27/25 1028   SpO2 99 % 02/27/25 1054   Vitals shown include unfiled device data.        Post Anesthesia Care and Evaluation    Patient location during evaluation: PACU  Patient participation: complete - patient participated  Level of consciousness: awake  Pain scale: See nurse's notes for pain score.  Pain management: adequate    Airway patency: patent  Anesthetic complications: No anesthetic complications  PONV Status: none  Cardiovascular status: acceptable  Respiratory status: acceptable and spontaneous ventilation  Hydration status: acceptable    Comments: Patient seen and examined postoperatively; vital signs stable; SpO2 greater than or equal to 90%; cardiopulmonary status stable; nausea/vomiting adequately controlled; pain adequately controlled; no apparent anesthesia complications; patient discharged from anesthesia care when discharge criteria were met

## 2025-02-27 NOTE — ANESTHESIA PROCEDURE NOTES
Peripheral Block    Pre-sedation assessment completed: 2/27/2025 7:00 AM    Patient reassessed immediately prior to procedure    Patient location during procedure: pre-op  Reason for block: procedure for pain, at surgeon's request, post-op pain management and secondary anesthetic  Performed by  Anesthesiologist: Osvaldo Irene MD  Assisted by: Jocelin Day RN  Preanesthetic Checklist  Completed: patient identified, IV checked, site marked, risks and benefits discussed, surgical consent, monitors and equipment checked, pre-op evaluation and timeout performed  Prep:  Pt Position: sitting  Sterile barriers:cap, gloves, mask and washed/disinfected hands  Prep: ChloraPrep  Patient monitoring: blood pressure monitoring, continuous pulse oximetry and EKG  Procedure    Sedation: yes  Performed under: local infiltration  Guidance:ultrasound guided and landmark technique    ULTRASOUND INTERPRETATION.  Using ultrasound guidance a 22 G gauge needle was placed in close proximity to the brachial plexus nerve, at which point, under ultrasound guidance anesthetic was injected in the area of the nerve and spread of the anesthesia was seen on ultrasound in close proximity thereto.  There were no abnormalities seen on ultrasound; a digital image was taken; and the patient tolerated the procedure with no complications. Images:still images obtained, printed/placed on chart    Laterality:left  Block Type:supraclavicular  Injection Technique:single-shot  Needle Type:echogenic  Needle Gauge:22 G  Resistance on Injection: less than 15 psi    Medications Used: ropivacaine (NAROPIN) 0.5 % injection - Perineural   15 mL - 2/27/2025 7:22:00 AM  mepivacaine PF (CARBOCAINE) 1 % injection - Perineural   15 mL - 2/27/2025 7:22:00 AM  dexmedetomidine HCl (PRECEDEX) injection - Perineural   40 mcg - 2/27/2025 7:22:00 AM      Post Assessment  Injection Assessment: negative aspiration for heme, no paresthesia on injection and incremental  injection  Patient Tolerance:comfortable throughout block  Complications:no  Additional Notes  Pre-procedure:  Peripheral nerve block performed preoperatively prior to the start of anesthesia time at the request of the patient and the surgeon for the management of postoperative acute surgical pain as well as for a secondary intraoperative anesthetic (general anesthesia is the primary intraoperative anesthetic); patient identified; pre-procedure vital signs, all relevant labs/studies, complete medical/surgical/anesthetic history, full medication list, full allergy list, and NPO status obtained/reviewed; physical assessment performed; anesthetic options, side effects, potential complications, risks, and benefits discussed; questions answered; patient wishes to proceed with the procedure; written anesthesia procedure consent obtained; patient cleared for procedure; time out performed; IV access in situ    Procedure:  ASA monitor placed; supplemental oxygen provided; patient positioned; hand hygiene performed; sterile technique maintained throughout the procedure; sterile prep applied; insertion site determined by anatomical landmarks, palpation, and ultrasound imaging; live ultrasound guidance throughout the procedure; target nerves/landmarks identified on live ultrasound; skin and subcutaneous tissues numbed by injection of 1% lidocaine; 2 inch 22G [email or web address] Ultra 360 Insulated Echogenic Needle used; realtime needle advancement and placement near the target nerves witnessed on live ultrasound; negative aspiration prior to injection; correct needle placement confirmed on live ultrasound by local anesthetic spread around the target nerves; local anesthetic mixture injected with negative aspiration prior to each injection and after each 1-5 mL injected; needle withdrawn; dressing applied; ultrasound image printed and placed in the patient's permanent medical record    Post-procedure:  Peripheral nerve block placed  successfully; good block; no apparent complications; minimal estimated blood loss; vital signs stable throughout; see nurse's notes for vitals; transported to the OR, general anesthesia induced, and surgery started  Performed by: Osvaldo Irene MD

## 2025-02-27 NOTE — OUTREACH NOTE
Medical Week 1 Survey      Flowsheet Row Responses   [facility] facility patient discharged from? [facility]   Does the patient have one of the following disease processes/diagnoses(primary or secondary)? Other   Week 1 attempt successful? No   Unsuccessful attempts Attempt 2   Revoke Readmitted            Sharla GERARD - Registered Nurse

## 2025-02-27 NOTE — H&P
[facility]   PREOPERATIVE HISTORY AND PHYSICAL    Patient Name:Lynne Cade  : 1950  MRN: [id number]  Primary Care Physician: Lorraine Agarwal APRN  Date of admission: 2025    Subjective   Subjective     Chief Complaint: preoperative evaluation    History of Present Illness  Lynne Cade is a 74 y.o. female who presents for preoperative evaluation. She is scheduled for ARTERIOVENOUS SHUNT GRAFT (Left).  She has had a failed left arm brachiocephalic AV fistula.  She has a known high bifurcation of her brachial artery in the left arm so this was actually an ulnar to cephalic fistula.  She is CKD IV.  Given her anatomy, I have recommended a left axillary to axillary graft placement for hemodialysis access.      Review of Systems   All other systems reviewed and are negative.       Personal History     Past Medical History:   Diagnosis Date    Anemia     Hx of anemia    Anxiety     Arthritis     Conditions. Abstracted from [facility].    Cataract     bilateral    CHF (congestive heart failure) 22    Low iron    Cholelithiasis 30 yrs ago    Had gb removed    CKD (chronic kidney disease), stage IV 2023    Colon polyp     Depression     Take cymbalta    Diabetes mellitus     type 2    Diarrhea     on and off    Edema of right foot 2021    Healthcare maintenance 2008    PAP. Abstracted from [facility].    Hip pain, bilateral     HL (hearing loss) 19    Have hearing aids    Hyperlipidemia     Hypertension     Knee pain, bilateral     Obesity     Osteopenia 22    On med    Other forms of dyspnea     Other specified soft tissue disorders     Pneumonia due to COVID-19 virus 2022    Renal insufficiency Stage 3    Has gone up to stage 4    Slow to wake up after anesthesia     Spinal headache     Stress headaches     Tremor     Benign    Tremor of both hands     Urinary tract infection 22    [place]    Visual impairment 2017    Cataracts    Vitamin D  deficiency        Past Surgical History:   Procedure Laterality Date    ARTERIOVENOUS FISTULA/SHUNT SURGERY Left 02/24/2023    Procedure: BRACHIOCEPHALIC ARTERIOVENOUS FISTULA FORMATION;  Surgeon: Vignesh Riley MD;  Location: [facility];  Service: Vascular;  Laterality: Left;    BREAST BIOPSY Right     CHOLECYSTECTOMY  1990    COLONOSCOPY      This year 2022    EYE SURGERY Bilateral     cataract removal    GASTRIC BYPASS  2002    HERNIA REPAIR  2005    umbilical hernia    HIP OPEN REDUCTION Right 03/02/2020    Procedure: FEMORAL NECK OPEN REDUCTION INTERNAL FIXATION;  Surgeon: Yfn Sifuentes MD;  Location: [facility] OR;  Service: Orthopedics;  Laterality: Right;  Cannulated screw fixation right femoral neck    JOINT REPLACEMENT  2020    R knee    LAPAROTOMY OOPHERECTOMY Bilateral 2010    OTHER SURGICAL HISTORY  04/20/2015    Lexiscan stress test, normal. Abstracted from [facility].    PANNICULECTOMY      TOTAL KNEE ARTHROPLASTY Right 04/29/2021    Procedure: RIGHT TOTAL KNEE REPLACEMENT;  Surgeon: Yfn Sifuentes MD;  Location: [facility];  Service: Orthopedics;  Laterality: Right;       Family History: Her family history includes Anxiety disorder in her daughter; Asthma in her mother; Depression in her mother; Diabetes in her daughter and father; Early death in her daughter; Hyperlipidemia in her father; Hypertension in her father; Kidney disease in her mother; Miscarriages / Stillbirths in her daughter and mother.     Social History: She  reports that she has never smoked. She has never been exposed to tobacco smoke. She has never used smokeless tobacco. She reports that she does not drink alcohol and does not use drugs.    Home Medications:  Accu-Chek Megan, Accu-Chek Softclix Lancets, B-D SINGLE USE SWABS REGULAR, DULoxetine, True Metrix Air Glucose Meter, Vibegron, accu-chek soft touch, albuterol sulfate HFA, alendronate, allopurinol, calcitriol, cyclobenzaprine, dilTIAZem CD, furosemide,  gabapentin, glimepiride, glucose blood, hydrALAZINE, hydrOXYzine, lidocaine, primidone, rosuvastatin, sevelamer, and sodium bicarbonate    Allergies:  She is allergic to carvedilol, morphine, sulfa antibiotics, hydrocodone, and diphenhydramine.    Objective    Objective     Vitals:    Temp:  [98.3 °F (36.8 °C)] 98.3 °F (36.8 °C)  Heart Rate:  [89] 89  Resp:  [17] 17  BP: (154)/(65) 154/65    Physical Exam  Vitals reviewed.   Constitutional:       General: She is not in acute distress.     Appearance: Normal appearance.   HENT:      Head: Normocephalic and atraumatic.      Right Ear: External ear normal.      Left Ear: External ear normal.      Nose: Nose normal.      Mouth/Throat:      Mouth: Mucous membranes are moist.      Pharynx: Oropharynx is clear.   Eyes:      Extraocular Movements: Extraocular movements intact.      Conjunctiva/sclera: Conjunctivae normal.      Pupils: Pupils are equal, round, and reactive to light.   Cardiovascular:      Rate and Rhythm: Normal rate and regular rhythm.      Pulses:           Carotid pulses are 2+ on the right side and 2+ on the left side.       Radial pulses are 2+ on the right side and 2+ on the left side.      Comments: Brachial 2+ bilaterally  No arm swelling  Pulmonary:      Comments: Non labored respirations on room air, equal chest rise  Abdominal:      General: Abdomen is flat. There is no distension.      Palpations: Abdomen is soft.   Musculoskeletal:      Cervical back: Normal range of motion. No rigidity.      Right lower leg: No edema.      Left lower leg: No edema.   Skin:     General: Skin is warm and dry.      Capillary Refill: Capillary refill takes less than 2 seconds.   Neurological:      General: No focal deficit present.      Mental Status: She is alert and oriented to person, place, and time.   Psychiatric:         Mood and Affect: Mood normal.         Behavior: Behavior normal.         Assessment & Plan   Assessment / Plan     Brief Patient  Summary:  Lynne Cade is a 74 y.o. female who presents for preoperative evaluation.    Pre-Op Diagnosis Codes:      * A-V fistula [I77.0]     * CKD (chronic kidney disease), stage IV [N18.4]    Active Hospital Problems:  Active Hospital Problems    Diagnosis     A-V fistula     CKD (chronic kidney disease), stage IV      Plan:   Procedure(s):  ARTERIOVENOUS SHUNT GRAFT    The risks, benefits, and alternatives of the procedure including but not limited to bleeding, infection, steal syndrome, IMN, stroke, heart attack, and death and risks of the anesthesia were discussed in detail with the patient and questions were answered. No guarantees were made or implied. Informed consent was obtained.    Zaina Bernard MD

## 2025-02-27 NOTE — OP NOTE
Operative Note  Location: [facility]  Date of Admission:  2/27/2025  OR Date: 2/27/2025      Pre-op Diagnosis: CKD stage IV    Post-op Diagnosis: Same    Procedure:   1) Left brachial to axillary loop  4-7 mm PTFE graft placement    Surgeon: Zaina Bernard MD    Assistant: Leanne Felix RN, Provided critical assistance in exposure, retraction, and suction that overall decrease blood loss and operative time.    Anesthesia: MAC with regional    Staff:   Circulator: Smita Rivas RN; Lynne Gonzalez RN  Scrub Person: Ronda Lind  Assistant: Leanne Felix CSFA    Estimated Blood Loss: 50 cc    Complications: none apparent      Implants:   4-7 mm PTFE graft    Indications:    The patient is a 74 year old female seen for evaluation of CKD stage IV.  She has had a failed left arm brachiocephalic AV fistula with a known high brachial artery bifurcation so I recommended a left axillary to axillary AV graft placement.     Procedure:    Patient brought to the operating room and positioned supine on the operating table with the Left arm out.  Appropriate perioperative antibiotics administered prior to skin incision.  Timeout was performed with all OR staff present and in agreement.  MAC anesthesia introduced without event.   Left arm block was performed by anesthesia in the pre operative area.   Left arm was prepped and draped in standard sterile fashion. The arm was examined under US, and the brachial artery and axillary vein were identified and were of good quality.   The brachial artery bifurcation was very high in the arm, almost in the axilla.  A longitudinal incision was made in the axilla and dissection was carried down through the soft tissues and fascia to expose the brachial artery and axillary veins, taking care to preserve the adjacent nervous structures.  The brachial artery and axillary vein were dissected free and any side branches were ligated.  This was just superior to the brachial artery bifurcation.   These were encircled with vessel loops proximally and distally.  Then, a counter incision was made on the arm and a 4-7 mm PTFE graft was tunneled subcutaneously from the axilla to the counter incision and back to the axillary incision.  The graft was flushed with heparinized saline.  The patient was systemically heparinized, then the artery was occluded with vascular clamps.  A longitudinal arteriotomy was made and the 4 mm portion of the graft was cut to an appropriate size and sewn to the artery in an end to side fashion with a 5-0 HS7 prolene suture.  This was completed, the clamps were removed from the artery, and the graft was flushed then clamped.  The vein was controlled with clamps then a longitudinal venotomy was made.  The 7mm portion of the graft was cut to an appropriate size and sewn to the vein in an end to side fashion.  This was flushed appropriately prior to completion.  There was an excellent thrill in the graft and the radial and ulnar pulses were intact at the wrist.  Hemostasis was obtained and protamine was given for heparin reversal.  The incisions were irrigated with NS then closed in layers.  The axillary incision was closed in layers with running 2-0 and 3-0 vicryl sutures and the skin was closed with a running 3-0 monocryl and glue.  The counter incision was closed with running 3-0 and 4-0 vicryl sutures.  Skin glue was applied to the skin.  The patient tolerated the procedure well with no apparent complications.  All counts were reported as correct at the conclusion of the procedure. Anesthesia was reversed and the patient was transferred to the PACU in stable condition.          Active Hospital Problems    Diagnosis  POA    A-V fistula [I77.0]  Unknown    CKD (chronic kidney disease), stage IV [N18.4]  Unknown         Zaina Bernard MD  Vascular Surgery  O: [phone]  F: [phone]

## 2025-02-27 NOTE — ANESTHESIA PREPROCEDURE EVALUATION
Anesthesia Evaluation     Patient summary reviewed and Nursing notes reviewed   history of anesthetic complications:  prolonged sedation  NPO Solid Status: > 8 hours  NPO Liquid Status: > 2 hours           Airway   Dental      Pulmonary    (+) pneumonia , COPD,shortness of breath  Cardiovascular     ECG reviewed    (+) hypertension, CAD, CHF , WOODALL, hyperlipidemia      Neuro/Psych  (+) headaches, syncope, tremors, weakness, psychiatric history Anxiety and Depression  GI/Hepatic/Renal/Endo    (+) obesity, renal disease- ARF and CRI, diabetes mellitus    Musculoskeletal     Abdominal    Substance History      OB/GYN          Other   arthritis, blood dyscrasia anemia,     ROS/Med Hx Other: Additional History:  Hyperglycemia, hyperphosphatemia, hypermagnesemia, nephropathy, edmea    Echo:    Echocardiogram Findings    Left Ventricle Left ventricular systolic function is normal. Left ventricular ejection fraction appears to be 56 - 60%.   Septal wall motion is normal. Normal global longitudinal LV strain (GLS) = -19.5%. Left ventricle strain data was reviewed by the physician and found to be accurate. Normal left ventricular cavity size and wall thickness noted. All left ventricular wall segments contract normally. Left ventricular diastolic function is consistent with (grade I) impaired relaxation.  Right Ventricle Normal right ventricular cavity size, wall thickness, systolic function and septal motion noted.  Left Atrium Normal left atrial size and volume noted. There is no spontaneous echo contrast present. No evidence of cardiac transplantation present. The interatrial septum does not appear to be redundant. No interatrial septal aneurysm present. No lipomatous hypertrophy of the interatrial septum present. Cannot exclude the presence of a patent foramen ovale.  Right Atrium Normal right atrial cavity size noted. The inferior vena cava is normally sized. Normal IVC inspiratory collapse of greater than 50%  noted.  Aortic Valve The aortic valve is structurally normal with no regurgitation or stenosis present. The aortic valve appears trileaflet.  Mitral Valve The mitral valve is grossly normal in structure. Trace to mild mitral valve regurgitation is present with a posteriorly-directed jet noted. No significant mitral valve stenosis is present.  Tricuspid Valve The tricuspid valve is grossly normal in structure. Trace to mild tricuspid valve regurgitation is present. Estimated right ventricular systolic pressure from tricuspid regurgitation is normal (<35 mmHg). No evidence of pulmonary hypertension is present. No evidence of significant tricuspid valve stenosis is present.  Pulmonic Valve The pulmonic valve is structurally normal with no significant stenosis present. There is no significant pulmonic valve regurgitation present.  Greater Vessels No dilation of the aortic root is present. No dilation of the sinuses of Valsalva is present.  Pericardium There is no evidence of pericardial effusion. .          PSH:  HERNIA REPAIR PANNICULECTOMY  GASTRIC BYPASS OTHER SURGICAL HISTORY  HIP OPEN REDUCTION BREAST BIOPSY  TOTAL KNEE ARTHROPLASTY ARTERIOVENOUS FISTULA/SHUNT SURGERY  EYE SURGERY CHOLECYSTECTOMY  JOINT REPLACEMENT COLONOSCOPY  LAPAROTOMY OOPHERECTOMY               Anesthesia Plan    ASA 4     general with block   total IV anesthesia  (Patient identified; pre-operative vital signs, all relevant labs/studies, complete medical/surgical/anesthetic history, full medication list, full allergy list, and NPO status obtained/reviewed; physical assessment performed; anesthetic options, side effects, potential complications, risks, and benefits discussed; questions answered; written anesthesia consent obtained; patient cleared for procedure; anesthesia machine and equipment checked and functioning)  intravenous induction     Anesthetic plan, risks, benefits, and alternatives have been provided, discussed and informed consent  has been obtained with: patient.    Plan discussed with CRNA and CAA.    CODE STATUS:

## 2025-02-28 ENCOUNTER — TELEPHONE (OUTPATIENT)
Age: 75
End: 2025-02-28
Payer: MEDICARE

## 2025-02-28 NOTE — TELEPHONE ENCOUNTER
LVM with pt  stating that bruising, slight redness and swelling is all expected post procedure and can last for several days. Soreness/tenderness is also expected. I advised pt  to give us a call back to discuss symptoms.

## 2025-02-28 NOTE — TELEPHONE ENCOUNTER
Patient had AV shunt graft yesterday, 02.27.25. Patients spouse, Gordy, called with concerns. He stated that in the left arm, patient is experiencing swelling, bruising, redness around the bicep and pain. Pain is rated 5/10. He stated that the area is not warm to touch and the patient states that the area is not sensitive/ painful to touch. Patient is scheduled for a follow up with Joana on 03.13.25. Spouse if wanting to know if these are common side affects or if he should bring patient in sooner or present to ER.     Thanh patient - APRN out

## 2025-03-13 ENCOUNTER — OFFICE VISIT (OUTPATIENT)
Age: 75
End: 2025-03-13
Payer: MEDICARE

## 2025-03-13 ENCOUNTER — TRANSCRIBE ORDERS (OUTPATIENT)
Dept: ADMINISTRATIVE | Facility: HOSPITAL | Age: 75
End: 2025-03-13
Payer: MEDICARE

## 2025-03-13 ENCOUNTER — LAB (OUTPATIENT)
Dept: LAB | Facility: HOSPITAL | Age: 75
End: 2025-03-13
Payer: MEDICARE

## 2025-03-13 VITALS
HEART RATE: 87 BPM | RESPIRATION RATE: 15 BRPM | SYSTOLIC BLOOD PRESSURE: 122 MMHG | BODY MASS INDEX: 36.7 KG/M2 | HEIGHT: 64 IN | WEIGHT: 215 LBS | DIASTOLIC BLOOD PRESSURE: 77 MMHG | OXYGEN SATURATION: 97 %

## 2025-03-13 DIAGNOSIS — N18.4 CHRONIC KIDNEY DISEASE, STAGE IV (SEVERE): Primary | ICD-10-CM

## 2025-03-13 DIAGNOSIS — Z99.2 HEMODIALYSIS ACCESS, AV GRAFT: ICD-10-CM

## 2025-03-13 DIAGNOSIS — N18.4 CKD (CHRONIC KIDNEY DISEASE) STAGE 4, GFR 15-29 ML/MIN: ICD-10-CM

## 2025-03-13 DIAGNOSIS — N18.4 CKD (CHRONIC KIDNEY DISEASE), STAGE IV: Primary | ICD-10-CM

## 2025-03-13 LAB
ANION GAP SERPL CALCULATED.3IONS-SCNC: 13.5 MMOL/L (ref 5–15)
BUN SERPL-MCNC: 33 MG/DL (ref 8–23)
BUN/CREAT SERPL: 11.5 (ref 7–25)
CALCIUM SPEC-SCNC: 9 MG/DL (ref 8.6–10.5)
CHLORIDE SERPL-SCNC: 106 MMOL/L (ref 98–107)
CO2 SERPL-SCNC: 22.5 MMOL/L (ref 22–29)
CREAT SERPL-MCNC: 2.86 MG/DL (ref 0.57–1)
EGFRCR SERPLBLD CKD-EPI 2021: 16.6 ML/MIN/1.73
GLUCOSE SERPL-MCNC: 165 MG/DL (ref 65–99)
POTASSIUM SERPL-SCNC: 4.9 MMOL/L (ref 3.5–5.2)
SODIUM SERPL-SCNC: 142 MMOL/L (ref 136–145)

## 2025-03-13 PROCEDURE — 36415 COLL VENOUS BLD VENIPUNCTURE: CPT

## 2025-03-13 PROCEDURE — 80048 BASIC METABOLIC PNL TOTAL CA: CPT

## 2025-03-13 PROCEDURE — 99024 POSTOP FOLLOW-UP VISIT: CPT | Performed by: STUDENT IN AN ORGANIZED HEALTH CARE EDUCATION/TRAINING PROGRAM

## 2025-03-13 RX ORDER — SITAGLIPTIN 25 MG/1
TABLET, FILM COATED ORAL
COMMUNITY
Start: 2025-02-23

## 2025-03-13 NOTE — PROGRESS NOTES
Chief Complaint  CKD stage IV, left arm AVG follow up    Subjective        Lynne Cade presents to [facility] VASCULAR SURGERY  History of Present Illness  74 y.o. female turns for postop follow-up status post left axillary to axillary loop AV graft placement on 2/27/2025.  Since hospital discharge, she has developed severe left upper extremity swelling and bruising.  She has had scant drainage from the left axillary incision and she has been applying a dry dressing to this as needed.  She denies any fevers or chills.  She denies any left arm weakness.    Past Medical History:   Diagnosis Date    Anemia     Hx of anemia    Anxiety     Arthritis     Conditions. Abstracted from [facility].    Cataract     bilateral    CHF (congestive heart failure) 6/01/22    Low iron    Cholelithiasis 30 yrs ago    Had gb removed    CKD (chronic kidney disease), stage IV 01/13/2023    Colon polyp     Depression 1995    Take cymbalta    Diabetes mellitus     type 2    Diarrhea     on and off    Edema of right foot 04/2021    Healthcare maintenance 2008    PAP. Abstracted from [facility].    Hip pain, bilateral     HL (hearing loss) 1/1/19    Have hearing aids    Hyperlipidemia     Hypertension     Knee pain, bilateral     Obesity     Osteopenia 11/1/22    On med    Other forms of dyspnea     Other specified soft tissue disorders     Pneumonia due to COVID-19 virus 06/26/2022    Renal insufficiency Stage 3    Has gone up to stage 4    Slow to wake up after anesthesia     Spinal headache     Stress headaches     Tremor     Benign    Tremor of both hands     Urinary tract infection 07/01/22    [place]    Visual impairment 6/1/2017    Cataracts    Vitamin D deficiency        Past Surgical History:   Procedure Laterality Date    ARTERIOVENOUS FISTULA/SHUNT SURGERY Left 02/24/2023    Procedure: BRACHIOCEPHALIC ARTERIOVENOUS FISTULA FORMATION;  Surgeon: Vignesh Riley MD;  Location: [facility] OR;  Service:  Vascular;  Laterality: Left;    ARTERIOVENOUS FISTULA/SHUNT SURGERY Left 2/27/2025    Procedure: ARTERIOVENOUS SHUNT GRAFT;  Surgeon: Zaina Bernard MD;  Location: [facility] MAIN OR;  Service: Vascular;  Laterality: Left;    BREAST BIOPSY Right     CHOLECYSTECTOMY  1990    COLONOSCOPY      This year 2022    EYE SURGERY Bilateral     cataract removal    GASTRIC BYPASS  2002    HERNIA REPAIR  2005    umbilical hernia    HIP OPEN REDUCTION Right 03/02/2020    Procedure: FEMORAL NECK OPEN REDUCTION INTERNAL FIXATION;  Surgeon: Yfn Sifuentes MD;  Location: [facility] MAIN OR;  Service: Orthopedics;  Laterality: Right;  Cannulated screw fixation right femoral neck    JOINT REPLACEMENT  2020    R knee    LAPAROTOMY OOPHERECTOMY Bilateral 2010    OTHER SURGICAL HISTORY  04/20/2015    Lexiscan stress test, normal. Abstracted from [facility].    PANNICULECTOMY      TOTAL KNEE ARTHROPLASTY Right 04/29/2021    Procedure: RIGHT TOTAL KNEE REPLACEMENT;  Surgeon: Yfn Sifuentes MD;  Location: [facility] MAIN OR;  Service: Orthopedics;  Laterality: Right;       Family History   Problem Relation Age of Onset    Asthma Mother     Depression Mother     Kidney disease Mother         Kidney stones    Miscarriages / Stillbirths Mother     Hyperlipidemia Father     Hypertension Father     Diabetes Father     Anxiety disorder Daughter     Early death Daughter     Miscarriages / Stillbirths Daughter     Diabetes Daughter          Social History     Tobacco Use    Smoking status: Never     Passive exposure: Never    Smokeless tobacco: Never   Vaping Use    Vaping status: Never Used   Substance Use Topics    Alcohol use: Never    Drug use: Never       Allergies: Carvedilol, Morphine, Sulfa antibiotics, Hydrocodone, and Diphenhydramine    Prior to Admission medications    Medication Sig Start Date End Date Taking? Authorizing Provider   Accu-Chek Softclix Lancets lancets TEST BLOOD SUGAR TWICE DAILY 4/12/22  Yes Rose Robb MD    albuterol sulfate  (90 Base) MCG/ACT inhaler Inhale 2 puffs Every 6 (Six) Hours. 12/30/24  Yes Suleman Hensley MD   Alcohol Swabs (B-D SINGLE USE SWABS REGULAR) pads TO USE WHEN CHECKING BLOOD SUGAR TWICE A DAY 4/12/22  Yes Rose Robb MD   Alcohol Swabs (B-D SINGLE USE SWABS REGULAR) pads BD Single Use Swab, See Instructions, Use 1 swab to check blood sugar up to TID, # 300 EA, 1 Refill(s), Pharmacy: [facility], E11.9, Use 1 swab to check blood sugar up to TID, Supply, 168, cm, 03/25/24 11:10:00 EDT, Height, 89.9, kg, 03/25/24 11:16:00 EDT, Weight Dosing 4/22/24  Yes Suleman Hensley MD   alendronate (FOSAMAX) 70 MG tablet Take 1 tablet by mouth Every 7 (Seven) Days.  Patient taking differently: Take 1 tablet by mouth Every 7 (Seven) Days. Sat 5/22/23  Yes Rose Robb MD   allopurinol (ZYLOPRIM) 100 MG tablet Take 1 tablet by mouth Daily.   Yes Suleman Hensley MD   Blood Glucose Calibration (Accu-Chek Megan) solution Use as directed 1/26/21  Yes Rose Robb MD   Blood Glucose Monitoring Suppl (Accu-Chek Megan) device Use to test twice daily   DX: E11.9 1/26/21  Yes Rose Robb MD   Blood Glucose Monitoring Suppl (True Metrix Air Glucose Meter) device True Metrix Air Glucose Meter, See Instructions, use to check blood sugar up to TID, # 1 EA, 0 Refill(s), Pharmacy: [facility], E11.9, use to check blood sugar up to TID, Supply, 168, cm, 03/25/24 11:10:00 EDT, Height, 89.9, kg, 03/25/24 11:16:00 EDT, Weight Dosing 4/22/24  Yes Suleman Hensley MD   calcitriol (ROCALTROL) 0.25 MCG capsule Take 2 capsules by mouth Daily.   Yes ProviderSuleman MD   cyclobenzaprine (FLEXERIL) 5 MG tablet Take 1 tablet by mouth 3 (Three) Times a Day As Needed.   Yes Provider, MD Suleman   dilTIAZem CD (CARDIZEM CD) 240 MG 24 hr capsule Take 1 capsule by mouth Daily for 30 days. 2/20/25 3/22/25 Yes True Cyr  MD Caryl   DULoxetine (CYMBALTA) 60 MG capsule Take 1 capsule by mouth 2 (Two) Times a Day. 2/15/23  Yes Rose Robb MD   furosemide (LASIX) 40 MG tablet Take 1 tablet by mouth Daily for 30 days. 2/20/25 3/22/25 Yes True Cyr MD   gabapentin (NEURONTIN) 100 MG capsule Take 1 capsule by mouth every night at bedtime. 12/20/23  Yes Suleman Hensley MD   glimepiride (AMARYL) 2 MG tablet TAKE 2 TABLETS TWICE DAILY  Patient taking differently: Take 2 tablets by mouth 2 (Two) Times a Day. 6/15/23  Yes Rose Robb MD   glucose blood (Accu-Chek Megan Plus) test strip TEST BLOOD SUGAR TWICE DAILY AS DIRECTED   DX  E11.9 5/11/22  Yes Rose Robb MD   hydrALAZINE (APRESOLINE) 100 MG tablet Take 1 tablet by mouth 3 times a day for 100 days. 2/19/25 5/30/25 Yes True Cyr MD   hydrOXYzine (ATARAX) 10 MG tablet Take 1 tablet by mouth 3 (Three) Times a Day As Needed for Itching. 8/14/23  Yes Becky Greco DO   Januvia 25 MG tablet  2/23/25  Yes Suleman Hensley MD   Lancets (accu-chek soft touch) lancets Check blood sugars twice daily as directed. DX:E11.9 1/28/21  Yes Rose Robb MD   lidocaine (Lidoderm) 5 % Apply  topically to the appropriate area as directed. 5/10/24  Yes Suleman Hensley MD   oxyCODONE (Roxicodone) 5 MG immediate release tablet Take 1 tablet by mouth Every 4 (Four) Hours As Needed for Moderate Pain. 2/27/25  Yes Zaina Bernard MD   primidone (MYSOLINE) 50 MG tablet TAKE 1 TABLET EVERY NIGHT.  Patient taking differently: Take 1 tablet by mouth Every Night. 8/28/23  Yes Irene Khoury APRN   rosuvastatin (CRESTOR) 10 MG tablet Take 1 tablet by mouth Daily. 12/27/22  Yes Rose Robb MD   sodium bicarbonate 650 MG tablet Take 1 tablet by mouth Daily. 5/11/23  Yes Provider, Suleman, MD   Vibegron (Gemtesa) 75 MG tablet Take 1 tablet by mouth Daily.   Yes Provider, Suleman, MD         Objective   Vital  "Signs:  /77 (BP Location: Left arm, Patient Position: Sitting, Cuff Size: Adult)   Pulse 87   Resp 15   Ht 162.6 cm (64.02\")   Wt 97.5 kg (215 lb) Comment: Pt provided  SpO2 97%   BMI 36.89 kg/m²   Estimated body mass index is 36.89 kg/m² as calculated from the following:    Height as of this encounter: 162.6 cm (64.02\").    Weight as of this encounter: 97.5 kg (215 lb).               Physical Exam  Vitals reviewed.   Constitutional:       General: She is not in acute distress.     Appearance: Normal appearance.   HENT:      Head: Normocephalic and atraumatic.      Right Ear: External ear normal.      Left Ear: External ear normal.      Nose: Nose normal.      Mouth/Throat:      Mouth: Mucous membranes are moist.      Pharynx: Oropharynx is clear.   Eyes:      Extraocular Movements: Extraocular movements intact.      Conjunctiva/sclera: Conjunctivae normal.      Pupils: Pupils are equal, round, and reactive to light.   Cardiovascular:      Rate and Rhythm: Normal rate and regular rhythm.      Pulses:           Carotid pulses are 2+ on the right side and 2+ on the left side.       Radial pulses are 2+ on the right side and 2+ on the left side.      Comments: 2+ left upper extremity edema  Left upper arm incision clean, dry, and intact.  Left axillary incision clean, dry, and intact with no erythema and no fluctuance.  There is no drainage from this incision.  There was significant bruising of the left upper arm along the track of the graft.  Palpable thrill and Doppler signal over the AV graft    Pulmonary:      Comments: Non labored respirations on room air, equal chest rise  Abdominal:      General: Abdomen is flat. There is no distension.      Palpations: Abdomen is soft.   Musculoskeletal:      Cervical back: Normal range of motion. No rigidity.      Right lower leg: No edema.      Left lower leg: No edema.   Skin:     General: Skin is warm and dry.      Capillary Refill: Capillary refill takes less " than 2 seconds.   Neurological:      General: No focal deficit present.      Mental Status: She is alert and oriented to person, place, and time.   Psychiatric:         Mood and Affect: Mood normal.         Behavior: Behavior normal.        Result Review :            Last Echo  Results for orders placed during the hospital encounter of 02/16/25    Adult Transthoracic Echo Complete W/ Cont if Necessary Per Protocol    Interpretation Summary    Left ventricular systolic function is normal. Left ventricular ejection fraction appears to be 56 - 60%.    Left ventricular diastolic function is consistent with (grade I) impaired relaxation.    Estimated right ventricular systolic pressure from tricuspid regurgitation is normal (<35 mmHg).      Results for orders placed during the hospital encounter of 06/01/22    Adult Transthoracic Echo Complete W/ Cont if Necessary Per Protocol    Interpretation Summary  · Estimated left ventricular EF was in agreement with the calculated left ventricular EF. Left ventricular ejection fraction appears to be 56 - 60%. Left ventricular systolic function is normal.  · Left ventricular diastolic function is consistent with (grade I) impaired relaxation.  · Estimated right ventricular systolic pressure from tricuspid regurgitation is normal (<35 mmHg).       Last Stress       Last Cath  No results found for this or any previous visit.                Assessment and Plan     Diagnoses and all orders for this visit:    1. CKD (chronic kidney disease), stage IV (Primary)    2. Hemodialysis access, AV graft    Lynne Castillo returns for a postop visit status post left arm axillary to axillary AV graft placement on 2/27/2025.  She has had significant left upper extremity swelling postoperatively but no signs or symptoms of infection.  She does have a palpable pulse distal to the graft and a palpable thrill and Doppler signal over the graft.  Her incisions are healing well with no signs of  infection.  I have recommended that she elevate her arm and wrap her arm with Ace bandages from fingertips to shoulder daily to help with the edema.  I reassured her that this will improve with elevation, compression, and time.  I will follow-up with her in 2 weeks with a graft duplex for surveillance.         Follow Up     Return in about 2 weeks (around 3/27/2025).  Patient was given instructions and counseling regarding her condition or for health maintenance advice. Please see specific information pulled into the AVS if appropriate.

## 2025-03-17 DIAGNOSIS — I50.33 DIASTOLIC CHF, ACUTE ON CHRONIC: Primary | ICD-10-CM

## 2025-03-19 ENCOUNTER — LAB (OUTPATIENT)
Dept: LAB | Facility: HOSPITAL | Age: 75
End: 2025-03-19
Payer: MEDICARE

## 2025-03-19 DIAGNOSIS — I50.33 DIASTOLIC CHF, ACUTE ON CHRONIC: ICD-10-CM

## 2025-03-19 LAB
ANION GAP SERPL CALCULATED.3IONS-SCNC: 15.2 MMOL/L (ref 5–15)
BUN SERPL-MCNC: 35 MG/DL (ref 8–23)
BUN/CREAT SERPL: 12.3 (ref 7–25)
CALCIUM SPEC-SCNC: 9.2 MG/DL (ref 8.6–10.5)
CHLORIDE SERPL-SCNC: 99 MMOL/L (ref 98–107)
CO2 SERPL-SCNC: 20.8 MMOL/L (ref 22–29)
CREAT SERPL-MCNC: 2.85 MG/DL (ref 0.57–1)
EGFRCR SERPLBLD CKD-EPI 2021: 16.6 ML/MIN/1.73
GLUCOSE SERPL-MCNC: 213 MG/DL (ref 65–99)
POTASSIUM SERPL-SCNC: 4.9 MMOL/L (ref 3.5–5.2)
SODIUM SERPL-SCNC: 135 MMOL/L (ref 136–145)

## 2025-03-19 PROCEDURE — 80048 BASIC METABOLIC PNL TOTAL CA: CPT

## 2025-03-19 PROCEDURE — 36415 COLL VENOUS BLD VENIPUNCTURE: CPT

## 2025-03-27 ENCOUNTER — OFFICE VISIT (OUTPATIENT)
Age: 75
End: 2025-03-27
Payer: MEDICARE

## 2025-03-27 VITALS
BODY MASS INDEX: 33.75 KG/M2 | DIASTOLIC BLOOD PRESSURE: 50 MMHG | HEIGHT: 66 IN | SYSTOLIC BLOOD PRESSURE: 122 MMHG | WEIGHT: 210 LBS

## 2025-03-27 DIAGNOSIS — Z95.828 S/P ARTERIOVENOUS (AV) GRAFT PLACEMENT: Primary | ICD-10-CM

## 2025-03-27 DIAGNOSIS — N18.4 CHRONIC KIDNEY DISEASE, STAGE 4 (SEVERE): ICD-10-CM

## 2025-03-27 PROCEDURE — 1159F MED LIST DOCD IN RCRD: CPT | Performed by: STUDENT IN AN ORGANIZED HEALTH CARE EDUCATION/TRAINING PROGRAM

## 2025-03-27 PROCEDURE — 3074F SYST BP LT 130 MM HG: CPT | Performed by: STUDENT IN AN ORGANIZED HEALTH CARE EDUCATION/TRAINING PROGRAM

## 2025-03-27 PROCEDURE — 99024 POSTOP FOLLOW-UP VISIT: CPT | Performed by: STUDENT IN AN ORGANIZED HEALTH CARE EDUCATION/TRAINING PROGRAM

## 2025-03-27 PROCEDURE — 3078F DIAST BP <80 MM HG: CPT | Performed by: STUDENT IN AN ORGANIZED HEALTH CARE EDUCATION/TRAINING PROGRAM

## 2025-03-27 PROCEDURE — 1160F RVW MEDS BY RX/DR IN RCRD: CPT | Performed by: STUDENT IN AN ORGANIZED HEALTH CARE EDUCATION/TRAINING PROGRAM

## 2025-03-27 RX ORDER — ZOLPIDEM TARTRATE 5 MG/1
5 TABLET ORAL NIGHTLY PRN
COMMUNITY
Start: 2025-03-23

## 2025-03-27 RX ORDER — DILTIAZEM HYDROCHLORIDE 300 MG/1
300 CAPSULE, COATED, EXTENDED RELEASE ORAL DAILY
COMMUNITY
Start: 2025-03-19

## 2025-03-27 RX ORDER — IPRATROPIUM BROMIDE AND ALBUTEROL SULFATE 2.5; .5 MG/3ML; MG/3ML
0.5 SOLUTION RESPIRATORY (INHALATION) 2 TIMES DAILY
COMMUNITY
Start: 2025-03-19

## 2025-03-27 NOTE — PROGRESS NOTES
Chief Complaint  Wound Check (2 wk ULE Wound check )    Subjective        Lynne Cade presents to [facility] VASCULAR SURGERY  History of Present Illness  74 y.o. female turns well for a postop visit status post left axillary to axillary AV graft placement on 2/27/2025.  She was seen 2 weeks ago and had significant left arm swelling.  This is improved but her left arm still remains swollen and she is having difficulty using it.  She also has developed a knot in her left axilla.  She denies fever and chills.  She denies numbness, tingling, and weakness of the left hand.    Past Medical History:   Diagnosis Date    Anemia     Hx of anemia    Anxiety     Arthritis     Conditions. Abstracted from [facility].    Cataract     bilateral    CHF (congestive heart failure) 6/01/22    Low iron    Cholelithiasis 30 yrs ago    Had gb removed    CKD (chronic kidney disease), stage IV 01/13/2023    Colon polyp     Depression 1995    Take cymbalta    Diabetes mellitus     type 2    Diarrhea     on and off    Edema of right foot 04/2021    Healthcare maintenance 2008    PAP. Abstracted from [facility].    Hip pain, bilateral     HL (hearing loss) 1/1/19    Have hearing aids    Hyperlipidemia     Hypertension     Knee pain, bilateral     Obesity     Osteopenia 11/1/22    On med    Other forms of dyspnea     Other specified soft tissue disorders     Pneumonia due to COVID-19 virus 06/26/2022    Renal insufficiency Stage 3    Has gone up to stage 4    Slow to wake up after anesthesia     Spinal headache     Stress headaches     Tremor     Benign    Tremor of both hands     Urinary tract infection 07/01/22    [place]    Visual impairment 6/1/2017    Cataracts    Vitamin D deficiency        Past Surgical History:   Procedure Laterality Date    ARTERIOVENOUS FISTULA/SHUNT SURGERY Left 02/24/2023    Procedure: BRACHIOCEPHALIC ARTERIOVENOUS FISTULA FORMATION;  Surgeon: Vignesh Riley MD;  Location: [facility]  OR;  Service: Vascular;  Laterality: Left;    ARTERIOVENOUS FISTULA/SHUNT SURGERY Left 2/27/2025    Procedure: ARTERIOVENOUS SHUNT GRAFT;  Surgeon: Zaina Bernard MD;  Location: [facility] MAIN OR;  Service: Vascular;  Laterality: Left;    BREAST BIOPSY Right     CHOLECYSTECTOMY  1990    COLONOSCOPY      This year 2022    EYE SURGERY Bilateral     cataract removal    GASTRIC BYPASS  2002    HERNIA REPAIR  2005    umbilical hernia    HIP OPEN REDUCTION Right 03/02/2020    Procedure: FEMORAL NECK OPEN REDUCTION INTERNAL FIXATION;  Surgeon: Yfn Sifuentes MD;  Location: [facility] MAIN OR;  Service: Orthopedics;  Laterality: Right;  Cannulated screw fixation right femoral neck    JOINT REPLACEMENT  2020    R knee    LAPAROTOMY OOPHERECTOMY Bilateral 2010    OTHER SURGICAL HISTORY  04/20/2015    Lexiscan stress test, normal. Abstracted from [facility].    PANNICULECTOMY      TOTAL KNEE ARTHROPLASTY Right 04/29/2021    Procedure: RIGHT TOTAL KNEE REPLACEMENT;  Surgeon: Yfn Sifuentes MD;  Location: [facility] MAIN OR;  Service: Orthopedics;  Laterality: Right;       Family History   Problem Relation Age of Onset    Asthma Mother     Depression Mother     Kidney disease Mother         Kidney stones    Miscarriages / Stillbirths Mother     Hyperlipidemia Father     Hypertension Father     Diabetes Father     Anxiety disorder Daughter     Early death Daughter     Miscarriages / Stillbirths Daughter     Diabetes Daughter          Social History     Tobacco Use    Smoking status: Never     Passive exposure: Never    Smokeless tobacco: Never   Vaping Use    Vaping status: Never Used   Substance Use Topics    Alcohol use: Never    Drug use: Never       Allergies: Carvedilol, Morphine, Sulfa antibiotics, Hydrocodone, and Diphenhydramine    Prior to Admission medications    Medication Sig Start Date End Date Taking? Authorizing Provider   Accu-Chek Softclix Lancets lancets TEST BLOOD SUGAR TWICE DAILY 4/12/22  Yes Rose Robb  MD ARLETH   albuterol sulfate  (90 Base) MCG/ACT inhaler Inhale 2 puffs Every 6 (Six) Hours. 12/30/24  Yes Suleman Hensley MD   Alcohol Swabs (B-D SINGLE USE SWABS REGULAR) pads TO USE WHEN CHECKING BLOOD SUGAR TWICE A DAY 4/12/22  Yes Rose Robb MD   Alcohol Swabs (B-D SINGLE USE SWABS REGULAR) pads BD Single Use Swab, See Instructions, Use 1 swab to check blood sugar up to TID, # 300 EA, 1 Refill(s), Pharmacy: [facility], E11.9, Use 1 swab to check blood sugar up to TID, Supply, 168, cm, 03/25/24 11:10:00 EDT, Height, 89.9, kg, 03/25/24 11:16:00 EDT, Weight Dosing 4/22/24  Yes Suleman Hensley MD   alendronate (FOSAMAX) 70 MG tablet Take 1 tablet by mouth Every 7 (Seven) Days.  Patient taking differently: Take 1 tablet by mouth Every 7 (Seven) Days. Sat 5/22/23  Yes Rose Robb MD   allopurinol (ZYLOPRIM) 100 MG tablet Take 1 tablet by mouth Daily.   Yes Suleman Hensley MD   Blood Glucose Calibration (Accu-Chek Megan) solution Use as directed 1/26/21  Yes Rose Robb MD   Blood Glucose Monitoring Suppl (Accu-Chek Megan) device Use to test twice daily   DX: E11.9 1/26/21  Yes Rose Robb MD   Blood Glucose Monitoring Suppl (True Metrix Air Glucose Meter) device True Metrix Air Glucose Meter, See Instructions, use to check blood sugar up to TID, # 1 EA, 0 Refill(s), Pharmacy: [facility], E11.9, use to check blood sugar up to TID, Supply, 168, cm, 03/25/24 11:10:00 EDT, Height, 89.9, kg, 03/25/24 11:16:00 EDT, Weight Dosing 4/22/24  Yes Suleman Hensley MD   calcitriol (ROCALTROL) 0.25 MCG capsule Take 2 capsules by mouth Daily.   Yes Suleman Hensley MD   cyclobenzaprine (FLEXERIL) 5 MG tablet Take 1 tablet by mouth 3 (Three) Times a Day As Needed.   Yes Provider, Suleman, MD   dilTIAZem CD (CARDIZEM CD) 300 MG 24 hr capsule Take 1 capsule by mouth Daily. 3/19/25  Yes Provider, MD Suleman    DULoxetine (CYMBALTA) 60 MG capsule Take 1 capsule by mouth 2 (Two) Times a Day. 2/15/23  Yes Rose Robb MD   gabapentin (NEURONTIN) 100 MG capsule Take 1 capsule by mouth every night at bedtime. 12/20/23  Yes Suleman Hensley MD   glimepiride (AMARYL) 2 MG tablet TAKE 2 TABLETS TWICE DAILY  Patient taking differently: Take 2 tablets by mouth 2 (Two) Times a Day. 6/15/23  Yes Rose Robb MD   glucose blood (Accu-Chek Megan Plus) test strip TEST BLOOD SUGAR TWICE DAILY AS DIRECTED   DX  E11.9 5/11/22  Yes Rose Robb MD   hydrALAZINE (APRESOLINE) 100 MG tablet Take 1 tablet by mouth 3 times a day for 100 days. 2/19/25 5/30/25 Yes True Cyr MD   hydrOXYzine (ATARAX) 10 MG tablet Take 1 tablet by mouth 3 (Three) Times a Day As Needed for Itching. 8/14/23  Yes Becky Greco DO   ipratropium-albuterol (DUO-NEB) 0.5-2.5 mg/3 ml nebulizer Take 0.5 mL by nebulization 2 (Two) Times a Day. 3/19/25  Yes Suleman Hensley MD   Januvia 25 MG tablet  2/23/25  Yes Suleman Hensley MD   Lancets (accu-chek soft touch) lancets Check blood sugars twice daily as directed. DX:E11.9 1/28/21  Yes Rose Robb MD   lidocaine (Lidoderm) 5 % Apply  topically to the appropriate area as directed. 5/10/24  Yes ProviderSuleman MD   primidone (MYSOLINE) 50 MG tablet TAKE 1 TABLET EVERY NIGHT.  Patient taking differently: Take 1 tablet by mouth Every Night. 8/28/23  Yes Irene Khoury APRN   rosuvastatin (CRESTOR) 10 MG tablet Take 1 tablet by mouth Daily. 12/27/22  Yes Rose Robb MD   sodium bicarbonate 650 MG tablet Take 1 tablet by mouth Daily. 5/11/23  Yes ProviderSuleman MD   Vibegron (Gemtesa) 75 MG tablet Take 1 tablet by mouth Daily.   Yes Provider, MD Suleman   zolpidem (AMBIEN) 5 MG tablet Take 1 tablet by mouth At Night As Needed. 3/23/25  Yes Provider, MD Suleman   furosemide (LASIX) 40 MG tablet Take 1 tablet by mouth Daily for  "30 days. 2/20/25 3/22/25  True Cyr MD   dilTIAZem CD (CARDIZEM CD) 240 MG 24 hr capsule Take 1 capsule by mouth Daily for 30 days. 2/20/25 3/27/25  True Cyr MD   oxyCODONE (Roxicodone) 5 MG immediate release tablet Take 1 tablet by mouth Every 4 (Four) Hours As Needed for Moderate Pain. 2/27/25 3/27/25  Zaina Bernard MD         Objective   Vital Signs:  /50   Ht 167.6 cm (66\")   Wt 95.3 kg (210 lb)   BMI 33.89 kg/m²   Estimated body mass index is 33.89 kg/m² as calculated from the following:    Height as of this encounter: 167.6 cm (66\").    Weight as of this encounter: 95.3 kg (210 lb).               Physical Exam  Vitals reviewed.   Constitutional:       General: She is not in acute distress.     Appearance: Normal appearance.   HENT:      Head: Normocephalic and atraumatic.      Right Ear: External ear normal.      Left Ear: External ear normal.      Nose: Nose normal.      Mouth/Throat:      Mouth: Mucous membranes are moist.      Pharynx: Oropharynx is clear.   Eyes:      Extraocular Movements: Extraocular movements intact.      Conjunctiva/sclera: Conjunctivae normal.      Pupils: Pupils are equal, round, and reactive to light.   Cardiovascular:      Rate and Rhythm: Normal rate and regular rhythm.      Pulses:           Carotid pulses are 2+ on the right side and 2+ on the left side.       Radial pulses are 2+ on the right side and 2+ on the left side.      Comments: 2+ left upper extremity edema  Left Celerity incision with fibrinous exudate in the center of the incision.  Minimal erythema and no fluctuance.  Bruising and swelling of the arm have improved but not completely resolved  Palpable thrill and Doppler signal over the AV graft    Pulmonary:      Comments: Non labored respirations on room air, equal chest rise  Abdominal:      General: Abdomen is flat. There is no distension.      Palpations: Abdomen is soft.   Musculoskeletal:      Cervical back: " Normal range of motion. No rigidity.      Right lower leg: No edema.      Left lower leg: No edema.   Skin:     General: Skin is warm and dry.      Capillary Refill: Capillary refill takes less than 2 seconds.   Neurological:      General: No focal deficit present.      Mental Status: She is alert and oriented to person, place, and time.   Psychiatric:         Mood and Affect: Mood normal.         Behavior: Behavior normal.        Result Review :            Last Echo  Results for orders placed during the hospital encounter of 02/16/25    Adult Transthoracic Echo Complete W/ Cont if Necessary Per Protocol    Interpretation Summary    Left ventricular systolic function is normal. Left ventricular ejection fraction appears to be 56 - 60%.    Left ventricular diastolic function is consistent with (grade I) impaired relaxation.    Estimated right ventricular systolic pressure from tricuspid regurgitation is normal (<35 mmHg).      Results for orders placed during the hospital encounter of 06/01/22    Adult Transthoracic Echo Complete W/ Cont if Necessary Per Protocol    Interpretation Summary  · Estimated left ventricular EF was in agreement with the calculated left ventricular EF. Left ventricular ejection fraction appears to be 56 - 60%. Left ventricular systolic function is normal.  · Left ventricular diastolic function is consistent with (grade I) impaired relaxation.  · Estimated right ventricular systolic pressure from tricuspid regurgitation is normal (<35 mmHg).       Last Stress       Last Cath  No results found for this or any previous visit.                Assessment and Plan     Diagnoses and all orders for this visit:    1. S/P arteriovenous (AV) graft placement (Primary)  -     Duplex Hemodialysis Access CAR; Future    2. Chronic kidney disease, stage 4 (severe)  -     Duplex Hemodialysis Access CAR; Future    Lynne Cade is a 74-year-old female who returns for follow-up after left axillary to axillary  AV graft placement.  She is now 4 weeks postop.  She is not on dialysis yet.  Her arm swelling has improved but not fully resolved with Ace wrap compression and elevation.  Her left axillary incision does appear to have more fibrinous exudate today but it is not infected.  I think that this incision is staying too moist.  I have recommended that she keep a dry ABD pad in her axilla at all times.  I recommended that she continue wrapping her arm with Ace bandages.  I recommended Aquaphor to her left arm for dry, flaky skin.  I will follow-up with her in 2 weeks with an AV graft duplex for another wound check.  If her left arm swelling is not significantly improving, she will need a fistulogram and likely venous angioplasty, which I explained to her and her .         Follow Up     No follow-ups on file.  Patient was given instructions and counseling regarding her condition or for health maintenance advice. Please see specific information pulled into the AVS if appropriate.   Any information in this note that has been copied and pasted was reviewed and edited to reflect today's exam.  This note was created using Dragon dictation software.  I have reviewed the note for accuracy and made corrections as needed.  Please note that some errors may still exist, please contact me with any questions or concerns.

## 2025-04-08 ENCOUNTER — APPOINTMENT (OUTPATIENT)
Dept: WOUND CARE | Facility: HOSPITAL | Age: 75
End: 2025-04-08
Payer: MEDICARE

## 2025-04-08 ENCOUNTER — HOSPITAL ENCOUNTER (OUTPATIENT)
Dept: CARDIOLOGY | Facility: HOSPITAL | Age: 75
Discharge: HOME | End: 2025-04-08
Payer: MEDICARE

## 2025-04-08 DIAGNOSIS — N18.4 CHRONIC KIDNEY DISEASE, STAGE 4 (SEVERE): ICD-10-CM

## 2025-04-08 DIAGNOSIS — Z95.828 S/P ARTERIOVENOUS (AV) GRAFT PLACEMENT: ICD-10-CM

## 2025-04-08 PROCEDURE — 93990 DOPPLER FLOW TESTING: CPT

## 2025-04-09 ENCOUNTER — OFFICE VISIT (OUTPATIENT)
Dept: WOUND CARE | Facility: HOSPITAL | Age: 75
End: 2025-04-09
Payer: MEDICARE

## 2025-04-09 LAB
BH CV VAS DIALYSIS ARTERIAL ANASTOMOSIS DIAMETER: 0.7 CM
BH CV VAS DIALYSIS ARTERIAL ANASTOMOSIS EDV: 192 CM/SEC
BH CV VAS DIALYSIS ARTERIAL ANASTOMOSIS PSV: 443 CM/SEC
BH CV VAS DIALYSIS CONDUIT DIST DEPTH: 1.3 CM
BH CV VAS DIALYSIS CONDUIT DIST DIAMETER: 0.7 CM
BH CV VAS DIALYSIS CONDUIT DIST EDV: 66.7 CM/SEC
BH CV VAS DIALYSIS CONDUIT DIST FLOW VOL: 1112 ML/MIN
BH CV VAS DIALYSIS CONDUIT DIST PSV: 125 CM/SEC
BH CV VAS DIALYSIS CONDUIT MID DEPTH: 0.9 CM
BH CV VAS DIALYSIS CONDUIT MID DIAMETER: 0.7 CM
BH CV VAS DIALYSIS CONDUIT MID EDV: 63.3 CM/SEC
BH CV VAS DIALYSIS CONDUIT MID FLOW VOL: 960 ML/MIN
BH CV VAS DIALYSIS CONDUIT MID PSV: 123 CM/SEC
BH CV VAS DIALYSIS CONDUIT PROX DEPTH: 0.7 CM
BH CV VAS DIALYSIS CONDUIT PROX DIAMETER: 0.6 CM
BH CV VAS DIALYSIS CONDUIT PROX EDV: 113 CM/SEC
BH CV VAS DIALYSIS CONDUIT PROX FLOW VOL: 1000 ML/MIN
BH CV VAS DIALYSIS CONDUIT PROX PSV: 265 CM/SEC
BH CV VAS DIALYSIS PRE-INFLOW AXILLARY DIAMETER: 0.6 CM
BH CV VAS DIALYSIS PRE-INFLOW AXILLARY EDV: 104 CM/SEC
BH CV VAS DIALYSIS PRE-INFLOW AXILLARY FLOW VOL: 1306 ML/MIN
BH CV VAS DIALYSIS PRE-INFLOW AXILLARY PSV: 277 CM/SEC

## 2025-04-10 ENCOUNTER — OFFICE VISIT (OUTPATIENT)
Age: 75
End: 2025-04-10
Payer: MEDICARE

## 2025-04-10 VITALS
DIASTOLIC BLOOD PRESSURE: 74 MMHG | OXYGEN SATURATION: 93 % | HEART RATE: 87 BPM | HEIGHT: 66 IN | WEIGHT: 210 LBS | BODY MASS INDEX: 33.75 KG/M2 | SYSTOLIC BLOOD PRESSURE: 128 MMHG

## 2025-04-10 DIAGNOSIS — N18.4 CKD (CHRONIC KIDNEY DISEASE), STAGE IV: ICD-10-CM

## 2025-04-10 DIAGNOSIS — I87.1 VENOUS STENOSIS OF LEFT UPPER EXTREMITY: Primary | ICD-10-CM

## 2025-04-10 DIAGNOSIS — Z99.2 HEMODIALYSIS ACCESS, AV GRAFT: ICD-10-CM

## 2025-04-10 NOTE — PROGRESS NOTES
Chief Complaint  CKD, AV graft follow up    Subjective        Lynne Cade presents to [facility] VASCULAR SURGERY  History of Present Illness  74 y.o. female s/p left arm axillary to axillary loop graft placement returns for follow up.  She is doing well but her left arm swelling persists and has not improved since her last visit despite elevation and compression. She has a small wound on her left forearm and a partial dehiscence of her left axillary incision which are being treated at the wound care center and are improving.  No fevers, chills, or purulent drainage.     Past Medical History:   Diagnosis Date    Anemia     Hx of anemia    Anxiety     Controled  xanax prn    Arthritis     Conditions. Abstracted from [employer].    Cataract     bilateral    CHF (congestive heart failure) 6/01/22    Low iron    Cholelithiasis 30 yrs ago    Had gb removed    CKD (chronic kidney disease), stage IV 01/13/2023    Colon polyp     Depression 1995    Take cymbalta    Diabetes mellitus     type 2    Diarrhea     on and off    Edema of right foot 04/2021    Healthcare maintenance 2008    PAP. Abstracted from [employer].    Hip pain, bilateral     HL (hearing loss) 1/1/19    Have hearing aids    Hyperlipidemia     Hypertension     Knee pain, bilateral     Obesity     Osteopenia 11/1/22    On med    Other forms of dyspnea     Other specified soft tissue disorders     Pneumonia due to COVID-19 virus 06/26/2022    Renal insufficiency Stage 3    Has gone up to stage 4    Slow to wake up after anesthesia     Spinal headache     Stress headaches     Tremor     Benign    Tremor of both hands     Urinary tract infection 07/01/22    [place]    Visual impairment 6/1/2017    Cataracts    Vitamin D deficiency        Past Surgical History:   Procedure Laterality Date    ARTERIOVENOUS FISTULA/SHUNT SURGERY Left 02/24/2023    Procedure: BRACHIOCEPHALIC ARTERIOVENOUS FISTULA FORMATION;  Surgeon: Vignesh Riley MD;   Location: [facility] MAIN OR;  Service: Vascular;  Laterality: Left;    ARTERIOVENOUS FISTULA/SHUNT SURGERY Left 2/27/2025    Procedure: ARTERIOVENOUS SHUNT GRAFT;  Surgeon: Zaina Bernard MD;  Location: [facility] MAIN OR;  Service: Vascular;  Laterality: Left;    BREAST BIOPSY Right     CHOLECYSTECTOMY  1990    COLONOSCOPY      This year 2022    EYE SURGERY Bilateral     cataract removal    GASTRIC BYPASS  2002    HERNIA REPAIR  2005    umbilical hernia    HIP OPEN REDUCTION Right 03/02/2020    Procedure: FEMORAL NECK OPEN REDUCTION INTERNAL FIXATION;  Surgeon: Yfn Sifuentes MD;  Location: [facility] MAIN OR;  Service: Orthopedics;  Laterality: Right;  Cannulated screw fixation right femoral neck    JOINT REPLACEMENT  2020    R knee    LAPAROTOMY OOPHERECTOMY Bilateral 2010    OTHER SURGICAL HISTORY  04/20/2015    Lexiscan stress test, normal. [place] [facility] [place]    PANNICULECTOMY      TOTAL KNEE ARTHROPLASTY Right 04/29/2021    Procedure: RIGHT TOTAL KNEE REPLACEMENT;  Surgeon: Yfn Sifuentes MD;  Location: [facility] MAIN OR;  Service: Orthopedics;  Laterality: Right;       Family History   Problem Relation Age of Onset    Asthma Mother     Depression Mother     Kidney disease Mother         Kidney stones    Miscarriages / Stillbirths Mother     Hyperlipidemia Father     Hypertension Father     Diabetes Father     Anxiety disorder Daughter     Early death Daughter     Miscarriages / Stillbirths Daughter     Diabetes Daughter          Social History     Tobacco Use    Smoking status: Never     Passive exposure: Never    Smokeless tobacco: Never   Vaping Use    Vaping status: Never Used   Substance Use Topics    Alcohol use: Never    Drug use: Never       Allergies: Carvedilol, Morphine, Sulfa antibiotics, Hydrocodone, and Diphenhydramine    Prior to Admission medications    Medication Sig Start Date End Date Taking? Authorizing Provider   Accu-Chek Softclix Lancets lancets TEST BLOOD SUGAR TWICE DAILY 4/12/22   Yes Rose Robb MD   albuterol sulfate  (90 Base) MCG/ACT inhaler Inhale 2 puffs Every 6 (Six) Hours. 12/30/24  Yes Suleman Hensley MD   Alcohol Swabs (B-D SINGLE USE SWABS REGULAR) pads TO USE WHEN CHECKING BLOOD SUGAR TWICE A DAY 4/12/22  Yes Rose Robb MD   Alcohol Swabs (B-D SINGLE USE SWABS REGULAR) pads BD Single Use Swab, See Instructions, Use 1 swab to check blood sugar up to TID, # 300 EA, 1 Refill(s), Pharmacy: [facility], E11.9, Use 1 swab to check blood sugar up to TID, Supply, 168, cm, 03/25/24 11:10:00 EDT, Height, 89.9, kg, 03/25/24 11:16:00 EDT, Weight Dosing 4/22/24  Yes Suleman Hensley MD   alendronate (FOSAMAX) 70 MG tablet Take 1 tablet by mouth Every 7 (Seven) Days.  Patient taking differently: Take 1 tablet by mouth Every 7 (Seven) Days. Sat 5/22/23  Yes Rose Robb MD   allopurinol (ZYLOPRIM) 100 MG tablet Take 1 tablet by mouth Daily.   Yes Suleman Hensley MD   Blood Glucose Calibration (Accu-Chek Megan) solution Use as directed 1/26/21  Yes Rose Robb MD   Blood Glucose Monitoring Suppl (Accu-Chek Megan) device Use to test twice daily   DX: E11.9 1/26/21  Yes Rose Robb MD   Blood Glucose Monitoring Suppl (True Metrix Air Glucose Meter) device True Metrix Air Glucose Meter, See Instructions, use to check blood sugar up to TID, # 1 EA, 0 Refill(s), Pharmacy: [facility], E11.9, use to check blood sugar up to TID, Supply, 168, cm, 03/25/24 11:10:00 EDT, Height, 89.9, kg, 03/25/24 11:16:00 EDT, Weight Dosing 4/22/24  Yes Suleman Hensley MD   calcitriol (ROCALTROL) 0.25 MCG capsule Take 2 capsules by mouth Daily.   Yes Suleman Hensley MD   cyclobenzaprine (FLEXERIL) 5 MG tablet Take 1 tablet by mouth 3 (Three) Times a Day As Needed.   Yes Provider, Historical, MD   dilTIAZem CD (CARDIZEM CD) 300 MG 24 hr capsule Take 1 capsule by mouth Daily. 3/19/25  Yes  Suleman Hensley MD   DULoxetine (CYMBALTA) 60 MG capsule Take 1 capsule by mouth 2 (Two) Times a Day. 2/15/23  Yes Rose Robb MD   gabapentin (NEURONTIN) 100 MG capsule Take 1 capsule by mouth every night at bedtime. 12/20/23  Yes Suleman Hensley MD   glimepiride (AMARYL) 2 MG tablet TAKE 2 TABLETS TWICE DAILY  Patient taking differently: Take 2 tablets by mouth 2 (Two) Times a Day. 6/15/23  Yes Rose Robb MD   glucose blood (Accu-Chek Megan Plus) test strip TEST BLOOD SUGAR TWICE DAILY AS DIRECTED   DX  E11.9 5/11/22  Yes Rose Robb MD   hydrALAZINE (APRESOLINE) 100 MG tablet Take 1 tablet by mouth 3 times a day for 100 days. 2/19/25 5/30/25 Yes True Cyr MD   hydrOXYzine (ATARAX) 10 MG tablet Take 1 tablet by mouth 3 (Three) Times a Day As Needed for Itching. 8/14/23  Yes Becky Greco DO   ipratropium-albuterol (DUO-NEB) 0.5-2.5 mg/3 ml nebulizer Take 0.5 mL by nebulization 2 (Two) Times a Day. 3/19/25  Yes Suleman Hensley MD   Januvia 25 MG tablet  2/23/25  Yes Suleman Hensley MD   Lancets (accu-chek soft touch) lancets Check blood sugars twice daily as directed. DX:E11.9 1/28/21  Yes Rose Robb MD   lidocaine (Lidoderm) 5 % Apply  topically to the appropriate area as directed. 5/10/24  Yes Suleman Hensley MD   primidone (MYSOLINE) 50 MG tablet TAKE 1 TABLET EVERY NIGHT.  Patient taking differently: Take 1 tablet by mouth Every Night. 8/28/23  Yes Irene Khoury APRN   rosuvastatin (CRESTOR) 10 MG tablet Take 1 tablet by mouth Daily. 12/27/22  Yes Rose Robb MD   sodium bicarbonate 650 MG tablet Take 1 tablet by mouth Daily. 5/11/23  Yes Suleman Hensley MD   Vibegron (Gemtesa) 75 MG tablet Take 1 tablet by mouth Daily.   Yes Ayden MD Suleman   zolpidem (AMBIEN) 5 MG tablet Take 1 tablet by mouth At Night As Needed. 3/23/25  Yes Provider, MD Suleman   furosemide (LASIX) 40 MG tablet Take 1  "tablet by mouth Daily for 30 days. 2/20/25 3/22/25  True Cyr MD         Objective   Vital Signs:  /74   Pulse 87   Ht 167 cm (65.75\")   Wt 95.3 kg (210 lb)   SpO2 93%   BMI 34.16 kg/m²   Estimated body mass index is 34.16 kg/m² as calculated from the following:    Height as of this encounter: 167 cm (65.75\").    Weight as of this encounter: 95.3 kg (210 lb).               Physical Exam  Vitals reviewed.   Constitutional:       General: She is not in acute distress.     Appearance: Normal appearance.   HENT:      Head: Normocephalic and atraumatic.      Right Ear: External ear normal.      Left Ear: External ear normal.      Nose: Nose normal.      Mouth/Throat:      Mouth: Mucous membranes are moist.      Pharynx: Oropharynx is clear.   Eyes:      Extraocular Movements: Extraocular movements intact.      Conjunctiva/sclera: Conjunctivae normal.      Pupils: Pupils are equal, round, and reactive to light.   Cardiovascular:      Rate and Rhythm: Normal rate and regular rhythm.      Pulses:           Carotid pulses are 2+ on the right side and 2+ on the left side.       Radial pulses are 2+ on the right side and 2+ on the left side.      Comments: 2+ left upper extremity edema  Left axillary incision with dime sized area of dehiscence laterally, superficial, no erythema or purulence  Left forearm wound healing, now dime sized and healthy appearing  Palpable thrill and Doppler signal over the AV graft    Pulmonary:      Comments: Non labored respirations on room air, equal chest rise  Abdominal:      General: Abdomen is flat. There is no distension.      Palpations: Abdomen is soft.   Musculoskeletal:      Cervical back: Normal range of motion. No rigidity.      Right lower leg: No edema.      Left lower leg: No edema.   Skin:     General: Skin is warm and dry.      Capillary Refill: Capillary refill takes less than 2 seconds.   Neurological:      General: No focal deficit present.      Mental " Status: She is alert and oriented to person, place, and time.   Psychiatric:         Mood and Affect: Mood normal.         Behavior: Behavior normal.              Result Review :            Last Echo  Results for orders placed during the hospital encounter of 02/16/25    Adult Transthoracic Echo Complete W/ Cont if Necessary Per Protocol    Interpretation Summary    Left ventricular systolic function is normal. Left ventricular ejection fraction appears to be 56 - 60%.    Left ventricular diastolic function is consistent with (grade I) impaired relaxation.    Estimated right ventricular systolic pressure from tricuspid regurgitation is normal (<35 mmHg).      Results for orders placed during the hospital encounter of 06/01/22    Adult Transthoracic Echo Complete W/ Cont if Necessary Per Protocol    Interpretation Summary  · Estimated left ventricular EF was in agreement with the calculated left ventricular EF. Left ventricular ejection fraction appears to be 56 - 60%. Left ventricular systolic function is normal.  · Left ventricular diastolic function is consistent with (grade I) impaired relaxation.  · Estimated right ventricular systolic pressure from tricuspid regurgitation is normal (<35 mmHg).                     Assessment and Plan     Diagnoses and all orders for this visit:    1. Venous stenosis of left upper extremity (Primary)  -     IR peripheral av dialysis perc angioplasty; Future    2. Hemodialysis access, AV graft    3. CKD (chronic kidney disease), stage IV    Lynne Castillo returns for follow up s/p left arm axillary to axillary AV graft placement.  Her left axillary incision dehiscence and left forearm wounds continue to improve with local wound care.  They have been seen in the wound care center and her  is performing wound care at home.  She denies fevers or chills.  She continues to have severe left arm swelling despite compression and elevation.  She had an AVG duplex yesterday that  showed a patent AVG with no stenosis and adequate depth, diameters, and flow volumes.  There was no venous stenosis on the duplex, so I suspect she has a central venous stenosis that is causing her arm swelling.  Since she is not yet on dialysis, I will arrange for her to have a CO2 angiogram with IR to evaluate for central venous stenosis. Her wounds are healing well and her wound dehiscence appears superficial.  I will plan to follow up with her in about 6 weeks to see how her arm swelling is doing post fistulagram.           Follow Up     Return in about 6 weeks (around 5/22/2025).  Patient was given instructions and counseling regarding her condition or for health maintenance advice. Please see specific information pulled into the AVS if appropriate.   Any information in this note that has been copied and pasted was reviewed and edited to reflect today's exam.  This note was created using Dragon dictation software.  I have reviewed the note for accuracy and made corrections as needed.  Please note that some errors may still exist, please contact me with any questions or concerns.

## 2025-04-16 ENCOUNTER — OFFICE VISIT (OUTPATIENT)
Dept: WOUND CARE | Facility: HOSPITAL | Age: 75
End: 2025-04-16
Payer: MEDICARE

## 2025-04-21 ENCOUNTER — HOSPITAL ENCOUNTER (OUTPATIENT)
Dept: INTERVENTIONAL RADIOLOGY/VASCULAR | Facility: HOSPITAL | Age: 75
Discharge: HOME | End: 2025-04-21
Admitting: STUDENT IN AN ORGANIZED HEALTH CARE EDUCATION/TRAINING PROGRAM
Payer: MEDICARE

## 2025-04-21 VITALS
RESPIRATION RATE: 15 BRPM | BODY MASS INDEX: 34.07 KG/M2 | HEART RATE: 87 BPM | DIASTOLIC BLOOD PRESSURE: 61 MMHG | SYSTOLIC BLOOD PRESSURE: 141 MMHG | OXYGEN SATURATION: 91 % | HEIGHT: 66 IN | WEIGHT: 212 LBS

## 2025-04-21 DIAGNOSIS — I87.1 VENOUS STENOSIS OF LEFT UPPER EXTREMITY: ICD-10-CM

## 2025-04-21 PROCEDURE — 25510000001 IOPAMIDOL PER 1 ML: Performed by: STUDENT IN AN ORGANIZED HEALTH CARE EDUCATION/TRAINING PROGRAM

## 2025-04-21 PROCEDURE — C1725 CATH, TRANSLUMIN NON-LASER: HCPCS

## 2025-04-21 PROCEDURE — C1769 GUIDE WIRE: HCPCS

## 2025-04-21 PROCEDURE — 25010000002 ONDANSETRON PER 1 MG: Performed by: RADIOLOGY

## 2025-04-21 PROCEDURE — 25010000002 FENTANYL CITRATE (PF) 50 MCG/ML SOLUTION: Performed by: RADIOLOGY

## 2025-04-21 PROCEDURE — C1894 INTRO/SHEATH, NON-LASER: HCPCS

## 2025-04-21 PROCEDURE — 25010000002 LIDOCAINE 1 % SOLUTION: Performed by: RADIOLOGY

## 2025-04-21 RX ORDER — LIDOCAINE HYDROCHLORIDE 10 MG/ML
INJECTION, SOLUTION INFILTRATION; PERINEURAL AS NEEDED
Status: COMPLETED | OUTPATIENT
Start: 2025-04-21 | End: 2025-04-21

## 2025-04-21 RX ORDER — FENTANYL CITRATE 50 UG/ML
INJECTION, SOLUTION INTRAMUSCULAR; INTRAVENOUS AS NEEDED
Status: COMPLETED | OUTPATIENT
Start: 2025-04-21 | End: 2025-04-21

## 2025-04-21 RX ORDER — IOPAMIDOL 755 MG/ML
50 INJECTION, SOLUTION INTRAVASCULAR
Status: COMPLETED | OUTPATIENT
Start: 2025-04-21 | End: 2025-04-21

## 2025-04-21 RX ORDER — ONDANSETRON 2 MG/ML
INJECTION INTRAMUSCULAR; INTRAVENOUS AS NEEDED
Status: COMPLETED | OUTPATIENT
Start: 2025-04-21 | End: 2025-04-21

## 2025-04-21 RX ADMIN — LIDOCAINE HYDROCHLORIDE 1 ML: 10 INJECTION, SOLUTION INFILTRATION; PERINEURAL at 14:32

## 2025-04-21 RX ADMIN — FENTANYL CITRATE 100 MCG: 50 INJECTION, SOLUTION INTRAMUSCULAR; INTRAVENOUS at 14:42

## 2025-04-21 RX ADMIN — IOPAMIDOL 1.5 ML: 755 INJECTION, SOLUTION INTRAVENOUS at 15:02

## 2025-04-21 RX ADMIN — ONDANSETRON 4 MG: 2 INJECTION INTRAMUSCULAR; INTRAVENOUS at 14:41

## 2025-04-21 NOTE — DISCHARGE INSTRUCTIONS
Keep dressing on, clean and dry, for the next 48 hours; then you may remove the dressing.  No heavy lifting greater than 10 lbs. for the next 24 hours. Watch for signs and symptoms of infection such as fever over 101 degrees, redness/swelling around area, yellow/green drainage, etc... If you notice anything, see your physician. Otherwise, follow up with Dr. Melendez as needed.    No alcohol, driving, cooking, or legal decision making for the next 24 hours due to receiving narcotic pain medication.    Any concerns, you can reach us at [phone], Mon-Fri, from 8am-5pm. If after hours, follow phone prompts.

## 2025-04-22 ENCOUNTER — TELEPHONE (OUTPATIENT)
Age: 75
End: 2025-04-22
Payer: MEDICARE

## 2025-04-22 ENCOUNTER — APPOINTMENT (OUTPATIENT)
Dept: GENERAL RADIOLOGY | Facility: HOSPITAL | Age: 75
DRG: 682 | End: 2025-04-22
Payer: MEDICARE

## 2025-04-22 ENCOUNTER — HOSPITAL ENCOUNTER (INPATIENT)
Facility: HOSPITAL | Age: 75
LOS: 11 days | Discharge: NURSING, REHAB OR LONG-TERM CARE | DRG: 682 | End: 2025-05-04
Attending: STUDENT IN AN ORGANIZED HEALTH CARE EDUCATION/TRAINING PROGRAM | Admitting: INTERNAL MEDICINE
Payer: MEDICARE

## 2025-04-22 ENCOUNTER — APPOINTMENT (OUTPATIENT)
Dept: CT IMAGING | Facility: HOSPITAL | Age: 75
DRG: 682 | End: 2025-04-22
Payer: MEDICARE

## 2025-04-22 DIAGNOSIS — J96.01 ACUTE RESPIRATORY FAILURE WITH HYPOXIA AND HYPERCAPNIA: ICD-10-CM

## 2025-04-22 DIAGNOSIS — E87.29 RESPIRATORY ACIDOSIS: Primary | ICD-10-CM

## 2025-04-22 DIAGNOSIS — R41.82 ALTERED MENTAL STATUS, UNSPECIFIED ALTERED MENTAL STATUS TYPE: ICD-10-CM

## 2025-04-22 DIAGNOSIS — J96.02 ACUTE RESPIRATORY FAILURE WITH HYPOXIA AND HYPERCAPNIA: ICD-10-CM

## 2025-04-22 DIAGNOSIS — R53.1 GENERALIZED WEAKNESS: ICD-10-CM

## 2025-04-22 PROBLEM — R53.83 LETHARGY: Status: ACTIVE | Noted: 2025-04-22

## 2025-04-22 LAB
ALBUMIN SERPL-MCNC: 3.9 G/DL (ref 3.5–5.2)
ALBUMIN/GLOB SERPL: 1.1 G/DL
ALP SERPL-CCNC: 53 U/L (ref 39–117)
ALT SERPL W P-5'-P-CCNC: 13 U/L (ref 1–33)
AMMONIA BLD-SCNC: 16 UMOL/L (ref 11–51)
AMPHET+METHAMPHET UR QL: NEGATIVE
AMPHETAMINES UR QL: NEGATIVE
ANION GAP SERPL CALCULATED.3IONS-SCNC: 11.6 MMOL/L (ref 5–15)
APAP SERPL-MCNC: <5 MCG/ML (ref 0–30)
ARTERIAL PATENCY WRIST A: POSITIVE
AST SERPL-CCNC: 21 U/L (ref 1–32)
ATMOSPHERIC PRESS: ABNORMAL MM[HG]
BACTERIA UR QL AUTO: NORMAL /HPF
BARBITURATES UR QL SCN: POSITIVE
BASE EXCESS BLDA CALC-SCNC: 2.3 MMOL/L (ref 0–3)
BASOPHILS # BLD AUTO: 0.04 10*3/MM3 (ref 0–0.2)
BASOPHILS # BLD AUTO: 0.05 10*3/MM3 (ref 0–0.2)
BASOPHILS NFR BLD AUTO: 0.4 % (ref 0–1.5)
BASOPHILS NFR BLD AUTO: 0.5 % (ref 0–1.5)
BDY SITE: ABNORMAL
BENZODIAZ UR QL SCN: NEGATIVE
BILIRUB SERPL-MCNC: 0.2 MG/DL (ref 0–1.2)
BILIRUB UR QL STRIP: NEGATIVE
BUN SERPL-MCNC: 50 MG/DL (ref 8–23)
BUN/CREAT SERPL: 17.3 (ref 7–25)
BUPRENORPHINE SERPL-MCNC: NEGATIVE NG/ML
CALCIUM SPEC-SCNC: 9.2 MG/DL (ref 8.6–10.5)
CANNABINOIDS SERPL QL: NEGATIVE
CHLORIDE SERPL-SCNC: 103 MMOL/L (ref 98–107)
CLARITY UR: CLEAR
CO2 BLDA-SCNC: 33.5 MMOL/L (ref 22–29)
CO2 SERPL-SCNC: 26.4 MMOL/L (ref 22–29)
COCAINE UR QL: NEGATIVE
COLOR UR: YELLOW
CREAT SERPL-MCNC: 2.89 MG/DL (ref 0.57–1)
DEPRECATED RDW RBC AUTO: 57.2 FL (ref 37–54)
DEPRECATED RDW RBC AUTO: 59.6 FL (ref 37–54)
EGFRCR SERPLBLD CKD-EPI 2021: 16.4 ML/MIN/1.73
EOSINOPHIL # BLD AUTO: 0.05 10*3/MM3 (ref 0–0.4)
EOSINOPHIL # BLD AUTO: 0.06 10*3/MM3 (ref 0–0.4)
EOSINOPHIL NFR BLD AUTO: 0.5 % (ref 0.3–6.2)
EOSINOPHIL NFR BLD AUTO: 0.6 % (ref 0.3–6.2)
ERYTHROCYTE [DISTWIDTH] IN BLOOD BY AUTOMATED COUNT: 14.6 % (ref 12.3–15.4)
ERYTHROCYTE [DISTWIDTH] IN BLOOD BY AUTOMATED COUNT: 15.1 % (ref 12.3–15.4)
GLOBULIN UR ELPH-MCNC: 3.4 GM/DL
GLUCOSE BLDC GLUCOMTR-MCNC: 140 MG/DL (ref 70–105)
GLUCOSE SERPL-MCNC: 143 MG/DL (ref 65–99)
GLUCOSE UR STRIP-MCNC: ABNORMAL MG/DL
HCO3 BLDA-SCNC: 31.1 MMOL/L (ref 21–28)
HCT VFR BLD AUTO: 26.5 % (ref 34–46.6)
HCT VFR BLD AUTO: 27.4 % (ref 34–46.6)
HEMODILUTION: NO
HGB BLD-MCNC: 7.8 G/DL (ref 12–15.9)
HGB BLD-MCNC: 8 G/DL (ref 12–15.9)
HGB UR QL STRIP.AUTO: NEGATIVE
HYALINE CASTS UR QL AUTO: NORMAL /LPF
HYPOCHROMIA BLD QL: NORMAL
IMM GRANULOCYTES # BLD AUTO: 0.03 10*3/MM3 (ref 0–0.05)
IMM GRANULOCYTES # BLD AUTO: 0.05 10*3/MM3 (ref 0–0.05)
IMM GRANULOCYTES NFR BLD AUTO: 0.3 % (ref 0–0.5)
IMM GRANULOCYTES NFR BLD AUTO: 0.5 % (ref 0–0.5)
INHALED O2 CONCENTRATION: 32 %
KETONES UR QL STRIP: NEGATIVE
LEUKOCYTE ESTERASE UR QL STRIP.AUTO: NEGATIVE
LYMPHOCYTES # BLD AUTO: 1.35 10*3/MM3 (ref 0.7–3.1)
LYMPHOCYTES # BLD AUTO: 1.38 10*3/MM3 (ref 0.7–3.1)
LYMPHOCYTES NFR BLD AUTO: 14.1 % (ref 19.6–45.3)
LYMPHOCYTES NFR BLD AUTO: 14.5 % (ref 19.6–45.3)
Lab: ABNORMAL
MACROCYTES BLD QL SMEAR: NORMAL
MAGNESIUM SERPL-MCNC: 2.4 MG/DL (ref 1.6–2.4)
MCH RBC QN AUTO: 31.5 PG (ref 26.6–33)
MCH RBC QN AUTO: 31.7 PG (ref 26.6–33)
MCHC RBC AUTO-ENTMCNC: 29.2 G/DL (ref 31.5–35.7)
MCHC RBC AUTO-ENTMCNC: 29.4 G/DL (ref 31.5–35.7)
MCV RBC AUTO: 106.9 FL (ref 79–97)
MCV RBC AUTO: 108.7 FL (ref 79–97)
METHADONE UR QL SCN: NEGATIVE
MODALITY: ABNORMAL
MONOCYTES # BLD AUTO: 0.72 10*3/MM3 (ref 0.1–0.9)
MONOCYTES # BLD AUTO: 1.19 10*3/MM3 (ref 0.1–0.9)
MONOCYTES NFR BLD AUTO: 12.4 % (ref 5–12)
MONOCYTES NFR BLD AUTO: 7.5 % (ref 5–12)
NEUTROPHILS NFR BLD AUTO: 6.9 10*3/MM3 (ref 1.7–7)
NEUTROPHILS NFR BLD AUTO: 7.32 10*3/MM3 (ref 1.7–7)
NEUTROPHILS NFR BLD AUTO: 71.9 % (ref 42.7–76)
NEUTROPHILS NFR BLD AUTO: 76.8 % (ref 42.7–76)
NITRITE UR QL STRIP: NEGATIVE
NOTIFIED WHO: ABNORMAL
NRBC BLD AUTO-RTO: 0 /100 WBC (ref 0–0.2)
NRBC BLD AUTO-RTO: 0 /100 WBC (ref 0–0.2)
OPIATES UR QL: NEGATIVE
OXYCODONE UR QL SCN: NEGATIVE
PCO2 BLDA: 76.1 MM HG (ref 35–48)
PCP UR QL SCN: NEGATIVE
PH BLDA: 7.22 PH UNITS (ref 7.35–7.45)
PH UR STRIP.AUTO: 5.5 [PH] (ref 5–8)
PLATELET # BLD AUTO: 246 10*3/MM3 (ref 140–450)
PLATELET # BLD AUTO: 258 10*3/MM3 (ref 140–450)
PMV BLD AUTO: 10.5 FL (ref 6–12)
PMV BLD AUTO: 10.5 FL (ref 6–12)
PO2 BLD: 466 MM[HG] (ref 0–500)
PO2 BLDA: 149.2 MM HG (ref 83–108)
POTASSIUM SERPL-SCNC: 4 MMOL/L (ref 3.5–5.2)
PROT SERPL-MCNC: 7.3 G/DL (ref 6–8.5)
PROT UR QL STRIP: ABNORMAL
RBC # BLD AUTO: 2.48 10*6/MM3 (ref 3.77–5.28)
RBC # BLD AUTO: 2.52 10*6/MM3 (ref 3.77–5.28)
RBC # UR STRIP: NORMAL /HPF
READ BACK: YES
REF LAB TEST METHOD: NORMAL
SALICYLATES SERPL-MCNC: <0.5 MG/DL
SAO2 % BLDCOA: 98.7 % (ref 94–98)
SMALL PLATELETS BLD QL SMEAR: ADEQUATE
SODIUM SERPL-SCNC: 141 MMOL/L (ref 136–145)
SP GR UR STRIP: 1.01 (ref 1–1.03)
SQUAMOUS #/AREA URNS HPF: NORMAL /HPF
TRICYCLICS UR QL SCN: NEGATIVE
TSH SERPL DL<=0.05 MIU/L-ACNC: 3.19 UIU/ML (ref 0.27–4.2)
UROBILINOGEN UR QL STRIP: ABNORMAL
WBC # UR STRIP: NORMAL /HPF
WBC MORPH BLD: NORMAL
WBC NRBC COR # BLD AUTO: 9.54 10*3/MM3 (ref 3.4–10.8)
WBC NRBC COR # BLD AUTO: 9.6 10*3/MM3 (ref 3.4–10.8)

## 2025-04-22 PROCEDURE — 81001 URINALYSIS AUTO W/SCOPE: CPT

## 2025-04-22 PROCEDURE — 94640 AIRWAY INHALATION TREATMENT: CPT

## 2025-04-22 PROCEDURE — 80179 DRUG ASSAY SALICYLATE: CPT

## 2025-04-22 PROCEDURE — 82803 BLOOD GASES ANY COMBINATION: CPT

## 2025-04-22 PROCEDURE — 25010000002 METHYLPREDNISOLONE PER 125 MG: Performed by: STUDENT IN AN ORGANIZED HEALTH CARE EDUCATION/TRAINING PROGRAM

## 2025-04-22 PROCEDURE — 94799 UNLISTED PULMONARY SVC/PX: CPT

## 2025-04-22 PROCEDURE — 85025 COMPLETE CBC W/AUTO DIFF WBC: CPT | Performed by: STUDENT IN AN ORGANIZED HEALTH CARE EDUCATION/TRAINING PROGRAM

## 2025-04-22 PROCEDURE — 85007 BL SMEAR W/DIFF WBC COUNT: CPT

## 2025-04-22 PROCEDURE — 93005 ELECTROCARDIOGRAM TRACING: CPT

## 2025-04-22 PROCEDURE — G0378 HOSPITAL OBSERVATION PER HR: HCPCS

## 2025-04-22 PROCEDURE — 83735 ASSAY OF MAGNESIUM: CPT

## 2025-04-22 PROCEDURE — 99291 CRITICAL CARE FIRST HOUR: CPT

## 2025-04-22 PROCEDURE — 82140 ASSAY OF AMMONIA: CPT

## 2025-04-22 PROCEDURE — 36600 WITHDRAWAL OF ARTERIAL BLOOD: CPT

## 2025-04-22 PROCEDURE — 93005 ELECTROCARDIOGRAM TRACING: CPT | Performed by: STUDENT IN AN ORGANIZED HEALTH CARE EDUCATION/TRAINING PROGRAM

## 2025-04-22 PROCEDURE — 80306 DRUG TEST PRSMV INSTRMNT: CPT

## 2025-04-22 PROCEDURE — 85025 COMPLETE CBC W/AUTO DIFF WBC: CPT

## 2025-04-22 PROCEDURE — 82948 REAGENT STRIP/BLOOD GLUCOSE: CPT

## 2025-04-22 PROCEDURE — 84443 ASSAY THYROID STIM HORMONE: CPT | Performed by: STUDENT IN AN ORGANIZED HEALTH CARE EDUCATION/TRAINING PROGRAM

## 2025-04-22 PROCEDURE — P9612 CATHETERIZE FOR URINE SPEC: HCPCS

## 2025-04-22 PROCEDURE — 80143 DRUG ASSAY ACETAMINOPHEN: CPT

## 2025-04-22 PROCEDURE — 80053 COMPREHEN METABOLIC PANEL: CPT

## 2025-04-22 PROCEDURE — 71045 X-RAY EXAM CHEST 1 VIEW: CPT

## 2025-04-22 PROCEDURE — 94660 CPAP INITIATION&MGMT: CPT

## 2025-04-22 PROCEDURE — 70450 CT HEAD/BRAIN W/O DYE: CPT

## 2025-04-22 PROCEDURE — 36415 COLL VENOUS BLD VENIPUNCTURE: CPT

## 2025-04-22 RX ORDER — IPRATROPIUM BROMIDE AND ALBUTEROL SULFATE 2.5; .5 MG/3ML; MG/3ML
3 SOLUTION RESPIRATORY (INHALATION) EVERY 4 HOURS PRN
Status: DISCONTINUED | OUTPATIENT
Start: 2025-04-22 | End: 2025-05-04 | Stop reason: HOSPADM

## 2025-04-22 RX ORDER — BISACODYL 10 MG
10 SUPPOSITORY, RECTAL RECTAL DAILY PRN
Status: DISCONTINUED | OUTPATIENT
Start: 2025-04-22 | End: 2025-05-04 | Stop reason: HOSPADM

## 2025-04-22 RX ORDER — METHYLPREDNISOLONE SODIUM SUCCINATE 40 MG/ML
40 INJECTION, POWDER, LYOPHILIZED, FOR SOLUTION INTRAMUSCULAR; INTRAVENOUS EVERY 12 HOURS
Status: DISCONTINUED | OUTPATIENT
Start: 2025-04-23 | End: 2025-04-23

## 2025-04-22 RX ORDER — INSULIN LISPRO 100 [IU]/ML
2-7 INJECTION, SOLUTION INTRAVENOUS; SUBCUTANEOUS
Status: DISCONTINUED | OUTPATIENT
Start: 2025-04-23 | End: 2025-05-04 | Stop reason: HOSPADM

## 2025-04-22 RX ORDER — SODIUM BICARBONATE 650 MG/1
650 TABLET ORAL DAILY
Status: DISCONTINUED | OUTPATIENT
Start: 2025-04-23 | End: 2025-04-22

## 2025-04-22 RX ORDER — AMOXICILLIN 250 MG
2 CAPSULE ORAL 2 TIMES DAILY PRN
Status: DISCONTINUED | OUTPATIENT
Start: 2025-04-22 | End: 2025-05-04 | Stop reason: HOSPADM

## 2025-04-22 RX ORDER — BISACODYL 5 MG/1
5 TABLET, DELAYED RELEASE ORAL DAILY PRN
Status: DISCONTINUED | OUTPATIENT
Start: 2025-04-22 | End: 2025-05-04 | Stop reason: HOSPADM

## 2025-04-22 RX ORDER — IPRATROPIUM BROMIDE AND ALBUTEROL SULFATE 2.5; .5 MG/3ML; MG/3ML
3 SOLUTION RESPIRATORY (INHALATION)
Status: DISCONTINUED | OUTPATIENT
Start: 2025-04-22 | End: 2025-04-29

## 2025-04-22 RX ORDER — SODIUM CHLORIDE 0.9 % (FLUSH) 0.9 %
10 SYRINGE (ML) INJECTION AS NEEDED
Status: DISCONTINUED | OUTPATIENT
Start: 2025-04-22 | End: 2025-05-04 | Stop reason: HOSPADM

## 2025-04-22 RX ORDER — ACETAMINOPHEN 650 MG/1
650 SUPPOSITORY RECTAL EVERY 4 HOURS PRN
Status: DISCONTINUED | OUTPATIENT
Start: 2025-04-22 | End: 2025-05-04 | Stop reason: HOSPADM

## 2025-04-22 RX ORDER — POLYETHYLENE GLYCOL 3350 17 G/17G
17 POWDER, FOR SOLUTION ORAL DAILY PRN
Status: DISCONTINUED | OUTPATIENT
Start: 2025-04-22 | End: 2025-05-04 | Stop reason: HOSPADM

## 2025-04-22 RX ORDER — DEXTROSE MONOHYDRATE 25 G/50ML
25 INJECTION, SOLUTION INTRAVENOUS
Status: DISCONTINUED | OUTPATIENT
Start: 2025-04-22 | End: 2025-05-04 | Stop reason: HOSPADM

## 2025-04-22 RX ORDER — IBUPROFEN 600 MG/1
1 TABLET ORAL
Status: DISCONTINUED | OUTPATIENT
Start: 2025-04-22 | End: 2025-05-04 | Stop reason: HOSPADM

## 2025-04-22 RX ORDER — NICOTINE POLACRILEX 4 MG
15 LOZENGE BUCCAL
Status: DISCONTINUED | OUTPATIENT
Start: 2025-04-22 | End: 2025-05-04 | Stop reason: HOSPADM

## 2025-04-22 RX ORDER — ACETAMINOPHEN 160 MG/5ML
650 SOLUTION ORAL EVERY 4 HOURS PRN
Status: DISCONTINUED | OUTPATIENT
Start: 2025-04-22 | End: 2025-05-04 | Stop reason: HOSPADM

## 2025-04-22 RX ORDER — ACETAMINOPHEN 325 MG/1
650 TABLET ORAL EVERY 4 HOURS PRN
Status: DISCONTINUED | OUTPATIENT
Start: 2025-04-22 | End: 2025-05-04 | Stop reason: HOSPADM

## 2025-04-22 RX ORDER — METHYLPREDNISOLONE SODIUM SUCCINATE 125 MG/2ML
125 INJECTION, POWDER, LYOPHILIZED, FOR SOLUTION INTRAMUSCULAR; INTRAVENOUS ONCE
Status: COMPLETED | OUTPATIENT
Start: 2025-04-22 | End: 2025-04-22

## 2025-04-22 RX ORDER — BUDESONIDE 0.5 MG/2ML
0.5 INHALANT ORAL
Status: DISCONTINUED | OUTPATIENT
Start: 2025-04-22 | End: 2025-05-04 | Stop reason: HOSPADM

## 2025-04-22 RX ADMIN — IPRATROPIUM BROMIDE AND ALBUTEROL SULFATE 3 ML: .5; 3 SOLUTION RESPIRATORY (INHALATION) at 22:40

## 2025-04-22 RX ADMIN — METHYLPREDNISOLONE SODIUM SUCCINATE 125 MG: 125 INJECTION, POWDER, FOR SOLUTION INTRAMUSCULAR; INTRAVENOUS at 22:39

## 2025-04-22 RX ADMIN — IPRATROPIUM BROMIDE AND ALBUTEROL SULFATE 3 ML: .5; 3 SOLUTION RESPIRATORY (INHALATION) at 19:24

## 2025-04-22 NOTE — ED PROVIDER NOTES
Subjective   History of Present Illness  Patient is a 74-year-old female PMH of CHF, CKD, DM2, HLD, HTN presenting to the ED via EMS for altered mental status since yesterday.   states patient had a port placed in February, plans to start dialysis soon, at that time noticed a blocked vein.  Patient had a procedure yesterday and IR to clear out vein.   states since then she has been seeming very lethargic, slow to answer, and having generalized weakness.   states last night he was having trouble transferring her from the couch into the bed, eventually overnight was able to get her into bed.  He states when she woke up this morning she needed to go to the bathroom, states this was very difficult for him due to her weakness.  When he tried to wake her up this morning to take her to her doctors appointments she did not want to wake up and since then has been just very lethargic.  Patient is answering questions, denies any pain at this time, denies feeling confused but does admit to feeling generalized weakness and lethargic.  EMS states patient was in the 80s O2 when they arrived, placed her on oxygen,  states she wears O2 at night due to recent diagnosis of interstitial lung disease.        Review of Systems   Unable to perform ROS: Mental status change       Past Medical History:   Diagnosis Date    Anemia     Hx of anemia    Anxiety     Controled  xanax prn    Arthritis     Conditions. Abstracted from [facility].    Cataract     bilateral    CHF (congestive heart failure) 6/01/22    Low iron    Cholelithiasis 30 yrs ago    Had gb removed    CKD (chronic kidney disease), stage IV 01/13/2023    Colon polyp     Depression 1995    Take cymbalta    Diabetes mellitus     type 2    Diarrhea     on and off    Edema of right foot 04/2021    Healthcare maintenance 2008    PAP. Abstracted from [facility].    Hip pain, bilateral     HL (hearing loss) 1/1/19    Have hearing aids    Hyperlipidemia      Hypertension     Knee pain, bilateral     Obesity     Osteopenia 11/1/22    On med    Other forms of dyspnea     Other specified soft tissue disorders     Pneumonia due to COVID-19 virus 06/26/2022    Renal insufficiency Stage 3    Has gone up to stage 4    Slow to wake up after anesthesia     Spinal headache     Stress headaches     Tremor     Benign    Tremor of both hands     Urinary tract infection 07/01/22    [place]    Visual impairment 6/1/2017    Cataracts    Vitamin D deficiency        Allergies   Allergen Reactions    Carvedilol Shortness Of Breath    Morphine Itching    Sulfa Antibiotics GI Intolerance    Hydrocodone Nausea Only    Diphenhydramine Anxiety       Past Surgical History:   Procedure Laterality Date    ARTERIOVENOUS FISTULA/SHUNT SURGERY Left 02/24/2023    Procedure: BRACHIOCEPHALIC ARTERIOVENOUS FISTULA FORMATION;  Surgeon: Vignesh Riley MD;  Location: [facility] MAIN OR;  Service: Vascular;  Laterality: Left;    ARTERIOVENOUS FISTULA/SHUNT SURGERY Left 2/27/2025    Procedure: ARTERIOVENOUS SHUNT GRAFT;  Surgeon: Zaina Bernard MD;  Location: [facility] MAIN OR;  Service: Vascular;  Laterality: Left;    BREAST BIOPSY Right     CHOLECYSTECTOMY  1990    COLONOSCOPY      This year 2022    EYE SURGERY Bilateral     cataract removal    GASTRIC BYPASS  2002    HERNIA REPAIR  2005    umbilical hernia    HIP OPEN REDUCTION Right 03/02/2020    Procedure: FEMORAL NECK OPEN REDUCTION INTERNAL FIXATION;  Surgeon: Ynf Sifuentes MD;  Location: [facility] MAIN OR;  Service: Orthopedics;  Laterality: Right;  Cannulated screw fixation right femoral neck    JOINT REPLACEMENT  2020    R knee    LAPAROTOMY OOPHERECTOMY Bilateral 2010    OTHER SURGICAL HISTORY  04/20/2015    Lexiscan stress test, normal. [facility].    PANNICULECTOMY      TOTAL KNEE ARTHROPLASTY Right 04/29/2021    Procedure: RIGHT TOTAL KNEE REPLACEMENT;  Surgeon: Yfn Sifuentes MD;  Location: [facility] MAIN OR;  Service:  Orthopedics;  Laterality: Right;       Family History   Problem Relation Age of Onset    Asthma Mother     Depression Mother     Kidney disease Mother         Kidney stones    Miscarriages / Stillbirths Mother     Hyperlipidemia Father     Hypertension Father     Diabetes Father     Anxiety disorder Daughter     Early death Daughter     Miscarriages / Stillbirths Daughter     Diabetes Daughter        Social History     Socioeconomic History    Marital status:    Tobacco Use    Smoking status: Never     Passive exposure: Never    Smokeless tobacco: Never   Vaping Use    Vaping status: Never Used   Substance and Sexual Activity    Alcohol use: Never    Drug use: Never    Sexual activity: Not Currently     Partners: Male     Birth control/protection: Condom, Post-menopausal, Bilateral salpingectomy      Comment: monogamous with            Objective   Physical Exam  Constitutional:       Appearance: She is ill-appearing.   HENT:      Head: Normocephalic and atraumatic.      Mouth/Throat:      Mouth: Mucous membranes are moist.   Eyes:      Extraocular Movements: Extraocular movements intact.      Pupils: Pupils are equal, round, and reactive to light.   Cardiovascular:      Rate and Rhythm: Normal rate and regular rhythm.      Heart sounds: Normal heart sounds.   Pulmonary:      Effort: Pulmonary effort is normal.      Breath sounds: Normal breath sounds.   Abdominal:      General: Bowel sounds are normal.      Palpations: Abdomen is soft.      Tenderness: There is no abdominal tenderness.   Musculoskeletal:         General: Normal range of motion.      Cervical back: Normal range of motion and neck supple.   Skin:     General: Skin is warm.      Capillary Refill: Capillary refill takes less than 2 seconds.   Neurological:      Mental Status: She is alert. She is disoriented.      Comments: Weakness noticed in  strength bilaterally and plantarflexion bilaterally.   Psychiatric:         Mood and Affect:  "Mood normal.         Speech: Speech normal.         Behavior: Behavior normal.         Procedures           ED Course  ED Course as of 04/22/25 2205 Tue Apr 22, 2025   1808 Spoke with Dr. Torres hospitalist who agreed to admit patient. [EC]      ED Course User Index  [EC] Sally Castro PA-C      /64 (BP Location: Right arm, Patient Position: Lying)   Pulse 89   Temp 99.7 °F (37.6 °C) (Oral)   Resp 21   Ht 167.6 cm (65.98\")   Wt 97.6 kg (215 lb 2.7 oz)   SpO2 100%   BMI 34.75 kg/m²   Labs Reviewed   COMPREHENSIVE METABOLIC PANEL - Abnormal; Notable for the following components:       Result Value    Glucose 143 (*)     BUN 50 (*)     Creatinine 2.89 (*)     eGFR 16.4 (*)     All other components within normal limits    Narrative:     GFR Categories in Chronic Kidney Disease (CKD)      GFR Category          GFR (mL/min/1.73)    Interpretation  G1                     90 or greater         Normal or high (1)  G2                      60-89                Mild decrease (1)  G3a                   45-59                Mild to moderate decrease  G3b                   30-44                Moderate to severe decrease  G4                    15-29                Severe decrease  G5                    14 or less           Kidney failure          (1)In the absence of evidence of kidney disease, neither GFR category G1 or G2 fulfill the criteria for CKD.    eGFR calculation 2021 CKD-EPI creatinine equation, which does not include race as a factor   URINALYSIS W/ MICROSCOPIC IF INDICATED (NO CULTURE) - Abnormal; Notable for the following components:    Glucose,  mg/dL (Trace) (*)     Protein,  mg/dL (2+) (*)     All other components within normal limits   URINE DRUG SCREEN - Abnormal; Notable for the following components:    Barbiturates Screen, Urine Positive (*)     All other components within normal limits    Narrative:     Cutoff For Drugs Screened:    Amphetamines               500 " ng/ml  Barbiturates               200 ng/ml  Benzodiazepines            150 ng/ml  Cocaine                    150 ng/ml  Methadone                  200 ng/ml  Opiates                    100 ng/ml  Phencyclidine               25 ng/ml  THC                         50 ng/ml  Methamphetamine            500 ng/ml  Tricyclic Antidepressants  300 ng/ml  Oxycodone                  100 ng/ml  Buprenorphine               10 ng/ml    The normal value for all drugs tested is negative. This report includes unconfirmed screening results, with the cutoff values listed, to be used for medical treatment purposes only.  Unconfirmed results must not be used for non-medical purposes such as employment or legal testing.  Clinical consideration should be applied to any drug of abuse test, particularly when unconfirmed results are used.    All urine drugs of abuse requests without chain of custody are for medical screening purposes only.  False positives are possible.     CBC WITH AUTO DIFFERENTIAL - Abnormal; Notable for the following components:    RBC 2.52 (*)     Hemoglobin 8.0 (*)     Hematocrit 27.4 (*)     .7 (*)     MCHC 29.2 (*)     RDW-SD 59.6 (*)     Lymphocyte % 14.1 (*)     Monocyte % 12.4 (*)     Monocytes, Absolute 1.19 (*)     All other components within normal limits   BLOOD GAS, ARTERIAL - Abnormal; Notable for the following components:    pH, Arterial 7.220 (*)     pCO2, Arterial 76.1 (*)     pO2, Arterial 149.2 (*)     HCO3, Arterial 31.1 (*)     O2 Saturation, Arterial 98.7 (*)     CO2 Content 33.5 (*)     All other components within normal limits   POCT GLUCOSE FINGERSTICK - Abnormal; Notable for the following components:    Glucose 140 (*)     All other components within normal limits   MAGNESIUM - Normal   AMMONIA - Normal   ACETAMINOPHEN LEVEL - Normal    Narrative:     Acetaminophen Therapeutic Range  5-20 ug/mL      Hours after ingestion            Toxic Value    4 Hours                           150  ug/mL    8 Hours                            70 ug/mL   12 Hours                            40 ug/mL   16 Hours                            20 ug/mL    These values apply to a single ingestion only.    SALICYLATE LEVEL - Normal   URINALYSIS, MICROSCOPIC ONLY   SCAN SLIDE   BLOOD GAS, ARTERIAL   BASIC METABOLIC PANEL   CBC WITH AUTO DIFFERENTIAL   CBC AND DIFFERENTIAL    Narrative:     The following orders were created for panel order CBC & Differential.  Procedure                               Abnormality         Status                     ---------                               -----------         ------                     CBC Auto Differential[[id number]]        Abnormal            Final result               Scan Slide[[id number]]                                       Final result                 Please view results for these tests on the individual orders.     Medications   sodium chloride 0.9 % flush 10 mL (has no administration in time range)   Potassium Replacement - Follow Nurse / BPA Driven Protocol (has no administration in time range)   Magnesium Low Dose Replacement - Follow Nurse / BPA Driven Protocol (has no administration in time range)   Phosphorus Replacement - Follow Nurse / BPA Driven Protocol (has no administration in time range)   Calcium Replacement - Follow Nurse / BPA Driven Protocol (has no administration in time range)   acetaminophen (TYLENOL) tablet 650 mg (has no administration in time range)     Or   acetaminophen (TYLENOL) 160 MG/5ML oral solution 650 mg (has no administration in time range)     Or   acetaminophen (TYLENOL) suppository 650 mg (has no administration in time range)   sennosides-docusate (PERICOLACE) 8.6-50 MG per tablet 2 tablet (has no administration in time range)     And   polyethylene glycol (MIRALAX) packet 17 g (has no administration in time range)     And   bisacodyl (DULCOLAX) EC tablet 5 mg (has no administration in time range)     And   bisacodyl (DULCOLAX)  suppository 10 mg (has no administration in time range)   ipratropium-albuterol (DUO-NEB) nebulizer solution 3 mL (3 mL Nebulization Given 4/22/25 1924)     XR Chest 1 View  Result Date: 4/22/2025  Impression: No active disease. Electronically Signed: Yfn Luna MD  4/22/2025 3:53 PM EDT  Workstation ID: [id number]    CT Head Without Contrast  Result Date: 4/22/2025  Impression: 1.No acute intracranial finding. 2.Chronic changes as above. Electronically Signed: Antonio Ceballos MD  4/22/2025 3:52 PM EDT  Workstation ID: [id number]    IR peripheral av dialysis perc angioplasty  Result Date: 4/22/2025  1. Greater than 90% stenosis at the venous anastomosis was successfully treated with 8 mm angioplasty, with good final angiographic results. Electronically Signed: Jp Hurst MD  4/22/2025 12:17 PM EDT  Workstation ID: [id number]                                                     Medical Decision Making  Chart review: 4/10/25 Dr. Bernard vascular surgery: Lynne Castillo returns for follow up s/p left arm axillary to axillary AV graft placement. Her left axillary incision dehiscence and left forearm wounds continue to improve with local wound care. They have been seen in the wound care center and her  is performing wound care at home. She denies fevers or chills. She continues to have severe left arm swelling despite compression and elevation. She had an AVG duplex yesterday that showed a patent AVG with no stenosis and adequate depth, diameters, and flow volumes. There was no venous stenosis on the duplex, so I suspect she has a central venous stenosis that is causing her arm swelling. Since she is not yet on dialysis, I will arrange for her to have a CO2 angiogram with IR to evaluate for central venous stenosis. Her wounds are healing well and her wound dehiscence appears superficial. I will plan to follow up with her in about 6 weeks to see how her arm swelling is doing post fistulagram.     Patient presented to  the ED for the above complaint.    Patient underwent the above exam and evaluation.    While in the ED patient was placed in a gown and IV was established.  EKG, chest x-ray, blood work was obtained to assess for arrhythmia, infection, electrolyte abnormality, intoxication, hypercapnia.  CT of the head was obtained to assess for ICH, infarct.  Patient was noted to be hypercapnic with respiratory acidosis, was placed on BiPAP.  Upon reevaluation patient is resting comfortably, tolerating well, vitals stable.  Discussed findings with patient and family at bedside.  Educated patient we will be admitting her for further treatment evaluation.  Patient and family voiced understanding, agreeable with dispo plan.  Spoke Dr. Torres hospitalist who agreed to admit patient.    EKG independently interpreted by Dr. Hoffman showed sinus rhythm, rate 91, RI interval 167, QTc 448, compared to previous EKG 2/16/2025 showing sinus rhythm, rate 76.  Labs were independently interpreted by myself and deemed remarkable for the following: WBC 9.60, hemoglobin 8.0, glucose 143, creatinine 2.89, calcium 4.0, mag 2.4, Imodium 16, urinalysis negative for hematuria or bacteria, ABG showed pH 7.22, pCO2 of 76.1, pO2 of 149.2.  Chest x-ray, CT of head independently interpreted by Dr. Hoffman and reviewed by myself showing: Chest x-ray showed no acute findings.  CT head showed no acute intracranial findings.    Appropriate PPE was worn during each patient encounter.    Note Disclaimer: At [facility], we believe that sharing information builds trust and better relationships. You are receiving this note because you are receiving care at [facility] or recently visited. It is possible you will see health information before a provider has talked with you about it. This kind of information can be easy to misunderstand. To help you fully understand what it means for your health, we urge you to discuss this note with your provider.    Based on  clinical findings anticipate this patient will require 2 midnight stay.    Discussed this patient with Dr. Hoffman who agrees with plan.      Problems Addressed:  Acute respiratory failure with hypoxia and hypercapnia: complicated acute illness or injury  Altered mental status, unspecified altered mental status type: complicated acute illness or injury  Generalized weakness: complicated acute illness or injury  Respiratory acidosis: complicated acute illness or injury    Amount and/or Complexity of Data Reviewed  Labs: ordered.  Radiology: ordered.    Risk  Prescription drug management.  Decision regarding hospitalization.    Critical Care  Total time providing critical care: 40 minutes        Final diagnoses:   Respiratory acidosis   Acute respiratory failure with hypoxia and hypercapnia   Generalized weakness   Altered mental status, unspecified altered mental status type       ED Disposition  ED Disposition       ED Disposition   Decision to Admit    Condition   --    Comment   Level of Care: Stepdown [25]   Diagnosis: Lethargy [[id number]]   Admitting Physician: DEREK GOMES [[id number]]   Attending Physician: DEREK GOMES [[id number]]                 No follow-up provider specified.       Medication List      No changes were made to your prescriptions during this visit.            Sally Castro PA-C  04/22/25 1810       Sally Castro PA-C  04/22/25 2201

## 2025-04-22 NOTE — Clinical Note
Level of Care: Telemetry [5]   Admitting Physician: DEREK GOMES [[id number]]   Attending Physician: DEREK GOMES [[id number]]

## 2025-04-22 NOTE — LETTER
EMS Transport Request  For use at [facility], [place], [place], [place], and [place] only   Patient Name: Lynne Cade : 1950   Weight:102 kg (224 lb 6.9 oz) Pick-up Location: [facility] BLS/ALS: BLS/ALS: BLS   Insurance: [employer] MEDICARE REPLACEMENT Auth End Date: 25   Pre-Cert #: D/C Summary complete:    Destination: [place] [address]   Contact Precautions: None   Equipment (O2, Fluids, etc.): O2, settings 3L NC   Arrive By Date/Time: 25 Stretcher/WC: Wheelchair   CM Requesting: Kristy Carmona RN Ext: [phone]   Notes/Medical Necessity: 3L NC, unsteady gait, assist x2, recent vascular procedure to LUE. Report given to Tri Garg.      ______________________________________________________________________    *Only 2 patient bags OR 1 carry-on size bag are permitted.  Wheelchairs and walkers CANNOT transported with the patient. Acknowledge: Yes

## 2025-04-22 NOTE — TELEPHONE ENCOUNTER
Patients spouse called and stated that she had a procedure yesterday and that the patient has been sleeping since 10pm yesterday. He stated that she is still groggy. He stated that she is conscious but having a hard time waking her up. He stated that she is very weak and does not have strength. This procedure was done by IR, not with a vascular surgeon. Patients spouse stated that he was calling our office because Joana wanted the procedure done. Patient wanted to know if they should call an ambulance to take the patient to the hospital. I spoke with Cony MCGEE, and she stated that patient should present to the ER.    Regarding: [place] M 33- Patient tested positive for covid today and would like to get Paxlovid, has fever 101, head  ----- Message from Marisol Ying sent at 3/5/2024  8:48 PM CST -----  Patient Name: Huy Paiz     Specialist or PCP Name: Gilma Beck     Symptoms: IL M 33- Patient tested positive for covid today and would like to get Paxlovid, has fever 101, headache and congestion     Pregnant (females aged 13-60. If Yes, how long?) : n/a     Call Back # : [phone]     Which State are you currently located in?: [place]     Name of Clinic Site / Acct# : [facility]     Use following scripting for patients waiting for a callback:   \"Nurse callback times vary based on call volumes; please be aware the return phone call may come from an unidentified or out of state phone number. If your symptoms worsen or become life threatening while waiting, you should seek immediate assistance by calling 911 or going to the ER for evaluation.\"

## 2025-04-23 LAB
ALBUMIN SERPL-MCNC: 3.2 G/DL (ref 3.5–5.2)
ALBUMIN/GLOB SERPL: 1 G/DL
ALP SERPL-CCNC: 49 U/L (ref 39–117)
ALT SERPL W P-5'-P-CCNC: 10 U/L (ref 1–33)
ANION GAP SERPL CALCULATED.3IONS-SCNC: 12.6 MMOL/L (ref 5–15)
ANION GAP SERPL CALCULATED.3IONS-SCNC: 13.6 MMOL/L (ref 5–15)
ARTERIAL PATENCY WRIST A: ABNORMAL
AST SERPL-CCNC: 14 U/L (ref 1–32)
ATMOSPHERIC PRESS: ABNORMAL MM[HG]
B PARAPERT DNA SPEC QL NAA+PROBE: NOT DETECTED
B PERT DNA SPEC QL NAA+PROBE: NOT DETECTED
BASE EXCESS BLDA CALC-SCNC: -0.6 MMOL/L (ref 0–3)
BASOPHILS # BLD AUTO: 0.02 10*3/MM3 (ref 0–0.2)
BASOPHILS NFR BLD AUTO: 0.3 % (ref 0–1.5)
BDY SITE: ABNORMAL
BILIRUB SERPL-MCNC: <0.2 MG/DL (ref 0–1.2)
BUN SERPL-MCNC: 49 MG/DL (ref 8–23)
BUN SERPL-MCNC: 51 MG/DL (ref 8–23)
BUN/CREAT SERPL: 17.6 (ref 7–25)
BUN/CREAT SERPL: 18.3 (ref 7–25)
C PNEUM DNA NPH QL NAA+NON-PROBE: NOT DETECTED
CALCIUM SPEC-SCNC: 8.8 MG/DL (ref 8.6–10.5)
CALCIUM SPEC-SCNC: 9.2 MG/DL (ref 8.6–10.5)
CHLORIDE SERPL-SCNC: 107 MMOL/L (ref 98–107)
CHLORIDE SERPL-SCNC: 107 MMOL/L (ref 98–107)
CO2 SERPL-SCNC: 23.4 MMOL/L (ref 22–29)
CO2 SERPL-SCNC: 24.4 MMOL/L (ref 22–29)
CREAT SERPL-MCNC: 2.79 MG/DL (ref 0.57–1)
CREAT SERPL-MCNC: 2.79 MG/DL (ref 0.57–1)
DEPRECATED RDW RBC AUTO: 57.9 FL (ref 37–54)
EGFRCR SERPLBLD CKD-EPI 2021: 17.1 ML/MIN/1.73
EGFRCR SERPLBLD CKD-EPI 2021: 17.1 ML/MIN/1.73
EOSINOPHIL # BLD AUTO: 0 10*3/MM3 (ref 0–0.4)
EOSINOPHIL NFR BLD AUTO: 0 % (ref 0.3–6.2)
ERYTHROCYTE [DISTWIDTH] IN BLOOD BY AUTOMATED COUNT: 14.7 % (ref 12.3–15.4)
FLUAV SUBTYP SPEC NAA+PROBE: NOT DETECTED
FLUBV RNA ISLT QL NAA+PROBE: NOT DETECTED
GLOBULIN UR ELPH-MCNC: 3.1 GM/DL
GLUCOSE BLDC GLUCOMTR-MCNC: 202 MG/DL (ref 70–105)
GLUCOSE BLDC GLUCOMTR-MCNC: 331 MG/DL (ref 70–105)
GLUCOSE BLDC GLUCOMTR-MCNC: 331 MG/DL (ref 70–105)
GLUCOSE BLDC GLUCOMTR-MCNC: 383 MG/DL (ref 70–105)
GLUCOSE SERPL-MCNC: 148 MG/DL (ref 65–99)
GLUCOSE SERPL-MCNC: 221 MG/DL (ref 65–99)
HADV DNA SPEC NAA+PROBE: NOT DETECTED
HCO3 BLDA-SCNC: 27.7 MMOL/L (ref 21–28)
HCOV 229E RNA SPEC QL NAA+PROBE: NOT DETECTED
HCOV HKU1 RNA SPEC QL NAA+PROBE: NOT DETECTED
HCOV NL63 RNA SPEC QL NAA+PROBE: NOT DETECTED
HCOV OC43 RNA SPEC QL NAA+PROBE: NOT DETECTED
HCT VFR BLD AUTO: 25.3 % (ref 34–46.6)
HEMODILUTION: NO
HGB BLD-MCNC: 7.4 G/DL (ref 12–15.9)
HMPV RNA NPH QL NAA+NON-PROBE: NOT DETECTED
HPIV1 RNA ISLT QL NAA+PROBE: NOT DETECTED
HPIV2 RNA SPEC QL NAA+PROBE: NOT DETECTED
HPIV3 RNA NPH QL NAA+PROBE: NOT DETECTED
HPIV4 P GENE NPH QL NAA+PROBE: NOT DETECTED
IMM GRANULOCYTES # BLD AUTO: 0.03 10*3/MM3 (ref 0–0.05)
IMM GRANULOCYTES NFR BLD AUTO: 0.4 % (ref 0–0.5)
INHALED O2 CONCENTRATION: 43 %
LYMPHOCYTES # BLD AUTO: 0.37 10*3/MM3 (ref 0.7–3.1)
LYMPHOCYTES NFR BLD AUTO: 4.6 % (ref 19.6–45.3)
M PNEUMO IGG SER IA-ACNC: NOT DETECTED
MAGNESIUM SERPL-MCNC: 2.4 MG/DL (ref 1.6–2.4)
MCH RBC QN AUTO: 31.5 PG (ref 26.6–33)
MCHC RBC AUTO-ENTMCNC: 29.2 G/DL (ref 31.5–35.7)
MCV RBC AUTO: 107.7 FL (ref 79–97)
MODALITY: ABNORMAL
MONOCYTES # BLD AUTO: 0.08 10*3/MM3 (ref 0.1–0.9)
MONOCYTES NFR BLD AUTO: 1 % (ref 5–12)
NEUTROPHILS NFR BLD AUTO: 7.46 10*3/MM3 (ref 1.7–7)
NEUTROPHILS NFR BLD AUTO: 93.7 % (ref 42.7–76)
NRBC BLD AUTO-RTO: 0 /100 WBC (ref 0–0.2)
PCO2 BLDA: 68.1 MM HG (ref 35–48)
PH BLDA: 7.22 PH UNITS (ref 7.35–7.45)
PHOSPHATE SERPL-MCNC: 5.8 MG/DL (ref 2.5–4.5)
PLATELET # BLD AUTO: 240 10*3/MM3 (ref 140–450)
PMV BLD AUTO: 10.3 FL (ref 6–12)
PO2 BLD: 299 MM[HG] (ref 0–500)
PO2 BLDA: 128.7 MM HG (ref 83–108)
POTASSIUM SERPL-SCNC: 4 MMOL/L (ref 3.5–5.2)
POTASSIUM SERPL-SCNC: 4.4 MMOL/L (ref 3.5–5.2)
PROT SERPL-MCNC: 6.3 G/DL (ref 6–8.5)
QT INTERVAL: 364 MS
QTC INTERVAL: 448 MS
RBC # BLD AUTO: 2.35 10*6/MM3 (ref 3.77–5.28)
RHINOVIRUS RNA SPEC NAA+PROBE: NOT DETECTED
RSV RNA NPH QL NAA+NON-PROBE: NOT DETECTED
SAO2 % BLDCOA: 98 % (ref 94–98)
SARS-COV-2 RNA RESP QL NAA+PROBE: NOT DETECTED
SODIUM SERPL-SCNC: 144 MMOL/L (ref 136–145)
SODIUM SERPL-SCNC: 144 MMOL/L (ref 136–145)
WBC NRBC COR # BLD AUTO: 7.96 10*3/MM3 (ref 3.4–10.8)

## 2025-04-23 PROCEDURE — 0202U NFCT DS 22 TRGT SARS-COV-2: CPT | Performed by: INTERNAL MEDICINE

## 2025-04-23 PROCEDURE — 82948 REAGENT STRIP/BLOOD GLUCOSE: CPT

## 2025-04-23 PROCEDURE — 63710000001 INSULIN LISPRO (HUMAN) PER 5 UNITS: Performed by: STUDENT IN AN ORGANIZED HEALTH CARE EDUCATION/TRAINING PROGRAM

## 2025-04-23 PROCEDURE — 94799 UNLISTED PULMONARY SVC/PX: CPT

## 2025-04-23 PROCEDURE — 97162 PT EVAL MOD COMPLEX 30 MIN: CPT

## 2025-04-23 PROCEDURE — 85025 COMPLETE CBC W/AUTO DIFF WBC: CPT | Performed by: STUDENT IN AN ORGANIZED HEALTH CARE EDUCATION/TRAINING PROGRAM

## 2025-04-23 PROCEDURE — 94664 DEMO&/EVAL PT USE INHALER: CPT

## 2025-04-23 PROCEDURE — 82803 BLOOD GASES ANY COMBINATION: CPT | Performed by: STUDENT IN AN ORGANIZED HEALTH CARE EDUCATION/TRAINING PROGRAM

## 2025-04-23 PROCEDURE — 80053 COMPREHEN METABOLIC PANEL: CPT | Performed by: STUDENT IN AN ORGANIZED HEALTH CARE EDUCATION/TRAINING PROGRAM

## 2025-04-23 PROCEDURE — 83735 ASSAY OF MAGNESIUM: CPT | Performed by: STUDENT IN AN ORGANIZED HEALTH CARE EDUCATION/TRAINING PROGRAM

## 2025-04-23 PROCEDURE — 84100 ASSAY OF PHOSPHORUS: CPT | Performed by: STUDENT IN AN ORGANIZED HEALTH CARE EDUCATION/TRAINING PROGRAM

## 2025-04-23 PROCEDURE — 25010000002 FUROSEMIDE PER 20 MG: Performed by: STUDENT IN AN ORGANIZED HEALTH CARE EDUCATION/TRAINING PROGRAM

## 2025-04-23 PROCEDURE — 94761 N-INVAS EAR/PLS OXIMETRY MLT: CPT

## 2025-04-23 PROCEDURE — 83540 ASSAY OF IRON: CPT | Performed by: HOSPITALIST

## 2025-04-23 PROCEDURE — 82948 REAGENT STRIP/BLOOD GLUCOSE: CPT | Performed by: STUDENT IN AN ORGANIZED HEALTH CARE EDUCATION/TRAINING PROGRAM

## 2025-04-23 PROCEDURE — 36600 WITHDRAWAL OF ARTERIAL BLOOD: CPT | Performed by: STUDENT IN AN ORGANIZED HEALTH CARE EDUCATION/TRAINING PROGRAM

## 2025-04-23 PROCEDURE — 84466 ASSAY OF TRANSFERRIN: CPT | Performed by: HOSPITALIST

## 2025-04-23 PROCEDURE — 25010000002 HEPARIN (PORCINE) PER 1000 UNITS: Performed by: INTERNAL MEDICINE

## 2025-04-23 PROCEDURE — 25010000002 METHYLPREDNISOLONE PER 40 MG: Performed by: STUDENT IN AN ORGANIZED HEALTH CARE EDUCATION/TRAINING PROGRAM

## 2025-04-23 RX ORDER — CALCITRIOL 0.25 UG/1
0.5 CAPSULE, LIQUID FILLED ORAL DAILY
COMMUNITY

## 2025-04-23 RX ORDER — HYDRALAZINE HYDROCHLORIDE 100 MG/1
50 TABLET, FILM COATED ORAL 2 TIMES DAILY
COMMUNITY

## 2025-04-23 RX ORDER — FUROSEMIDE 40 MG/1
40 TABLET ORAL DAILY
Status: DISCONTINUED | OUTPATIENT
Start: 2025-04-23 | End: 2025-05-04 | Stop reason: HOSPADM

## 2025-04-23 RX ORDER — HYDROXYZINE HYDROCHLORIDE 10 MG/1
10 TABLET, FILM COATED ORAL 3 TIMES DAILY PRN
Status: DISCONTINUED | OUTPATIENT
Start: 2025-04-23 | End: 2025-05-04 | Stop reason: HOSPADM

## 2025-04-23 RX ORDER — PRIMIDONE 50 MG/1
50 TABLET ORAL NIGHTLY
COMMUNITY

## 2025-04-23 RX ORDER — SODIUM BICARBONATE 650 MG/1
650 TABLET ORAL 2 TIMES DAILY
COMMUNITY
End: 2025-05-04 | Stop reason: HOSPADM

## 2025-04-23 RX ORDER — ROSUVASTATIN CALCIUM 10 MG/1
10 TABLET, COATED ORAL DAILY
Status: DISCONTINUED | OUTPATIENT
Start: 2025-04-23 | End: 2025-05-04 | Stop reason: HOSPADM

## 2025-04-23 RX ORDER — FUROSEMIDE 10 MG/ML
40 INJECTION INTRAMUSCULAR; INTRAVENOUS ONCE
Status: COMPLETED | OUTPATIENT
Start: 2025-04-23 | End: 2025-04-23

## 2025-04-23 RX ORDER — GABAPENTIN 100 MG/1
100 CAPSULE ORAL EVERY 12 HOURS SCHEDULED
Status: DISCONTINUED | OUTPATIENT
Start: 2025-04-23 | End: 2025-05-04 | Stop reason: HOSPADM

## 2025-04-23 RX ORDER — ALENDRONATE SODIUM 70 MG/1
70 TABLET ORAL
COMMUNITY

## 2025-04-23 RX ORDER — CYCLOBENZAPRINE HCL 10 MG
5 TABLET ORAL 3 TIMES DAILY PRN
Status: DISCONTINUED | OUTPATIENT
Start: 2025-04-23 | End: 2025-04-24

## 2025-04-23 RX ORDER — GLIMEPIRIDE 2 MG/1
2 TABLET ORAL 2 TIMES DAILY
COMMUNITY
End: 2025-05-04 | Stop reason: HOSPADM

## 2025-04-23 RX ORDER — ENOXAPARIN SODIUM 100 MG/ML
30 INJECTION SUBCUTANEOUS DAILY
Status: DISCONTINUED | OUTPATIENT
Start: 2025-04-23 | End: 2025-04-23

## 2025-04-23 RX ORDER — ZOLPIDEM TARTRATE 5 MG/1
5 TABLET ORAL NIGHTLY PRN
Status: DISCONTINUED | OUTPATIENT
Start: 2025-04-23 | End: 2025-04-24

## 2025-04-23 RX ORDER — GABAPENTIN 100 MG/1
100 CAPSULE ORAL NIGHTLY
Status: DISCONTINUED | OUTPATIENT
Start: 2025-04-23 | End: 2025-04-23

## 2025-04-23 RX ORDER — HYDRALAZINE HYDROCHLORIDE 25 MG/1
100 TABLET, FILM COATED ORAL 2 TIMES DAILY
Status: DISCONTINUED | OUTPATIENT
Start: 2025-04-23 | End: 2025-04-24

## 2025-04-23 RX ORDER — PRIMIDONE 50 MG/1
50 TABLET ORAL NIGHTLY
Status: DISCONTINUED | OUTPATIENT
Start: 2025-04-23 | End: 2025-05-04 | Stop reason: HOSPADM

## 2025-04-23 RX ORDER — HEPARIN SODIUM 5000 [USP'U]/ML
5000 INJECTION, SOLUTION INTRAVENOUS; SUBCUTANEOUS EVERY 12 HOURS SCHEDULED
Status: DISCONTINUED | OUTPATIENT
Start: 2025-04-23 | End: 2025-05-04 | Stop reason: HOSPADM

## 2025-04-23 RX ORDER — CALCITRIOL 0.25 UG/1
0.5 CAPSULE, LIQUID FILLED ORAL DAILY
Status: DISCONTINUED | OUTPATIENT
Start: 2025-04-23 | End: 2025-05-04 | Stop reason: HOSPADM

## 2025-04-23 RX ORDER — CYCLOBENZAPRINE HCL 5 MG
5 TABLET ORAL 3 TIMES DAILY PRN
COMMUNITY
End: 2025-05-04 | Stop reason: HOSPADM

## 2025-04-23 RX ORDER — DULOXETIN HYDROCHLORIDE 30 MG/1
60 CAPSULE, DELAYED RELEASE ORAL 2 TIMES DAILY
Status: DISCONTINUED | OUTPATIENT
Start: 2025-04-23 | End: 2025-05-04 | Stop reason: HOSPADM

## 2025-04-23 RX ADMIN — ACETAMINOPHEN 650 MG: 325 TABLET, FILM COATED ORAL at 09:12

## 2025-04-23 RX ADMIN — DULOXETINE 60 MG: 30 CAPSULE, DELAYED RELEASE ORAL at 14:56

## 2025-04-23 RX ADMIN — FUROSEMIDE 40 MG: 10 INJECTION, SOLUTION INTRAMUSCULAR; INTRAVENOUS at 05:35

## 2025-04-23 RX ADMIN — CALCITRIOL CAPSULES 0.25 MCG 0.5 MCG: 0.25 CAPSULE ORAL at 14:57

## 2025-04-23 RX ADMIN — GABAPENTIN 100 MG: 100 CAPSULE ORAL at 21:20

## 2025-04-23 RX ADMIN — HEPARIN SODIUM 5000 UNITS: 5000 INJECTION INTRAVENOUS; SUBCUTANEOUS at 09:26

## 2025-04-23 RX ADMIN — METHYLPREDNISOLONE SODIUM SUCCINATE 40 MG: 40 INJECTION, POWDER, FOR SOLUTION INTRAMUSCULAR; INTRAVENOUS at 09:13

## 2025-04-23 RX ADMIN — IPRATROPIUM BROMIDE AND ALBUTEROL SULFATE 3 ML: .5; 3 SOLUTION RESPIRATORY (INHALATION) at 06:12

## 2025-04-23 RX ADMIN — INSULIN LISPRO 5 UNITS: 100 INJECTION, SOLUTION INTRAVENOUS; SUBCUTANEOUS at 11:54

## 2025-04-23 RX ADMIN — HYDRALAZINE HYDROCHLORIDE 50 MG: 25 TABLET ORAL at 14:57

## 2025-04-23 RX ADMIN — HEPARIN SODIUM 5000 UNITS: 5000 INJECTION INTRAVENOUS; SUBCUTANEOUS at 21:16

## 2025-04-23 RX ADMIN — IPRATROPIUM BROMIDE AND ALBUTEROL SULFATE 3 ML: .5; 3 SOLUTION RESPIRATORY (INHALATION) at 18:40

## 2025-04-23 RX ADMIN — INSULIN LISPRO 3 UNITS: 100 INJECTION, SOLUTION INTRAVENOUS; SUBCUTANEOUS at 09:13

## 2025-04-23 RX ADMIN — INSULIN LISPRO 6 UNITS: 100 INJECTION, SOLUTION INTRAVENOUS; SUBCUTANEOUS at 21:16

## 2025-04-23 RX ADMIN — FUROSEMIDE 40 MG: 40 TABLET ORAL at 14:56

## 2025-04-23 RX ADMIN — ROSUVASTATIN CALCIUM 10 MG: 10 TABLET, FILM COATED ORAL at 14:57

## 2025-04-23 RX ADMIN — GABAPENTIN 100 MG: 100 CAPSULE ORAL at 14:56

## 2025-04-23 RX ADMIN — DULOXETINE 60 MG: 30 CAPSULE, DELAYED RELEASE ORAL at 21:20

## 2025-04-23 RX ADMIN — INSULIN LISPRO 5 UNITS: 100 INJECTION, SOLUTION INTRAVENOUS; SUBCUTANEOUS at 18:01

## 2025-04-23 RX ADMIN — BUDESONIDE 0.5 MG: 0.5 SUSPENSION RESPIRATORY (INHALATION) at 06:17

## 2025-04-23 RX ADMIN — DILTIAZEM HYDROCHLORIDE 300 MG: 180 CAPSULE, EXTENDED RELEASE ORAL at 09:13

## 2025-04-23 RX ADMIN — PRIMIDONE 50 MG: 50 TABLET ORAL at 21:18

## 2025-04-23 RX ADMIN — BUDESONIDE 0.5 MG: 0.5 SUSPENSION RESPIRATORY (INHALATION) at 18:40

## 2025-04-23 RX ADMIN — ZOLPIDEM TARTRATE 5 MG: 5 TABLET, FILM COATED ORAL at 21:16

## 2025-04-23 NOTE — CONSULTS
Group: [facility]         CONSULT NOTE    Patient Identification:  Lynne Cade  74 y.o.  female  1950  [id number]            Requesting physician: Attending physician    Reason for Consultation: Hypoxia      History of Present Illness:    74-year-old female admitted on 4/22/2025 with confusion and generalized weakness  ABG showed hypercapnic hypoxic respiratory insufficiency      Assessment:    Acute/chronic hypercapnic/hypoxic respiratory insufficiency    Chronic elevated right hemidiaphragm, associated right lower lobe mild bronchiectasis  CT chest February 2025 no evidence of interstitial lung disease    Non-smoker  No PFTs    Features of obstructive sleep apnea    CKD stage IV  Diabetes mellitus  Hyperlipidemia    Encephalopathy   TSH 3.1  Ammonia 16      Recommendations:    Check respiratory panel     oxygen titration currently on 6 L nasal cannula    Noninvasive ventilation utilizing BiPAP    Bronchodilators currently on Pulmicort and DuoNeb    DC IV steroids since there is no evidence of interstitial lung disease    Avoid sedatives such as Cymbalta and Atarax and Flexeril    Diuresis 40 mg IV once    Currently off antibiotics            CT chest 2/15/2025      Review of Sytems:  Review of Systems   Respiratory:  Positive for cough. Negative for shortness of breath, wheezing and stridor.    Cardiovascular:  Positive for leg swelling. Negative for chest pain and palpitations.       Past Medical History:  Past Medical History:   Diagnosis Date    Anemia     Hx of anemia    Anxiety     Controled  xanax prn    Arthritis     Conditions. Abstracted from [facility] [place].    Cataract     bilateral    CHF (congestive heart failure) 6/01/22    Low iron    Cholelithiasis 30 yrs ago    Had gb removed    CKD (chronic kidney disease), stage IV 01/13/2023    Colon polyp     Depression 1995    Take cymbalta    Diabetes mellitus     type 2    Diarrhea     on and off    Edema of right foot 04/2021     Healthcare maintenance 2008    PAP. Abstracted from [facility].    Hip pain, bilateral     HL (hearing loss) 1/1/19    Have hearing aids    Hyperlipidemia     Hypertension     Knee pain, bilateral     Obesity     Osteopenia 11/1/22    On med    Other forms of dyspnea     Other specified soft tissue disorders     Pneumonia due to COVID-19 virus 06/26/2022    Renal insufficiency Stage 3    Has gone up to stage 4    Slow to wake up after anesthesia     Spinal headache     Stress headaches     Tremor     Benign    Tremor of both hands     Urinary tract infection 07/01/22    [place]    Visual impairment 6/1/2017    Cataracts    Vitamin D deficiency        Past Surgical History:  Past Surgical History:   Procedure Laterality Date    ARTERIOVENOUS FISTULA/SHUNT SURGERY Left 02/24/2023    Procedure: BRACHIOCEPHALIC ARTERIOVENOUS FISTULA FORMATION;  Surgeon: Vignesh Riley MD;  Location: [facility] MAIN OR;  Service: Vascular;  Laterality: Left;    ARTERIOVENOUS FISTULA/SHUNT SURGERY Left 2/27/2025    Procedure: ARTERIOVENOUS SHUNT GRAFT;  Surgeon: Zaina Bernard MD;  Location: [facility] MAIN OR;  Service: Vascular;  Laterality: Left;    BREAST BIOPSY Right     CHOLECYSTECTOMY  1990    COLONOSCOPY      This year 2022    EYE SURGERY Bilateral     cataract removal    GASTRIC BYPASS  2002    HERNIA REPAIR  2005    umbilical hernia    HIP OPEN REDUCTION Right 03/02/2020    Procedure: FEMORAL NECK OPEN REDUCTION INTERNAL FIXATION;  Surgeon: Yfn Sifuentes MD;  Location: [facility] MAIN OR;  Service: Orthopedics;  Laterality: Right;  Cannulated screw fixation right femoral neck    JOINT REPLACEMENT  2020    R knee    LAPAROTOMY OOPHERECTOMY Bilateral 2010    OTHER SURGICAL HISTORY  04/20/2015    Lexiscan stress test, normal. Abstracted from [facility].    PANNICULECTOMY      TOTAL KNEE ARTHROPLASTY Right 04/29/2021    Procedure: RIGHT TOTAL KNEE REPLACEMENT;  Surgeon: Yfn Sifuentes MD;  Location: [facility] MAIN OR;  Service:  Orthopedics;  Laterality: Right;        Home Meds:  Medications Prior to Admission   Medication Sig Dispense Refill Last Dose/Taking    alendronate (FOSAMAX) 70 MG tablet Take 1 tablet by mouth Every 7 (Seven) Days. (Patient taking differently: Take 1 tablet by mouth Every 7 (Seven) Days. Sat) 12 tablet 1 4/21/2025    calcitriol (ROCALTROL) 0.25 MCG capsule Take 2 capsules by mouth Daily.   Patient Taking Differently    cyclobenzaprine (FLEXERIL) 5 MG tablet Take 1 tablet by mouth 3 (Three) Times a Day As Needed. (Patient taking differently: Take 1 tablet by mouth 4 (Four) Times a Day As Needed.)   Patient Taking Differently    dilTIAZem CD (CARDIZEM CD) 300 MG 24 hr capsule Take 1 capsule by mouth Daily.   4/21/2025    DULoxetine (CYMBALTA) 60 MG capsule Take 1 capsule by mouth 2 (Two) Times a Day. 180 capsule 1 4/21/2025    gabapentin (NEURONTIN) 100 MG capsule Take 1 capsule by mouth every night at bedtime.   4/21/2025    glimepiride (AMARYL) 2 MG tablet TAKE 2 TABLETS TWICE DAILY (Patient taking differently: Take 1 tablet by mouth 2 (Two) Times a Day.) 360 tablet 1 4/21/2025    glucose blood (Accu-Chek Megan Plus) test strip TEST BLOOD SUGAR TWICE DAILY AS DIRECTED   DX  E11.9 300 each 3 4/21/2025    hydrALAZINE (APRESOLINE) 100 MG tablet Take 1 tablet by mouth 3 times a day for 100 days. (Patient taking differently: Take 0.5 tablets by mouth 2 (Two) Times a Day.) 90 tablet 0 Patient Taking Differently    hydrOXYzine (ATARAX) 10 MG tablet Take 1 tablet by mouth 3 (Three) Times a Day As Needed for Itching. 90 tablet 1 4/21/2025    ipratropium-albuterol (DUO-NEB) 0.5-2.5 mg/3 ml nebulizer Take 0.5 mL by nebulization 2 (Two) Times a Day.   4/21/2025    Januvia 25 MG tablet    4/21/2025    Lancets (accu-chek soft touch) lancets Check blood sugars twice daily as directed. DX:E11.9 300 each 12 4/21/2025    primidone (MYSOLINE) 50 MG tablet TAKE 1 TABLET EVERY NIGHT. (Patient taking differently: Take 1 tablet by mouth  Every Night.) 30 tablet 0 4/21/2025    rosuvastatin (CRESTOR) 10 MG tablet Take 1 tablet by mouth Daily. 90 tablet 1 4/21/2025    sodium bicarbonate 650 MG tablet Take 1 tablet by mouth Daily. (Patient taking differently: Take 1 tablet by mouth 2 (Two) Times a Day.)   Patient Taking Differently    Vibegron (Gemtesa) 75 MG tablet Take 1 tablet by mouth Daily.   4/21/2025    zolpidem (AMBIEN) 5 MG tablet Take 1 tablet by mouth At Night As Needed.   4/21/2025    Accu-Chek Softclix Lancets lancets TEST BLOOD SUGAR TWICE DAILY 200 each 1 Unknown    albuterol sulfate  (90 Base) MCG/ACT inhaler Inhale 2 puffs Every 6 (Six) Hours.   Unknown    Alcohol Swabs (B-D SINGLE USE SWABS REGULAR) pads TO USE WHEN CHECKING BLOOD SUGAR TWICE A  each 1 Unknown    Alcohol Swabs (B-D SINGLE USE SWABS REGULAR) pads BD Single Use Swab, See Instructions, Use 1 swab to check blood sugar up to TID, # 300 EA, 1 Refill(s), Pharmacy: [facility], E11.9, Use 1 swab to check blood sugar up to TID, Supply, 168, cm, 03/25/24 11:10:00 EDT, Height, 89.9, kg, 03/25/24 11:16:00 EDT, Weight Dosing   Unknown    allopurinol (ZYLOPRIM) 100 MG tablet Take 1 tablet by mouth Daily.   4/21/2025    Blood Glucose Calibration (Accu-Chek Megan) solution Use as directed 3 each 1 Unknown    Blood Glucose Monitoring Suppl (Accu-Chek Megan) device Use to test twice daily   DX: E11.9 1 each 0 Unknown    Blood Glucose Monitoring Suppl (True Metrix Air Glucose Meter) device True Metrix Air Glucose Meter, See Instructions, use to check blood sugar up to TID, # 1 EA, 0 Refill(s), Pharmacy: [facility], E11.9, use to check blood sugar up to TID, Supply, 168, cm, 03/25/24 11:10:00 EDT, Height, 89.9, kg, 03/25/24 11:16:00 EDT, Weight Dosing   Unknown    furosemide (LASIX) 40 MG tablet Take 1 tablet by mouth Daily for 30 days. 30 tablet 0     lidocaine (Lidoderm) 5 % Apply  topically to the appropriate area as directed.   " Unknown       Allergies:  Allergies   Allergen Reactions    Carvedilol Shortness Of Breath    Morphine Itching    Sulfa Antibiotics GI Intolerance    Hydrocodone Nausea Only    Diphenhydramine Anxiety       Social History:   Social History     Socioeconomic History    Marital status:    Tobacco Use    Smoking status: Never     Passive exposure: Never    Smokeless tobacco: Never   Vaping Use    Vaping status: Never Used   Substance and Sexual Activity    Alcohol use: Never    Drug use: Never    Sexual activity: Not Currently     Partners: Male     Birth control/protection: Condom, Post-menopausal, Bilateral salpingectomy      Comment: monogamous with        Family History:  Family History   Problem Relation Age of Onset    Asthma Mother     Depression Mother     Kidney disease Mother         Kidney stones    Miscarriages / Stillbirths Mother     Hyperlipidemia Father     Hypertension Father     Diabetes Father     Anxiety disorder Daughter     Early death Daughter     Miscarriages / Stillbirths Daughter     Diabetes Daughter        Physical Exam:  /59 (BP Location: Right arm, Patient Position: Lying)   Pulse 86   Temp 97.5 °F (36.4 °C) (Oral)   Resp 14   Ht 167.6 cm (65.98\")   Wt 97.6 kg (215 lb 2.7 oz)   SpO2 99%   BMI 34.75 kg/m²  Body mass index is 34.75 kg/m². 99% 97.6 kg (215 lb 2.7 oz)  Physical Exam  Cardiovascular:      Heart sounds: No murmur heard.     No gallop.   Pulmonary:      Effort: No respiratory distress.      Breath sounds: No stridor. Rhonchi present. No wheezing or rales.   Chest:      Chest wall: No tenderness.         LABS:  Lab Results   Component Value Date    CALCIUM 9.2 04/23/2025    PHOS 5.8 (H) 04/23/2025     Results from last 7 days   Lab Units 04/23/25  0500 04/22/25  2338 04/22/25  1649 04/22/25  1649 04/22/25  1532   MAGNESIUM mg/dL 2.4  --   --  2.4  --    SODIUM mmol/L 144 144  --  141  --    POTASSIUM mmol/L 4.4 4.0  --  4.0  --    CHLORIDE mmol/L 107 " 107  --  103  --    CO2 mmol/L 23.4 24.4  --  26.4  --    BUN mg/dL 51* 49*  --  50*  --    CREATININE mg/dL 2.79* 2.79*  --  2.89*  --    GLUCOSE mg/dL 221* 148*   < > 143*  --    CALCIUM mg/dL 9.2 8.8  --  9.2  --    WBC 10*3/mm3 7.96 9.54  --   --  9.60   HEMOGLOBIN g/dL 7.4* 7.8*  --   --  8.0*   PLATELETS 10*3/mm3 240 258  --   --  246   ALT (SGPT) U/L 10  --   --  13  --    AST (SGOT) U/L 14  --   --  21  --     < > = values in this interval not displayed.     Lab Results   Component Value Date    CKTOTAL 27 02/16/2025    TROPONINT 32 (H) 02/16/2025                 Results from last 7 days   Lab Units 04/23/25  0353 04/22/25  1605   PH, ARTERIAL pH units 7.217* 7.220*   PCO2, ARTERIAL mm Hg 68.1* 76.1*   PO2 ART mm Hg 128.7* 149.2*   O2 SATURATION ART % 98.0 98.7*   MODALITY  HFNC Cannula                 Lab Results   Component Value Date    TSH 3.190 04/22/2025     Estimated Creatinine Clearance: 20.2 mL/min (A) (by C-G formula based on SCr of 2.79 mg/dL (H)).  Results from last 7 days   Lab Units 04/22/25  1530   NITRITE UA  Negative   WBC UA /HPF 0-2   BACTERIA UA /HPF None Seen   SQUAM EPITHEL UA /HPF 0-2        Imaging:  Imaging Results (Last 24 Hours)       Procedure Component Value Units Date/Time    XR Chest 1 View [[id number]] Collected: 04/22/25 1552     Updated: 04/22/25 1555    Narrative:      XR CHEST 1 VW    Date of Exam: 4/22/2025 3:40 PM EDT    Indication: AMS Protocol  AMS Protocol    Comparison: 2/16/2025    Findings:  Heart size is within normal limits for the projection. The right hemidiaphragm is elevated, unchanged. The pulmonary vascular pattern appears normal and the lungs appear clear.      Impression:      Impression:  No active disease.        Electronically Signed: Yfn Luna MD    4/22/2025 3:53 PM EDT    Workstation ID: [id number]    CT Head Without Contrast [[id number]] Collected: 04/22/25 1550     Updated: 04/22/25 1555    Narrative:      CT HEAD WO CONTRAST    Date of Exam:  4/22/2025 3:39 PM EDT    Indication: AMS, lethargic.    Comparison: 9/12/2024    Technique: Axial CT images were obtained of the head without contrast administration.  Coronal reconstructions were performed.  Automated exposure control and iterative reconstruction methods were used.      Findings:  There is no evidence of acute territorial infarction.    There is no acute intracranial hemorrhage. There are no extra-axial collections.    Ventricles and CSF spaces are symmetric. No mass effect nor hydrocephalus.    There is a focal area of rounded decreased attenuation above the right cerebellar hemisphere, unchanged suggestive of prior infarction. Brain parenchyma appears similar to prior examination.     Paranasal sinuses and mastoid air cells are adequately aerated.     Osseous structures and orbits appear intact.      Impression:      Impression:  1.No acute intracranial finding.  2.Chronic changes as above.        Electronically Signed: Antonio Ceballos MD    4/22/2025 3:52 PM EDT    Workstation ID: [id number]              Current Meds:   SCHEDULE  budesonide, 0.5 mg, Nebulization, BID - RT  dilTIAZem CD, 300 mg, Oral, Daily  enoxaparin sodium, 30 mg, Subcutaneous, Daily  insulin lispro, 2-7 Units, Subcutaneous, 4x Daily AC & at Bedtime  ipratropium-albuterol, 3 mL, Nebulization, 4x Daily - RT  methylPREDNISolone sodium succinate, 40 mg, Intravenous, Q12H      Infusions  Pharmacy Consult - Steroid Insulin Protocol,       PRNs    acetaminophen **OR** acetaminophen **OR** acetaminophen    senna-docusate sodium **AND** polyethylene glycol **AND** bisacodyl **AND** bisacodyl    Calcium Replacement - Follow Nurse / BPA Driven Protocol    dextrose    dextrose    glucagon (human recombinant)    ipratropium-albuterol    Magnesium Low Dose Replacement - Follow Nurse / BPA Driven Protocol    Pharmacy Consult - Steroid Insulin Protocol    Phosphorus Replacement - Follow Nurse / BPA Driven Protocol    Potassium Replacement -  Follow Nurse / BPA Driven Protocol    sodium chloride        Gigi Claire MD  4/23/2025  08:14 EDT      Much of this encounter note is an electronic transcription/translation of spoken language to printed text using Dragon Software.

## 2025-04-23 NOTE — H&P
[facility]  History & Physical    Patient Name: Lynne Cade  : 1950  MRN: [id number]  Primary Care Physician:  Lorraine Agarwal APRN  Date of admission: 2025  Date and Time of Service: 2025 at 2041    Subjective      Chief Complaint: Encephalopathy and generalized weakness    History of Present Illness: Lynne Cade is a 74 y.o. female with a CMH of COPD, CAD, hyperlipidemia, diabetes mellitus, CKD stage IV who presented to [facility] on 2025 with confusion and generalized weakness.    Patient currently awake, oriented to self only, with spouse present at bedside. Per spouse, patient has been experiencing generalized weakness and fatigue, with confusion and inability to recall the date, prompting hospital evaluation. Denies chest pain. On 2L nasal cannula, consistent with baseline home oxygen requirement. Follows with Dr. Peralta; recently diagnosed with interstitial lung disease. No history of tobacco use.    Spouse reports patient recently underwent a vascular procedure on 2025 for left upper extremity swelling due to venous stenosis. She previously had a graft placed for dialysis access due to CKD stage IV, though she is not currently on hemodialysis and continues to make urine. Swelling has slightly improved since the procedure, and patient is scheduled with outpatient vascular surgery follow-up in 2 weeks.    On presentation, vital stable, afebrile, ABG showing pH of 7.2, PCO2 of 76, bicarb of 31, labs remarkable for creatinine of 2.89, ammonia level 16, WBC normal, hemoglobin 10.9, chest x-ray showing cardiomegaly, no active disease per the radiologist reading, CT head was obtained negative for any acute findings.  EG showing sinus rhythm.  Patient now admitted to medicine team for further workup inpatient.      Review of Systems negative unless mentioned above    Personal History     Past Medical History:   Diagnosis Date    Anemia      Hx of anemia    Anxiety     Controled  xanax prn    Arthritis     Conditions. Abstracted from [facility].    Cataract     bilateral    CHF (congestive heart failure) 6/01/22    Low iron    Cholelithiasis 30 yrs ago    Had gb removed    CKD (chronic kidney disease), stage IV 01/13/2023    Colon polyp     Depression 1995    Take cymbalta    Diabetes mellitus     type 2    Diarrhea     on and off    Edema of right foot 04/2021    Healthcare maintenance 2008    PAP. Abstracted from [facility].    Hip pain, bilateral     HL (hearing loss) 1/1/19    Have hearing aids    Hyperlipidemia     Hypertension     Knee pain, bilateral     Obesity     Osteopenia 11/1/22    On med    Other forms of dyspnea     Other specified soft tissue disorders     Pneumonia due to COVID-19 virus 06/26/2022    Renal insufficiency Stage 3    Has gone up to stage 4    Slow to wake up after anesthesia     Spinal headache     Stress headaches     Tremor     Benign    Tremor of both hands     Urinary tract infection 07/01/22    [place]    Visual impairment 6/1/2017    Cataracts    Vitamin D deficiency        Past Surgical History:   Procedure Laterality Date    ARTERIOVENOUS FISTULA/SHUNT SURGERY Left 02/24/2023    Procedure: BRACHIOCEPHALIC ARTERIOVENOUS FISTULA FORMATION;  Surgeon: Vignesh Riley MD;  Location: [facility] MAIN OR;  Service: Vascular;  Laterality: Left;    ARTERIOVENOUS FISTULA/SHUNT SURGERY Left 2/27/2025    Procedure: ARTERIOVENOUS SHUNT GRAFT;  Surgeon: Zaina Bernard MD;  Location: [facility] MAIN OR;  Service: Vascular;  Laterality: Left;    BREAST BIOPSY Right     CHOLECYSTECTOMY  1990    COLONOSCOPY      This year 2022    EYE SURGERY Bilateral     cataract removal    GASTRIC BYPASS  2002    HERNIA REPAIR  2005    umbilical hernia    HIP OPEN REDUCTION Right 03/02/2020    Procedure: FEMORAL NECK OPEN REDUCTION INTERNAL FIXATION;  Surgeon: Yfn Sifuentes MD;  Location: [facility] MAIN OR;  Service: Orthopedics;  Laterality:  Right;  Cannulated screw fixation right femoral neck    JOINT REPLACEMENT  2020    R knee    LAPAROTOMY OOPHERECTOMY Bilateral 2010    OTHER SURGICAL HISTORY  04/20/2015    Lexiscan stress test, normal. Abstracted from [facility].    PANNICULECTOMY      TOTAL KNEE ARTHROPLASTY Right 04/29/2021    Procedure: RIGHT TOTAL KNEE REPLACEMENT;  Surgeon: Yfn Sifuentes MD;  Location: [facility] OR;  Service: Orthopedics;  Laterality: Right;       Family History: family history includes Anxiety disorder in her daughter; Asthma in her mother; Depression in her mother; Diabetes in her daughter and father; Early death in her daughter; Hyperlipidemia in her father; Hypertension in her father; Kidney disease in her mother; Miscarriages / Stillbirths in her daughter and mother. Otherwise pertinent FHx was reviewed and not pertinent to current issue.    Social History:  reports that she has never smoked. She has never been exposed to tobacco smoke. She has never used smokeless tobacco. She reports that she does not drink alcohol and does not use drugs.    Home Medications:  Prior to Admission Medications       Prescriptions Last Dose Informant Patient Reported? Taking?    Accu-Chek Softclix Lancets lancets   No No    TEST BLOOD SUGAR TWICE DAILY    albuterol sulfate  (90 Base) MCG/ACT inhaler   Yes No    Inhale 2 puffs Every 6 (Six) Hours.    Alcohol Swabs (B-D SINGLE USE SWABS REGULAR) pads   No No    TO USE WHEN CHECKING BLOOD SUGAR TWICE A DAY    Alcohol Swabs (B-D SINGLE USE SWABS REGULAR) pads   Yes No    BD Single Use Swab, See Instructions, Use 1 swab to check blood sugar up to TID, # 300 EA, 1 Refill(s), Pharmacy: [facility], E11.9, Use 1 swab to check blood sugar up to TID, Supply, 168, cm, 03/25/24 11:10:00 EDT, Height, 89.9, kg, 03/25/24 11:16:00 EDT, Weight Dosing    alendronate (FOSAMAX) 70 MG tablet   No No    Take 1 tablet by mouth Every 7 (Seven) Days.    Patient taking differently:   Take 1 tablet by mouth Every 7 (Seven) Days. Sat    allopurinol (ZYLOPRIM) 100 MG tablet   Yes No    Take 1 tablet by mouth Daily.    Blood Glucose Calibration (Accu-Chek Megan) solution   No No    Use as directed    Blood Glucose Monitoring Suppl (Accu-Chek Megan) device   No No    Use to test twice daily   DX: E11.9    Blood Glucose Monitoring Suppl (True Metrix Air Glucose Meter) device   Yes No    True Metrix Air Glucose Meter, See Instructions, use to check blood sugar up to TID, # 1 EA, 0 Refill(s), Pharmacy: [facility], E11.9, use to check blood sugar up to TID, Supply, 168, cm, 03/25/24 11:10:00 EDT, Height, 89.9, kg, 03/25/24 11:16:00 EDT, Weight Dosing    calcitriol (ROCALTROL) 0.25 MCG capsule   Yes No    Take 2 capsules by mouth Daily.    cyclobenzaprine (FLEXERIL) 5 MG tablet   Yes No    Take 1 tablet by mouth 3 (Three) Times a Day As Needed.    dilTIAZem CD (CARDIZEM CD) 300 MG 24 hr capsule   Yes No    Take 1 capsule by mouth Daily.    DULoxetine (CYMBALTA) 60 MG capsule   No No    Take 1 capsule by mouth 2 (Two) Times a Day.    furosemide (LASIX) 40 MG tablet   No No    Take 1 tablet by mouth Daily for 30 days.    gabapentin (NEURONTIN) 100 MG capsule   Yes No    Take 1 capsule by mouth every night at bedtime.    glimepiride (AMARYL) 2 MG tablet   No No    TAKE 2 TABLETS TWICE DAILY    Patient taking differently:  Take 2 tablets by mouth 2 (Two) Times a Day.    glucose blood (Accu-Chek Megan Plus) test strip   No No    TEST BLOOD SUGAR TWICE DAILY AS DIRECTED   DX  E11.9    hydrALAZINE (APRESOLINE) 100 MG tablet   No No    Take 1 tablet by mouth 3 times a day for 100 days.    hydrOXYzine (ATARAX) 10 MG tablet   No No    Take 1 tablet by mouth 3 (Three) Times a Day As Needed for Itching.    ipratropium-albuterol (DUO-NEB) 0.5-2.5 mg/3 ml nebulizer   Yes No    Take 0.5 mL by nebulization 2 (Two) Times a Day.    Januvia 25 MG tablet   Yes No    Lancets (accu-chek soft touch)  lancets   No No    Check blood sugars twice daily as directed. DX:E11.9    lidocaine (Lidoderm) 5 %   Yes No    Apply  topically to the appropriate area as directed.    primidone (MYSOLINE) 50 MG tablet   No No    TAKE 1 TABLET EVERY NIGHT.    Patient taking differently:  Take 1 tablet by mouth Every Night.    rosuvastatin (CRESTOR) 10 MG tablet   No No    Take 1 tablet by mouth Daily.    sodium bicarbonate 650 MG tablet   Yes No    Take 1 tablet by mouth Daily.    Vibegron (Gemtesa) 75 MG tablet   Yes No    Take 1 tablet by mouth Daily.    zolpidem (AMBIEN) 5 MG tablet   Yes No    Take 1 tablet by mouth At Night As Needed.              Allergies:  Allergies   Allergen Reactions    Carvedilol Shortness Of Breath    Morphine Itching    Sulfa Antibiotics GI Intolerance    Hydrocodone Nausea Only    Diphenhydramine Anxiety       Objective      Vitals:   Temp:  [97.7 °F (36.5 °C)] 97.7 °F (36.5 °C)  Heart Rate:  [86-93] 88  Resp:  [16] 16  BP: (133-165)/(63-75) 158/72  Body mass index is 34.23 kg/m².  Physical Exam  General: Awake alert, disoriented, obese  HEENT: Atraumatic normocephalic  Cardio, heart rate normal, rhythm regular  Respiratory: Decreased breath sounds at lung base  Abdomen: Soft, nontender, no rebound or guarding  Extremities: 1+ pitting edema the bilateral extremities, left upper extremity swelling-chronic, wound noted, recent vascular surgery intervention on 4/21/2025  Neuro: Awake, disoriented, moves all extremities    Diagnostic Data:  Lab Results (last 24 hours)       Procedure Component Value Units Date/Time    Blood Gas, Arterial - [[id number]]  (Abnormal) Collected: 04/22/25 1605    Specimen: Arterial Blood Updated: 04/22/25 1747     Site Right Radial     Tapan's Test Positive     pH, Arterial 7.220 pH units      pCO2, Arterial 76.1 mm Hg      pO2, Arterial 149.2 mm Hg      HCO3, Arterial 31.1 mmol/L      Base Excess, Arterial 2.3 mmol/L      Comment: Serial Number: [id number]:  [id number]         O2 Saturation, Arterial 98.7 %      CO2 Content 33.5 mmol/L      Barometric Pressure for Blood Gas --     Comment: N/A        Modality Cannula     FIO2 32 %      Notified Who Sally     Read Back Yes     Notified Time --     Hemodilution No     PO2/FIO2 466    Comprehensive Metabolic Panel [[id number]]  (Abnormal) Collected: 04/22/25 1649    Specimen: Blood from Arm, Right Updated: 04/22/25 1717     Glucose 143 mg/dL      BUN 50 mg/dL      Creatinine 2.89 mg/dL      Sodium 141 mmol/L      Potassium 4.0 mmol/L      Chloride 103 mmol/L      CO2 26.4 mmol/L      Calcium 9.2 mg/dL      Total Protein 7.3 g/dL      Albumin 3.9 g/dL      ALT (SGPT) 13 U/L      AST (SGOT) 21 U/L      Alkaline Phosphatase 53 U/L      Total Bilirubin 0.2 mg/dL      Globulin 3.4 gm/dL      A/G Ratio 1.1 g/dL      BUN/Creatinine Ratio 17.3     Anion Gap 11.6 mmol/L      eGFR 16.4 mL/min/1.73     Narrative:      GFR Categories in Chronic Kidney Disease (CKD)      GFR Category          GFR (mL/min/1.73)    Interpretation  G1                     90 or greater         Normal or high (1)  G2                      60-89                Mild decrease (1)  G3a                   45-59                Mild to moderate decrease  G3b                   30-44                Moderate to severe decrease  G4                    15-29                Severe decrease  G5                    14 or less           Kidney failure          (1)In the absence of evidence of kidney disease, neither GFR category G1 or G2 fulfill the criteria for CKD.    eGFR calculation 2021 CKD-EPI creatinine equation, which does not include race as a factor    Magnesium [[id number]]  (Normal) Collected: 04/22/25 1649    Specimen: Blood from Arm, Right Updated: 04/22/25 1717     Magnesium 2.4 mg/dL     Ammonia [[id number]]  (Normal) Collected: 04/22/25 1649    Specimen: Blood from Arm, Right Updated: 04/22/25 1716     Ammonia 16 umol/L     Acetaminophen Level [[id number]]  (Normal) Collected:  04/22/25 1619    Specimen: Blood from Arm, Right Updated: 04/22/25 1645     Acetaminophen <5.0 mcg/mL     Narrative:      Acetaminophen Therapeutic Range  5-20 ug/mL      Hours after ingestion            Toxic Value    4 Hours                           150 ug/mL    8 Hours                            70 ug/mL   12 Hours                            40 ug/mL   16 Hours                            20 ug/mL    These values apply to a single ingestion only.     Salicylate Level [[id number]]  (Normal) Collected: 04/22/25 1619    Specimen: Blood from Arm, Right Updated: 04/22/25 1645     Salicylate <0.5 mg/dL     CBC & Differential [[id number]]  (Abnormal) Collected: 04/22/25 1532    Specimen: Blood from Arm, Right Updated: 04/22/25 1558    Narrative:      The following orders were created for panel order CBC & Differential.  Procedure                               Abnormality         Status                     ---------                               -----------         ------                     CBC Auto Differential[[id number]]        Abnormal            Final result               Scan Slide[[id number]]                                       Final result                 Please view results for these tests on the individual orders.    CBC Auto Differential [[id number]]  (Abnormal) Collected: 04/22/25 1532    Specimen: Blood from Arm, Right Updated: 04/22/25 1558     WBC 9.60 10*3/mm3      RBC 2.52 10*6/mm3      Hemoglobin 8.0 g/dL      Hematocrit 27.4 %      .7 fL      MCH 31.7 pg      MCHC 29.2 g/dL      RDW 15.1 %      RDW-SD 59.6 fl      MPV 10.5 fL      Platelets 246 10*3/mm3      Neutrophil % 71.9 %      Lymphocyte % 14.1 %      Monocyte % 12.4 %      Eosinophil % 0.6 %      Basophil % 0.5 %      Immature Grans % 0.5 %      Neutrophils, Absolute 6.90 10*3/mm3      Lymphocytes, Absolute 1.35 10*3/mm3      Monocytes, Absolute 1.19 10*3/mm3      Eosinophils, Absolute 0.06 10*3/mm3      Basophils, Absolute 0.05 10*3/mm3       Immature Grans, Absolute 0.05 10*3/mm3      nRBC 0.0 /100 WBC     Scan Slide [[id number]] Collected: 04/22/25 1532    Specimen: Blood from Arm, Right Updated: 04/22/25 1558     Hypochromia Slight/1+     Macrocytes Slight/1+     WBC Morphology Normal     Platelet Estimate Adequate    Urine Drug Screen - Straight Cath [[id number]]  (Abnormal) Collected: 04/22/25 1530    Specimen: Urine from Straight Cath Updated: 04/22/25 1558     THC, Screen, Urine Negative     Phencyclidine (PCP), Urine Negative     Cocaine Screen, Urine Negative     Methamphetamine, Ur Negative     Opiate Screen Negative     Amphetamine Screen, Urine Negative     Benzodiazepine Screen, Urine Negative     Tricyclic Antidepressants Screen Negative     Methadone Screen, Urine Negative     Barbiturates Screen, Urine Positive     Oxycodone Screen, Urine Negative     Buprenorphine, Screen, Urine Negative    Narrative:      Cutoff For Drugs Screened:    Amphetamines               500 ng/ml  Barbiturates               200 ng/ml  Benzodiazepines            150 ng/ml  Cocaine                    150 ng/ml  Methadone                  200 ng/ml  Opiates                    100 ng/ml  Phencyclidine               25 ng/ml  THC                         50 ng/ml  Methamphetamine            500 ng/ml  Tricyclic Antidepressants  300 ng/ml  Oxycodone                  100 ng/ml  Buprenorphine               10 ng/ml    The normal value for all drugs tested is negative. This report includes unconfirmed screening results, with the cutoff values listed, to be used for medical treatment purposes only.  Unconfirmed results must not be used for non-medical purposes such as employment or legal testing.  Clinical consideration should be applied to any drug of abuse test, particularly when unconfirmed results are used.    All urine drugs of abuse requests without chain of custody are for medical screening purposes only.  False positives are possible.      Urinalysis With  Microscopic If Indicated (No Culture) - Straight Cath [[id number]]  (Abnormal) Collected: 04/22/25 1530    Specimen: Urine from Straight Cath Updated: 04/22/25 1539     Color, UA Yellow     Appearance, UA Clear     pH, UA 5.5     Specific Gravity, UA 1.014     Glucose,  mg/dL (Trace)     Ketones, UA Negative     Bilirubin, UA Negative     Blood, UA Negative     Protein,  mg/dL (2+)     Leuk Esterase, UA Negative     Nitrite, UA Negative     Urobilinogen, UA 0.2 E.U./dL    Urinalysis, Microscopic Only - Straight Cath [[id number]] Collected: 04/22/25 1530    Specimen: Urine from Straight Cath Updated: 04/22/25 1539     RBC, UA None Seen /HPF      WBC, UA 0-2 /HPF      Bacteria, UA None Seen /HPF      Squamous Epithelial Cells, UA 0-2 /HPF      Hyaline Casts, UA 0-2 /LPF      Methodology Automated Microscopy    POC Glucose Once [[id number]]  (Abnormal) Collected: 04/22/25 1508    Specimen: Blood Updated: 04/22/25 1509     Glucose 140 mg/dL      Comment: Serial Number: [id number]:  [id number]                Imaging Results (Last 24 Hours)       Procedure Component Value Units Date/Time    XR Chest 1 View [[id number]] Collected: 04/22/25 1552     Updated: 04/22/25 1555    Narrative:      XR CHEST 1 VW    Date of Exam: 4/22/2025 3:40 PM EDT    Indication: AMS Protocol  AMS Protocol    Comparison: 2/16/2025    Findings:  Heart size is within normal limits for the projection. The right hemidiaphragm is elevated, unchanged. The pulmonary vascular pattern appears normal and the lungs appear clear.      Impression:      Impression:  No active disease.        Electronically Signed: Yfn Luna MD    4/22/2025 3:53 PM EDT    Workstation ID: [id number]    CT Head Without Contrast [[id number]] Collected: 04/22/25 1550     Updated: 04/22/25 1555    Narrative:      CT HEAD WO CONTRAST    Date of Exam: 4/22/2025 3:39 PM EDT    Indication: AMS, lethargic.    Comparison: 9/12/2024    Technique: Axial CT images were  obtained of the head without contrast administration.  Coronal reconstructions were performed.  Automated exposure control and iterative reconstruction methods were used.      Findings:  There is no evidence of acute territorial infarction.    There is no acute intracranial hemorrhage. There are no extra-axial collections.    Ventricles and CSF spaces are symmetric. No mass effect nor hydrocephalus.    There is a focal area of rounded decreased attenuation above the right cerebellar hemisphere, unchanged suggestive of prior infarction. Brain parenchyma appears similar to prior examination.     Paranasal sinuses and mastoid air cells are adequately aerated.     Osseous structures and orbits appear intact.      Impression:      Impression:  1.No acute intracranial finding.  2.Chronic changes as above.        Electronically Signed: Antonio Ceballos MD    4/22/2025 3:52 PM EDT    Workstation ID: [id number]              Assessment & Plan    Lynne Cade is a 74 y.o. female with a CMH of ILD, CAD, hyperlipidemia, diabetes mellitus, CKD stage IV who presented to [facility] on 4/22/2025 with confusion and generalized weakness.    Assessments  Acute encephalopathy: Possibly due to CO2 retention  Acute on chronic hypoxic respiratory failure with hypercarbia  Recently diagnosed with interstitial lung disease  Type 2 diabetes  Hyperlipidemia  CKD stage IV  Left upper extremity stenosis status post intervention per vascular surgery on 4/21/2025    Plan    -Encephalopathy likely due to acute respiratory acidosis, CT brain negative for any acute process, ammonia normal, will check TSH    -Continue DuoNebs, Pulmicort, Solu-Medrol 125 once, Solu-Medrol 40 twice daily thereafter, transition to p.o. with clinical improvement, patient not wearing BiPAP due to anxiety, will start patient on high flow nasal cannula, plan to repeat ABG in few hours    -Start SSI for type 2 diabetes, monitor glucose closely, start insulin steroid  protocol    -Patient does have a left upper extremity swelling, per spouse swelling is slightly reduced since the intervention yesterday, recommend patient to follow-up outpatient with vascular as scheduled, if swelling increases may consider reaching out to vascular surgery for any recommendations    -Will give dose of Lasix IV 40 once, monitor strict ins and outs, creatinine near the baseline of 2.6-2.7, creatinine closely, continue IV or resume her p.o. depending on the clinical provide in the morning, continue Solu-Medrol    -Resume other home medication once reconciled per pharmacy or medically appropriate    -Follow a.m. labs    VTE Prophylaxis:  No VTE prophylaxis order currently exists.    CODE STATUS:  Full      I discussed the patient's findings and my recommendations with patient and nursing staff.      This document has been electronically signed by Trever Hernandez MD on April 22, 2025 20:41 EDT   [facility] Hospitalist Team

## 2025-04-23 NOTE — PLAN OF CARE
Goal Outcome Evaluation:         Pt woke up alert and oriented x 4. Pt transitioned off airvo to 6L n/c.  Pt asisted to the chair and then titrated to 4L tolerating well.     Pulmonary consulted - IV steroids d/c'd    Pt currently on 3L n/c.     Wound care nurse placed orders for wound care.

## 2025-04-23 NOTE — PROGRESS NOTES
[facility] MEDICINE SERVICE  DAILY PROGRESS NOTE    NAME: Lynne Cade  : 1950  MRN: [id number]      LOS: 0 days     PROVIDER OF SERVICE: Fadi Almonte MD    Chief Complaint: Lethargy    Subjective:     Interval History: Patient feels overall much better today with improvement in breathing although still not quite back to her baseline.  She is much more alert and oriented per the patient's  at the bedside.        Review of Systems:   Review of Systems    Objective:     Vital Signs  Temp:  [97.3 °F (36.3 °C)-99.7 °F (37.6 °C)] 97.3 °F (36.3 °C)  Heart Rate:  [85-98] 96  Resp:  [12-21] 15  BP: (131-167)/(59-75) 159/63  Flow (L/min) (Oxygen Therapy):  [2-60] 6   Body mass index is 34.75 kg/m².    Physical Exam  Physical Exam  General Appearance:  Alert, cooperative, no distress, appears stated age  Head:  Normocephalic, without obvious abnormality, atraumatic  Eyes:  PERRL, conjunctiva/corneas clear, EOM's intact, fundi benign, both eyes  Ears:  Normal TM's and external ear canals, both ears  Nose: Nares normal, septum midline, mucosa normal, no drainage or sinus tenderness  Throat: Lips, mucosa, and tongue normal; teeth and gums normal  Neck: Supple, symmetrical, trachea midline, no adenopathy, thyroid: not enlarged, symmetric, no tenderness/mass/nodules, no carotid bruit or JVD  Lungs:   Mild bilateral rhonchi expiratory wheeze, respirations unlabored  Heart:  Regular rate and rhythm, S1, S2 normal, no murmur, rub or gallop  Abdomen:  Soft, non-tender, bowel sounds active all four quadrants,  no masses, no organomegaly  Extremities: Extremities normal, atraumatic, no cyanosis or edema  Pulses: 2+ and symmetric  Skin: Skin color, texture, turgor normal, no rashes or lesions  Neurologic: Normal         Diagnostic Data    Results from last 7 days   Lab Units 25  0500   WBC 10*3/mm3 7.96   HEMOGLOBIN g/dL 7.4*   HEMATOCRIT % 25.3*   PLATELETS 10*3/mm3 240   GLUCOSE mg/dL 221*    CREATININE mg/dL 2.79*   BUN mg/dL 51*   SODIUM mmol/L 144   POTASSIUM mmol/L 4.4   AST (SGOT) U/L 14   ALT (SGPT) U/L 10   ALK PHOS U/L 49   BILIRUBIN mg/dL <0.2   ANION GAP mmol/L 13.6       XR Chest 1 View  Result Date: 4/22/2025  Impression: No active disease. Electronically Signed: Yfn Luna MD  4/22/2025 3:53 PM EDT  Workstation ID: [id number]    CT Head Without Contrast  Result Date: 4/22/2025  Impression: 1.No acute intracranial finding. 2.Chronic changes as above. Electronically Signed: Antonio Ceballos MD  4/22/2025 3:52 PM EDT  Workstation ID: [id number]    IR peripheral av dialysis perc angioplasty  Result Date: 4/22/2025  1. Greater than 90% stenosis at the venous anastomosis was successfully treated with 8 mm angioplasty, with good final angiographic results. Electronically Signed: Jp Hurst MD  4/22/2025 12:17 PM EDT  Workstation ID: [id number]        I reviewed the patient's new clinical results.    Assessment/Plan:     Active and Resolved Problems  Active Hospital Problems    Diagnosis  POA    **Lethargy [R53.83]  Yes      Resolved Hospital Problems   No resolved problems to display.     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             Disposition Planning:      Barriers to Discharge: Improvement in respiratory status, PT/OT evaluate  Anticipated Date of Discharge: 4/24  Place of Discharge: Home versus SNF      Time: 45 minutes     Code Status and Medical Interventions: CPR (Attempt to Resuscitate); Full Support   Ordered at: 04/22/25 2246     Code Status (Patient has no pulse and is not breathing):    CPR (Attempt to Resuscitate)     Medical Interventions (Patient has pulse or is breathing):    Full Support       Signature: Electronically signed by Fadi Almonte MD, 04/23/25, 11:21 EDT.  [facility] Hospitalist Team

## 2025-04-23 NOTE — CASE MANAGEMENT/SOCIAL WORK
Continued Stay Note  [facility]     Patient Name: Lynne Cade  MRN: [id number]  Today's Date: 4/23/2025    Admit Date: 4/22/2025        Discharge Plan       Row Name 04/23/25 1036       Plan    Plan Comments DC Barriers: 6L HF O2, PT/OT evals, resp panel swab, Cr 2.79, Hgb 7.4, repeat ABG, diuresis, LUE edema, Pulm consult             Vanesa Mercer RN     [facility]  Office: [phone]  Cell: [phone]  Fax # [phone]

## 2025-04-23 NOTE — NURSING NOTE
WOCN note:    74 yr old female admitted 4/22/25 with reports of generalized weakness and lethargy. WOCN consult received for left arm wounds.     Patient presents with edema to her LUE. She is current with [facility] wound center for wounds to her left forearm and axilla. The left forearm wound is healed. There is a stable scab in place with no exudate or fluctuance noted. Recommend to leave open to air.   The axilla wound measures approximately 0.5 x 1 x 0.5 cm with a yellow fibrotic wound base. There is scant serosanguinous exudate noted on the old dressing. The wound was irrigated with NS and packed with Iodoform packing strip and covered with silicone foam dressing. Recommend to change daily. Spouse has been performing wound care at home. Patient to continue follow up with outpatient wound center as scheduled. We will follow as needed.

## 2025-04-23 NOTE — PLAN OF CARE
Goal Outcome Evaluation:  Plan of Care Reviewed With: patient, spouse           Outcome Evaluation: Lynne Cade is a 74 y.o. female  with medical hx of COPD, CAD, HLD, DM and CKD stage 4 who presents with confusion and generalized weakness. Of note, pt had  vascular surgery LUE Feb. '25 and follow up with vascular surgery sarah this week.   CT shows no acute intracranial findings.  She is found to have Acute metabolic encephalopathy secondary to hypoxia and hypercapnia.  At baseline, pt lives with her  in a H with ramps to enter.  Prior to February, she was independent for all mobility and ADLs without AD.  Since then, she has had increasing confusion and weakness, requiring use of RW and finally, assist from spouse for transfers to w/c and assist with ADLs.  She recently started on 2L O2 at home.  At time of PT evaluation, she is A&O x 4 with some memory deficits and word finding noted.  She has been decreased to 3L high flow O2.  She demonstrates BLE weakness and continued edema LUE.  She was educated in AROM to LUE and deep breathing to activate abdominal lymphatics.  She transfers to stand with min A and is able to walk short distances with RW, exhibiting tremoring BLEs and reliance on BUEs.  She would benefit from HH PT at discharge.    Anticipated Discharge Disposition (PT): home with assist, home with home health

## 2025-04-23 NOTE — CASE MANAGEMENT/SOCIAL WORK
Discharge Planning Assessment   [facility]     Patient Name: Lynne Cade  MRN: [id number]  Today's Date: 4/23/2025    Admit Date: 4/22/2025    Plan: Return home with . Home O2 2L [facility]   Discharge Needs Assessment       Row Name 04/23/25 1451       Living Environment    People in Home spouse    Current Living Arrangements home    Potentially Unsafe Housing Conditions none    In the past 12 months has the electric, gas, oil, or water company threatened to shut off services in your home? No    Primary Care Provided by self    Provides Primary Care For no one    Family Caregiver if Needed spouse    Family Caregiver Names  Gordy    Quality of Family Relationships helpful;involved;supportive    Able to Return to Prior Arrangements yes       Resource/Environmental Concerns    Resource/Environmental Concerns none    Transportation Concerns none       Transportation Needs    In the past 12 months, has lack of transportation kept you from medical appointments or from getting medications? no    In the past 12 months, has lack of transportation kept you from meetings, work, or from getting things needed for daily living? No       Food Insecurity    Within the past 12 months, you worried that your food would run out before you got the money to buy more. Never true    Within the past 12 months, the food you bought just didn't last and you didn't have money to get more. Never true       Transition Planning    Patient/Family Anticipates Transition to home with family    Patient/Family Anticipated Services at Transition none    Transportation Anticipated family or friend will provide       Discharge Needs Assessment    Readmission Within the Last 30 Days no previous admission in last 30 days    Equipment Currently Used at Home walker, rolling;wheelchair;cane, straight;bp cuff;scales;oxygen;glucometer;commode;nebulizer    Concerns to be Addressed care coordination/care conferences;discharge planning    Do  you want help finding or keeping work or a job? Patient declined    Do you want help with school or training? For example, starting or completing job training or getting a high school diploma, GED or equivalent Patient declined    Anticipated Changes Related to Illness none    Equipment Needed After Discharge none                   Discharge Plan       Row Name 04/23/25 4897       Plan    Plan Return home with . Home O2 2L [facility]    Plan Comments CM met with patient at bedside to discuss discharge planning. Patient lives at home with her  Gordy. She is a retired nurse . She reports that she is able to drive if necessary &  assists. Mostly independent with ADLs. She has not yet started OPHD due to the edema in her arm.  states they have all needed DME at home, w/c walker, cane, commode, nebulizer, and home O2 through [facility] (baseline is 2L). PCP and pharmacy ([facility]) verified, agreeable to M2B. Denies current issues affording medications, states she had some high cost medications but they have told MD that they cannot get them or switched to other things. No current HHC or OPPT.  will transport at discharge.                Demographic Summary       Row Name 04/23/25 8446       General Information    Admission Type inpatient    Arrived From emergency department    Referral Source admission list    Reason for Consult care coordination/care conference;decision-making    Preferred Language English       Contact Information    Permission Granted to Share Info With                    Functional Status       Row Name 04/23/25 4620       Functional Status    Usual Activity Tolerance moderate    Current Activity Tolerance moderate       Functional Status, IADL    Medications independent    Meal Preparation independent    Housekeeping independent    Laundry independent    Shopping independent       Mental Status Summary    Recent Changes  in Mental Status/Cognitive Functioning no changes             Vanesa Mercer RN      [facility]  Office: [phone]  Cell: [phone]  Fax # [phone]

## 2025-04-23 NOTE — THERAPY EVALUATION
Patient Name: Lynne Cade  : 1950    MRN: [id number]                              Today's Date: 2025       Admit Date: 2025    Visit Dx:     ICD-10-CM ICD-9-CM   1. Respiratory acidosis  E87.29 276.2   2. Acute respiratory failure with hypoxia and hypercapnia  J96.01 518.81    J96.02    3. Generalized weakness  R53.1 780.79   4. Altered mental status, unspecified altered mental status type  R41.82 780.97     Patient Active Problem List   Diagnosis    Acute kidney injury superimposed on CKD    Absolute anemia    Anxiety    Benign essential tremor    Chronic obstructive pulmonary disease    Family tension    Hyperlipidemia    Insomnia    Iron deficiency anemia    Other amnesia    Diabetic nephropathy    Renal insufficiency    Type 2 diabetes mellitus with hyperglycemia    Grief at loss of child    Closed subcapital fracture of right femur    Essential hypertension    CKD (chronic kidney disease), stage IV    Transition of care performed with sharing of clinical summary    Vitamin D deficiency    Age-related osteoporosis without current pathological fracture    History of total knee arthroplasty    Dyspnea on exertion    S/P gastric bypass    Obesity (BMI 30-39.9)    Elevated LFTs    Weakness    Syncope, unspecified syncope type    Right leg swelling    Folliculitis    Elevated troponin    Coronary artery disease involving native coronary artery of native heart without angina pectoris    A-V fistula    Dyspnea    Dyspnea and respiratory abnormalities    Hemodialysis access, AV graft    S/P arteriovenous (AV) graft placement    Venous stenosis of left upper extremity    Lethargy     Past Medical History:   Diagnosis Date    Anemia     Hx of anemia    Anxiety     Controled  xanax prn    Arthritis     Conditions. Abstracted from [facility].    Cataract     bilateral    CHF (congestive heart failure) 22    Low iron    Cholelithiasis 30 yrs ago    Had gb removed    CKD (chronic kidney disease),  stage IV 01/13/2023    Colon polyp     Depression 1995    Take cymbalta    Diabetes mellitus     type 2    Diarrhea     on and off    Edema of right foot 04/2021    Healthcare maintenance 2008    PAP. Abstracted from [facility].    Hip pain, bilateral     HL (hearing loss) 1/1/19    Have hearing aids    Hyperlipidemia     Hypertension     Knee pain, bilateral     Obesity     Osteopenia 11/1/22    On med    Other forms of dyspnea     Other specified soft tissue disorders     Pneumonia due to COVID-19 virus 06/26/2022    Renal insufficiency Stage 3    Has gone up to stage 4    Slow to wake up after anesthesia     Spinal headache     Stress headaches     Tremor     Benign    Tremor of both hands     Urinary tract infection 07/01/22    [place]    Visual impairment 6/1/2017    Cataracts    Vitamin D deficiency      Past Surgical History:   Procedure Laterality Date    ARTERIOVENOUS FISTULA/SHUNT SURGERY Left 02/24/2023    Procedure: BRACHIOCEPHALIC ARTERIOVENOUS FISTULA FORMATION;  Surgeon: Vignesh Riley MD;  Location: [facility];  Service: Vascular;  Laterality: Left;    ARTERIOVENOUS FISTULA/SHUNT SURGERY Left 2/27/2025    Procedure: ARTERIOVENOUS SHUNT GRAFT;  Surgeon: Zaina Bernard MD;  Location: [facility] MAIN OR;  Service: Vascular;  Laterality: Left;    BREAST BIOPSY Right     CHOLECYSTECTOMY  1990    COLONOSCOPY      This year 2022    EYE SURGERY Bilateral     cataract removal    GASTRIC BYPASS  2002    HERNIA REPAIR  2005    umbilical hernia    HIP OPEN REDUCTION Right 03/02/2020    Procedure: FEMORAL NECK OPEN REDUCTION INTERNAL FIXATION;  Surgeon: Yfn Sifuentes MD;  Location: [facility] MAIN OR;  Service: Orthopedics;  Laterality: Right;  Cannulated screw fixation right femoral neck    JOINT REPLACEMENT  2020    R knee    LAPAROTOMY OOPHERECTOMY Bilateral 2010    OTHER SURGICAL HISTORY  04/20/2015    Lexiscan stress test, normal. Abstracted from [facility].    PANNICULECTOMY      TOTAL KNEE  ARTHROPLASTY Right 04/29/2021    Procedure: RIGHT TOTAL KNEE REPLACEMENT;  Surgeon: Yfn Sifuentes MD;  Location: [facility] MAIN OR;  Service: Orthopedics;  Laterality: Right;      General Information       Row Name 04/23/25 1136          Physical Therapy Time and Intention    Document Type evaluation  -CL     Mode of Treatment individual therapy;physical therapy  -CL       Row Name 04/23/25 1136          General Information    Patient Profile Reviewed yes  -CL     Prior Level of Function min assist:;transfer;ADL's  -CL     Existing Precautions/Restrictions fall  -CL     Barriers to Rehab medically complex  -CL       Row Name 04/23/25 1136          Living Environment    Current Living Arrangements home  -CL     People in Home spouse  -CL     Name(s) of People in Home Spouse: Gordy Cade  -CL       Row Name 04/23/25 1136          Home Main Entrance    Number of Stairs, Main Entrance other (see comments)  ramps to enter  -CL       Row Name 04/23/25 1136          Stairs Within Home, Primary    Number of Stairs, Within Home, Primary none  -CL       Row Name 04/23/25 1136          Cognition    Orientation Status (Cognition) person;place;situation  -CL       Row Name 04/23/25 1136          Safety Issues/Impairments Affecting Functional Mobility    Safety Issues Affecting Function (Mobility) insight into deficits/self-awareness  -CL     Impairments Affecting Function (Mobility) endurance/activity tolerance;strength  -CL               User Key  (r) = Recorded By, (t) = Taken By, (c) = Cosigned By      Initials Name Provider Type    CL Olivia Lucio, PT Physical Therapist                   Mobility       Row Name 04/23/25 1138          Bed Mobility    Comment, (Bed Mobility) Pt up in chair at start of session  -CL       Row Name 04/23/25 1138          Sit-Stand Transfer    Sit-Stand [place] (Transfers) minimum assist (75% patient effort)  -CL       Row Name 04/23/25 1138          Gait/Stairs (Locomotion)     Jay Level (Gait) minimum assist (75% patient effort)  -CL     Assistive Device (Gait) walker, front-wheeled  -CL     Patient was able to Ambulate yes  -CL     Distance in Feet (Gait) 5  -CL               User Key  (r) = Recorded By, (t) = Taken By, (c) = Cosigned By      Initials Name Provider Type    Olivia Willett, PT Physical Therapist                   Obj/Interventions       Row Name 04/23/25 1139          Range of Motion Comprehensive    General Range of Motion no range of motion deficits identified  -CL     Comment, General Range of Motion Generalized edema LUE  -CL       Row Name 04/23/25 1139          Strength Comprehensive (MMT)    Comment, General Manual Muscle Testing (MMT) Assessment hip flex 3/5, knee ext 4/5 bilaterally  -CL       Row Name 04/23/25 1139          Balance    Balance Assessment sitting static balance;standing static balance;standing dynamic balance;sitting dynamic balance  -CL     Static Sitting Balance independent  -CL     Dynamic Sitting Balance supervision  -CL     Position, Sitting Balance unsupported;sitting in chair  -CL     Static Standing Balance minimal assist  -CL     Dynamic Standing Balance minimal assist  -CL     Position/Device Used, Standing Balance supported;walker, rolling  -CL       Row Name 04/23/25 1139          Sensory Assessment (Somatosensory)    Sensory Assessment (Somatosensory) sensation intact  -CL               User Key  (r) = Recorded By, (t) = Taken By, (c) = Cosigned By      Initials Name Provider Type    Olivia Willett, PT Physical Therapist                   Goals/Plan       Row Name 04/23/25 1149          Bed Mobility Goal 1 (PT)    Activity/Assistive Device (Bed Mobility Goal 1, PT) bed mobility activities, all  -CL     Jay Level/Cues Needed (Bed Mobility Goal 1, PT) independent  -CL     Time Frame (Bed Mobility Goal 1, PT) long term goal (LTG);2 weeks  -CL       Row Name 04/23/25 1149          Transfer Goal 1 (PT)     Activity/Assistive Device (Transfer Goal 1, PT) sit-to-stand/stand-to-sit;bed-to-chair/chair-to-bed  -CL     Lacey Level/Cues Needed (Transfer Goal 1, PT) modified independence  -CL     Time Frame (Transfer Goal 1, PT) long term goal (LTG);2 weeks  -CL       Row Name 04/23/25 1149          Gait Training Goal 1 (PT)    Activity/Assistive Device (Gait Training Goal 1, PT) gait (walking locomotion);assistive device use  -CL     Lacey Level (Gait Training Goal 1, PT) modified independence  -CL     Distance (Gait Training Goal 1, PT) 25'  -CL     Time Frame (Gait Training Goal 1, PT) long term goal (LTG);2 weeks  -CL       Row Name 04/23/25 1149          Therapy Assessment/Plan (PT)    Planned Therapy Interventions (PT) balance training;bed mobility training;gait training;neuromuscular re-education;patient/family education;strengthening;transfer training  -CL               User Key  (r) = Recorded By, (t) = Taken By, (c) = Cosigned By      Initials Name Provider Type    CL Olivia Lucio, PT Physical Therapist                   Clinical Impression       Row Name 04/23/25 1140          Pain    Pretreatment Pain Rating 0/10 - no pain  -CL     Posttreatment Pain Rating 0/10 - no pain  -CL       Row Name 04/23/25 1140          Plan of Care Review    Plan of Care Reviewed With patient;spouse  -CL     Outcome Evaluation Lynne Cade is a 74 y.o. female  with medical hx of COPD, CAD, HLD, DM and CKD stage 4 who presents with confusion and generalized weakness. Of note, pt had  vascular surgery RAMONITA Feb. '25 and follow up with vascular surgery sarah this week.   CT shows no acute intracranial findings.  She is found to have Acute metabolic encephalopathy secondary to hypoxia and hypercapnia.  At baseline, pt lives with her  in a [facility] with ramps to enter.  Prior to February, she was independent for all mobility and ADLs without AD.  Since then, she has had increasing confusion and weakness, requiring  use of RW and finally, assist from spouse for transfers to w/c and assist with ADLs.  She recently started on 2L O2 at home.  At time of PT evaluation, she is A&O x 4 with some memory deficits and word finding noted.  She has been decreased to 3L high flow O2.  She demonstrates BLE weakness and continued edema LUE.  She was educated in AROM to LUE and deep breathing to activate abdominal lymphatics.  She transfers to stand with min A and is able to walk short distances with RW, exhibiting tremoring BLEs and reliance on BUEs.  She would benefit from HH PT at discharge.  -CL       Row Name 04/23/25 1140          Therapy Assessment/Plan (PT)    Rehab Potential (PT) good  -CL     Criteria for Skilled Interventions Met (PT) skilled treatment is necessary;yes  -CL     Therapy Frequency (PT) 5 times/wk  -CL     Predicted Duration of Therapy Intervention (PT) Until discharge  -CL       Row Name 04/23/25 1140          Vital Signs    Pre Systolic BP Rehab 145  -CL     Pre Treatment Diastolic BP 66  -CL     Pretreatment Heart Rate (beats/min) 82  -CL     Pretreatment Resp Rate (breaths/min) 11  -CL     Pre SpO2 (%) 96  -CL     O2 Delivery Pre Treatment supplemental O2  -CL     Intra SpO2 (%) 96  -CL     O2 Delivery Intra Treatment supplemental O2  -CL     Post SpO2 (%) 95  -CL     Pre Patient Position Sitting  -CL     Intra Patient Position Standing  -CL     Post Patient Position Sitting  -CL       Row Name 04/23/25 1140          Positioning and Restraints    Pre-Treatment Position sitting in chair/recliner  -CL     Post Treatment Position chair  -CL     In Chair sitting;call light within reach;encouraged to call for assist;exit alarm on;with family/caregiver  -CL               User Key  (r) = Recorded By, (t) = Taken By, (c) = Cosigned By      Initials Name Provider Type    CL Olivia Lucio, PT Physical Therapist                   Outcome Measures       Row Name 04/23/25 1145          How much help from another person do  you currently need...    Turning from your back to your side while in flat bed without using bedrails? 3  -CL     Moving from lying on back to sitting on the side of a flat bed without bedrails? 3  -CL     Moving to and from a bed to a chair (including a wheelchair)? 3  -CL     Standing up from a chair using your arms (e.g., wheelchair, bedside chair)? 3  -CL     Climbing 3-5 steps with a railing? 1  -CL     To walk in hospital room? 3  -CL     AM-PAC 6 Clicks Score (PT) 16  -CL     Highest Level of Mobility Goal 5 --> Static standing  -CL       Row Name 04/23/25 1149          Functional Assessment    Outcome Measure Options AM-PAC 6 Clicks Basic Mobility (PT)  -CL               User Key  (r) = Recorded By, (t) = Taken By, (c) = Cosigned By      Initials Name Provider Type    CL Olivia Lucio, PT Physical Therapist                                 Physical Therapy Education       Title: PT OT SLP Therapies (Done)       Topic: Physical Therapy (Done)       Point: Mobility training (Done)       Learning Progress Summary            Patient Acceptance, E,TB, VU by CL at 4/23/2025 1150                                      User Key       Initials Effective Dates Name Provider Type Discipline    CL 03/02/22 -  Olivia Lucio, PT Physical Therapist PT                  PT Recommendation and Plan  Planned Therapy Interventions (PT): balance training, bed mobility training, gait training, neuromuscular re-education, patient/family education, strengthening, transfer training  Outcome Evaluation: Lynne Cade is a 74 y.o. female  with medical hx of COPD, CAD, HLD, DM and CKD stage 4 who presents with confusion and generalized weakness. Of note, pt had  vascular surgery LUE Feb. '25 and follow up with vascular surgery sarah this week.   CT shows no acute intracranial findings.  She is found to have Acute metabolic encephalopathy secondary to hypoxia and hypercapnia.  At baseline, pt lives with her  in a [facility]  with ramps to enter.  Prior to February, she was independent for all mobility and ADLs without AD.  Since then, she has had increasing confusion and weakness, requiring use of RW and finally, assist from spouse for transfers to w/c and assist with ADLs.  She recently started on 2L O2 at home.  At time of PT evaluation, she is A&O x 4 with some memory deficits and word finding noted.  She has been decreased to 3L high flow O2.  She demonstrates BLE weakness and continued edema LUE.  She was educated in AROM to LUE and deep breathing to activate abdominal lymphatics.  She transfers to stand with min A and is able to walk short distances with RW, exhibiting tremoring BLEs and reliance on BUEs.  She would benefit from  PT at discharge.     Time Calculation:   PT Evaluation Complexity  History, PT Evaluation Complexity: 3 or more personal factors and/or comorbidities  Examination of Body Systems (PT Eval Complexity): total of 3 or more elements  Clinical Presentation (PT Evaluation Complexity): evolving  Clinical Decision Making (PT Evaluation Complexity): moderate complexity  Overall Complexity (PT Evaluation Complexity): moderate complexity     PT Charges       Row Name 04/23/25 1150             Time Calculation    Start Time 1041  -CL      Stop Time 1107  -CL      Time Calculation (min) 26 min  -CL      PT Received On 04/23/25  -CL      PT - Next Appointment 04/24/25  -CL      PT Goal Re-Cert Due Date 05/07/25  -CL         Time Calculation- PT    Total Timed Code Minutes- PT 0 minute(s)  -CL                User Key  (r) = Recorded By, (t) = Taken By, (c) = Cosigned By      Initials Name Provider Type    CL Olivia Lucio, PT Physical Therapist                  Therapy Charges for Today       Code Description Service Date Service Provider Modifiers Qty    [id number]  PT EVAL MOD COMPLEXITY 4 4/23/2025 Olivia Lucio, PT GP 1            PT G-Codes  Outcome Measure Options: AM-PAC 6 Clicks Basic Mobility  (PT)  AM-PAC 6 Clicks Score (PT): 16  PT Discharge Summary  Anticipated Discharge Disposition (PT): home with assist, home with home health    Olivia Lucio, PT  4/23/2025

## 2025-04-23 NOTE — PLAN OF CARE
Problem: Adult Inpatient Plan of Care  Goal: Absence of Hospital-Acquired Illness or Injury  Intervention: Identify and Manage Fall Risk  Recent Flowsheet Documentation  Taken 4/23/2025 0401 by Angelica Aguilar RN  Safety Promotion/Fall Prevention:   assistive device/personal items within reach   clutter free environment maintained   fall prevention program maintained   nonskid shoes/slippers when out of bed   room organization consistent   safety round/check completed  Taken 4/23/2025 0001 by Angelica Aguilar RN  Safety Promotion/Fall Prevention:   assistive device/personal items within reach   clutter free environment maintained   fall prevention program maintained   nonskid shoes/slippers when out of bed   room organization consistent   safety round/check completed  Taken 4/22/2025 2200 by Angelica Aguilar RN  Safety Promotion/Fall Prevention:   assistive device/personal items within reach   clutter free environment maintained   fall prevention program maintained   nonskid shoes/slippers when out of bed   room organization consistent   safety round/check completed  Taken 4/22/2025 2040 by Angelica Aguilar RN  Safety Promotion/Fall Prevention:   assistive device/personal items within reach   clutter free environment maintained   fall prevention program maintained   nonskid shoes/slippers when out of bed   room organization consistent   safety round/check completed  Intervention: Prevent Skin Injury  Recent Flowsheet Documentation  Taken 4/22/2025 2040 by Angelica Aguilar RN  Skin Protection:   incontinence pads utilized   silicone foam dressing in place   transparent dressing maintained  Intervention: Prevent and Manage VTE (Venous Thromboembolism) Risk  Recent Flowsheet Documentation  Taken 4/23/2025 0401 by Angelica Aguilar RN  VTE Prevention/Management: patient refused intervention  Taken 4/23/2025 0001 by Angelica Aguilar RN  VTE Prevention/Management: patient refused intervention  Taken 4/22/2025  2040 by Angelica Aguilar RN  VTE Prevention/Management: patient refused intervention  Intervention: Prevent Infection  Recent Flowsheet Documentation  Taken 4/22/2025 2040 by Angelica Aguilar RN  Infection Prevention:   environmental surveillance performed   hand hygiene promoted   personal protective equipment utilized   single patient room provided  Goal: Optimal Comfort and Wellbeing  Intervention: Provide Person-Centered Care  Recent Flowsheet Documentation  Taken 4/22/2025 2040 by Angelica Aguilar RN  Trust Relationship/Rapport:   care explained   choices provided   questions answered   questions encouraged   reassurance provided  Goal: Readiness for Transition of Care  Intervention: Mutually Develop Transition Plan  Recent Flowsheet Documentation  Taken 4/22/2025 2210 by Angelica Aguilar RN  Transportation Anticipated: family or friend will provide  Patient/Family Anticipated Services at Transition: none  Patient/Family Anticipates Transition to: home with family  Taken 4/22/2025 2204 by Angelica Aguilar RN  Equipment Currently Used at Home:   walker, rolling   wheelchair   cane, straight   bp cuff   scales   oxygen   glucometer     Problem: Comorbidity Management  Goal: Maintenance of Behavioral Health Symptom Control  Intervention: Maintain Behavioral Health Symptom Control  Recent Flowsheet Documentation  Taken 4/22/2025 2040 by Angelica Aguilar RN  Medication Review/Management: medications reviewed  Goal: Maintenance of COPD Symptom Control  Intervention: Maintain COPD (Chronic Obstructive Pulmonary Disease) Symptom Control  Recent Flowsheet Documentation  Taken 4/22/2025 2040 by Angelica Aguilar RN  Medication Review/Management: medications reviewed  Goal: Blood Glucose Level Within Target Range  Intervention: Monitor and Manage Glycemia  Recent Flowsheet Documentation  Taken 4/22/2025 2040 by Angelica Aguilar RN  Medication Review/Management: medications reviewed  Goal: Blood Pressure in Desired  Range  Intervention: Maintain Blood Pressure Management  Recent Flowsheet Documentation  Taken 4/22/2025 2040 by Angelica Aguilar, RN  Medication Review/Management: medications reviewed     Problem: Skin Injury Risk Increased  Goal: Skin Health and Integrity  Intervention: Optimize Skin Protection  Recent Flowsheet Documentation  Taken 4/22/2025 2040 by Angelica Aguilar, RN  Activity Management: activity minimized  Pressure Reduction Techniques:   weight shift assistance provided   pressure points protected   positioned off wounds   frequent weight shift encouraged  Pressure Reduction Devices:   pressure-redistributing mattress utilized   positioning supports utilized  Skin Protection:   incontinence pads utilized   silicone foam dressing in place   transparent dressing maintained     Problem: Fall Injury Risk  Goal: Absence of Fall and Fall-Related Injury  Intervention: Identify and Manage Contributors  Recent Flowsheet Documentation  Taken 4/22/2025 2040 by Angelica Aguilar RN  Medication Review/Management: medications reviewed  Intervention: Promote Injury-Free Environment  Recent Flowsheet Documentation  Taken 4/23/2025 0401 by Angelica Aguilar, RN  Safety Promotion/Fall Prevention:   assistive device/personal items within reach   clutter free environment maintained   fall prevention program maintained   nonskid shoes/slippers when out of bed   room organization consistent   safety round/check completed  Taken 4/23/2025 0001 by Angelica Aguilar RN  Safety Promotion/Fall Prevention:   assistive device/personal items within reach   clutter free environment maintained   fall prevention program maintained   nonskid shoes/slippers when out of bed   room organization consistent   safety round/check completed  Taken 4/22/2025 2200 by Angelica Aguilar RN  Safety Promotion/Fall Prevention:   assistive device/personal items within reach   clutter free environment maintained   fall prevention program maintained    nonskid shoes/slippers when out of bed   room organization consistent   safety round/check completed  Taken 4/22/2025 2040 by Angelica Aguilar RN  Safety Promotion/Fall Prevention:   assistive device/personal items within reach   clutter free environment maintained   fall prevention program maintained   nonskid shoes/slippers when out of bed   room organization consistent   safety round/check completed   Goal Outcome Evaluation:

## 2025-04-24 ENCOUNTER — APPOINTMENT (OUTPATIENT)
Dept: MRI IMAGING | Facility: HOSPITAL | Age: 75
DRG: 682 | End: 2025-04-24
Payer: MEDICARE

## 2025-04-24 ENCOUNTER — APPOINTMENT (OUTPATIENT)
Dept: CARDIOLOGY | Facility: HOSPITAL | Age: 75
DRG: 682 | End: 2025-04-24
Payer: MEDICARE

## 2025-04-24 LAB
BH CV XLRA MEAS LEFT CAROTID BULB EDV: 25.9 CM/SEC
BH CV XLRA MEAS LEFT CAROTID BULB PSV: 111 CM/SEC
BH CV XLRA MEAS LEFT DIST CCA EDV: 23.4 CM/SEC
BH CV XLRA MEAS LEFT DIST CCA PSV: 108 CM/SEC
BH CV XLRA MEAS LEFT DIST ICA EDV: -28.7 CM/SEC
BH CV XLRA MEAS LEFT DIST ICA PSV: -107 CM/SEC
BH CV XLRA MEAS LEFT ICA/CCA RATIO: 0.57
BH CV XLRA MEAS LEFT PROX CCA EDV: -29.3 CM/SEC
BH CV XLRA MEAS LEFT PROX CCA PSV: -189 CM/SEC
BH CV XLRA MEAS LEFT PROX ECA EDV: -11.9 CM/SEC
BH CV XLRA MEAS LEFT PROX ECA PSV: -102 CM/SEC
BH CV XLRA MEAS LEFT PROX ICA EDV: -35 CM/SEC
BH CV XLRA MEAS LEFT PROX ICA PSV: -102 CM/SEC
BH CV XLRA MEAS LEFT PROX SCLA PSV: 287 CM/SEC
BH CV XLRA MEAS LEFT VERTEBRAL A EDV: 11.9 CM/SEC
BH CV XLRA MEAS LEFT VERTEBRAL A PSV: 72.9 CM/SEC
BH CV XLRA MEAS RIGHT CAROTID BULB EDV: -15.4 CM/SEC
BH CV XLRA MEAS RIGHT CAROTID BULB PSV: -90.4 CM/SEC
BH CV XLRA MEAS RIGHT DIST CCA EDV: 23 CM/SEC
BH CV XLRA MEAS RIGHT DIST CCA PSV: 99.4 CM/SEC
BH CV XLRA MEAS RIGHT DIST ICA EDV: -32.9 CM/SEC
BH CV XLRA MEAS RIGHT DIST ICA PSV: -116 CM/SEC
BH CV XLRA MEAS RIGHT ICA/CCA RATIO: -1.26
BH CV XLRA MEAS RIGHT PROX CCA EDV: 18 CM/SEC
BH CV XLRA MEAS RIGHT PROX CCA PSV: 114 CM/SEC
BH CV XLRA MEAS RIGHT PROX ECA PSV: -106 CM/SEC
BH CV XLRA MEAS RIGHT PROX ICA EDV: -29.1 CM/SEC
BH CV XLRA MEAS RIGHT PROX ICA PSV: -144 CM/SEC
BH CV XLRA MEAS RIGHT PROX SCLA PSV: 194 CM/SEC
GLUCOSE BLDC GLUCOMTR-MCNC: 110 MG/DL (ref 70–105)
GLUCOSE BLDC GLUCOMTR-MCNC: 163 MG/DL (ref 70–105)
GLUCOSE BLDC GLUCOMTR-MCNC: 181 MG/DL (ref 70–105)
GLUCOSE BLDC GLUCOMTR-MCNC: 199 MG/DL (ref 70–105)
GLUCOSE BLDC GLUCOMTR-MCNC: 65 MG/DL (ref 70–105)
GLUCOSE BLDC GLUCOMTR-MCNC: 89 MG/DL (ref 70–105)
IRON 24H UR-MRATE: 74 MCG/DL (ref 37–145)
IRON SATN MFR SERPL: 27 % (ref 20–50)
RIGHT ARM BP: NORMAL MMHG
TIBC SERPL-MCNC: 276 MCG/DL (ref 298–536)
TRANSFERRIN SERPL-MCNC: 185 MG/DL (ref 200–360)

## 2025-04-24 PROCEDURE — 94799 UNLISTED PULMONARY SVC/PX: CPT

## 2025-04-24 PROCEDURE — 94664 DEMO&/EVAL PT USE INHALER: CPT

## 2025-04-24 PROCEDURE — 93880 EXTRACRANIAL BILAT STUDY: CPT

## 2025-04-24 PROCEDURE — 94761 N-INVAS EAR/PLS OXIMETRY MLT: CPT

## 2025-04-24 PROCEDURE — 97530 THERAPEUTIC ACTIVITIES: CPT

## 2025-04-24 PROCEDURE — 70551 MRI BRAIN STEM W/O DYE: CPT

## 2025-04-24 PROCEDURE — 82948 REAGENT STRIP/BLOOD GLUCOSE: CPT | Performed by: STUDENT IN AN ORGANIZED HEALTH CARE EDUCATION/TRAINING PROGRAM

## 2025-04-24 PROCEDURE — 63710000001 INSULIN LISPRO (HUMAN) PER 5 UNITS: Performed by: STUDENT IN AN ORGANIZED HEALTH CARE EDUCATION/TRAINING PROGRAM

## 2025-04-24 PROCEDURE — 97166 OT EVAL MOD COMPLEX 45 MIN: CPT

## 2025-04-24 PROCEDURE — 25010000002 HEPARIN (PORCINE) PER 1000 UNITS: Performed by: INTERNAL MEDICINE

## 2025-04-24 PROCEDURE — 93880 EXTRACRANIAL BILAT STUDY: CPT | Performed by: SURGERY

## 2025-04-24 PROCEDURE — 82948 REAGENT STRIP/BLOOD GLUCOSE: CPT

## 2025-04-24 PROCEDURE — 97116 GAIT TRAINING THERAPY: CPT

## 2025-04-24 RX ORDER — BUMETANIDE 1 MG/1
1 TABLET ORAL ONCE
Status: DISCONTINUED | OUTPATIENT
Start: 2025-04-24 | End: 2025-04-24

## 2025-04-24 RX ORDER — CYCLOBENZAPRINE HCL 10 MG
10 TABLET ORAL 3 TIMES DAILY PRN
Status: DISCONTINUED | OUTPATIENT
Start: 2025-04-24 | End: 2025-04-26

## 2025-04-24 RX ORDER — ZOLPIDEM TARTRATE 5 MG/1
5 TABLET ORAL NIGHTLY PRN
Status: DISCONTINUED | OUTPATIENT
Start: 2025-04-24 | End: 2025-04-26

## 2025-04-24 RX ORDER — HYDRALAZINE HYDROCHLORIDE 25 MG/1
50 TABLET, FILM COATED ORAL 2 TIMES DAILY
Status: DISCONTINUED | OUTPATIENT
Start: 2025-04-24 | End: 2025-04-26

## 2025-04-24 RX ORDER — PANTOPRAZOLE SODIUM 40 MG/1
40 TABLET, DELAYED RELEASE ORAL
Status: DISCONTINUED | OUTPATIENT
Start: 2025-04-24 | End: 2025-04-25

## 2025-04-24 RX ORDER — ASPIRIN 81 MG/1
81 TABLET ORAL DAILY
Status: DISCONTINUED | OUTPATIENT
Start: 2025-04-24 | End: 2025-05-04 | Stop reason: HOSPADM

## 2025-04-24 RX ORDER — METOLAZONE 2.5 MG/1
2.5 TABLET ORAL ONCE
Status: COMPLETED | OUTPATIENT
Start: 2025-04-24 | End: 2025-04-24

## 2025-04-24 RX ADMIN — ASPIRIN 81 MG: 81 TABLET, COATED ORAL at 11:51

## 2025-04-24 RX ADMIN — DILTIAZEM HYDROCHLORIDE 300 MG: 180 CAPSULE, EXTENDED RELEASE ORAL at 08:20

## 2025-04-24 RX ADMIN — HEPARIN SODIUM 5000 UNITS: 5000 INJECTION INTRAVENOUS; SUBCUTANEOUS at 22:56

## 2025-04-24 RX ADMIN — CALCITRIOL CAPSULES 0.25 MCG 0.5 MCG: 0.25 CAPSULE ORAL at 08:23

## 2025-04-24 RX ADMIN — PRIMIDONE 50 MG: 50 TABLET ORAL at 22:56

## 2025-04-24 RX ADMIN — DULOXETINE 60 MG: 30 CAPSULE, DELAYED RELEASE ORAL at 08:20

## 2025-04-24 RX ADMIN — IPRATROPIUM BROMIDE AND ALBUTEROL SULFATE 3 ML: .5; 3 SOLUTION RESPIRATORY (INHALATION) at 15:05

## 2025-04-24 RX ADMIN — BUDESONIDE 0.5 MG: 0.5 SUSPENSION RESPIRATORY (INHALATION) at 06:50

## 2025-04-24 RX ADMIN — METOLAZONE 2.5 MG: 2.5 TABLET ORAL at 11:51

## 2025-04-24 RX ADMIN — ROSUVASTATIN CALCIUM 10 MG: 10 TABLET, FILM COATED ORAL at 08:22

## 2025-04-24 RX ADMIN — IPRATROPIUM BROMIDE AND ALBUTEROL SULFATE 3 ML: .5; 3 SOLUTION RESPIRATORY (INHALATION) at 06:45

## 2025-04-24 RX ADMIN — HYDRALAZINE HYDROCHLORIDE 50 MG: 25 TABLET ORAL at 22:56

## 2025-04-24 RX ADMIN — GABAPENTIN 100 MG: 100 CAPSULE ORAL at 22:56

## 2025-04-24 RX ADMIN — DULOXETINE 60 MG: 30 CAPSULE, DELAYED RELEASE ORAL at 22:56

## 2025-04-24 RX ADMIN — HEPARIN SODIUM 5000 UNITS: 5000 INJECTION INTRAVENOUS; SUBCUTANEOUS at 08:23

## 2025-04-24 RX ADMIN — PANTOPRAZOLE SODIUM 40 MG: 40 TABLET, DELAYED RELEASE ORAL at 11:51

## 2025-04-24 RX ADMIN — INSULIN LISPRO 2 UNITS: 100 INJECTION, SOLUTION INTRAVENOUS; SUBCUTANEOUS at 11:50

## 2025-04-24 RX ADMIN — IPRATROPIUM BROMIDE AND ALBUTEROL SULFATE 3 ML: .5; 3 SOLUTION RESPIRATORY (INHALATION) at 19:05

## 2025-04-24 RX ADMIN — GABAPENTIN 100 MG: 100 CAPSULE ORAL at 08:23

## 2025-04-24 RX ADMIN — BUDESONIDE 0.5 MG: 0.5 SUSPENSION RESPIRATORY (INHALATION) at 19:05

## 2025-04-24 RX ADMIN — HYDRALAZINE HYDROCHLORIDE 50 MG: 25 TABLET ORAL at 08:22

## 2025-04-24 RX ADMIN — INSULIN LISPRO 2 UNITS: 100 INJECTION, SOLUTION INTRAVENOUS; SUBCUTANEOUS at 22:56

## 2025-04-24 RX ADMIN — FUROSEMIDE 40 MG: 40 TABLET ORAL at 08:23

## 2025-04-24 NOTE — DISCHARGE PLACEMENT REQUEST
"Coby Valentino \"NAVIN\" (74 y.o. Female)       Date of Birth   1950    Social Security Number       Address   [address] [place] [address] [place] [facility]    Home Phone   [phone]    MRN   [id number]       [facility]    Marital Status                               Admission Date   4/22/2025    Admission Type   Emergency    Admitting Provider   Fadi Almonte MD    Attending Provider   Brammell, Timothy Duane, MD    Department, Room/Bed   [facility], 2104/1       Discharge Date       Discharge Disposition       Discharge Destination                                 Attending Provider: Brammell, Timothy Duane, MD    Allergies: Carvedilol, Morphine, Sulfa Antibiotics, Hydrocodone, Diphenhydramine    Isolation: None   Infection: None   Code Status: CPR    Ht: 167.6 cm (65.98\")   Wt: 97.7 kg (215 lb 6.2 oz)    Admission Cmt: None   Principal Problem: Lethargy [R53.83]                   Active Insurance as of 4/22/2025       Primary Coverage       Payor Plan Insurance Group Employer/Plan Group    ANTHEM MEDICARE REPLACEMENT ANTH MEDICARE ADVANTAGE HMO [id number]       Payor Plan Address Payor Plan Phone Number Payor Plan Fax Number Effective Dates    [address] [phone]  1/1/2025 - None Entered    [facility] [id number]         Subscriber Name Subscriber Birth Date Member ID       COBY VALENTINO 1950 [id number]                     Emergency Contacts        (Rel.) Home Phone Work Phone Mobile Phone    AMINTA VALENTINO (Spouse) -- -- [phone]    JOSIE()YOAV (Other) -- -- [phone]                "

## 2025-04-24 NOTE — PLAN OF CARE
Goal Outcome Evaluation:         Pt was a rapid response/code stroke over night. Pt has been cognitively impaired today. Carotids and MRI brain ordered.     Bipap ordered for bedtime.       Problem: Noninvasive Ventilation Acute  Goal: Effective Unassisted Ventilation and Oxygenation  4/24/2025 1927 by Althea Sanders, RN  Outcome: Progressing  4/24/2025 1833 by Althea Sanders, RN  Outcome: Progressing

## 2025-04-24 NOTE — THERAPY EVALUATION
Patient Name: Lynne Cade  : 1950    MRN: [id number]                              Today's Date: 2025       Admit Date: 2025    Visit Dx:     ICD-10-CM ICD-9-CM   1. Respiratory acidosis  E87.29 276.2   2. Acute respiratory failure with hypoxia and hypercapnia  J96.01 518.81    J96.02    3. Generalized weakness  R53.1 780.79   4. Altered mental status, unspecified altered mental status type  R41.82 780.97     Patient Active Problem List   Diagnosis    Acute kidney injury superimposed on CKD    Absolute anemia    Anxiety    Benign essential tremor    Chronic obstructive pulmonary disease    Family tension    Hyperlipidemia    Insomnia    Iron deficiency anemia    Other amnesia    Diabetic nephropathy    Renal insufficiency    Type 2 diabetes mellitus with hyperglycemia    Grief at loss of child    Closed subcapital fracture of right femur    Essential hypertension    CKD (chronic kidney disease), stage IV    Transition of care performed with sharing of clinical summary    Vitamin D deficiency    Age-related osteoporosis without current pathological fracture    History of total knee arthroplasty    Dyspnea on exertion    S/P gastric bypass    Obesity (BMI 30-39.9)    Elevated LFTs    Weakness    Syncope, unspecified syncope type    Right leg swelling    Folliculitis    Elevated troponin    Coronary artery disease involving native coronary artery of native heart without angina pectoris    A-V fistula    Dyspnea    Dyspnea and respiratory abnormalities    Hemodialysis access, AV graft    S/P arteriovenous (AV) graft placement    Venous stenosis of left upper extremity    Lethargy     Past Medical History:   Diagnosis Date    Anemia     Hx of anemia    Anxiety     Controled  xanax prn    Arthritis     Conditions. Abstracted from [facility].    Cataract     bilateral    CHF (congestive heart failure) 22    Low iron    Cholelithiasis 30 yrs ago    Had gb removed    CKD (chronic kidney disease),  stage IV 01/13/2023    Colon polyp     Depression 1995    Take cymbalta    Diabetes mellitus     type 2    Diarrhea     on and off    Edema of right foot 04/2021    Healthcare maintenance 2008    PAP. Abstracted from [facility].    Hip pain, bilateral     HL (hearing loss) 1/1/19    Have hearing aids    Hyperlipidemia     Hypertension     Knee pain, bilateral     Obesity     Osteopenia 11/1/22    On med    Other forms of dyspnea     Other specified soft tissue disorders     Pneumonia due to COVID-19 virus 06/26/2022    Renal insufficiency Stage 3    Has gone up to stage 4    Slow to wake up after anesthesia     Spinal headache     Stress headaches     Tremor     Benign    Tremor of both hands     Urinary tract infection 07/01/22    [place]    Visual impairment 6/1/2017    Cataracts    Vitamin D deficiency      Past Surgical History:   Procedure Laterality Date    ARTERIOVENOUS FISTULA/SHUNT SURGERY Left 02/24/2023    Procedure: BRACHIOCEPHALIC ARTERIOVENOUS FISTULA FORMATION;  Surgeon: Vignesh Riley MD;  Location: [facility];  Service: Vascular;  Laterality: Left;    ARTERIOVENOUS FISTULA/SHUNT SURGERY Left 2/27/2025    Procedure: ARTERIOVENOUS SHUNT GRAFT;  Surgeon: Zaina Bernard MD;  Location: [facility] MAIN OR;  Service: Vascular;  Laterality: Left;    BREAST BIOPSY Right     CHOLECYSTECTOMY  1990    COLONOSCOPY      This year 2022    EYE SURGERY Bilateral     cataract removal    GASTRIC BYPASS  2002    HERNIA REPAIR  2005    umbilical hernia    HIP OPEN REDUCTION Right 03/02/2020    Procedure: FEMORAL NECK OPEN REDUCTION INTERNAL FIXATION;  Surgeon: Yfn Sifuentes MD;  Location: [facility] MAIN OR;  Service: Orthopedics;  Laterality: Right;  Cannulated screw fixation right femoral neck    JOINT REPLACEMENT  2020    R knee    LAPAROTOMY OOPHERECTOMY Bilateral 2010    OTHER SURGICAL HISTORY  04/20/2015    Lexiscan stress test, normal. Abstracted from [facility].    PANNICULECTOMY      TOTAL KNEE  ARTHROPLASTY Right 04/29/2021    Procedure: RIGHT TOTAL KNEE REPLACEMENT;  Surgeon: Yfn Sifuentes MD;  Location: [facility] MAIN OR;  Service: Orthopedics;  Laterality: Right;      General Information       Row Name 04/24/25 1142          OT Time and Intention    Document Type evaluation  -SR     Mode of Treatment occupational therapy  -SR       Row Name 04/24/25 1142          Occupational Profile    Reason for Services/Referral (Occupational Profile) Lynne Cade is a 74 y.o. female  with medical hx of COPD, CAD, HLD, DM and CKD stage 4 who presents with confusion and generalized weakness. Of note, pt had  vascular surgery LUE Feb. '25 and follow up with vascular surgery sarah this week.   CT shows no acute intracranial findings.  She is found to have Acute metabolic encephalopathy secondary to hypoxia and hypercapnia.  At baseline, pt lives with her  in a H with ramps to enter.  Prior to February, she was independent for all mobility and ADLs without AD.  Since then, she has had increasing confusion and weakness, requiring use of RW and finally, assist from spouse for transfers to w/c and assist with ADLs.  She recently started on 2L O2 at home.  -SR       Row Name 04/24/25 1142          Living Environment    Current Living Arrangements home  -SR       Row Name 04/24/25 1142          Cognition    Orientation Status (Cognition) oriented to;person;place;situation;time  SBT 4/28 indicating normal cognition.  -SR               User Key  (r) = Recorded By, (t) = Taken By, (c) = Cosigned By      Initials Name Provider Type    SR Mariama Reyna OT Occupational Therapist                     Mobility/ADL's       Row Name 04/24/25 1144          Sit-Stand Transfer    Sit-Stand [place] (Transfers) minimum assist (75% patient effort)  -SR       Row Name 04/24/25 1144          Functional Mobility    Functional Mobility- Ind. Level minimum assist (75% patient effort)  -SR     Functional Mobility-  Device walker, front-wheeled  -SR       Row Name 04/24/25 1144          Activities of Daily Living    BADL Assessment/Intervention lower body dressing  -SR       Row Name 04/24/25 1144          Lower Body Dressing Assessment/Training    [place] Level (Lower Body Dressing) don;socks;dependent (less than 25% patient effort)  -SR               User Key  (r) = Recorded By, (t) = Taken By, (c) = Cosigned By      Initials Name Provider Type    SR Mariama Reyna OT Occupational Therapist                   Obj/Interventions       Row Name 04/24/25 1151          Range of Motion Comprehensive    General Range of Motion no range of motion deficits identified  -SR       Row Name 04/24/25 1151          Strength Comprehensive (MMT)    Comment, General Manual Muscle Testing (MMT) Assessment BUE grossly 4-/5  -SR       Row Name 04/24/25 1151          Balance    Static Sitting Balance supervision  -SR     Static Standing Balance minimal assist  -SR     Dynamic Standing Balance minimal assist  -SR     Position/Device Used, Standing Balance walker, front-wheeled  -SR     Balance Interventions sitting;sit to stand;standing;static;supported;minimal challenge;dynamic  -SR               User Key  (r) = Recorded By, (t) = Taken By, (c) = Cosigned By      Initials Name Provider Type    SR Mariama Reyna, OT Occupational Therapist                   Goals/Plan       Row Name 04/24/25 1155          Bathing Goal 1 (OT)    Activity/Device (Bathing Goal 1, OT) bathing skills, all  -SR     [place] Level/Cues Needed (Bathing Goal 1, OT) moderate assist (50-74% patient effort)  -SR     Time Frame (Bathing Goal 1, OT) 2 weeks  -SR       Row Name 04/24/25 1155          Toileting Goal 1 (OT)    Activity/Device (Toileting Goal 1, OT) toileting skills, all  -SR     [place] Level/Cues Needed (Toileting Goal 1, OT) minimum assist (75% or more patient effort)  -SR     Time Frame (Toileting Goal 1, OT) 2 weeks  -[facility]  Name 04/24/25 1155          Therapy Assessment/Plan (OT)    Planned Therapy Interventions (OT) activity tolerance training;BADL retraining;IADL retraining;functional balance retraining;neuromuscular control/coordination retraining;ROM/therapeutic exercise;transfer/mobility retraining;strengthening exercise;occupation/activity based interventions  -SR               User Key  (r) = Recorded By, (t) = Taken By, (c) = Cosigned By      Initials Name Provider Type    SR Mariama Reyna, OT Occupational Therapist                   Clinical Impression       Row Name 04/24/25 1152          Pain Assessment    Pretreatment Pain Rating 0/10 - no pain  -SR     Posttreatment Pain Rating 0/10 - no pain  -SR       Row Name 04/24/25 1152          Plan of Care Review    Outcome Evaluation Lynne Cade is a 74 y.o. female  with medical hx of COPD, CAD, HLD, DM and CKD stage 4 who presents with confusion and generalized weakness. Of note, pt had  vascular surgery LUE Feb. '25 and follow up with vascular surgery sarah this week.   CT shows no acute intracranial findings.  She is found to have Acute metabolic encephalopathy secondary to hypoxia and hypercapnia.  At baseline, pt lives with her  in a H with ramps to enter.  Prior to February, she was independent for all mobility and ADLs without AD.  Since then, she has had increasing confusion and weakness, requiring use of RW and finally, assist from spouse for transfers to w/c and assist with ADLs.  She recently started on 2L O2 at home.  This date pt requires min assist for bed mobility, transfers, and functional mobility.  She is dependent for donning socks.  During mobility knees buckle slightly with concern for giving out under her.  She demos generalized weakness and is high risk for fall. Recommend SNF at [facility].  -SR       Row Name 04/24/25 1151          Therapy Assessment/Plan (OT)    Rehab Potential (OT) good  -SR     Criteria for Skilled Therapeutic  Interventions Met (OT) yes  -SR     Therapy Frequency (OT) 5 times/wk  -SR       Row Name 04/24/25 1152          Therapy Plan Review/Discharge Plan (OT)    Anticipated Discharge Disposition (OT) skilled nursing facility  -SR       Row Name 04/24/25 1152          Positioning and Restraints    Pre-Treatment Position in bed  -SR     Post Treatment Position chair  -SR     In Chair call light within reach;exit alarm on;encouraged to call for assist  -SR               User Key  (r) = Recorded By, (t) = Taken By, (c) = Cosigned By      Initials Name Provider Type    SR Mariama Reyna OT Occupational Therapist                   Outcome Measures    No documentation.                   Occupational Therapy Education       Title: PT OT SLP Therapies (In Progress)       Topic: Occupational Therapy (In Progress)       Point: ADL training (In Progress)       Learning Progress Summary            Patient Acceptance, E,TB, NR by SR at 4/24/2025 1156                      Point: Body mechanics (In Progress)       Learning Progress Summary            Patient Acceptance, E,TB, NR by SR at 4/24/2025 1156                                      User Key       Initials Effective Dates Name Provider Type Discipline    SR 06/16/21 -  Mariama Reyna OT Occupational Therapist OT                  OT Recommendation and Plan  Planned Therapy Interventions (OT): activity tolerance training, BADL retraining, IADL retraining, functional balance retraining, neuromuscular control/coordination retraining, ROM/therapeutic exercise, transfer/mobility retraining, strengthening exercise, occupation/activity based interventions  Therapy Frequency (OT): 5 times/wk  Plan of Care Review  Outcome Evaluation: Lynne Cade is a 74 y.o. female  with medical hx of COPD, CAD, HLD, DM and CKD stage 4 who presents with confusion and generalized weakness. Of note, pt had  vascular surgery LUE Feb. '25 and follow up with vascular surgery sarah wells  week.   CT shows no acute intracranial findings.  She is found to have Acute metabolic encephalopathy secondary to hypoxia and hypercapnia.  At baseline, pt lives with her  in a SSH with ramps to enter.  Prior to February, she was independent for all mobility and ADLs without AD.  Since then, she has had increasing confusion and weakness, requiring use of RW and finally, assist from spouse for transfers to w/c and assist with ADLs.  She recently started on 2L O2 at home.  This date pt requires min assist for bed mobility, transfers, and functional mobility.  She is dependent for donning socks.  During mobility knees buckle slightly with concern for giving out under her.  She demos generalized weakness and is high risk for fall. Recommend SNF at [facility].     Time Calculation:         Time Calculation- OT       Row Name 04/24/25 1156             Time Calculation- OT    OT Start Time 0941  -SR      OT Stop Time 0959  -SR      OT Time Calculation (min) 18 min  -SR      Total Timed Code Minutes- OT 0 minute(s)  -SR      OT Received On 04/24/25  -SR      OT - Next Appointment 04/25/25  -SR      OT Goal Re-Cert Due Date 05/08/25  -SR                User Key  (r) = Recorded By, (t) = Taken By, (c) = Cosigned By      Initials Name Provider Type    SR Mariama Reyna OT Occupational Therapist                  Therapy Charges for Today       Code Description Service Date Service Provider Modifiers Qty    17259694594  OT EVAL MOD COMPLEXITY 4 4/24/2025 Mariama Reyna OT GO 1                 Mariama Reyna OT  4/24/2025

## 2025-04-24 NOTE — DISCHARGE PLACEMENT REQUEST
"Coby Valentino \"NAVIN\" (74 y.o. Female)       Date of Birth   1950    Social Security Number       Address   [address] [place] [address] [place] [facility]    Home Phone   [phone]    MRN   [id number]       [facility]    Marital Status                               Admission Date   4/22/2025    Admission Type   Emergency    Admitting Provider   Fadi Almonte MD    Attending Provider   Brammell, Timothy Duane, MD    Department, Room/Bed   [facility], 2104/1       Discharge Date       Discharge Disposition       Discharge Destination                                 Attending Provider: Brammell, Timothy Duane, MD    Allergies: Carvedilol, Morphine, Sulfa Antibiotics, Hydrocodone, Diphenhydramine    Isolation: None   Infection: None   Code Status: CPR    Ht: 167.6 cm (65.98\")   Wt: 97.7 kg (215 lb 6.2 oz)    Admission Cmt: None   Principal Problem: Lethargy [R53.83]                   Active Insurance as of 4/22/2025       Primary Coverage       Payor Plan Insurance Group Employer/Plan Group    ANTHEM MEDICARE REPLACEMENT ANTH MEDICARE ADVANTAGE HMO [id number]       Payor Plan Address Payor Plan Phone Number Payor Plan Fax Number Effective Dates    [address] [phone]  1/1/2025 - None Entered    [facility] [address]         Subscriber Name Subscriber Birth Date Member ID       COBY VALENTINO 1950 [id number]                     Emergency Contacts        (Rel.) Home Phone Work Phone Mobile Phone    AMINTA VALENTINO (Spouse) -- -- [phone]    JOSIE()YOAV (Other) -- -- [phone]                "

## 2025-04-24 NOTE — NURSING NOTE
Pt found at 0100 with non reactive pupils and was slow to respond to questions. Rapid response was called and upon further assessment a code stroke was called. Ultimately it was determined the symptoms were due to a dose of Ambien.

## 2025-04-24 NOTE — THERAPY TREATMENT NOTE
"Subjective: Pt agreeable to therapeutic plan of care.    Objective:     Precautions - Hifh fall risk; O2; cognition    Bed mobility - CGA verbal cues for use of bed rail  Transfers - Min-A  Ambulation - 15 feet RW Min-A; pt with mild buckling bilateral knees      Vitals: WNL; maintains O2 at 94% on 2L    Pain: 0 VAS Location: n/a  Intervention for pain: N/A    Education: Provided education on the importance of mobility in the acute care setting, Transfer Training, and Gait Training    Assessment: Lynne Cade presents with functional mobility impairments which indicate the need for skilled intervention. Tolerating session today without incident. Pt requires cues for bed mobility and min A for gait with mild buckling noted BLEs.   Will continue to follow and progress as tolerated. Prior to February, pt was independent with mobility but has become increasingly reliant on spouse for transfers and mobility.  She may benefit from SNF for continued PT prior to return home.     Plan/Recommendations:   If medically appropriate, Moderate Intensity Therapy recommended post-acute care. This is recommended as therapy feels the patient would require 3-4 days per week and wouldn't tolerate \"3 hour daily\" rehab intensity. SNF would be the preferred choice. If the patient does not agree to SNF, arrange HH or OP depending on home bound status. If patient is medically complex, consider LTACH. Pt requires no DME at discharge.     Pt desires Skilled Rehab placement at discharge. Pt cooperative; agreeable to therapeutic recommendations and plan of care.         Basic Mobility 6-click:  Rollin = Total, A lot = 2, A little = 3; 4 = None  Supine>Sit:   1 = Total, A lot = 2, A little = 3; 4 = None   Sit>Stand with arms:  1 = Total, A lot = 2, A little = 3; 4 = None  Bed>Chair:   1 = Total, A lot = 2, A little = 3; 4 = None  Ambulate in room:  1 = Total, A lot = 2, A little = 3; 4 = None  3-5 Steps with railin = Total, " A lot = 2, A little = 3; 4 = None  Score: 16    Modified [place]: N/A = No pre-op stroke/TIA    Post-Tx Position: Up in Chair, Alarms activated, and Call light and personal items within reach  PPE: gloves    Therapy Charges for Today       Code Description Service Date Service Provider Modifiers Qty    79463917364 HC PT EVAL MOD COMPLEXITY 4 4/23/2025 Olivia Lucio, PT GP 1    02406804038 HC GAIT TRAINING EA 15 MIN 4/24/2025 Olivia Lucio, PT GP 1    [id number]  PT THERAPEUTIC ACT EA 15 MIN 4/24/2025 Olivia Lucio, PT GP 1           PT Charges       Row Name 04/24/25 1029             Time Calculation    Start Time 0941  -CL      Stop Time 1000  -CL      Time Calculation (min) 19 min  -CL      PT Received On 04/24/25  -CL      PT - Next Appointment 04/28/25  -CL      PT Goal Re-Cert Due Date 05/07/25  -CL         Time Calculation- PT    Total Timed Code Minutes- PT 19 minute(s)  -CL                User Key  (r) = Recorded By, (t) = Taken By, (c) = Cosigned By      Initials Name Provider Type    Olivia Willett, PT Physical Therapist

## 2025-04-24 NOTE — PROGRESS NOTES
Daily Progress Note        Lethargy    Assessment:     Acute/chronic hypercapnic/hypoxic respiratory insufficiency     Chronic elevated right hemidiaphragm, associated right lower lobe mild bronchiectasis    CT chest February 2025 no evidence of interstitial lung disease     Respiratory panel negative     non-smoker  No PFTs     Features of obstructive sleep apnea     CKD stage IV  Diabetes mellitus  Hyperlipidemia     Encephalopathy   TSH 3.1  Ammonia 16        Recommendations:        oxygen titration currently on 2 L nasal cannula     Noninvasive ventilation utilizing BiPAP during sleep     Bronchodilators currently on Pulmicort and DuoNeb     Steroids discontinued since there is no evidence of interstitial lung disease on most recent CAT scan in February 2025     Avoid sedatives such as Cymbalta and Atarax and Flexeril     Diuresis 40 mg IV once     Currently off antibiotics                CT chest 2/15/2025          LOS: 1 day     Subjective         Objective     Vital signs for last 24 hours:  Vitals:    04/24/25 0649 04/24/25 0650 04/24/25 0655 04/24/25 0722   BP:       BP Location:       Patient Position:       Pulse: 83 89 86    Resp: 16 16 16    Temp:    97.8 °F (36.6 °C)   TempSrc:    Oral   SpO2:       Weight:       Height:           Intake/Output last 3 shifts:  I/O last 3 completed shifts:  In: 1200 [P.O.:1200]  Out: 650 [Urine:650]  Intake/Output this shift:  No intake/output data recorded.      Radiology  Imaging Results (Last 24 Hours)       ** No results found for the last 24 hours. **            Labs:  Results from last 7 days   Lab Units 04/23/25  0500   WBC 10*3/mm3 7.96   HEMOGLOBIN g/dL 7.4*   HEMATOCRIT % 25.3*   PLATELETS 10*3/mm3 240     Results from last 7 days   Lab Units 04/23/25  0500   SODIUM mmol/L 144   POTASSIUM mmol/L 4.4   CHLORIDE mmol/L 107   CO2 mmol/L 23.4   BUN mg/dL 51*   CREATININE mg/dL 2.79*   CALCIUM mg/dL 9.2   BILIRUBIN mg/dL <0.2   ALK PHOS U/L 49   ALT (SGPT) U/L 10    AST (SGOT) U/L 14   GLUCOSE mg/dL 221*     Results from last 7 days   Lab Units 04/23/25  0353   PH, ARTERIAL pH units 7.217*   PO2 ART mm Hg 128.7*   PCO2, ARTERIAL mm Hg 68.1*   HCO3 ART mmol/L 27.7     Results from last 7 days   Lab Units 04/23/25  0500 04/22/25  1649   ALBUMIN g/dL 3.2* 3.9             Results from last 7 days   Lab Units 04/23/25  0500   MAGNESIUM mg/dL 2.4         Results from last 7 days   Lab Units 04/22/25  1649   TSH uIU/mL 3.190           Meds:   SCHEDULE  budesonide, 0.5 mg, Nebulization, BID - RT  calcitriol, 0.5 mcg, Oral, Daily  dilTIAZem CD, 300 mg, Oral, Daily  DULoxetine, 60 mg, Oral, BID  furosemide, 40 mg, Oral, Daily  gabapentin, 100 mg, Oral, Q12H  heparin (porcine), 5,000 Units, Subcutaneous, Q12H  hydrALAZINE, 100 mg, Oral, BID  insulin lispro, 2-7 Units, Subcutaneous, 4x Daily AC & at Bedtime  ipratropium-albuterol, 3 mL, Nebulization, 4x Daily - RT  primidone, 50 mg, Oral, Nightly  rosuvastatin, 10 mg, Oral, Daily      Infusions     PRNs    acetaminophen **OR** acetaminophen **OR** acetaminophen    senna-docusate sodium **AND** polyethylene glycol **AND** bisacodyl **AND** bisacodyl    Calcium Replacement - Follow Nurse / BPA Driven Protocol    cyclobenzaprine    dextrose    dextrose    glucagon (human recombinant)    hydrOXYzine    ipratropium-albuterol    Magnesium Low Dose Replacement - Follow Nurse / BPA Driven Protocol    Phosphorus Replacement - Follow Nurse / BPA Driven Protocol    Potassium Replacement - Follow Nurse / BPA Driven Protocol    sodium chloride    zolpidem    Physical Exam:  Physical Exam  Cardiovascular:      Heart sounds: Murmur heard.      No gallop.   Pulmonary:      Effort: No respiratory distress.      Breath sounds: No stridor. Rhonchi and rales present. No wheezing.   Chest:      Chest wall: No tenderness.         ROS  Review of Systems   Respiratory:  Positive for cough and shortness of breath. Negative for wheezing and stridor.     Cardiovascular:  Positive for palpitations and leg swelling. Negative for chest pain.             Total critical care time spent with patient greater than: 45 Minutes

## 2025-04-24 NOTE — PLAN OF CARE
Problem: Adult Inpatient Plan of Care  Goal: Absence of Hospital-Acquired Illness or Injury  Intervention: Identify and Manage Fall Risk  Recent Flowsheet Documentation  Taken 4/24/2025 0400 by Angelica Aguilar RN  Safety Promotion/Fall Prevention:   assistive device/personal items within reach   clutter free environment maintained   fall prevention program maintained   nonskid shoes/slippers when out of bed   room organization consistent   safety round/check completed  Taken 4/24/2025 0200 by Angelica Aguilar RN  Safety Promotion/Fall Prevention:   assistive device/personal items within reach   clutter free environment maintained   fall prevention program maintained   nonskid shoes/slippers when out of bed   room organization consistent   safety round/check completed  Taken 4/24/2025 0001 by Angelica Aguilar RN  Safety Promotion/Fall Prevention:   assistive device/personal items within reach   clutter free environment maintained   fall prevention program maintained   nonskid shoes/slippers when out of bed   room organization consistent   safety round/check completed  Taken 4/23/2025 2200 by Angelica Aguilar RN  Safety Promotion/Fall Prevention:   assistive device/personal items within reach   clutter free environment maintained   fall prevention program maintained   nonskid shoes/slippers when out of bed   room organization consistent   safety round/check completed  Taken 4/23/2025 2001 by Angelica Aguilar RN  Safety Promotion/Fall Prevention:   safety round/check completed   room organization consistent   nonskid shoes/slippers when out of bed   fall prevention program maintained   clutter free environment maintained   assistive device/personal items within reach  Intervention: Prevent Skin Injury  Recent Flowsheet Documentation  Taken 4/23/2025 2001 by Angelica Aguilar RN  Skin Protection:   incontinence pads utilized   transparent dressing maintained  Intervention: Prevent and Manage VTE (Venous  Thromboembolism) Risk  Recent Flowsheet Documentation  Taken 4/24/2025 0400 by Angelica Aguilar RN  VTE Prevention/Management: patient refused intervention  Taken 4/24/2025 0001 by Angelica Aguilar RN  VTE Prevention/Management: patient refused intervention  Taken 4/23/2025 2001 by Angelica Aguilar RN  VTE Prevention/Management: patient refused intervention  Intervention: Prevent Infection  Recent Flowsheet Documentation  Taken 4/23/2025 2001 by Angelica Aguilar RN  Infection Prevention:   environmental surveillance performed   hand hygiene promoted   personal protective equipment utilized   single patient room provided  Goal: Optimal Comfort and Wellbeing  Intervention: Provide Person-Centered Care  Recent Flowsheet Documentation  Taken 4/23/2025 2001 by Angelica Aguilar RN  Trust Relationship/Rapport:   care explained   choices provided   questions answered   questions encouraged   reassurance provided     Problem: Comorbidity Management  Goal: Maintenance of Behavioral Health Symptom Control  Intervention: Maintain Behavioral Health Symptom Control  Recent Flowsheet Documentation  Taken 4/23/2025 2001 by Angelica Aguilar RN  Medication Review/Management: medications reviewed  Goal: Maintenance of COPD Symptom Control  Intervention: Maintain COPD (Chronic Obstructive Pulmonary Disease) Symptom Control  Recent Flowsheet Documentation  Taken 4/23/2025 2001 by Angelica Aguilar RN  Medication Review/Management: medications reviewed  Goal: Blood Glucose Level Within Target Range  Intervention: Monitor and Manage Glycemia  Recent Flowsheet Documentation  Taken 4/23/2025 2001 by Angelica Aguilar RN  Medication Review/Management: medications reviewed  Goal: Blood Pressure in Desired Range  Intervention: Maintain Blood Pressure Management  Recent Flowsheet Documentation  Taken 4/23/2025 2001 by Angelica Aguilar RN  Medication Review/Management: medications reviewed     Problem: Skin Injury Risk Increased  Goal:  Skin Health and Integrity  Intervention: Optimize Skin Protection  Recent Flowsheet Documentation  Taken 4/23/2025 2001 by Angelica Aguilar, RN  Activity Management: activity encouraged  Pressure Reduction Techniques:   frequent weight shift encouraged   weight shift assistance provided   pressure points protected  Head of Bed (HOB) Positioning: HOB at 30-45 degrees  Pressure Reduction Devices:   pressure-redistributing mattress utilized   positioning supports utilized  Skin Protection:   incontinence pads utilized   transparent dressing maintained     Problem: Fall Injury Risk  Goal: Absence of Fall and Fall-Related Injury  Intervention: Identify and Manage Contributors  Recent Flowsheet Documentation  Taken 4/23/2025 2001 by Angelica Aguilar, RN  Medication Review/Management: medications reviewed  Intervention: Promote Injury-Free Environment  Recent Flowsheet Documentation  Taken 4/24/2025 0400 by Angelica Aguilar RN  Safety Promotion/Fall Prevention:   assistive device/personal items within reach   clutter free environment maintained   fall prevention program maintained   nonskid shoes/slippers when out of bed   room organization consistent   safety round/check completed  Taken 4/24/2025 0200 by Angelica Aguilar RN  Safety Promotion/Fall Prevention:   assistive device/personal items within reach   clutter free environment maintained   fall prevention program maintained   nonskid shoes/slippers when out of bed   room organization consistent   safety round/check completed  Taken 4/24/2025 0001 by Angelica Aguilar RN  Safety Promotion/Fall Prevention:   assistive device/personal items within reach   clutter free environment maintained   fall prevention program maintained   nonskid shoes/slippers when out of bed   room organization consistent   safety round/check completed  Taken 4/23/2025 2200 by Angelica Aguilar RN  Safety Promotion/Fall Prevention:   assistive device/personal items within reach   clutter free  environment maintained   fall prevention program maintained   nonskid shoes/slippers when out of bed   room organization consistent   safety round/check completed  Taken 4/23/2025 2001 by Angelica Aguilar RN  Safety Promotion/Fall Prevention:   safety round/check completed   room organization consistent   nonskid shoes/slippers when out of bed   fall prevention program maintained   clutter free environment maintained   assistive device/personal items within reach   Goal Outcome Evaluation:

## 2025-04-24 NOTE — PLAN OF CARE
Goal Outcome Evaluation:              Outcome Evaluation: Lynne Cade is a 74 y.o. female  with medical hx of COPD, CAD, HLD, DM and CKD stage 4 who presents with confusion and generalized weakness. Of note, pt had  vascular surgery LUE Feb. '25 and follow up with vascular surgery sarah this week.   CT shows no acute intracranial findings.  She is found to have Acute metabolic encephalopathy secondary to hypoxia and hypercapnia.  At baseline, pt lives with her  in a H with ramps to enter.  Prior to February, she was independent for all mobility and ADLs without AD.  Since then, she has had increasing confusion and weakness, requiring use of RW and finally, assist from spouse for transfers to w/c and assist with ADLs.  She recently started on 2L O2 at home.  This date pt requires min assist for bed mobility, transfers, and functional mobility.  She is dependent for donning socks.  During mobility knees buckle slightly with concern for giving out under her.  She demos generalized weakness and is high risk for fall. Recommend SNF at [facility].    Anticipated Discharge Disposition (OT): skilled nursing facility

## 2025-04-24 NOTE — PLAN OF CARE
Goal Outcome Evaluation:  Plan of Care Reviewed With: patient, spouse      Lynne Cade presents with functional mobility impairments which indicate the need for skilled intervention. Tolerating session today without incident. Pt requires cues for bed mobility and min A for gait with mild buckling noted BLEs.   Will continue to follow and progress as tolerated. Prior to February, pt was independent with mobility but has become increasingly reliant on spouse for transfers and mobility.  She may benefit from SNF for continued PT prior to return home.     Anticipated Discharge Disposition (PT): skilled nursing facility

## 2025-04-24 NOTE — CASE MANAGEMENT/SOCIAL WORK
Continued Stay Note  SHELDON Law     Patient Name: Lynne Cade  MRN: [id number]  Today's Date: 4/24/2025    Admit Date: 4/22/2025    Plan: Reurn home with . Home 02 2L [facility]. [facility] pending acceptance.   Discharge Plan       Row Name 04/24/25 1401       Plan    Plan Reurn home with . Home 02 2L [facility]. [facility] pending acceptance.    Plan Comments CM met with the patient at bedside to discuss HHC choices. Patient and  agreenable to HHC. 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 CM sent referral to Al[facility] with [facility], pending reponse. Discharge barriers: 2L NC, monitor H&H (7.4/25.3), pulm following, rapid called 4/24 in the AM.               Expected Discharge Date and Time       Expected Discharge Date Expected Discharge Time    Apr 25, 2025           Met with patient in room wearing PPE: mask.    Maintained distance greater than six feet and spent less than 15 minutes in the room.       Mariama Griffith RN

## 2025-04-24 NOTE — PROGRESS NOTES
"[facility] MEDICINE SERVICE  DAILY PROGRESS NOTE    NAME: Lynne Cade  : 1950  MRN: [id number]      LOS: 1 day     PROVIDER OF SERVICE: Timothy Duane Brammell, MD    Chief Complaint: Lethargy    Subjective:     Interval History:  History taken from: patient/nurse.    Patient with reported confusion and slow to respond with apparent \"rapid\" evaluation last evening.  Initially had Airvo then transition to nasal cannula.  Tolerated BiPAP in the past.  Now awake and alert.  Still seems somewhat slow to respond.  Has oxygen at 2 L at home.  No history of sleep apnea or apparatus in the home.  She denies any sputum production.  Eating without issue.  Denies any bowel complaints.  Denies any other additional acute issues.        Review of Systems:   Review of Systems   All other systems reviewed and are negative.      Objective:     Vital Signs  Temp:  [97 °F (36.1 °C)-97.8 °F (36.6 °C)] 97.8 °F (36.6 °C)  Heart Rate:  [] 90  Resp:  [15-20] 16  BP: ()/(53-69) 117/62  Flow (L/min) (Oxygen Therapy):  [2-6] 2   Body mass index is 34.78 kg/m².    Physical Exam  Physical Exam  Constitutional:       General: She is not in acute distress.     Appearance: She is obese.   HENT:      Head: Normocephalic.   Cardiovascular:      Rate and Rhythm: Normal rate and regular rhythm.   Pulmonary:      Comments: Coarse breath sounds  Abdominal:      General: Bowel sounds are normal.      Palpations: Abdomen is soft.      Tenderness: There is no abdominal tenderness.   Musculoskeletal:         General: Swelling present.      Comments: Trace to 1+   Neurological:      Mental Status: She is alert. Mental status is at baseline.      Comments: Somewhat delayed in response.   Psychiatric:         Behavior: Behavior normal.            Diagnostic Data    Results from last 7 days   Lab Units 25  0500   WBC 10*3/mm3 7.96   HEMOGLOBIN g/dL 7.4*   HEMATOCRIT % 25.3*   PLATELETS 10*3/mm3 240   GLUCOSE mg/dL 221* "   CREATININE mg/dL 2.79*   BUN mg/dL 51*   SODIUM mmol/L 144   POTASSIUM mmol/L 4.4   AST (SGOT) U/L 14   ALT (SGPT) U/L 10   ALK PHOS U/L 49   BILIRUBIN mg/dL <0.2   ANION GAP mmol/L 13.6       XR Chest 1 View  Result Date: 4/22/2025  Impression: No active disease. Electronically Signed: Yfn Luna MD  4/22/2025 3:53 PM EDT  Workstation ID: [id number]    CT Head Without Contrast  Result Date: 4/22/2025  Impression: 1.No acute intracranial finding. 2.Chronic changes as above. Electronically Signed: Antonio Ceballos MD  4/22/2025 3:52 PM EDT  Workstation ID: [id number]            Assessment:    Acute on chronic hypoxemic hypercapnic respiratory failure  Chronic kidney disease stage IV  Anemia  Type 2 diabetes  Obstructive sleep apnea  Prior CVA by imaging  Encephalopathy of the probable metabolic nature  Axillary wound left  Edema  History of coronary artery disease       Plan.  Chart reviewed.  Will obtain MRI of head as well as carotid duplex.  Pulmonary following with recommendations as to BiPAP.  Nebulization therapy.  No steroids or antibiotics at present.  Attempting to avoid sedating medications.  Attempting some diuresis.  Physical and Occupational Therapy.  Will need sleep study.  Check anemia parameters.  May benefit from erythropoietin.      Active and Resolved Problems  Active Hospital Problems    Diagnosis  POA    **Lethargy [R53.83]  Yes      Resolved Hospital Problems   No resolved problems to display.           VTE Prophylaxis:  Pharmacologic VTE prophylaxis orders are present.             Disposition Planning:     Barriers to Discharge:clinicla improvement and pending studies  Anticipated Date of Discharge: 4/26  Place of Discharge: home      Time: 45 minutes     Code Status and Medical Interventions: CPR (Attempt to Resuscitate); Full Support   Ordered at: 04/22/25 8455     Code Status (Patient has no pulse and is not breathing):    CPR (Attempt to Resuscitate)     Medical Interventions (Patient has  pulse or is breathing):    Full Support       Signature: Electronically signed by Timothy Duane Brammell, MD, 04/24/25, 10:43 EDT.  [facility] Hospitalist Team

## 2025-04-25 LAB
25(OH)D3 SERPL-MCNC: 48.7 NG/ML (ref 30–100)
ALBUMIN SERPL-MCNC: 3.3 G/DL (ref 3.5–5.2)
ALBUMIN/GLOB SERPL: 1.2 G/DL
ALP SERPL-CCNC: 41 U/L (ref 39–117)
ALT SERPL W P-5'-P-CCNC: 25 U/L (ref 1–33)
ANION GAP SERPL CALCULATED.3IONS-SCNC: 12 MMOL/L (ref 5–15)
AST SERPL-CCNC: 37 U/L (ref 1–32)
BASOPHILS # BLD AUTO: 0.03 10*3/MM3 (ref 0–0.2)
BASOPHILS NFR BLD AUTO: 0.3 % (ref 0–1.5)
BILIRUB SERPL-MCNC: 0.2 MG/DL (ref 0–1.2)
BUN SERPL-MCNC: 75 MG/DL (ref 8–23)
BUN/CREAT SERPL: 21.4 (ref 7–25)
CALCIUM SPEC-SCNC: 8.3 MG/DL (ref 8.6–10.5)
CHLORIDE SERPL-SCNC: 101 MMOL/L (ref 98–107)
CK SERPL-CCNC: 52 U/L (ref 20–180)
CO2 SERPL-SCNC: 23 MMOL/L (ref 22–29)
CREAT SERPL-MCNC: 3.51 MG/DL (ref 0.57–1)
DEPRECATED RDW RBC AUTO: 57 FL (ref 37–54)
EGFRCR SERPLBLD CKD-EPI 2021: 13 ML/MIN/1.73
EOSINOPHIL # BLD AUTO: 0.23 10*3/MM3 (ref 0–0.4)
EOSINOPHIL NFR BLD AUTO: 2.4 % (ref 0.3–6.2)
ERYTHROCYTE [DISTWIDTH] IN BLOOD BY AUTOMATED COUNT: 14.8 % (ref 12.3–15.4)
FERRITIN SERPL-MCNC: 21.8 NG/ML (ref 13–150)
FOLATE SERPL-MCNC: 12.8 NG/ML (ref 4.78–24.2)
GLOBULIN UR ELPH-MCNC: 2.8 GM/DL
GLUCOSE BLDC GLUCOMTR-MCNC: 134 MG/DL (ref 70–105)
GLUCOSE BLDC GLUCOMTR-MCNC: 146 MG/DL (ref 70–105)
GLUCOSE BLDC GLUCOMTR-MCNC: 209 MG/DL (ref 70–105)
GLUCOSE BLDC GLUCOMTR-MCNC: 84 MG/DL (ref 70–105)
GLUCOSE SERPL-MCNC: 149 MG/DL (ref 65–99)
HBV SURFACE AG SERPL QL IA: NORMAL
HCT VFR BLD AUTO: 26.3 % (ref 34–46.6)
HGB BLD-MCNC: 8.1 G/DL (ref 12–15.9)
IMM GRANULOCYTES # BLD AUTO: 0.03 10*3/MM3 (ref 0–0.05)
IMM GRANULOCYTES NFR BLD AUTO: 0.3 % (ref 0–0.5)
LYMPHOCYTES # BLD AUTO: 1.04 10*3/MM3 (ref 0.7–3.1)
LYMPHOCYTES NFR BLD AUTO: 10.8 % (ref 19.6–45.3)
MCH RBC QN AUTO: 32.3 PG (ref 26.6–33)
MCHC RBC AUTO-ENTMCNC: 30.8 G/DL (ref 31.5–35.7)
MCV RBC AUTO: 104.8 FL (ref 79–97)
MONOCYTES # BLD AUTO: 0.63 10*3/MM3 (ref 0.1–0.9)
MONOCYTES NFR BLD AUTO: 6.5 % (ref 5–12)
NEUTROPHILS NFR BLD AUTO: 7.67 10*3/MM3 (ref 1.7–7)
NEUTROPHILS NFR BLD AUTO: 79.7 % (ref 42.7–76)
NRBC BLD AUTO-RTO: 0 /100 WBC (ref 0–0.2)
PHOSPHATE SERPL-MCNC: 7.3 MG/DL (ref 2.5–4.5)
PLATELET # BLD AUTO: 309 10*3/MM3 (ref 140–450)
PMV BLD AUTO: 10.8 FL (ref 6–12)
POTASSIUM SERPL-SCNC: 4.9 MMOL/L (ref 3.5–5.2)
PROT SERPL-MCNC: 6.1 G/DL (ref 6–8.5)
PTH-INTACT SERPL-MCNC: 436 PG/ML (ref 15–65)
RBC # BLD AUTO: 2.51 10*6/MM3 (ref 3.77–5.28)
SODIUM SERPL-SCNC: 136 MMOL/L (ref 136–145)
VIT B12 BLD-MCNC: 310 PG/ML (ref 211–946)
WBC NRBC COR # BLD AUTO: 9.63 10*3/MM3 (ref 3.4–10.8)

## 2025-04-25 PROCEDURE — 94799 UNLISTED PULMONARY SVC/PX: CPT

## 2025-04-25 PROCEDURE — 94664 DEMO&/EVAL PT USE INHALER: CPT

## 2025-04-25 PROCEDURE — 63710000001 INSULIN LISPRO (HUMAN) PER 5 UNITS: Performed by: STUDENT IN AN ORGANIZED HEALTH CARE EDUCATION/TRAINING PROGRAM

## 2025-04-25 PROCEDURE — 97535 SELF CARE MNGMENT TRAINING: CPT

## 2025-04-25 PROCEDURE — 94761 N-INVAS EAR/PLS OXIMETRY MLT: CPT

## 2025-04-25 PROCEDURE — 94660 CPAP INITIATION&MGMT: CPT

## 2025-04-25 PROCEDURE — 84100 ASSAY OF PHOSPHORUS: CPT | Performed by: HOSPITALIST

## 2025-04-25 PROCEDURE — 82607 VITAMIN B-12: CPT | Performed by: HOSPITALIST

## 2025-04-25 PROCEDURE — 83970 ASSAY OF PARATHORMONE: CPT | Performed by: HOSPITALIST

## 2025-04-25 PROCEDURE — 85025 COMPLETE CBC W/AUTO DIFF WBC: CPT | Performed by: HOSPITALIST

## 2025-04-25 PROCEDURE — 82746 ASSAY OF FOLIC ACID SERUM: CPT | Performed by: HOSPITALIST

## 2025-04-25 PROCEDURE — 82550 ASSAY OF CK (CPK): CPT | Performed by: HOSPITALIST

## 2025-04-25 PROCEDURE — 25010000002 HEPARIN (PORCINE) PER 1000 UNITS: Performed by: INTERNAL MEDICINE

## 2025-04-25 PROCEDURE — 97530 THERAPEUTIC ACTIVITIES: CPT

## 2025-04-25 PROCEDURE — 82948 REAGENT STRIP/BLOOD GLUCOSE: CPT | Performed by: STUDENT IN AN ORGANIZED HEALTH CARE EDUCATION/TRAINING PROGRAM

## 2025-04-25 PROCEDURE — 82728 ASSAY OF FERRITIN: CPT | Performed by: HOSPITALIST

## 2025-04-25 PROCEDURE — 82306 VITAMIN D 25 HYDROXY: CPT | Performed by: HOSPITALIST

## 2025-04-25 PROCEDURE — 87340 HEPATITIS B SURFACE AG IA: CPT | Performed by: HOSPITALIST

## 2025-04-25 PROCEDURE — 80053 COMPREHEN METABOLIC PANEL: CPT | Performed by: HOSPITALIST

## 2025-04-25 PROCEDURE — 82948 REAGENT STRIP/BLOOD GLUCOSE: CPT

## 2025-04-25 RX ADMIN — DILTIAZEM HYDROCHLORIDE 300 MG: 180 CAPSULE, EXTENDED RELEASE ORAL at 09:12

## 2025-04-25 RX ADMIN — CYCLOBENZAPRINE HYDROCHLORIDE 10 MG: 10 TABLET, FILM COATED ORAL at 21:47

## 2025-04-25 RX ADMIN — HEPARIN SODIUM 5000 UNITS: 5000 INJECTION INTRAVENOUS; SUBCUTANEOUS at 09:13

## 2025-04-25 RX ADMIN — HYDRALAZINE HYDROCHLORIDE 50 MG: 25 TABLET ORAL at 09:12

## 2025-04-25 RX ADMIN — ROSUVASTATIN CALCIUM 10 MG: 10 TABLET, FILM COATED ORAL at 09:13

## 2025-04-25 RX ADMIN — HEPARIN SODIUM 5000 UNITS: 5000 INJECTION INTRAVENOUS; SUBCUTANEOUS at 21:46

## 2025-04-25 RX ADMIN — GABAPENTIN 100 MG: 100 CAPSULE ORAL at 21:46

## 2025-04-25 RX ADMIN — CALCITRIOL CAPSULES 0.25 MCG 0.5 MCG: 0.25 CAPSULE ORAL at 09:13

## 2025-04-25 RX ADMIN — BUDESONIDE 0.5 MG: 0.5 SUSPENSION RESPIRATORY (INHALATION) at 07:58

## 2025-04-25 RX ADMIN — BUDESONIDE 0.5 MG: 0.5 SUSPENSION RESPIRATORY (INHALATION) at 18:38

## 2025-04-25 RX ADMIN — IPRATROPIUM BROMIDE AND ALBUTEROL SULFATE 3 ML: .5; 3 SOLUTION RESPIRATORY (INHALATION) at 07:58

## 2025-04-25 RX ADMIN — HYDRALAZINE HYDROCHLORIDE 50 MG: 25 TABLET ORAL at 21:47

## 2025-04-25 RX ADMIN — GABAPENTIN 100 MG: 100 CAPSULE ORAL at 09:13

## 2025-04-25 RX ADMIN — DULOXETINE 60 MG: 30 CAPSULE, DELAYED RELEASE ORAL at 09:12

## 2025-04-25 RX ADMIN — IPRATROPIUM BROMIDE AND ALBUTEROL SULFATE 3 ML: .5; 3 SOLUTION RESPIRATORY (INHALATION) at 18:33

## 2025-04-25 RX ADMIN — FUROSEMIDE 40 MG: 40 TABLET ORAL at 09:13

## 2025-04-25 RX ADMIN — IPRATROPIUM BROMIDE AND ALBUTEROL SULFATE 3 ML: .5; 3 SOLUTION RESPIRATORY (INHALATION) at 10:34

## 2025-04-25 RX ADMIN — INSULIN LISPRO 3 UNITS: 100 INJECTION, SOLUTION INTRAVENOUS; SUBCUTANEOUS at 22:02

## 2025-04-25 RX ADMIN — PANTOPRAZOLE SODIUM 40 MG: 40 TABLET, DELAYED RELEASE ORAL at 06:01

## 2025-04-25 RX ADMIN — ASPIRIN 81 MG: 81 TABLET, COATED ORAL at 09:13

## 2025-04-25 RX ADMIN — IPRATROPIUM BROMIDE AND ALBUTEROL SULFATE 3 ML: .5; 3 SOLUTION RESPIRATORY (INHALATION) at 14:47

## 2025-04-25 RX ADMIN — DULOXETINE 60 MG: 30 CAPSULE, DELAYED RELEASE ORAL at 21:46

## 2025-04-25 RX ADMIN — PRIMIDONE 50 MG: 50 TABLET ORAL at 21:47

## 2025-04-25 NOTE — PROGRESS NOTES
Daily Progress Note        Lethargy    Assessment:     Acute/chronic hypercapnic/hypoxic respiratory insufficiency     Chronic elevated right hemidiaphragm, associated right lower lobe mild bronchiectasis    CT chest February 2025 no evidence of interstitial lung disease     Respiratory panel negative     non-smoker  No PFTs     Features of obstructive sleep apnea     CKD stage IV  Diabetes mellitus  Hyperlipidemia     Encephalopathy   TSH 3.1  Ammonia 16        Recommendations:        oxygen titration currently on 2 L nasal cannula     Tolerated BiPAP during sleep  Sleep study as an outpatient     Bronchodilators currently on Pulmicort and DuoNeb     Steroids discontinued since there is no evidence of interstitial lung disease on most recent CAT scan in February 2025     Minimize sedatives such as Cymbalta and Atarax and Flexeril     Diuresis 40 mg IV once     Currently off antibiotics                CT chest 2/15/2025          LOS: 2 days     Subjective         Objective     Vital signs for last 24 hours:  Vitals:    04/25/25 0758 04/25/25 0802 04/25/25 0803 04/25/25 0806   BP:       BP Location:       Patient Position:       Pulse: 87 87 84 82   Resp: 13 13 13 13   Temp:       TempSrc:       SpO2: 98% 98% 99% 100%   Weight:       Height:           Intake/Output last 3 shifts:  I/O last 3 completed shifts:  In: 720 [P.O.:720]  Out: 1250 [Urine:1250]  Intake/Output this shift:  I/O this shift:  In: -   Out: 900 [Urine:900]      Radiology  Imaging Results (Last 24 Hours)       Procedure Component Value Units Date/Time    MRI Brain Without Contrast [[id number]] Collected: 04/24/25 1821     Updated: 04/24/25 1824    Narrative:      MRI BRAIN WO CONTRAST    Date of Exam: 4/24/2025 5:00 PM EDT    Indication: confusion.     Comparison: CT 4/22/2025 and prior    Technique:  Routine multiplanar/multisequence sequence images of the brain were obtained without contrast administration.      Findings:    Diffusion weighted  imaging demonstrates no acute restriction abnormality. Midline structures of the brain are grossly unremarkable in appearance. Pituitary and sella structures and craniocervical junction are grossly unremarkable in appearance. There is   diffuse atrophy. Remote small infarcts are noted within the cerebellum right side greater than left. No acute intracranial hemorrhage or mass effect is noted. Moderate patchy areas of FLAIR and T2 signal hyperintensity within the brain parenchyma with a   supratentorial predominance is most compatible with sequela of small vessel ischemic disease in this patient. Paranasal sinuses appear to be grossly clear. Thinning of the lenses noted the orbits bilaterally. Mastoid air cells demonstrate small effusion   on the right greater than the left. Major intracranial flow voids are grossly patent.      Impression:      Impression:  1.No acute ischemic change noted.  2.Diffuse atrophy and white matter changes compatible sequela of small vessel ischemic disease in this patient.  3.Small bilateral mastoid effusions right greater than left.        Electronically Signed: Carlos Manuel Dumont MD    4/24/2025 6:22 PM EDT    Workstation ID: [id number]            Labs:  Results from last 7 days   Lab Units 04/23/25  0500   WBC 10*3/mm3 7.96   HEMOGLOBIN g/dL 7.4*   HEMATOCRIT % 25.3*   PLATELETS 10*3/mm3 240     Results from last 7 days   Lab Units 04/23/25  0500   SODIUM mmol/L 144   POTASSIUM mmol/L 4.4   CHLORIDE mmol/L 107   CO2 mmol/L 23.4   BUN mg/dL 51*   CREATININE mg/dL 2.79*   CALCIUM mg/dL 9.2   BILIRUBIN mg/dL <0.2   ALK PHOS U/L 49   ALT (SGPT) U/L 10   AST (SGOT) U/L 14   GLUCOSE mg/dL 221*     Results from last 7 days   Lab Units 04/23/25  0353   PH, ARTERIAL pH units 7.217*   PO2 ART mm Hg 128.7*   PCO2, ARTERIAL mm Hg 68.1*   HCO3 ART mmol/L 27.7     Results from last 7 days   Lab Units 04/23/25  0500 04/22/25  1649   ALBUMIN g/dL 3.2* 3.9             Results from last 7 days   Lab Units  04/23/25  0500   MAGNESIUM mg/dL 2.4         Results from last 7 days   Lab Units 04/22/25  1649   TSH uIU/mL 3.190           Meds:   SCHEDULE  aspirin, 81 mg, Oral, Daily  budesonide, 0.5 mg, Nebulization, BID - RT  calcitriol, 0.5 mcg, Oral, Daily  dilTIAZem CD, 300 mg, Oral, Daily  DULoxetine, 60 mg, Oral, BID  furosemide, 40 mg, Oral, Daily  gabapentin, 100 mg, Oral, Q12H  heparin (porcine), 5,000 Units, Subcutaneous, Q12H  hydrALAZINE, 50 mg, Oral, BID  insulin lispro, 2-7 Units, Subcutaneous, 4x Daily AC & at Bedtime  ipratropium-albuterol, 3 mL, Nebulization, 4x Daily - RT  pantoprazole, 40 mg, Oral, Q AM  primidone, 50 mg, Oral, Nightly  rosuvastatin, 10 mg, Oral, Daily      Infusions     PRNs    acetaminophen **OR** acetaminophen **OR** acetaminophen    senna-docusate sodium **AND** polyethylene glycol **AND** bisacodyl **AND** bisacodyl    Calcium Replacement - Follow Nurse / BPA Driven Protocol    cyclobenzaprine    dextrose    dextrose    glucagon (human recombinant)    hydrOXYzine    ipratropium-albuterol    Magnesium Low Dose Replacement - Follow Nurse / BPA Driven Protocol    Phosphorus Replacement - Follow Nurse / BPA Driven Protocol    Potassium Replacement - Follow Nurse / BPA Driven Protocol    sodium chloride    zolpidem    Physical Exam:  Physical Exam  Cardiovascular:      Heart sounds: Murmur heard.      No gallop.   Pulmonary:      Effort: No respiratory distress.      Breath sounds: No stridor. Rhonchi and rales present. No wheezing.   Chest:      Chest wall: No tenderness.         ROS  Review of Systems   Respiratory:  Positive for cough and shortness of breath. Negative for wheezing and stridor.    Cardiovascular:  Positive for palpitations and leg swelling. Negative for chest pain.             Total critical care time spent with patient greater than: 45 Minutes

## 2025-04-25 NOTE — PROGRESS NOTES
[facility] MEDICINE SERVICE  DAILY PROGRESS NOTE    NAME: Lynne Cade  : 1950  MRN: [id number]      LOS: 2 days     PROVIDER OF SERVICE: Timothy Duane Brammell, MD    Chief Complaint: Lethargy    Subjective:     Interval History:  History taken from: patient/nurse.    Patient tolerated her BiPAP last night.  She appears more awake and interactive today.  She denies any pain complaints.  Denies any shortness of breath.  Eating without issue.  Nurses unaware of any other additional acute concerns.        Review of Systems:   Review of Systems   All other systems reviewed and are negative.      Objective:     Vital Signs  Temp:  [97.2 °F (36.2 °C)-97.8 °F (36.6 °C)] 97.3 °F (36.3 °C)  Heart Rate:  [81-93] 92  Resp:  [13-20] 16  BP: (121-142)/(53-66) 140/53  Flow (L/min) (Oxygen Therapy):  [2-3] 3   Body mass index is 35.56 kg/m².    Physical Exam  Physical Exam  Vitals reviewed.   Constitutional:       General: She is not in acute distress.  HENT:      Head: Normocephalic.   Cardiovascular:      Rate and Rhythm: Normal rate and regular rhythm.      Pulses: Normal pulses.      Heart sounds: Normal heart sounds.   Pulmonary:      Effort: Pulmonary effort is normal.      Breath sounds: Normal breath sounds.   Abdominal:      General: Bowel sounds are normal.      Palpations: Abdomen is soft.      Tenderness: There is no abdominal tenderness.   Musculoskeletal:         General: No swelling.   Neurological:      Mental Status: She is alert.            Diagnostic Data    Results from last 7 days   Lab Units 25  1347 25  0500   WBC 10*3/mm3 9.63 7.96   HEMOGLOBIN g/dL 8.1* 7.4*   HEMATOCRIT % 26.3* 25.3*   PLATELETS 10*3/mm3 309 240   GLUCOSE mg/dL  --  221*   CREATININE mg/dL  --  2.79*   BUN mg/dL  --  51*   SODIUM mmol/L  --  144   POTASSIUM mmol/L  --  4.4   AST (SGOT) U/L  --  14   ALT (SGPT) U/L  --  10   ALK PHOS U/L  --  49   BILIRUBIN mg/dL  --  <0.2   ANION GAP mmol/L  --  13.6        MRI Brain Without Contrast  Result Date: 4/24/2025  Impression: 1.No acute ischemic change noted. 2.Diffuse atrophy and white matter changes compatible sequela of small vessel ischemic disease in this patient. 3.Small bilateral mastoid effusions right greater than left. Electronically Signed: Carlos Manuel Dumont MD  4/24/2025 6:22 PM EDT  Workstation ID: [id number]            Assessment:   Acute on chronic hypoxemic hypercapnic respiratory failure  Chronic kidney disease stage IV  Anemia  Type 2 diabetes  Obstructive sleep apnea  Prior CVA by imaging  Encephalopathy of the probable metabolic nature  Axillary wound left  Edema  History of coronary artery disease       Plan: Discussed with pulmonary consultant.  Will need sleep study as an outpatient.  Courage patient is to use of her BiPAP when sleeping during the day.  Difficult discharge situation in light of her need for study prior to obtaining her BiPAP machine.      Active and Resolved Problems  Active Hospital Problems    Diagnosis  POA    **Lethargy [R53.83]  Yes      Resolved Hospital Problems   No resolved problems to display.           VTE Prophylaxis:  Pharmacologic VTE prophylaxis orders are present.             Disposition Planning:     Barriers to Discharge:resp and mentation  Anticipated Date of Discharge: 4/27  Place of Discharge: home      Time: 30 minutes     Code Status and Medical Interventions: CPR (Attempt to Resuscitate); Full Support   Ordered at: 04/22/25 2909     Code Status (Patient has no pulse and is not breathing):    CPR (Attempt to Resuscitate)     Medical Interventions (Patient has pulse or is breathing):    Full Support       Signature: Electronically signed by Timothy Duane Brammell, MD, 04/25/25, 14:10 EDT.  [facility] Hospitalist Team

## 2025-04-25 NOTE — PLAN OF CARE
Goal Outcome Evaluation:         Patient has been more alert and oriented today. Pt answered all questions appropriately. Night shift nurse reported patient wore bipap all night. Pt will need outpt sleep study per pulmonary.

## 2025-04-25 NOTE — CASE MANAGEMENT/SOCIAL WORK
Continued Stay Note  SHELDON Law     Patient Name: Lynne Cade  MRN: [id number]  Today's Date: 4/25/2025    Admit Date: 4/22/2025    Plan: Home with spouse and [facility] (accepted) Home O2-[facility]L [facility].   Discharge Plan       Row Name 04/25/25 1153       Plan    Plan Home with spouse and [facility] (accepted) Home O2-2L [facility].    Patient/Family in Agreement with Plan yes    Plan Comments CM discussed patient with hospitalist and nursing at rounds. Patient needs OP sleep study set up possibly look into home sleep study through [facility] sent text regarding sleep study. CM verified that [facility] can accept. D/C Barriers: 3L, hgb 7.4 (rapid response/code stroke yesterday 4/24 due to a dose of ambien)             Mery Palm RN     Office: [phone]

## 2025-04-25 NOTE — PLAN OF CARE
"Goal Outcome Evaluation:        Assessment: Lynne Cade presents with ADL impairments affecting function including balance, endurance / activity tolerance, pain, postural / trunk control, range of motion (ROM), and strength. Demonstrated functioning below baseline abilities indicate the need for continued skilled intervention while inpatient. Tolerating session today without incident. Will continue to follow and progress as tolerated.     Plan/Recommendations:   Moderate Intensity Therapy recommended post-acute care. This is recommended as therapy feels the patient would require 3-4 days per week and wouldn't tolerate \"3 hour daily\" rehab intensity. SNF would be the preferred choice. If the patient does not agree to SNF, arrange HH or OP depending on home bound status. If patient is medically complex, consider LTACH.. Pt requires no DME at discharge.                           "

## 2025-04-25 NOTE — THERAPY TREATMENT NOTE
"Subjective: Pt agreeable to therapeutic plan of care.  Cognition: arousal/alertness: Alert and Attentive    Objective:     Bed Mobility: Min-A   Functional Transfers: Min-A and with rolling walker     Balance: dynamic, with UE support, and standing Min-A and with rolling walker  Functional Ambulation: CGA and with rolling walker with noted buckling at knees.      Bathing: Mod-A  ADL Position: unsupported sitting  ADL Comments: Assist required to wash feet and buttocks     Lower Body Dressing: Dependent  ADL Position: supported sitting  ADL Comments: Donning socks     Vitals: WNL    Pain: 0 Javed-Baker Location: NA  Interventions for pain: N/A  Education: Provided education on the importance of mobility in the acute care setting    Assessment: Lynne Cade presents with ADL impairments affecting function including balance, endurance / activity tolerance, pain, postural / trunk control, range of motion (ROM), and strength. Demonstrated functioning below baseline abilities indicate the need for continued skilled intervention while inpatient. Tolerating session today without incident. Will continue to follow and progress as tolerated.     Plan/Recommendations:   Moderate Intensity Therapy recommended post-acute care. This is recommended as therapy feels the patient would require 3-4 days per week and wouldn't tolerate \"3 hour daily\" rehab intensity. SNF would be the preferred choice. If the patient does not agree to SNF, arrange HH or OP depending on home bound status. If patient is medically complex, consider LTACH.. Pt requires no DME at discharge.     Pt desires Skilled Rehab placement at discharge. Pt cooperative; agreeable to therapeutic recommendations and plan of care.     Modified Nicole: N/A = No pre-op stroke/TIA    Post-Tx Position: Up in Chair, Alarms activated, and Call light and personal items within reach  PPE: gloves    Therapy Charges for Today       Code Description Service Date Service Provider " Modifiers Qty    00370423837 HC OT EVAL MOD COMPLEXITY 4 4/24/2025 Mariama Reyna, OT GO 1    86072966411 HC OT SELF CARE/MGMT/TRAIN EA 15 MIN 4/25/2025 Mariama Reyna, OT GO 1    11085603472 HC OT THERAPEUTIC ACT EA 15 MIN 4/25/2025 Mariama Reyna, OT GO 1           Time Calculation- OT       Row Name 04/25/25 1527             Time Calculation- OT    OT Start Time 1409  -SR      OT Stop Time 1436  -SR      OT Time Calculation (min) 27 min  -SR      Total Timed Code Minutes- OT 27 minute(s)  -SR      OT Received On 04/25/25  -SR      OT - Next Appointment 04/28/25  -SR                User Key  (r) = Recorded By, (t) = Taken By, (c) = Cosigned By      Initials Name Provider Type    SR Mariama Reyna, OT Occupational Therapist

## 2025-04-25 NOTE — PLAN OF CARE
Goal Outcome Evaluation:             Problem: Adult Inpatient Plan of Care  Goal: Absence of Hospital-Acquired Illness or Injury  Intervention: Identify and Manage Fall Risk  Recent Flowsheet Documentation  Taken 4/25/2025 0400 by Rose Vega RN  Safety Promotion/Fall Prevention:   safety round/check completed   room organization consistent   assistive device/personal items within reach   clutter free environment maintained  Taken 4/25/2025 0200 by Rose Vega RN  Safety Promotion/Fall Prevention:   safety round/check completed   room organization consistent   assistive device/personal items within reach   clutter free environment maintained  Taken 4/25/2025 0000 by Rose Vega RN  Safety Promotion/Fall Prevention:   safety round/check completed   room organization consistent   assistive device/personal items within reach   clutter free environment maintained  Taken 4/24/2025 2200 by Rose Vega RN  Safety Promotion/Fall Prevention:   safety round/check completed   room organization consistent   assistive device/personal items within reach   clutter free environment maintained  Taken 4/24/2025 2000 by Rose Vega RN  Safety Promotion/Fall Prevention:   safety round/check completed   room organization consistent   assistive device/personal items within reach   clutter free environment maintained  Intervention: Prevent Skin Injury  Recent Flowsheet Documentation  Taken 4/25/2025 0400 by Rose Vega RN  Body Position: turned  Skin Protection: incontinence pads utilized  Taken 4/25/2025 0000 by Rose Vega RN  Body Position: turned  Skin Protection: incontinence pads utilized  Taken 4/24/2025 2000 by Rose Vega RN  Body Position: turned  Skin Protection: incontinence pads utilized  Intervention: Prevent and Manage VTE (Venous Thromboembolism) Risk  Recent Flowsheet Documentation  Taken 4/25/2025 0400 by Rose Vega RN  VTE Prevention/Management: patient refused  intervention  Taken 4/25/2025 0000 by Rose Vega RN  VTE Prevention/Management: patient refused intervention  Taken 4/24/2025 2000 by Rose Vega RN  VTE Prevention/Management: patient refused intervention  Intervention: Prevent Infection  Recent Flowsheet Documentation  Taken 4/25/2025 0400 by Rose Vega RN  Infection Prevention:   rest/sleep promoted   environmental surveillance performed   hand hygiene promoted   single patient room provided  Taken 4/25/2025 0200 by Rose Vega RN  Infection Prevention:   rest/sleep promoted   environmental surveillance performed   hand hygiene promoted   single patient room provided  Taken 4/25/2025 0000 by Rose Vega RN  Infection Prevention:   rest/sleep promoted   environmental surveillance performed   hand hygiene promoted   single patient room provided  Taken 4/24/2025 2200 by Rose Vega RN  Infection Prevention:   rest/sleep promoted   environmental surveillance performed   hand hygiene promoted   single patient room provided  Taken 4/24/2025 2000 by Rose Vega RN  Infection Prevention:   rest/sleep promoted   environmental surveillance performed   hand hygiene promoted   single patient room provided  Goal: Optimal Comfort and Wellbeing  Intervention: Provide Person-Centered Care  Recent Flowsheet Documentation  Taken 4/25/2025 0400 by Rose Vega RN  Trust Relationship/Rapport:   care explained   questions answered   questions encouraged  Taken 4/25/2025 0000 by Rose Vega RN  Trust Relationship/Rapport:   care explained   questions answered   questions encouraged  Taken 4/24/2025 2000 by Rose Vega RN  Trust Relationship/Rapport:   care explained   questions answered   questions encouraged     Problem: Comorbidity Management  Goal: Maintenance of Behavioral Health Symptom Control  Intervention: Maintain Behavioral Health Symptom Control  Recent Flowsheet Documentation  Taken 4/25/2025 0400 by Silvia  ANGELA Rose  Medication Review/Management: medications reviewed  Taken 4/25/2025 0200 by Rose Vega RN  Medication Review/Management: medications reviewed  Taken 4/25/2025 0000 by Rose Vega RN  Medication Review/Management: medications reviewed  Taken 4/24/2025 2200 by Rose Vega RN  Medication Review/Management: medications reviewed  Taken 4/24/2025 2000 by Rose Vega RN  Medication Review/Management: medications reviewed  Goal: Maintenance of COPD Symptom Control  Intervention: Maintain COPD (Chronic Obstructive Pulmonary Disease) Symptom Control  Recent Flowsheet Documentation  Taken 4/25/2025 0400 by Rose Vega RN  Medication Review/Management: medications reviewed  Taken 4/25/2025 0200 by Rose Vega RN  Medication Review/Management: medications reviewed  Taken 4/25/2025 0000 by Rose Vega RN  Medication Review/Management: medications reviewed  Taken 4/24/2025 2200 by Rose Vega RN  Medication Review/Management: medications reviewed  Taken 4/24/2025 2000 by Rose Vega RN  Medication Review/Management: medications reviewed  Goal: Blood Glucose Level Within Target Range  Intervention: Monitor and Manage Glycemia  Recent Flowsheet Documentation  Taken 4/25/2025 0400 by Rose Vega RN  Medication Review/Management: medications reviewed  Taken 4/25/2025 0200 by Rose Vega RN  Medication Review/Management: medications reviewed  Taken 4/25/2025 0000 by Rose Vega RN  Medication Review/Management: medications reviewed  Taken 4/24/2025 2200 by Rose Vega RN  Medication Review/Management: medications reviewed  Taken 4/24/2025 2000 by Rose Vega RN  Medication Review/Management: medications reviewed  Goal: Blood Pressure in Desired Range  Intervention: Maintain Blood Pressure Management  Recent Flowsheet Documentation  Taken 4/25/2025 0400 by Rose Vega RN  Medication Review/Management: medications reviewed  Taken  4/25/2025 0200 by Rose Vega RN  Medication Review/Management: medications reviewed  Taken 4/25/2025 0000 by Rose Vega RN  Medication Review/Management: medications reviewed  Taken 4/24/2025 2200 by Rose Vega RN  Medication Review/Management: medications reviewed  Taken 4/24/2025 2000 by Rose Vega RN  Medication Review/Management: medications reviewed     Problem: Skin Injury Risk Increased  Goal: Skin Health and Integrity  Intervention: Optimize Skin Protection  Recent Flowsheet Documentation  Taken 4/25/2025 0400 by Roes Vega RN  Activity Management: bedrest  Pressure Reduction Techniques: frequent weight shift encouraged  Head of Bed (HOB) Positioning: HOB elevated  Pressure Reduction Devices: pressure-redistributing mattress utilized  Skin Protection: incontinence pads utilized  Taken 4/25/2025 0000 by Rose Vega RN  Activity Management: bedrest  Pressure Reduction Techniques: frequent weight shift encouraged  Head of Bed (HOB) Positioning: HOB elevated  Pressure Reduction Devices: pressure-redistributing mattress utilized  Skin Protection: incontinence pads utilized  Taken 4/24/2025 2000 by Rose Vega RN  Activity Management: back to bed  Pressure Reduction Techniques: frequent weight shift encouraged  Head of Bed (HOB) Positioning: HOB elevated  Pressure Reduction Devices: positioning supports utilized  Skin Protection: incontinence pads utilized     Problem: Fall Injury Risk  Goal: Absence of Fall and Fall-Related Injury  Intervention: Identify and Manage Contributors  Recent Flowsheet Documentation  Taken 4/25/2025 0400 by Rose Vega RN  Medication Review/Management: medications reviewed  Taken 4/25/2025 0200 by Rose Vega RN  Medication Review/Management: medications reviewed  Taken 4/25/2025 0000 by Rose Vega RN  Medication Review/Management: medications reviewed  Taken 4/24/2025 2200 by Rose Vega, RN  Medication  Review/Management: medications reviewed  Taken 4/24/2025 2000 by Rose Vega RN  Medication Review/Management: medications reviewed  Intervention: Promote Injury-Free Environment  Recent Flowsheet Documentation  Taken 4/25/2025 0400 by Rose Vega RN  Safety Promotion/Fall Prevention:   safety round/check completed   room organization consistent   assistive device/personal items within reach   clutter free environment maintained  Taken 4/25/2025 0200 by Rose Vega RN  Safety Promotion/Fall Prevention:   safety round/check completed   room organization consistent   assistive device/personal items within reach   clutter free environment maintained  Taken 4/25/2025 0000 by Rose Vega RN  Safety Promotion/Fall Prevention:   safety round/check completed   room organization consistent   assistive device/personal items within reach   clutter free environment maintained  Taken 4/24/2025 2200 by Rose Vega RN  Safety Promotion/Fall Prevention:   safety round/check completed   room organization consistent   assistive device/personal items within reach   clutter free environment maintained  Taken 4/24/2025 2000 by Rose Vega RN  Safety Promotion/Fall Prevention:   safety round/check completed   room organization consistent   assistive device/personal items within reach   clutter free environment maintained

## 2025-04-26 LAB
CREAT UR-MCNC: 37.8 MG/DL
GLUCOSE BLDC GLUCOMTR-MCNC: 118 MG/DL (ref 70–105)
GLUCOSE BLDC GLUCOMTR-MCNC: 133 MG/DL (ref 70–105)
GLUCOSE BLDC GLUCOMTR-MCNC: 180 MG/DL (ref 70–105)
GLUCOSE BLDC GLUCOMTR-MCNC: 265 MG/DL (ref 70–105)
PROT ?TM UR-MCNC: 14.8 MG/DL
PROT/CREAT UR: 391.5 MG/G CREA (ref 0–200)

## 2025-04-26 PROCEDURE — 82948 REAGENT STRIP/BLOOD GLUCOSE: CPT | Performed by: STUDENT IN AN ORGANIZED HEALTH CARE EDUCATION/TRAINING PROGRAM

## 2025-04-26 PROCEDURE — 25810000003 SODIUM CHLORIDE 0.9 % SOLUTION: Performed by: INTERNAL MEDICINE

## 2025-04-26 PROCEDURE — 25010000002 EPOETIN ALFA PER 1000 UNITS: Performed by: INTERNAL MEDICINE

## 2025-04-26 PROCEDURE — 94799 UNLISTED PULMONARY SVC/PX: CPT

## 2025-04-26 PROCEDURE — 94660 CPAP INITIATION&MGMT: CPT

## 2025-04-26 PROCEDURE — 94664 DEMO&/EVAL PT USE INHALER: CPT

## 2025-04-26 PROCEDURE — 25010000002 HEPARIN (PORCINE) PER 1000 UNITS: Performed by: INTERNAL MEDICINE

## 2025-04-26 PROCEDURE — 25010000002 NA FERRIC GLUC CPLX PER 12.5 MG: Performed by: INTERNAL MEDICINE

## 2025-04-26 PROCEDURE — 82570 ASSAY OF URINE CREATININE: CPT | Performed by: HOSPITALIST

## 2025-04-26 PROCEDURE — 63710000001 INSULIN LISPRO (HUMAN) PER 5 UNITS: Performed by: STUDENT IN AN ORGANIZED HEALTH CARE EDUCATION/TRAINING PROGRAM

## 2025-04-26 PROCEDURE — 94761 N-INVAS EAR/PLS OXIMETRY MLT: CPT

## 2025-04-26 PROCEDURE — 82948 REAGENT STRIP/BLOOD GLUCOSE: CPT

## 2025-04-26 PROCEDURE — 84156 ASSAY OF PROTEIN URINE: CPT | Performed by: HOSPITALIST

## 2025-04-26 RX ORDER — HYDRALAZINE HYDROCHLORIDE 25 MG/1
25 TABLET, FILM COATED ORAL 2 TIMES DAILY
Status: DISCONTINUED | OUTPATIENT
Start: 2025-04-26 | End: 2025-05-04 | Stop reason: HOSPADM

## 2025-04-26 RX ORDER — FAMOTIDINE 20 MG/1
20 TABLET, FILM COATED ORAL DAILY
Status: DISCONTINUED | OUTPATIENT
Start: 2025-04-26 | End: 2025-05-04 | Stop reason: HOSPADM

## 2025-04-26 RX ADMIN — BUDESONIDE 0.5 MG: 0.5 SUSPENSION RESPIRATORY (INHALATION) at 07:20

## 2025-04-26 RX ADMIN — ROSUVASTATIN CALCIUM 10 MG: 10 TABLET, FILM COATED ORAL at 08:54

## 2025-04-26 RX ADMIN — HEPARIN SODIUM 5000 UNITS: 5000 INJECTION INTRAVENOUS; SUBCUTANEOUS at 08:55

## 2025-04-26 RX ADMIN — DULOXETINE 60 MG: 30 CAPSULE, DELAYED RELEASE ORAL at 08:52

## 2025-04-26 RX ADMIN — Medication 10 ML: at 08:55

## 2025-04-26 RX ADMIN — HYDRALAZINE HYDROCHLORIDE 25 MG: 25 TABLET ORAL at 20:43

## 2025-04-26 RX ADMIN — IPRATROPIUM BROMIDE AND ALBUTEROL SULFATE 3 ML: .5; 3 SOLUTION RESPIRATORY (INHALATION) at 11:39

## 2025-04-26 RX ADMIN — HEPARIN SODIUM 5000 UNITS: 5000 INJECTION INTRAVENOUS; SUBCUTANEOUS at 20:42

## 2025-04-26 RX ADMIN — CALCITRIOL CAPSULES 0.25 MCG 0.5 MCG: 0.25 CAPSULE ORAL at 08:54

## 2025-04-26 RX ADMIN — ERYTHROPOIETIN 20000 UNITS: 20000 INJECTION, SOLUTION INTRAVENOUS; SUBCUTANEOUS at 19:38

## 2025-04-26 RX ADMIN — BUDESONIDE 0.5 MG: 0.5 SUSPENSION RESPIRATORY (INHALATION) at 18:48

## 2025-04-26 RX ADMIN — SODIUM CHLORIDE 250 MG: 9 INJECTION, SOLUTION INTRAVENOUS at 19:25

## 2025-04-26 RX ADMIN — INSULIN LISPRO 4 UNITS: 100 INJECTION, SOLUTION INTRAVENOUS; SUBCUTANEOUS at 20:41

## 2025-04-26 RX ADMIN — DULOXETINE 60 MG: 30 CAPSULE, DELAYED RELEASE ORAL at 20:53

## 2025-04-26 RX ADMIN — GABAPENTIN 100 MG: 100 CAPSULE ORAL at 20:43

## 2025-04-26 RX ADMIN — IPRATROPIUM BROMIDE AND ALBUTEROL SULFATE 3 ML: .5; 3 SOLUTION RESPIRATORY (INHALATION) at 14:58

## 2025-04-26 RX ADMIN — PRIMIDONE 50 MG: 50 TABLET ORAL at 20:43

## 2025-04-26 RX ADMIN — ASPIRIN 81 MG: 81 TABLET, COATED ORAL at 08:54

## 2025-04-26 RX ADMIN — Medication 10 ML: at 21:54

## 2025-04-26 RX ADMIN — IPRATROPIUM BROMIDE AND ALBUTEROL SULFATE 3 ML: .5; 3 SOLUTION RESPIRATORY (INHALATION) at 07:20

## 2025-04-26 RX ADMIN — FUROSEMIDE 40 MG: 40 TABLET ORAL at 08:55

## 2025-04-26 RX ADMIN — HYDRALAZINE HYDROCHLORIDE 50 MG: 25 TABLET ORAL at 08:53

## 2025-04-26 RX ADMIN — DILTIAZEM HYDROCHLORIDE 300 MG: 180 CAPSULE, EXTENDED RELEASE ORAL at 08:52

## 2025-04-26 RX ADMIN — IPRATROPIUM BROMIDE AND ALBUTEROL SULFATE 3 ML: .5; 3 SOLUTION RESPIRATORY (INHALATION) at 18:42

## 2025-04-26 RX ADMIN — Medication 10 ML: at 19:25

## 2025-04-26 RX ADMIN — INSULIN LISPRO 2 UNITS: 100 INJECTION, SOLUTION INTRAVENOUS; SUBCUTANEOUS at 12:56

## 2025-04-26 RX ADMIN — FAMOTIDINE 20 MG: 20 TABLET, FILM COATED ORAL at 12:56

## 2025-04-26 RX ADMIN — GABAPENTIN 100 MG: 100 CAPSULE ORAL at 08:55

## 2025-04-26 NOTE — PLAN OF CARE
Goal Outcome Evaluation:            PT wore bipap throughout the night, will need a sleep study out patient, wound care completed, plan of care ongoing.

## 2025-04-26 NOTE — PLAN OF CARE
Goal Outcome Evaluation:      Pt A&O x 4. Urine sample collected and sent to lab per order. Spouse at bedside. Nephrology consult ordered and contacted. Continues to wear bipap when sleeping.

## 2025-04-26 NOTE — PROGRESS NOTES
Daily Progress Note        Lethargy    Assessment:     Acute/chronic hypercapnic/hypoxic respiratory insufficiency     Chronic elevated right hemidiaphragm, associated right lower lobe mild bronchiectasis    CT chest February 2025 no evidence of interstitial lung disease     Respiratory panel negative     non-smoker  No PFTs     Features of obstructive sleep apnea     CKD stage IV  Diabetes mellitus  Hyperlipidemia     Encephalopathy   TSH 3.1  Ammonia 16        Recommendations:        oxygen titration currently on 2 L nasal cannula     Tolerated BiPAP during sleep  Sleep study as an outpatient     Bronchodilators currently on Pulmicort and DuoNeb     Steroids discontinued since there is no evidence of interstitial lung disease on most recent CAT scan in February 2025     Minimize sedatives such as Cymbalta and Atarax and Flexeril     Diuresis 40 mg IV once     Currently off antibiotics                CT chest 2/15/2025          LOS: 3 days     Subjective         Objective     Vital signs for last 24 hours:  Vitals:    04/26/25 0720 04/26/25 0723 04/26/25 0724 04/26/25 0727   BP:       BP Location:       Patient Position:       Pulse: 82 82 79 84   Resp: 24 20 20 20   Temp:       TempSrc:       SpO2: 100% 100% 99% 100%   Weight:       Height:           Intake/Output last 3 shifts:  I/O last 3 completed shifts:  In: 240 [P.O.:240]  Out: 2700 [Urine:2700]  Intake/Output this shift:  No intake/output data recorded.      Radiology  Imaging Results (Last 24 Hours)       ** No results found for the last 24 hours. **            Labs:  Results from last 7 days   Lab Units 04/25/25  1347   WBC 10*3/mm3 9.63   HEMOGLOBIN g/dL 8.1*   HEMATOCRIT % 26.3*   PLATELETS 10*3/mm3 309     Results from last 7 days   Lab Units 04/25/25  1630   SODIUM mmol/L 136   POTASSIUM mmol/L 4.9   CHLORIDE mmol/L 101   CO2 mmol/L 23.0   BUN mg/dL 75*   CREATININE mg/dL 3.51*   CALCIUM mg/dL 8.3*   BILIRUBIN mg/dL 0.2   ALK PHOS U/L 41   ALT (SGPT)  U/L 25   AST (SGOT) U/L 37*   GLUCOSE mg/dL 149*     Results from last 7 days   Lab Units 04/23/25  0353   PH, ARTERIAL pH units 7.217*   PO2 ART mm Hg 128.7*   PCO2, ARTERIAL mm Hg 68.1*   HCO3 ART mmol/L 27.7     Results from last 7 days   Lab Units 04/25/25  1630 04/23/25  0500 04/22/25  1649   ALBUMIN g/dL 3.3* 3.2* 3.9     Results from last 7 days   Lab Units 04/25/25  1630   CK TOTAL U/L 52         Results from last 7 days   Lab Units 04/23/25  0500   MAGNESIUM mg/dL 2.4         Results from last 7 days   Lab Units 04/22/25  1649   TSH uIU/mL 3.190           Meds:   SCHEDULE  aspirin, 81 mg, Oral, Daily  budesonide, 0.5 mg, Nebulization, BID - RT  calcitriol, 0.5 mcg, Oral, Daily  dilTIAZem CD, 300 mg, Oral, Daily  DULoxetine, 60 mg, Oral, BID  furosemide, 40 mg, Oral, Daily  gabapentin, 100 mg, Oral, Q12H  heparin (porcine), 5,000 Units, Subcutaneous, Q12H  hydrALAZINE, 50 mg, Oral, BID  insulin lispro, 2-7 Units, Subcutaneous, 4x Daily AC & at Bedtime  ipratropium-albuterol, 3 mL, Nebulization, 4x Daily - RT  primidone, 50 mg, Oral, Nightly  rosuvastatin, 10 mg, Oral, Daily      Infusions     PRNs    acetaminophen **OR** acetaminophen **OR** acetaminophen    senna-docusate sodium **AND** polyethylene glycol **AND** bisacodyl **AND** bisacodyl    Calcium Replacement - Follow Nurse / BPA Driven Protocol    cyclobenzaprine    dextrose    dextrose    glucagon (human recombinant)    hydrOXYzine    ipratropium-albuterol    Magnesium Low Dose Replacement - Follow Nurse / BPA Driven Protocol    Phosphorus Replacement - Follow Nurse / BPA Driven Protocol    Potassium Replacement - Follow Nurse / BPA Driven Protocol    sodium chloride    zolpidem    Physical Exam:  Physical Exam  Cardiovascular:      Heart sounds: Murmur heard.      No gallop.   Pulmonary:      Effort: No respiratory distress.      Breath sounds: No stridor. Rhonchi and rales present. No wheezing.   Chest:      Chest wall: No tenderness.          ROS  Review of Systems   Respiratory:  Positive for cough and shortness of breath. Negative for wheezing and stridor.    Cardiovascular:  Positive for palpitations and leg swelling. Negative for chest pain.             Total critical care time spent with patient greater than: 45 Minutes

## 2025-04-26 NOTE — PROGRESS NOTES
[facility] MEDICINE SERVICE  DAILY PROGRESS NOTE    NAME: Lynne Cade  : 1950  MRN: [id number]      LOS: 3 days     PROVIDER OF SERVICE: Timothy Duane Brammell, MD    Chief Complaint: Lethargy    Subjective:     Interval History:  History taken from: patient    Patient awake and appropriate.   at bedside feels that she is resolved as to her prior confusion.  She states that she pivots with assistance at home for mobility.  Eating without issue.  Still makes some urine.  SeeIing nephrology some outpatient.  No other acute issues.        Review of Systems:   Review of Systems   All other systems reviewed and are negative.      Objective:     Vital Signs  Temp:  [97.3 °F (36.3 °C)-97.5 °F (36.4 °C)] 97.4 °F (36.3 °C)  Heart Rate:  [74-92] 92  Resp:  [12-24] 12  BP: (119-140)/(50-86) 136/55  Flow (L/min) (Oxygen Therapy):  [2-40] 3.5   Body mass index is 36.74 kg/m².    Physical Exam  Physical Exam  Constitutional:       General: She is not in acute distress.     Appearance: She is obese.   HENT:      Head: Normocephalic.   Cardiovascular:      Rate and Rhythm: Normal rate and regular rhythm.   Pulmonary:      Effort: Pulmonary effort is normal.      Breath sounds: Normal breath sounds.   Abdominal:      General: Bowel sounds are normal.      Palpations: Abdomen is soft.   Musculoskeletal:         General: No swelling.   Neurological:      Mental Status: She is alert. Mental status is at baseline.            Diagnostic Data    Results from last 7 days   Lab Units 25  1630 25  1347   WBC 10*3/mm3  --  9.63   HEMOGLOBIN g/dL  --  8.1*   HEMATOCRIT %  --  26.3*   PLATELETS 10*3/mm3  --  309   GLUCOSE mg/dL 149*  --    CREATININE mg/dL 3.51*  --    BUN mg/dL 75*  --    SODIUM mmol/L 136  --    POTASSIUM mmol/L 4.9  --    AST (SGOT) U/L 37*  --    ALT (SGPT) U/L 25  --    ALK PHOS U/L 41  --    BILIRUBIN mg/dL 0.2  --    ANION GAP mmol/L 12.0  --        MRI Brain Without  Contrast  Result Date: 4/24/2025  Impression: 1.No acute ischemic change noted. 2.Diffuse atrophy and white matter changes compatible sequela of small vessel ischemic disease in this patient. 3.Small bilateral mastoid effusions right greater than left. Electronically Signed: Carlos Manuel Dumont MD  4/24/2025 6:22 PM EDT  Workstation ID: [id number]            Assessment:   Acute on chronic hypoxemic hypercapnic respiratory failure  Acute on chronic kidney disease stage IV  Anemia  Type 2 diabetes  Obstructive sleep apnea  Microvascular atrophy of CNS  Encephalopathy of the probable metabolic nature  Axillary wound left  Edema  History of CAD  Morbid obesity  History of coronary artery disease    Plan:  Ambien and muscle relaxants have been reordered by my physician. labs pending. Will place Lasix on hold.  Pulmonary following.  Will need outpatient sleep study.  Probable discharge to rehab setting.      Active and Resolved Problems  Active Hospital Problems    Diagnosis  POA    **Lethargy [R53.83]  Yes      Resolved Hospital Problems   No resolved problems to display.           VTE Prophylaxis:  Pharmacologic VTE prophylaxis orders are present.             Disposition Planning:     Barriers to Discharge:CO2 retention and rehab placement  Anticipated Date of Discharge: 4/27  Place of Discharge: rehab      Time: 30 minutes     Code Status and Medical Interventions: CPR (Attempt to Resuscitate); Full Support   Ordered at: 04/22/25 2828     Code Status (Patient has no pulse and is not breathing):    CPR (Attempt to Resuscitate)     Medical Interventions (Patient has pulse or is breathing):    Full Support       Signature: Electronically signed by Timothy Duane Brammell, MD, 04/26/25, 11:27 EDT.  [facility], [place] Hospitalist Team

## 2025-04-26 NOTE — CONSULTS
NEPHROLOGY CONSULTATION-----[facility]    [facility]  [phone]  Vipul Elias MD    Patient Care Team:  Lorraine Agarwal APRN as PCP - General (Nurse Practitioner)  Rick Melendez MD as Consulting Physician (Nephrology)  Mayo Fregoso MD as Consulting Physician (Urology)  Fredi Pierce MD as Consulting Physician (Cardiology)  Cony Sanchez APRN as Nurse Practitioner (Vascular Surgery)    CC/REASON FOR CONSULTATION: Elevated creatinine    PHYSICIAN REQUESTING CONSULTATION: Brammell, Timothy Duane,*     History of Present Illness  74-year-old female with past medical history of CKD stage IV, hypertension, T2DM presented to the hospital on 4/22/2025 with complaints of confusion and generalized weakness.  Her creatinine was 2.8 on admission increased to 3.5.  She received some diuretics.  She was admitted with acute on chronic hypoxemic/hypercapnic respiratory failure.  No chest pain.  No dysuria or gross hematuria.  She had left arm AV graft.    Review of Systems   Noted above otherwise 10 systems reviewed were negative.    Past Medical History:   Diagnosis Date    Anemia     Hx of anemia    Anxiety     Controled  xanax prn    Arthritis     Conditions. Abstracted from [facility].    Cataract     bilateral    CHF (congestive heart failure) 6/01/22    Low iron    Cholelithiasis 30 yrs ago    Had gb removed    CKD (chronic kidney disease), stage IV 01/13/2023    Colon polyp     Depression 1995    Take cymbalta    Diabetes mellitus     type 2    Diarrhea     on and off    Edema of right foot 04/2021    Healthcare maintenance 2008    PAP. Abstracted from [facility].    Hip pain, bilateral     HL (hearing loss) 1/1/19    Have hearing aids    Hyperlipidemia     Hypertension     Knee pain, bilateral     Obesity     Osteopenia 11/1/22    On med    Other forms of dyspnea     Other specified soft tissue disorders      Pneumonia due to COVID-19 virus 06/26/2022    Renal insufficiency Stage 3    Has gone up to stage 4    Slow to wake up after anesthesia     Spinal headache     Stress headaches     Tremor     Benign    Tremor of both hands     Urinary tract infection 07/01/22    [place]    Visual impairment 6/1/2017    Cataracts    Vitamin D deficiency        Past Surgical History:   Procedure Laterality Date    ARTERIOVENOUS FISTULA/SHUNT SURGERY Left 02/24/2023    Procedure: BRACHIOCEPHALIC ARTERIOVENOUS FISTULA FORMATION;  Surgeon: Vignesh Riley MD;  Location: [facility] MAIN OR;  Service: Vascular;  Laterality: Left;    ARTERIOVENOUS FISTULA/SHUNT SURGERY Left 02/27/2025    Procedure: ARTERIOVENOUS SHUNT GRAFT;  Surgeon: Zaina Bernard MD;  Location: [facility] MAIN OR;  Service: Vascular;  Laterality: Left;    BREAST BIOPSY Right     CHOLECYSTECTOMY  1990    COLONOSCOPY      This year 2022    EYE SURGERY Bilateral     cataract removal    GASTRIC BYPASS  2002    HERNIA REPAIR  2005    umbilical hernia    HIP OPEN REDUCTION Right 03/02/2020    Procedure: FEMORAL NECK OPEN REDUCTION INTERNAL FIXATION;  Surgeon: Yfn Sifuentes MD;  Location: [facility] MAIN OR;  Service: Orthopedics;  Laterality: Right;  Cannulated screw fixation right femoral neck    JOINT REPLACEMENT  2020    R knee    LAPAROTOMY OOPHERECTOMY Bilateral 2010    OTHER SURGICAL HISTORY  04/20/2015    Lexiscan stress test, normal. Abstracted from [facility].    PANNICULECTOMY      TOTAL KNEE ARTHROPLASTY Right 04/29/2021    Procedure: RIGHT TOTAL KNEE REPLACEMENT;  Surgeon: Yfn Sifuentes MD;  Location: [facility] MAIN OR;  Service: Orthopedics;  Laterality: Right;       Family History   Problem Relation Age of Onset    Asthma Mother     Depression Mother     Kidney disease Mother         Kidney stones    Miscarriages / Stillbirths Mother     Hyperlipidemia Father     Hypertension Father     Diabetes Father     Anxiety disorder Daughter     Early death Daughter      Miscarriages / Stillbirths Daughter     Diabetes Daughter        Social History     Tobacco Use    Smoking status: Never     Passive exposure: Never    Smokeless tobacco: Never   Vaping Use    Vaping status: Never Used   Substance Use Topics    Alcohol use: Never    Drug use: Never       Home Meds:   Medications Prior to Admission   Medication Sig Dispense Refill Last Dose/Taking    albuterol sulfate  (90 Base) MCG/ACT inhaler Inhale 2 puffs Every 6 (Six) Hours.   Taking    alendronate (FOSAMAX) 70 MG tablet Take 1 tablet by mouth Every 7 (Seven) Days. On Saturdays   Taking    calcitriol (ROCALTROL) 0.25 MCG capsule Take 2 capsules by mouth Daily.   Taking    cyclobenzaprine (FLEXERIL) 5 MG tablet Take 1 tablet by mouth 3 (Three) Times a Day As Needed for Muscle Spasms.   Taking As Needed    dilTIAZem CD (CARDIZEM CD) 300 MG 24 hr capsule Take 1 capsule by mouth Daily.   Taking    DULoxetine (CYMBALTA) 60 MG capsule Take 1 capsule by mouth 2 (Two) Times a Day. 180 capsule 1 Taking    furosemide (LASIX) 40 MG tablet Take 1 tablet by mouth Daily for 30 days. 30 tablet 0 Taking    gabapentin (NEURONTIN) 100 MG capsule Take 1 capsule by mouth 2 (Two) Times a Day.   Taking    glimepiride (AMARYL) 2 MG tablet Take 1 tablet by mouth 2 (Two) Times a Day.   Taking    hydrALAZINE (APRESOLINE) 100 MG tablet Take 0.5 tablets by mouth 2 (Two) Times a Day.   Taking    Januvia 25 MG tablet Take 1 tablet by mouth Daily.   Taking    primidone (MYSOLINE) 50 MG tablet Take 1 tablet by mouth Every Night.   Taking    rosuvastatin (CRESTOR) 10 MG tablet Take 1 tablet by mouth Daily. 90 tablet 1 Taking    sodium bicarbonate 650 MG tablet Take 1 tablet by mouth 2 (Two) Times a Day.   Taking    zolpidem (AMBIEN) 5 MG tablet Take 1 tablet by mouth At Night As Needed.   Taking As Needed    hydrOXYzine (ATARAX) 10 MG tablet Take 1 tablet by mouth 3 (Three) Times a Day As Needed for Itching. 90 tablet 1     ipratropium-albuterol  (DUO-NEB) 0.5-2.5 mg/3 ml nebulizer Take 0.5 mL by nebulization 2 (Two) Times a Day.       lidocaine (Lidoderm) 5 % Apply  topically to the appropriate area as directed.          Scheduled Meds:  aspirin, 81 mg, Oral, Daily  budesonide, 0.5 mg, Nebulization, BID - RT  calcitriol, 0.5 mcg, Oral, Daily  dilTIAZem CD, 300 mg, Oral, Daily  DULoxetine, 60 mg, Oral, BID  famotidine, 20 mg, Oral, Daily  [Held by provider] furosemide, 40 mg, Oral, Daily  gabapentin, 100 mg, Oral, Q12H  heparin (porcine), 5,000 Units, Subcutaneous, Q12H  hydrALAZINE, 50 mg, Oral, BID  insulin lispro, 2-7 Units, Subcutaneous, 4x Daily AC & at Bedtime  ipratropium-albuterol, 3 mL, Nebulization, 4x Daily - RT  primidone, 50 mg, Oral, Nightly  rosuvastatin, 10 mg, Oral, Daily        Continuous Infusions:       PRN Meds:    acetaminophen **OR** acetaminophen **OR** acetaminophen    senna-docusate sodium **AND** polyethylene glycol **AND** bisacodyl **AND** bisacodyl    Calcium Replacement - Follow Nurse / BPA Driven Protocol    dextrose    dextrose    glucagon (human recombinant)    hydrOXYzine    ipratropium-albuterol    Magnesium Low Dose Replacement - Follow Nurse / BPA Driven Protocol    Phosphorus Replacement - Follow Nurse / BPA Driven Protocol    Potassium Replacement - Follow Nurse / BPA Driven Protocol    sodium chloride    Allergies:  Carvedilol, Morphine, Sulfa antibiotics, Hydrocodone, and Diphenhydramine    OBJECTIVE    Vital Signs  Temp:  [97.3 °F (36.3 °C)-97.6 °F (36.4 °C)] 97.6 °F (36.4 °C)  Heart Rate:  [74-92] 80  Resp:  [12-24] 13  BP: (114-136)/(50-89) 114/89    I/O this shift:  In: -   Out: 600 [Urine:600]  I/O last 3 completed shifts:  In: 240 [P.O.:240]  Out: 2700 [Urine:2700]    Physical Exam:  General Appearance: alert, appears stated age and cooperative  Head: normocephalic, without obvious abnormality and atraumatic  Eyes: conjunctivae and sclerae normal and no icterus  Neck: supple and no JVD  Lungs: clear to  "auscultation and respirations regular  Heart: regular rhythm & normal rate and normal S1, S2  Chest Wall: no abnormalities observed  Abdomen: normal bowel sounds and soft, nontender  Extremities: moves extremities well, no edema, no cyanosis  Skin: no bleeding, bruising or rash  Neurologic: Alert, and oriented. No focal deficits    Results Review:    I reviewed the patient's new clinical results.    WBC WBC   Date Value Ref Range Status   04/25/2025 9.63 3.40 - 10.80 10*3/mm3 Final      HGB Hemoglobin   Date Value Ref Range Status   04/25/2025 8.1 (L) 12.0 - 15.9 g/dL Final      HCT Hematocrit   Date Value Ref Range Status   04/25/2025 26.3 (L) 34.0 - 46.6 % Final      Platelets No results found for: \"LABPLAT\"   MCV MCV   Date Value Ref Range Status   04/25/2025 104.8 (H) 79.0 - 97.0 fL Final          Sodium Sodium   Date Value Ref Range Status   04/25/2025 136 136 - 145 mmol/L Final      Potassium Potassium   Date Value Ref Range Status   04/25/2025 4.9 3.5 - 5.2 mmol/L Final     Comment:     Slight hemolysis detected by analyzer. Result may be falsely elevated.      Chloride Chloride   Date Value Ref Range Status   04/25/2025 101 98 - 107 mmol/L Final      CO2 CO2   Date Value Ref Range Status   04/25/2025 23.0 22.0 - 29.0 mmol/L Final      BUN BUN   Date Value Ref Range Status   04/25/2025 75 (H) 8 - 23 mg/dL Final      Creatinine Creatinine   Date Value Ref Range Status   04/25/2025 3.51 (H) 0.57 - 1.00 mg/dL Final      Calcium Calcium   Date Value Ref Range Status   04/25/2025 8.3 (L) 8.6 - 10.5 mg/dL Final      PO4 No results found for: \"CAPO4\"   Albumin Albumin   Date Value Ref Range Status   04/25/2025 3.3 (L) 3.5 - 5.2 g/dL Final      Magnesium No results found for: \"MG\"   Uric Acid No results found for: \"URICACID\"       Imaging Results (Last 72 Hours)       Procedure Component Value Units Date/Time    MRI Brain Without Contrast [[id number]] Collected: 04/24/25 1821     Updated: 04/24/25 1824    Narrative:   "    MRI BRAIN WO CONTRAST    Date of Exam: 4/24/2025 5:00 PM EDT    Indication: confusion.     Comparison: CT 4/22/2025 and prior    Technique:  Routine multiplanar/multisequence sequence images of the brain were obtained without contrast administration.      Findings:    Diffusion weighted imaging demonstrates no acute restriction abnormality. Midline structures of the brain are grossly unremarkable in appearance. Pituitary and sella structures and craniocervical junction are grossly unremarkable in appearance. There is   diffuse atrophy. Remote small infarcts are noted within the cerebellum right side greater than left. No acute intracranial hemorrhage or mass effect is noted. Moderate patchy areas of FLAIR and T2 signal hyperintensity within the brain parenchyma with a   supratentorial predominance is most compatible with sequela of small vessel ischemic disease in this patient. Paranasal sinuses appear to be grossly clear. Thinning of the lenses noted the orbits bilaterally. Mastoid air cells demonstrate small effusion   on the right greater than the left. Major intracranial flow voids are grossly patent.      Impression:      Impression:  1.No acute ischemic change noted.  2.Diffuse atrophy and white matter changes compatible sequela of small vessel ischemic disease in this patient.  3.Small bilateral mastoid effusions right greater than left.        Electronically Signed: Carlos Manuel Dumont MD    4/24/2025 6:22 PM EDT    Workstation ID: [id number]              Results for orders placed during the hospital encounter of 04/22/25    XR Chest 1 View    Narrative  XR CHEST 1 VW    Date of Exam: 4/22/2025 3:40 PM EDT    Indication: AMS Protocol  AMS Protocol    Comparison: 2/16/2025    Findings:  Heart size is within normal limits for the projection. The right hemidiaphragm is elevated, unchanged. The pulmonary vascular pattern appears normal and the lungs appear clear.    Impression  Impression:  No active  disease.        Electronically Signed: Yfn Luna MD  4/22/2025 3:53 PM EDT  Workstation ID: [id number]      Results for orders placed during the hospital encounter of 02/16/25    XR Chest 2 View    Narrative  XR CHEST 2 VW    Date of Exam: 2/16/2025 4:12 PM EST    Indication: Short of breath    Comparison: 2/5/2025    Findings:  Cardiomediastinal silhouette is unremarkable. Similar elevation of the right hemidiaphragm. No airspace disease, pneumothorax, nor pleural effusion. No acute osseous abnormality identified.    Impression  Impression:  No acute process identified      Electronically Signed: Antonio Ceballos MD  2/16/2025 4:13 PM EST  Workstation ID: [id number]      Results for orders placed in visit on 01/09/24    SCANNED - IMAGING        Results for orders placed during the hospital encounter of 04/22/25    Duplex Carotid Ultrasound CAR    Interpretation Summary    Right internal carotid artery demonstrates a less than 50% stenosis.    Antegrade right vertebral flow.    Left internal carotid artery demonstrates a less than 50% stenosis.    Antegrade left vertebral flow.      ASSESSMENT / PLAN      Lethargy    CHRIS superimposed on CKD stage IV-CHRIS likely prerenal from relative hypotension and or overdiuresis.  Hold diuretics for now.  Avoid hypotension.    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  AV graft.  Hypertension  T2DM  Chronic hypercapnic/hypoxic respiratory failure  History coronary artery disease  Obstructive sleep apnea    Hold Lasix  Reduce hydralazine to 25 mg p.o. every 12  Monitor renal function, fluid status electrolytes        I discussed the patient's findings and my recommendations with patient    Will follow along closely. Thank you for allowing us to see this patient in renal consultation.    [facility]  [phone]  MD Vipul Velasco MD  04/26/25  13:58  EDT

## 2025-04-27 LAB
GLUCOSE BLDC GLUCOMTR-MCNC: 133 MG/DL (ref 70–105)
GLUCOSE BLDC GLUCOMTR-MCNC: 133 MG/DL (ref 70–105)
GLUCOSE BLDC GLUCOMTR-MCNC: 168 MG/DL (ref 70–105)
GLUCOSE BLDC GLUCOMTR-MCNC: 243 MG/DL (ref 70–105)

## 2025-04-27 PROCEDURE — 25010000002 HEPARIN (PORCINE) PER 1000 UNITS: Performed by: INTERNAL MEDICINE

## 2025-04-27 PROCEDURE — 97530 THERAPEUTIC ACTIVITIES: CPT

## 2025-04-27 PROCEDURE — 82948 REAGENT STRIP/BLOOD GLUCOSE: CPT | Performed by: STUDENT IN AN ORGANIZED HEALTH CARE EDUCATION/TRAINING PROGRAM

## 2025-04-27 PROCEDURE — 94799 UNLISTED PULMONARY SVC/PX: CPT

## 2025-04-27 PROCEDURE — C1751 CATH, INF, PER/CENT/MIDLINE: HCPCS

## 2025-04-27 PROCEDURE — 97110 THERAPEUTIC EXERCISES: CPT

## 2025-04-27 PROCEDURE — 94664 DEMO&/EVAL PT USE INHALER: CPT

## 2025-04-27 PROCEDURE — 94761 N-INVAS EAR/PLS OXIMETRY MLT: CPT

## 2025-04-27 PROCEDURE — 82948 REAGENT STRIP/BLOOD GLUCOSE: CPT

## 2025-04-27 PROCEDURE — 94660 CPAP INITIATION&MGMT: CPT

## 2025-04-27 PROCEDURE — 63710000001 INSULIN LISPRO (HUMAN) PER 5 UNITS: Performed by: STUDENT IN AN ORGANIZED HEALTH CARE EDUCATION/TRAINING PROGRAM

## 2025-04-27 RX ADMIN — INSULIN LISPRO 2 UNITS: 100 INJECTION, SOLUTION INTRAVENOUS; SUBCUTANEOUS at 08:35

## 2025-04-27 RX ADMIN — PRIMIDONE 50 MG: 50 TABLET ORAL at 20:46

## 2025-04-27 RX ADMIN — IPRATROPIUM BROMIDE AND ALBUTEROL SULFATE 3 ML: .5; 3 SOLUTION RESPIRATORY (INHALATION) at 06:40

## 2025-04-27 RX ADMIN — DILTIAZEM HYDROCHLORIDE 300 MG: 180 CAPSULE, EXTENDED RELEASE ORAL at 08:34

## 2025-04-27 RX ADMIN — DULOXETINE 60 MG: 30 CAPSULE, DELAYED RELEASE ORAL at 20:46

## 2025-04-27 RX ADMIN — ACETAMINOPHEN 650 MG: 325 TABLET, FILM COATED ORAL at 22:43

## 2025-04-27 RX ADMIN — INSULIN LISPRO 3 UNITS: 100 INJECTION, SOLUTION INTRAVENOUS; SUBCUTANEOUS at 20:43

## 2025-04-27 RX ADMIN — ROSUVASTATIN CALCIUM 10 MG: 10 TABLET, FILM COATED ORAL at 08:34

## 2025-04-27 RX ADMIN — BUDESONIDE 0.5 MG: 0.5 SUSPENSION RESPIRATORY (INHALATION) at 06:44

## 2025-04-27 RX ADMIN — HEPARIN SODIUM 5000 UNITS: 5000 INJECTION INTRAVENOUS; SUBCUTANEOUS at 08:35

## 2025-04-27 RX ADMIN — BUDESONIDE 0.5 MG: 0.5 SUSPENSION RESPIRATORY (INHALATION) at 18:35

## 2025-04-27 RX ADMIN — FAMOTIDINE 20 MG: 20 TABLET, FILM COATED ORAL at 08:34

## 2025-04-27 RX ADMIN — HYDRALAZINE HYDROCHLORIDE 25 MG: 25 TABLET ORAL at 08:34

## 2025-04-27 RX ADMIN — CALCITRIOL CAPSULES 0.25 MCG 0.5 MCG: 0.25 CAPSULE ORAL at 08:34

## 2025-04-27 RX ADMIN — GABAPENTIN 100 MG: 100 CAPSULE ORAL at 08:34

## 2025-04-27 RX ADMIN — ASPIRIN 81 MG: 81 TABLET, COATED ORAL at 08:34

## 2025-04-27 RX ADMIN — HYDRALAZINE HYDROCHLORIDE 25 MG: 25 TABLET ORAL at 20:45

## 2025-04-27 RX ADMIN — IPRATROPIUM BROMIDE AND ALBUTEROL SULFATE 3 ML: .5; 3 SOLUTION RESPIRATORY (INHALATION) at 18:31

## 2025-04-27 RX ADMIN — IPRATROPIUM BROMIDE AND ALBUTEROL SULFATE 3 ML: .5; 3 SOLUTION RESPIRATORY (INHALATION) at 10:20

## 2025-04-27 RX ADMIN — HEPARIN SODIUM 5000 UNITS: 5000 INJECTION INTRAVENOUS; SUBCUTANEOUS at 20:46

## 2025-04-27 RX ADMIN — IPRATROPIUM BROMIDE AND ALBUTEROL SULFATE 3 ML: .5; 3 SOLUTION RESPIRATORY (INHALATION) at 15:27

## 2025-04-27 RX ADMIN — DULOXETINE 60 MG: 30 CAPSULE, DELAYED RELEASE ORAL at 08:34

## 2025-04-27 RX ADMIN — ACETAMINOPHEN 650 MG: 325 TABLET, FILM COATED ORAL at 12:48

## 2025-04-27 RX ADMIN — GABAPENTIN 100 MG: 100 CAPSULE ORAL at 20:46

## 2025-04-27 NOTE — PROGRESS NOTES
[facility] MEDICINE SERVICE  DAILY PROGRESS NOTE    NAME: Lynne Cade  : 1950  MRN: [id number]      LOS: 4 days     PROVIDER OF SERVICE: Timothy Duane Brammell, MD    Chief Complaint: Lethargy    Subjective:     Interval History:  History taken from: patient    Patient perhaps not as alert today.  She states that she wore masks last evening.  Some severe shortness of breath.  Having difficulty obtaining labs.  Seen by nephrology.  No other acute issues.        Review of Systems:   Review of Systems   All other systems reviewed and are negative.      Objective:     Vital Signs  Temp:  [97 °F (36.1 °C)-98.1 °F (36.7 °C)] 98.1 °F (36.7 °C)  Heart Rate:  [67-86] 82  Resp:  [14-24] 18  BP: (123-151)/(45-61) 148/61  Flow (L/min) (Oxygen Therapy):  [2-3.5] 2   Body mass index is 37.27 kg/m².    Physical Exam  Physical Exam  Vitals reviewed.   Constitutional:       General: She is not in acute distress.     Appearance: She is obese.   HENT:      Head: Normocephalic.   Cardiovascular:      Rate and Rhythm: Normal rate and regular rhythm.   Pulmonary:      Effort: Pulmonary effort is normal.      Breath sounds: Normal breath sounds.   Abdominal:      General: Bowel sounds are normal.      Palpations: Abdomen is soft.      Tenderness: There is no abdominal tenderness.   Musculoskeletal:         General: No swelling.   Neurological:      Mental Status: She is alert. Mental status is at baseline.            Diagnostic Data    Results from last 7 days   Lab Units 25  1630 25  1347   WBC 10*3/mm3  --  9.63   HEMOGLOBIN g/dL  --  8.1*   HEMATOCRIT %  --  26.3*   PLATELETS 10*3/mm3  --  309   GLUCOSE mg/dL 149*  --    CREATININE mg/dL 3.51*  --    BUN mg/dL 75*  --    SODIUM mmol/L 136  --    POTASSIUM mmol/L 4.9  --    AST (SGOT) U/L 37*  --    ALT (SGPT) U/L 25  --    ALK PHOS U/L 41  --    BILIRUBIN mg/dL 0.2  --    ANION GAP mmol/L 12.0  --        No radiology results for the last  day          Assessment:   Acute on chronic hypoxemic hypercapnic respiratory failure  Acute on chronic kidney disease stage IV  Anemia  Type 2 diabetes  Obstructive sleep apnea probable  Microvascular atrophy of CNS  Encephalopathy of the probable metabolic nature  Axillary wound left  Edema  History of CAD  Morbid obesity  History of coronary artery disease      Plan: Lab work pending.  Nephrology following.  Difficult situation in light of her need for NIPPV apparatus in unclear as to how long it may take to get an outpatient sleep study done.  May need to go to rehab setting well awaiting her sleep study in order to have a machine.      Active and Resolved Problems  Active Hospital Problems    Diagnosis  POA    **Lethargy [R53.83]  Yes      Resolved Hospital Problems   No resolved problems to display.           VTE Prophylaxis:  Pharmacologic VTE prophylaxis orders are present.             Disposition Planning:     Barriers to Discharge:mentation, sleep apnea and renal function  Anticipated Date of Discharge: 4/30  Place of Discharge: ?rehab for sleep apparatus pending sleep study      Time: 30 minutes     Code Status and Medical Interventions: CPR (Attempt to Resuscitate); Full Support   Ordered at: 04/22/25 2246     Code Status (Patient has no pulse and is not breathing):    CPR (Attempt to Resuscitate)     Medical Interventions (Patient has pulse or is breathing):    Full Support       Signature: Electronically signed by Timothy Duane Brammell, MD, 04/27/25, 12:44 EDT.  [facility] Hospitalist Team

## 2025-04-27 NOTE — PROGRESS NOTES
Daily Progress Note        Lethargy    Assessment:     Acute/chronic hypercapnic/hypoxic respiratory insufficiency     Chronic elevated right hemidiaphragm, associated right lower lobe mild bronchiectasis    CT chest February 2025 no evidence of interstitial lung disease     Respiratory panel negative     non-smoker  No PFTs     Features of obstructive sleep apnea     CKD stage IV  Diabetes mellitus  Hyperlipidemia     Encephalopathy   TSH 3.1  Ammonia 16        Recommendations:        oxygen titration currently on 2 L nasal cannula     Tolerated BiPAP during sleep  Sleep study as an outpatient     Bronchodilators currently on Pulmicort and DuoNeb     Steroids discontinued since there is no evidence of interstitial lung disease on most recent CAT scan in February 2025     Minimize sedatives such as Cymbalta and Atarax and Flexeril     Diuresis 40 mg IV once     Currently off antibiotics                CT chest 2/15/2025          LOS: 4 days     Subjective         Objective     Vital signs for last 24 hours:  Vitals:    04/27/25 0830 04/27/25 0834 04/27/25 1020 04/27/25 1028   BP: 148/61 148/61     BP Location:  Right arm     Patient Position:  Lying     Pulse: 83 86 84 82   Resp:  19 18 18   Temp:  98.1 °F (36.7 °C)     TempSrc:  Oral     SpO2: 98% 94% 97% 96%   Weight:       Height:           Intake/Output last 3 shifts:  I/O last 3 completed shifts:  In: 1085 [P.O.:1085]  Out: 3240 [Urine:3240]  Intake/Output this shift:  I/O this shift:  In: 240 [P.O.:240]  Out: -       Radiology  Imaging Results (Last 24 Hours)       ** No results found for the last 24 hours. **            Labs:  Results from last 7 days   Lab Units 04/25/25  1347   WBC 10*3/mm3 9.63   HEMOGLOBIN g/dL 8.1*   HEMATOCRIT % 26.3*   PLATELETS 10*3/mm3 309     Results from last 7 days   Lab Units 04/25/25  1630   SODIUM mmol/L 136   POTASSIUM mmol/L 4.9   CHLORIDE mmol/L 101   CO2 mmol/L 23.0   BUN mg/dL 75*   CREATININE mg/dL 3.51*   CALCIUM mg/dL  8.3*   BILIRUBIN mg/dL 0.2   ALK PHOS U/L 41   ALT (SGPT) U/L 25   AST (SGOT) U/L 37*   GLUCOSE mg/dL 149*     Results from last 7 days   Lab Units 04/23/25  0353   PH, ARTERIAL pH units 7.217*   PO2 ART mm Hg 128.7*   PCO2, ARTERIAL mm Hg 68.1*   HCO3 ART mmol/L 27.7     Results from last 7 days   Lab Units 04/25/25  1630 04/23/25  0500 04/22/25  1649   ALBUMIN g/dL 3.3* 3.2* 3.9     Results from last 7 days   Lab Units 04/25/25  1630   CK TOTAL U/L 52         Results from last 7 days   Lab Units 04/23/25  0500   MAGNESIUM mg/dL 2.4         Results from last 7 days   Lab Units 04/22/25  1649   TSH uIU/mL 3.190           Meds:   SCHEDULE  aspirin, 81 mg, Oral, Daily  budesonide, 0.5 mg, Nebulization, BID - RT  calcitriol, 0.5 mcg, Oral, Daily  dilTIAZem CD, 300 mg, Oral, Daily  DULoxetine, 60 mg, Oral, BID  famotidine, 20 mg, Oral, Daily  [Held by provider] furosemide, 40 mg, Oral, Daily  gabapentin, 100 mg, Oral, Q12H  heparin (porcine), 5,000 Units, Subcutaneous, Q12H  hydrALAZINE, 25 mg, Oral, BID  insulin lispro, 2-7 Units, Subcutaneous, 4x Daily AC & at Bedtime  ipratropium-albuterol, 3 mL, Nebulization, 4x Daily - RT  primidone, 50 mg, Oral, Nightly  rosuvastatin, 10 mg, Oral, Daily      Infusions     PRNs    acetaminophen **OR** acetaminophen **OR** acetaminophen    senna-docusate sodium **AND** polyethylene glycol **AND** bisacodyl **AND** bisacodyl    Calcium Replacement - Follow Nurse / BPA Driven Protocol    dextrose    dextrose    glucagon (human recombinant)    hydrOXYzine    ipratropium-albuterol    Magnesium Low Dose Replacement - Follow Nurse / BPA Driven Protocol    Phosphorus Replacement - Follow Nurse / BPA Driven Protocol    Potassium Replacement - Follow Nurse / BPA Driven Protocol    sodium chloride    Physical Exam:  Physical Exam  Cardiovascular:      Heart sounds: Murmur heard.      No gallop.   Pulmonary:      Effort: No respiratory distress.      Breath sounds: No stridor. Rhonchi and  rales present. No wheezing.   Chest:      Chest wall: No tenderness.         ROS  Review of Systems   Respiratory:  Positive for cough and shortness of breath. Negative for wheezing and stridor.    Cardiovascular:  Positive for palpitations and leg swelling. Negative for chest pain.             Total critical care time spent with patient greater than: 45 Minutes

## 2025-04-27 NOTE — PROGRESS NOTES
"NEPHROLOGY PROGRESS NOTE------[facility]    [facility]  [phone]  Vipul Elias MD      Patient Care Team:  Lorraine Agarwal APRN as PCP - General (Nurse Practitioner)  Rick Melendez MD as Consulting Physician (Nephrology)  Mayo Fregoso MD as Consulting Physician (Urology)  Fredi Pierce MD as Consulting Physician (Cardiology)  Cony Sanchez APRN as Nurse Practitioner (Vascular Surgery)      Provider:  Vipul Elias MD  Patient Name: Lynne Cade  :  1950    SUBJECTIVE:  Follow-up CHRIS/CKD   no chest pain or shortness of breath    Medication:  aspirin, 81 mg, Oral, Daily  budesonide, 0.5 mg, Nebulization, BID - RT  calcitriol, 0.5 mcg, Oral, Daily  dilTIAZem CD, 300 mg, Oral, Daily  DULoxetine, 60 mg, Oral, BID  famotidine, 20 mg, Oral, Daily  [Held by provider] furosemide, 40 mg, Oral, Daily  gabapentin, 100 mg, Oral, Q12H  heparin (porcine), 5,000 Units, Subcutaneous, Q12H  hydrALAZINE, 25 mg, Oral, BID  insulin lispro, 2-7 Units, Subcutaneous, 4x Daily AC & at Bedtime  ipratropium-albuterol, 3 mL, Nebulization, 4x Daily - RT  primidone, 50 mg, Oral, Nightly  rosuvastatin, 10 mg, Oral, Daily           OBJECTIVE    Vital Sign Min/Max for last 24 hours  Temp  Min: 97 °F (36.1 °C)  Max: 98.1 °F (36.7 °C)   BP  Min: 114/89  Max: 151/55   Pulse  Min: 67  Max: 86   Resp  Min: 13  Max: 24   SpO2  Min: 91 %  Max: 100 %   No data recorded   Weight  Min: 105 kg (230 lb 13.2 oz)  Max: 105 kg (230 lb 13.2 oz)     Flowsheet Rows      Flowsheet Row First Filed Value   Admission Height 167.6 cm (66\") Documented at 2025 1448   Admission Weight 96.2 kg (212 lb 1.3 oz) Documented at 2025 1448            I/O this shift:  In: 240 [P.O.:240]  Out: -   I/O last 3 completed shifts:  In: 1085 [P.O.:1085]  Out: 3240 [Urine:3240]    Physical Exam:  General Appearance: alert, appears stated age and " "cooperative  Head: normocephalic, without obvious abnormality and atraumatic  Eyes: conjunctivae and sclerae normal and no icterus  Neck: supple and no JVD  Lungs:    Heart: regular rhythm & normal rate and normal S1, S2  Chest: Wall no abnormalities observed  Abdomen: normal bowel sounds and soft, nontender  Extremities: moves extremities well, no edema, no cyanosis and no redness  Skin: no bleeding, bruising or rash, turgor normal, color normal and no lesions noted  Neurologic: Alert, and oriented. No focal deficits    Labs:    WBC WBC   Date Value Ref Range Status   04/25/2025 9.63 3.40 - 10.80 10*3/mm3 Final      HGB Hemoglobin   Date Value Ref Range Status   04/25/2025 8.1 (L) 12.0 - 15.9 g/dL Final      HCT Hematocrit   Date Value Ref Range Status   04/25/2025 26.3 (L) 34.0 - 46.6 % Final      Platelets No results found for: \"LABPLAT\"   MCV MCV   Date Value Ref Range Status   04/25/2025 104.8 (H) 79.0 - 97.0 fL Final          Sodium Sodium   Date Value Ref Range Status   04/25/2025 136 136 - 145 mmol/L Final      Potassium Potassium   Date Value Ref Range Status   04/25/2025 4.9 3.5 - 5.2 mmol/L Final     Comment:     Slight hemolysis detected by analyzer. Result may be falsely elevated.      Chloride Chloride   Date Value Ref Range Status   04/25/2025 101 98 - 107 mmol/L Final      CO2 CO2   Date Value Ref Range Status   04/25/2025 23.0 22.0 - 29.0 mmol/L Final      BUN BUN   Date Value Ref Range Status   04/25/2025 75 (H) 8 - 23 mg/dL Final      Creatinine Creatinine   Date Value Ref Range Status   04/25/2025 3.51 (H) 0.57 - 1.00 mg/dL Final      Calcium Calcium   Date Value Ref Range Status   04/25/2025 8.3 (L) 8.6 - 10.5 mg/dL Final      PO4 No components found for: \"PO4\"   Albumin Albumin   Date Value Ref Range Status   04/25/2025 3.3 (L) 3.5 - 5.2 g/dL Final      Magnesium No results found for: \"MG\"   Uric Acid No components found for: \"URIC ACID\"     Imaging Results (Last 72 Hours)       Procedure " Component Value Units Date/Time    MRI Brain Without Contrast [[id number]] Collected: 04/24/25 1821     Updated: 04/24/25 1824    Narrative:      MRI BRAIN WO CONTRAST    Date of Exam: 4/24/2025 5:00 PM EDT    Indication: confusion.     Comparison: CT 4/22/2025 and prior    Technique:  Routine multiplanar/multisequence sequence images of the brain were obtained without contrast administration.      Findings:    Diffusion weighted imaging demonstrates no acute restriction abnormality. Midline structures of the brain are grossly unremarkable in appearance. Pituitary and sella structures and craniocervical junction are grossly unremarkable in appearance. There is   diffuse atrophy. Remote small infarcts are noted within the cerebellum right side greater than left. No acute intracranial hemorrhage or mass effect is noted. Moderate patchy areas of FLAIR and T2 signal hyperintensity within the brain parenchyma with a   supratentorial predominance is most compatible with sequela of small vessel ischemic disease in this patient. Paranasal sinuses appear to be grossly clear. Thinning of the lenses noted the orbits bilaterally. Mastoid air cells demonstrate small effusion   on the right greater than the left. Major intracranial flow voids are grossly patent.      Impression:      Impression:  1.No acute ischemic change noted.  2.Diffuse atrophy and white matter changes compatible sequela of small vessel ischemic disease in this patient.  3.Small bilateral mastoid effusions right greater than left.        Electronically Signed: Carlos Manuel Dumont MD    4/24/2025 6:22 PM EDT    Workstation ID: [id number]            Results for orders placed during the hospital encounter of 04/22/25    XR Chest 1 View    Narrative  XR CHEST 1 VW    Date of Exam: 4/22/2025 3:40 PM EDT    Indication: AMS Protocol  AMS Protocol    Comparison: 2/16/2025    Findings:  Heart size is within normal limits for the projection. The right hemidiaphragm is  elevated, unchanged. The pulmonary vascular pattern appears normal and the lungs appear clear.    Impression  Impression:  No active disease.        Electronically Signed: Yfn Luna MD  4/22/2025 3:53 PM EDT  Workstation ID: [id number]      Results for orders placed during the hospital encounter of 02/16/25    XR Chest 2 View    Narrative  XR CHEST 2 VW    Date of Exam: 2/16/2025 4:12 PM EST    Indication: Short of breath    Comparison: 2/5/2025    Findings:  Cardiomediastinal silhouette is unremarkable. Similar elevation of the right hemidiaphragm. No airspace disease, pneumothorax, nor pleural effusion. No acute osseous abnormality identified.    Impression  Impression:  No acute process identified      Electronically Signed: Antonio Ceballos MD  2/16/2025 4:13 PM EST  Workstation ID: [id number]      Results for orders placed in visit on 01/09/24    SCANNED - IMAGING      Results for orders placed during the hospital encounter of 04/22/25    Duplex Carotid Ultrasound CAR    Interpretation Summary    Right internal carotid artery demonstrates a less than 50% stenosis.    Antegrade right vertebral flow.    Left internal carotid artery demonstrates a less than 50% stenosis.    Antegrade left vertebral flow.        ASSESSMENT / PLAN      Lethargy    CHRIS superimposed on CKD stage IV-CHRIS likely prerenal from relative hypotension and or overdiuresis.  Hold diuretics for now.  Avoid hypotension.    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  AV graft.  Hypertension  T2DM  Chronic hypercapnic/hypoxic respiratory failure  History coronary artery disease  Obstructive sleep apnea     Labs pending  Hold Lasix  HAVE reduced Hydralazine to 25 mg p.o. every 12  Monitor renal function, fluid status electrolytes        Vipul Elias MD  [facility]  [phone]  04/27/25  12:16 EDT

## 2025-04-27 NOTE — PLAN OF CARE
Goal Outcome Evaluation:      Pt sat up in the chair for a couple of hours today. Pt is very unsteady on feet. PT worked with pt and said to use mahi steady. Spouse has been at bedside. Continue to monitor

## 2025-04-27 NOTE — THERAPY TREATMENT NOTE
"Subjective: Pt agreeable to therapeutic plan of care.    Objective:     Precautions -   falls, 02,     Bed mobility - Min-A  supine to sit to supine  pt sitting at edge of the bed for about 6 minutes before reports of nausea.    Transfers - N/A or Not attempted.  Ambulation - N/A or Not attempted.    Therapeutic Exercise - 10 Reps B LE AROM lying supine and supported sitting / EOB    Vitals: WNL    Pain: 0    I  Education: Provided education on the importance of mobility in the acute care setting, Verbal/Tactile Cues, and Transfer Training    Assessment: Lynne Cade presents with functional mobility impairments which indicate the need for skilled intervention. Pt is definitely more oriented today.  Pt with poor control of ble.  Pt demonstrated myoclonic jerking during her exercises to ble's especially in supine. Nursing reported that she had to sit pt in her lap with transfer back to bed earlier. Tolerating session today without incident. Will continue to follow and progress as tolerated.     Plan/Recommendations:   If medically appropriate, Moderate Intensity Therapy recommended post-acute care. This is recommended as therapy feels the patient would require 3-4 days per week and wouldn't tolerate \"3 hour daily\" rehab intensity. SNF would be the preferred choice. If the patient does not agree to SNF, arrange HH or OP depending on home bound status. If patient is medically complex, consider LTACH. Pt requires no DME at discharge.     Pt desires Skilled Rehab placement at discharge. Pt cooperative; agreeable to therapeutic recommendations and plan of care.     Basic Mobility 6-click:  Rollin = Total, A lot = 2, A little = 3; 4 = None  Supine>Sit:   1 = Total, A lot = 2, A little = 3; 4 = None   Sit>Stand with arms:  1 = Total, A lot = 2, A little = 3; 4 = None  Bed>Chair:   1 = Total, A lot = 2, A little = 3; 4 = None  Ambulate in room:  1 = Total, A lot = 2, A little = 3; 4 = None  3-5 Steps with " railin = Total, A lot = 2, A little = 3; 4 = None  Score: 12    Modified [place]: 4 = Moderately severe disability (Unable to attend to own bodily needs without assistance, and unable to walk unassisted)     Post-Tx Position: Supine with HOB Elevated, Alarms activated, and Call light and personal items within reach  pt's family present  PPE: gloves    Therapy Charges for Today       Code Description Service Date Service Provider Modifiers Qty    00185006282  PT THERAPEUTIC ACT EA 15 MIN 2025 Melida Aldridge PTA GP 1    74423266828 HC PT THER PROC EA 15 MIN 2025 Melida Aldridge PTA GP 1           PT Charges       Row Name 25 1716             Time Calculation    Start Time 1607  -SC      Stop Time 1627  -SC      Time Calculation (min) 20 min  -SC      PT Received On 25  -SC      PT - Next Appointment 25  -SC         Time Calculation- PT    Total Timed Code Minutes- PT 20 minute(s)  -SC                User Key  (r) = Recorded By, (t) = Taken By, (c) = Cosigned By      Initials Name Provider Type    Melida Pierson PTA Physical Therapist Assistant

## 2025-04-27 NOTE — PLAN OF CARE
"  Lynne Cade presents with functional mobility impairments which indicate the need for skilled intervention. Pt is definitely more oriented today.  Pt with poor control of ble.  Pt demonstrated myoclonic jerking during her exercises to ble's especially in supine. Nursing reported that she had to sit pt in her lap with transfer back to bed earlier. Tolerating session today without incident. Will continue to follow and progress as tolerated.     Plan/Recommendations:   If medically appropriate, Moderate Intensity Therapy recommended post-acute care. This is recommended as therapy feels the patient would require 3-4 days per week and wouldn't tolerate \"3 hour daily\" rehab intensity. SNF would be the preferred choice. If the patient does not agree to SNF, arrange HH or OP depending on home bound status. If patient is medically complex, consider LTACH. Pt requires no DME at discharge.     Pt desires Skilled Rehab placement at discharge. Pt cooperative; agreeable to therapeutic recommendations and plan of care.                                           "

## 2025-04-27 NOTE — PLAN OF CARE
Goal Outcome Evaluation:  Plan of Care Reviewed With: patient        Progress: no change    Plan of care is ongoing.

## 2025-04-28 LAB
ALBUMIN SERPL-MCNC: 3.3 G/DL (ref 3.5–5.2)
ANION GAP SERPL CALCULATED.3IONS-SCNC: 11.6 MMOL/L (ref 5–15)
BASOPHILS # BLD AUTO: 0.04 10*3/MM3 (ref 0–0.2)
BASOPHILS NFR BLD AUTO: 0.4 % (ref 0–1.5)
BUN SERPL-MCNC: 86 MG/DL (ref 8–23)
BUN/CREAT SERPL: 24.7 (ref 7–25)
CALCIUM SPEC-SCNC: 8.4 MG/DL (ref 8.6–10.5)
CHLORIDE SERPL-SCNC: 102 MMOL/L (ref 98–107)
CO2 SERPL-SCNC: 25.4 MMOL/L (ref 22–29)
CREAT SERPL-MCNC: 3.48 MG/DL (ref 0.57–1)
DEPRECATED RDW RBC AUTO: 57.9 FL (ref 37–54)
EGFRCR SERPLBLD CKD-EPI 2021: 13.1 ML/MIN/1.73
EOSINOPHIL # BLD AUTO: 0.23 10*3/MM3 (ref 0–0.4)
EOSINOPHIL NFR BLD AUTO: 2.3 % (ref 0.3–6.2)
ERYTHROCYTE [DISTWIDTH] IN BLOOD BY AUTOMATED COUNT: 14.9 % (ref 12.3–15.4)
GLUCOSE BLDC GLUCOMTR-MCNC: 114 MG/DL (ref 70–105)
GLUCOSE BLDC GLUCOMTR-MCNC: 120 MG/DL (ref 70–105)
GLUCOSE BLDC GLUCOMTR-MCNC: 166 MG/DL (ref 70–105)
GLUCOSE BLDC GLUCOMTR-MCNC: 290 MG/DL (ref 70–105)
GLUCOSE SERPL-MCNC: 208 MG/DL (ref 65–99)
HCT VFR BLD AUTO: 23.5 % (ref 34–46.6)
HCT VFR BLD AUTO: 24 % (ref 34–46.6)
HGB BLD-MCNC: 6.9 G/DL (ref 12–15.9)
HGB BLD-MCNC: 7.1 G/DL (ref 12–15.9)
IMM GRANULOCYTES # BLD AUTO: 0.13 10*3/MM3 (ref 0–0.05)
IMM GRANULOCYTES NFR BLD AUTO: 1.3 % (ref 0–0.5)
LYMPHOCYTES # BLD AUTO: 0.95 10*3/MM3 (ref 0.7–3.1)
LYMPHOCYTES NFR BLD AUTO: 9.5 % (ref 19.6–45.3)
MCH RBC QN AUTO: 31.4 PG (ref 26.6–33)
MCHC RBC AUTO-ENTMCNC: 29.4 G/DL (ref 31.5–35.7)
MCV RBC AUTO: 106.8 FL (ref 79–97)
MONOCYTES # BLD AUTO: 1.03 10*3/MM3 (ref 0.1–0.9)
MONOCYTES NFR BLD AUTO: 10.3 % (ref 5–12)
NEUTROPHILS NFR BLD AUTO: 7.64 10*3/MM3 (ref 1.7–7)
NEUTROPHILS NFR BLD AUTO: 76.2 % (ref 42.7–76)
NRBC BLD AUTO-RTO: 0 /100 WBC (ref 0–0.2)
PHOSPHATE SERPL-MCNC: 7.8 MG/DL (ref 2.5–4.5)
PLATELET # BLD AUTO: 210 10*3/MM3 (ref 140–450)
PMV BLD AUTO: 10.8 FL (ref 6–12)
POTASSIUM SERPL-SCNC: 4.7 MMOL/L (ref 3.5–5.2)
RBC # BLD AUTO: 2.2 10*6/MM3 (ref 3.77–5.28)
SODIUM SERPL-SCNC: 139 MMOL/L (ref 136–145)
URATE SERPL-MCNC: 5.6 MG/DL (ref 2.4–5.7)
WBC NRBC COR # BLD AUTO: 10.02 10*3/MM3 (ref 3.4–10.8)

## 2025-04-28 PROCEDURE — 94660 CPAP INITIATION&MGMT: CPT

## 2025-04-28 PROCEDURE — 94761 N-INVAS EAR/PLS OXIMETRY MLT: CPT

## 2025-04-28 PROCEDURE — 94799 UNLISTED PULMONARY SVC/PX: CPT

## 2025-04-28 PROCEDURE — 63710000001 INSULIN LISPRO (HUMAN) PER 5 UNITS: Performed by: STUDENT IN AN ORGANIZED HEALTH CARE EDUCATION/TRAINING PROGRAM

## 2025-04-28 PROCEDURE — 80069 RENAL FUNCTION PANEL: CPT | Performed by: INTERNAL MEDICINE

## 2025-04-28 PROCEDURE — 25010000002 HEPARIN (PORCINE) PER 1000 UNITS: Performed by: INTERNAL MEDICINE

## 2025-04-28 PROCEDURE — 84550 ASSAY OF BLOOD/URIC ACID: CPT | Performed by: HOSPITALIST

## 2025-04-28 PROCEDURE — 97530 THERAPEUTIC ACTIVITIES: CPT

## 2025-04-28 PROCEDURE — 85025 COMPLETE CBC W/AUTO DIFF WBC: CPT | Performed by: HOSPITALIST

## 2025-04-28 PROCEDURE — 85014 HEMATOCRIT: CPT | Performed by: FAMILY MEDICINE

## 2025-04-28 PROCEDURE — 94664 DEMO&/EVAL PT USE INHALER: CPT

## 2025-04-28 PROCEDURE — 82948 REAGENT STRIP/BLOOD GLUCOSE: CPT | Performed by: STUDENT IN AN ORGANIZED HEALTH CARE EDUCATION/TRAINING PROGRAM

## 2025-04-28 PROCEDURE — 82948 REAGENT STRIP/BLOOD GLUCOSE: CPT

## 2025-04-28 PROCEDURE — 85018 HEMOGLOBIN: CPT | Performed by: FAMILY MEDICINE

## 2025-04-28 RX ADMIN — ACETAMINOPHEN 650 MG: 325 TABLET, FILM COATED ORAL at 23:09

## 2025-04-28 RX ADMIN — DULOXETINE 60 MG: 30 CAPSULE, DELAYED RELEASE ORAL at 09:29

## 2025-04-28 RX ADMIN — BUDESONIDE 0.5 MG: 0.5 SUSPENSION RESPIRATORY (INHALATION) at 18:18

## 2025-04-28 RX ADMIN — INSULIN LISPRO 2 UNITS: 100 INJECTION, SOLUTION INTRAVENOUS; SUBCUTANEOUS at 11:47

## 2025-04-28 RX ADMIN — ROSUVASTATIN CALCIUM 10 MG: 10 TABLET, FILM COATED ORAL at 09:29

## 2025-04-28 RX ADMIN — CALCITRIOL CAPSULES 0.25 MCG 0.5 MCG: 0.25 CAPSULE ORAL at 09:28

## 2025-04-28 RX ADMIN — IPRATROPIUM BROMIDE AND ALBUTEROL SULFATE 3 ML: .5; 3 SOLUTION RESPIRATORY (INHALATION) at 18:18

## 2025-04-28 RX ADMIN — DULOXETINE 60 MG: 30 CAPSULE, DELAYED RELEASE ORAL at 20:52

## 2025-04-28 RX ADMIN — IPRATROPIUM BROMIDE AND ALBUTEROL SULFATE 3 ML: .5; 3 SOLUTION RESPIRATORY (INHALATION) at 14:38

## 2025-04-28 RX ADMIN — IPRATROPIUM BROMIDE AND ALBUTEROL SULFATE 3 ML: .5; 3 SOLUTION RESPIRATORY (INHALATION) at 10:38

## 2025-04-28 RX ADMIN — FAMOTIDINE 20 MG: 20 TABLET, FILM COATED ORAL at 09:29

## 2025-04-28 RX ADMIN — IPRATROPIUM BROMIDE AND ALBUTEROL SULFATE 3 ML: .5; 3 SOLUTION RESPIRATORY (INHALATION) at 06:48

## 2025-04-28 RX ADMIN — GABAPENTIN 100 MG: 100 CAPSULE ORAL at 20:52

## 2025-04-28 RX ADMIN — HEPARIN SODIUM 5000 UNITS: 5000 INJECTION INTRAVENOUS; SUBCUTANEOUS at 20:52

## 2025-04-28 RX ADMIN — BUDESONIDE 0.5 MG: 0.5 SUSPENSION RESPIRATORY (INHALATION) at 06:53

## 2025-04-28 RX ADMIN — HEPARIN SODIUM 5000 UNITS: 5000 INJECTION INTRAVENOUS; SUBCUTANEOUS at 09:29

## 2025-04-28 RX ADMIN — GABAPENTIN 100 MG: 100 CAPSULE ORAL at 09:28

## 2025-04-28 RX ADMIN — INSULIN LISPRO 4 UNITS: 100 INJECTION, SOLUTION INTRAVENOUS; SUBCUTANEOUS at 20:52

## 2025-04-28 RX ADMIN — PRIMIDONE 50 MG: 50 TABLET ORAL at 20:52

## 2025-04-28 RX ADMIN — ASPIRIN 81 MG: 81 TABLET, COATED ORAL at 09:28

## 2025-04-28 RX ADMIN — HYDRALAZINE HYDROCHLORIDE 25 MG: 25 TABLET ORAL at 20:52

## 2025-04-28 NOTE — PLAN OF CARE
"Goal Outcome Evaluation:     Assessment: Lynne Cade presents with functional mobility impairments which indicate the need for skilled intervention.  Pt less steady today and unable to amb or take steps to chair. Pt still demos myoclonic jerking which impacts all aspects of functional mobility. Pt able to transfer to bedside chair with MOD x 2. Tolerating session today without incident. Will continue to follow and progress as tolerated.     Plan/Recommendations:   If medically appropriate, Moderate Intensity Therapy recommended post-acute care. This is recommended as therapy feels the patient would require 3-4 days per week and wouldn't tolerate \"3 hour daily\" rehab intensity. SNF would be the preferred choice. If the patient does not agree to SNF, arrange HH or OP depending on home bound status. If patient is medically complex, consider LTACH. Pt requires no DME at discharge.     Pt desires Skilled Rehab placement at discharge. Pt cooperative; agreeable to therapeutic recommendations and plan of care.                                          "

## 2025-04-28 NOTE — PLAN OF CARE
Goal Outcome Evaluation:  Plan of Care Reviewed With: patient        Progress: no change    Patient has no voiced complaint during the night and  Has been using her bipap machine.      Plan of care is going.

## 2025-04-28 NOTE — THERAPY TREATMENT NOTE
"Subjective: Pt agreeable to therapeutic plan of care. States she cannot amb today and that her legs are more \"wobbly\".    Objective:     Precautions - Falls Risk    Bed mobility - MIN x 2 to sit EOB    Transfers - MOD x 2 using RW to stand. Pt's LE's quickly buckled and pt returned to sitting. Bedside chair pulled up next to pt to allow for stand-pivot transfer. Pt transferred using MOD x 2 and no AD.    Ambulation - N/A or Not attempted.    Vitals: WNL    Education: Provided education on the importance of mobility in the acute care setting, Verbal/Tactile Cues, Transfer Training, and Energy conservation strategies    Assessment: Lynne Cade presents with functional mobility impairments which indicate the need for skilled intervention.  Pt less steady today and unable to amb or take steps to chair. Pt still demos myoclonic jerking which impacts all aspects of functional mobility. Pt able to transfer to bedside chair with MOD x 2. Tolerating session today without incident. Will continue to follow and progress as tolerated.     Plan/Recommendations:   If medically appropriate, Moderate Intensity Therapy recommended post-acute care. This is recommended as therapy feels the patient would require 3-4 days per week and wouldn't tolerate \"3 hour daily\" rehab intensity. SNF would be the preferred choice. If the patient does not agree to SNF, arrange HH or OP depending on home bound status. If patient is medically complex, consider LTACH. Pt requires no DME at discharge.     Pt desires Skilled Rehab placement at discharge. Pt cooperative; agreeable to therapeutic recommendations and plan of care.       Post-Tx Position: Up in Chair, Alarms activated, and Call light and personal items within reach  PPE: gloves    Therapy Charges for Today       Code Description Service Date Service Provider Modifiers Qty    69283883295  PT THERAPEUTIC ACT EA 15 MIN 4/28/2025 Kiana Ramírez, PT GP 1           PT Charges       Row Name " 04/28/25 1433             Time Calculation    Start Time 1145  -FLORES      Stop Time 1155  -FLORES      Time Calculation (min) 10 min  -FLORES      PT Received On 04/28/25  -FLORES      PT - Next Appointment 04/29/25  -FLORES      PT Goal Re-Cert Due Date 05/07/25  -FLORES         Time Calculation- PT    Total Timed Code Minutes- PT 10 minute(s)  -FLORES                User Key  (r) = Recorded By, (t) = Taken By, (c) = Cosigned By      Initials Name Provider Type    Kiana Mcdaniels, PT Physical Therapist

## 2025-04-28 NOTE — THERAPY TREATMENT NOTE
"Subjective: Pt agreeable to therapeutic plan of care.  Cognition: arousal/alertness: Alert and Attentive    Objective:     Precautions - LE Buckling     Bed Mobility: Mod-A   Functional Transfers: Max-A and Assist x 2     Balance: sitting EOB CGA  Functional Ambulation: N/A or Not attempted.    Feeding: Supervision  ADL Position: sitting up in bed  ADL Comments: Difficulty with feeding due to myoclonic movements     Vitals: WNL    Pain: 0 Javed-Baker Location: NA  Interventions for pain: N/A  Education: Provided education on the importance of mobility in the acute care setting    Assessment: Lynne Cade presents with ADL impairments affecting function including balance, coordination, endurance / activity tolerance, postural / trunk control, range of motion (ROM), and strength.  This date pt demos increased weakness this date and required stand pivot sit transfer x2 due to buckling of BLE.  Demonstrated functioning below baseline abilities indicate the need for continued skilled intervention while inpatient. Tolerating session today without incident. Will continue to follow and progress as tolerated.     Plan/Recommendations:   Moderate Intensity Therapy recommended post-acute care. This is recommended as therapy feels the patient would require 3-4 days per week and wouldn't tolerate \"3 hour daily\" rehab intensity. SNF would be the preferred choice. If the patient does not agree to SNF, arrange HH or OP depending on home bound status. If patient is medically complex, consider LTACH.. Pt requires no DME at discharge.     Pt desires Skilled Rehab placement at discharge. Pt cooperative; agreeable to therapeutic recommendations and plan of care.     Modified Nicole: N/A = No pre-op stroke/TIA    Post-Tx Position: Up in Chair, Alarms activated, and Call light and personal items within reach  PPE: gloves    Therapy Charges for Today       Code Description Service Date Service Provider Modifiers Qty    71316258449 HC " OT THERAPEUTIC ACT EA 15 MIN 4/28/2025 Mariama Reyna, OT GO 1           Time Calculation- OT       Row Name 04/28/25 1505             Time Calculation- OT    OT Start Time 1145  -SR      OT Stop Time 1155  -SR      OT Time Calculation (min) 10 min  -SR      Total Timed Code Minutes- OT 10 minute(s)  -SR      OT Received On 04/28/25  -SR      OT - Next Appointment 04/29/25  -SR                User Key  (r) = Recorded By, (t) = Taken By, (c) = Cosigned By      Initials Name Provider Type    SR Mariama Reyna, OT Occupational Therapist

## 2025-04-28 NOTE — PROGRESS NOTES
Daily Progress Note        Lethargy    Assessment:     Acute/chronic hypercapnic/hypoxic respiratory insufficiency     Chronic elevated right hemidiaphragm, associated right lower lobe mild bronchiectasis    CT chest February 2025 no evidence of interstitial lung disease     Respiratory panel negative     non-smoker  No PFTs     Features of obstructive sleep apnea     CKD stage IV  Diabetes mellitus  Hyperlipidemia     Encephalopathy   TSH 3.1  Ammonia 16        Recommendations:        oxygen titration currently on 2 L nasal cannula     Tolerated BiPAP during sleep  Sleep study as an outpatient     Bronchodilators currently on Pulmicort and DuoNeb     Steroids discontinued since there is no evidence of interstitial lung disease on most recent CAT scan in February 2025     Minimize sedatives such as Cymbalta and Atarax and Flexeril     Diuresis 40 mg IV once     Currently off antibiotics                CT chest 2/15/2025          LOS: 5 days     Subjective         Objective     Vital signs for last 24 hours:  Vitals:    04/28/25 0648 04/28/25 0652 04/28/25 0653 04/28/25 0700   BP:       BP Location:       Patient Position:       Pulse: 80 77 80 80   Resp: 20 21 21 21   Temp:       TempSrc:       SpO2: 100% 100% 100% 100%   Weight:       Height:           Intake/Output last 3 shifts:  I/O last 3 completed shifts:  In: 1205 [P.O.:1205]  Out: 1440 [Urine:1440]  Intake/Output this shift:  No intake/output data recorded.      Radiology  Imaging Results (Last 24 Hours)       ** No results found for the last 24 hours. **            Labs:  Results from last 7 days   Lab Units 04/25/25  1347   WBC 10*3/mm3 9.63   HEMOGLOBIN g/dL 8.1*   HEMATOCRIT % 26.3*   PLATELETS 10*3/mm3 309     Results from last 7 days   Lab Units 04/25/25  1630   SODIUM mmol/L 136   POTASSIUM mmol/L 4.9   CHLORIDE mmol/L 101   CO2 mmol/L 23.0   BUN mg/dL 75*   CREATININE mg/dL 3.51*   CALCIUM mg/dL 8.3*   BILIRUBIN mg/dL 0.2   ALK PHOS U/L 41   ALT  (SGPT) U/L 25   AST (SGOT) U/L 37*   GLUCOSE mg/dL 149*     Results from last 7 days   Lab Units 04/23/25  0353   PH, ARTERIAL pH units 7.217*   PO2 ART mm Hg 128.7*   PCO2, ARTERIAL mm Hg 68.1*   HCO3 ART mmol/L 27.7     Results from last 7 days   Lab Units 04/25/25  1630 04/23/25  0500 04/22/25  1649   ALBUMIN g/dL 3.3* 3.2* 3.9     Results from last 7 days   Lab Units 04/25/25  1630   CK TOTAL U/L 52         Results from last 7 days   Lab Units 04/23/25  0500   MAGNESIUM mg/dL 2.4         Results from last 7 days   Lab Units 04/22/25  1649   TSH uIU/mL 3.190           Meds:   SCHEDULE  aspirin, 81 mg, Oral, Daily  budesonide, 0.5 mg, Nebulization, BID - RT  calcitriol, 0.5 mcg, Oral, Daily  dilTIAZem CD, 300 mg, Oral, Daily  DULoxetine, 60 mg, Oral, BID  famotidine, 20 mg, Oral, Daily  [Held by provider] furosemide, 40 mg, Oral, Daily  gabapentin, 100 mg, Oral, Q12H  heparin (porcine), 5,000 Units, Subcutaneous, Q12H  hydrALAZINE, 25 mg, Oral, BID  insulin lispro, 2-7 Units, Subcutaneous, 4x Daily AC & at Bedtime  ipratropium-albuterol, 3 mL, Nebulization, 4x Daily - RT  primidone, 50 mg, Oral, Nightly  rosuvastatin, 10 mg, Oral, Daily      Infusions     PRNs    acetaminophen **OR** acetaminophen **OR** acetaminophen    senna-docusate sodium **AND** polyethylene glycol **AND** bisacodyl **AND** bisacodyl    Calcium Replacement - Follow Nurse / BPA Driven Protocol    dextrose    dextrose    glucagon (human recombinant)    hydrOXYzine    ipratropium-albuterol    Magnesium Low Dose Replacement - Follow Nurse / BPA Driven Protocol    Phosphorus Replacement - Follow Nurse / BPA Driven Protocol    Potassium Replacement - Follow Nurse / BPA Driven Protocol    sodium chloride    Physical Exam:  Physical Exam  Cardiovascular:      Heart sounds: Murmur heard.      No gallop.   Pulmonary:      Effort: No respiratory distress.      Breath sounds: No stridor. Rhonchi and rales present. No wheezing.   Chest:      Chest wall:  No tenderness.         ROS  Review of Systems   Respiratory:  Positive for cough and shortness of breath. Negative for wheezing and stridor.    Cardiovascular:  Positive for palpitations and leg swelling. Negative for chest pain.             Total critical care time spent with patient greater than: 45 Minutes

## 2025-04-28 NOTE — SIGNIFICANT NOTE
04/28/25 1320   PASRR   PASRR Status Approved/Complete  (Level 1 Approved)     CM made aware via secure chat.

## 2025-04-28 NOTE — THERAPY TREATMENT NOTE
"Subjective: Pt agreeable to therapeutic plan of care.  Cognition: arousal/alertness: Alert and Attentive     Objective:      Precautions - LE Buckling      Bed Mobility: Mod-A   Functional Transfers: Max-A and Assist x 2     Balance: sitting EOB Min A  Functional Ambulation: N/A or Not attempted.     Vitals: WNL     Pain: 0 Javed-Baker Location: NA  Interventions for pain: N/A  Education: Provided education on the importance of mobility in the acute care setting     Assessment: Lynne Cade presents with ADL impairments affecting function including balance, coordination, endurance / activity tolerance, postural / trunk control, range of motion (ROM), and strength.  This date pt demos increased weakness this date and required stand pivot sit transfer x2 due to weakness and buckling of BLE.  Demonstrated functioning below baseline abilities indicate the need for continued skilled intervention while inpatient. Tolerating session today without incident. Will continue to follow and progress as tolerated.      Plan/Recommendations:   Moderate Intensity Therapy recommended post-acute care. This is recommended as therapy feels the patient would require 3-4 days per week and wouldn't tolerate \"3 hour daily\" rehab intensity. SNF would be the preferred choice. If the patient does not agree to SNF, arrange HH or OP depending on home bound status. If patient is medically complex, consider LTACH.. Pt requires no DME at discharge.      Pt desires Skilled Rehab placement at discharge. Pt cooperative; agreeable to therapeutic recommendations and plan of care.      Modified [place]: N/A = No pre-op stroke/TIA     Post-Tx Position: Up in Chair, Alarms activated, and Call light and personal items within reach  PPE: gloves    Therapy Charges for Today       Code Description Service Date Service Provider Modifiers Qty    40850269960  OT THERAPEUTIC ACT EA 15 MIN 4/28/2025 Mariama Reyna, OT GO 1    [id number]  OT " THERAPEUTIC ACT EA 15 MIN 4/28/2025 Mariama Reyna, OT GO 1           Time Calculation- OT       Row Name 04/28/25 1544 04/28/25 1505          Time Calculation- OT    OT Start Time 1355  -SR 1145  -SR     OT Stop Time 1405  -SR 1155  -SR     OT Time Calculation (min) 10 min  -SR 10 min  -SR     Total Timed Code Minutes- OT 10 minute(s)  -SR 10 minute(s)  -SR     OT Received On 04/28/25  -SR 04/28/25  -SR     OT - Next Appointment 04/29/25  -SR 04/29/25  -SR               User Key  (r) = Recorded By, (t) = Taken By, (c) = Cosigned By      Initials Name Provider Type    SR Mariama Reyna, OT Occupational Therapist

## 2025-04-28 NOTE — PROGRESS NOTES
[facility] MEDICINE SERVICE  DAILY PROGRESS NOTE    NAME: Lynne Cade  : 1950  MRN: [id number]   Sex: female  Age: 74 y.o.    PROVIDER OF SERVICE: Jay Romero MD, MD    Chief Complaint: Lethargy    Subjective:     Interval History:  Lynne Cade was seen and examined at bedside following hospital admission due to lethargy, sleepiness.  This is a very pleasant 74-year-old  female, who is being followed along by nephrology, and pulmonology.  The patient was found to have acute on chronic hypercapnic and hypoxic respiratory insufficiency.  She has been evaluated, and also has been found to have features of obstructive sleep apnea.  Currently, she is on 2 L/min of O2 via nasal cannula, and tolerating BiPAP during her sleep.  She has been recommended a sleep study as an outpatient.  She is being followed along by the care management team, and remains on IV diuretics, which are currently on hold by the nephrology team.    Objective:     Vital Signs  Temp:  [97.2 °F (36.2 °C)-97.7 °F (36.5 °C)] 97.7 °F (36.5 °C)  Heart Rate:  [64-90] 87  Resp:  [13-23] 16  BP: (125-142)/(48-57) 125/55  Flow (L/min) (Oxygen Therapy):  [2] 2   Body mass index is 36.35 kg/m².    Intake/Output    Intake/Output Summary (Last 24 hours) at 2025 1124  Last data filed at 2025 0800  Gross per 24 hour   Intake 600 ml   Output 1100 ml   Net -500 ml        Physical Exam  General: Alert, cooperative, no distress, AAOx3  Lungs: Clear to auscultation bilaterally, respirations unlabored  Heart: Regular rate and rhythm, S1 and S2 normal, no murmurs, no rub or gallop.  2+ edema.  Abdomen: Soft, nontender, bowel sounds active, no masses, no organomegaly  Psychiatric: Appropriate mood and affect.     Diagnostic Data    Results from last 7 days   Lab Units 25  1048 25  0946 25  1630   WBC 10*3/mm3  --  10.02  --    HEMOGLOBIN g/dL 7.1* 6.9*  --    HEMATOCRIT % 24.0* 23.5*  --    PLATELETS 10*3/mm3   --  210  --    GLUCOSE mg/dL  --  208* 149*   CREATININE mg/dL  --  3.48* 3.51*   BUN mg/dL  --  86* 75*   SODIUM mmol/L  --  139 136   POTASSIUM mmol/L  --  4.7 4.9   AST (SGOT) U/L  --   --  37*   ALT (SGPT) U/L  --   --  25   ALK PHOS U/L  --   --  41   BILIRUBIN mg/dL  --   --  0.2   ANION GAP mmol/L  --  11.6 12.0       No radiology results for the last day    I reviewed the patient's new clinical results.    Assessment/Plan:       Lethargy      Plan:  1.  Creatinine elevated to 3.48 today.  The patient's baseline is around 2.6-2.8.  Appreciate assistance from the nephrology team.  2.  The patient was found to have a hemoglobin decreased to 6.9.  Recheck hemoglobin was 7.1.  Closely monitor hemoglobin.  3.  Patient has been recommended NIPPV as an outpatient.  She will need an outpatient sleep study.  4.  Further recommendations to follow pending hospital course.    VTE Prophylaxis:  Pharmacologic VTE prophylaxis orders are present.     Disposition Planning:     Barriers to Discharge: CHRIS  Anticipated Date of Discharge: TBD  Place of Discharge: TBD    Signature: Electronically signed by Jay Romero MD, MD, 04/28/25, 11:24 EDT.  [facility] Hospitalist Team

## 2025-04-28 NOTE — CASE MANAGEMENT/SOCIAL WORK
Continued Stay Note   Thanh     Patient Name: Lynne Cade  MRN: [id number]  Today's Date: 4/28/2025    Admit Date: 4/22/2025    Plan: Home with spouse and VNA  (accepted) Home O2-2L [facility].   Discharge Plan       Row Name 04/28/25 0812       Plan    Plan Comments DC Barriers: 40L O2-bipap, Cr 3.51, Hgb 8.1, IV diuresis (currently on hold per neph), neph monitoring renal fx and electrolytes, pulm following             Vanesa Mercer RN     [facility]  Office: [phone]  Cell: [phone]  Fax # [phone]

## 2025-04-28 NOTE — PLAN OF CARE
"Goal Outcome Evaluation:              Assessment: Lynne Cade presents with ADL impairments affecting function including balance, coordination, endurance / activity tolerance, postural / trunk control, range of motion (ROM), and strength.  This date pt demos increased weakness this date and required stand pivot sit transfer x2 due to buckling of BLE.  Demonstrated functioning below baseline abilities indicate the need for continued skilled intervention while inpatient. Tolerating session today without incident. Will continue to follow and progress as tolerated.     Plan/Recommendations:   Moderate Intensity Therapy recommended post-acute care. This is recommended as therapy feels the patient would require 3-4 days per week and wouldn't tolerate \"3 hour daily\" rehab intensity. SNF would be the preferred choice. If the patient does not agree to SNF, arrange HH or OP depending on home bound status. If patient is medically complex, consider LTACH.. Pt requires no DME at discharge.                         "

## 2025-04-29 ENCOUNTER — APPOINTMENT (OUTPATIENT)
Dept: NEPHROLOGY | Facility: HOSPITAL | Age: 75
DRG: 682 | End: 2025-04-29
Payer: MEDICARE

## 2025-04-29 LAB
ALBUMIN SERPL-MCNC: 3.4 G/DL (ref 3.5–5.2)
ANION GAP SERPL CALCULATED.3IONS-SCNC: 14.6 MMOL/L (ref 5–15)
BASOPHILS # BLD AUTO: 0.05 10*3/MM3 (ref 0–0.2)
BASOPHILS NFR BLD AUTO: 0.4 % (ref 0–1.5)
BUN SERPL-MCNC: 89 MG/DL (ref 8–23)
BUN/CREAT SERPL: 27.4 (ref 7–25)
CALCIUM SPEC-SCNC: 8.5 MG/DL (ref 8.6–10.5)
CHLORIDE SERPL-SCNC: 102 MMOL/L (ref 98–107)
CO2 SERPL-SCNC: 23.4 MMOL/L (ref 22–29)
CREAT SERPL-MCNC: 3.25 MG/DL (ref 0.57–1)
DEPRECATED RDW RBC AUTO: 58.4 FL (ref 37–54)
EGFRCR SERPLBLD CKD-EPI 2021: 14.2 ML/MIN/1.73
EOSINOPHIL # BLD AUTO: 0.32 10*3/MM3 (ref 0–0.4)
EOSINOPHIL NFR BLD AUTO: 2.8 % (ref 0.3–6.2)
ERYTHROCYTE [DISTWIDTH] IN BLOOD BY AUTOMATED COUNT: 15.2 % (ref 12.3–15.4)
GLUCOSE BLDC GLUCOMTR-MCNC: 116 MG/DL (ref 70–105)
GLUCOSE BLDC GLUCOMTR-MCNC: 159 MG/DL (ref 70–105)
GLUCOSE BLDC GLUCOMTR-MCNC: 267 MG/DL (ref 70–105)
GLUCOSE SERPL-MCNC: 111 MG/DL (ref 65–99)
HCT VFR BLD AUTO: 24.4 % (ref 34–46.6)
HGB BLD-MCNC: 7.1 G/DL (ref 12–15.9)
IMM GRANULOCYTES # BLD AUTO: 0.16 10*3/MM3 (ref 0–0.05)
IMM GRANULOCYTES NFR BLD AUTO: 1.4 % (ref 0–0.5)
LYMPHOCYTES # BLD AUTO: 1.37 10*3/MM3 (ref 0.7–3.1)
LYMPHOCYTES NFR BLD AUTO: 11.8 % (ref 19.6–45.3)
MCH RBC QN AUTO: 31.1 PG (ref 26.6–33)
MCHC RBC AUTO-ENTMCNC: 29.1 G/DL (ref 31.5–35.7)
MCV RBC AUTO: 107 FL (ref 79–97)
MONOCYTES # BLD AUTO: 1.39 10*3/MM3 (ref 0.1–0.9)
MONOCYTES NFR BLD AUTO: 12 % (ref 5–12)
NEUTROPHILS NFR BLD AUTO: 71.6 % (ref 42.7–76)
NEUTROPHILS NFR BLD AUTO: 8.33 10*3/MM3 (ref 1.7–7)
NRBC BLD AUTO-RTO: 0 /100 WBC (ref 0–0.2)
PHOSPHATE SERPL-MCNC: 7.6 MG/DL (ref 2.5–4.5)
PLATELET # BLD AUTO: 222 10*3/MM3 (ref 140–450)
PMV BLD AUTO: 10.8 FL (ref 6–12)
POTASSIUM SERPL-SCNC: 4.6 MMOL/L (ref 3.5–5.2)
RBC # BLD AUTO: 2.28 10*6/MM3 (ref 3.77–5.28)
SODIUM SERPL-SCNC: 140 MMOL/L (ref 136–145)
WBC NRBC COR # BLD AUTO: 11.62 10*3/MM3 (ref 3.4–10.8)

## 2025-04-29 PROCEDURE — 63710000001 INSULIN LISPRO (HUMAN) PER 5 UNITS: Performed by: STUDENT IN AN ORGANIZED HEALTH CARE EDUCATION/TRAINING PROGRAM

## 2025-04-29 PROCEDURE — 82948 REAGENT STRIP/BLOOD GLUCOSE: CPT

## 2025-04-29 PROCEDURE — 80069 RENAL FUNCTION PANEL: CPT | Performed by: INTERNAL MEDICINE

## 2025-04-29 PROCEDURE — 94761 N-INVAS EAR/PLS OXIMETRY MLT: CPT

## 2025-04-29 PROCEDURE — 5A1D70Z PERFORMANCE OF URINARY FILTRATION, INTERMITTENT, LESS THAN 6 HOURS PER DAY: ICD-10-PCS | Performed by: INTERNAL MEDICINE

## 2025-04-29 PROCEDURE — 94799 UNLISTED PULMONARY SVC/PX: CPT

## 2025-04-29 PROCEDURE — 85025 COMPLETE CBC W/AUTO DIFF WBC: CPT | Performed by: INTERNAL MEDICINE

## 2025-04-29 PROCEDURE — 94660 CPAP INITIATION&MGMT: CPT

## 2025-04-29 PROCEDURE — 82948 REAGENT STRIP/BLOOD GLUCOSE: CPT | Performed by: STUDENT IN AN ORGANIZED HEALTH CARE EDUCATION/TRAINING PROGRAM

## 2025-04-29 PROCEDURE — 25010000002 HEPARIN (PORCINE) PER 1000 UNITS: Performed by: INTERNAL MEDICINE

## 2025-04-29 RX ORDER — ALBUMIN (HUMAN) 12.5 G/50ML
12.5 SOLUTION INTRAVENOUS AS NEEDED
Status: ACTIVE | OUTPATIENT
Start: 2025-04-29 | End: 2025-04-30

## 2025-04-29 RX ORDER — IPRATROPIUM BROMIDE AND ALBUTEROL SULFATE 2.5; .5 MG/3ML; MG/3ML
3 SOLUTION RESPIRATORY (INHALATION) 2 TIMES DAILY
Status: DISCONTINUED | OUTPATIENT
Start: 2025-04-29 | End: 2025-05-04 | Stop reason: HOSPADM

## 2025-04-29 RX ORDER — ROPINIROLE 0.25 MG/1
0.5 TABLET, FILM COATED ORAL 3 TIMES DAILY
Status: DISCONTINUED | OUTPATIENT
Start: 2025-04-29 | End: 2025-05-04 | Stop reason: HOSPADM

## 2025-04-29 RX ORDER — LIDOCAINE AND PRILOCAINE 25; 25 MG/G; MG/G
CREAM TOPICAL ONCE
Status: DISCONTINUED | OUTPATIENT
Start: 2025-04-29 | End: 2025-05-04 | Stop reason: HOSPADM

## 2025-04-29 RX ADMIN — SENNOSIDES AND DOCUSATE SODIUM 2 TABLET: 50; 8.6 TABLET ORAL at 11:06

## 2025-04-29 RX ADMIN — GABAPENTIN 100 MG: 100 CAPSULE ORAL at 21:10

## 2025-04-29 RX ADMIN — PRIMIDONE 50 MG: 50 TABLET ORAL at 21:10

## 2025-04-29 RX ADMIN — BUDESONIDE 0.5 MG: 0.5 SUSPENSION RESPIRATORY (INHALATION) at 07:20

## 2025-04-29 RX ADMIN — POLYETHYLENE GLYCOL 3350 17 G: 17 POWDER, FOR SOLUTION ORAL at 11:06

## 2025-04-29 RX ADMIN — FAMOTIDINE 20 MG: 20 TABLET, FILM COATED ORAL at 08:41

## 2025-04-29 RX ADMIN — ASPIRIN 81 MG: 81 TABLET, COATED ORAL at 08:41

## 2025-04-29 RX ADMIN — HYDRALAZINE HYDROCHLORIDE 25 MG: 25 TABLET ORAL at 21:10

## 2025-04-29 RX ADMIN — IPRATROPIUM BROMIDE AND ALBUTEROL SULFATE 3 ML: .5; 3 SOLUTION RESPIRATORY (INHALATION) at 07:16

## 2025-04-29 RX ADMIN — BUDESONIDE 0.5 MG: 0.5 SUSPENSION RESPIRATORY (INHALATION) at 19:53

## 2025-04-29 RX ADMIN — DULOXETINE 60 MG: 30 CAPSULE, DELAYED RELEASE ORAL at 08:35

## 2025-04-29 RX ADMIN — GABAPENTIN 100 MG: 100 CAPSULE ORAL at 08:41

## 2025-04-29 RX ADMIN — ROSUVASTATIN CALCIUM 10 MG: 10 TABLET, FILM COATED ORAL at 08:40

## 2025-04-29 RX ADMIN — DULOXETINE 60 MG: 30 CAPSULE, DELAYED RELEASE ORAL at 21:10

## 2025-04-29 RX ADMIN — HEPARIN SODIUM 5000 UNITS: 5000 INJECTION INTRAVENOUS; SUBCUTANEOUS at 21:11

## 2025-04-29 RX ADMIN — HEPARIN SODIUM 5000 UNITS: 5000 INJECTION INTRAVENOUS; SUBCUTANEOUS at 08:41

## 2025-04-29 RX ADMIN — ROPINIROLE HYDROCHLORIDE 0.5 MG: 0.25 TABLET, FILM COATED ORAL at 21:22

## 2025-04-29 RX ADMIN — ROPINIROLE HYDROCHLORIDE 0.5 MG: 0.25 TABLET, FILM COATED ORAL at 12:50

## 2025-04-29 RX ADMIN — IPRATROPIUM BROMIDE AND ALBUTEROL SULFATE 3 ML: .5; 3 SOLUTION RESPIRATORY (INHALATION) at 19:48

## 2025-04-29 RX ADMIN — ACETAMINOPHEN 650 MG: 325 TABLET, FILM COATED ORAL at 21:26

## 2025-04-29 RX ADMIN — CALCITRIOL CAPSULES 0.25 MCG 0.5 MCG: 0.25 CAPSULE ORAL at 08:35

## 2025-04-29 RX ADMIN — INSULIN LISPRO 4 UNITS: 100 INJECTION, SOLUTION INTRAVENOUS; SUBCUTANEOUS at 21:14

## 2025-04-29 RX ADMIN — INSULIN LISPRO 2 UNITS: 100 INJECTION, SOLUTION INTRAVENOUS; SUBCUTANEOUS at 12:50

## 2025-04-29 NOTE — PROGRESS NOTES
[facility] MEDICINE SERVICE  DAILY PROGRESS NOTE    NAME: Lynne Cade  : 1950  MRN: [id number]   Sex: female  Age: 74 y.o.    PROVIDER OF SERVICE: Jay Romero MD, MD    Chief Complaint: Lethargy    Subjective:     Interval History:  Lynne Cade was seen and examined at bedside following hospital admission due to lethargy, sleepiness.  This is a very pleasant 74-year-old  female, who is being followed along by nephrology, and pulmonology.  The patient was found to have acute on chronic hypercapnic and hypoxic respiratory insufficiency.  She has been evaluated, and also has been found to have features of obstructive sleep apnea.  Currently, she is on 2 L/min of O2 via nasal cannula, and tolerating BiPAP during her sleep.  She has been recommended a sleep study as an outpatient.  She is being followed along by the care management team, and remains on IV diuretics, which are currently on hold by the nephrology team.  Complaining of restless legs.    Objective:     Vital Signs  Temp:  [97.2 °F (36.2 °C)-98.9 °F (37.2 °C)] 98.2 °F (36.8 °C)  Heart Rate:  [79-92] 85  Resp:  [16-21] 20  BP: (122-145)/(42-65) 122/52  Flow (L/min) (Oxygen Therapy):  [2] 2   Body mass index is 36.35 kg/m².    Intake/Output    Intake/Output Summary (Last 24 hours) at 2025 1059  Last data filed at 2025 0900  Gross per 24 hour   Intake 480 ml   Output 1450 ml   Net -970 ml        Physical Exam  General: Alert, cooperative, no distress, AAOx3  Lungs: Clear to auscultation bilaterally, respirations unlabored  Heart: Regular rate and rhythm, S1 and S2 normal, no murmurs, no rub or gallop.  2+ edema.  Abdomen: Soft, nontender, bowel sounds active, no masses, no organomegaly  Psychiatric: Appropriate mood and affect.     Diagnostic Data    Results from last 7 days   Lab Units 25  0400 25  0946 25  1630   WBC 10*3/mm3 11.62*   < >  --    HEMOGLOBIN g/dL 7.1*   < >  --    HEMATOCRIT %  24.4*   < >  --    PLATELETS 10*3/mm3 222   < >  --    GLUCOSE mg/dL 111*   < > 149*   CREATININE mg/dL 3.25*   < > 3.51*   BUN mg/dL 89*   < > 75*   SODIUM mmol/L 140   < > 136   POTASSIUM mmol/L 4.6   < > 4.9   AST (SGOT) U/L  --   --  37*   ALT (SGPT) U/L  --   --  25   ALK PHOS U/L  --   --  41   BILIRUBIN mg/dL  --   --  0.2   ANION GAP mmol/L 14.6   < > 12.0    < > = values in this interval not displayed.       No radiology results for the last day    I reviewed the patient's new clinical results.    Assessment/Plan:       Lethargy      Plan:  1.  Creatinine downtrending to 3.25 today.  The patient's baseline is around 2.6-2.8.  Appreciate assistance from the nephrology team.  2.  The patient was found to have a hemoglobin decreased yesterday morning to 6.9.  Recheck hemoglobin was 7.1.  Closely monitor hemoglobin.  May need transfusion prior to discharge.  3.  Patient has been recommended NIPPV as an outpatient.  She will need an outpatient sleep study.  4.  Her  tells me that she has fairly significant lower extremity spasms.  I do not know whether this is related to her kidney injury, versus history of restless leg syndrome.  Will try to treat with Requip.  5.  Further recommendations to follow pending hospital course.    VTE Prophylaxis:  Pharmacologic VTE prophylaxis orders are present.     Disposition Planning:     Barriers to Discharge: CHRIS  Anticipated Date of Discharge: TBD  Place of Discharge: SNF    Signature: Electronically signed by Jay Romero MD, MD, 04/29/25, 10:59 EDT.  [facility] Hospitalist Team

## 2025-04-29 NOTE — SIGNIFICANT NOTE
04/29/25 1636   OTHER   Discipline occupational therapist   Rehab Time/Intention   Session Not Performed other (see comments)  (dialysis)

## 2025-04-29 NOTE — CASE MANAGEMENT/SOCIAL WORK
Continued Stay Note  [facility]     Patient Name: Lynne Cade  MRN: [id number]  Today's Date: 4/29/2025    Admit Date: 4/22/2025    Plan: SNF Choices pending. PASRR approved. Precert required. Home O2 2L [facility]   Discharge Plan       Row Name 04/29/25 1312       Plan    Provided Post Acute Provider List? Yes    Post Acute Provider List Nursing Home    Provided Post Acute Provider Quality & Resource List? Yes    Post Acute Provider Quality and Resource List Nursing Home    Delivered To Patient    Method of Delivery In person      Row Name 04/29/25 1308       Plan    Plan Comments CM updated by Justine at [facility] that they currently have no bed availability. CM sent 3rd choice via inbasket and notified [facility] liaison Sylvie.      Row Name 04/29/25 1305       Plan    Plan SNF Choices pending. PASRR approved. Precert required. Home O2 2L [facility]    Plan Comments CM met with patient at bedside to discuss SNF choices. SNF list provided and patient chose 1st choice: [facility], 2) [facility], 3) [facility]. CM sent inbasket referrals to [place] and [facility] and notified liaisons [facility]j [employer] [facility].             Vanesa Mercer RN     [facility]  Office: [phone]  Cell: [phone]  Fax # [phone]

## 2025-04-29 NOTE — PROGRESS NOTES
"NEPHROLOGY PROGRESS NOTE------[facility]/OPT    [facility]  [phone]  Vipul Eilas MD      Patient Care Team:  Lorraine Agarwal APRN as PCP - General (Nurse Practitioner)  Rick Melendez MD as Consulting Physician (Nephrology)  Mayo Fregoso MD as Consulting Physician (Urology)  Fredi Pierce MD as Consulting Physician (Cardiology)  Cony Sanchez APRN as Nurse Practitioner (Vascular Surgery)      Provider:  Vipul Elias MD  Patient Name: Lynne Cade  :  1950    SUBJECTIVE:  Follow-up CHRIS/CKD   no chest pain or shortness of breath    Medication:  aspirin, 81 mg, Oral, Daily  budesonide, 0.5 mg, Nebulization, BID - RT  calcitriol, 0.5 mcg, Oral, Daily  dilTIAZem CD, 300 mg, Oral, Daily  DULoxetine, 60 mg, Oral, BID  famotidine, 20 mg, Oral, Daily  [Held by provider] furosemide, 40 mg, Oral, Daily  gabapentin, 100 mg, Oral, Q12H  heparin (porcine), 5,000 Units, Subcutaneous, Q12H  hydrALAZINE, 25 mg, Oral, BID  insulin lispro, 2-7 Units, Subcutaneous, 4x Daily AC & at Bedtime  ipratropium-albuterol, 3 mL, Nebulization, 4x Daily - RT  primidone, 50 mg, Oral, Nightly  rosuvastatin, 10 mg, Oral, Daily           OBJECTIVE    Vital Sign Min/Max for last 24 hours  Temp  Min: 97.2 °F (36.2 °C)  Max: 98.9 °F (37.2 °C)   BP  Min: 122/52  Max: 145/63   Pulse  Min: 79  Max: 92   Resp  Min: 16  Max: 21   SpO2  Min: 90 %  Max: 100 %   No data recorded   No data recorded     Flowsheet Rows      Flowsheet Row First Filed Value   Admission Height 167.6 cm (66\") Documented at 2025 1448   Admission Weight 96.2 kg (212 lb 1.3 oz) Documented at 2025 1448            No intake/output data recorded.  I/O last 3 completed shifts:  In: 1080 [P.O.:1080]  Out: 1550 [Urine:1550]    Physical Exam:  General Appearance: alert, appears stated age and cooperative  Head: normocephalic, without obvious abnormality and " "atraumatic  Eyes: conjunctivae and sclerae normal and no icterus  Neck: supple and no JVD  Lungs:    Heart: regular rhythm & normal rate and normal S1, S2  Chest: Wall no abnormalities observed  Abdomen: normal bowel sounds and soft, nontender  Extremities: moves extremities well, no edema, no cyanosis and no redness  Skin: no bleeding, bruising or rash, turgor normal, color normal and no lesions noted  Neurologic: Alert, and oriented. No focal deficits    Labs:    WBC WBC   Date Value Ref Range Status   04/29/2025 11.62 (H) 3.40 - 10.80 10*3/mm3 Final   04/28/2025 10.02 3.40 - 10.80 10*3/mm3 Final      HGB Hemoglobin   Date Value Ref Range Status   04/29/2025 7.1 (L) 12.0 - 15.9 g/dL Final   04/28/2025 7.1 (L) 12.0 - 15.9 g/dL Final   04/28/2025 6.9 (C) 12.0 - 15.9 g/dL Final      HCT Hematocrit   Date Value Ref Range Status   04/29/2025 24.4 (L) 34.0 - 46.6 % Final   04/28/2025 24.0 (L) 34.0 - 46.6 % Final   04/28/2025 23.5 (L) 34.0 - 46.6 % Final      Platelets No results found for: \"LABPLAT\"   MCV MCV   Date Value Ref Range Status   04/29/2025 107.0 (H) 79.0 - 97.0 fL Final   04/28/2025 106.8 (H) 79.0 - 97.0 fL Final          Sodium Sodium   Date Value Ref Range Status   04/29/2025 140 136 - 145 mmol/L Final   04/28/2025 139 136 - 145 mmol/L Final      Potassium Potassium   Date Value Ref Range Status   04/29/2025 4.6 3.5 - 5.2 mmol/L Final   04/28/2025 4.7 3.5 - 5.2 mmol/L Final      Chloride Chloride   Date Value Ref Range Status   04/29/2025 102 98 - 107 mmol/L Final   04/28/2025 102 98 - 107 mmol/L Final      CO2 CO2   Date Value Ref Range Status   04/29/2025 23.4 22.0 - 29.0 mmol/L Final   04/28/2025 25.4 22.0 - 29.0 mmol/L Final      BUN BUN   Date Value Ref Range Status   04/29/2025 89 (H) 8 - 23 mg/dL Final   04/28/2025 86 (H) 8 - 23 mg/dL Final      Creatinine Creatinine   Date Value Ref Range Status   04/29/2025 3.25 (H) 0.57 - 1.00 mg/dL Final   04/28/2025 3.48 (H) 0.57 - 1.00 mg/dL Final      Calcium " "Calcium   Date Value Ref Range Status   04/29/2025 8.5 (L) 8.6 - 10.5 mg/dL Final   04/28/2025 8.4 (L) 8.6 - 10.5 mg/dL Final      PO4 No components found for: \"PO4\"   Albumin Albumin   Date Value Ref Range Status   04/29/2025 3.4 (L) 3.5 - 5.2 g/dL Final   04/28/2025 3.3 (L) 3.5 - 5.2 g/dL Final      Magnesium No results found for: \"MG\"   Uric Acid No components found for: \"URIC ACID\"     Imaging Results (Last 72 Hours)       ** No results found for the last 72 hours. **            Results for orders placed during the hospital encounter of 04/22/25    XR Chest 1 View    Narrative  XR CHEST 1 VW    Date of Exam: 4/22/2025 3:40 PM EDT    Indication: AMS Protocol  AMS Protocol    Comparison: 2/16/2025    Findings:  Heart size is within normal limits for the projection. The right hemidiaphragm is elevated, unchanged. The pulmonary vascular pattern appears normal and the lungs appear clear.    Impression  Impression:  No active disease.        Electronically Signed: Yfn Luna MD  4/22/2025 3:53 PM EDT  Workstation ID: [id number]      Results for orders placed during the hospital encounter of 02/16/25    XR Chest 2 View    Narrative  XR CHEST 2 VW    Date of Exam: 2/16/2025 4:12 PM EST    Indication: Short of breath    Comparison: 2/5/2025    Findings:  Cardiomediastinal silhouette is unremarkable. Similar elevation of the right hemidiaphragm. No airspace disease, pneumothorax, nor pleural effusion. No acute osseous abnormality identified.    Impression  Impression:  No acute process identified      Electronically Signed: Antonio Ceballos MD  2/16/2025 4:13 PM EST  Workstation ID: [id number]      Results for orders placed in visit on 01/09/24    SCANNED - IMAGING      Results for orders placed during the hospital encounter of 04/22/25    Duplex Carotid Ultrasound CAR    Interpretation Summary    Right internal carotid artery demonstrates a less than 50% stenosis.    Antegrade right vertebral flow.    Left internal carotid " artery demonstrates a less than 50% stenosis.    Antegrade left vertebral flow.        ASSESSMENT / PLAN      Lethargy    CHRIS superimposed on CKD stage IV-CHRIS likely prerenal from relative hypotension and or overdiuresis.  Hold diuretics for now.  Avoid hypotension.    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  AV graft.  Hypertension  T2DM  Chronic hypercapnic/hypoxic respiratory failure  History coronary artery disease  Obstructive sleep apnea     Creatinine mostly stable  Worsening azotemia   Discussed need to initiate HD  D/w Dr William, her graft is ready to be used  Plan HD today  Arrange for outpt dialysis  Monitor renal function, fluid status electrolytes        Vipul Elias MD  [facility]  [phone]  04/29/25  09:40 EDT

## 2025-04-29 NOTE — SIGNIFICANT NOTE
HD TX init. per long mao. X2 w/ 16ga needles. Both sites patent at init. Lines visible and secure. Pt. denies all pain/soa/nvd. VSS. Will monitor.

## 2025-04-29 NOTE — PROGRESS NOTES
Daily Progress Note        Lethargy    Assessment:     Acute/chronic hypercapnic/hypoxic respiratory insufficiency     Chronic elevated right hemidiaphragm, associated right lower lobe mild bronchiectasis    CT chest February 2025 no evidence of interstitial lung disease     Respiratory panel negative     non-smoker  No PFTs     Features of obstructive sleep apnea     CKD stage IV  Diabetes mellitus  Hyperlipidemia     Encephalopathy   TSH 3.1  Ammonia 16        Recommendations:        oxygen titration currently on 2 L nasal cannula     Tolerated BiPAP during sleep  Sleep study as an outpatient     Bronchodilators currently on Pulmicort and DuoNeb     Steroids discontinued since there is no evidence of interstitial lung disease on most recent CAT scan in February 2025     Minimize sedatives such as Cymbalta and Atarax and Flexeril     Diuresis 40 mg IV once     Currently off antibiotics                CT chest 2/15/2025          LOS: 6 days     Subjective         Objective     Vital signs for last 24 hours:  Vitals:    04/29/25 0720 04/29/25 0723 04/29/25 0756 04/29/25 0840   BP:   138/42 122/52   BP Location:       Patient Position:       Pulse: 80 82 90 85   Resp: 20 20 20    Temp:   98.2 °F (36.8 °C)    TempSrc:   Axillary    SpO2: 97% 98% 97%    Weight:       Height:           Intake/Output last 3 shifts:  I/O last 3 completed shifts:  In: 1080 [P.O.:1080]  Out: 1550 [Urine:1550]  Intake/Output this shift:  I/O this shift:  In: -   Out: 800 [Urine:800]      Radiology  Imaging Results (Last 24 Hours)       ** No results found for the last 24 hours. **            Labs:  Results from last 7 days   Lab Units 04/29/25  0400   WBC 10*3/mm3 11.62*   HEMOGLOBIN g/dL 7.1*   HEMATOCRIT % 24.4*   PLATELETS 10*3/mm3 222     Results from last 7 days   Lab Units 04/29/25  0400 04/28/25  0946 04/25/25  1630   SODIUM mmol/L 140   < > 136   POTASSIUM mmol/L 4.6   < > 4.9   CHLORIDE mmol/L 102   < > 101   CO2 mmol/L 23.4   < >  23.0   BUN mg/dL 89*   < > 75*   CREATININE mg/dL 3.25*   < > 3.51*   CALCIUM mg/dL 8.5*   < > 8.3*   BILIRUBIN mg/dL  --   --  0.2   ALK PHOS U/L  --   --  41   ALT (SGPT) U/L  --   --  25   AST (SGOT) U/L  --   --  37*   GLUCOSE mg/dL 111*   < > 149*    < > = values in this interval not displayed.     Results from last 7 days   Lab Units 04/23/25  0353   PH, ARTERIAL pH units 7.217*   PO2 ART mm Hg 128.7*   PCO2, ARTERIAL mm Hg 68.1*   HCO3 ART mmol/L 27.7     Results from last 7 days   Lab Units 04/29/25  0400 04/28/25  0946 04/25/25  1630   ALBUMIN g/dL 3.4* 3.3* 3.3*     Results from last 7 days   Lab Units 04/25/25  1630   CK TOTAL U/L 52         Results from last 7 days   Lab Units 04/23/25  0500   MAGNESIUM mg/dL 2.4         Results from last 7 days   Lab Units 04/22/25  1649   TSH uIU/mL 3.190           Meds:   SCHEDULE  aspirin, 81 mg, Oral, Daily  budesonide, 0.5 mg, Nebulization, BID - RT  calcitriol, 0.5 mcg, Oral, Daily  dilTIAZem CD, 300 mg, Oral, Daily  DULoxetine, 60 mg, Oral, BID  famotidine, 20 mg, Oral, Daily  [Held by provider] furosemide, 40 mg, Oral, Daily  gabapentin, 100 mg, Oral, Q12H  heparin (porcine), 5,000 Units, Subcutaneous, Q12H  hydrALAZINE, 25 mg, Oral, BID  insulin lispro, 2-7 Units, Subcutaneous, 4x Daily AC & at Bedtime  ipratropium-albuterol, 3 mL, Nebulization, BID  primidone, 50 mg, Oral, Nightly  rOPINIRole, 0.5 mg, Oral, TID  rosuvastatin, 10 mg, Oral, Daily      Infusions     PRNs    acetaminophen **OR** acetaminophen **OR** acetaminophen    senna-docusate sodium **AND** polyethylene glycol **AND** bisacodyl **AND** bisacodyl    Calcium Replacement - Follow Nurse / BPA Driven Protocol    dextrose    dextrose    glucagon (human recombinant)    hydrOXYzine    ipratropium-albuterol    Magnesium Low Dose Replacement - Follow Nurse / BPA Driven Protocol    Phosphorus Replacement - Follow Nurse / BPA Driven Protocol    Potassium Replacement - Follow Nurse / BPA Driven Protocol     sodium chloride    Physical Exam:  Physical Exam  Cardiovascular:      Heart sounds: Murmur heard.      No gallop.   Pulmonary:      Effort: No respiratory distress.      Breath sounds: No stridor. Rhonchi and rales present. No wheezing.   Chest:      Chest wall: No tenderness.         ROS  Review of Systems   Respiratory:  Positive for cough and shortness of breath. Negative for wheezing and stridor.    Cardiovascular:  Positive for palpitations and leg swelling. Negative for chest pain.             Total critical care time spent with patient greater than: 45 Minutes

## 2025-04-29 NOTE — PLAN OF CARE
Goal Outcome Evaluation:      C/O no BM x 1 week.  Gave Miralax and Pericolace.  Went for first HD at 13:30 today.  Unsure if they will be using her new fistula.  Per spouse, Dr. Beranrd told them it should be good to use.    Will be discharged to rehab and can have her sleep study done there so she will have her bipap when she returns home.      Problem: Noninvasive Ventilation Acute  Goal: Effective Unassisted Ventilation and Oxygenation  Outcome: Progressing  Intervention: Monitor and Manage Noninvasive Ventilation  Recent Flowsheet Documentation  Taken 4/29/2025 0800 by Cathryn Reece RN  Airway/Ventilation Management: airway patency maintained     Problem: Adult Inpatient Plan of Care  Goal: Plan of Care Review  Outcome: Progressing  Goal: Patient-Specific Goal (Individualized)  Outcome: Progressing  Goal: Absence of Hospital-Acquired Illness or Injury  Outcome: Progressing  Intervention: Identify and Manage Fall Risk  Recent Flowsheet Documentation  Taken 4/29/2025 1400 by Cathryn Reece RN  Safety Promotion/Fall Prevention: patient off unit  Taken 4/29/2025 1330 by Cathryn Reece RN  Safety Promotion/Fall Prevention: (HD) patient off unit  Taken 4/29/2025 1200 by Cathryn Reece RN  Safety Promotion/Fall Prevention:   assistive device/personal items within reach   activity supervised   clutter free environment maintained   fall prevention program maintained   nonskid shoes/slippers when out of bed   room organization consistent   safety round/check completed  Taken 4/29/2025 1000 by Cathryn Reece RN  Safety Promotion/Fall Prevention:   assistive device/personal items within reach   activity supervised   clutter free environment maintained   fall prevention program maintained   nonskid shoes/slippers when out of bed   room organization consistent   safety round/check completed  Taken 4/29/2025 0800 by Cathryn Reece RN  Safety Promotion/Fall Prevention:   assistive device/personal items within reach    activity supervised   clutter free environment maintained   fall prevention program maintained   nonskid shoes/slippers when out of bed   room organization consistent   safety round/check completed  Intervention: Prevent Skin Injury  Recent Flowsheet Documentation  Taken 4/29/2025 0800 by Cathryn Reece RN  Body Position: position changed independently  Intervention: Prevent Infection  Recent Flowsheet Documentation  Taken 4/29/2025 1200 by Cathryn Reece RN  Infection Prevention:   hand hygiene promoted   personal protective equipment utilized   rest/sleep promoted   single patient room provided   environmental surveillance performed  Taken 4/29/2025 1000 by Cathryn Reece RN  Infection Prevention:   hand hygiene promoted   personal protective equipment utilized   rest/sleep promoted   single patient room provided   environmental surveillance performed  Taken 4/29/2025 0800 by Cathryn Reece RN  Infection Prevention:   hand hygiene promoted   personal protective equipment utilized   rest/sleep promoted   single patient room provided   environmental surveillance performed  Goal: Optimal Comfort and Wellbeing  Outcome: Progressing  Intervention: Provide Person-Centered Care  Recent Flowsheet Documentation  Taken 4/29/2025 0800 by Cathryn Reece RN  Trust Relationship/Rapport: care explained  Goal: Readiness for Transition of Care  Outcome: Progressing     Problem: Comorbidity Management  Goal: Maintenance of Behavioral Health Symptom Control  Outcome: Progressing  Intervention: Maintain Behavioral Health Symptom Control  Recent Flowsheet Documentation  Taken 4/29/2025 1200 by Cathryn Reece RN  Medication Review/Management: medications reviewed  Taken 4/29/2025 1000 by Cathryn Reece RN  Medication Review/Management: medications reviewed  Taken 4/29/2025 0800 by Cathryn Recee RN  Medication Review/Management: medications reviewed  Goal: Maintenance of COPD Symptom Control  Outcome: Progressing  Intervention:  Maintain COPD (Chronic Obstructive Pulmonary Disease) Symptom Control  Recent Flowsheet Documentation  Taken 4/29/2025 1200 by Cathryn Reece RN  Medication Review/Management: medications reviewed  Taken 4/29/2025 1000 by Cathryn Reece RN  Medication Review/Management: medications reviewed  Taken 4/29/2025 0800 by Cathryn Reece RN  Medication Review/Management: medications reviewed  Goal: Blood Glucose Level Within Target Range  Outcome: Progressing  Intervention: Monitor and Manage Glycemia  Recent Flowsheet Documentation  Taken 4/29/2025 1200 by Cathryn Reece RN  Medication Review/Management: medications reviewed  Taken 4/29/2025 1000 by Cathryn Reece RN  Medication Review/Management: medications reviewed  Taken 4/29/2025 0800 by Cathryn Reece RN  Medication Review/Management: medications reviewed  Goal: Blood Pressure in Desired Range  Outcome: Progressing  Intervention: Maintain Blood Pressure Management  Recent Flowsheet Documentation  Taken 4/29/2025 1200 by Cathryn Reece RN  Medication Review/Management: medications reviewed  Taken 4/29/2025 1000 by Cathryn Reece RN  Medication Review/Management: medications reviewed  Taken 4/29/2025 0800 by Cathryn Reece RN  Medication Review/Management: medications reviewed     Problem: Skin Injury Risk Increased  Goal: Skin Health and Integrity  Outcome: Progressing  Intervention: Optimize Skin Protection  Recent Flowsheet Documentation  Taken 4/29/2025 0800 by Cathryn Reece RN  Activity Management: activity encouraged     Problem: Fall Injury Risk  Goal: Absence of Fall and Fall-Related Injury  Outcome: Progressing  Intervention: Identify and Manage Contributors  Recent Flowsheet Documentation  Taken 4/29/2025 1200 by Cathryn Reece RN  Medication Review/Management: medications reviewed  Taken 4/29/2025 1000 by Cathryn Reece RN  Medication Review/Management: medications reviewed  Taken 4/29/2025 0800 by Cathryn Reece RN  Medication  Review/Management: medications reviewed  Self-Care Promotion: independence encouraged  Intervention: Promote Injury-Free Environment  Recent Flowsheet Documentation  Taken 4/29/2025 1400 by Cathryn Reece RN  Safety Promotion/Fall Prevention: patient off unit  Taken 4/29/2025 1330 by Cathryn Reece, RN  Safety Promotion/Fall Prevention: (HD) patient off unit  Taken 4/29/2025 1200 by Cathryn Reece, RN  Safety Promotion/Fall Prevention:   assistive device/personal items within reach   activity supervised   clutter free environment maintained   fall prevention program maintained   nonskid shoes/slippers when out of bed   room organization consistent   safety round/check completed  Taken 4/29/2025 1000 by Cathryn Reece, RN  Safety Promotion/Fall Prevention:   assistive device/personal items within reach   activity supervised   clutter free environment maintained   fall prevention program maintained   nonskid shoes/slippers when out of bed   room organization consistent   safety round/check completed  Taken 4/29/2025 0800 by Cathryn Reece, RN  Safety Promotion/Fall Prevention:   assistive device/personal items within reach   activity supervised   clutter free environment maintained   fall prevention program maintained   nonskid shoes/slippers when out of bed   room organization consistent   safety round/check completed

## 2025-04-29 NOTE — DISCHARGE PLACEMENT REQUEST
"Coby Valentino S \"NAVIN\" (74 y.o. Female)       Date of Birth   1950    Social Security Number       Address   [address] [place] [address] [place] [facility]    Home Phone   [phone]    MRN   [id number]       [facility]    Marital Status                               Admission Date   4/22/2025    Admission Type   Emergency    Admitting Provider   Fadi Almonte MD    Attending Provider   Jay Romero MD    Department, Room/Bed   [facility], 2104/1       Discharge Date       Discharge Disposition       Discharge Destination                                 Attending Provider: Jay Romero MD    Allergies: Carvedilol, Morphine, Sulfa Antibiotics, Hydrocodone, Diphenhydramine    Isolation: None   Infection: None   Code Status: CPR    Ht: 167.6 cm (65.98\")   Wt: 102 kg (225 lb 1.4 oz)    Admission Cmt: None   Principal Problem: Lethargy [R53.83]                   Active Insurance as of 4/22/2025       Primary Coverage       Payor Plan Insurance Group Employer/Plan Group    ANTH MEDICARE REPLACEMENT ANTH MEDICARE ADVANTAGE HMO [id number]       Payor Plan Address Payor Plan Phone Number Payor Plan Fax Number Effective Dates    [address] [phone]  1/1/2025 - None Entered    [facility] [address]         Subscriber Name Subscriber Birth Date Member ID       COBY VALENTINO 1950 [id number]                     Emergency Contacts        (Rel.) Home Phone Work Phone Mobile Phone    AMINTA VALENTINO (Spouse) -- -- [phone]    JOSIE()YOAV (Other) -- -- [phone]            "

## 2025-04-29 NOTE — PLAN OF CARE
Goal Outcome Evaluation:      Patient alert and oriented x 3-4 with confusion at times. She was on 2L during the day and wears bipap at night. She will need a sleep study prior to d/c. Plan is to continue to monitor renal function and hemaglobin. Spouse currently at bedside.

## 2025-04-30 LAB
ALBUMIN SERPL-MCNC: 3.4 G/DL (ref 3.5–5.2)
ANION GAP SERPL CALCULATED.3IONS-SCNC: 8.8 MMOL/L (ref 5–15)
BASOPHILS # BLD AUTO: 0.03 10*3/MM3 (ref 0–0.2)
BASOPHILS NFR BLD AUTO: 0.3 % (ref 0–1.5)
BUN SERPL-MCNC: 48 MG/DL (ref 8–23)
BUN/CREAT SERPL: 21.2 (ref 7–25)
CALCIUM SPEC-SCNC: 8.6 MG/DL (ref 8.6–10.5)
CHLORIDE SERPL-SCNC: 102 MMOL/L (ref 98–107)
CO2 SERPL-SCNC: 27.2 MMOL/L (ref 22–29)
CREAT SERPL-MCNC: 2.26 MG/DL (ref 0.57–1)
DEPRECATED RDW RBC AUTO: 57.5 FL (ref 37–54)
EGFRCR SERPLBLD CKD-EPI 2021: 22 ML/MIN/1.73
EOSINOPHIL # BLD AUTO: 0.25 10*3/MM3 (ref 0–0.4)
EOSINOPHIL NFR BLD AUTO: 2.7 % (ref 0.3–6.2)
ERYTHROCYTE [DISTWIDTH] IN BLOOD BY AUTOMATED COUNT: 15.1 % (ref 12.3–15.4)
GLUCOSE BLDC GLUCOMTR-MCNC: 122 MG/DL (ref 70–105)
GLUCOSE BLDC GLUCOMTR-MCNC: 156 MG/DL (ref 70–105)
GLUCOSE BLDC GLUCOMTR-MCNC: 197 MG/DL (ref 70–105)
GLUCOSE BLDC GLUCOMTR-MCNC: 235 MG/DL (ref 70–105)
GLUCOSE SERPL-MCNC: 147 MG/DL (ref 65–99)
HBV SURFACE AB SER RIA-ACNC: NORMAL
HCT VFR BLD AUTO: 24.6 % (ref 34–46.6)
HGB BLD-MCNC: 7.3 G/DL (ref 12–15.9)
IMM GRANULOCYTES # BLD AUTO: 0.2 10*3/MM3 (ref 0–0.05)
IMM GRANULOCYTES NFR BLD AUTO: 2.1 % (ref 0–0.5)
LYMPHOCYTES # BLD AUTO: 1.39 10*3/MM3 (ref 0.7–3.1)
LYMPHOCYTES NFR BLD AUTO: 14.9 % (ref 19.6–45.3)
MCH RBC QN AUTO: 31.6 PG (ref 26.6–33)
MCHC RBC AUTO-ENTMCNC: 29.7 G/DL (ref 31.5–35.7)
MCV RBC AUTO: 106.5 FL (ref 79–97)
MONOCYTES # BLD AUTO: 1.3 10*3/MM3 (ref 0.1–0.9)
MONOCYTES NFR BLD AUTO: 14 % (ref 5–12)
NEUTROPHILS NFR BLD AUTO: 6.14 10*3/MM3 (ref 1.7–7)
NEUTROPHILS NFR BLD AUTO: 66 % (ref 42.7–76)
NRBC BLD AUTO-RTO: 0.2 /100 WBC (ref 0–0.2)
PHOSPHATE SERPL-MCNC: 4.8 MG/DL (ref 2.5–4.5)
PLATELET # BLD AUTO: 242 10*3/MM3 (ref 140–450)
PMV BLD AUTO: 10.6 FL (ref 6–12)
POTASSIUM SERPL-SCNC: 4.5 MMOL/L (ref 3.5–5.2)
RBC # BLD AUTO: 2.31 10*6/MM3 (ref 3.77–5.28)
SODIUM SERPL-SCNC: 138 MMOL/L (ref 136–145)
WBC NRBC COR # BLD AUTO: 9.31 10*3/MM3 (ref 3.4–10.8)

## 2025-04-30 PROCEDURE — 25010000002 PROCHLORPERAZINE 10 MG/2ML SOLUTION: Performed by: INTERNAL MEDICINE

## 2025-04-30 PROCEDURE — 25810000003 SODIUM CHLORIDE 0.9 % SOLUTION: Performed by: INTERNAL MEDICINE

## 2025-04-30 PROCEDURE — 86704 HEP B CORE ANTIBODY TOTAL: CPT | Performed by: INTERNAL MEDICINE

## 2025-04-30 PROCEDURE — 97530 THERAPEUTIC ACTIVITIES: CPT

## 2025-04-30 PROCEDURE — 82948 REAGENT STRIP/BLOOD GLUCOSE: CPT

## 2025-04-30 PROCEDURE — 94664 DEMO&/EVAL PT USE INHALER: CPT

## 2025-04-30 PROCEDURE — 25010000002 ONDANSETRON PER 1 MG: Performed by: INTERNAL MEDICINE

## 2025-04-30 PROCEDURE — 25010000002 NA FERRIC GLUC CPLX PER 12.5 MG: Performed by: INTERNAL MEDICINE

## 2025-04-30 PROCEDURE — 63710000001 INSULIN LISPRO (HUMAN) PER 5 UNITS: Performed by: STUDENT IN AN ORGANIZED HEALTH CARE EDUCATION/TRAINING PROGRAM

## 2025-04-30 PROCEDURE — 94761 N-INVAS EAR/PLS OXIMETRY MLT: CPT

## 2025-04-30 PROCEDURE — 25010000002 HEPARIN (PORCINE) PER 1000 UNITS: Performed by: INTERNAL MEDICINE

## 2025-04-30 PROCEDURE — 94799 UNLISTED PULMONARY SVC/PX: CPT

## 2025-04-30 PROCEDURE — 25010000002 EPOETIN ALFA-EPBX 10000 UNIT/ML SOLUTION: Performed by: INTERNAL MEDICINE

## 2025-04-30 PROCEDURE — 86706 HEP B SURFACE ANTIBODY: CPT | Performed by: INTERNAL MEDICINE

## 2025-04-30 PROCEDURE — 82948 REAGENT STRIP/BLOOD GLUCOSE: CPT | Performed by: STUDENT IN AN ORGANIZED HEALTH CARE EDUCATION/TRAINING PROGRAM

## 2025-04-30 PROCEDURE — 80069 RENAL FUNCTION PANEL: CPT | Performed by: INTERNAL MEDICINE

## 2025-04-30 PROCEDURE — 85025 COMPLETE CBC W/AUTO DIFF WBC: CPT | Performed by: INTERNAL MEDICINE

## 2025-04-30 PROCEDURE — 94660 CPAP INITIATION&MGMT: CPT

## 2025-04-30 RX ORDER — PROCHLORPERAZINE EDISYLATE 5 MG/ML
5 INJECTION INTRAMUSCULAR; INTRAVENOUS EVERY 6 HOURS PRN
Status: DISCONTINUED | OUTPATIENT
Start: 2025-04-30 | End: 2025-05-04 | Stop reason: HOSPADM

## 2025-04-30 RX ORDER — ONDANSETRON 2 MG/ML
4 INJECTION INTRAMUSCULAR; INTRAVENOUS EVERY 6 HOURS PRN
Status: DISCONTINUED | OUTPATIENT
Start: 2025-04-30 | End: 2025-05-04 | Stop reason: HOSPADM

## 2025-04-30 RX ADMIN — ONDANSETRON 4 MG: 2 INJECTION, SOLUTION INTRAMUSCULAR; INTRAVENOUS at 10:33

## 2025-04-30 RX ADMIN — INSULIN LISPRO 3 UNITS: 100 INJECTION, SOLUTION INTRAVENOUS; SUBCUTANEOUS at 21:25

## 2025-04-30 RX ADMIN — HEPARIN SODIUM 5000 UNITS: 5000 INJECTION INTRAVENOUS; SUBCUTANEOUS at 21:25

## 2025-04-30 RX ADMIN — ASPIRIN 81 MG: 81 TABLET, COATED ORAL at 08:37

## 2025-04-30 RX ADMIN — DILTIAZEM HYDROCHLORIDE 300 MG: 180 CAPSULE, EXTENDED RELEASE ORAL at 08:36

## 2025-04-30 RX ADMIN — INSULIN LISPRO 2 UNITS: 100 INJECTION, SOLUTION INTRAVENOUS; SUBCUTANEOUS at 08:38

## 2025-04-30 RX ADMIN — SODIUM CHLORIDE 250 MG: 9 INJECTION, SOLUTION INTRAVENOUS at 13:29

## 2025-04-30 RX ADMIN — DULOXETINE 60 MG: 30 CAPSULE, DELAYED RELEASE ORAL at 08:36

## 2025-04-30 RX ADMIN — ROPINIROLE HYDROCHLORIDE 0.5 MG: 0.25 TABLET, FILM COATED ORAL at 21:24

## 2025-04-30 RX ADMIN — IPRATROPIUM BROMIDE AND ALBUTEROL SULFATE 3 ML: .5; 3 SOLUTION RESPIRATORY (INHALATION) at 06:43

## 2025-04-30 RX ADMIN — PROCHLORPERAZINE EDISYLATE 5 MG: 5 INJECTION, SOLUTION INTRAMUSCULAR; INTRAVENOUS at 13:29

## 2025-04-30 RX ADMIN — FAMOTIDINE 20 MG: 20 TABLET, FILM COATED ORAL at 08:37

## 2025-04-30 RX ADMIN — IPRATROPIUM BROMIDE AND ALBUTEROL SULFATE 3 ML: .5; 3 SOLUTION RESPIRATORY (INHALATION) at 20:20

## 2025-04-30 RX ADMIN — PRIMIDONE 50 MG: 50 TABLET ORAL at 21:25

## 2025-04-30 RX ADMIN — HEPARIN SODIUM 5000 UNITS: 5000 INJECTION INTRAVENOUS; SUBCUTANEOUS at 08:38

## 2025-04-30 RX ADMIN — GABAPENTIN 100 MG: 100 CAPSULE ORAL at 08:37

## 2025-04-30 RX ADMIN — ROPINIROLE HYDROCHLORIDE 0.5 MG: 0.25 TABLET, FILM COATED ORAL at 08:36

## 2025-04-30 RX ADMIN — BUDESONIDE 0.5 MG: 0.5 SUSPENSION RESPIRATORY (INHALATION) at 06:43

## 2025-04-30 RX ADMIN — HYDRALAZINE HYDROCHLORIDE 25 MG: 25 TABLET ORAL at 21:24

## 2025-04-30 RX ADMIN — HYDRALAZINE HYDROCHLORIDE 25 MG: 25 TABLET ORAL at 08:37

## 2025-04-30 RX ADMIN — ROSUVASTATIN CALCIUM 10 MG: 10 TABLET, FILM COATED ORAL at 08:37

## 2025-04-30 RX ADMIN — ROPINIROLE HYDROCHLORIDE 0.5 MG: 0.25 TABLET, FILM COATED ORAL at 17:11

## 2025-04-30 RX ADMIN — EPOETIN ALFA-EPBX 20000 UNITS: 10000 INJECTION, SOLUTION INTRAVENOUS; SUBCUTANEOUS at 17:11

## 2025-04-30 RX ADMIN — CALCITRIOL CAPSULES 0.25 MCG 0.5 MCG: 0.25 CAPSULE ORAL at 08:36

## 2025-04-30 RX ADMIN — BUDESONIDE 0.5 MG: 0.5 SUSPENSION RESPIRATORY (INHALATION) at 20:24

## 2025-04-30 NOTE — PLAN OF CARE
"Goal Outcome Evaluation:            Assessment: Lynne Cade presents with ADL impairments affecting function including balance, endurance / activity tolerance, and strength. Pt is limited with functional transfers due to myoclonic jerking and buckling in BLEs. Pt edcuated on importance of eating meals in upright position and energy conservation today. Pt with poor activity tolerance and generalized weakness overall. Demonstrated functioning below baseline abilities indicate the need for continued skilled intervention while inpatient. Tolerating session today without incident. Will continue to follow and progress as tolerated.     Plan/Recommendations:   Moderate Intensity Therapy recommended post-acute care. This is recommended as therapy feels the patient would require 3-4 days per week and wouldn't tolerate \"3 hour daily\" rehab intensity. SNF would be the preferred choice. If the patient does not agree to SNF, arrange HH or OP depending on home bound status. If patient is medically complex, consider LTACH.. Pt requires no DME at discharge.     Pt desires Skilled Rehab placement at discharge. Pt cooperative; agreeable to therapeutic recommendations and plan of care.                                 "

## 2025-04-30 NOTE — CASE MANAGEMENT/SOCIAL WORK
Continued Stay Note  [facility]     Patient Name: Lynne Cade  MRN: [id number]  Today's Date: 4/30/2025    Admit Date: 4/22/2025    Plan: [facility] (accepted, skilled). PASRR approved. Precert required. Home O2 2L Elie Brothers. OPHD [facility] accepted, MWF 7:05am chair time   Discharge Plan       Row Name 04/30/25 0772       Plan    Plan Comments Per Sylvie liaison for [facility] they can provide transportation for MWF OPHD at scheduled chair time. CM spoke to patient's  at bedside and updated him on acceptance and dialysis clinic,  verbalized understanding and was agreeable to plan of care. He states he has been in contact with [facility] as well and even when patient is ready to discharge from SNF they will likely stay at the [facility] HD location vs [facility].             Vanesa Mercer RN     [facility]  Office: [phone]  Cell: [phone]  Fax # [phone]

## 2025-04-30 NOTE — PLAN OF CARE
Goal Outcome Evaluation:  Plan of Care Reviewed With: patient        Progress: no change    Patient had her first dialysis yesterday.    Patient  was given Gabby-colace and Miralax yesterday- still has no BM- per  its been a week.     Plan of care is ongoing.

## 2025-04-30 NOTE — DISCHARGE PLACEMENT REQUEST
"Coby Cade \"NAVIN\" (74 y.o. Female)       Date of Birth   1950    Social Security Number       Address   [address] [place] [address] [place] [address]    Home Phone   [phone]    MRN   [id number]       [facility]    Marital Status                               Admission Date   2025    Admission Type   Emergency    Admitting Provider   Fadi Almonte MD    Attending Provider   Pernell Trivedi MD    Department, Room/Bed   [facility],        Discharge Date       Discharge Disposition       Discharge Destination                                 Attending Provider: Pernell Trivedi MD    Allergies: Carvedilol, Morphine, Sulfa Antibiotics, Hydrocodone, Diphenhydramine    Isolation: None   Infection: None   Code Status: CPR    Ht: 167.6 cm (65.98\")   Wt: 102 kg (224 lb 6.9 oz)    Admission Cmt: None   Principal Problem: Lethargy [R53.83]                   Active Insurance as of 2025       Primary Coverage       Payor Plan Insurance Group Employer/Plan Group    ANTH MEDICARE REPLACEMENT ANTH MEDICARE ADVANTAGE HMO [id number]       Payor Plan Address Payor Plan Phone Number Payor Plan Fax Number Effective Dates    [address] [phone]  2025 - None Entered    [facility] [address]         Subscriber Name Subscriber Birth Date Member ID       COBY CADE 1950 [id number]                     Emergency Contacts        (Rel.) Home Phone Work Phone Mobile Phone    AMINTA CADE (Spouse) -- -- [phone]    JOSIE()YOAV (Other) -- -- [phone]              [facility] CARDIOVASCULAR NON-INVASIVE  [address] [facility] [place] [address] [phone]            Patient Information    Patient Name  Coby Cade MRN  [id number] Legal Sex  Female  (Age)  1950 (74 y.o.)       Duplex Hemodialysis Access CAR    Accession Number: [id number]   Date of Study: 25   Ordering Provider: Zaina Bernard" MD    Clinical Indications: Follow-up AVF/AVG        [facility]  Performing Staff   Cardiology: Zaina Bernard MD     Tech: Kiana Houston RVS           Clinical Indication    Follow-up AVF/AVG   Dx: S/P arteriovenous (AV) graft placement [Z95.828 (ICD-10-CM)]; Chronic kidney disease, stage 4 (severe) [N18.4 (ICD-10-CM)]   Comments: Dr. Bernard to read         Interpretation Summary         Patent left axillary to axillary arteriovenous graft.  Diameters, depth, and flow volumes are all adequate.                  Patient Hx Of Height, Weight, and Vitals    Height Weight BSA (Calculated - sq m) BMI (Calculated) Retired BMI (kg/m2) Pulse BP                          Study Findings    A left sided axillary-axillary vein AV graft is present.                 Vascular Surgical History    ARTERIOVENOUS FISTULA/SHUNT SURGERY Left 02/24/2023   Procedure: BRACHIOCEPHALIC ARTERIOVENOUS FISTULA FORMATION;  Surgeon: Vignesh Riley MD;  Location: [facility] MAIN OR;  Service: Vascular;  Laterality: Left;    ARTERIOVENOUS FISTULA/SHUNT SURGERY Left 02/27/2025   Procedure: ARTERIOVENOUS SHUNT GRAFT;  Surgeon: Zaina Bernard MD;  Location: [facility] MAIN OR;  Service: Vascular;  Laterality: Left;        Cardiac History    Diagnosis Date Comment Source   CHF (congestive heart failure) 6/01/22 Low iron    Diabetes mellitus  type 2    Hyperlipidemia      Hypertension          Social History      Tobacco Use    Never smoked or used smokeless tobacco.   Passive Exposure: Never     Vaping Use    Never used         Pre-Inflow/Inflow Artery Hemodialysis Access Measurements    Pre-Inflow/Inflow Artery PSV (cm/sec) EDV (cm/sec) Diameter (cm) Flow Vol (mL/min)   Axillary 277 cm/sec       104 cm/sec       0.6 cm       1,306 mL/min           Arterial Anastomosis Hemodialysis Access Measurements    Arterial Anastomosis PSV (cm/sec) EDV (cm/sec) Diameter (cm)    443 cm/sec       192 cm/sec       0.7 cm           Conduit  Hemodialysis Access Measurements    Conduit PSV (cm/sec) EDV (cm/sec) Diameter (cm) Flow Vol (mL/min) Depth (cm)   Prox 265 cm/sec       113 cm/sec       0.6 cm       1,000 mL/min       0.7 cm         Mid 123 cm/sec       63.3 cm/sec       0.7 cm       960 mL/min       0.9 cm         Dist 125 cm/sec       66.7 cm/sec       0.7 cm       1,112 mL/min       1.3 cm

## 2025-04-30 NOTE — PROGRESS NOTES
[facility] MEDICINE SERVICE  DAILY PROGRESS NOTE    NAME: Lynne Cade  : 1950  MRN: [id number]   Sex: female  Age: 74 y.o.    PROVIDER OF SERVICE: Pernell Trivedi MD    Chief Complaint: Lethargy    Subjective:     Interval History:  Lynne Cade was seen and examined at bedside following hospital admission due to lethargy, sleepiness.  This is a very pleasant 74-year-old  female, who is being followed along by nephrology, and pulmonology.  The patient was found to have acute on chronic hypercapnic and hypoxic respiratory insufficiency.  She has been evaluated, and also has been found to have features of obstructive sleep apnea.  Currently, she is on 2 L/min of O2 via nasal cannula, and tolerating BiPAP during her sleep.  She has been recommended a sleep study as an outpatient.  She is being followed along by the care management team, and remains on IV diuretics, which are currently on hold by the nephrology team.  Complaining of restless legs.      She had dialysis yesterday for the first time  Doing okay this morning without any complaint however she told me that she was extremely anxious when she had the dialysis yesterday  Denies any shortness of breath at rest  Lower extremity pain/restless is better    Objective:     Vital Signs  Temp:  [97.4 °F (36.3 °C)-98.1 °F (36.7 °C)] 97.9 °F (36.6 °C)  Heart Rate:  [] 94  Resp:  [14-24] 20  BP: (109-145)/(35-75) 130/54  Flow (L/min) (Oxygen Therapy):  [2] 2   Body mass index is 36.24 kg/m².    Intake/Output    Intake/Output Summary (Last 24 hours) at 2025 0915  Last data filed at 2025 0541  Gross per 24 hour   Intake 840 ml   Output 3600 ml   Net -2760 ml        Physical Exam  General: Alert, cooperative, no distress, AAOx3  Lungs: Clear to auscultation bilaterally, respirations unlabored  Heart: Regular rate and rhythm, S1 and S2 normal, no murmurs, no rub or gallop.  2+ edema.  Abdomen: Soft, nontender, bowel sounds  active, no masses, no organomegaly  Psychiatric: Appropriate mood and affect.     Diagnostic Data    Results from last 7 days   Lab Units 04/30/25  0409 04/28/25  0946 04/25/25  1630   WBC 10*3/mm3 9.31   < >  --    HEMOGLOBIN g/dL 7.3*   < >  --    HEMATOCRIT % 24.6*   < >  --    PLATELETS 10*3/mm3 242   < >  --    GLUCOSE mg/dL 147*   < > 149*   CREATININE mg/dL 2.26*   < > 3.51*   BUN mg/dL 48*   < > 75*   SODIUM mmol/L 138   < > 136   POTASSIUM mmol/L 4.5   < > 4.9   AST (SGOT) U/L  --   --  37*   ALT (SGPT) U/L  --   --  25   ALK PHOS U/L  --   --  41   BILIRUBIN mg/dL  --   --  0.2   ANION GAP mmol/L 8.8   < > 12.0    < > = values in this interval not displayed.       No radiology results for the last day    I reviewed the patient's new clinical results.    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  Status post hemodialysis yesterday, it was her first treatment  - on board, patient is being planned to have hemodialysis going forward she already has left upper extremity AV graft  2.  Anemia of chronic disease, hemoglobin at 7.3.  No indication for transfusion  3.  Patient has been recommended NIPPV as an outpatient.  She will need an outpatient sleep study.  Pulmonary is following  4.  Lower extremity restless seems to be better after Requip was started yesterday we will keep her on it  5.  Further recommendations to follow pending hospital course.  6.  Seen by PT recommending SNF    VTE Prophylaxis:  Pharmacologic VTE prophylaxis orders are present.     Disposition Planning:     Barriers to Discharge: CHRIS  Anticipated Date of Discharge: TBD  Place of Discharge: SNF    Signature: Electronically signed by Pernell Trivedi  MD, 04/30/25, 09:15 EDT.  [facility] Hospitalist Team

## 2025-04-30 NOTE — CASE MANAGEMENT/SOCIAL WORK
Continued Stay Note   Than[facility]     Patient Name: Lynne Cade  MRN: [id number]  Today's Date: 4/30/2025    Admit Date: 4/22/2025    Plan: SNF Choices pending. PASRR approved. Precert required. Home O2 2L [facility]   Discharge Plan       Row Name 04/30/25 0935       Plan    Plan Comments DC Barriers: 2L O2-bipap at night, Cr 2.26, Hgb 7.3, IV diuresis (currently on hold per neph), neph monitoring renal fx and electrolytes, pulm following, new OPHD set up             Vanesa Mercer RN     [facility]  Office: [phone]  Cell: [phone]  Fax # [phone]

## 2025-04-30 NOTE — PLAN OF CARE
Goal Outcome Evaluation:                 Problem: Adult Inpatient Plan of Care  Goal: Plan of Care Review  Outcome: Progressing  Goal: Patient-Specific Goal (Individualized)  Outcome: Progressing  Goal: Absence of Hospital-Acquired Illness or Injury  Outcome: Progressing  Intervention: Identify and Manage Fall Risk  Recent Flowsheet Documentation  Taken 4/30/2025 1635 by Arlin Davidson RN  Safety Promotion/Fall Prevention:   activity supervised   assistive device/personal items within reach   clutter free environment maintained   fall prevention program maintained   nonskid shoes/slippers when out of bed   room organization consistent  Taken 4/30/2025 1415 by Arlin Davidson RN  Safety Promotion/Fall Prevention:   activity supervised   assistive device/personal items within reach   clutter free environment maintained   fall prevention program maintained   nonskid shoes/slippers when out of bed   room organization consistent   safety round/check completed  Taken 4/30/2025 1245 by Arlin Davidson RN  Safety Promotion/Fall Prevention:   activity supervised   assistive device/personal items within reach   clutter free environment maintained   fall prevention program maintained   nonskid shoes/slippers when out of bed   room organization consistent   safety round/check completed  Taken 4/30/2025 1015 by Arlin Davidson RN  Safety Promotion/Fall Prevention:   activity supervised   assistive device/personal items within reach   clutter free environment maintained   fall prevention program maintained   nonskid shoes/slippers when out of bed   room organization consistent   safety round/check completed  Taken 4/30/2025 0825 by Arlin Davidson RN  Safety Promotion/Fall Prevention:   activity supervised   assistive device/personal items within reach   clutter free environment maintained   fall prevention program maintained   nonskid shoes/slippers when out of bed   room organization consistent   safety round/check  completed  Intervention: Prevent Skin Injury  Recent Flowsheet Documentation  Taken 4/30/2025 1635 by Arlin Davidson RN  Body Position: position changed independently  Taken 4/30/2025 1245 by Arlin Davidson RN  Body Position: position changed independently  Skin Protection: incontinence pads utilized  Taken 4/30/2025 0825 by Arlin Davidson RN  Body Position: (up im chair) other (see comments)  Skin Protection: incontinence pads utilized  Intervention: Prevent and Manage VTE (Venous Thromboembolism) Risk  Recent Flowsheet Documentation  Taken 4/30/2025 1635 by Arlin Davidson RN  VTE Prevention/Management: (sub Q heparin) other (see comments)  Taken 4/30/2025 1245 by Alrin Davidson RN  VTE Prevention/Management: (sub  Q heparin)   other (see comments)   SCDs (sequential compression devices) off  Taken 4/30/2025 0825 by Arlin Davidson RN  VTE Prevention/Management: (heparin SQ)   other (see comments)   SCDs (sequential compression devices) off  Intervention: Prevent Infection  Recent Flowsheet Documentation  Taken 4/30/2025 1635 by Arlin Davidson RN  Infection Prevention:   hand hygiene promoted   personal protective equipment utilized   rest/sleep promoted   single patient room provided   environmental surveillance performed  Taken 4/30/2025 1415 by Arlin Davidson RN  Infection Prevention:   hand hygiene promoted   personal protective equipment utilized   rest/sleep promoted   single patient room provided   environmental surveillance performed  Taken 4/30/2025 1245 by Arlin Davidson RN  Infection Prevention:   hand hygiene promoted   single patient room provided   personal protective equipment utilized   rest/sleep promoted   environmental surveillance performed  Taken 4/30/2025 1015 by Arlin Davidson RN  Infection Prevention:   hand hygiene promoted   personal protective equipment utilized   rest/sleep promoted   single patient room provided   environmental surveillance performed  Taken 4/30/2025  0825 by Arlin Davidson RN  Infection Prevention:   personal protective equipment utilized   hand hygiene promoted   rest/sleep promoted   single patient room provided   environmental surveillance performed  Goal: Optimal Comfort and Wellbeing  Outcome: Progressing  Intervention: Provide Person-Centered Care  Recent Flowsheet Documentation  Taken 4/30/2025 1635 by Arlin Davidson RN  Trust Relationship/Rapport:   care explained   choices provided  Taken 4/30/2025 1245 by Arlin Davidson RN  Trust Relationship/Rapport:   choices provided   care explained  Taken 4/30/2025 0825 by Arlin Davidson RN  Trust Relationship/Rapport:   care explained   choices provided  Goal: Readiness for Transition of Care  Outcome: Progressing     Problem: Comorbidity Management  Goal: Maintenance of Behavioral Health Symptom Control  Outcome: Progressing  Intervention: Maintain Behavioral Health Symptom Control  Recent Flowsheet Documentation  Taken 4/30/2025 1635 by Arlin Davidson RN  Medication Review/Management: medications reviewed  Taken 4/30/2025 1415 by Arlin Davidson RN  Medication Review/Management: medications reviewed  Taken 4/30/2025 1245 by Arlin Davidson RN  Medication Review/Management: medications reviewed  Taken 4/30/2025 1015 by Arlin Davidson RN  Medication Review/Management: medications reviewed  Taken 4/30/2025 0825 by Arlin Davidson RN  Medication Review/Management: medications reviewed  Goal: Maintenance of COPD Symptom Control  Outcome: Progressing  Intervention: Maintain COPD (Chronic Obstructive Pulmonary Disease) Symptom Control  Recent Flowsheet Documentation  Taken 4/30/2025 1635 by Arlin Davidson RN  Medication Review/Management: medications reviewed  Taken 4/30/2025 1415 by Arlin Davidson RN  Medication Review/Management: medications reviewed  Taken 4/30/2025 1245 by Arlin Davidson RN  Medication Review/Management: medications reviewed  Taken 4/30/2025 1015 by Arlin Davidson  RN  Medication Review/Management: medications reviewed  Taken 4/30/2025 0825 by Arlin Davidson RN  Medication Review/Management: medications reviewed  Goal: Blood Glucose Level Within Target Range  Outcome: Progressing  Intervention: Monitor and Manage Glycemia  Recent Flowsheet Documentation  Taken 4/30/2025 1635 by Arlin Davidson RN  Medication Review/Management: medications reviewed  Taken 4/30/2025 1415 by Arlin Davidson RN  Medication Review/Management: medications reviewed  Taken 4/30/2025 1245 by Arlin Davidson RN  Medication Review/Management: medications reviewed  Taken 4/30/2025 1015 by Arlin Davidson RN  Medication Review/Management: medications reviewed  Taken 4/30/2025 0825 by Arlin Davidson RN  Medication Review/Management: medications reviewed  Goal: Blood Pressure in Desired Range  Outcome: Progressing  Intervention: Maintain Blood Pressure Management  Recent Flowsheet Documentation  Taken 4/30/2025 1635 by Arlin Davidson RN  Medication Review/Management: medications reviewed  Taken 4/30/2025 1415 by Arlin Davidson RN  Medication Review/Management: medications reviewed  Taken 4/30/2025 1245 by Arlin Davidson RN  Medication Review/Management: medications reviewed  Taken 4/30/2025 1015 by Arlin Davidson RN  Medication Review/Management: medications reviewed  Taken 4/30/2025 0825 by Arlin Davidson RN  Medication Review/Management: medications reviewed     Problem: Skin Injury Risk Increased  Goal: Skin Health and Integrity  Outcome: Progressing  Intervention: Optimize Skin Protection  Recent Flowsheet Documentation  Taken 4/30/2025 1635 by Arlin Davidson RN  Activity Management: activity encouraged  Head of Bed (HOB) Positioning: HOB at 30-45 degrees  Taken 4/30/2025 1415 by Arlin Davidson RN  Activity Management: activity encouraged  Taken 4/30/2025 1245 by Arlin Davdison RN  Activity Management: activity encouraged  Pressure Reduction Techniques: frequent weight shift  encouraged  Head of Bed (HOB) Positioning: HOB at 30 degrees  Pressure Reduction Devices: positioning supports utilized  Skin Protection: incontinence pads utilized  Taken 4/30/2025 0825 by Arlin Davidson RN  Activity Management:   activity encouraged   up in chair  Pressure Reduction Techniques: frequent weight shift encouraged  Head of Bed (HOB) Positioning: (up in chair) other (see comments)  Pressure Reduction Devices: positioning supports utilized  Skin Protection: incontinence pads utilized     Problem: Fall Injury Risk  Goal: Absence of Fall and Fall-Related Injury  Outcome: Progressing  Intervention: Identify and Manage Contributors  Recent Flowsheet Documentation  Taken 4/30/2025 1635 by Arlin Davidson RN  Medication Review/Management: medications reviewed  Self-Care Promotion:   independence encouraged   BADL personal objects within reach  Taken 4/30/2025 1415 by Arlin Davidson RN  Medication Review/Management: medications reviewed  Taken 4/30/2025 1245 by Arlin Davidson RN  Medication Review/Management: medications reviewed  Self-Care Promotion:   independence encouraged   BADL personal objects within reach  Taken 4/30/2025 1015 by Arlin Davidson RN  Medication Review/Management: medications reviewed  Taken 4/30/2025 0825 by Arlin Davidson RN  Medication Review/Management: medications reviewed  Self-Care Promotion:   independence encouraged   BADL personal objects within reach  Intervention: Promote Injury-Free Environment  Recent Flowsheet Documentation  Taken 4/30/2025 1635 by Arlin Davidson RN  Safety Promotion/Fall Prevention:   activity supervised   assistive device/personal items within reach   clutter free environment maintained   fall prevention program maintained   nonskid shoes/slippers when out of bed   room organization consistent  Taken 4/30/2025 1415 by Arlin Davidson RN  Safety Promotion/Fall Prevention:   activity supervised   assistive device/personal items within reach    clutter free environment maintained   fall prevention program maintained   nonskid shoes/slippers when out of bed   room organization consistent   safety round/check completed  Taken 4/30/2025 1245 by Arlin Davidson RN  Safety Promotion/Fall Prevention:   activity supervised   assistive device/personal items within reach   clutter free environment maintained   fall prevention program maintained   nonskid shoes/slippers when out of bed   room organization consistent   safety round/check completed  Taken 4/30/2025 1015 by Arlin Davidson RN  Safety Promotion/Fall Prevention:   activity supervised   assistive device/personal items within reach   clutter free environment maintained   fall prevention program maintained   nonskid shoes/slippers when out of bed   room organization consistent   safety round/check completed  Taken 4/30/2025 0825 by Arlin Davidson RN  Safety Promotion/Fall Prevention:   activity supervised   assistive device/personal items within reach   clutter free environment maintained   fall prevention program maintained   nonskid shoes/slippers when out of bed   room organization consistent   safety round/check completed

## 2025-04-30 NOTE — THERAPY TREATMENT NOTE
"Subjective: Pt agreeable to therapeutic plan of care. Patient is found resting in bed; agreeable to getting out of bed for lunch with encouragement.    Objective:     Precautions - falls, myoclonic jerking/LE buckling with transfers, 2L oxygen    Bed mobility - Min-A increased time  Transfers - Mod-A and Max-A fluctuating assist due to LE buckling; SPS with RW  Ambulation - not safe to try due to LE buckling with SPS transfer.        Patient instructed in 2 minutes static sitting EOB with UE support prn and min-moda for balance due to posterior lean.      Therapeutic Exercise encouraged LE ROM and therEx when OOB    Vitals: WNL    Pain: 0     Education: Provided education on the importance of mobility in the acute care setting, Verbal/Tactile Cues, ADL training, Transfer Training, and Gait Training    Assessment: Lynne Cade presents with functional mobility impairments which indicate the need for skilled intervention. Patient demos myoclonic jerking which impacts LE stability during functional transfers.  Patient requires mod-maxA for SPS transfer with RW.  Continues with very poor activity tolerance and impaired sitting/standing balance.  Continue to recommend SNF level of care once medically appropriate.   Tolerating session today without incident. Will continue to follow and progress as tolerated.     Plan/Recommendations:   If medically appropriate, Moderate Intensity Therapy recommended post-acute care. This is recommended as therapy feels the patient would require 3-4 days per week and wouldn't tolerate \"3 hour daily\" rehab intensity. SNF would be the preferred choice. If the patient does not agree to SNF, arrange HH or OP depending on home bound status. If patient is medically complex, consider LTACH. Pt requires no DME at discharge.     Pt desires Skilled Rehab placement at discharge. Pt cooperative; agreeable to therapeutic recommendations and plan of care.         Basic Mobility 6-click:  Rolling:   "       1 = Total, A lot = 2, A little = 3; 4 = None  Supine>Sit:   1 = Total, A lot = 2, A little = 3; 4 = None   Sit>Stand with arms:  1 = Total, A lot = 2, A little = 3; 4 = None  Bed>Chair:   1 = Total, A lot = 2, A little = 3; 4 = None  Ambulate in room:  1 = Total, A lot = 2, A little = 3; 4 = None  3-5 Steps with railin = Total, A lot = 2, A little = 3; 4 = None  Score: 12    Modified Nicole: N/A = No pre-op stroke/TIA    Post-Tx Position: Up in Chair, Alarms activated, and Call light and personal items within reach; advised patient and spouse in use of call light and assistance back to bed due to falls risk and inconsistent performance with mobility.    PPE: gloves    Therapy Charges for Today       Code Description Service Date Service Provider Modifiers Qty    [id number]  PT THERAPEUTIC ACT EA 15 MIN 2025 Bozena Perez, PT GP 1           PT Charges       Row Name 25 1509             Time Calculation    Start Time 1154  -RR      Stop Time 1205  -RR      Time Calculation (min) 11 min  -RR      PT Received On 25  -RR      PT - Next Appointment 25  -RR         Time Calculation- PT    Total Timed Code Minutes- PT 11 minute(s)  -RR                User Key  (r) = Recorded By, (t) = Taken By, (c) = Cosigned By      Initials Name Provider Type    RR Bozena Perez, PT Physical Therapist

## 2025-04-30 NOTE — CASE MANAGEMENT/SOCIAL WORK
Continued Stay Note  [facility]     Patient Name: Lynne Cade  MRN: [id number]  Today's Date: 4/30/2025    Admit Date: 4/22/2025    Plan: [facility] (accepted, skilled). PASRR approved. Precert required. Home O2 2L [facility]. OPHD [facility] accepted, MWF 7:05am chair time   Discharge Plan       Row Name 04/30/25 1414       Plan    Plan [facility] (accepted, skilled). PASRR approved. Precert required. Home O2 2L [facility]. OPHD [facility] accepted, MWF 7:05am chair time    Plan Comments [facility] accepted patient for OPHD and have patient tentatively placed on a MWF schedule with chair time of 7:05-11:05. CM updated [facility] liaison Sylvie, waiting for confirmation they can transport for that chair time. Start date currently listed for Monday 5/5. CM updated care team.      Row Name 04/30/25 1125                Plan Comments CM submitted required docs via [facility] for OP HD at [facility]. Per Sylvie at [facility] they can transport if patient has a MWF schedule with chair time between 8am-noon. Awaiting confirmation.             Vanesa Mercer RN      [facility]  Office: [phone]  Cell: [phone]  Fax # [phone]

## 2025-04-30 NOTE — PROGRESS NOTES
"NEPHROLOGY PROGRESS NOTE------[facility]    [facility]  [phone]  Vipul Elias MD      Patient Care Team:  Lorraine Agarwal APRN as PCP - General (Nurse Practitioner)  Rick Melendez MD as Consulting Physician (Nephrology)  Mayo Fregoso MD as Consulting Physician (Urology)  Fredi Pierce MD as Consulting Physician (Cardiology)  Cony Sanchez APRN as Nurse Practitioner (Vascular Surgery)      Provider:  Vipul Elias MD  Patient Name: Lynne Cade  :  1950    SUBJECTIVE:  Follow-up CHRIS/CKD   no chest pain or shortness of breath  Dialyzed yesterday using AV graft  Feels much better, less confused      Medication:  aspirin, 81 mg, Oral, Daily  budesonide, 0.5 mg, Nebulization, BID - RT  calcitriol, 0.5 mcg, Oral, Daily  dilTIAZem CD, 300 mg, Oral, Daily  DULoxetine, 60 mg, Oral, BID  famotidine, 20 mg, Oral, Daily  [Held by provider] furosemide, 40 mg, Oral, Daily  gabapentin, 100 mg, Oral, Q12H  heparin (porcine), 5,000 Units, Subcutaneous, Q12H  hydrALAZINE, 25 mg, Oral, BID  insulin lispro, 2-7 Units, Subcutaneous, 4x Daily AC & at Bedtime  ipratropium-albuterol, 3 mL, Nebulization, BID  lidocaine-prilocaine, , Topical, Once  primidone, 50 mg, Oral, Nightly  rOPINIRole, 0.5 mg, Oral, TID  rosuvastatin, 10 mg, Oral, Daily           OBJECTIVE    Vital Sign Min/Max for last 24 hours  Temp  Min: 97.4 °F (36.3 °C)  Max: 98.1 °F (36.7 °C)   BP  Min: 109/44  Max: 145/64   Pulse  Min: 82  Max: 100   Resp  Min: 14  Max: 24   SpO2  Min: 96 %  Max: 100 %   No data recorded   Weight  Min: 102 kg (224 lb 6.9 oz)  Max: 102 kg (224 lb 6.9 oz)     Flowsheet Rows      Flowsheet Row First Filed Value   Admission Height 167.6 cm (66\") Documented at 2025 1448   Admission Weight 96.2 kg (212 lb 1.3 oz) Documented at 2025 1448            No intake/output data recorded.  I/O last 3 completed shifts:  In: " "1080 [P.O.:1080]  Out: 4400 [Urine:1550]    Physical Exam:  General Appearance: alert, appears stated age and cooperative  Head: normocephalic, without obvious abnormality and atraumatic  Eyes: conjunctivae and sclerae normal and no icterus  Neck: supple and no JVD  Lungs:    Heart: regular rhythm & normal rate and normal S1, S2  Chest: Wall no abnormalities observed  Abdomen: normal bowel sounds and soft, nontender  Extremities: moves extremities well, no edema, no cyanosis and no redness.  Left arm some edema/good bruit over AV graft  Skin: no bleeding, bruising or rash, turgor normal, color normal and no lesions noted  Neurologic: Alert, and oriented. No focal deficits    Labs:    WBC WBC   Date Value Ref Range Status   04/30/2025 9.31 3.40 - 10.80 10*3/mm3 Final   04/29/2025 11.62 (H) 3.40 - 10.80 10*3/mm3 Final   04/28/2025 10.02 3.40 - 10.80 10*3/mm3 Final      HGB Hemoglobin   Date Value Ref Range Status   04/30/2025 7.3 (L) 12.0 - 15.9 g/dL Final   04/29/2025 7.1 (L) 12.0 - 15.9 g/dL Final   04/28/2025 7.1 (L) 12.0 - 15.9 g/dL Final   04/28/2025 6.9 (C) 12.0 - 15.9 g/dL Final      HCT Hematocrit   Date Value Ref Range Status   04/30/2025 24.6 (L) 34.0 - 46.6 % Final   04/29/2025 24.4 (L) 34.0 - 46.6 % Final   04/28/2025 24.0 (L) 34.0 - 46.6 % Final   04/28/2025 23.5 (L) 34.0 - 46.6 % Final      Platelets No results found for: \"LABPLAT\"   MCV MCV   Date Value Ref Range Status   04/30/2025 106.5 (H) 79.0 - 97.0 fL Final   04/29/2025 107.0 (H) 79.0 - 97.0 fL Final   04/28/2025 106.8 (H) 79.0 - 97.0 fL Final          Sodium Sodium   Date Value Ref Range Status   04/30/2025 138 136 - 145 mmol/L Final   04/29/2025 140 136 - 145 mmol/L Final   04/28/2025 139 136 - 145 mmol/L Final      Potassium Potassium   Date Value Ref Range Status   04/30/2025 4.5 3.5 - 5.2 mmol/L Final   04/29/2025 4.6 3.5 - 5.2 mmol/L Final   04/28/2025 4.7 3.5 - 5.2 mmol/L Final      Chloride Chloride   Date Value Ref Range Status " "  04/30/2025 102 98 - 107 mmol/L Final   04/29/2025 102 98 - 107 mmol/L Final   04/28/2025 102 98 - 107 mmol/L Final      CO2 CO2   Date Value Ref Range Status   04/30/2025 27.2 22.0 - 29.0 mmol/L Final   04/29/2025 23.4 22.0 - 29.0 mmol/L Final   04/28/2025 25.4 22.0 - 29.0 mmol/L Final      BUN BUN   Date Value Ref Range Status   04/30/2025 48 (H) 8 - 23 mg/dL Final   04/29/2025 89 (H) 8 - 23 mg/dL Final   04/28/2025 86 (H) 8 - 23 mg/dL Final      Creatinine Creatinine   Date Value Ref Range Status   04/30/2025 2.26 (H) 0.57 - 1.00 mg/dL Final   04/29/2025 3.25 (H) 0.57 - 1.00 mg/dL Final   04/28/2025 3.48 (H) 0.57 - 1.00 mg/dL Final      Calcium Calcium   Date Value Ref Range Status   04/30/2025 8.6 8.6 - 10.5 mg/dL Final   04/29/2025 8.5 (L) 8.6 - 10.5 mg/dL Final   04/28/2025 8.4 (L) 8.6 - 10.5 mg/dL Final      PO4 No components found for: \"PO4\"   Albumin Albumin   Date Value Ref Range Status   04/30/2025 3.4 (L) 3.5 - 5.2 g/dL Final   04/29/2025 3.4 (L) 3.5 - 5.2 g/dL Final   04/28/2025 3.3 (L) 3.5 - 5.2 g/dL Final      Magnesium No results found for: \"MG\"   Uric Acid No components found for: \"URIC ACID\"     Imaging Results (Last 72 Hours)       ** No results found for the last 72 hours. **            Results for orders placed during the hospital encounter of 04/22/25    XR Chest 1 View    Narrative  XR CHEST 1 VW    Date of Exam: 4/22/2025 3:40 PM EDT    Indication: AMS Protocol  AMS Protocol    Comparison: 2/16/2025    Findings:  Heart size is within normal limits for the projection. The right hemidiaphragm is elevated, unchanged. The pulmonary vascular pattern appears normal and the lungs appear clear.    Impression  Impression:  No active disease.        Electronically Signed: Yfn Luna MD  4/22/2025 3:53 PM EDT  Workstation ID: [id number]      Results for orders placed during the hospital encounter of 02/16/25    XR Chest 2 View    Narrative  XR CHEST 2 VW    Date of Exam: 2/16/2025 4:12 PM " EST    Indication: Short of breath    Comparison: 2/5/2025    Findings:  Cardiomediastinal silhouette is unremarkable. Similar elevation of the right hemidiaphragm. No airspace disease, pneumothorax, nor pleural effusion. No acute osseous abnormality identified.    Impression  Impression:  No acute process identified      Electronically Signed: Antonio Ceballos MD  2/16/2025 4:13 PM EST  Workstation ID: [id number]      Results for orders placed in visit on 01/09/24    SCANNED - IMAGING      Results for orders placed during the hospital encounter of 04/22/25    Duplex Carotid Ultrasound CAR    Interpretation Summary    Right internal carotid artery demonstrates a less than 50% stenosis.    Antegrade right vertebral flow.    Left internal carotid artery demonstrates a less than 50% stenosis.    Antegrade left vertebral flow.        ASSESSMENT / PLAN      Lethargy    CHRIS superimposed on CKD stage IV-CHRIS likely prerenal from relative hypotension and or overdiuresis.  Hold diuretics for now.  Avoid hypotension.    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  AV graft.  Hypertension  T2DM  Chronic hypercapnic/hypoxic respiratory failure  History coronary artery disease  Obstructive sleep apnea  Anemia of CKD-IV iron/EPO     Dialyzed yesterday  Likely new ESRD  Arrange for outpt dialysis  Dialysis in a.m.  Dosed EPO/IV iron  Monitor renal function, fluid status electrolytes        Vipul Elias MD  [facility]  [phone]  04/30/25  11:12 EDT

## 2025-04-30 NOTE — PROGRESS NOTES
Daily Progress Note        Lethargy    Assessment:     Acute/chronic hypercapnic/hypoxic respiratory insufficiency     Chronic elevated right hemidiaphragm, associated right lower lobe mild bronchiectasis    CT chest February 2025 no evidence of interstitial lung disease     Respiratory panel negative     non-smoker  No PFTs     Features of obstructive sleep apnea     CKD stage IV  Diabetes mellitus  Hyperlipidemia     Encephalopathy   TSH 3.1  Ammonia 16        Recommendations:        oxygen titration currently on 2 L nasal cannula     Tolerated BiPAP during sleep  Sleep study as an outpatient     Bronchodilators currently on Pulmicort and DuoNeb     Steroids discontinued since there is no evidence of interstitial lung disease on most recent CAT scan in February 2025     Minimize sedatives      Currently off antibiotics     Followed by nephrology for CKD/worsening azotemia /hemodialysis          CT chest 2/15/2025          LOS: 7 days     Subjective         Objective     Vital signs for last 24 hours:  Vitals:    04/30/25 0643 04/30/25 0646 04/30/25 0647 04/30/25 0650   BP:       BP Location:       Patient Position:       Pulse: 86 89 90 88   Resp: 18 18 16 16   Temp:       TempSrc:       SpO2: 98% 99% 98% 99%   Weight:       Height:           Intake/Output last 3 shifts:  I/O last 3 completed shifts:  In: 1080 [P.O.:1080]  Out: 4400 [Urine:1550]  Intake/Output this shift:  No intake/output data recorded.      Radiology  Imaging Results (Last 24 Hours)       ** No results found for the last 24 hours. **            Labs:  Results from last 7 days   Lab Units 04/30/25  0409   WBC 10*3/mm3 9.31   HEMOGLOBIN g/dL 7.3*   HEMATOCRIT % 24.6*   PLATELETS 10*3/mm3 242     Results from last 7 days   Lab Units 04/30/25  0409 04/28/25  0946 04/25/25  1630   SODIUM mmol/L 138   < > 136   POTASSIUM mmol/L 4.5   < > 4.9   CHLORIDE mmol/L 102   < > 101   CO2 mmol/L 27.2   < > 23.0   BUN mg/dL 48*   < > 75*   CREATININE mg/dL 2.26*    < > 3.51*   CALCIUM mg/dL 8.6   < > 8.3*   BILIRUBIN mg/dL  --   --  0.2   ALK PHOS U/L  --   --  41   ALT (SGPT) U/L  --   --  25   AST (SGOT) U/L  --   --  37*   GLUCOSE mg/dL 147*   < > 149*    < > = values in this interval not displayed.           Results from last 7 days   Lab Units 04/30/25  0409 04/29/25  0400 04/28/25  0946   ALBUMIN g/dL 3.4* 3.4* 3.3*     Results from last 7 days   Lab Units 04/25/25  1630   CK TOTAL U/L 52                               Meds:   SCHEDULE  aspirin, 81 mg, Oral, Daily  budesonide, 0.5 mg, Nebulization, BID - RT  calcitriol, 0.5 mcg, Oral, Daily  dilTIAZem CD, 300 mg, Oral, Daily  DULoxetine, 60 mg, Oral, BID  famotidine, 20 mg, Oral, Daily  [Held by provider] furosemide, 40 mg, Oral, Daily  gabapentin, 100 mg, Oral, Q12H  heparin (porcine), 5,000 Units, Subcutaneous, Q12H  hydrALAZINE, 25 mg, Oral, BID  insulin lispro, 2-7 Units, Subcutaneous, 4x Daily AC & at Bedtime  ipratropium-albuterol, 3 mL, Nebulization, BID  lidocaine-prilocaine, , Topical, Once  primidone, 50 mg, Oral, Nightly  rOPINIRole, 0.5 mg, Oral, TID  rosuvastatin, 10 mg, Oral, Daily      Infusions     PRNs    acetaminophen **OR** acetaminophen **OR** acetaminophen    albumin human    senna-docusate sodium **AND** polyethylene glycol **AND** bisacodyl **AND** bisacodyl    Calcium Replacement - Follow Nurse / BPA Driven Protocol    dextrose    dextrose    glucagon (human recombinant)    hydrOXYzine    ipratropium-albuterol    Magnesium Low Dose Replacement - Follow Nurse / BPA Driven Protocol    sodium chloride    Physical Exam:  Physical Exam  Cardiovascular:      Heart sounds: Murmur heard.      No gallop.   Pulmonary:      Effort: No respiratory distress.      Breath sounds: No stridor. Rhonchi and rales present. No wheezing.   Chest:      Chest wall: No tenderness.         ROS  Review of Systems   Respiratory:  Positive for cough and shortness of breath. Negative for wheezing and stridor.     Cardiovascular:  Positive for palpitations and leg swelling. Negative for chest pain.             Total critical care time spent with patient greater than: 45 Minutes

## 2025-04-30 NOTE — PLAN OF CARE
Goal Outcome Evaluation:         Assessment: Lynne Cade presents with functional mobility impairments which indicate the need for skilled intervention. Patient demos myoclonic jerking which impacts LE stability during functional transfers.  Patient requires mod-maxA for SPS transfer with RW.  Continues with very poor activity tolerance and impaired sitting/standing balance.  Continue to recommend SNF level of care once medically appropriate.   Tolerating session today without incident. Will continue to follow and progress as tolerated.

## 2025-04-30 NOTE — THERAPY TREATMENT NOTE
"Subjective: Pt agreeable to therapeutic plan of care. Pt reporting feeling better after receiving medication for nausea    Objective:     Precautions - falls, BLE buckling/jerking during transfers, 2L oxygen     Bed Mobility: min A with increased time and bed rail   Functional Transfers: Mod-A, Max-A, and with rolling walker, 2nd person for safety due to LE buckling, stand pivot to chair from bed      Balance: sitting EOB SBA sit balance, standing modA stand balance with RW   Functional Ambulation: N/A or Not attempted.    Feeding: Independent  ADL Position: supported sitting  ADL Comments: sitting up in chair, edcuation provided on importance of eating meals in upright position       Vitals: WNL    Pain: 0     Education: Provided education on the importance of mobility in the acute care setting, Verbal/Tactile Cues, ADL training, and Transfer Training    Assessment: Lynne Cade presents with ADL impairments affecting function including balance, endurance / activity tolerance, and strength. Pt is limited with functional transfers due to myoclonic jerking and buckling in BLEs. Pt edcuated on importance of eating meals in upright position and energy conservation today. Pt with poor activity tolerance and generalized weakness overall. Demonstrated functioning below baseline abilities indicate the need for continued skilled intervention while inpatient. Tolerating session today without incident. Will continue to follow and progress as tolerated.     Plan/Recommendations:   Moderate Intensity Therapy recommended post-acute care. This is recommended as therapy feels the patient would require 3-4 days per week and wouldn't tolerate \"3 hour daily\" rehab intensity. SNF would be the preferred choice. If the patient does not agree to SNF, arrange HH or OP depending on home bound status. If patient is medically complex, consider LTACH.. Pt requires no DME at discharge.     Pt desires Skilled Rehab placement at discharge. " Pt cooperative; agreeable to therapeutic recommendations and plan of care.     Modified [place]: N/A = No pre-op stroke/TIA    Post-Tx Position: Up in Chair, Alarms activated, and Call light and personal items within reach  PPE: gloves    Therapy Charges for Today       Code Description Service Date Service Provider Modifiers Qty    [id number]  OT THERAPEUTIC ACT EA 15 MIN 4/30/2025 Manda Ceballos OT GO 1           Time Calculation- OT       Row Name 04/30/25 1515             Time Calculation- OT    OT Start Time 1154  -JK      OT Stop Time 1205  -CONCETTAK      OT Time Calculation (min) 11 min  -DESIRAE      Total Timed Code Minutes- OT 11 minute(s)  -DESIRAE      OT Received On 04/30/25  -DESIRAE      OT - Next Appointment 05/01/25  -DESIRAE                User Key  (r) = Recorded By, (t) = Taken By, (c) = Cosigned By      Initials Name Provider Type    Manda Castle OT Occupational Therapist

## 2025-04-30 NOTE — DISCHARGE PLACEMENT REQUEST
"Lynne Cade \"NAVIN\" (74 y.o. Female)       Date of Birth   1950    Social Security Number       Address   [address] [place] [address] [place] [facility]    Home Phone   [phone]    MRN   [id number]       [facility]    Marital Status                               Admission Date   2025    Admission Type   Emergency    Admitting Provider   Fadi Almonte MD    Attending Provider   Pernell Trivedi MD    Department, Room/Bed   [facility],        Discharge Date       Discharge Disposition       Discharge Destination                                 Attending Provider: Pernell Trivedi MD    Allergies: Carvedilol, Morphine, Sulfa Antibiotics, Hydrocodone, Diphenhydramine    Isolation: None   Infection: None   Code Status: CPR    Ht: 167.6 cm (65.98\")   Wt: 102 kg (224 lb 6.9 oz)    Admission Cmt: None   Principal Problem: Lethargy [R53.83]                   Active Insurance as of 2025       Primary Coverage       Payor Plan Insurance Group Employer/Plan Group    ANTH MEDICARE REPLACEMENT ANTH MEDICARE ADVANTAGE HMO [id number]       Payor Plan Address Payor Plan Phone Number Payor Plan Fax Number Effective Dates    [address] [phone]  2025 - None Entered    [facility] [id number]         Subscriber Name Subscriber Birth Date Member ID       LYNNE CDAE 1950 [id number]                     Emergency Contacts        (Rel.) Home Phone Work Phone Mobile Phone    AMINTA CADE (Spouse) -- -- [phone]    JOSIE()YOAV (Other) -- -- [phone]                 History & Physical        Trever Hernandez MD at [address] [place] [address] Medicine Services  History & Physical    Patient Name: Lynne Cade  : 1950  MRN: [id number]  Primary Care Physician:  Lorraine Agarwal, EVERARDO  Date of admission: 2025  Date and Time of Service: 2025 at 2041    Subjective      Chief " Complaint: Encephalopathy and generalized weakness    History of Present Illness: Lynne Cade is a 74 y.o. female with a CMH of COPD, CAD, hyperlipidemia, diabetes mellitus, CKD stage IV who presented to [facility] on 4/22/2025 with confusion and generalized weakness.    Patient currently awake, oriented to self only, with spouse present at bedside. Per spouse, patient has been experiencing generalized weakness and fatigue, with confusion and inability to recall the date, prompting hospital evaluation. Denies chest pain. On 2L nasal cannula, consistent with baseline home oxygen requirement. Follows with Dr. Peralta; recently diagnosed with interstitial lung disease. No history of tobacco use.    Spouse reports patient recently underwent a vascular procedure on 4/21/2025 for left upper extremity swelling due to venous stenosis. She previously had a graft placed for dialysis access due to CKD stage IV, though she is not currently on hemodialysis and continues to make urine. Swelling has slightly improved since the procedure, and patient is scheduled with outpatient vascular surgery follow-up in 2 weeks.    On presentation, vital stable, afebrile, ABG showing pH of 7.2, PCO2 of 76, bicarb of 31, labs remarkable for creatinine of 2.89, ammonia level 16, WBC normal, hemoglobin 10.9, chest x-ray showing cardiomegaly, no active disease per the radiologist reading, CT head was obtained negative for any acute findings.  EG showing sinus rhythm.  Patient now admitted to medicine team for further workup inpatient.      Review of Systems negative unless mentioned above    Personal History     Past Medical History:   Diagnosis Date    Anemia     Hx of anemia    Anxiety     Controled  xanax prn    Arthritis     Cond[facility]. [facility]    Cataract     bilateral    CHF (congestive heart failure) 6/01/22    Low iron    Cholelithiasis 30 yrs ago    Had gb removed    CKD (chronic kidney disease), stage IV  01/13/2023    Colon polyp     Depression 1995    Take cymbalta    Diabetes mellitus     type 2    Diarrhea     on and off    Edema of right foot 04/2021    Healthcare maintenance 2008    PAP. Abstracted from [facility].    Hip pain, bilateral     HL (hearing loss) 1/1/19    Have hearing aids    Hyperlipidemia     Hypertension     Knee pain, bilateral     Obesity     Osteopenia 11/1/22    On med    Other forms of dyspnea     Other specified soft tissue disorders     Pneumonia due to COVID-19 virus 06/26/2022    Renal insufficiency Stage 3    Has gone up to stage 4    Slow to wake up after anesthesia     Spinal headache     Stress headaches     Tremor     Benign    Tremor of both hands     Urinary tract infection 07/01/22    [place]rd    Visual impairment 6/1/2017    Cataracts    Vitamin D deficiency        Past Surgical History:   Procedure Laterality Date    ARTERIOVENOUS FISTULA/SHUNT SURGERY Left 02/24/2023    Procedure: BRACHIOCEPHALIC ARTERIOVENOUS FISTULA FORMATION;  Surgeon: Vignesh Riley MD;  Location: [facility];  Service: Vascular;  Laterality: Left;    ARTERIOVENOUS FISTULA/SHUNT SURGERY Left 2/27/2025    Procedure: ARTERIOVENOUS SHUNT GRAFT;  Surgeon: Zaina Bernard MD;  Location: [facility] MAIN OR;  Service: Vascular;  Laterality: Left;    BREAST BIOPSY Right     CHOLECYSTECTOMY  1990    COLONOSCOPY      This year 2022    EYE SURGERY Bilateral     cataract removal    GASTRIC BYPASS  2002    HERNIA REPAIR  2005    umbilical hernia    HIP OPEN REDUCTION Right 03/02/2020    Procedure: FEMORAL NECK OPEN REDUCTION INTERNAL FIXATION;  Surgeon: Yfn Sifuentes MD;  Location: [facility] OR;  Service: Orthopedics;  Laterality: Right;  Cannulated screw fixation right femoral neck    JOINT REPLACEMENT  2020    R knee    LAPAROTOMY OOPHERECTOMY Bilateral 2010    OTHER SURGICAL HISTORY  04/20/2015    Lexiscan stress test, normal. Abstracted from [facility].    PANNICULECTOMY      TOTAL KNEE ARTHROPLASTY  Right 04/29/2021    Procedure: RIGHT TOTAL KNEE REPLACEMENT;  Surgeon: Yfn Sifuentes MD;  Location: [facility] MAIN OR;  Service: Orthopedics;  Laterality: Right;       Family History: family history includes Anxiety disorder in her daughter; Asthma in her mother; Depression in her mother; Diabetes in her daughter and father; Early death in her daughter; Hyperlipidemia in her father; Hypertension in her father; Kidney disease in her mother; Miscarriages / Stillbirths in her daughter and mother. Otherwise pertinent FHx was reviewed and not pertinent to current issue.    Social History:  reports that she has never smoked. She has never been exposed to tobacco smoke. She has never used smokeless tobacco. She reports that she does not drink alcohol and does not use drugs.    Home Medications:  Prior to Admission Medications       Prescriptions Last Dose Informant Patient Reported? Taking?    Accu-Chek Softclix Lancets lancets   No No    TEST BLOOD SUGAR TWICE DAILY    albuterol sulfate  (90 Base) MCG/ACT inhaler   Yes No    Inhale 2 puffs Every 6 (Six) Hours.    Alcohol Swabs (B-D SINGLE USE SWABS REGULAR) pads   No No    TO USE WHEN CHECKING BLOOD SUGAR TWICE A DAY    Alcohol Swabs (B-D SINGLE USE SWABS REGULAR) pads   Yes No    BD Single Use Swab, See Instructions, Use 1 swab to check blood sugar up to TID, # 300 EA, 1 Refill(s), Pharmacy: [facility], E11.9, Use 1 swab to check blood sugar up to TID, Supply, 168, cm, 03/25/24 11:10:00 EDT, Height, 89.9, kg, 03/25/24 11:16:00 EDT, Weight Dosing    alendronate (FOSAMAX) 70 MG tablet   No No    Take 1 tablet by mouth Every 7 (Seven) Days.    Patient taking differently:  Take 1 tablet by mouth Every 7 (Seven) Days. Sat    allopurinol (ZYLOPRIM) 100 MG tablet   Yes No    Take 1 tablet by mouth Daily.    Blood Glucose Calibration (Accu-Chek Megan) solution   No No    Use as directed    Blood Glucose Monitoring Suppl (Accu-Chek Megan) device   No  No    Use to test twice daily   DX: E11.9    Blood Glucose Monitoring Suppl (True Metrix Air Glucose Meter) device   Yes No    True Metrix Air Glucose Meter, See Instructions, use to check blood sugar up to TID, # 1 EA, 0 Refill(s), Pharmacy: [facility], E11.9, use to check blood sugar up to TID, Supply, 168, cm, 03/25/24 11:10:00 EDT, Height, 89.9, kg, 03/25/24 11:16:00 EDT, Weight Dosing    calcitriol (ROCALTROL) 0.25 MCG capsule   Yes No    Take 2 capsules by mouth Daily.    cyclobenzaprine (FLEXERIL) 5 MG tablet   Yes No    Take 1 tablet by mouth 3 (Three) Times a Day As Needed.    dilTIAZem CD (CARDIZEM CD) 300 MG 24 hr capsule   Yes No    Take 1 capsule by mouth Daily.    DULoxetine (CYMBALTA) 60 MG capsule   No No    Take 1 capsule by mouth 2 (Two) Times a Day.    furosemide (LASIX) 40 MG tablet   No No    Take 1 tablet by mouth Daily for 30 days.    gabapentin (NEURONTIN) 100 MG capsule   Yes No    Take 1 capsule by mouth every night at bedtime.    glimepiride (AMARYL) 2 MG tablet   No No    TAKE 2 TABLETS TWICE DAILY    Patient taking differently:  Take 2 tablets by mouth 2 (Two) Times a Day.    glucose blood (Accu-Chek Megan Plus) test strip   No No    TEST BLOOD SUGAR TWICE DAILY AS DIRECTED   DX  E11.9    hydrALAZINE (APRESOLINE) 100 MG tablet   No No    Take 1 tablet by mouth 3 times a day for 100 days.    hydrOXYzine (ATARAX) 10 MG tablet   No No    Take 1 tablet by mouth 3 (Three) Times a Day As Needed for Itching.    ipratropium-albuterol (DUO-NEB) 0.5-2.5 mg/3 ml nebulizer   Yes No    Take 0.5 mL by nebulization 2 (Two) Times a Day.    Januvia 25 MG tablet   Yes No    Lancets (accu-chek soft touch) lancets   No No    Check blood sugars twice daily as directed. DX:E11.9    lidocaine (Lidoderm) 5 %   Yes No    Apply  topically to the appropriate area as directed.    primidone (MYSOLINE) 50 MG tablet   No No    TAKE 1 TABLET EVERY NIGHT.    Patient taking differently:  Take 1  tablet by mouth Every Night.    rosuvastatin (CRESTOR) 10 MG tablet   No No    Take 1 tablet by mouth Daily.    sodium bicarbonate 650 MG tablet   Yes No    Take 1 tablet by mouth Daily.    Vibegron (Gemtesa) 75 MG tablet   Yes No    Take 1 tablet by mouth Daily.    zolpidem (AMBIEN) 5 MG tablet   Yes No    Take 1 tablet by mouth At Night As Needed.              Allergies:  Allergies   Allergen Reactions    Carvedilol Shortness Of Breath    Morphine Itching    Sulfa Antibiotics GI Intolerance    Hydrocodone Nausea Only    Diphenhydramine Anxiety       Objective      Vitals:   Temp:  [97.7 °F (36.5 °C)] 97.7 °F (36.5 °C)  Heart Rate:  [86-93] 88  Resp:  [16] 16  BP: (133-165)/(63-75) 158/72  Body mass index is 34.23 kg/m².  Physical Exam  General: Awake alert, disoriented, obese  HEENT: Atraumatic normocephalic  Cardio, heart rate normal, rhythm regular  Respiratory: Decreased breath sounds at lung base  Abdomen: Soft, nontender, no rebound or guarding  Extremities: 1+ pitting edema the bilateral extremities, left upper extremity swelling-chronic, wound noted, recent vascular surgery intervention on 4/21/2025  Neuro: Awake, disoriented, moves all extremities    Diagnostic Data:  Lab Results (last 24 hours)       Procedure Component Value Units Date/Time    Blood Gas, Arterial - [[id number]]  (Abnormal) Collected: 04/22/25 1605    Specimen: Arterial Blood Updated: 04/22/25 1747     Site Right Radial     Tapan's Test Positive     pH, Arterial 7.220 pH units      pCO2, Arterial 76.1 mm Hg      pO2, Arterial 149.2 mm Hg      HCO3, Arterial 31.1 mmol/L      Base Excess, Arterial 2.3 mmol/L      Comment: Serial Number: [id number]:  [id number]        O2 Saturation, Arterial 98.7 %      CO2 Content 33.5 mmol/L      Barometric Pressure for Blood Gas --     Comment: N/A        Modality Cannula     FIO2 32 %      Notified Who Sally     Read Back Yes     Notified Time --     Hemodilution No     PO2/FIO2 466    Comprehensive  Metabolic Panel [[id number]]  (Abnormal) Collected: 04/22/25 1649    Specimen: Blood from Arm, Right Updated: 04/22/25 1717     Glucose 143 mg/dL      BUN 50 mg/dL      Creatinine 2.89 mg/dL      Sodium 141 mmol/L      Potassium 4.0 mmol/L      Chloride 103 mmol/L      CO2 26.4 mmol/L      Calcium 9.2 mg/dL      Total Protein 7.3 g/dL      Albumin 3.9 g/dL      ALT (SGPT) 13 U/L      AST (SGOT) 21 U/L      Alkaline Phosphatase 53 U/L      Total Bilirubin 0.2 mg/dL      Globulin 3.4 gm/dL      A/G Ratio 1.1 g/dL      BUN/Creatinine Ratio 17.3     Anion Gap 11.6 mmol/L      eGFR 16.4 mL/min/1.73     Narrative:      GFR Categories in Chronic Kidney Disease (CKD)      GFR Category          GFR (mL/min/1.73)    Interpretation  G1                     90 or greater         Normal or high (1)  G2                      60-89                Mild decrease (1)  G3a                   45-59                Mild to moderate decrease  G3b                   30-44                Moderate to severe decrease  G4                    15-29                Severe decrease  G5                    14 or less           Kidney failure          (1)In the absence of evidence of kidney disease, neither GFR category G1 or G2 fulfill the criteria for CKD.    eGFR calculation 2021 CKD-EPI creatinine equation, which does not include race as a factor    Magnesium [[id number]]  (Normal) Collected: 04/22/25 1649    Specimen: Blood from Arm, Right Updated: 04/22/25 1717     Magnesium 2.4 mg/dL     Ammonia [[id number]]  (Normal) Collected: 04/22/25 1649    Specimen: Blood from Arm, Right Updated: 04/22/25 1716     Ammonia 16 umol/L     Acetaminophen Level [[id number]]  (Normal) Collected: 04/22/25 1619    Specimen: Blood from Arm, Right Updated: 04/22/25 1645     Acetaminophen <5.0 mcg/mL     Narrative:      Acetaminophen Therapeutic Range  5-20 ug/mL      Hours after ingestion            Toxic Value    4 Hours                           150 ug/mL    8 Hours                             70 ug/mL   12 Hours                            40 ug/mL   16 Hours                            20 ug/mL    These values apply to a single ingestion only.     Salicylate Level [[id number]]  (Normal) Collected: 04/22/25 1619    Specimen: Blood from Arm, Right Updated: 04/22/25 1645     Salicylate <0.5 mg/dL     CBC & Differential [[id number]]  (Abnormal) Collected: 04/22/25 1532    Specimen: Blood from Arm, Right Updated: 04/22/25 1558    Narrative:      The following orders were created for panel order CBC & Differential.  Procedure                               Abnormality         Status                     ---------                               -----------         ------                     CBC Auto Differential[[id number]]        Abnormal            Final result               Scan Slide[[id number]]                                       Final result                 Please view results for these tests on the individual orders.    CBC Auto Differential [[id number]]  (Abnormal) Collected: 04/22/25 1532    Specimen: Blood from Arm, Right Updated: 04/22/25 1558     WBC 9.60 10*3/mm3      RBC 2.52 10*6/mm3      Hemoglobin 8.0 g/dL      Hematocrit 27.4 %      .7 fL      MCH 31.7 pg      MCHC 29.2 g/dL      RDW 15.1 %      RDW-SD 59.6 fl      MPV 10.5 fL      Platelets 246 10*3/mm3      Neutrophil % 71.9 %      Lymphocyte % 14.1 %      Monocyte % 12.4 %      Eosinophil % 0.6 %      Basophil % 0.5 %      Immature Grans % 0.5 %      Neutrophils, Absolute 6.90 10*3/mm3      Lymphocytes, Absolute 1.35 10*3/mm3      Monocytes, Absolute 1.19 10*3/mm3      Eosinophils, Absolute 0.06 10*3/mm3      Basophils, Absolute 0.05 10*3/mm3      Immature Grans, Absolute 0.05 10*3/mm3      nRBC 0.0 /100 WBC     Scan Slide [[id number]] Collected: 04/22/25 1532    Specimen: Blood from Arm, Right Updated: 04/22/25 1558     Hypochromia Slight/1+     Macrocytes Slight/1+     WBC Morphology Normal     Platelet Estimate  Adequate    Urine Drug Screen - Straight Cath [[id number]]  (Abnormal) Collected: 04/22/25 1530    Specimen: Urine from Straight Cath Updated: 04/22/25 1558     THC, Screen, Urine Negative     Phencyclidine (PCP), Urine Negative     Cocaine Screen, Urine Negative     Methamphetamine, Ur Negative     Opiate Screen Negative     Amphetamine Screen, Urine Negative     Benzodiazepine Screen, Urine Negative     Tricyclic Antidepressants Screen Negative     Methadone Screen, Urine Negative     Barbiturates Screen, Urine Positive     Oxycodone Screen, Urine Negative     Buprenorphine, Screen, Urine Negative    Narrative:      Cutoff For Drugs Screened:    Amphetamines               500 ng/ml  Barbiturates               200 ng/ml  Benzodiazepines            150 ng/ml  Cocaine                    150 ng/ml  Methadone                  200 ng/ml  Opiates                    100 ng/ml  Phencyclidine               25 ng/ml  THC                         50 ng/ml  Methamphetamine            500 ng/ml  Tricyclic Antidepressants  300 ng/ml  Oxycodone                  100 ng/ml  Buprenorphine               10 ng/ml    The normal value for all drugs tested is negative. This report includes unconfirmed screening results, with the cutoff values listed, to be used for medical treatment purposes only.  Unconfirmed results must not be used for non-medical purposes such as employment or legal testing.  Clinical consideration should be applied to any drug of abuse test, particularly when unconfirmed results are used.    All urine drugs of abuse requests without chain of custody are for medical screening purposes only.  False positives are possible.      Urinalysis With Microscopic If Indicated (No Culture) - Straight Cath [[id number]]  (Abnormal) Collected: 04/22/25 1530    Specimen: Urine from Straight Cath Updated: 04/22/25 1539     Color, UA Yellow     Appearance, UA Clear     pH, UA 5.5     Specific Gravity, UA 1.014     Glucose,  mg/dL  (Trace)     Ketones, UA Negative     Bilirubin, UA Negative     Blood, UA Negative     Protein,  mg/dL (2+)     Leuk Esterase, UA Negative     Nitrite, UA Negative     Urobilinogen, UA 0.2 E.U./dL    Urinalysis, Microscopic Only - Straight Cath [[id number]] Collected: 04/22/25 1530    Specimen: Urine from Straight Cath Updated: 04/22/25 1539     RBC, UA None Seen /HPF      WBC, UA 0-2 /HPF      Bacteria, UA None Seen /HPF      Squamous Epithelial Cells, UA 0-2 /HPF      Hyaline Casts, UA 0-2 /LPF      Methodology Automated Microscopy    POC Glucose Once [[id number]]  (Abnormal) Collected: 04/22/25 1508    Specimen: Blood Updated: 04/22/25 1509     Glucose 140 mg/dL      Comment: Serial Number: [id number]:  [id number]                Imaging Results (Last 24 Hours)       Procedure Component Value Units Date/Time    XR Chest 1 View [[id number]] Collected: 04/22/25 1552     Updated: 04/22/25 1555    Narrative:      XR CHEST 1 VW    Date of Exam: 4/22/2025 3:40 PM EDT    Indication: AMS Protocol  AMS Protocol    Comparison: 2/16/2025    Findings:  Heart size is within normal limits for the projection. The right hemidiaphragm is elevated, unchanged. The pulmonary vascular pattern appears normal and the lungs appear clear.      Impression:      Impression:  No active disease.        Electronically Signed: Yfn Luna MD    4/22/2025 3:53 PM EDT    Workstation ID: [id number]    CT Head Without Contrast [[id number]] Collected: 04/22/25 1550     Updated: 04/22/25 1555    Narrative:      CT HEAD WO CONTRAST    Date of Exam: 4/22/2025 3:39 PM EDT    Indication: AMS, lethargic.    Comparison: 9/12/2024    Technique: Axial CT images were obtained of the head without contrast administration.  Coronal reconstructions were performed.  Automated exposure control and iterative reconstruction methods were used.      Findings:  There is no evidence of acute territorial infarction.    There is no acute intracranial hemorrhage.  There are no extra-axial collections.    Ventricles and CSF spaces are symmetric. No mass effect nor hydrocephalus.    There is a focal area of rounded decreased attenuation above the right cerebellar hemisphere, unchanged suggestive of prior infarction. Brain parenchyma appears similar to prior examination.     Paranasal sinuses and mastoid air cells are adequately aerated.     Osseous structures and orbits appear intact.      Impression:      Impression:  1.No acute intracranial finding.  2.Chronic changes as above.        Electronically Signed: Antonio Ceballos MD    4/22/2025 3:52 PM EDT    Workstation ID: [id number]              Assessment & Plan    Lynne Cade is a 74 y.o. female with a CMH of ILD, CAD, hyperlipidemia, diabetes mellitus, CKD stage IV who presented to [facility] on 4/22/2025 with confusion and generalized weakness.    Assessments  Acute encephalopathy: Possibly due to CO2 retention  Acute on chronic hypoxic respiratory failure with hypercarbia  Recently diagnosed with interstitial lung disease  Type 2 diabetes  Hyperlipidemia  CKD stage IV  Left upper extremity stenosis status post intervention per vascular surgery on 4/21/2025    Plan    -Encephalopathy likely due to acute respiratory acidosis, CT brain negative for any acute process, ammonia normal, will check TSH    -Continue DuoNebs, Pulmicort, Solu-Medrol 125 once, Solu-Medrol 40 twice daily thereafter, transition to p.o. with clinical improvement, patient not wearing BiPAP due to anxiety, will start patient on high flow nasal cannula, plan to repeat ABG in few hours    -Start SSI for type 2 diabetes, monitor glucose closely, start insulin steroid protocol    -Patient does have a left upper extremity swelling, per spouse swelling is slightly reduced since the intervention yesterday, recommend patient to follow-up outpatient with vascular as scheduled, if swelling increases may consider reaching out to vascular surgery for any  recommendations    -Will give dose of Lasix IV 40 once, monitor strict ins and outs, creatinine near the baseline of 2.6-2.7, creatinine closely, continue IV or resume her p.o. depending on the clinical provide in the morning, continue Solu-Medrol    -Resume other home medication once reconciled per pharmacy or medically appropriate    -Follow a.m. labs    VTE Prophylaxis:  No VTE prophylaxis order currently exists.    CODE STATUS:  Full      I discussed the patient's findings and my recommendations with patient and nursing staff.      This document has been electronically signed by Trever Hernandez MD on 2025 20:41 EDT   [facility]     Electronically signed by Trever Hernandez MD at 25 0213          Physician Progress Notes (last 24 hours)        Pernell Trivedi MD at 25 0915              [facility] MEDICINE SERVICE  DAILY PROGRESS NOTE    NAME: Lynne Cade  : 1950  MRN: [id number]   Sex: female  Age: 74 y.o.    PROVIDER OF SERVICE: Pernell Trivedi MD    Chief Complaint: Lethargy    Subjective:     Interval History:  Lynne Cade was seen and examined at bedside following hospital admission due to lethargy, sleepiness.  This is a very pleasant 74-year-old  female, who is being followed along by nephrology, and pulmonology.  The patient was found to have acute on chronic hypercapnic and hypoxic respiratory insufficiency.  She has been evaluated, and also has been found to have features of obstructive sleep apnea.  Currently, she is on 2 L/min of O2 via nasal cannula, and tolerating BiPAP during her sleep.  She has been recommended a sleep study as an outpatient.  She is being followed along by the care management team, and remains on IV diuretics, which are currently on hold by the nephrology team.  Complaining of restless legs.      She had dialysis yesterday for the first time  Doing okay this morning without any complaint however  she told me that she was extremely anxious when she had the dialysis yesterday  Denies any shortness of breath at rest  Lower extremity pain/restless is better    Objective:     Vital Signs  Temp:  [97.4 °F (36.3 °C)-98.1 °F (36.7 °C)] 97.9 °F (36.6 °C)  Heart Rate:  [] 94  Resp:  [14-24] 20  BP: (109-145)/(35-75) 130/54  Flow (L/min) (Oxygen Therapy):  [2] 2   Body mass index is 36.24 kg/m².    Intake/Output    Intake/Output Summary (Last 24 hours) at 4/30/2025 0915  Last data filed at 4/30/2025 0541  Gross per 24 hour   Intake 840 ml   Output 3600 ml   Net -2760 ml        Physical Exam  General: Alert, cooperative, no distress, AAOx3  Lungs: Clear to auscultation bilaterally, respirations unlabored  Heart: Regular rate and rhythm, S1 and S2 normal, no murmurs, no rub or gallop.  2+ edema.  Abdomen: Soft, nontender, bowel sounds active, no masses, no organomegaly  Psychiatric: Appropriate mood and affect.     Diagnostic Data    Results from last 7 days   Lab Units 04/30/25  0409 04/28/25  0946 04/25/25  1630   WBC 10*3/mm3 9.31   < >  --    HEMOGLOBIN g/dL 7.3*   < >  --    HEMATOCRIT % 24.6*   < >  --    PLATELETS 10*3/mm3 242   < >  --    GLUCOSE mg/dL 147*   < > 149*   CREATININE mg/dL 2.26*   < > 3.51*   BUN mg/dL 48*   < > 75*   SODIUM mmol/L 138   < > 136   POTASSIUM mmol/L 4.5   < > 4.9   AST (SGOT) U/L  --   --  37*   ALT (SGPT) U/L  --   --  25   ALK PHOS U/L  --   --  41   BILIRUBIN mg/dL  --   --  0.2   ANION GAP mmol/L 8.8   < > 12.0    < > = values in this interval not displayed.       No radiology results for the last day    I reviewed the patient's new clinical results.    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  Status post hemodialysis yesterday, it was her first treatment  - on board, patient is being planned to have hemodialysis going forward she already has left upper extremity AV graft  2.  Anemia of chronic disease, hemoglobin at 7.3.  No indication for transfusion  3.  Patient has been recommended NIPPV as an outpatient.  She will need an outpatient sleep study.  Pulmonary is following  4.  Lower extremity restless seems to be better after Requip was started yesterday we will keep her on it  5.  Further recommendations to follow pending hospital course.  6.  Seen by PT recommending SNF    VTE Prophylaxis:  Pharmacologic VTE prophylaxis orders are present.     Disposition Planning:     Barriers to Discharge: CHRIS  Anticipated Date of Discharge: TBD  Place of Discharge: SNF    Signature: Electronically signed by Pernell Trivedi MD, 04/30/25, 09:15 EDT.  [facility] Hospitalist Team      Electronically signed by Pernell Trivedi MD at 04/30/25 0919       Gigi Claire MD at 04/30/25 0739          Daily Progress Note        Lethargy    Assessment:     Acute/chronic hypercapnic/hypoxic respiratory insufficiency     Chronic elevated right hemidiaphragm, associated right lower lobe mild bronchiectasis    CT chest February 2025 no evidence of interstitial lung disease     Respiratory panel negative     non-smoker  No PFTs     Features of obstructive sleep apnea     CKD stage IV  Diabetes mellitus  Hyperlipidemia     Encephalopathy   TSH 3.1  Ammonia 16        Recommendations:        oxygen titration currently on 2 L nasal cannula     Tolerated BiPAP during sleep  Sleep study as an outpatient     Bronchodilators currently on Pulmicort and DuoNeb     Steroids discontinued since there is no evidence of interstitial lung disease on most recent CAT scan in February 2025     Minimize sedatives      Currently off antibiotics     Followed by nephrology for CKD/worsening azotemia /hemodialysis          CT chest  "2/15/2025          LOS: 7 days     Total critical care time spent with patient greater than: 45 Minutes    Electronically signed by Gigi Claire MD at 04/30/25 0742         Hepatitis B Surface Antibody [[id number]] (Order [id number])  Order  Date: 4/29/2025 Department: [facility] Released By: Vanesa Mercer RN (auto-released) Authorizing: Vipul Elias MD     In Basket Actions    Done  Result Mgmt     View in In Basket  Reprint Order Requisition    Hepatitis B Surface Antibody (Order #[id number]) on 4/29/25        Hepatitis B Surface Antibody  Order: [id number]   Status: Final result       Next appt: 05/29/2025 at 01:30 PM in Vascular Surgery (Zaina Bernard MD)    Test Result Released: No (scheduled for 4/30/2025 11:38 AM)    Specimen Information: Blood   0 Result Notes      Component  Ref Range & Units (hover) 04:09   Hep B S Ab Non-Reactive   Resulting Agency  [facility]                 Hepatitis B Surface Antigen [[id number]] (Order [id number])  Order  Date: 4/25/2025 Department: [facility] Released By/Authorizing: Brammell, Timothy Duane, MD (auto-released)     Reprint Order Requisition    Hepatitis B Surface Antigen (Order #[id number]) on 4/25/25        Hepatitis B Surface Antigen  Order: [id number]   Status: Final result       Next appt: 05/29/2025 at 01:30 PM in Vascular Surgery (Zaina Bernard MD)    Test Result Released: Yes (seen)    Specimen Information: Blood   0 Result Notes          Component  Ref Range & Units (hover) 5 d ago  (4/25/25) 3 mo ago  (1/8/25) 1 yr ago  (9/1/23) 2 yr ago  (5/1/23) 2 yr ago  (6/26/22)   Hepatitis B Surface Ag Non-Reactive Non-Reactive Non-Reactive Non-Reactive Non-Reactive   Resulting Agency [facility]             Specimen Collected: 04/25/25 13:47 EDT     Immunization Summary  Lynne TIDWELL Alyssia \"NAVIN\"  MRN: [id number]     Patient Information    Patient Name  Lynne Cade Legal " Sex  Female   1950     Immunizations by Immunization Family    COVID-19 2021 (70 y.o.) 2021 (70 y.o.)     Influenza 10/5/2015 (65 y.o.) 10/5/2015 (65 y.o.) 2016 (66 y.o.) 2016 (66 y.o.)    2017 (67 y.o.) 10/1/2018 (68 y.o.) 11/3/2018 (68 y.o.) 2019 (69 y.o.)    2019 (69 y.o.) 2019 (69 y.o.) 2022 (72 y.o.) 10/3/2023 (73 y.o.)   Pneumococcal PCV 10/1/2018 (68 y.o.) 11/3/2018 (68 y.o.)     Pneumococcal Polysaccharide (PPSV23) 2020 (70 y.o.)      Zoster 2015 (64 y.o.)        Immunizations Ungrouped    Name Date     COVID-19 (MODERNA) 1st,2nd,3rd Dose Monovalent 21 (70 y.o.)    COVID-19 (MODERNA) 1st,2nd,3rd Dose Monovalent 21 (70 y.o.)    FLUAD TRI 65YR+ 19 (69 y.o.)    Flu Vaccine Quad PF 6-35MO 16 (66 y.o.)    Flu Vaccine Quad PF 6-35MO 10/05/15 (65 y.o.)    Flu Vaccine Quad PF >36MO 16 (66 y.o.)    Flu Vaccine Quad PF >36MO 10/05/15 (65 y.o.)    Fluad Quad 65+ 19 (69 y.o.)    Fluzone High-Dose 65+YRS 19 (69 y.o.)    Fluzone High-Dose 65+YRS 18 (68 y.o.)    Fluzone High-Dose 65+YRS 10/01/18 (68 y.o.)    Fluzone High-Dose 65+YRS 09/27/17 (67 y.o.)    Fluzone High-Dose 65+yrs 10/03/23 (73 y.o.)    Fluzone High-Dose 65+yrs 22 (72 y.o.)    Pneumococcal Conjugate 13-Valent (PCV13) 18 (68 y.o.)    Pneumococcal Conjugate 13-Valent (PCV13) 10/01/18 (68 y.o.)    Pneumococcal Polysaccharide (PPSV23) 20 (70 y.o.)    Zoster 02/12/15 (64 y.o.)

## 2025-05-01 LAB
ALBUMIN SERPL-MCNC: 3.1 G/DL (ref 3.5–5.2)
ANION GAP SERPL CALCULATED.3IONS-SCNC: 13.7 MMOL/L (ref 5–15)
BUN SERPL-MCNC: 60 MG/DL (ref 8–23)
BUN/CREAT SERPL: 19 (ref 7–25)
CALCIUM SPEC-SCNC: 8.5 MG/DL (ref 8.6–10.5)
CHLORIDE SERPL-SCNC: 103 MMOL/L (ref 98–107)
CO2 SERPL-SCNC: 25.3 MMOL/L (ref 22–29)
CREAT SERPL-MCNC: 3.16 MG/DL (ref 0.57–1)
EGFRCR SERPLBLD CKD-EPI 2021: 14.7 ML/MIN/1.73
GLUCOSE BLDC GLUCOMTR-MCNC: 116 MG/DL (ref 70–105)
GLUCOSE BLDC GLUCOMTR-MCNC: 141 MG/DL (ref 70–105)
GLUCOSE BLDC GLUCOMTR-MCNC: 180 MG/DL (ref 70–105)
GLUCOSE SERPL-MCNC: 125 MG/DL (ref 65–99)
HBV CORE AB SERPL QL IA: NEGATIVE
PHOSPHATE SERPL-MCNC: 6.8 MG/DL (ref 2.5–4.5)
POTASSIUM SERPL-SCNC: 4.9 MMOL/L (ref 3.5–5.2)
SODIUM SERPL-SCNC: 142 MMOL/L (ref 136–145)

## 2025-05-01 PROCEDURE — 94660 CPAP INITIATION&MGMT: CPT

## 2025-05-01 PROCEDURE — 97110 THERAPEUTIC EXERCISES: CPT

## 2025-05-01 PROCEDURE — 94761 N-INVAS EAR/PLS OXIMETRY MLT: CPT

## 2025-05-01 PROCEDURE — 97530 THERAPEUTIC ACTIVITIES: CPT

## 2025-05-01 PROCEDURE — 63710000001 INSULIN LISPRO (HUMAN) PER 5 UNITS: Performed by: STUDENT IN AN ORGANIZED HEALTH CARE EDUCATION/TRAINING PROGRAM

## 2025-05-01 PROCEDURE — 25010000002 NA FERRIC GLUC CPLX PER 12.5 MG: Performed by: INTERNAL MEDICINE

## 2025-05-01 PROCEDURE — 97535 SELF CARE MNGMENT TRAINING: CPT

## 2025-05-01 PROCEDURE — 82948 REAGENT STRIP/BLOOD GLUCOSE: CPT

## 2025-05-01 PROCEDURE — 94799 UNLISTED PULMONARY SVC/PX: CPT

## 2025-05-01 PROCEDURE — 25810000003 SODIUM CHLORIDE 0.9 % SOLUTION: Performed by: INTERNAL MEDICINE

## 2025-05-01 PROCEDURE — 80069 RENAL FUNCTION PANEL: CPT | Performed by: INTERNAL MEDICINE

## 2025-05-01 PROCEDURE — 25010000002 HEPARIN (PORCINE) PER 1000 UNITS: Performed by: INTERNAL MEDICINE

## 2025-05-01 PROCEDURE — 94664 DEMO&/EVAL PT USE INHALER: CPT

## 2025-05-01 RX ORDER — BUDESONIDE 0.5 MG/2ML
0.5 INHALANT ORAL
Start: 2025-05-01

## 2025-05-01 RX ORDER — INSULIN GLARGINE 100 [IU]/ML
7 INJECTION, SOLUTION SUBCUTANEOUS DAILY
Start: 2025-05-01

## 2025-05-01 RX ORDER — LIDOCAINE AND PRILOCAINE 25; 25 MG/G; MG/G
CREAM TOPICAL ONCE
Status: COMPLETED | OUTPATIENT
Start: 2025-05-01 | End: 2025-05-01

## 2025-05-01 RX ORDER — ALBUMIN (HUMAN) 12.5 G/50ML
12.5 SOLUTION INTRAVENOUS AS NEEDED
Status: DISCONTINUED | OUTPATIENT
Start: 2025-05-01 | End: 2025-05-02

## 2025-05-01 RX ADMIN — ROPINIROLE HYDROCHLORIDE 0.5 MG: 0.25 TABLET, FILM COATED ORAL at 21:14

## 2025-05-01 RX ADMIN — INSULIN LISPRO 2 UNITS: 100 INJECTION, SOLUTION INTRAVENOUS; SUBCUTANEOUS at 21:15

## 2025-05-01 RX ADMIN — ROPINIROLE HYDROCHLORIDE 0.5 MG: 0.25 TABLET, FILM COATED ORAL at 16:52

## 2025-05-01 RX ADMIN — DULOXETINE 60 MG: 30 CAPSULE, DELAYED RELEASE ORAL at 12:09

## 2025-05-01 RX ADMIN — SODIUM CHLORIDE 250 MG: 9 INJECTION, SOLUTION INTRAVENOUS at 12:09

## 2025-05-01 RX ADMIN — IPRATROPIUM BROMIDE AND ALBUTEROL SULFATE 3 ML: .5; 3 SOLUTION RESPIRATORY (INHALATION) at 19:30

## 2025-05-01 RX ADMIN — ROPINIROLE HYDROCHLORIDE 0.5 MG: 0.25 TABLET, FILM COATED ORAL at 12:09

## 2025-05-01 RX ADMIN — HYDROXYZINE HYDROCHLORIDE 10 MG: 10 TABLET, FILM COATED ORAL at 22:32

## 2025-05-01 RX ADMIN — ASPIRIN 81 MG: 81 TABLET, COATED ORAL at 12:09

## 2025-05-01 RX ADMIN — ROSUVASTATIN CALCIUM 10 MG: 10 TABLET, FILM COATED ORAL at 12:09

## 2025-05-01 RX ADMIN — CALCITRIOL CAPSULES 0.25 MCG 0.5 MCG: 0.25 CAPSULE ORAL at 12:09

## 2025-05-01 RX ADMIN — DULOXETINE 60 MG: 30 CAPSULE, DELAYED RELEASE ORAL at 21:14

## 2025-05-01 RX ADMIN — IPRATROPIUM BROMIDE AND ALBUTEROL SULFATE 3 ML: .5; 3 SOLUTION RESPIRATORY (INHALATION) at 06:21

## 2025-05-01 RX ADMIN — GABAPENTIN 100 MG: 100 CAPSULE ORAL at 12:09

## 2025-05-01 RX ADMIN — LIDOCAINE AND PRILOCAINE: 25; 25 CREAM TOPICAL at 12:52

## 2025-05-01 RX ADMIN — ACETAMINOPHEN 650 MG: 325 TABLET, FILM COATED ORAL at 23:41

## 2025-05-01 RX ADMIN — GABAPENTIN 100 MG: 100 CAPSULE ORAL at 21:14

## 2025-05-01 RX ADMIN — PRIMIDONE 50 MG: 50 TABLET ORAL at 21:14

## 2025-05-01 RX ADMIN — HEPARIN SODIUM 5000 UNITS: 5000 INJECTION INTRAVENOUS; SUBCUTANEOUS at 21:15

## 2025-05-01 RX ADMIN — FAMOTIDINE 20 MG: 20 TABLET, FILM COATED ORAL at 12:09

## 2025-05-01 RX ADMIN — HEPARIN SODIUM 5000 UNITS: 5000 INJECTION INTRAVENOUS; SUBCUTANEOUS at 12:08

## 2025-05-01 RX ADMIN — BUDESONIDE 0.5 MG: 0.5 SUSPENSION RESPIRATORY (INHALATION) at 19:26

## 2025-05-01 RX ADMIN — DILTIAZEM HYDROCHLORIDE 300 MG: 180 CAPSULE, EXTENDED RELEASE ORAL at 12:09

## 2025-05-01 RX ADMIN — BUDESONIDE 0.5 MG: 0.5 SUSPENSION RESPIRATORY (INHALATION) at 06:21

## 2025-05-01 NOTE — SIGNIFICANT NOTE
Case Management/Social Work    Patient Name:  Lynne Cade  YOB: 1950  MRN: [id number]  Admit Date:  4/22/2025 05/01/25 1104   Post Acute Pre-Cert Documentation   Request Submitted by Facility - Type: Hospital   Post-Acute Authorization Type Submitted: SNF   Date Post Acute Pre-Cert Inititated per Facility 05/01/25   Verification from Payer Yes   Date Post Acute Pre-Cert Completed 05/01/25   Accepting Facility [facility]   Hospital Discharge Date Requested 05/03/25   All Clinicals Submitted? Yes   Had Accepting Facility at Time of Submission Yes   Response Received from Insurance? Approval   Response Communicated to:    Authorization Number: [id number]   Post Acute Pre-Cert Initiated Comment CME submitted SNF precert for [facility] via [employer] portal. Auth ID [id number] . SNF precert auto approved. Valid 5/3-5/9. Approval letter indexed to media. CM made aware.           Electronically signed by:  RAJAT Guerrero  05/01/25 11:07 EDT    Rik Guerrero  Case Management Extender  [address] [address]  P: [phone]  F: [phone]

## 2025-05-01 NOTE — THERAPY TREATMENT NOTE
Subjective: Pt agreeable to therapeutic plan of care.  Cognition: oriented to Person, Place, Time, and Situation, memory: Normal, arousal/alertness: Alert, safety/judgement: good, and awareness of deficits: good awareness of safety precautions and good awareness of deficits    Objective: no visible myoclonus today. Had HD this am. Purewick in place & urine appears dark.    Precautions - fall    Bed Mobility: CGA, Mod-A, and with adaptive equipment. Initial sup> sit with mod (A) then sit>supine w/ CGA.  Functional Transfers: Min-A and with rolling walker     Balance: supported, dynamic, with UE support, and standing Min-A and with rolling walker  Functional Ambulation: Min-A and with rolling walker to make ambulatory transfer to chair, then complete X4 sit<>stands, then needing to transfer back to bed for IV line placement using guided ultrasound.    Grooming: Supervision  ADL Position: supported sitting  ADL Comments: while seated in the recliner pt provided items to wash face, hands, & to complete oral care.    Lower Body Dressing and Toileting: Dependent  ADL Position: supine  ADL Comments: to don socks & underwear and for bowel & bladder mgmt.    Vitals: WNL    Pain: 2 VAS Location: sacrum. Placed waffle cushion   Interventions for pain: Repositioned, Increased Activity, and Therapeutic Presence  Education: Provided education on the importance of mobility in the acute care setting, Verbal/Tactile Cues, ADL training, and Transfer Training    Assessment: Lynne Cade presents with ADL impairments affecting function including balance, endurance / activity tolerance, range of motion (ROM), and strength. Pt showing improvement this date after HD. Pt alert and active with therapy. Spouse pleased reporting that since she had a surgery in February she has not been (I) or active. Demonstrated functioning below baseline abilities indicate the need for continued skilled intervention while inpatient. Tolerating session  "today without incident. Will continue to follow and progress as tolerated. Pt plans SNF for rehab with OP HD 3 days/week.    Plan/Recommendations:   Moderate Intensity Therapy recommended post-acute care. This is recommended as therapy feels the patient would require 3-4 days per week and wouldn't tolerate \"3 hour daily\" rehab intensity. SNF would be the preferred choice. If the patient does not agree to SNF, arrange HH or OP depending on home bound status. If patient is medically complex, consider LTACH.. Pt requires no DME at discharge.     Pt desires Skilled Rehab placement at discharge. Pt cooperative; agreeable to therapeutic recommendations and plan of care.     Modified [place]: 4 = Moderately severe disability (Unable to attend to own bodily needs without assistance, and unable to walk unassisted)     Post-Tx Position: Supine with HOB Elevated, Alarms activated, and Call light and personal items within reach  PPE: gloves    Therapy Charges for Today       Code Description Service Date Service Provider Modifiers Qty    33177209854  OT SELF CARE/MGMT/TRAIN EA 15 MIN 5/1/2025 Maryjo Johnson OT GO 1    [id number]  OT THERAPEUTIC ACT EA 15 MIN 5/1/2025 Maryjo Johnson OT GO 1           Time Calculation- OT       Row Name 05/01/25 1417             Time Calculation-     OT Start Time 1341  -      OT Stop Time 1402  -      OT Time Calculation (min) 21 min  -      Total Timed Code Minutes- OT 21 minute(s)  -      OT Received On 05/01/25  -      OT - Next Appointment 05/02/25  -                User Key  (r) = Recorded By, (t) = Taken By, (c) = Cosigned By      Initials Name Provider Type     Maryjo Johnson OT Occupational Therapist                          "

## 2025-05-01 NOTE — PLAN OF CARE
Goal Outcome Evaluation:                 Problem: Adult Inpatient Plan of Care  Goal: Plan of Care Review  5/1/2025 1702 by Arlin Davidson RN  Outcome: Progressing  5/1/2025 1702 by Arlin Davidson RN  Outcome: Progressing  Goal: Patient-Specific Goal (Individualized)  5/1/2025 1702 by Arlin Davidson RN  Outcome: Progressing  5/1/2025 1702 by Arlin Davidson RN  Outcome: Progressing  Goal: Absence of Hospital-Acquired Illness or Injury  5/1/2025 1702 by Arlin Davidson RN  Outcome: Progressing  5/1/2025 1702 by Arlin Davidson RN  Outcome: Progressing  Intervention: Identify and Manage Fall Risk  Recent Flowsheet Documentation  Taken 5/1/2025 1656 by Arlin Davidson RN  Safety Promotion/Fall Prevention:   assistive device/personal items within reach   activity supervised   clutter free environment maintained   fall prevention program maintained   nonskid shoes/slippers when out of bed   room organization consistent   safety round/check completed  Taken 5/1/2025 1425 by Arlin Davidson RN  Safety Promotion/Fall Prevention:   activity supervised   assistive device/personal items within reach   clutter free environment maintained   fall prevention program maintained   nonskid shoes/slippers when out of bed   room organization consistent   safety round/check completed  Taken 5/1/2025 1215 by Arlin Davidson RN  Safety Promotion/Fall Prevention: patient off unit  Intervention: Prevent Skin Injury  Recent Flowsheet Documentation  Taken 5/1/2025 1656 by Arlin Davidson RN  Body Position: position changed independently  Skin Protection: incontinence pads utilized  Taken 5/1/2025 1215 by Arlin Davidson RN  Body Position: position changed independently  Skin Protection: incontinence pads utilized  Intervention: Prevent and Manage VTE (Venous Thromboembolism) Risk  Recent Flowsheet Documentation  Taken 5/1/2025 1656 by Arlin Davidson RN  VTE Prevention/Management: (SQ heparin) other (see comments)  Taken  5/1/2025 1215 by Arlin Davidson RN  VTE Prevention/Management: (SQ heparin) other (see comments)  Intervention: Prevent Infection  Recent Flowsheet Documentation  Taken 5/1/2025 1656 by Arlin Davidson RN  Infection Prevention:   hand hygiene promoted   personal protective equipment utilized   single patient room provided   rest/sleep promoted   environmental surveillance performed  Taken 5/1/2025 1425 by Arlin Davidson RN  Infection Prevention:   personal protective equipment utilized   hand hygiene promoted   rest/sleep promoted   single patient room provided   environmental surveillance performed  Taken 5/1/2025 1215 by Arlin Davidson RN  Infection Prevention:   hand hygiene promoted   personal protective equipment utilized   rest/sleep promoted   single patient room provided   environmental surveillance performed  Goal: Optimal Comfort and Wellbeing  5/1/2025 1702 by Arlin Davidson RN  Outcome: Progressing  5/1/2025 1702 by Arlin Davidson RN  Outcome: Progressing  Intervention: Provide Person-Centered Care  Recent Flowsheet Documentation  Taken 5/1/2025 1656 by Arlin Davidson RN  Trust Relationship/Rapport:   choices provided   care explained  Taken 5/1/2025 1215 by Arlin Davidson RN  Trust Relationship/Rapport:   choices provided   care explained  Goal: Readiness for Transition of Care  5/1/2025 1702 by Arlin Davidson RN  Outcome: Progressing  5/1/2025 1702 by Arlin Davidson RN  Outcome: Progressing     Problem: Fall Injury Risk  Goal: Absence of Fall and Fall-Related Injury  Outcome: Progressing  Intervention: Identify and Manage Contributors  Recent Flowsheet Documentation  Taken 5/1/2025 1656 by Arlin Davidson RN  Medication Review/Management: medications reviewed  Self-Care Promotion:   BADL personal objects within reach   independence encouraged  Taken 5/1/2025 1425 by Arlin Davidson RN  Medication Review/Management: medications reviewed  Taken 5/1/2025 1215 by Arlin Davidson  RN  Medication Review/Management: medications reviewed  Self-Care Promotion:   independence encouraged   BADL personal objects within reach  Intervention: Promote Injury-Free Environment  Recent Flowsheet Documentation  Taken 5/1/2025 1656 by Arlin Davidson, RN  Safety Promotion/Fall Prevention:   assistive device/personal items within reach   activity supervised   clutter free environment maintained   fall prevention program maintained   nonskid shoes/slippers when out of bed   room organization consistent   safety round/check completed  Taken 5/1/2025 1425 by Arlin Davidson, RN  Safety Promotion/Fall Prevention:   activity supervised   assistive device/personal items within reach   clutter free environment maintained   fall prevention program maintained   nonskid shoes/slippers when out of bed   room organization consistent   safety round/check completed  Taken 5/1/2025 1215 by Arlin Davidson, RN  Safety Promotion/Fall Prevention: patient off unit

## 2025-05-01 NOTE — PROGRESS NOTES
Daily Progress Note        Lethargy    Assessment:     Acute/chronic hypercapnic/hypoxic respiratory insufficiency     Chronic elevated right hemidiaphragm, associated right lower lobe mild bronchiectasis    CT chest February 2025 no evidence of interstitial lung disease     Respiratory panel negative     non-smoker  No PFTs     Features of obstructive sleep apnea     CKD stage IV  Diabetes mellitus  Hyperlipidemia     Encephalopathy   TSH 3.1  Ammonia 16        Recommendations:        oxygen titration currently on 2 L nasal cannula     Tolerated BiPAP during sleep  Sleep study as an outpatient     Bronchodilators currently on Pulmicort and DuoNeb     Steroids discontinued since there is no evidence of interstitial lung disease on most recent CAT scan in February 2025     Minimize sedatives      Currently off antibiotics     Followed by nephrology for CKD/worsening azotemia /hemodialysis          CT chest 2/15/2025          LOS: 8 days     Subjective         Objective     Vital signs for last 24 hours:  Vitals:    05/01/25 0628 05/01/25 0720 05/01/25 0728 05/01/25 0745   BP:  131/43 124/52 112/54   BP Location:  Right arm Right arm Right arm   Patient Position:  Lying Lying Lying   Pulse: 81 79 78 77   Resp: 20 14 14 14   Temp:       TempSrc:       SpO2: 100% 95% 100% 99%   Weight:       Height:           Intake/Output last 3 shifts:  I/O last 3 completed shifts:  In: 840 [P.O.:840]  Out: 700 [Urine:700]  Intake/Output this shift:  No intake/output data recorded.      Radiology  Imaging Results (Last 24 Hours)       ** No results found for the last 24 hours. **            Labs:  Results from last 7 days   Lab Units 04/30/25  0409   WBC 10*3/mm3 9.31   HEMOGLOBIN g/dL 7.3*   HEMATOCRIT % 24.6*   PLATELETS 10*3/mm3 242     Results from last 7 days   Lab Units 05/01/25  0415 04/28/25  0946 04/25/25  1630   SODIUM mmol/L 142   < > 136   POTASSIUM mmol/L 4.9   < > 4.9   CHLORIDE mmol/L 103   < > 101   CO2 mmol/L 25.3   <  > 23.0   BUN mg/dL 60*   < > 75*   CREATININE mg/dL 3.16*   < > 3.51*   CALCIUM mg/dL 8.5*   < > 8.3*   BILIRUBIN mg/dL  --   --  0.2   ALK PHOS U/L  --   --  41   ALT (SGPT) U/L  --   --  25   AST (SGOT) U/L  --   --  37*   GLUCOSE mg/dL 125*   < > 149*    < > = values in this interval not displayed.           Results from last 7 days   Lab Units 05/01/25  0415 04/30/25  0409 04/29/25  0400   ALBUMIN g/dL 3.1* 3.4* 3.4*     Results from last 7 days   Lab Units 04/25/25  1630   CK TOTAL U/L 52                               Meds:   SCHEDULE  aspirin, 81 mg, Oral, Daily  budesonide, 0.5 mg, Nebulization, BID - RT  calcitriol, 0.5 mcg, Oral, Daily  dilTIAZem CD, 300 mg, Oral, Daily  DULoxetine, 60 mg, Oral, BID  famotidine, 20 mg, Oral, Daily  ferric gluconate, 250 mg, Intravenous, Daily  [Held by provider] furosemide, 40 mg, Oral, Daily  gabapentin, 100 mg, Oral, Q12H  heparin (porcine), 5,000 Units, Subcutaneous, Q12H  hydrALAZINE, 25 mg, Oral, BID  insulin lispro, 2-7 Units, Subcutaneous, 4x Daily AC & at Bedtime  ipratropium-albuterol, 3 mL, Nebulization, BID  lidocaine-prilocaine, , Topical, Once  primidone, 50 mg, Oral, Nightly  rOPINIRole, 0.5 mg, Oral, TID  rosuvastatin, 10 mg, Oral, Daily      Infusions     PRNs    acetaminophen **OR** acetaminophen **OR** acetaminophen    senna-docusate sodium **AND** polyethylene glycol **AND** bisacodyl **AND** bisacodyl    Calcium Replacement - Follow Nurse / BPA Driven Protocol    dextrose    dextrose    glucagon (human recombinant)    hydrOXYzine    ipratropium-albuterol    Magnesium Low Dose Replacement - Follow Nurse / BPA Driven Protocol    ondansetron    prochlorperazine    sodium chloride    Physical Exam:  Physical Exam  Cardiovascular:      Heart sounds: Murmur heard.      No gallop.   Pulmonary:      Effort: No respiratory distress.      Breath sounds: No stridor. Rhonchi and rales present. No wheezing.   Chest:      Chest wall: No tenderness.         ROS  Review  of Systems   Respiratory:  Positive for cough and shortness of breath. Negative for wheezing and stridor.    Cardiovascular:  Positive for palpitations and leg swelling. Negative for chest pain.             Total critical care time spent with patient greater than: 45 Minutes

## 2025-05-01 NOTE — PLAN OF CARE
Goal Outcome Evaluation:            Assessment: Lynne Cade presents with functional mobility impairments which indicate the need for skilled intervention. Patient demos improved alertness and participation; reduced myoclonic jerking and improved stability in standing.  Patient remains well below functional baseline and will benefit from SNF level of care once medically appropriate. Tolerating session today without incident. Will continue to follow and progress as tolerated

## 2025-05-01 NOTE — PROGRESS NOTES
[facility] MEDICINE SERVICE  DAILY PROGRESS NOTE    NAME: Lynne Cade  : 1950  MRN: [id number]   Sex: female  Age: 74 y.o.    PROVIDER OF SERVICE: Pernell Trivedi MD    Chief Complaint: Lethargy    Subjective:     Interval History:  Lynne Cade was seen and examined at bedside following hospital admission due to lethargy, sleepiness.  This is a very pleasant 74-year-old  female, who is being followed along by nephrology, and pulmonology.  The patient was found to have acute on chronic hypercapnic and hypoxic respiratory insufficiency.  She has been evaluated, and also has been found to have features of obstructive sleep apnea.  Currently, she is on 2 L/min of O2 via nasal cannula, and tolerating BiPAP during her sleep.  She has been recommended a sleep study as an outpatient.  She is being followed along by the care management team, and remains on IV diuretics, which are currently on hold by the nephrology team.  Complaining of restless legs.      She had dialysis yesterday for the first time  Doing okay this morning without any complaint however she told me that she was extremely anxious when she had the dialysis yesterday  Denies any shortness of breath at rest  Lower extremity pain/restless is better    25  - Seen on dialysis, sleepy but easily arousable, not in distress    Objective:     Vital Signs  Temp:  [97.2 °F (36.2 °C)-97.7 °F (36.5 °C)] 97.2 °F (36.2 °C)  Heart Rate:  [74-95] 86  Resp:  [13-24] 16  BP: ()/(43-59) 114/48  Flow (L/min) (Oxygen Therapy):  [2-3] 2   Body mass index is 36.24 kg/m².    Intake/Output    Intake/Output Summary (Last 24 hours) at 2025 1054  Last data filed at 2025 0545  Gross per 24 hour   Intake 720 ml   Output 400 ml   Net 320 ml        Physical Exam  General: Alert, cooperative, no distress, AAOx3  Lungs: Clear to auscultation bilaterally, respirations unlabored  Heart: Regular rate and rhythm, S1 and S2 normal, no  murmurs, no rub or gallop.  2+ edema.  Abdomen: Soft, nontender, bowel sounds active, no masses, no organomegaly  Psychiatric: Appropriate mood and affect.     Diagnostic Data    Results from last 7 days   Lab Units 05/01/25  0415 04/30/25  0409 04/28/25  0946 04/25/25  1630   WBC 10*3/mm3  --  9.31   < >  --    HEMOGLOBIN g/dL  --  7.3*   < >  --    HEMATOCRIT %  --  24.6*   < >  --    PLATELETS 10*3/mm3  --  242   < >  --    GLUCOSE mg/dL 125* 147*   < > 149*   CREATININE mg/dL 3.16* 2.26*   < > 3.51*   BUN mg/dL 60* 48*   < > 75*   SODIUM mmol/L 142 138   < > 136   POTASSIUM mmol/L 4.9 4.5   < > 4.9   AST (SGOT) U/L  --   --   --  37*   ALT (SGPT) U/L  --   --   --  25   ALK PHOS U/L  --   --   --  41   BILIRUBIN mg/dL  --   --   --  0.2   ANION GAP mmol/L 13.7 8.8   < > 12.0    < > = values in this interval not displayed.       No radiology results for the last day    I reviewed the patient's new clinical results.    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  Status post hemodialysis 4/29, it was her first treatment  -Second HD today  - on board, patient is being planned to have hemodialysis going forward she already has left upper extremity AV graft  -Pre-CERT was started, just discussed with     2.  Anemia of chronic disease, hemoglobin at 7.3.  On IV iron and EPO by nephrology  3.  Patient has been recommended NIPPV as an outpatient.  She will need an outpatient sleep study.  Pulmonary is following  4.  Lower extremity restless seems to be better after Requip was started yesterday we will keep her on it  5.  Further recommendations to follow pending hospital course.  6.  Seen by PT recommending  SNF    VTE Prophylaxis:  Pharmacologic VTE prophylaxis orders are present.     Disposition Planning:     Barriers to Discharge: CHRIS  Anticipated Date of Discharge: TBD  Place of Discharge: SNF    Signature: Electronically signed by Pernell Trivedi MD, 05/01/25, 10:54 EDT.  [facility] Hospitalist Team

## 2025-05-01 NOTE — PLAN OF CARE
Goal Outcome Evaluation:  Plan of Care Reviewed With: patient        Progress: improving    Patient will be dialyzed today.   Plan of care.

## 2025-05-01 NOTE — PROGRESS NOTES
"NEPHROLOGY PROGRESS NOTE------[facility]    [facility]  [phone]  Vipul Elias MD      Patient Care Team:  Lorraine Agarwal APRN as PCP - General (Nurse Practitioner)  Rick Melendez MD as Consulting Physician (Nephrology)  Mayo Fregoso MD as Consulting Physician (Urology)  Fredi Pierce MD as Consulting Physician (Cardiology)  Cony Sanchez APRN as Nurse Practitioner (Vascular Surgery)      Provider:  Vipul Elias MD  Patient Name: Lynne Cade  :  1950    SUBJECTIVE:  Follow-up CHRIS/CKD   No chest pain or SOA    Medication:  aspirin, 81 mg, Oral, Daily  budesonide, 0.5 mg, Nebulization, BID - RT  calcitriol, 0.5 mcg, Oral, Daily  dilTIAZem CD, 300 mg, Oral, Daily  DULoxetine, 60 mg, Oral, BID  famotidine, 20 mg, Oral, Daily  ferric gluconate, 250 mg, Intravenous, Daily  [Held by provider] furosemide, 40 mg, Oral, Daily  gabapentin, 100 mg, Oral, Q12H  heparin (porcine), 5,000 Units, Subcutaneous, Q12H  hydrALAZINE, 25 mg, Oral, BID  insulin lispro, 2-7 Units, Subcutaneous, 4x Daily AC & at Bedtime  ipratropium-albuterol, 3 mL, Nebulization, BID  lidocaine-prilocaine, , Topical, Once  primidone, 50 mg, Oral, Nightly  rOPINIRole, 0.5 mg, Oral, TID  rosuvastatin, 10 mg, Oral, Daily           OBJECTIVE    Vital Sign Min/Max for last 24 hours  Temp  Min: 97.2 °F (36.2 °C)  Max: 97.7 °F (36.5 °C)   BP  Min: 107/57  Max: 158/59   Pulse  Min: 74  Max: 84   Resp  Min: 14  Max: 24   SpO2  Min: 92 %  Max: 100 %   No data recorded   No data recorded     Flowsheet Rows      Flowsheet Row First Filed Value   Admission Height 167.6 cm (66\") Documented at 2025 1448   Admission Weight 96.2 kg (212 lb 1.3 oz) Documented at 2025 1448            No intake/output data recorded.  I/O last 3 completed shifts:  In: 840 [P.O.:840]  Out: 700 [Urine:700]    Physical Exam:  General Appearance: alert, appears " "stated age and cooperative  Head: normocephalic, without obvious abnormality and atraumatic  Eyes: conjunctivae and sclerae normal and no icterus  Neck: supple and no JVD  Lungs:    Heart: regular rhythm & normal rate and normal S1, S2  Chest: Wall no abnormalities observed  Abdomen: normal bowel sounds and soft, nontender  Extremities: moves extremities well, no edema, no cyanosis and no redness.  Left arm some edema/good bruit over AV graft  Skin: no bleeding, bruising or rash, turgor normal, color normal and no lesions noted  Neurologic: Alert, and oriented. No focal deficits    Labs:    WBC WBC   Date Value Ref Range Status   04/30/2025 9.31 3.40 - 10.80 10*3/mm3 Final   04/29/2025 11.62 (H) 3.40 - 10.80 10*3/mm3 Final   04/28/2025 10.02 3.40 - 10.80 10*3/mm3 Final      HGB Hemoglobin   Date Value Ref Range Status   04/30/2025 7.3 (L) 12.0 - 15.9 g/dL Final   04/29/2025 7.1 (L) 12.0 - 15.9 g/dL Final   04/28/2025 7.1 (L) 12.0 - 15.9 g/dL Final   04/28/2025 6.9 (C) 12.0 - 15.9 g/dL Final      HCT Hematocrit   Date Value Ref Range Status   04/30/2025 24.6 (L) 34.0 - 46.6 % Final   04/29/2025 24.4 (L) 34.0 - 46.6 % Final   04/28/2025 24.0 (L) 34.0 - 46.6 % Final   04/28/2025 23.5 (L) 34.0 - 46.6 % Final      Platelets No results found for: \"LABPLAT\"   MCV MCV   Date Value Ref Range Status   04/30/2025 106.5 (H) 79.0 - 97.0 fL Final   04/29/2025 107.0 (H) 79.0 - 97.0 fL Final   04/28/2025 106.8 (H) 79.0 - 97.0 fL Final          Sodium Sodium   Date Value Ref Range Status   05/01/2025 142 136 - 145 mmol/L Final   04/30/2025 138 136 - 145 mmol/L Final   04/29/2025 140 136 - 145 mmol/L Final   04/28/2025 139 136 - 145 mmol/L Final      Potassium Potassium   Date Value Ref Range Status   05/01/2025 4.9 3.5 - 5.2 mmol/L Final   04/30/2025 4.5 3.5 - 5.2 mmol/L Final   04/29/2025 4.6 3.5 - 5.2 mmol/L Final   04/28/2025 4.7 3.5 - 5.2 mmol/L Final      Chloride Chloride   Date Value Ref Range Status   05/01/2025 103 98 - 107 " "mmol/L Final   04/30/2025 102 98 - 107 mmol/L Final   04/29/2025 102 98 - 107 mmol/L Final   04/28/2025 102 98 - 107 mmol/L Final      CO2 CO2   Date Value Ref Range Status   05/01/2025 25.3 22.0 - 29.0 mmol/L Final   04/30/2025 27.2 22.0 - 29.0 mmol/L Final   04/29/2025 23.4 22.0 - 29.0 mmol/L Final   04/28/2025 25.4 22.0 - 29.0 mmol/L Final      BUN BUN   Date Value Ref Range Status   05/01/2025 60 (H) 8 - 23 mg/dL Final   04/30/2025 48 (H) 8 - 23 mg/dL Final   04/29/2025 89 (H) 8 - 23 mg/dL Final   04/28/2025 86 (H) 8 - 23 mg/dL Final      Creatinine Creatinine   Date Value Ref Range Status   05/01/2025 3.16 (H) 0.57 - 1.00 mg/dL Final   04/30/2025 2.26 (H) 0.57 - 1.00 mg/dL Final   04/29/2025 3.25 (H) 0.57 - 1.00 mg/dL Final   04/28/2025 3.48 (H) 0.57 - 1.00 mg/dL Final      Calcium Calcium   Date Value Ref Range Status   05/01/2025 8.5 (L) 8.6 - 10.5 mg/dL Final   04/30/2025 8.6 8.6 - 10.5 mg/dL Final   04/29/2025 8.5 (L) 8.6 - 10.5 mg/dL Final   04/28/2025 8.4 (L) 8.6 - 10.5 mg/dL Final      PO4 No components found for: \"PO4\"   Albumin Albumin   Date Value Ref Range Status   05/01/2025 3.1 (L) 3.5 - 5.2 g/dL Final   04/30/2025 3.4 (L) 3.5 - 5.2 g/dL Final   04/29/2025 3.4 (L) 3.5 - 5.2 g/dL Final   04/28/2025 3.3 (L) 3.5 - 5.2 g/dL Final      Magnesium No results found for: \"MG\"   Uric Acid No components found for: \"URIC ACID\"     Imaging Results (Last 72 Hours)       ** No results found for the last 72 hours. **            Results for orders placed during the hospital encounter of 04/22/25    XR Chest 1 View    Narrative  XR CHEST 1 VW    Date of Exam: 4/22/2025 3:40 PM EDT    Indication: AMS Protocol  AMS Protocol    Comparison: 2/16/2025    Findings:  Heart size is within normal limits for the projection. The right hemidiaphragm is elevated, unchanged. The pulmonary vascular pattern appears normal and the lungs appear clear.    Impression  Impression:  No active disease.        Electronically Signed: Yfn [email or web address]" MD Jeremy  4/22/2025 3:53 PM EDT  Workstation ID: [id number]      Results for orders placed during the hospital encounter of 02/16/25    XR Chest 2 View    Narrative  XR CHEST 2 VW    Date of Exam: 2/16/2025 4:12 PM EST    Indication: Short of breath    Comparison: 2/5/2025    Findings:  Cardiomediastinal silhouette is unremarkable. Similar elevation of the right hemidiaphragm. No airspace disease, pneumothorax, nor pleural effusion. No acute osseous abnormality identified.    Impression  Impression:  No acute process identified      Electronically Signed: Antonio Ceballos MD  2/16/2025 4:13 PM EST  Workstation ID: [id number]      Results for orders placed in visit on 01/09/24    SCANNED - IMAGING      Results for orders placed during the hospital encounter of 04/22/25    Duplex Carotid Ultrasound CAR    Interpretation Summary    Right internal carotid artery demonstrates a less than 50% stenosis.    Antegrade right vertebral flow.    Left internal carotid artery demonstrates a less than 50% stenosis.    Antegrade left vertebral flow.        ASSESSMENT / PLAN      Lethargy    New ESRD-_CKD due to HTN NS and or DGS.    AV graft. With venous stenosis, S/p angioplasty  Hypertension  T2DM  Chronic hypercapnic/hypoxic respiratory failure  History coronary artery disease  Obstructive sleep apnea  Anemia of CKD-IV iron/EPO     Dialyzed this am  Some left arm edema, d/w Vascular, they will f/u in a few weeks, recent angioplasty  Likely new ESRD  Arrange for outpt dialysis  Monitor renal function, fluid status electrolytes        Viupl Elias MD  [facility]  [phone]  05/01/25  08:19 EDT

## 2025-05-01 NOTE — DISCHARGE PLACEMENT REQUEST
"Lynne Cade \"NAVIN\" (74 y.o. Female)       Date of Birth   1950    Social Security Number       Address   [address] [place] [address] [place] [facility]    Home Phone   [phone]    MRN   [id number]       [facility]    Marital Status                               Admission Date   2025    Admission Type   Emergency    Admitting Provider   Fadi Almonte MD    Attending Provider   Pernell Trivedi MD    Department, Room/Bed   [facility],        Discharge Date       Discharge Disposition       Discharge Destination                                 Attending Provider: Pernell Trivedi MD    Allergies: Carvedilol, Morphine, Sulfa Antibiotics, Hydrocodone, Diphenhydramine    Isolation: None   Infection: None   Code Status: CPR    Ht: 167.6 cm (65.98\")   Wt: 102 kg (224 lb 6.9 oz)    Admission Cmt: None   Principal Problem: Lethargy [R53.83]                   Active Insurance as of 2025       Primary Coverage       Payor Plan Insurance Group Employer/Plan Group    ANTH MEDICARE REPLACEMENT ANTH MEDICARE ADVANTAGE HMO [id number]       Payor Plan Address Payor Plan Phone Number Payor Plan Fax Number Effective Dates    [address] [phone]  2025 - None Entered    [facility] [id number]         Subscriber Name Subscriber Birth Date Member ID       LYNNE CADE 1950 [id number]                     Emergency Contacts        (Rel.) Home Phone Work Phone Mobile Phone    AMINTA CADE (Spouse) -- -- [phone]    JOSIE()YOAV (Other) -- -- [phone]                 History & Physical        Trever Hernandez MD at [address] [place] [address] Medicine Services  History & Physical    Patient Name: Lynne Cade  : 1950  MRN: [id number]  Primary Care Physician:  Lorraine Agarwal, EVERARDO  Date of admission: 2025  Date and Time of Service: 2025 at 2041    Subjective      Chief " Complaint: Encephalopathy and generalized weakness    History of Present Illness: Lynne Cade is a 74 y.o. female with a CMH of COPD, CAD, hyperlipidemia, diabetes mellitus, CKD stage IV who presented to [facility] on 4/22/2025 with confusion and generalized weakness.    Patient currently awake, oriented to self only, with spouse present at bedside. Per spouse, patient has been experiencing generalized weakness and fatigue, with confusion and inability to recall the date, prompting hospital evaluation. Denies chest pain. On 2L nasal cannula, consistent with baseline home oxygen requirement. Follows with Dr. Peralta; recently diagnosed with interstitial lung disease. No history of tobacco use.    Spouse reports patient recently underwent a vascular procedure on 4/21/2025 for left upper extremity swelling due to venous stenosis. She previously had a graft placed for dialysis access due to CKD stage IV, though she is not currently on hemodialysis and continues to make urine. Swelling has slightly improved since the procedure, and patient is scheduled with outpatient vascular surgery follow-up in 2 weeks.    On presentation, vital stable, afebrile, ABG showing pH of 7.2, PCO2 of 76, bicarb of 31, labs remarkable for creatinine of 2.89, ammonia level 16, WBC normal, hemoglobin 10.9, chest x-ray showing cardiomegaly, no active disease per the radiologist reading, CT head was obtained negative for any acute findings.  EG showing sinus rhythm.  Patient now admitted to medicine team for further workup inpatient.      Review of Systems negative unless mentioned above    Personal History     Past Medical History:   Diagnosis Date    Anemia     Hx of anemia    Anxiety     Controled  xanax prn    Arthritis     [facility]. [facility]    Cataract     bilateral    CHF (congestive heart failure) 6/01/22    Low iron    Cholelithiasis 30 yrs ago    Had gb removed    CKD (chronic kidney disease), stage IV  01/13/2023    Colon polyp     Depression 1995    Take cymbalta    Diabetes mellitus     type 2    Diarrhea     on and off    Edema of right foot 04/2021    Healthcare maintenance 2008    PAP. Abstracted from [facility].    Hip pain, bilateral     HL (hearing loss) 1/1/19    Have hearing aids    Hyperlipidemia     Hypertension     Knee pain, bilateral     Obesity     Osteopenia 11/1/22    On med    Other forms of dyspnea     Other specified soft tissue disorders     Pneumonia due to COVID-19 virus 06/26/2022    Renal insufficiency Stage 3    Has gone up to stage 4    Slow to wake up after anesthesia     Spinal headache     Stress headaches     Tremor     Benign    Tremor of both hands     Urinary tract infection 07/01/22    Currd    Visual impairment 6/1/2017    Cataracts    Vitamin D deficiency        Past Surgical History:   Procedure Laterality Date    ARTERIOVENOUS FISTULA/SHUNT SURGERY Left 02/24/2023    Procedure: BRACHIOCEPHALIC ARTERIOVENOUS FISTULA FORMATION;  Surgeon: Vignesh Riley MD;  Location: [facility];  Service: Vascular;  Laterality: Left;    ARTERIOVENOUS FISTULA/SHUNT SURGERY Left 2/27/2025    Procedure: ARTERIOVENOUS SHUNT GRAFT;  Surgeon: Zaina Bernard MD;  Location: [facility] MAIN OR;  Service: Vascular;  Laterality: Left;    BREAST BIOPSY Right     CHOLECYSTECTOMY  1990    COLONOSCOPY      This year 2022    EYE SURGERY Bilateral     cataract removal    GASTRIC BYPASS  2002    HERNIA REPAIR  2005    umbilical hernia    HIP OPEN REDUCTION Right 03/02/2020    Procedure: FEMORAL NECK OPEN REDUCTION INTERNAL FIXATION;  Surgeon: Yfn Sifuentes MD;  Location: [facility] OR;  Service: Orthopedics;  Laterality: Right;  Cannulated screw fixation right femoral neck    JOINT REPLACEMENT  2020    R knee    LAPAROTOMY OOPHERECTOMY Bilateral 2010    OTHER SURGICAL HISTORY  04/20/2015    Lexiscan stress test, normal. Abstracted from [facility].    PANNICULECTOMY      TOTAL KNEE ARTHROPLASTY  Right 04/29/2021    Procedure: RIGHT TOTAL KNEE REPLACEMENT;  Surgeon: Yfn Sifuentes MD;  Location: [facility] MAIN OR;  Service: Orthopedics;  Laterality: Right;       Family History: family history includes Anxiety disorder in her daughter; Asthma in her mother; Depression in her mother; Diabetes in her daughter and father; Early death in her daughter; Hyperlipidemia in her father; Hypertension in her father; Kidney disease in her mother; Miscarriages / Stillbirths in her daughter and mother. Otherwise pertinent FHx was reviewed and not pertinent to current issue.    Social History:  reports that she has never smoked. She has never been exposed to tobacco smoke. She has never used smokeless tobacco. She reports that she does not drink alcohol and does not use drugs.    Home Medications:  Prior to Admission Medications       Prescriptions Last Dose Informant Patient Reported? Taking?    Accu-Chek Softclix Lancets lancets   No No    TEST BLOOD SUGAR TWICE DAILY    albuterol sulfate  (90 Base) MCG/ACT inhaler   Yes No    Inhale 2 puffs Every 6 (Six) Hours.    Alcohol Swabs (B-D SINGLE USE SWABS REGULAR) pads   No No    TO USE WHEN CHECKING BLOOD SUGAR TWICE A DAY    Alcohol Swabs (B-D SINGLE USE SWABS REGULAR) pads   Yes No    BD Single Use Swab, See Instructions, Use 1 swab to check blood sugar up to TID, # 300 EA, 1 Refill(s), Pharmacy: [facility], E11.9, Use 1 swab to check blood sugar up to TID, Supply, 168, cm, 03/25/24 11:10:00 EDT, Height, 89.9, kg, 03/25/24 11:16:00 EDT, Weight Dosing    alendronate (FOSAMAX) 70 MG tablet   No No    Take 1 tablet by mouth Every 7 (Seven) Days.    Patient taking differently:  Take 1 tablet by mouth Every 7 (Seven) Days. Sat    allopurinol (ZYLOPRIM) 100 MG tablet   Yes No    Take 1 tablet by mouth Daily.    Blood Glucose Calibration (Accu-Chek Megan) solution   No No    Use as directed    Blood Glucose Monitoring Suppl (Accu-Chek Megan) device   No  No    Use to test twice daily   DX: E11.9    Blood Glucose Monitoring Suppl (True Metrix Air Glucose Meter) device   Yes No    True Metrix Air Glucose Meter, See Instructions, use to check blood sugar up to TID, # 1 EA, 0 Refill(s), Pharmacy: [facility], E11.9, use to check blood sugar up to TID, Supply, 168, cm, 03/25/24 11:10:00 EDT, Height, 89.9, kg, 03/25/24 11:16:00 EDT, Weight Dosing    calcitriol (ROCALTROL) 0.25 MCG capsule   Yes No    Take 2 capsules by mouth Daily.    cyclobenzaprine (FLEXERIL) 5 MG tablet   Yes No    Take 1 tablet by mouth 3 (Three) Times a Day As Needed.    dilTIAZem CD (CARDIZEM CD) 300 MG 24 hr capsule   Yes No    Take 1 capsule by mouth Daily.    DULoxetine (CYMBALTA) 60 MG capsule   No No    Take 1 capsule by mouth 2 (Two) Times a Day.    furosemide (LASIX) 40 MG tablet   No No    Take 1 tablet by mouth Daily for 30 days.    gabapentin (NEURONTIN) 100 MG capsule   Yes No    Take 1 capsule by mouth every night at bedtime.    glimepiride (AMARYL) 2 MG tablet   No No    TAKE 2 TABLETS TWICE DAILY    Patient taking differently:  Take 2 tablets by mouth 2 (Two) Times a Day.    glucose blood (Accu-Chek Megan Plus) test strip   No No    TEST BLOOD SUGAR TWICE DAILY AS DIRECTED   DX  E11.9    hydrALAZINE (APRESOLINE) 100 MG tablet   No No    Take 1 tablet by mouth 3 times a day for 100 days.    hydrOXYzine (ATARAX) 10 MG tablet   No No    Take 1 tablet by mouth 3 (Three) Times a Day As Needed for Itching.    ipratropium-albuterol (DUO-NEB) 0.5-2.5 mg/3 ml nebulizer   Yes No    Take 0.5 mL by nebulization 2 (Two) Times a Day.    Januvia 25 MG tablet   Yes No    Lancets (accu-chek soft touch) lancets   No No    Check blood sugars twice daily as directed. DX:E11.9    lidocaine (Lidoderm) 5 %   Yes No    Apply  topically to the appropriate area as directed.    primidone (MYSOLINE) 50 MG tablet   No No    TAKE 1 TABLET EVERY NIGHT.    Patient taking differently:  Take 1  tablet by mouth Every Night.    rosuvastatin (CRESTOR) 10 MG tablet   No No    Take 1 tablet by mouth Daily.    sodium bicarbonate 650 MG tablet   Yes No    Take 1 tablet by mouth Daily.    Vibegron (Gemtesa) 75 MG tablet   Yes No    Take 1 tablet by mouth Daily.    zolpidem (AMBIEN) 5 MG tablet   Yes No    Take 1 tablet by mouth At Night As Needed.              Allergies:  Allergies   Allergen Reactions    Carvedilol Shortness Of Breath    Morphine Itching    Sulfa Antibiotics GI Intolerance    Hydrocodone Nausea Only    Diphenhydramine Anxiety       Objective      Vitals:   Temp:  [97.7 °F (36.5 °C)] 97.7 °F (36.5 °C)  Heart Rate:  [86-93] 88  Resp:  [16] 16  BP: (133-165)/(63-75) 158/72  Body mass index is 34.23 kg/m².  Physical Exam  General: Awake alert, disoriented, obese  HEENT: Atraumatic normocephalic  Cardio, heart rate normal, rhythm regular  Respiratory: Decreased breath sounds at lung base  Abdomen: Soft, nontender, no rebound or guarding  Extremities: 1+ pitting edema the bilateral extremities, left upper extremity swelling-chronic, wound noted, recent vascular surgery intervention on 4/21/2025  Neuro: Awake, disoriented, moves all extremities    Diagnostic Data:  Lab Results (last 24 hours)       Procedure Component Value Units Date/Time    Blood Gas, Arterial - [[id number]]  (Abnormal) Collected: 04/22/25 1605    Specimen: Arterial Blood Updated: 04/22/25 1747     Site Right Radial     Tapan's Test Positive     pH, Arterial 7.220 pH units      pCO2, Arterial 76.1 mm Hg      pO2, Arterial 149.2 mm Hg      HCO3, Arterial 31.1 mmol/L      Base Excess, Arterial 2.3 mmol/L      Comment: Serial Number: [id number]:  [id number]        O2 Saturation, Arterial 98.7 %      CO2 Content 33.5 mmol/L      Barometric Pressure for Blood Gas --     Comment: N/A        Modality Cannula     FIO2 32 %      Notified Who Sally     Read Back Yes     Notified Time --     Hemodilution No     PO2/FIO2 466    Comprehensive  Metabolic Panel [[id number]]  (Abnormal) Collected: 04/22/25 1649    Specimen: Blood from Arm, Right Updated: 04/22/25 1717     Glucose 143 mg/dL      BUN 50 mg/dL      Creatinine 2.89 mg/dL      Sodium 141 mmol/L      Potassium 4.0 mmol/L      Chloride 103 mmol/L      CO2 26.4 mmol/L      Calcium 9.2 mg/dL      Total Protein 7.3 g/dL      Albumin 3.9 g/dL      ALT (SGPT) 13 U/L      AST (SGOT) 21 U/L      Alkaline Phosphatase 53 U/L      Total Bilirubin 0.2 mg/dL      Globulin 3.4 gm/dL      A/G Ratio 1.1 g/dL      BUN/Creatinine Ratio 17.3     Anion Gap 11.6 mmol/L      eGFR 16.4 mL/min/1.73     Narrative:      GFR Categories in Chronic Kidney Disease (CKD)      GFR Category          GFR (mL/min/1.73)    Interpretation  G1                     90 or greater         Normal or high (1)  G2                      60-89                Mild decrease (1)  G3a                   45-59                Mild to moderate decrease  G3b                   30-44                Moderate to severe decrease  G4                    15-29                Severe decrease  G5                    14 or less           Kidney failure          (1)In the absence of evidence of kidney disease, neither GFR category G1 or G2 fulfill the criteria for CKD.    eGFR calculation 2021 CKD-EPI creatinine equation, which does not include race as a factor    Magnesium [[id number]]  (Normal) Collected: 04/22/25 1649    Specimen: Blood from Arm, Right Updated: 04/22/25 1717     Magnesium 2.4 mg/dL     Ammonia [[id number]]  (Normal) Collected: 04/22/25 1649    Specimen: Blood from Arm, Right Updated: 04/22/25 1716     Ammonia 16 umol/L     Acetaminophen Level [[id number]]  (Normal) Collected: 04/22/25 1619    Specimen: Blood from Arm, Right Updated: 04/22/25 1645     Acetaminophen <5.0 mcg/mL     Narrative:      Acetaminophen Therapeutic Range  5-20 ug/mL      Hours after ingestion            Toxic Value    4 Hours                           150 ug/mL    8 Hours                             70 ug/mL   12 Hours                            40 ug/mL   16 Hours                            20 ug/mL    These values apply to a single ingestion only.     Salicylate Level [[id number]]  (Normal) Collected: 04/22/25 1619    Specimen: Blood from Arm, Right Updated: 04/22/25 1645     Salicylate <0.5 mg/dL     CBC & Differential [[id number]]  (Abnormal) Collected: 04/22/25 1532    Specimen: Blood from Arm, Right Updated: 04/22/25 1558    Narrative:      The following orders were created for panel order CBC & Differential.  Procedure                               Abnormality         Status                     ---------                               -----------         ------                     CBC Auto Differential[[id number]]        Abnormal            Final result               Scan Slide[[id number]]                                       Final result                 Please view results for these tests on the individual orders.    CBC Auto Differential [[id number]]  (Abnormal) Collected: 04/22/25 1532    Specimen: Blood from Arm, Right Updated: 04/22/25 1558     WBC 9.60 10*3/mm3      RBC 2.52 10*6/mm3      Hemoglobin 8.0 g/dL      Hematocrit 27.4 %      .7 fL      MCH 31.7 pg      MCHC 29.2 g/dL      RDW 15.1 %      RDW-SD 59.6 fl      MPV 10.5 fL      Platelets 246 10*3/mm3      Neutrophil % 71.9 %      Lymphocyte % 14.1 %      Monocyte % 12.4 %      Eosinophil % 0.6 %      Basophil % 0.5 %      Immature Grans % 0.5 %      Neutrophils, Absolute 6.90 10*3/mm3      Lymphocytes, Absolute 1.35 10*3/mm3      Monocytes, Absolute 1.19 10*3/mm3      Eosinophils, Absolute 0.06 10*3/mm3      Basophils, Absolute 0.05 10*3/mm3      Immature Grans, Absolute 0.05 10*3/mm3      nRBC 0.0 /100 WBC     Scan Slide [[id number]] Collected: 04/22/25 1532    Specimen: Blood from Arm, Right Updated: 04/22/25 1558     Hypochromia Slight/1+     Macrocytes Slight/1+     WBC Morphology Normal     Platelet Estimate  Adequate    Urine Drug Screen - Straight Cath [[id number]]  (Abnormal) Collected: 04/22/25 1530    Specimen: Urine from Straight Cath Updated: 04/22/25 1558     THC, Screen, Urine Negative     Phencyclidine (PCP), Urine Negative     Cocaine Screen, Urine Negative     Methamphetamine, Ur Negative     Opiate Screen Negative     Amphetamine Screen, Urine Negative     Benzodiazepine Screen, Urine Negative     Tricyclic Antidepressants Screen Negative     Methadone Screen, Urine Negative     Barbiturates Screen, Urine Positive     Oxycodone Screen, Urine Negative     Buprenorphine, Screen, Urine Negative    Narrative:      Cutoff For Drugs Screened:    Amphetamines               500 ng/ml  Barbiturates               200 ng/ml  Benzodiazepines            150 ng/ml  Cocaine                    150 ng/ml  Methadone                  200 ng/ml  Opiates                    100 ng/ml  Phencyclidine               25 ng/ml  THC                         50 ng/ml  Methamphetamine            500 ng/ml  Tricyclic Antidepressants  300 ng/ml  Oxycodone                  100 ng/ml  Buprenorphine               10 ng/ml    The normal value for all drugs tested is negative. This report includes unconfirmed screening results, with the cutoff values listed, to be used for medical treatment purposes only.  Unconfirmed results must not be used for non-medical purposes such as employment or legal testing.  Clinical consideration should be applied to any drug of abuse test, particularly when unconfirmed results are used.    All urine drugs of abuse requests without chain of custody are for medical screening purposes only.  False positives are possible.      Urinalysis With Microscopic If Indicated (No Culture) - Straight Cath [[id number]]  (Abnormal) Collected: 04/22/25 1530    Specimen: Urine from Straight Cath Updated: 04/22/25 1539     Color, UA Yellow     Appearance, UA Clear     pH, UA 5.5     Specific Gravity, UA 1.014     Glucose,  mg/dL  (Trace)     Ketones, UA Negative     Bilirubin, UA Negative     Blood, UA Negative     Protein,  mg/dL (2+)     Leuk Esterase, UA Negative     Nitrite, UA Negative     Urobilinogen, UA 0.2 E.U./dL    Urinalysis, Microscopic Only - Straight Cath [[id number]] Collected: 04/22/25 1530    Specimen: Urine from Straight Cath Updated: 04/22/25 1539     RBC, UA None Seen /HPF      WBC, UA 0-2 /HPF      Bacteria, UA None Seen /HPF      Squamous Epithelial Cells, UA 0-2 /HPF      Hyaline Casts, UA 0-2 /LPF      Methodology Automated Microscopy    POC Glucose Once [[id number]]  (Abnormal) Collected: 04/22/25 1508    Specimen: Blood Updated: 04/22/25 1509     Glucose 140 mg/dL      Comment: Serial Number: [id number]:  [id number]                Imaging Results (Last 24 Hours)       Procedure Component Value Units Date/Time    XR Chest 1 View [[id number]] Collected: 04/22/25 1552     Updated: 04/22/25 1555    Narrative:      XR CHEST 1 VW    Date of Exam: 4/22/2025 3:40 PM EDT    Indication: AMS Protocol  AMS Protocol    Comparison: 2/16/2025    Findings:  Heart size is within normal limits for the projection. The right hemidiaphragm is elevated, unchanged. The pulmonary vascular pattern appears normal and the lungs appear clear.      Impression:      Impression:  No active disease.        Electronically Signed: Yfn Luna MD    4/22/2025 3:53 PM EDT    Workstation ID: [id number]    CT Head Without Contrast [[id number]] Collected: 04/22/25 1550     Updated: 04/22/25 1555    Narrative:      CT HEAD WO CONTRAST    Date of Exam: 4/22/2025 3:39 PM EDT    Indication: AMS, lethargic.    Comparison: 9/12/2024    Technique: Axial CT images were obtained of the head without contrast administration.  Coronal reconstructions were performed.  Automated exposure control and iterative reconstruction methods were used.      Findings:  There is no evidence of acute territorial infarction.    There is no acute intracranial hemorrhage.  There are no extra-axial collections.    Ventricles and CSF spaces are symmetric. No mass effect nor hydrocephalus.    There is a focal area of rounded decreased attenuation above the right cerebellar hemisphere, unchanged suggestive of prior infarction. Brain parenchyma appears similar to prior examination.     Paranasal sinuses and mastoid air cells are adequately aerated.     Osseous structures and orbits appear intact.      Impression:      Impression:  1.No acute intracranial finding.  2.Chronic changes as above.        Electronically Signed: Antonio Ceballos MD    4/22/2025 3:52 PM EDT    Workstation ID: [id number]              Assessment & Plan    Lynne Cade is a 74 y.o. female with a CMH of ILD, CAD, hyperlipidemia, diabetes mellitus, CKD stage IV who presented to [facility] on 4/22/2025 with confusion and generalized weakness.    Assessments  Acute encephalopathy: Possibly due to CO2 retention  Acute on chronic hypoxic respiratory failure with hypercarbia  Recently diagnosed with interstitial lung disease  Type 2 diabetes  Hyperlipidemia  CKD stage IV  Left upper extremity stenosis status post intervention per vascular surgery on 4/21/2025    Plan    -Encephalopathy likely due to acute respiratory acidosis, CT brain negative for any acute process, ammonia normal, will check TSH    -Continue DuoNebs, Pulmicort, Solu-Medrol 125 once, Solu-Medrol 40 twice daily thereafter, transition to p.o. with clinical improvement, patient not wearing BiPAP due to anxiety, will start patient on high flow nasal cannula, plan to repeat ABG in few hours    -Start SSI for type 2 diabetes, monitor glucose closely, start insulin steroid protocol    -Patient does have a left upper extremity swelling, per spouse swelling is slightly reduced since the intervention yesterday, recommend patient to follow-up outpatient with vascular as scheduled, if swelling increases may consider reaching out to vascular surgery for any  recommendations    -Will give dose of Lasix IV 40 once, monitor strict ins and outs, creatinine near the baseline of 2.6-2.7, creatinine closely, continue IV or resume her p.o. depending on the clinical provide in the morning, continue Solu-Medrol    -Resume other home medication once reconciled per pharmacy or medically appropriate    -Follow a.m. labs    VTE Prophylaxis:  No VTE prophylaxis order currently exists.    CODE STATUS:  Full      I discussed the patient's findings and my recommendations with patient and nursing staff.      This document has been electronically signed by Trever Hernandez MD on 2025 20:41 EDT   [facility]     Electronically signed by Trever Hernandez MD at 25 0213       Operative/Procedure Notes (all)    No notes of this type exist for this encounter.          Physician Progress Notes (last 48 hours)        Pernell Trivedi MD at 25 1054              [facility] MEDICINE SERVICE  DAILY PROGRESS NOTE    NAME: Lynne Cade  : 1950  MRN: [id number]   Sex: female  Age: 74 y.o.    PROVIDER OF SERVICE: Pernell Trivedi MD    Chief Complaint: Lethargy    Subjective:     Interval History:  Lynne Cade was seen and examined at bedside following hospital admission due to lethargy, sleepiness.  This is a very pleasant 74-year-old  female, who is being followed along by nephrology, and pulmonology.  The patient was found to have acute on chronic hypercapnic and hypoxic respiratory insufficiency.  She has been evaluated, and also has been found to have features of obstructive sleep apnea.  Currently, she is on 2 L/min of O2 via nasal cannula, and tolerating BiPAP during her sleep.  She has been recommended a sleep study as an outpatient.  She is being followed along by the care management team, and remains on IV diuretics, which are currently on hold by the nephrology team.  Complaining of restless legs.      She had  dialysis yesterday for the first time  Doing okay this morning without any complaint however she told me that she was extremely anxious when she had the dialysis yesterday  Denies any shortness of breath at rest  Lower extremity pain/restless is better    5/1/25  - Seen on dialysis, sleepy but easily arousable, not in distress    Objective:     Vital Signs  Temp:  [97.2 °F (36.2 °C)-97.7 °F (36.5 °C)] 97.2 °F (36.2 °C)  Heart Rate:  [74-95] 86  Resp:  [13-24] 16  BP: ()/(43-59) 114/48  Flow (L/min) (Oxygen Therapy):  [2-3] 2   Body mass index is 36.24 kg/m².    Intake/Output    Intake/Output Summary (Last 24 hours) at 5/1/2025 1054  Last data filed at 5/1/2025 0545  Gross per 24 hour   Intake 720 ml   Output 400 ml   Net 320 ml        Physical Exam  General: Alert, cooperative, no distress, AAOx3  Lungs: Clear to auscultation bilaterally, respirations unlabored  Heart: Regular rate and rhythm, S1 and S2 normal, no murmurs, no rub or gallop.  2+ edema.  Abdomen: Soft, nontender, bowel sounds active, no masses, no organomegaly  Psychiatric: Appropriate mood and affect.     Diagnostic Data    Results from last 7 days   Lab Units 05/01/25  0415 04/30/25  0409 04/28/25  0946 04/25/25  1630   WBC 10*3/mm3  --  9.31   < >  --    HEMOGLOBIN g/dL  --  7.3*   < >  --    HEMATOCRIT %  --  24.6*   < >  --    PLATELETS 10*3/mm3  --  242   < >  --    GLUCOSE mg/dL 125* 147*   < > 149*   CREATININE mg/dL 3.16* 2.26*   < > 3.51*   BUN mg/dL 60* 48*   < > 75*   SODIUM mmol/L 142 138   < > 136   POTASSIUM mmol/L 4.9 4.5   < > 4.9   AST (SGOT) U/L  --   --   --  37*   ALT (SGPT) U/L  --   --   --  25   ALK PHOS U/L  --   --   --  41   BILIRUBIN mg/dL  --   --   --  0.2   ANION GAP mmol/L 13.7 8.8   < > 12.0    < > = values in this interval not displayed.       No radiology results for the last day    I reviewed the patient's new clinical results.    Assessment/Plan:       Lethargy  - Acute on chronic respiratory failure with  hypoxia and hypercapnia  - Acute on chronic kidney injury  - Diabetes mellitus  - Acute encephalopathy likely metabolic secondary to CO2 retention  - Left upper extremity stenosis status post intervention per vascular surgery on 2025     Plan:  1.  Status post hemodialysis , it was her first treatment  -Second HD today  - on board, patient is being planned to have hemodialysis going forward she already has left upper extremity AV graft  -Pre-CERT was started, just discussed with     2.  Anemia of chronic disease, hemoglobin at 7.3.  On IV iron and EPO by nephrology  3.  Patient has been recommended NIPPV as an outpatient.  She will need an outpatient sleep study.  Pulmonary is following  4.  Lower extremity restless seems to be better after Requip was started yesterday we will keep her on it  5.  Further recommendations to follow pending hospital course.  6.  Seen by PT recommending SNF    VTE Prophylaxis:  Pharmacologic VTE prophylaxis orders are present.     Disposition Planning:     Barriers to Discharge: CHRIS  Anticipated Date of Discharge: TBD  Place of Discharge: SNF    Signature: Electronically signed by Pernell Trivedi MD, 25, 10:54 EDT.  [facility] Hospitalist Team      Electronically signed by Pernell Trivedi MD at 25 1055       Vipul Elias MD at 25 1112          NEPHROLOGY PROGRESS NOTE------[facility]    [facility]  [phone]  Vipul Elias MD      Patient Care Team:  Lorraine Agarwal APRN as PCP - General (Nurse Practitioner)  Rick Melendez MD as Consulting Physician (Nephrology)  Mayo Fregoso MD as Consulting Physician (Urology)  Fredi Pierce MD as Consulting Physician (Cardiology)  Cony Sanchez APRN as Nurse Practitioner (Vascular Surgery)      Provider:  Vipul Elias MD  Patient Name: Lynne Cade  :   "1950    SUBJECTIVE:  Follow-up CHRIS/CKD   no chest pain or shortness of breath  Dialyzed yesterday using AV graft  Feels much better, less confused      Medication:  aspirin, 81 mg, Oral, Daily  budesonide, 0.5 mg, Nebulization, BID - RT  calcitriol, 0.5 mcg, Oral, Daily  dilTIAZem CD, 300 mg, Oral, Daily  DULoxetine, 60 mg, Oral, BID  famotidine, 20 mg, Oral, Daily  [Held by provider] furosemide, 40 mg, Oral, Daily  gabapentin, 100 mg, Oral, Q12H  heparin (porcine), 5,000 Units, Subcutaneous, Q12H  hydrALAZINE, 25 mg, Oral, BID  insulin lispro, 2-7 Units, Subcutaneous, 4x Daily AC & at Bedtime  ipratropium-albuterol, 3 mL, Nebulization, BID  lidocaine-prilocaine, , Topical, Once  primidone, 50 mg, Oral, Nightly  rOPINIRole, 0.5 mg, Oral, TID  rosuvastatin, 10 mg, Oral, Daily           OBJECTIVE    Vital Sign Min/Max for last 24 hours  Temp  Min: 97.4 °F (36.3 °C)  Max: 98.1 °F (36.7 °C)   BP  Min: 109/44  Max: 145/64   Pulse  Min: 82  Max: 100   Resp  Min: 14  Max: 24   SpO2  Min: 96 %  Max: 100 %   No data recorded   Weight  Min: 102 kg (224 lb 6.9 oz)  Max: 102 kg (224 lb 6.9 oz)     Flowsheet Rows      Flowsheet Row First Filed Value   Admission Height 167.6 cm (66\") Documented at 04/22/2025 1448   Admission Weight 96.2 kg (212 lb 1.3 oz) Documented at 04/22/2025 1448            No intake/output data recorded.  I/O last 3 completed shifts:  In: 1080 [P.O.:1080]  Out: 4400 [Urine:1550]    Physical Exam:  General Appearance: alert, appears stated age and cooperative  Head: normocephalic, without obvious abnormality and atraumatic  Eyes: conjunctivae and sclerae normal and no icterus  Neck: supple and no JVD  Lungs:    Heart: regular rhythm & normal rate and normal S1, S2  Chest: Wall no abnormalities observed  Abdomen: normal bowel sounds and soft, nontender  Extremities: moves extremities well, no edema, no cyanosis and no redness.  Left arm some edema/good bruit over AV graft  Skin: no bleeding, bruising or " "rash, turgor normal, color normal and no lesions noted  Neurologic: Alert, and oriented. No focal deficits    Labs:    WBC WBC   Date Value Ref Range Status   04/30/2025 9.31 3.40 - 10.80 10*3/mm3 Final   04/29/2025 11.62 (H) 3.40 - 10.80 10*3/mm3 Final   04/28/2025 10.02 3.40 - 10.80 10*3/mm3 Final      HGB Hemoglobin   Date Value Ref Range Status   04/30/2025 7.3 (L) 12.0 - 15.9 g/dL Final   04/29/2025 7.1 (L) 12.0 - 15.9 g/dL Final   04/28/2025 7.1 (L) 12.0 - 15.9 g/dL Final   04/28/2025 6.9 (C) 12.0 - 15.9 g/dL Final      HCT Hematocrit   Date Value Ref Range Status   04/30/2025 24.6 (L) 34.0 - 46.6 % Final   04/29/2025 24.4 (L) 34.0 - 46.6 % Final   04/28/2025 24.0 (L) 34.0 - 46.6 % Final   04/28/2025 23.5 (L) 34.0 - 46.6 % Final      Platelets No results found for: \"LABPLAT\"   MCV MCV   Date Value Ref Range Status   04/30/2025 106.5 (H) 79.0 - 97.0 fL Final   04/29/2025 107.0 (H) 79.0 - 97.0 fL Final   04/28/2025 106.8 (H) 79.0 - 97.0 fL Final          Sodium Sodium   Date Value Ref Range Status   04/30/2025 138 136 - 145 mmol/L Final   04/29/2025 140 136 - 145 mmol/L Final   04/28/2025 139 136 - 145 mmol/L Final      Potassium Potassium   Date Value Ref Range Status   04/30/2025 4.5 3.5 - 5.2 mmol/L Final   04/29/2025 4.6 3.5 - 5.2 mmol/L Final   04/28/2025 4.7 3.5 - 5.2 mmol/L Final      Chloride Chloride   Date Value Ref Range Status   04/30/2025 102 98 - 107 mmol/L Final   04/29/2025 102 98 - 107 mmol/L Final   04/28/2025 102 98 - 107 mmol/L Final      CO2 CO2   Date Value Ref Range Status   04/30/2025 27.2 22.0 - 29.0 mmol/L Final   04/29/2025 23.4 22.0 - 29.0 mmol/L Final   04/28/2025 25.4 22.0 - 29.0 mmol/L Final      BUN BUN   Date Value Ref Range Status   04/30/2025 48 (H) 8 - 23 mg/dL Final   04/29/2025 89 (H) 8 - 23 mg/dL Final   04/28/2025 86 (H) 8 - 23 mg/dL Final      Creatinine Creatinine   Date Value Ref Range Status   04/30/2025 2.26 (H) 0.57 - 1.00 mg/dL Final   04/29/2025 3.25 (H) 0.57 - " "1.00 mg/dL Final   04/28/2025 3.48 (H) 0.57 - 1.00 mg/dL Final      Calcium Calcium   Date Value Ref Range Status   04/30/2025 8.6 8.6 - 10.5 mg/dL Final   04/29/2025 8.5 (L) 8.6 - 10.5 mg/dL Final   04/28/2025 8.4 (L) 8.6 - 10.5 mg/dL Final      PO4 No components found for: \"PO4\"   Albumin Albumin   Date Value Ref Range Status   04/30/2025 3.4 (L) 3.5 - 5.2 g/dL Final   04/29/2025 3.4 (L) 3.5 - 5.2 g/dL Final   04/28/2025 3.3 (L) 3.5 - 5.2 g/dL Final      Magnesium No results found for: \"MG\"   Uric Acid No components found for: \"URIC ACID\"     Imaging Results (Last 72 Hours)       ** No results found for the last 72 hours. **            Results for orders placed during the hospital encounter of 04/22/25    XR Chest 1 View    Narrative  XR CHEST 1 VW    Date of Exam: 4/22/2025 3:40 PM EDT    Indication: AMS Protocol  AMS Protocol    Comparison: 2/16/2025    Findings:  Heart size is within normal limits for the projection. The right hemidiaphragm is elevated, unchanged. The pulmonary vascular pattern appears normal and the lungs appear clear.    Impression  Impression:  No active disease.        Electronically Signed: Yfn Luna MD  4/22/2025 3:53 PM EDT  Workstation ID: [id number]      Results for orders placed during the hospital encounter of 02/16/25    XR Chest 2 View    Narrative  XR CHEST 2 VW    Date of Exam: 2/16/2025 4:12 PM EST    Indication: Short of breath    Comparison: 2/5/2025    Findings:  Cardiomediastinal silhouette is unremarkable. Similar elevation of the right hemidiaphragm. No airspace disease, pneumothorax, nor pleural effusion. No acute osseous abnormality identified.    Impression  Impression:  No acute process identified      Electronically Signed: Antonio Ceballos MD  2/16/2025 4:13 PM EST  Workstation ID: [id number]      Results for orders placed in visit on 01/09/24    SCANNED - IMAGING      Results for orders placed during the hospital encounter of 04/22/25    Duplex Carotid Ultrasound " CAR    Interpretation Summary    Right internal carotid artery demonstrates a less than 50% stenosis.    Antegrade right vertebral flow.    Left internal carotid artery demonstrates a less than 50% stenosis.    Antegrade left vertebral flow.        ASSESSMENT / PLAN      Lethargy    CHRIS superimposed on CKD stage IV-CHRIS likely prerenal from relative hypotension and or overdiuresis.  Hold diuretics for now.  Avoid hypotension.    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  AV graft.  Hypertension  T2DM  Chronic hypercapnic/hypoxic respiratory failure  History coronary artery disease  Obstructive sleep apnea  Anemia of CKD-IV iron/EPO     Dialyzed yesterday  Likely new ESRD  Arrange for outpt dialysis  Dialysis in a.m.  Dosed EPO/IV iron  Monitor renal function, fluid status electrolytes        Vipul Elias MD  [facility]  [phone]  25  11:12 EDT      Electronically signed by Vipul Elias MD at 25 1217       Pernell Trivedi MD at 25 0915              [facility] MEDICINE SERVICE  DAILY PROGRESS NOTE    NAME: Lynne Cade  : 1950  MRN: [id number]   Sex: female  Age: 74 y.o.    PROVIDER OF SERVICE: Pernell Trivedi MD    Chief Complaint: Lethargy    Subjective:     Interval History:  Lynne Cade was seen and examined at bedside following hospital admission due to lethargy, sleepiness.  This is a very pleasant 74-year-old  female, who is being followed along by nephrology, and pulmonology.  The patient was found to have acute on chronic hypercapnic and hypoxic respiratory insufficiency.  She has been evaluated, and also has been found to have features of obstructive sleep apnea.  Currently, she is on 2 L/min of O2 via nasal cannula, and tolerating BiPAP during her sleep.  She has been recommended a sleep study as an outpatient.  She is being followed along by the  care management team, and remains on IV diuretics, which are currently on hold by the nephrology team.  Complaining of restless legs.    4/30  She had dialysis yesterday for the first time  Doing okay this morning without any complaint however she told me that she was extremely anxious when she had the dialysis yesterday  Denies any shortness of breath at rest  Lower extremity pain/restless is better    Objective:     Vital Signs  Temp:  [97.4 °F (36.3 °C)-98.1 °F (36.7 °C)] 97.9 °F (36.6 °C)  Heart Rate:  [] 94  Resp:  [14-24] 20  BP: (109-145)/(35-75) 130/54  Flow (L/min) (Oxygen Therapy):  [2] 2   Body mass index is 36.24 kg/m².    Intake/Output    Intake/Output Summary (Last 24 hours) at 4/30/2025 0915  Last data filed at 4/30/2025 0541  Gross per 24 hour   Intake 840 ml   Output 3600 ml   Net -2760 ml        Physical Exam  General: Alert, cooperative, no distress, AAOx3  Lungs: Clear to auscultation bilaterally, respirations unlabored  Heart: Regular rate and rhythm, S1 and S2 normal, no murmurs, no rub or gallop.  2+ edema.  Abdomen: Soft, nontender, bowel sounds active, no masses, no organomegaly  Psychiatric: Appropriate mood and affect.     Diagnostic Data    Results from last 7 days   Lab Units 04/30/25  0409 04/28/25  0946 04/25/25  1630   WBC 10*3/mm3 9.31   < >  --    HEMOGLOBIN g/dL 7.3*   < >  --    HEMATOCRIT % 24.6*   < >  --    PLATELETS 10*3/mm3 242   < >  --    GLUCOSE mg/dL 147*   < > 149*   CREATININE mg/dL 2.26*   < > 3.51*   BUN mg/dL 48*   < > 75*   SODIUM mmol/L 138   < > 136   POTASSIUM mmol/L 4.5   < > 4.9   AST (SGOT) U/L  --   --  37*   ALT (SGPT) U/L  --   --  25   ALK PHOS U/L  --   --  41   BILIRUBIN mg/dL  --   --  0.2   ANION GAP mmol/L 8.8   < > 12.0    < > = values in this interval not displayed.       No radiology results for the last day    I reviewed the patient's new clinical results.    Assessment/Plan:       Lethargy  - Acute on chronic respiratory failure with  hypoxia and hypercapnia  - Acute on chronic kidney injury  - Diabetes mellitus  - Acute encephalopathy likely metabolic secondary to CO2 retention  - Left upper extremity stenosis status post intervention per vascular surgery on 4/21/2025     Plan:  1.  Status post hemodialysis yesterday, it was her first treatment  - on board, patient is being planned to have hemodialysis going forward she already has left upper extremity AV graft  2.  Anemia of chronic disease, hemoglobin at 7.3.  No indication for transfusion  3.  Patient has been recommended NIPPV as an outpatient.  She will need an outpatient sleep study.  Pulmonary is following  4.  Lower extremity restless seems to be better after Requip was started yesterday we will keep her on it  5.  Further recommendations to follow pending hospital course.  6.  Seen by PT recommending SNF    VTE Prophylaxis:  Pharmacologic VTE prophylaxis orders are present.     Disposition Planning:     Barriers to Discharge: CHRIS  Anticipated Date of Discharge: TBD  Place of Discharge: SNF    Signature: Electronically signed by Pernell Trivedi MD, 04/30/25, 09:15 EDT.  [facility] Hospitalist Team      Electronically signed by Pernell Trivedi MD at 04/30/25 0905       Gigi Claire MD at 04/30/25 0731          Daily Progress Note        Lethargy    Assessment:     Acute/chronic hypercapnic/hypoxic respiratory insufficiency     Chronic elevated right hemidiaphragm, associated right lower lobe mild bronchiectasis    CT chest February 2025 no evidence of interstitial lung disease     Respiratory panel negative     non-smoker  No PFTs     Features of obstructive sleep apnea     CKD stage IV  Diabetes mellitus  Hyperlipidemia     Encephalopathy   TSH 3.1  Ammonia 16        Recommendations:        oxygen titration currently on 2 L nasal cannula     Tolerated BiPAP during sleep  Sleep study as an outpatient     Bronchodilators currently on Pulmicort and DuoNeb     Steroids discontinued  since there is no evidence of interstitial lung disease on most recent CAT scan in February 2025     Minimize sedatives      Currently off antibiotics     Followed by nephrology for CKD/worsening azotemia /hemodialysis          CT chest 2/15/2025          LOS: 7 days     Subjective         Objective     Vital signs for last 24 hours:  Vitals:    04/30/25 0643 04/30/25 0646 04/30/25 0647 04/30/25 0650   BP:       BP Location:       Patient Position:       Pulse: 86 89 90 88   Resp: 18 18 16 16   Temp:       TempSrc:       SpO2: 98% 99% 98% 99%   Weight:       Height:           Intake/Output last 3 shifts:  I/O last 3 completed shifts:  In: 1080 [P.O.:1080]  Out: 4400 [Urine:1550]  Intake/Output this shift:  No intake/output data recorded.      Radiology  Imaging Results (Last 24 Hours)       ** No results found for the last 24 hours. **            Labs:  Results from last 7 days   Lab Units 04/30/25  0409   WBC 10*3/mm3 9.31   HEMOGLOBIN g/dL 7.3*   HEMATOCRIT % 24.6*   PLATELETS 10*3/mm3 242     Results from last 7 days   Lab Units 04/30/25  0409 04/28/25  0946 04/25/25  1630   SODIUM mmol/L 138   < > 136   POTASSIUM mmol/L 4.5   < > 4.9   CHLORIDE mmol/L 102   < > 101   CO2 mmol/L 27.2   < > 23.0   BUN mg/dL 48*   < > 75*   CREATININE mg/dL 2.26*   < > 3.51*   CALCIUM mg/dL 8.6   < > 8.3*   BILIRUBIN mg/dL  --   --  0.2   ALK PHOS U/L  --   --  41   ALT (SGPT) U/L  --   --  25   AST (SGOT) U/L  --   --  37*   GLUCOSE mg/dL 147*   < > 149*    < > = values in this interval not displayed.           Results from last 7 days   Lab Units 04/30/25  0409 04/29/25  0400 04/28/25  0946   ALBUMIN g/dL 3.4* 3.4* 3.3*     Results from last 7 days   Lab Units 04/25/25  1630   CK TOTAL U/L 52                               Meds:   SCHEDULE  aspirin, 81 mg, Oral, Daily  budesonide, 0.5 mg, Nebulization, BID - RT  calcitriol, 0.5 mcg, Oral, Daily  dilTIAZem CD, 300 mg, Oral, Daily  DULoxetine, 60 mg, Oral, BID  famotidine, 20 mg,  Oral, Daily  [Held by provider] furosemide, 40 mg, Oral, Daily  gabapentin, 100 mg, Oral, Q12H  heparin (porcine), 5,000 Units, Subcutaneous, Q12H  hydrALAZINE, 25 mg, Oral, BID  insulin lispro, 2-7 Units, Subcutaneous, 4x Daily AC & at Bedtime  ipratropium-albuterol, 3 mL, Nebulization, BID  lidocaine-prilocaine, , Topical, Once  primidone, 50 mg, Oral, Nightly  rOPINIRole, 0.5 mg, Oral, TID  rosuvastatin, 10 mg, Oral, Daily      Infusions     PRNs    acetaminophen **OR** acetaminophen **OR** acetaminophen    albumin human    senna-docusate sodium **AND** polyethylene glycol **AND** bisacodyl **AND** bisacodyl    Calcium Replacement - Follow Nurse / BPA Driven Protocol    dextrose    dextrose    glucagon (human recombinant)    hydrOXYzine    ipratropium-albuterol    Magnesium Low Dose Replacement - Follow Nurse / BPA Driven Protocol    sodium chloride    Physical Exam:  Physical Exam  Cardiovascular:      Heart sounds: Murmur heard.      No gallop.   Pulmonary:      Effort: No respiratory distress.      Breath sounds: No stridor. Rhonchi and rales present. No wheezing.   Chest:      Chest wall: No tenderness.         ROS  Review of Systems   Respiratory:  Positive for cough and shortness of breath. Negative for wheezing and stridor.    Cardiovascular:  Positive for palpitations and leg swelling. Negative for chest pain.             Total critical care time spent with patient greater than: 45 Minutes    Electronically signed by Gigi Claire MD at 04/30/25 0742       Consult Notes (last 48 hours)  Notes from 04/29/25 1103 through 05/01/25 1103   No notes of this type exist for this encounter.       Nutrition Notes (most recent note)    No notes exist for this encounter.       Speech Language Pathology Notes (most recent note)    No notes exist for this encounter.       Respiratory Therapy Notes (most recent note)    No notes exist for this encounter.       Maricruz Shrestha RN   Registered Nurse  Wound Care  Nursing Note      Signed  Date of Service:  25 1240  Creation Time:  25 1416     Signed        WOCN note:     74 yr old female admitted 25 with reports of generalized weakness and lethargy. WOCN consult received for left arm wounds.      Patient presents with edema to her LUE. She is current with [facility] wound center for wounds to her left forearm and axilla. The left forearm wound is healed. There is a stable scab in place with no exudate or fluctuance noted. Recommend to leave open to air.   The axilla wound measures approximately 0.5 x 1 x 0.5 cm with a yellow fibrotic wound base. There is scant serosanguinous exudate noted on the old dressing. The wound was irrigated with NS and packed with Iodoform packing strip and covered with silicone foam dressing. Recommend to change daily. Spouse has been performing wound care at home. Patient to continue follow up with outpatient wound center as scheduled. We will follow as needed.                          Olivia Lucio, PT   Physical Therapist  Physical Therapy  Therapy Evaluation     Signed  Date of Service:  25 115  Creation Time:  25 115     Signed        Expand All Collapse All  Patient Name: Lynne Cade                  : 1950                        MRN: [id number]                              Today's Date: 2025                                   Admit Date: 2025                        Visit Dx:   Visit Diagnosis       ICD-10-CM ICD-9-CM   1. Respiratory acidosis  E87.29 276.2   2. Acute respiratory failure with hypoxia and hypercapnia  J96.01 518.81     J96.02     3. Generalized weakness  R53.1 780.79   4. Altered mental status, unspecified altered mental status type  R41.82 780.97         Problem List       Patient Active Problem List   Diagnosis    Acute kidney injury superimposed on CKD    Absolute anemia    Anxiety    Benign essential tremor    Chronic obstructive pulmonary disease    Family tension    Hyperlipidemia     Insomnia    Iron deficiency anemia    Other amnesia    Diabetic nephropathy    Renal insufficiency    Type 2 diabetes mellitus with hyperglycemia    Grief at loss of child    Closed subcapital fracture of right femur    Essential hypertension    CKD (chronic kidney disease), stage IV    Transition of care performed with sharing of clinical summary    Vitamin D deficiency    Age-related osteoporosis without current pathological fracture    History of total knee arthroplasty    Dyspnea on exertion    S/P gastric bypass    Obesity (BMI 30-39.9)    Elevated LFTs    Weakness    Syncope, unspecified syncope type    Right leg swelling    Folliculitis    Elevated troponin    Coronary artery disease involving native coronary artery of native heart without angina pectoris    A-V fistula    Dyspnea    Dyspnea and respiratory abnormalities    Hemodialysis access, AV graft    S/P arteriovenous (AV) graft placement    Venous stenosis of left upper extremity    Lethargy         Medical History        Past Medical History:   Diagnosis Date    Anemia       Hx of anemia    Anxiety       Controled  xanax prn    Arthritis       Conditions. Abstracted from [facility].    Cataract       bilateral    CHF (congestive heart failure) 6/01/22     Low iron    Cholelithiasis 30 yrs ago     Had gb removed    CKD (chronic kidney disease), stage IV 01/13/2023    Colon polyp      Depression 1995     Take cymbalta    Diabetes mellitus       type 2    Diarrhea       on and off    Edema of right foot 04/2021    Healthcare maintenance 2008     PAP. Abstracted from [facility].    Hip pain, bilateral      HL (hearing loss) 1/1/19     Have hearing aids    Hyperlipidemia      Hypertension      Knee pain, bilateral      Obesity      Osteopenia 11/1/22     On med    Other forms of dyspnea      Other specified soft tissue disorders      Pneumonia due to COVID-19 virus 06/26/2022    Renal insufficiency Stage 3     Has gone up to stage 4    Slow to wake up  after anesthesia      Spinal headache      Stress headaches      Tremor       Benign    Tremor of both hands      Urinary tract infection 07/01/22     [place]    Visual impairment 6/1/2017     Cataracts    Vitamin D deficiency           Surgical History         Past Surgical History:   Procedure Laterality Date    ARTERIOVENOUS FISTULA/SHUNT SURGERY Left 02/24/2023     Procedure: BRACHIOCEPHALIC ARTERIOVENOUS FISTULA FORMATION;  Surgeon: Vignesh Riley MD;  Location: [facility] MAIN OR;  Service: Vascular;  Laterality: Left;    ARTERIOVENOUS FISTULA/SHUNT SURGERY Left 2/27/2025     Procedure: ARTERIOVENOUS SHUNT GRAFT;  Surgeon: Zaina Bernard MD;  Location: [facility] OR;  Service: Vascular;  Laterality: Left;    BREAST BIOPSY Right      CHOLECYSTECTOMY   1990    COLONOSCOPY         This year 2022    EYE SURGERY Bilateral       cataract removal    GASTRIC BYPASS   2002    HERNIA REPAIR   2005     umbilical hernia    HIP OPEN REDUCTION Right 03/02/2020     Procedure: FEMORAL NECK OPEN REDUCTION INTERNAL FIXATION;  Surgeon: Yfn Sifuentes MD;  Location: [facility];  Service: Orthopedics;  Laterality: Right;  Cannulated screw fixation right femoral neck    JOINT REPLACEMENT   2020     R knee    LAPAROTOMY OOPHERECTOMY Bilateral 2010    OTHER SURGICAL HISTORY   04/20/2015     Lexiscan stress test, normal. [facility]    PANNICULECTOMY        TOTAL KNEE ARTHROPLASTY Right 04/29/2021     Procedure: RIGHT TOTAL KNEE REPLACEMENT;  Surgeon: Yfn Sifuentes MD;  Location: [facility] OR;  Service: Orthopedics;  Laterality: Right;           General Information         Row Name 04/23/25 1136                 Physical Therapy Time and Intention     Document Type evaluation  -CL       Mode of Treatment individual therapy;physical therapy  -CL          Row Name 04/23/25 1136                 General Information     Patient Profile Reviewed yes  -CL       Prior Level of Function min  assist:;transfer;ADL's  -CL       Existing Precautions/Restrictions fall  -CL       Barriers to Rehab medically complex  -CL          Row Name 04/23/25 1136                 Living Environment     Current Living Arrangements home  -CL       People in Home spouse  -CL       Name(s) of People in Home Spouse: Gordy Cade  -CL          Row Name 04/23/25 1136                 Home Main Entrance     Number of Stairs, Main Entrance other (see comments)  ramps to enter  -CL          Row Name 04/23/25 1136                 Stairs Within Home, Primary     Number of Stairs, Within Home, Primary none  -CL          Row Name 04/23/25 1136                 Cognition     Orientation Status (Cognition) person;place;situation  -CL          Row Name 04/23/25 1136                 Safety Issues/Impairments Affecting Functional Mobility     Safety Issues Affecting Function (Mobility) insight into deficits/self-awareness  -CL       Impairments Affecting Function (Mobility) endurance/activity tolerance;strength  -CL                       User Key  (r) = Recorded By, (t) = Taken By, (c) = Cosigned By        Initials Name Provider Type     CL Olivia Lucio, PT Physical Therapist                            Mobility         Row Name 04/23/25 1138                 Bed Mobility     Comment, (Bed Mobility) Pt up in chair at start of session  -CL          Row Name 04/23/25 1138                 Sit-Stand Transfer     Sit-Stand [place] (Transfers) minimum assist (75% patient effort)  -CL          Row Name 04/23/25 1138                 Gait/Stairs (Locomotion)     [place] Level (Gait) minimum assist (75% patient effort)  -CL       Assistive Device (Gait) walker, front-wheeled  -CL       Patient was able to Ambulate yes  -CL       Distance in Feet (Gait) 5  -CL                       User Key  (r) = Recorded By, (t) = Taken By, (c) = Cosigned By        Initials Name Provider Type     Olivia Willett, PT Physical Therapist                             Obj/Interventions         Row Name 04/23/25 1139                 Range of Motion Comprehensive     General Range of Motion no range of motion deficits identified  -CL       Comment, General Range of Motion Generalized edema LUE  -CL          Row Name 04/23/25 1139                 Strength Comprehensive (MMT)     Comment, General Manual Muscle Testing (MMT) Assessment hip flex 3/5, knee ext 4/5 bilaterally  -CL          Row Name 04/23/25 1139                 Balance     Balance Assessment sitting static balance;standing static balance;standing dynamic balance;sitting dynamic balance  -CL       Static Sitting Balance independent  -CL       Dynamic Sitting Balance supervision  -CL       Position, Sitting Balance unsupported;sitting in chair  -CL       Static Standing Balance minimal assist  -CL       Dynamic Standing Balance minimal assist  -CL       Position/Device Used, Standing Balance supported;walker, rolling  -CL          Row Name 04/23/25 1139                 Sensory Assessment (Somatosensory)     Sensory Assessment (Somatosensory) sensation intact  -CL                       User Key  (r) = Recorded By, (t) = Taken By, (c) = Cosigned By        Initials Name Provider Type     CL Olivia Lucio, PT Physical Therapist                            Goals/Plan         Row Name 04/23/25 1149                 Bed Mobility Goal 1 (PT)     Activity/Assistive Device (Bed Mobility Goal 1, PT) bed mobility activities, all  -CL       [place] Level/Cues Needed (Bed Mobility Goal 1, PT) independent  -CL       Time Frame (Bed Mobility Goal 1, PT) long term goal (LTG);2 weeks  -CL          Row Name 04/23/25 1149                 Transfer Goal 1 (PT)     Activity/Assistive Device (Transfer Goal 1, PT) sit-to-stand/stand-to-sit;bed-to-chair/chair-to-bed  -CL       [place] Level/Cues Needed (Transfer Goal 1, PT) modified independence  -CL       Time Frame (Transfer Goal 1, PT) long term goal (LTG);2 weeks  -CL           Row Name 04/23/25 1149                 Gait Training Goal 1 (PT)     Activity/Assistive Device (Gait Training Goal 1, PT) gait (walking locomotion);assistive device use  -CL       [place] Level (Gait Training Goal 1, PT) modified independence  -CL       Distance (Gait Training Goal 1, PT) 25'  -CL       Time Frame (Gait Training Goal 1, PT) long term goal (LTG);2 weeks  -CL          Row Name 04/23/25 1149                 Therapy Assessment/Plan (PT)     Planned Therapy Interventions (PT) balance training;bed mobility training;gait training;neuromuscular re-education;patient/family education;strengthening;transfer training  -CL                    User Key  (r) = Recorded By, (t) = Taken By, (c) = Cosigned By        Initials Name Provider Type     Olivia Willett, PT Physical Therapist                         Clinical Impression         Row Name 04/23/25 1140                 Pain     Pretreatment Pain Rating 0/10 - no pain  -CL       Posttreatment Pain Rating 0/10 - no pain  -CL          Row Name 04/23/25 1140                 Plan of Care Review     Plan of Care Reviewed With patient;spouse  -CL       Outcome Evaluation Lynne Cade is a 74 y.o. female  with medical hx of COPD, CAD, HLD, DM and CKD stage 4 who presents with confusion and generalized weakness. Of note, pt had  vascular surgery LUE Feb. '25 and follow up with vascular surgery sarah this week.   CT shows no acute intracranial findings.  She is found to have Acute metabolic encephalopathy secondary to hypoxia and hypercapnia.  At baseline, [facility].  Prior to February, she was independent for all mobility and ADLs without AD.  Since then, she has had increasing confusion and weakness, requiring use of RW and finally, assist from spouse for transfers to w/c and assist with ADLs.  She recently started on 2L O2 at home.  At time of PT evaluation, she is A&O x 4 with some memory deficits  and word finding noted.  She has been decreased to 3L high flow O2.  She demonstrates BLE weakness and continued edema LUE.  She was educated in AROM to LUE and deep breathing to activate abdominal lymphatics.  She transfers to stand with min A and is able to walk short distances with RW, exhibiting tremoring BLEs and reliance on BUEs.  She would benefit from HH PT at discharge.  -CL          Row Name 04/23/25 1140                 Therapy Assessment/Plan (PT)     Rehab Potential (PT) good  -CL       Criteria for Skilled Interventions Met (PT) skilled treatment is necessary;yes  -CL       Therapy Frequency (PT) 5 times/wk  -CL       Predicted Duration of Therapy Intervention (PT) Until discharge  -CL          Row Name 04/23/25 1140                 Vital Signs     Pre Systolic BP Rehab 145  -CL       Pre Treatment Diastolic BP 66  -CL       Pretreatment Heart Rate (beats/min) 82  -CL       Pretreatment Resp Rate (breaths/min) 11  -CL       Pre SpO2 (%) 96  -CL       O2 Delivery Pre Treatment supplemental O2  -CL       Intra SpO2 (%) 96  -CL       O2 Delivery Intra Treatment supplemental O2  -CL       Post SpO2 (%) 95  -CL       Pre Patient Position Sitting  -CL       Intra Patient Position Standing  -CL       Post Patient Position Sitting  -CL          Row Name 04/23/25 1140                 Positioning and Restraints     Pre-Treatment Position sitting in chair/recliner  -CL       Post Treatment Position chair  -CL       In Chair sitting;call light within reach;encouraged to call for assist;exit alarm on;with family/caregiver  -CL                       User Key  (r) = Recorded By, (t) = Taken By, (c) = Cosigned By        Initials Name Provider Type     CL Olivia Lucio, PT Physical Therapist                            Outcome Measures         Row Name 04/23/25 114                 How much help from another person do you currently need...     Turning from your back to your side while in flat bed without using  bedrails? 3  -CL       Moving from lying on back to sitting on the side of a flat bed without bedrails? 3  -CL       Moving to and from a bed to a chair (including a wheelchair)? 3  -CL       Standing up from a chair using your arms (e.g., wheelchair, bedside chair)? 3  -CL       Climbing 3-5 steps with a railing? 1  -CL       To walk in hospital room? 3  -CL       AM-PAC 6 Clicks Score (PT) 16  -CL       Highest Level of Mobility Goal 5 --> Static standing  -CL          Row Name 04/23/25 1149                 Functional Assessment     Outcome Measure Options AM-PAC 6 Clicks Basic Mobility (PT)  -CL                       User Key  (r) = Recorded By, (t) = Taken By, (c) = Cosigned By        Initials Name Provider Type     CL Olivia Lucio, PT Physical Therapist                          Physical Therapy Education            Title: PT OT SLP Therapies (Done)         Topic: Physical Therapy (Done)         Point: Mobility training (Done)         Learning Progress Summary             Patient Acceptance, E,TB, VU by CL at 4/23/2025 1150                                                User Key         Initials Effective Dates Name Provider Type Discipline     CL 03/02/22 -  Olivia Lucio, PT Physical Therapist PT                          PT Recommendation and Plan  Planned Therapy Interventions (PT): balance training, bed mobility training, gait training, neuromuscular re-education, patient/family education, strengthening, transfer training  Outcome Evaluation: Lynne Cade is a 74 y.o. female  with medical hx of COPD, CAD, HLD, DM and CKD stage 4 who presents with confusion and generalized weakness. Of note, pt had  vascular surgery RAMONITA Feb. '25 and follow up with vascular surgery sarah this week.   CT shows no acute intracranial findings.  She is found to have Acute metabolic encephalopathy secondary to hypoxia and hypercapnia.  At baseline, pt lives with her  in a [facility] with ramps to enter.  Prior to  February, she was independent for all mobility and ADLs without AD.  Since then, she has had increasing confusion and weakness, requiring use of RW and finally, assist from spouse for transfers to w/c and assist with ADLs.  She recently started on 2L O2 at home.  At time of PT evaluation, she is A&O x 4 with some memory deficits and word finding noted.  She has been decreased to 3L high flow O2.  She demonstrates BLE weakness and continued edema LUE.  She was educated in AROM to LUE and deep breathing to activate abdominal lymphatics.  She transfers to stand with min A and is able to walk short distances with RW, exhibiting tremoring BLEs and reliance on BUEs.  She would benefit from HH PT at discharge.      Time Calculation:   PT Evaluation Complexity  History, PT Evaluation Complexity: 3 or more personal factors and/or comorbidities  Examination of Body Systems (PT Eval Complexity): total of 3 or more elements  Clinical Presentation (PT Evaluation Complexity): evolving  Clinical Decision Making (PT Evaluation Complexity): moderate complexity  Overall Complexity (PT Evaluation Complexity): moderate complexity       PT Charges         Row Name 04/23/25 1150                       Time Calculation     Start Time 1041  -CL         Stop Time 1107  -CL         Time Calculation (min) 26 min  -CL         PT Received On 04/23/25  -CL         PT - Next Appointment 04/24/25  -CL         PT Goal Re-Cert Due Date 05/07/25  -CL                 Time Calculation- PT     Total Timed Code Minutes- PT 0 minute(s)  -CL                      User Key  (r) = Recorded By, (t) = Taken By, (c) = Cosigned By        Initials Name Provider Type     CL Olivia Lucio, PT Physical Therapist                       Therapy Charges for Today         Code Description Service Date Service Provider Modifiers Qty     63489615202  PT EVAL MOD COMPLEXITY 4 4/23/2025 Olivia Lucio, PT GP 1                PT G-Codes  Outcome Measure Options:  AM-PAC 6 Clicks Basic Mobility (PT)  AM-PAC 6 Clicks Score (PT): 16  PT Discharge Summary  Anticipated Discharge Disposition (PT): home with assist, home with home health     Olivia LA Lucio, PT             4/23/2025                                    Bozena Perez, PT   Physical Therapist  Physical Therapy  Therapy Treatment Note     Addendum  Date of Service:  04/30/25 1509  Creation Time:  04/30/25 1509     Subjective: Pt agreeable to therapeutic plan of care. Patient is found resting in bed; agreeable to getting out of bed for lunch with encouragement.     Objective:      Precautions - falls, myoclonic jerking/LE buckling with transfers, 2L oxygen     Bed mobility - Min-A increased time  Transfers - Mod-A and Max-A fluctuating assist due to LE buckling; SPS with RW  Ambulation - not safe to try due to LE buckling with SPS transfer.          Patient instructed in 2 minutes static sitting EOB with UE support prn and min-moda for balance due to posterior lean.       Therapeutic Exercise encouraged LE ROM and therEx when OOB     Vitals: WNL     Pain: 0      Education: Provided education on the importance of mobility in the acute care setting, Verbal/Tactile Cues, ADL training, Transfer Training, and Gait Training     Assessment: Lynne Cade presents with functional mobility impairments which indicate the need for skilled intervention. Patient demos myoclonic jerking which impacts LE stability during functional transfers.  Patient requires mod-maxA for SPS transfer with RW.  Continues with very poor activity tolerance and impaired sitting/standing balance.  Continue to recommend SNF level of care once medically appropriate.   Tolerating session today without incident. Will continue to follow and progress as tolerated.      Plan/Recommendations:   If medically appropriate, Moderate Intensity Therapy recommended post-acute care. This is recommended as therapy feels the patient would require 3-4 days per  "week and wouldn't tolerate \"3 hour daily\" rehab intensity. SNF would be the preferred choice. If the patient does not agree to SNF, arrange HH or OP depending on home bound status. If patient is medically complex, consider LTACH. Pt requires no DME at discharge.      Pt desires Skilled Rehab placement at discharge. Pt cooperative; agreeable to therapeutic recommendations and plan of care.            Basic Mobility 6-click:  Rollin = Total, A lot = 2, A little = 3; 4 = None  Supine>Sit:                      1 = Total, A lot = 2, A little = 3; 4 = None   Sit>Stand with arms:       1 = Total, A lot = 2, A little = 3; 4 = None  Bed>Chair:                      1 = Total, A lot = 2, A little = 3; 4 = None  Ambulate in room:           1 = Total, A lot = 2, A little = 3; 4 = None  3-5 Steps with railin = Total, A lot = 2, A little = 3; 4 = None  Score: 12     Modified [place]: N/A = No pre-op stroke/TIA     Post-Tx Position: Up in Chair, Alarms activated, and Call light and personal items within reach; advised patient and spouse in use of call light and assistance back to bed due to falls risk and inconsistent performance with mobility.    PPE: gloves     Therapy Charges for Today         Code Description Service Date Service Provider Modifiers Qty     61556943540  PT THERAPEUTIC ACT EA 15 MIN 2025 Bozena Perez PT GP 1               PT Charges         Row Name 25 1509                       Time Calculation     Start Time 1154  -RR         Stop Time 1205  -RR         Time Calculation (min) 11 min  -RR         PT Received On 25  -RR         PT - Next Appointment 25  -RR                 Time Calculation- PT     Total Timed Code Minutes- PT 11 minute(s)  -RR                      User Key  (r) = Recorded By, (t) = Taken By, (c) = Cosigned By        Initials Name Provider Type     Bozena Cuenca PT Physical Therapist                 Manda Ceballos, OT " "  Occupational Therapist  Occupational Therapy  Therapy Treatment Note     Signed  Date of Service:  04/30/25 1516  Creation Time:  04/30/25 1516     Signed        Subjective: Pt agreeable to therapeutic plan of care. Pt reporting feeling better after receiving medication for nausea     Objective:      Precautions - falls, BLE buckling/jerking during transfers, 2L oxygen      Bed Mobility: min A with increased time and bed rail   Functional Transfers: Mod-A, Max-A, and with rolling walker, 2nd person for safety due to LE buckling, stand pivot to chair from bed      Balance: sitting EOB SBA sit balance, standing modA stand balance with RW   Functional Ambulation: N/A or Not attempted.     Feeding: Independent  ADL Position: supported sitting  ADL Comments: sitting up in chair, edcuation provided on importance of eating meals in upright position         Vitals: WNL     Pain: 0      Education: Provided education on the importance of mobility in the acute care setting, Verbal/Tactile Cues, ADL training, and Transfer Training     Assessment: Lynne Cade presents with ADL impairments affecting function including balance, endurance / activity tolerance, and strength. Pt is limited with functional transfers due to myoclonic jerking and buckling in BLEs. Pt edcuated on importance of eating meals in upright position and energy conservation today. Pt with poor activity tolerance and generalized weakness overall. Demonstrated functioning below baseline abilities indicate the need for continued skilled intervention while inpatient. Tolerating session today without incident. Will continue to follow and progress as tolerated.      Plan/Recommendations:   Moderate Intensity Therapy recommended post-acute care. This is recommended as therapy feels the patient would require 3-4 days per week and wouldn't tolerate \"3 hour daily\" rehab intensity. SNF would be the preferred choice. If the patient does not agree to SNF, arrange HH " or OP depending on home bound status. If patient is medically complex, consider LTACH.. Pt requires no DME at discharge.      Pt desires Skilled Rehab placement at discharge. Pt cooperative; agreeable to therapeutic recommendations and plan of care.      Modified [place]: N/A = No pre-op stroke/TIA     Post-Tx Position: Up in Chair, Alarms activated, and Call light and personal items within reach  PPE: gloves     Therapy Charges for Today         Code Description Service Date Service Provider Modifiers Qty     39687884812  OT THERAPEUTIC ACT EA 15 MIN 4/30/2025 Manda Ceballos OT GO 1               Time Calculation- OT         Row Name 04/30/25 1515                       Time Calculation- OT     OT Start Time 1154  -DESIRAE         OT Stop Time 1205  -DESIRAE         OT Time Calculation (min) 11 min  -DESIRAE         Total Timed Code Minutes- OT 11 minute(s)  -DESIRAE         OT Received On 04/30/25  -DESIRAE         OT - Next Appointment 05/01/25  -DESIRAE                      User Key  (r) = Recorded By, (t) = Taken By, (c) = Cosigned By        Initials Name Provider Type     Manda Castle OT Occupational Therapist

## 2025-05-01 NOTE — THERAPY TREATMENT NOTE
"Subjective: Pt agreeable to therapeutic plan of care.  Patient resting in bed alert and agreeable to Pt    Objective:     Precautions - falls, no BP L UE, 2L oxygen, myoclonic jerking/LE buckling with transfers     Bed mobility - Min-A-modA sup>sit, supervision sit>supine  Transfers - Min-A with RW and cues for hand placement.   Ambulation - 2x4-5 feet ambulatory transfer with RW; cues for balance and stability with min-modA    Therapeutic Exercise -BLE: AP, MIP, LAQ, hip ab/add x10-15 reps wit<>stand x5 reps for strenthening with CGA-Reji     Vitals: WNL    Pain: 0     Education: Provided education on the importance of mobility in the acute care setting, Transfer Training, and Gait Training    Assessment: Lynne Cade presents with functional mobility impairments which indicate the need for skilled intervention. Patient demos improved alertness and participation; reduced myoclonic jerking and improved stability in standing.  Patient remains well below functional baseline and will benefit from SNF level of care once medically appropriate. Tolerating session today without incident. Will continue to follow and progress as tolerated.     Plan/Recommendations:   If medically appropriate, Moderate Intensity Therapy recommended post-acute care. This is recommended as therapy feels the patient would require 3-4 days per week and wouldn't tolerate \"3 hour daily\" rehab intensity. SNF would be the preferred choice. If the patient does not agree to SNF, arrange HH or OP depending on home bound status. If patient is medically complex, consider LTACH. Pt requires no DME at discharge.     Pt desires Skilled Rehab placement at discharge. Pt cooperative; agreeable to therapeutic recommendations and plan of care.         Basic Mobility 6-click:  Rollin = Total, A lot = 2, A little = 3; 4 = None  Supine>Sit:   1 = Total, A lot = 2, A little = 3; 4 = None   Sit>Stand with arms:  1 = Total, A lot = 2, A little = 3; 4 = " None  Bed>Chair:   1 = Total, A lot = 2, A little = 3; 4 = None  Ambulate in room:  1 = Total, A lot = 2, A little = 3; 4 = None  3-5 Steps with railin = Total, A lot = 2, A little = 3; 4 = None  Score: 17    Modified [place]: N/A = No pre-op stroke/TIA    Post-Tx Position: Supine with HOB Elevated, Alarms activated, and Call light and personal items within reach  PPE: gloves    Therapy Charges for Today       Code Description Service Date Service Provider Modifiers Qty    [id number] HC PT THERAPEUTIC ACT EA 15 MIN 2025 Bozena Perez, PT GP 1    74949109021 HC PT THERAPEUTIC ACT EA 15 MIN 2025 Bozena Perez, PT GP 1    [id number]  PT THER PROC EA 15 MIN 2025 Bozena Perez, PT GP 1           PT Charges       Row Name 25 1453             Time Calculation    Start Time 1341  -RR      Stop Time 1402  -RR      Time Calculation (min) 21 min  -RR      PT Received On 25  -RR      PT - Next Appointment 25  -RR         Time Calculation- PT    Total Timed Code Minutes- PT 21 minute(s)  -RR                User Key  (r) = Recorded By, (t) = Taken By, (c) = Cosigned By      Initials Name Provider Type    Bozena Cuenca, PT Physical Therapist

## 2025-05-01 NOTE — CASE MANAGEMENT/SOCIAL WORK
Continued Stay Note  [facility]     Patient Name: Lynne Cade  MRN: [id number]  Today's Date: 5/1/2025    Admit Date: 4/22/2025    Plan: [facility] (accepted, skilled). PASRR approved. Precert approved Valid 5/3-5/9. Home O2 2L [facility]. OPHD [facility] Start on Monday 5/5 (MWF 7:05 chair time)   Discharge Plan       Row Name 05/01/25 1126       Plan    Plan [facility] (accepted, skilled). PASRR approved. Precert approved Valid 5/3-5/9. Home O2 2L [facility]. OPHD [facility] Start on Monday 5/5 (MWF 7:05 chair time)    Plan Comments SNF facility awaiting bipap delivery. OPHD set up to start on Monday 5/5. Precert approved valid Saturday Valid 5/3-5/9             Vanesa Mercer RN     [facility]  Office: [phone]  Cell: [phone]  Fax # [phone]

## 2025-05-01 NOTE — NURSING NOTE
Orders verified. Machine & water checks passed.  Report received from primary RN.  Aox4, not in distress.  Edema noted distal to dialysis graft and olecranon fossa. No hematoma, no tenderness noted.  Cannulated x2. Treatment started without issue.

## 2025-05-01 NOTE — SIGNIFICANT NOTE
HD Complete.     3.5 liters removed  82.8 BVP  3.5 hours    VSS, pt denies all. RPT called to [facility].

## 2025-05-01 NOTE — PLAN OF CARE
"Goal Outcome Evaluation:      Assessment: Lynne Cade presents with ADL impairments affecting function including balance, endurance / activity tolerance, range of motion (ROM), and strength. Pt showing improvement this date after HD. Pt alert and active with therapy. Spouse pleased reporting that since she had a surgery in February she has not been (I) or active. Demonstrated functioning below baseline abilities indicate the need for continued skilled intervention while inpatient. Tolerating session today without incident. Will continue to follow and progress as tolerated. Pt plans SNF for rehab with OP HD 3 days/week.     Plan/Recommendations:   Moderate Intensity Therapy recommended post-acute care. This is recommended as therapy feels the patient would require 3-4 days per week and wouldn't tolerate \"3 hour daily\" rehab intensity. SNF would be the preferred choice. If the patient does not agree to SNF, arrange HH or OP depending on home bound status. If patient is medically complex, consider LTACH.. Pt requires no DME at discharge.      Pt desires Skilled Rehab placement at discharge. Pt cooperative; agreeable to therapeutic recommendations and plan of care.      Modified [place]: 4 = Moderately severe disability (Unable to attend to own bodily needs without assistance, and unable to walk unassisted)   "

## 2025-05-02 LAB
ALBUMIN SERPL-MCNC: 3.2 G/DL (ref 3.5–5.2)
ANION GAP SERPL CALCULATED.3IONS-SCNC: 8.8 MMOL/L (ref 5–15)
BASOPHILS # BLD AUTO: 0.05 10*3/MM3 (ref 0–0.2)
BASOPHILS NFR BLD AUTO: 0.4 % (ref 0–1.5)
BUN SERPL-MCNC: 39 MG/DL (ref 8–23)
BUN/CREAT SERPL: 15.4 (ref 7–25)
CALCIUM SPEC-SCNC: 8.6 MG/DL (ref 8.6–10.5)
CHLORIDE SERPL-SCNC: 101 MMOL/L (ref 98–107)
CO2 SERPL-SCNC: 26.2 MMOL/L (ref 22–29)
CREAT SERPL-MCNC: 2.53 MG/DL (ref 0.57–1)
DEPRECATED RDW RBC AUTO: 60.4 FL (ref 37–54)
EGFRCR SERPLBLD CKD-EPI 2021: 19.2 ML/MIN/1.73
EOSINOPHIL # BLD AUTO: 0.27 10*3/MM3 (ref 0–0.4)
EOSINOPHIL NFR BLD AUTO: 2.3 % (ref 0.3–6.2)
ERYTHROCYTE [DISTWIDTH] IN BLOOD BY AUTOMATED COUNT: 15.9 % (ref 12.3–15.4)
GLUCOSE BLDC GLUCOMTR-MCNC: 134 MG/DL (ref 70–105)
GLUCOSE BLDC GLUCOMTR-MCNC: 140 MG/DL (ref 70–105)
GLUCOSE BLDC GLUCOMTR-MCNC: 167 MG/DL (ref 70–105)
GLUCOSE BLDC GLUCOMTR-MCNC: 198 MG/DL (ref 70–105)
GLUCOSE SERPL-MCNC: 150 MG/DL (ref 65–99)
HCT VFR BLD AUTO: 24.4 % (ref 34–46.6)
HGB BLD-MCNC: 7.1 G/DL (ref 12–15.9)
IMM GRANULOCYTES # BLD AUTO: 0.62 10*3/MM3 (ref 0–0.05)
IMM GRANULOCYTES NFR BLD AUTO: 5.3 % (ref 0–0.5)
LYMPHOCYTES # BLD AUTO: 1.82 10*3/MM3 (ref 0.7–3.1)
LYMPHOCYTES NFR BLD AUTO: 15.6 % (ref 19.6–45.3)
MCH RBC QN AUTO: 32.1 PG (ref 26.6–33)
MCHC RBC AUTO-ENTMCNC: 29.1 G/DL (ref 31.5–35.7)
MCV RBC AUTO: 110.4 FL (ref 79–97)
MONOCYTES # BLD AUTO: 1.24 10*3/MM3 (ref 0.1–0.9)
MONOCYTES NFR BLD AUTO: 10.6 % (ref 5–12)
NEUTROPHILS NFR BLD AUTO: 65.8 % (ref 42.7–76)
NEUTROPHILS NFR BLD AUTO: 7.68 10*3/MM3 (ref 1.7–7)
NRBC BLD AUTO-RTO: 1.4 /100 WBC (ref 0–0.2)
PHOSPHATE SERPL-MCNC: 5.5 MG/DL (ref 2.5–4.5)
PLATELET # BLD AUTO: 235 10*3/MM3 (ref 140–450)
PMV BLD AUTO: 10.1 FL (ref 6–12)
POTASSIUM SERPL-SCNC: 4.2 MMOL/L (ref 3.5–5.2)
RBC # BLD AUTO: 2.21 10*6/MM3 (ref 3.77–5.28)
SODIUM SERPL-SCNC: 136 MMOL/L (ref 136–145)
WBC NRBC COR # BLD AUTO: 11.68 10*3/MM3 (ref 3.4–10.8)

## 2025-05-02 PROCEDURE — 94664 DEMO&/EVAL PT USE INHALER: CPT

## 2025-05-02 PROCEDURE — 97110 THERAPEUTIC EXERCISES: CPT

## 2025-05-02 PROCEDURE — 25010000002 PROCHLORPERAZINE 10 MG/2ML SOLUTION: Performed by: INTERNAL MEDICINE

## 2025-05-02 PROCEDURE — 80069 RENAL FUNCTION PANEL: CPT | Performed by: INTERNAL MEDICINE

## 2025-05-02 PROCEDURE — 94799 UNLISTED PULMONARY SVC/PX: CPT

## 2025-05-02 PROCEDURE — 97530 THERAPEUTIC ACTIVITIES: CPT

## 2025-05-02 PROCEDURE — 94660 CPAP INITIATION&MGMT: CPT

## 2025-05-02 PROCEDURE — 82948 REAGENT STRIP/BLOOD GLUCOSE: CPT

## 2025-05-02 PROCEDURE — 25010000002 HEPARIN (PORCINE) PER 1000 UNITS: Performed by: INTERNAL MEDICINE

## 2025-05-02 PROCEDURE — 94761 N-INVAS EAR/PLS OXIMETRY MLT: CPT

## 2025-05-02 PROCEDURE — 63710000001 INSULIN LISPRO (HUMAN) PER 5 UNITS: Performed by: STUDENT IN AN ORGANIZED HEALTH CARE EDUCATION/TRAINING PROGRAM

## 2025-05-02 PROCEDURE — 97116 GAIT TRAINING THERAPY: CPT

## 2025-05-02 PROCEDURE — 25010000002 ONDANSETRON PER 1 MG: Performed by: INTERNAL MEDICINE

## 2025-05-02 PROCEDURE — 85025 COMPLETE CBC W/AUTO DIFF WBC: CPT | Performed by: INTERNAL MEDICINE

## 2025-05-02 PROCEDURE — 82948 REAGENT STRIP/BLOOD GLUCOSE: CPT | Performed by: STUDENT IN AN ORGANIZED HEALTH CARE EDUCATION/TRAINING PROGRAM

## 2025-05-02 RX ORDER — ALBUMIN (HUMAN) 12.5 G/50ML
12.5 SOLUTION INTRAVENOUS AS NEEDED
Status: CANCELLED | OUTPATIENT
Start: 2025-05-03 | End: 2025-05-03

## 2025-05-02 RX ADMIN — HEPARIN SODIUM 5000 UNITS: 5000 INJECTION INTRAVENOUS; SUBCUTANEOUS at 08:34

## 2025-05-02 RX ADMIN — ROPINIROLE HYDROCHLORIDE 0.5 MG: 0.25 TABLET, FILM COATED ORAL at 20:11

## 2025-05-02 RX ADMIN — BUDESONIDE 0.5 MG: 0.5 SUSPENSION RESPIRATORY (INHALATION) at 06:58

## 2025-05-02 RX ADMIN — GABAPENTIN 100 MG: 100 CAPSULE ORAL at 20:11

## 2025-05-02 RX ADMIN — CALCITRIOL CAPSULES 0.25 MCG 0.5 MCG: 0.25 CAPSULE ORAL at 08:34

## 2025-05-02 RX ADMIN — ROPINIROLE HYDROCHLORIDE 0.5 MG: 0.25 TABLET, FILM COATED ORAL at 08:34

## 2025-05-02 RX ADMIN — HYDRALAZINE HYDROCHLORIDE 25 MG: 25 TABLET ORAL at 20:11

## 2025-05-02 RX ADMIN — INSULIN LISPRO 2 UNITS: 100 INJECTION, SOLUTION INTRAVENOUS; SUBCUTANEOUS at 17:24

## 2025-05-02 RX ADMIN — IPRATROPIUM BROMIDE AND ALBUTEROL SULFATE 3 ML: .5; 3 SOLUTION RESPIRATORY (INHALATION) at 06:58

## 2025-05-02 RX ADMIN — ASPIRIN 81 MG: 81 TABLET, COATED ORAL at 08:34

## 2025-05-02 RX ADMIN — ROSUVASTATIN CALCIUM 10 MG: 10 TABLET, FILM COATED ORAL at 08:34

## 2025-05-02 RX ADMIN — ONDANSETRON 4 MG: 2 INJECTION, SOLUTION INTRAMUSCULAR; INTRAVENOUS at 10:33

## 2025-05-02 RX ADMIN — DULOXETINE 60 MG: 30 CAPSULE, DELAYED RELEASE ORAL at 08:34

## 2025-05-02 RX ADMIN — PRIMIDONE 50 MG: 50 TABLET ORAL at 20:11

## 2025-05-02 RX ADMIN — IPRATROPIUM BROMIDE AND ALBUTEROL SULFATE 3 ML: .5; 3 SOLUTION RESPIRATORY (INHALATION) at 18:52

## 2025-05-02 RX ADMIN — DULOXETINE 60 MG: 30 CAPSULE, DELAYED RELEASE ORAL at 20:11

## 2025-05-02 RX ADMIN — BUDESONIDE 0.5 MG: 0.5 SUSPENSION RESPIRATORY (INHALATION) at 18:52

## 2025-05-02 RX ADMIN — HEPARIN SODIUM 5000 UNITS: 5000 INJECTION INTRAVENOUS; SUBCUTANEOUS at 20:11

## 2025-05-02 RX ADMIN — FAMOTIDINE 20 MG: 20 TABLET, FILM COATED ORAL at 08:34

## 2025-05-02 RX ADMIN — PROCHLORPERAZINE EDISYLATE 5 MG: 5 INJECTION, SOLUTION INTRAMUSCULAR; INTRAVENOUS at 15:22

## 2025-05-02 RX ADMIN — INSULIN LISPRO 2 UNITS: 100 INJECTION, SOLUTION INTRAVENOUS; SUBCUTANEOUS at 20:11

## 2025-05-02 RX ADMIN — GABAPENTIN 100 MG: 100 CAPSULE ORAL at 08:34

## 2025-05-02 RX ADMIN — ROPINIROLE HYDROCHLORIDE 0.5 MG: 0.25 TABLET, FILM COATED ORAL at 17:24

## 2025-05-02 NOTE — NURSING NOTE
Called report to Mayra VACA. Pt transferring to room 355.  at bedside. Pt able to make needs known. VSS. No complaints at this time.

## 2025-05-02 NOTE — PROGRESS NOTES
Daily Progress Note          Assessment  Acute/chronic hypercapnic/hypoxic respiratory insufficiency     Chronic elevated right hemidiaphragm, associated right lower lobe mild bronchiectasis     CT chest February 2025 no evidence of interstitial lung disease     Respiratory panel negative      non-smoker  No PFTs     Features of obstructive sleep apnea     CKD stage IV  Diabetes mellitus  Hyperlipidemia     Encephalopathy   TSH 3.1  Ammonia 16        Recommendations:        oxygen titration currently on 2 L nasal cannula     Tolerated BiPAP during sleep  Sleep study as an outpatient     Bronchodilators currently on Pulmicort and DuoNeb     Steroids discontinued since there is no evidence of interstitial lung disease on most recent CAT scan in February 2025     Minimize sedatives      Currently off antibiotics     Followed by nephrology for CKD/worsening azotemia /hemodialysis          CT chest 2/15/2025               LOS: 9 days     Subjective     Shortness of breath    Objective     Vital signs for last 24 hours:  Vitals:    05/02/25 0705 05/02/25 0758 05/02/25 0833 05/02/25 1116   BP:  139/49 139/41 136/73   BP Location:  Left leg  Left leg   Patient Position:  Lying  Lying   Pulse: 82 87 84 87   Resp: 18 21  21   Temp:  97.4 °F (36.3 °C)  97.5 °F (36.4 °C)   TempSrc:  Oral  Oral   SpO2: 100% 100%  98%   Weight:       Height:           Intake/Output last 3 shifts:  I/O last 3 completed shifts:  In: 960 [P.O.:960]  Out: 4160 [Urine:660]  Intake/Output this shift:  I/O this shift:  In: 120 [P.O.:120]  Out: -       Radiology  Imaging Results (Last 24 Hours)       ** No results found for the last 24 hours. **            Labs:  Results from last 7 days   Lab Units 05/02/25  0846   WBC 10*3/mm3 11.68*   HEMOGLOBIN g/dL 7.1*   HEMATOCRIT % 24.4*   PLATELETS 10*3/mm3 235     Results from last 7 days   Lab Units 05/02/25  0501 04/28/25  0946 04/25/25  1630   SODIUM mmol/L 136   < > 136   POTASSIUM mmol/L 4.2   < > 4.9    CHLORIDE mmol/L 101   < > 101   CO2 mmol/L 26.2   < > 23.0   BUN mg/dL 39*   < > 75*   CREATININE mg/dL 2.53*   < > 3.51*   CALCIUM mg/dL 8.6   < > 8.3*   BILIRUBIN mg/dL  --   --  0.2   ALK PHOS U/L  --   --  41   ALT (SGPT) U/L  --   --  25   AST (SGOT) U/L  --   --  37*   GLUCOSE mg/dL 150*   < > 149*    < > = values in this interval not displayed.         Results from last 7 days   Lab Units 05/02/25  0501 05/01/25  0415 04/30/25  0409   ALBUMIN g/dL 3.2* 3.1* 3.4*     Results from last 7 days   Lab Units 04/25/25  1630   CK TOTAL U/L 52                           Meds:   SCHEDULE  aspirin, 81 mg, Oral, Daily  budesonide, 0.5 mg, Nebulization, BID - RT  calcitriol, 0.5 mcg, Oral, Daily  dilTIAZem CD, 300 mg, Oral, Daily  DULoxetine, 60 mg, Oral, BID  famotidine, 20 mg, Oral, Daily  [Held by provider] furosemide, 40 mg, Oral, Daily  gabapentin, 100 mg, Oral, Q12H  heparin (porcine), 5,000 Units, Subcutaneous, Q12H  hydrALAZINE, 25 mg, Oral, BID  insulin lispro, 2-7 Units, Subcutaneous, 4x Daily AC & at Bedtime  ipratropium-albuterol, 3 mL, Nebulization, BID  lidocaine-prilocaine, , Topical, Once  primidone, 50 mg, Oral, Nightly  rOPINIRole, 0.5 mg, Oral, TID  rosuvastatin, 10 mg, Oral, Daily      Infusions     PRNs    acetaminophen **OR** acetaminophen **OR** acetaminophen    senna-docusate sodium **AND** polyethylene glycol **AND** bisacodyl **AND** bisacodyl    Calcium Replacement - Follow Nurse / BPA Driven Protocol    dextrose    dextrose    glucagon (human recombinant)    hydrOXYzine    ipratropium-albuterol    Magnesium Low Dose Replacement - Follow Nurse / BPA Driven Protocol    ondansetron    prochlorperazine    sodium chloride    Physical Exam:  General Appearance:  Alert   HEENT:  Normocephalic, without obvious abnormality, Conjunctiva/corneas clear,.   Nares normal, no drainage     Neck:  Supple, symmetrical, trachea midline.   Lungs /Chest wall:   Bilateral basal rhonchi, respirations unlabored,  symmetrical wall movement.     Heart:  Regular rate and rhythm, S1 S2 normal  Abdomen: Soft, non-tender, no masses, no organomegaly.    Extremities: + Bilateral lower extremity edema, no clubbing or cyanosis     ROS  Constitutional: Negative for chills, fever and malaise/fatigue.   HENT: Negative.    Eyes: Negative.    Cardiovascular: Shortness of breath and lower extremity edema  Respiratory: Positive for cough and shortness of breath.    Skin: Negative.    Musculoskeletal: Negative.    Gastrointestinal: Negative.    Genitourinary: Negative.    Neurological: Generalized weakness      I reviewed the recent clinical results  I personally reviewed the latest radiological studies    Part of this note may be an electronic transcription/translation of spoken language to printed text using the Dragon Dictation System.

## 2025-05-02 NOTE — PLAN OF CARE
Goal Outcome Evaluation:  Plan of Care Reviewed With: patient        Progress: no change  Outcome Evaluation: pt has slept on and off throuhgout the night, unable to wear bipap throughout the night d/t anxiety-remaind on 2l nc, pt reminded to turn througout the night, no new complaints at this time,  remains @ bedside, possible d/c to [facility] in am,

## 2025-05-02 NOTE — CASE MANAGEMENT/SOCIAL WORK
Continued Stay Note  SHELDON Law     Patient Name: Lynne Cade  MRN: [id number]  Today's Date: 5/2/2025    Admit Date: 4/22/2025    Plan: [facility] (accepted, skilled). PASRR approved. Precert approved Valid 5/3-5/9. Home O2 2L [facility]. OPHD [facility] Start on Monday 5/5 (MWF 7:05 chair time)   Discharge Plan       Row Name 05/02/25 1351       Plan    Plan Comments CM confirmed with liaison Sylvie that Bipap has been delivered to the facility and patient's bed is ready for patient to admit on 5/3. CM updated patient and spouse at the bedside and careteam during MDR's.           William Nolan RN      Cell number [phone]  Office number [phone]

## 2025-05-02 NOTE — PLAN OF CARE
Goal Outcome Evaluation:      Pt. Wo significant progress made this date, min A x 2 provided for SPS transfer EOB to armchair, LLE knee spasm w/ standing and increased chance of falls. Pt. W/ limited standing tolerance. Max A provided for donning socks this date. Pt. Nauseous following and emesis bag provided, nsg present. Pt. Will require SNF placement at d/c to address aforementioned deficits and improve ADL independence.

## 2025-05-02 NOTE — PROGRESS NOTES
"NEPHROLOGY PROGRESS NOTE------[facility]    [facility]  [phone]  Felisa Melendez MD      Patient Care Team:  Lorraine Agarwal APRN as PCP - General (Nurse Practitioner)  Rick Melendez MD as Consulting Physician (Nephrology)  Mayo Fregoso MD as Consulting Physician (Urology)  Fredi Pierce MD as Consulting Physician (Cardiology)  Cony Sanchez APRN as Nurse Practitioner (Vascular Surgery)      Provider:  Felisa Melendez MD  Patient Name: Lynne Cade  :  1950    SUBJECTIVE:    F/U NEW ESRD    S/P HD yesterday. No angina. No palpitations.     Medication:  aspirin, 81 mg, Oral, Daily  budesonide, 0.5 mg, Nebulization, BID - RT  calcitriol, 0.5 mcg, Oral, Daily  dilTIAZem CD, 300 mg, Oral, Daily  DULoxetine, 60 mg, Oral, BID  famotidine, 20 mg, Oral, Daily  ferric gluconate, 250 mg, Intravenous, Daily  [Held by provider] furosemide, 40 mg, Oral, Daily  gabapentin, 100 mg, Oral, Q12H  heparin (porcine), 5,000 Units, Subcutaneous, Q12H  hydrALAZINE, 25 mg, Oral, BID  insulin lispro, 2-7 Units, Subcutaneous, 4x Daily AC & at Bedtime  ipratropium-albuterol, 3 mL, Nebulization, BID  lidocaine-prilocaine, , Topical, Once  primidone, 50 mg, Oral, Nightly  rOPINIRole, 0.5 mg, Oral, TID  rosuvastatin, 10 mg, Oral, Daily           OBJECTIVE    Vital Sign Min/Max for last 24 hours  Temp  Min: 96.1 °F (35.6 °C)  Max: 98.6 °F (37 °C)   BP  Min: 91/54  Max: 145/54   Pulse  Min: 76  Max: 95   Resp  Min: 13  Max: 20   SpO2  Min: 95 %  Max: 100 %   No data recorded   No data recorded     Flowsheet Rows      Flowsheet Row First Filed Value   Admission Height 167.6 cm (66\") Documented at 2025 1448   Admission Weight 96.2 kg (212 lb 1.3 oz) Documented at 2025 1448            No intake/output data recorded.  I/O last 3 completed shifts:  In: 960 [P.O.:960]  Out: 4160 [Urine:660]    Physical " "Exam:  General Appearance: alert, appears stated age and cooperative  Head: normocephalic, without obvious abnormality and atraumatic  Eyes: conjunctivae and sclerae normal and no icterus  Neck: supple and no JVD  Lungs:    Heart: regular rhythm & normal rate and normal S1, S2 +CYNDY  Chest: Wall no abnormalities observed  Abdomen: normal bowel sounds and soft, nontender  Extremities: moves extremities well, no edema, no cyanosis and no redness.  Left arm some edema/good bruit over AV graft  Skin: no bleeding, bruising or rash, turgor normal, color normal and no lesions noted  Neurologic: Alert, and oriented. No focal deficits    Labs:    WBC WBC   Date Value Ref Range Status   04/30/2025 9.31 3.40 - 10.80 10*3/mm3 Final      HGB Hemoglobin   Date Value Ref Range Status   04/30/2025 7.3 (L) 12.0 - 15.9 g/dL Final      HCT Hematocrit   Date Value Ref Range Status   04/30/2025 24.6 (L) 34.0 - 46.6 % Final      Platelets No results found for: \"LABPLAT\"   MCV MCV   Date Value Ref Range Status   04/30/2025 106.5 (H) 79.0 - 97.0 fL Final          Sodium Sodium   Date Value Ref Range Status   05/02/2025 136 136 - 145 mmol/L Final   05/01/2025 142 136 - 145 mmol/L Final   04/30/2025 138 136 - 145 mmol/L Final      Potassium Potassium   Date Value Ref Range Status   05/02/2025 4.2 3.5 - 5.2 mmol/L Final   05/01/2025 4.9 3.5 - 5.2 mmol/L Final   04/30/2025 4.5 3.5 - 5.2 mmol/L Final      Chloride Chloride   Date Value Ref Range Status   05/02/2025 101 98 - 107 mmol/L Final   05/01/2025 103 98 - 107 mmol/L Final   04/30/2025 102 98 - 107 mmol/L Final      CO2 CO2   Date Value Ref Range Status   05/02/2025 26.2 22.0 - 29.0 mmol/L Final   05/01/2025 25.3 22.0 - 29.0 mmol/L Final   04/30/2025 27.2 22.0 - 29.0 mmol/L Final      BUN BUN   Date Value Ref Range Status   05/02/2025 39 (H) 8 - 23 mg/dL Final   05/01/2025 60 (H) 8 - 23 mg/dL Final   04/30/2025 48 (H) 8 - 23 mg/dL Final      Creatinine Creatinine   Date Value Ref Range " "Status   05/02/2025 2.53 (H) 0.57 - 1.00 mg/dL Final   05/01/2025 3.16 (H) 0.57 - 1.00 mg/dL Final   04/30/2025 2.26 (H) 0.57 - 1.00 mg/dL Final      Calcium Calcium   Date Value Ref Range Status   05/02/2025 8.6 8.6 - 10.5 mg/dL Final   05/01/2025 8.5 (L) 8.6 - 10.5 mg/dL Final   04/30/2025 8.6 8.6 - 10.5 mg/dL Final      PO4 No components found for: \"PO4\"   Albumin Albumin   Date Value Ref Range Status   05/02/2025 3.2 (L) 3.5 - 5.2 g/dL Final   05/01/2025 3.1 (L) 3.5 - 5.2 g/dL Final   04/30/2025 3.4 (L) 3.5 - 5.2 g/dL Final      Magnesium No results found for: \"MG\"   Uric Acid No components found for: \"URIC ACID\"     Imaging Results (Last 72 Hours)       ** No results found for the last 72 hours. **            Results for orders placed during the hospital encounter of 04/22/25    XR Chest 1 View    Narrative  XR CHEST 1 VW    Date of Exam: 4/22/2025 3:40 PM EDT    Indication: AMS Protocol  AMS Protocol    Comparison: 2/16/2025    Findings:  Heart size is within normal limits for the projection. The right hemidiaphragm is elevated, unchanged. The pulmonary vascular pattern appears normal and the lungs appear clear.    Impression  Impression:  No active disease.        Electronically Signed: Yfn Luna MD  4/22/2025 3:53 PM EDT  Workstation ID: [id number]      Results for orders placed during the hospital encounter of 02/16/25    XR Chest 2 View    Narrative  XR CHEST 2 VW    Date of Exam: 2/16/2025 4:12 PM EST    Indication: Short of breath    Comparison: 2/5/2025    Findings:  Cardiomediastinal silhouette is unremarkable. Similar elevation of the right hemidiaphragm. No airspace disease, pneumothorax, nor pleural effusion. No acute osseous abnormality identified.    Impression  Impression:  No acute process identified      Electronically Signed: Antonio Ceballos MD  2/16/2025 4:13 PM EST  Workstation ID: [id number]      Results for orders placed in visit on 01/09/24    SCANNED - IMAGING      Results for orders " placed during the hospital encounter of 04/22/25    Duplex Carotid Ultrasound CAR    Interpretation Summary    Right internal carotid artery demonstrates a less than 50% stenosis.    Antegrade right vertebral flow.    Left internal carotid artery demonstrates a less than 50% stenosis.    Antegrade left vertebral flow.        ASSESSMENT / PLAN      Lethargy    NEW ESRD--------HD tomorrow per Tu-Th-Sa schedule. Outpatient HD arranged    2. ANEMIA OF ESRD-----EPO/iron with HD as needed    3. HTN WITH ESRD-----BP ok    4. VOLUME EXCESS-----Continue to increase UF with HD as BP tolerates    5. DMII WITH RENAL MANIFESTATIONS-----BS ok. Glucometers, SSI    6. CAD-------No angina    7. PRIETO-----CPAP while asleep    8. DM PVD    9. OA/DJD      Felisa Melendez MD  [facility]  [phone]  05/02/25  07:26 EDT

## 2025-05-02 NOTE — THERAPY TREATMENT NOTE
"Subjective: Pt agreeable to therapeutic plan of care.    Objective:     Precautions -falls, no BP LUE, 2L O2, myoclonic jerking/LE buckling.     Bed mobility - Mod-A  Transfers - Mod-A, Assist x 2, and with rolling walker  Ambulation - 3 feet Mod-A, Assist x 2, and with rolling walker; very poor BLE stability limiting gait progression. Pt additionally with onset of nausea due to \"motion sickness\"  Attempted sidelying vestibular screen JIA with no noted nystagmus, but pt with worsening nausea post resulting in vomiting - nurse notified and provided anti-nausea medication.     Therapeutic Exercise - 10 Reps B LE AROM unsupported sitting / EOB    Vitals: WNL    Pain:  n/a    Education: Provided education on the importance of mobility in the acute care setting, Verbal/Tactile Cues, Transfer Training, Gait Training, and HEP    Assessment: Lynne Cade presents with functional mobility impairments which indicate the need for skilled intervention. Pt's session limited by nausea and BLE myoclonic jerking with gait attempt. Pt reports she has chronic motion sickness s/p childhood meningitis - attempted sidelying test; pt was negative for nystagmus, but did vomit following. Will continue to follow and progress as tolerated.     Plan/Recommendations:   If medically appropriate, Moderate Intensity Therapy recommended post-acute care. This is recommended as therapy feels the patient would require 3-4 days per week and wouldn't tolerate \"3 hour daily\" rehab intensity. SNF would be the preferred choice. If the patient does not agree to SNF, arrange HH or OP depending on home bound status. If patient is medically complex, consider LTACH. Pt requires no DME at discharge.     Pt desires Skilled Rehab placement at discharge. Pt cooperative; agreeable to therapeutic recommendations and plan of care.     Post-Tx Position: Up in Chair, Alarms activated, and Call light and personal items within reach  PPE: gloves, surgical mask, and " deshawn    Therapy Charges for Today       Code Description Service Date Service Provider Modifiers Qty    12718473823 HC PT THER PROC EA 15 MIN 5/2/2025 Althea Johnson GP 1    01991363161 HC PT THERAPEUTIC ACT EA 15 MIN 5/2/2025 Althea Johnson GP 1    50437870460 HC GAIT TRAINING EA 15 MIN 5/2/2025 Althea Johnson GP 1           PT Charges       Row Name 05/02/25 1607             Time Calculation    Start Time 1500  -JV      Stop Time 1525  -JV      Time Calculation (min) 25 min  -JV      PT Received On 05/02/25  -JV      PT - Next Appointment 05/04/25  -JV         Time Calculation- PT    Total Timed Code Minutes- PT 25 minute(s)  -JV                User Key  (r) = Recorded By, (t) = Taken By, (c) = Cosigned By      Initials Name Provider Type    Althea Yeager Physical Therapist

## 2025-05-02 NOTE — THERAPY TREATMENT NOTE
"Subjective: Pt agreeable to therapeutic plan of care.  Cognition: oriented to Person, Place, and Time    Objective:       Bed Mobility: Mod-A   Functional Transfers: Min-A and Assist x 2     Balance: supported, static, and standing Min-A and Assist x 2  Functional Ambulation: N/A or Not attempted.    Lower Body Dressing: Max-A  ADL Position: edge of bed sitting    Vitals: WNL    Pain: 4 VAS Location: LLE  Interventions for pain: Repositioned  Education: Provided education on the importance of mobility in the acute care setting    Assessment: Lynne Cade presents with ADL impairments affecting function including balance, endurance / activity tolerance, pain, and strength. Demonstrated functioning below baseline abilities indicate the need for continued skilled intervention while inpatient. Tolerating session today without incident. Will continue to follow and progress as tolerated.     Plan/Recommendations:   Moderate Intensity Therapy recommended post-acute care. This is recommended as therapy feels the patient would require 3-4 days per week and wouldn't tolerate \"3 hour daily\" rehab intensity. SNF would be the preferred choice. If the patient does not agree to SNF, arrange HH or OP depending on home bound status. If patient is medically complex, consider LTACH.. Pt requires no DME at discharge.     Pt desires Skilled Rehab placement at discharge. Pt cooperative; agreeable to therapeutic recommendations and plan of care.     Modified Nicole: N/A = No pre-op stroke/TIA    Post-Tx Position: Up in Chair  PPE: gloves    Therapy Charges for Today       Code Description Service Date Service Provider Modifiers Qty    05134102518  OT THERAPEUTIC ACT EA 15 MIN 5/2/2025 Jose Manuel Steele OT GO 2           Time Calculation- OT       Row Name 05/02/25 1718             Time Calculation- OT    OT Start Time 1439  -MP      OT Stop Time 1502  -MP      OT Time Calculation (min) 23 min  -MP      Total Timed Code Minutes- " OT 23 minute(s)  -MP      OT Received On 05/02/25  -MP      OT - Next Appointment 05/05/25  -MP                User Key  (r) = Recorded By, (t) = Taken By, (c) = Cosigned By      Initials Name Provider Type    Jose Manuel Gooden, DORIS Occupational Therapist

## 2025-05-02 NOTE — PLAN OF CARE
"Assessment: Lynne Cade presents with functional mobility impairments which indicate the need for skilled intervention. Pt's session limited by nausea and BLE myoclonic jerking with gait attempt. Pt reports she has chronic motion sickness s/p childhood meningitis - attempted sidelying test; pt was negative for nystagmus, but did vomit following. Will continue to follow and progress as tolerated.     Plan/Recommendations:   If medically appropriate, Moderate Intensity Therapy recommended post-acute care. This is recommended as therapy feels the patient would require 3-4 days per week and wouldn't tolerate \"3 hour daily\" rehab intensity. SNF would be the preferred choice. If the patient does not agree to SNF, arrange HH or OP depending on home bound status. If patient is medically complex, consider LTACH. Pt requires no DME at discharge.     Pt desires Skilled Rehab placement at discharge. Pt cooperative; agreeable to therapeutic recommendations and plan of care.                                             "

## 2025-05-02 NOTE — PROGRESS NOTES
[facility] MEDICINE SERVICE  DAILY PROGRESS NOTE    NAME: Lynne Cdae  : 1950  MRN: [id number]      LOS: 9 days     PROVIDER OF SERVICE: Joana Daley MD    Chief Complaint: Lethargy    Subjective:     Interval History:  History taken from: patient    Patient otherwise feels well today.  She denies any chest pain or shortness of breath.  She wants to know when she is going to rehab.        Review of Systems:   Review of Systems   Constitutional:  Positive for fatigue.   Neurological:  Positive for weakness.   All other systems reviewed and are negative.      Objective:     Vital Signs  Temp:  [97.4 °F (36.3 °C)-98.6 °F (37 °C)] 97.5 °F (36.4 °C)  Heart Rate:  [77-93] 87  Resp:  [15-21] 21  BP: (114-145)/(41-73) 136/73  Flow (L/min) (Oxygen Therapy):  [2] 2   Body mass index is 36.24 kg/m².    Physical Exam  Physical Exam  Constitutional:       General: She is awake.      Appearance: She is well-developed and well-groomed. She is obese. She is ill-appearing.   HENT:      Head: Normocephalic and atraumatic.      Nose: Nose normal.      Mouth/Throat:      Mouth: Mucous membranes are moist.      Pharynx: Oropharynx is clear.   Eyes:      Extraocular Movements: Extraocular movements intact.      Conjunctiva/sclera: Conjunctivae normal.      Pupils: Pupils are equal, round, and reactive to light.   Cardiovascular:      Rate and Rhythm: Normal rate and regular rhythm.      Pulses: Normal pulses.      Heart sounds: Normal heart sounds.   Pulmonary:      Effort: Pulmonary effort is normal.      Breath sounds: Normal breath sounds.   Abdominal:      General: Abdomen is flat. Bowel sounds are normal. There is distension.      Palpations: Abdomen is soft.   Musculoskeletal:         General: Normal range of motion.      Cervical back: Normal range of motion and neck supple.      Right lower leg: No edema.      Left lower leg: No edema.   Skin:     General: Skin is warm and dry.   Neurological:       General: No focal deficit present.      Mental Status: She is alert and oriented to person, place, and time. Mental status is at baseline.      Cranial Nerves: Cranial nerves 2-12 are intact.      Sensory: Sensation is intact.      Motor: Motor function is intact.   Psychiatric:         Mood and Affect: Mood normal.         Behavior: Behavior normal. Behavior is cooperative.         Thought Content: Thought content normal.         Judgment: Judgment normal.            Diagnostic Data    Results from last 7 days   Lab Units 05/02/25  0846 05/02/25  0501 04/28/25  0946 04/25/25  1630   WBC 10*3/mm3 11.68*  --    < >  --    HEMOGLOBIN g/dL 7.1*  --    < >  --    HEMATOCRIT % 24.4*  --    < >  --    PLATELETS 10*3/mm3 235  --    < >  --    GLUCOSE mg/dL  --  150*   < > 149*   CREATININE mg/dL  --  2.53*   < > 3.51*   BUN mg/dL  --  39*   < > 75*   SODIUM mmol/L  --  136   < > 136   POTASSIUM mmol/L  --  4.2   < > 4.9   AST (SGOT) U/L  --   --   --  37*   ALT (SGPT) U/L  --   --   --  25   ALK PHOS U/L  --   --   --  41   BILIRUBIN mg/dL  --   --   --  0.2   ANION GAP mmol/L  --  8.8   < > 12.0    < > = values in this interval not displayed.       No radiology results for the last day      I reviewed the patient's new clinical results.    Assessment/Plan:     Active and Resolved Problems  Active Hospital Problems    Diagnosis  POA    **Lethargy [R53.83]  Yes      Resolved Hospital Problems   No resolved problems to display.      -Lethargy, multifactorial  -Dyspnea, multifactorial  -Anemia of chronic disease  - Acute on chronic respiratory failure with hypoxia and hypercapnia  - Acute on chronic kidney injury, with progression to end-stage renal disease  - Diabetes mellitus  - Acute encephalopathy likely metabolic secondary to CO2 retention  - Left upper extremity stenosis status post intervention per vascular surgery on 4/21/2025       Patient underwent hemodialysis on 4/29/2025.  Hemodialysis was initiated through this  hospital course.  Patient does have a left upper extremity AV graft.  Patient is being set up for outpatient hemodialysis after rehab.    Patient remains on IV iron as well as Epogen as per nephrology recommendations.  Anemia likely secondary to chronic disease.    Noted physical and Occupational Therapy evaluation and recommendation for subacute rehab.  Await placement.    Lower extremity restless leg syndrome noted.  Patient otherwise stable on Requip currently.    Hemoglobin eight 7.1 today.  Patient may require transfusion at hemodialysis.  Defer to nephrology, planned hemodialysis tomorrow.      VTE Prophylaxis:  Pharmacologic VTE prophylaxis orders are present.             Disposition Planning:     Barriers to Discharge: Insurance approval for rehab  Anticipated Date of Discharge: 5/3/2025  Place of Discharge: Rehab facility      Time: 45 minutes     Code Status and Medical Interventions: CPR (Attempt to Resuscitate); Full Support   Ordered at: 04/22/25 0976     Code Status (Patient has no pulse and is not breathing):    CPR (Attempt to Resuscitate)     Medical Interventions (Patient has pulse or is breathing):    Full Support       Signature: Electronically signed by Joana Daley MD, 05/02/25, 11:18 EDT.  [facility] Hospitalist Team

## 2025-05-02 NOTE — CONSULTS
Nutrition Services    Patient Name: Lynne Cade  YOB: 1950  MRN: [id number]  Admission date: 4/22/2025    Comment:    --Monitor/Encourage PO intake     CLINICAL NUTRITION ASSESSMENT      Reason for Assessment 5/2: Length of stay      H&P      Past Medical History:   Diagnosis Date    Anemia     Hx of anemia    Anxiety     Controled  xanax prn    Arthritis     Conditions. Abstracted from [employer].    Cataract     bilateral    CHF (congestive heart failure) 6/01/22    Low iron    Cholelithiasis 30 yrs ago    Had gb removed    CKD (chronic kidney disease), stage IV 01/13/2023    Colon polyp     Depression 1995    Take cymbalta    Diabetes mellitus     type 2    Diarrhea     on and off    Edema of right foot 04/2021    Healthcare maintenance 2008    PAP. Abstracted from [facility].    Hip pain, bilateral     HL (hearing loss) 1/1/19    Have hearing aids    Hyperlipidemia     Hypertension     Knee pain, bilateral     Obesity     Osteopenia 11/1/22    On med    Other forms of dyspnea     Other specified soft tissue disorders     Pneumonia due to COVID-19 virus 06/26/2022    Renal insufficiency Stage 3    Has gone up to stage 4    Slow to wake up after anesthesia     Spinal headache     Stress headaches     Tremor     Benign    Tremor of both hands     Urinary tract infection 07/01/22    [place]    Visual impairment 6/1/2017    Cataracts    Vitamin D deficiency        Past Surgical History:   Procedure Laterality Date    ARTERIOVENOUS FISTULA/SHUNT SURGERY Left 02/24/2023    Procedure: BRACHIOCEPHALIC ARTERIOVENOUS FISTULA FORMATION;  Surgeon: Vignesh Riley MD;  Location: [facility] MAIN OR;  Service: Vascular;  Laterality: Left;    ARTERIOVENOUS FISTULA/SHUNT SURGERY Left 02/27/2025    Procedure: ARTERIOVENOUS SHUNT GRAFT;  Surgeon: Zaina Bernard MD;  Location: [facility] MAIN OR;  Service: Vascular;  Laterality: Left;    BREAST BIOPSY Right     CHOLECYSTECTOMY  1990    COLONOSCOPY      This  "year 2022    EYE SURGERY Bilateral     cataract removal    GASTRIC BYPASS  2002    HERNIA REPAIR  2005    umbilical hernia    HIP OPEN REDUCTION Right 03/02/2020    Procedure: FEMORAL NECK OPEN REDUCTION INTERNAL FIXATION;  Surgeon: Yfn Sifuentes MD;  Location: [facility] MAIN OR;  Service: Orthopedics;  Laterality: Right;  Cannulated screw fixation right femoral neck    JOINT REPLACEMENT  2020    R knee    LAPAROTOMY OOPHERECTOMY Bilateral 2010    OTHER SURGICAL HISTORY  04/20/2015    Lexiscan stress test, normal. Abstracted from [facility].    PANNICULECTOMY      TOTAL KNEE ARTHROPLASTY Right 04/29/2021    Procedure: RIGHT TOTAL KNEE REPLACEMENT;  Surgeon: Yfn Sifuentes MD;  Location: [facility] MAIN OR;  Service: Orthopedics;  Laterality: Right;        Current Problems   Lethargy, multifactorial  Dyspnea, multifactorial  Anemia of chronic disease  Acute on chronic respiratory failure with hypoxia and hypercapnia  Acute on chronic kidney injury, with progression to end-stage renal disease  -Nephrology is following   Diabetes mellitus  Acute encephalopathy likely metabolic secondary to CO2 retention  Left upper extremity stenosis status post intervention per vascular surgery on 4/21/2025        Encounter Information        Trending Narrative     5/2: Pt was admitted with encephalopathy and generalized weakness. Nephrology is following for dialysis needs. Pt has been eating well per nursing documentation. No recent weight loss is indicated per chart review. Potential discharge noted for today.      Anthropometrics        Current Height, Weight Height: 167.6 cm (65.98\")  Weight: 102 kg (224 lb 6.9 oz) (04/30/25 0412)       Usual Body Weight (UBW) Unknown        Trending Weight Hx     This admission: 5/2: 224# (obtained 4/30)              PTA: 5/2: 29# gain over 10 months - possibly related to fluid     Wt Readings from Last 30 Encounters:   04/30/25 0412 102 kg (224 lb 6.9 oz)   04/28/25 0347 102 kg (225 lb 1.4 oz) "   04/27/25 0455 105 kg (230 lb 13.2 oz)   04/26/25 0401 103 kg (227 lb 8.2 oz)   04/25/25 0409 99.9 kg (220 lb 3.8 oz)   04/24/25 0400 97.7 kg (215 lb 6.2 oz)   04/23/25 0540 97.6 kg (215 lb 2.7 oz)   04/22/25 2040 97.6 kg (215 lb 2.7 oz)   04/22/25 1448 96.2 kg (212 lb 1.3 oz)   04/21/25 1401 96.2 kg (212 lb)   04/10/25 1339 95.3 kg (210 lb)   03/27/25 1424 95.3 kg (210 lb)   03/13/25 1234 97.5 kg (215 lb)   02/27/25 0628 94.3 kg (208 lb)   02/14/25 0936 93 kg (205 lb)   02/17/25 1118 94.3 kg (207 lb 14.3 oz)   02/16/25 1502 94.3 kg (207 lb 14.3 oz)   11/07/24 1207 92.5 kg (204 lb)   09/19/24 2025 92.9 kg (204 lb 12.9 oz)   08/01/24 1348 88.5 kg (195 lb)   06/05/24 1443 88.5 kg (195 lb)   09/14/23 1345 100 kg (221 lb 1.6 oz)   08/14/23 1038 98.5 kg (217 lb 3.2 oz)   07/31/23 2113 97.5 kg (215 lb)   07/24/23 1256 98.1 kg (216 lb 3.2 oz)   07/05/23 1454 99.3 kg (219 lb)   06/07/23 1523 98 kg (216 lb)   02/02/23 1112 93 kg (205 lb)   02/15/23 1403 98.4 kg (217 lb)   01/10/23 1627 98.9 kg (218 lb)   12/07/22 1508 102 kg (224 lb)   11/11/22 1404 98.9 kg (218 lb)   08/12/22 1510 97.5 kg (215 lb)   08/02/22 1412 97.5 kg (215 lb)   07/14/22 1114 97.1 kg (214 lb)   07/01/22 0501 100 kg (220 lb 7.4 oz)   06/30/22 0615 105 kg (231 lb 14.8 oz)   06/28/22 2158 105 kg (231 lb 7.7 oz)   06/28/22 1229 99.3 kg (219 lb)   06/27/22 0328 99.5 kg (219 lb 5.7 oz)   06/26/22 1722 95.3 kg (210 lb)   06/07/22 1428 97.5 kg (215 lb)   06/01/22 1418 100 kg (221 lb)   05/02/22 1512 100 kg (221 lb)      BMI kg/m2 Body mass index is 36.24 kg/m².       Labs        Pertinent Labs High phos noted - may need to consider a diet restriction    Results from last 7 days   Lab Units 05/02/25  0501 05/01/25  0415 04/30/25  0409 04/28/25  0946 04/25/25  1630   SODIUM mmol/L 136 142 138   < > 136   POTASSIUM mmol/L 4.2 4.9 4.5   < > 4.9   CHLORIDE mmol/L 101 103 102   < > 101   CO2 mmol/L 26.2 25.3 27.2   < > 23.0   BUN mg/dL 39* 60* 48*   < > 75*    CREATININE mg/dL 2.53* 3.16* 2.26*   < > 3.51*   CALCIUM mg/dL 8.6 8.5* 8.6   < > 8.3*   BILIRUBIN mg/dL  --   --   --   --  0.2   ALK PHOS U/L  --   --   --   --  41   ALT (SGPT) U/L  --   --   --   --  25   AST (SGOT) U/L  --   --   --   --  37*   GLUCOSE mg/dL 150* 125* 147*   < > 149*    < > = values in this interval not displayed.     Results from last 7 days   Lab Units 05/02/25  0846 05/02/25  0501   PHOSPHORUS mg/dL  --  5.5*   HEMOGLOBIN g/dL 7.1*  --    HEMATOCRIT % 24.4*  --      Lab Results   Component Value Date    HGBA1C 7.72 (H) 02/16/2025        Medications    Scheduled Medications aspirin, 81 mg, Oral, Daily  budesonide, 0.5 mg, Nebulization, BID - RT  calcitriol, 0.5 mcg, Oral, Daily  dilTIAZem CD, 300 mg, Oral, Daily  DULoxetine, 60 mg, Oral, BID  famotidine, 20 mg, Oral, Daily  [Held by provider] furosemide, 40 mg, Oral, Daily  gabapentin, 100 mg, Oral, Q12H  heparin (porcine), 5,000 Units, Subcutaneous, Q12H  hydrALAZINE, 25 mg, Oral, BID  insulin lispro, 2-7 Units, Subcutaneous, 4x Daily AC & at Bedtime  ipratropium-albuterol, 3 mL, Nebulization, BID  lidocaine-prilocaine, , Topical, Once  primidone, 50 mg, Oral, Nightly  rOPINIRole, 0.5 mg, Oral, TID  rosuvastatin, 10 mg, Oral, Daily        Infusions      PRN Medications   acetaminophen **OR** acetaminophen **OR** acetaminophen    senna-docusate sodium **AND** polyethylene glycol **AND** bisacodyl **AND** bisacodyl    Calcium Replacement - Follow Nurse / BPA Driven Protocol    dextrose    dextrose    glucagon (human recombinant)    hydrOXYzine    ipratropium-albuterol    Magnesium Low Dose Replacement - Follow Nurse / BPA Driven Protocol    ondansetron    prochlorperazine    sodium chloride     Physical Findings        Trending Physical   Appearance, NFPE 5/2: unable to complete    --  Edema    2+ L arm, generalized, legs, ankles, feet    Bowel Function   BM 5/1, pt has stool softeners ordered PRN    Tubes   No feeding tube     Chewing/Swallowing   No issues reported    Skin   Axilla wound noted per wound care   --  Current Nutrition Orders & Evaluation of Intake       Oral Nutrition     Food Allergies NKFA   Current PO Diet Diet: Diabetic; Consistent Carbohydrate; Fluid Consistency: Thin (IDDSI 0)   Supplement None ordered    PO Evaluation     Trending % PO Intake 5/2: %      Protein Requirements    EST Needs, Method, Wt used 71 g/day (1.2 g/kg using IBW)       Nutritional Risk Screening        NRS-2002 Score          Nutrition Diagnosis         Nutrition Dx Problem 1 Increased nutrient needs (protein) related to healing as evidenced by wound       Nutrition Dx Problem 2        Intervention Goal         Intervention Goal(s) To consume % of meals      Nutrition Intervention        RD Action Consider phos restriction if pt remains inpatient      Nutrition Prescription          Diet Prescription Consistent carbohydrate    Supplement Prescription None    --  Monitor/Evaluation        Monitor PO intake, Pertinent labs, Skin status         Electronically signed by:  Ivy Jean RD  05/02/25 13:23 EDT

## 2025-05-03 ENCOUNTER — APPOINTMENT (OUTPATIENT)
Dept: NEPHROLOGY | Facility: HOSPITAL | Age: 75
DRG: 682 | End: 2025-05-03
Payer: MEDICARE

## 2025-05-03 LAB
ABO GROUP BLD: NORMAL
ALBUMIN SERPL-MCNC: 3.4 G/DL (ref 3.5–5.2)
ANION GAP SERPL CALCULATED.3IONS-SCNC: 9.6 MMOL/L (ref 5–15)
BLD GP AB SCN SERPL QL: NEGATIVE
BUN SERPL-MCNC: 53 MG/DL (ref 8–23)
BUN/CREAT SERPL: 18 (ref 7–25)
CALCIUM SPEC-SCNC: 9 MG/DL (ref 8.6–10.5)
CHLORIDE SERPL-SCNC: 103 MMOL/L (ref 98–107)
CO2 SERPL-SCNC: 24.4 MMOL/L (ref 22–29)
CREAT SERPL-MCNC: 2.94 MG/DL (ref 0.57–1)
DEPRECATED RDW RBC AUTO: 63.1 FL (ref 37–54)
EGFRCR SERPLBLD CKD-EPI 2021: 16 ML/MIN/1.73
ERYTHROCYTE [DISTWIDTH] IN BLOOD BY AUTOMATED COUNT: 15.9 % (ref 12.3–15.4)
GLUCOSE BLDC GLUCOMTR-MCNC: 120 MG/DL (ref 70–105)
GLUCOSE BLDC GLUCOMTR-MCNC: 137 MG/DL (ref 70–105)
GLUCOSE BLDC GLUCOMTR-MCNC: 208 MG/DL (ref 70–105)
GLUCOSE SERPL-MCNC: 121 MG/DL (ref 65–99)
HCT VFR BLD AUTO: 24.4 % (ref 34–46.6)
HGB BLD-MCNC: 7 G/DL (ref 12–15.9)
MAGNESIUM SERPL-MCNC: 2.9 MG/DL (ref 1.6–2.4)
MCH RBC QN AUTO: 32.4 PG (ref 26.6–33)
MCHC RBC AUTO-ENTMCNC: 28.7 G/DL (ref 31.5–35.7)
MCV RBC AUTO: 113 FL (ref 79–97)
PHOSPHATE SERPL-MCNC: 7.4 MG/DL (ref 2.5–4.5)
PLATELET # BLD AUTO: 243 10*3/MM3 (ref 140–450)
PMV BLD AUTO: 10.1 FL (ref 6–12)
POTASSIUM SERPL-SCNC: 4.7 MMOL/L (ref 3.5–5.2)
RBC # BLD AUTO: 2.16 10*6/MM3 (ref 3.77–5.28)
RH BLD: POSITIVE
SODIUM SERPL-SCNC: 137 MMOL/L (ref 136–145)
T&S EXPIRATION DATE: NORMAL
WBC NRBC COR # BLD AUTO: 12.1 10*3/MM3 (ref 3.4–10.8)

## 2025-05-03 PROCEDURE — P9016 RBC LEUKOCYTES REDUCED: HCPCS

## 2025-05-03 PROCEDURE — 82948 REAGENT STRIP/BLOOD GLUCOSE: CPT

## 2025-05-03 PROCEDURE — 86901 BLOOD TYPING SEROLOGIC RH(D): CPT | Performed by: FAMILY MEDICINE

## 2025-05-03 PROCEDURE — 80069 RENAL FUNCTION PANEL: CPT | Performed by: INTERNAL MEDICINE

## 2025-05-03 PROCEDURE — 86850 RBC ANTIBODY SCREEN: CPT | Performed by: FAMILY MEDICINE

## 2025-05-03 PROCEDURE — 94799 UNLISTED PULMONARY SVC/PX: CPT

## 2025-05-03 PROCEDURE — 85027 COMPLETE CBC AUTOMATED: CPT | Performed by: INTERNAL MEDICINE

## 2025-05-03 PROCEDURE — C1751 CATH, INF, PER/CENT/MIDLINE: HCPCS

## 2025-05-03 PROCEDURE — 86923 COMPATIBILITY TEST ELECTRIC: CPT

## 2025-05-03 PROCEDURE — 86900 BLOOD TYPING SEROLOGIC ABO: CPT

## 2025-05-03 PROCEDURE — 83735 ASSAY OF MAGNESIUM: CPT | Performed by: INTERNAL MEDICINE

## 2025-05-03 PROCEDURE — 94660 CPAP INITIATION&MGMT: CPT

## 2025-05-03 PROCEDURE — 25010000002 HEPARIN (PORCINE) PER 1000 UNITS: Performed by: INTERNAL MEDICINE

## 2025-05-03 PROCEDURE — 82948 REAGENT STRIP/BLOOD GLUCOSE: CPT | Performed by: STUDENT IN AN ORGANIZED HEALTH CARE EDUCATION/TRAINING PROGRAM

## 2025-05-03 PROCEDURE — 86900 BLOOD TYPING SEROLOGIC ABO: CPT | Performed by: FAMILY MEDICINE

## 2025-05-03 PROCEDURE — 63710000001 INSULIN LISPRO (HUMAN) PER 5 UNITS: Performed by: STUDENT IN AN ORGANIZED HEALTH CARE EDUCATION/TRAINING PROGRAM

## 2025-05-03 RX ADMIN — IPRATROPIUM BROMIDE AND ALBUTEROL SULFATE 3 ML: .5; 3 SOLUTION RESPIRATORY (INHALATION) at 18:55

## 2025-05-03 RX ADMIN — DILTIAZEM HYDROCHLORIDE 300 MG: 180 CAPSULE, EXTENDED RELEASE ORAL at 14:41

## 2025-05-03 RX ADMIN — HYDRALAZINE HYDROCHLORIDE 25 MG: 25 TABLET ORAL at 14:42

## 2025-05-03 RX ADMIN — GABAPENTIN 100 MG: 100 CAPSULE ORAL at 20:16

## 2025-05-03 RX ADMIN — BUDESONIDE 0.5 MG: 0.5 SUSPENSION RESPIRATORY (INHALATION) at 19:00

## 2025-05-03 RX ADMIN — GABAPENTIN 100 MG: 100 CAPSULE ORAL at 14:42

## 2025-05-03 RX ADMIN — ASPIRIN 81 MG: 81 TABLET, COATED ORAL at 14:41

## 2025-05-03 RX ADMIN — HEPARIN SODIUM 5000 UNITS: 5000 INJECTION INTRAVENOUS; SUBCUTANEOUS at 14:40

## 2025-05-03 RX ADMIN — ROPINIROLE HYDROCHLORIDE 0.5 MG: 0.25 TABLET, FILM COATED ORAL at 15:00

## 2025-05-03 RX ADMIN — CALCITRIOL CAPSULES 0.25 MCG 0.5 MCG: 0.25 CAPSULE ORAL at 14:41

## 2025-05-03 RX ADMIN — FAMOTIDINE 20 MG: 20 TABLET, FILM COATED ORAL at 14:42

## 2025-05-03 RX ADMIN — INSULIN LISPRO 3 UNITS: 100 INJECTION, SOLUTION INTRAVENOUS; SUBCUTANEOUS at 17:17

## 2025-05-03 RX ADMIN — ROPINIROLE HYDROCHLORIDE 0.5 MG: 0.25 TABLET, FILM COATED ORAL at 20:16

## 2025-05-03 RX ADMIN — PRIMIDONE 50 MG: 50 TABLET ORAL at 20:17

## 2025-05-03 RX ADMIN — DULOXETINE 60 MG: 30 CAPSULE, DELAYED RELEASE ORAL at 20:16

## 2025-05-03 RX ADMIN — DULOXETINE 60 MG: 30 CAPSULE, DELAYED RELEASE ORAL at 14:41

## 2025-05-03 RX ADMIN — HEPARIN SODIUM 5000 UNITS: 5000 INJECTION INTRAVENOUS; SUBCUTANEOUS at 20:16

## 2025-05-03 RX ADMIN — ROSUVASTATIN CALCIUM 10 MG: 10 TABLET, FILM COATED ORAL at 14:42

## 2025-05-03 NOTE — PROGRESS NOTES
[facility] MEDICINE SERVICE  DAILY PROGRESS NOTE    NAME: Lynne Cade  : 1950  MRN: [id number]      LOS: 10 days     PROVIDER OF SERVICE: Narinder Mckeon MD    Chief Complaint: Lethargy    Subjective:     Interval History:  History taken from: patient    No acute events overnight.  Patient underwent dialysis today hemoglobin dropped to 7.0   will be transfused 1 unit of packed RBCs        Review of Systems:   Review of Systems   Constitutional:  Positive for fatigue.   Neurological:  Positive for weakness.   All other systems reviewed and are negative.      Objective:     Vital Signs  Temp:  [97 °F (36.1 °C)-97.9 °F (36.6 °C)] 97.7 °F (36.5 °C)  Heart Rate:  [82-95] 90  Resp:  [14-23] 15  BP: (107-163)/(50-99) 132/99  Flow (L/min) (Oxygen Therapy):  [2-3] 3   Body mass index is 36.24 kg/m².    Physical Exam  Physical Exam  Constitutional:       General: She is awake.      Appearance: She is well-developed and well-groomed. She is obese. She is ill-appearing.   HENT:      Head: Normocephalic and atraumatic.      Nose: Nose normal.      Mouth/Throat:      Mouth: Mucous membranes are moist.      Pharynx: Oropharynx is clear.   Eyes:      Extraocular Movements: Extraocular movements intact.      Conjunctiva/sclera: Conjunctivae normal.      Pupils: Pupils are equal, round, and reactive to light.   Cardiovascular:      Rate and Rhythm: Normal rate and regular rhythm.      Pulses: Normal pulses.      Heart sounds: Normal heart sounds.   Pulmonary:      Effort: Pulmonary effort is normal.      Breath sounds: Normal breath sounds.   Abdominal:      General: Abdomen is flat. Bowel sounds are normal. There is distension.      Palpations: Abdomen is soft.   Musculoskeletal:         General: Normal range of motion.      Cervical back: Normal range of motion and neck supple.      Right lower leg: No edema.      Left lower leg: No edema.   Skin:     General: Skin is warm and dry.   Neurological:      General:  No focal deficit present.      Mental Status: She is alert and oriented to person, place, and time. Mental status is at baseline.      Cranial Nerves: Cranial nerves 2-12 are intact.      Sensory: Sensation is intact.      Motor: Motor function is intact.   Psychiatric:         Mood and Affect: Mood normal.         Behavior: Behavior normal. Behavior is cooperative.         Thought Content: Thought content normal.         Judgment: Judgment normal.            Diagnostic Data    Results from last 7 days   Lab Units 05/03/25  0440   WBC 10*3/mm3 12.10*   HEMOGLOBIN g/dL 7.0*   HEMATOCRIT % 24.4*   PLATELETS 10*3/mm3 243   GLUCOSE mg/dL 121*   CREATININE mg/dL 2.94*   BUN mg/dL 53*   SODIUM mmol/L 137   POTASSIUM mmol/L 4.7   ANION GAP mmol/L 9.6       No radiology results for the last day      I reviewed the patient's new clinical results.    Assessment/Plan:     Active and Resolved Problems  Active Hospital Problems    Diagnosis  POA    **Lethargy [R53.83]  Yes      Resolved Hospital Problems   No resolved problems to display.      -Lethargy, multifactorial  -Dyspnea, multifactorial  -Anemia of chronic disease  - Acute on chronic respiratory failure with hypoxia and hypercapnia  - Acute on chronic kidney injury, with progression to end-stage renal disease  - Diabetes mellitus  - Acute encephalopathy likely metabolic secondary to CO2 retention  - Left upper extremity stenosis status post intervention per vascular surgery on 4/21/2025       Patient underwent hemodialysis on 4/29/2025.  Hemodialysis was initiated through this hospital course.  Patient does have a left upper extremity AV graft.  Patient is being set up for outpatient hemodialysis after rehab.      Patient remains on IV iron as well as Epogen as per nephrology recommendations.  Anemia likely secondary to chronic disease.    Noted physical and Occupational Therapy evaluation and recommendation for subacute rehab.  Await placement.    Lower extremity  restless leg syndrome noted.  Patient otherwise stable on Requip currently.    Hemoglobin dropped to 7, will be transfused 1 unit after hemodialysis session      VTE Prophylaxis:  Pharmacologic VTE prophylaxis orders are present.             Disposition Planning:     Barriers to Discharge: Insurance approval for rehab  Anticipated Date of Discharge: 5/4/2025  Place of Discharge: Rehab facility      Time: 45 minutes     Code Status and Medical Interventions: CPR (Attempt to Resuscitate); Full Support   Ordered at: 04/22/25 2246     Code Status (Patient has no pulse and is not breathing):    CPR (Attempt to Resuscitate)     Medical Interventions (Patient has pulse or is breathing):    Full Support       Signature: Electronically signed by Narinder Mckeon MD, 05/03/25, 14:07 EDT.  [facility] Hospitalist Team

## 2025-05-03 NOTE — PROGRESS NOTES
"NEPHROLOGY PROGRESS NOTE------[facility]    [facility]  [phone]  Felisa Melendez MD      Patient Care Team:  Lorraine Agarwal APRN as PCP - General (Nurse Practitioner)  Rick Melendez MD as Consulting Physician (Nephrology)  Mayo Fregoso MD as Consulting Physician (Urology)  Fredi Pierce MD as Consulting Physician (Cardiology)  Cony Sanchez APRN as Nurse Practitioner (Vascular Surgery)      Provider:  Felisa Melendez MD  Patient Name: Lynne Cade  :  1950    SUBJECTIVE:    F/U NEW ESRD    Seen and examined during HD tx. No SOB, CP, cramping.     Medication:  aspirin, 81 mg, Oral, Daily  budesonide, 0.5 mg, Nebulization, BID - RT  calcitriol, 0.5 mcg, Oral, Daily  dilTIAZem CD, 300 mg, Oral, Daily  DULoxetine, 60 mg, Oral, BID  famotidine, 20 mg, Oral, Daily  [Held by provider] furosemide, 40 mg, Oral, Daily  gabapentin, 100 mg, Oral, Q12H  heparin (porcine), 5,000 Units, Subcutaneous, Q12H  hydrALAZINE, 25 mg, Oral, BID  insulin lispro, 2-7 Units, Subcutaneous, 4x Daily AC & at Bedtime  ipratropium-albuterol, 3 mL, Nebulization, BID  lidocaine-prilocaine, , Topical, Once  primidone, 50 mg, Oral, Nightly  rOPINIRole, 0.5 mg, Oral, TID  rosuvastatin, 10 mg, Oral, Daily           OBJECTIVE    Vital Sign Min/Max for last 24 hours  Temp  Min: 97.4 °F (36.3 °C)  Max: 97.9 °F (36.6 °C)   BP  Min: 136/73  Max: 160/74   Pulse  Min: 84  Max: 91   Resp  Min: 17  Max: 21   SpO2  Min: 96 %  Max: 100 %   No data recorded   No data recorded     Flowsheet Rows      Flowsheet Row First Filed Value   Admission Height 167.6 cm (66\") Documented at 2025 1448   Admission Weight 96.2 kg (212 lb 1.3 oz) Documented at 2025 1448            No intake/output data recorded.  I/O last 3 completed shifts:  In: 840 [P.O.:840]  Out: 760 [Urine:760]    Physical Exam:  General Appearance: alert, appears " "stated age and cooperative  Head: normocephalic, without obvious abnormality and atraumatic  Eyes: conjunctivae and sclerae normal and no icterus  Neck: supple and no JVD  Lungs:    Heart: regular rhythm & normal rate and normal S1, S2 +CYNDY  Chest: Wall no abnormalities observed  Abdomen: normal bowel sounds and soft, nontender  Extremities: moves extremities well, no edema, no cyanosis and no redness.  Left arm some edema/good bruit over AV graft  Skin: no bleeding, bruising or rash, turgor normal, color normal and no lesions noted  Neurologic: Alert, and oriented. No focal deficits    Labs:    WBC WBC   Date Value Ref Range Status   05/03/2025 12.10 (H) 3.40 - 10.80 10*3/mm3 Final   05/02/2025 11.68 (H) 3.40 - 10.80 10*3/mm3 Final      HGB Hemoglobin   Date Value Ref Range Status   05/03/2025 7.0 (L) 12.0 - 15.9 g/dL Final   05/02/2025 7.1 (L) 12.0 - 15.9 g/dL Final      HCT Hematocrit   Date Value Ref Range Status   05/03/2025 24.4 (L) 34.0 - 46.6 % Final   05/02/2025 24.4 (L) 34.0 - 46.6 % Final      Platelets No results found for: \"LABPLAT\"   MCV MCV   Date Value Ref Range Status   05/03/2025 113.0 (H) 79.0 - 97.0 fL Final   05/02/2025 110.4 (H) 79.0 - 97.0 fL Final          Sodium Sodium   Date Value Ref Range Status   05/03/2025 137 136 - 145 mmol/L Final   05/02/2025 136 136 - 145 mmol/L Final   05/01/2025 142 136 - 145 mmol/L Final      Potassium Potassium   Date Value Ref Range Status   05/03/2025 4.7 3.5 - 5.2 mmol/L Final   05/02/2025 4.2 3.5 - 5.2 mmol/L Final   05/01/2025 4.9 3.5 - 5.2 mmol/L Final      Chloride Chloride   Date Value Ref Range Status   05/03/2025 103 98 - 107 mmol/L Final   05/02/2025 101 98 - 107 mmol/L Final   05/01/2025 103 98 - 107 mmol/L Final      CO2 CO2   Date Value Ref Range Status   05/03/2025 24.4 22.0 - 29.0 mmol/L Final   05/02/2025 26.2 22.0 - 29.0 mmol/L Final   05/01/2025 25.3 22.0 - 29.0 mmol/L Final      BUN BUN   Date Value Ref Range Status   05/03/2025 53 (H) 8 - 23 " "mg/dL Final   05/02/2025 39 (H) 8 - 23 mg/dL Final   05/01/2025 60 (H) 8 - 23 mg/dL Final      Creatinine Creatinine   Date Value Ref Range Status   05/03/2025 2.94 (H) 0.57 - 1.00 mg/dL Final   05/02/2025 2.53 (H) 0.57 - 1.00 mg/dL Final   05/01/2025 3.16 (H) 0.57 - 1.00 mg/dL Final      Calcium Calcium   Date Value Ref Range Status   05/03/2025 9.0 8.6 - 10.5 mg/dL Final   05/02/2025 8.6 8.6 - 10.5 mg/dL Final   05/01/2025 8.5 (L) 8.6 - 10.5 mg/dL Final      PO4 No components found for: \"PO4\"   Albumin Albumin   Date Value Ref Range Status   05/03/2025 3.4 (L) 3.5 - 5.2 g/dL Final   05/02/2025 3.2 (L) 3.5 - 5.2 g/dL Final   05/01/2025 3.1 (L) 3.5 - 5.2 g/dL Final      Magnesium Magnesium   Date Value Ref Range Status   05/03/2025 2.9 (H) 1.6 - 2.4 mg/dL Final      Uric Acid No components found for: \"URIC ACID\"     Imaging Results (Last 72 Hours)       ** No results found for the last 72 hours. **            Results for orders placed during the hospital encounter of 04/22/25    XR Chest 1 View    Narrative  XR CHEST 1 VW    Date of Exam: 4/22/2025 3:40 PM EDT    Indication: AMS Protocol  AMS Protocol    Comparison: 2/16/2025    Findings:  Heart size is within normal limits for the projection. The right hemidiaphragm is elevated, unchanged. The pulmonary vascular pattern appears normal and the lungs appear clear.    Impression  Impression:  No active disease.        Electronically Signed: Yfn Luna MD  4/22/2025 3:53 PM EDT  Workstation ID: [id number]      Results for orders placed during the hospital encounter of 02/16/25    XR Chest 2 View    Narrative  XR CHEST 2 VW    Date of Exam: 2/16/2025 4:12 PM EST    Indication: Short of breath    Comparison: 2/5/2025    Findings:  Cardiomediastinal silhouette is unremarkable. Similar elevation of the right hemidiaphragm. No airspace disease, pneumothorax, nor pleural effusion. No acute osseous abnormality identified.    Impression  Impression:  No acute process " identified      Electronically Signed: Antonio Ceballos MD  2/16/2025 4:13 PM EST  Workstation ID: [id number]      Results for orders placed in visit on 01/09/24    SCANNED - IMAGING      Results for orders placed during the hospital encounter of 04/22/25    Duplex Carotid Ultrasound CAR    Interpretation Summary    Right internal carotid artery demonstrates a less than 50% stenosis.    Antegrade right vertebral flow.    Left internal carotid artery demonstrates a less than 50% stenosis.    Antegrade left vertebral flow.        ASSESSMENT / PLAN      Lethargy    NEW ESRD--------HD today per Tu[facility]Th[facility]Sa schedule. Outpatient HD arranged    2. ANEMIA OF ESRD-----EPO/iron with HD as needed    3. HTN WITH ESRD-----BP ok    4. VOLUME EXCESS-----Continue to increase UF with HD as BP tolerates    5. DMII WITH RENAL MANIFESTATIONS-----BS ok. Glucometers, SSI    6. CAD-------No angina    7. PRIETO-----CPAP while asleep    8. DM PVD    9. OA/DJD      Felisa Melendez MD  [facility]  [phone]  05/03/25  08:41 EDT

## 2025-05-03 NOTE — PROGRESS NOTES
Daily Progress Note          Assessment  Acute/chronic hypercapnic/hypoxic respiratory insufficiency     Chronic elevated right hemidiaphragm, associated right lower lobe mild bronchiectasis     CT chest February 2025 no evidence of interstitial lung disease     Respiratory panel negative      non-smoker  No PFTs     Features of obstructive sleep apnea     CKD stage IV  Diabetes mellitus  Hyperlipidemia     Encephalopathy   TSH 3.1  Ammonia 16        Recommendations:        Respiratory status is gradually improving   oxygen titration currently on 2 L nasal cannula     Tolerated BiPAP during sleep  Sleep study as an outpatient     Bronchodilators currently on Pulmicort and DuoNeb     Steroids discontinued since there is no evidence of interstitial lung disease on most recent CAT scan in February 2025     Minimize sedatives      Currently off antibiotics     Followed by nephrology for CKD/worsening azotemia /hemodialysis          CT chest 2/15/2025               LOS: 10 days     Subjective     Mild chronic shortness of breath    Objective     Vital signs for last 24 hours:  Vitals:    05/03/25 1130 05/03/25 1200 05/03/25 1317 05/03/25 1350   BP: 144/57 152/62 158/59 132/99   BP Location: Left leg Left leg Left leg    Patient Position: Lying Lying Lying    Pulse: 83 84 95 90   Resp: 15 17 23 15   Temp:  97.7 °F (36.5 °C) 97 °F (36.1 °C) 97.7 °F (36.5 °C)   TempSrc:  Oral Oral Oral   SpO2: 96% 96% 94% 91%   Weight:       Height:           Intake/Output last 3 shifts:  I/O last 3 completed shifts:  In: 840 [P.O.:840]  Out: 760 [Urine:760]  Intake/Output this shift:  I/O this shift:  In: -   Out: 3000       Radiology  Imaging Results (Last 24 Hours)       ** No results found for the last 24 hours. **            Labs:  Results from last 7 days   Lab Units 05/03/25  0440   WBC 10*3/mm3 12.10*   HEMOGLOBIN g/dL 7.0*   HEMATOCRIT % 24.4*   PLATELETS 10*3/mm3 243     Results from last 7 days   Lab Units 05/03/25  0440   SODIUM  mmol/L 137   POTASSIUM mmol/L 4.7   CHLORIDE mmol/L 103   CO2 mmol/L 24.4   BUN mg/dL 53*   CREATININE mg/dL 2.94*   CALCIUM mg/dL 9.0   GLUCOSE mg/dL 121*         Results from last 7 days   Lab Units 05/03/25  0440 05/02/25  0501 05/01/25  0415   ALBUMIN g/dL 3.4* 3.2* 3.1*               Results from last 7 days   Lab Units 05/03/25  0440   MAGNESIUM mg/dL 2.9*                   Meds:   SCHEDULE  aspirin, 81 mg, Oral, Daily  budesonide, 0.5 mg, Nebulization, BID - RT  calcitriol, 0.5 mcg, Oral, Daily  dilTIAZem CD, 300 mg, Oral, Daily  DULoxetine, 60 mg, Oral, BID  famotidine, 20 mg, Oral, Daily  [Held by provider] furosemide, 40 mg, Oral, Daily  gabapentin, 100 mg, Oral, Q12H  heparin (porcine), 5,000 Units, Subcutaneous, Q12H  hydrALAZINE, 25 mg, Oral, BID  insulin lispro, 2-7 Units, Subcutaneous, 4x Daily AC & at Bedtime  ipratropium-albuterol, 3 mL, Nebulization, BID  lidocaine-prilocaine, , Topical, Once  primidone, 50 mg, Oral, Nightly  rOPINIRole, 0.5 mg, Oral, TID  rosuvastatin, 10 mg, Oral, Daily      Infusions     PRNs    acetaminophen **OR** acetaminophen **OR** acetaminophen    senna-docusate sodium **AND** polyethylene glycol **AND** bisacodyl **AND** bisacodyl    Calcium Replacement - Follow Nurse / BPA Driven Protocol    dextrose    dextrose    glucagon (human recombinant)    hydrOXYzine    ipratropium-albuterol    Magnesium Low Dose Replacement - Follow Nurse / BPA Driven Protocol    ondansetron    prochlorperazine    sodium chloride    Physical Exam:  General Appearance:  Alert   HEENT:  Normocephalic, without obvious abnormality, Conjunctiva/corneas clear,.   Nares normal, no drainage     Neck:  Supple, symmetrical, trachea midline.   Lungs /Chest wall:   Mild bilateral basal rhonchi, respirations unlabored, symmetrical wall movement.     Heart:  Regular rate and rhythm, S1 S2 normal  Abdomen: Soft, non-tender, no masses, no organomegaly.    Extremities: Trace bilateral lower extremity edema, no  clubbing or cyanosis     ROS  Constitutional: Negative for chills, fever and malaise/fatigue.   HENT: Negative.    Eyes: Negative.    Cardiovascular: Shortness of breath and lower extremity edema  Respiratory: Positive for cough and shortness of breath.    Skin: Negative.    Musculoskeletal: Negative.    Gastrointestinal: Negative.    Genitourinary: Negative.    Neurological: Generalized weakness      I reviewed the recent clinical results  I personally reviewed the latest radiological studies    Part of this note may be an electronic transcription/translation of spoken language to printed text using the Dragon Dictation System.

## 2025-05-04 VITALS
RESPIRATION RATE: 16 BRPM | TEMPERATURE: 97.7 F | DIASTOLIC BLOOD PRESSURE: 62 MMHG | HEIGHT: 66 IN | HEART RATE: 89 BPM | WEIGHT: 224.43 LBS | SYSTOLIC BLOOD PRESSURE: 133 MMHG | OXYGEN SATURATION: 98 % | BODY MASS INDEX: 36.07 KG/M2

## 2025-05-04 LAB
ALBUMIN SERPL-MCNC: 3.3 G/DL (ref 3.5–5.2)
ANION GAP SERPL CALCULATED.3IONS-SCNC: 10.1 MMOL/L (ref 5–15)
BH BB BLOOD EXPIRATION DATE: NORMAL
BH BB BLOOD TYPE BARCODE: 5100
BH BB DISPENSE STATUS: NORMAL
BH BB PRODUCT CODE: NORMAL
BH BB UNIT NUMBER: NORMAL
BUN SERPL-MCNC: 35 MG/DL (ref 8–23)
BUN/CREAT SERPL: 13.8 (ref 7–25)
CALCIUM SPEC-SCNC: 8.6 MG/DL (ref 8.6–10.5)
CHLORIDE SERPL-SCNC: 101 MMOL/L (ref 98–107)
CO2 SERPL-SCNC: 23.9 MMOL/L (ref 22–29)
CREAT SERPL-MCNC: 2.53 MG/DL (ref 0.57–1)
CROSSMATCH INTERPRETATION: NORMAL
DEPRECATED RDW RBC AUTO: 65.5 FL (ref 37–54)
EGFRCR SERPLBLD CKD-EPI 2021: 19.2 ML/MIN/1.73
ERYTHROCYTE [DISTWIDTH] IN BLOOD BY AUTOMATED COUNT: 17.6 % (ref 12.3–15.4)
GLUCOSE BLDC GLUCOMTR-MCNC: 129 MG/DL (ref 70–105)
GLUCOSE BLDC GLUCOMTR-MCNC: 148 MG/DL (ref 70–105)
GLUCOSE SERPL-MCNC: 167 MG/DL (ref 65–99)
HCT VFR BLD AUTO: 26.8 % (ref 34–46.6)
HGB BLD-MCNC: 7.9 G/DL (ref 12–15.9)
MCH RBC QN AUTO: 31.5 PG (ref 26.6–33)
MCHC RBC AUTO-ENTMCNC: 29.5 G/DL (ref 31.5–35.7)
MCV RBC AUTO: 106.8 FL (ref 79–97)
PHOSPHATE SERPL-MCNC: 5.6 MG/DL (ref 2.5–4.5)
PLATELET # BLD AUTO: 194 10*3/MM3 (ref 140–450)
PMV BLD AUTO: 10.6 FL (ref 6–12)
POTASSIUM SERPL-SCNC: 4.8 MMOL/L (ref 3.5–5.2)
RBC # BLD AUTO: 2.51 10*6/MM3 (ref 3.77–5.28)
SODIUM SERPL-SCNC: 135 MMOL/L (ref 136–145)
UNIT  ABO: NORMAL
UNIT  RH: NORMAL
WBC NRBC COR # BLD AUTO: 12.44 10*3/MM3 (ref 3.4–10.8)

## 2025-05-04 PROCEDURE — 94799 UNLISTED PULMONARY SVC/PX: CPT

## 2025-05-04 PROCEDURE — 80069 RENAL FUNCTION PANEL: CPT | Performed by: INTERNAL MEDICINE

## 2025-05-04 PROCEDURE — 82948 REAGENT STRIP/BLOOD GLUCOSE: CPT | Performed by: STUDENT IN AN ORGANIZED HEALTH CARE EDUCATION/TRAINING PROGRAM

## 2025-05-04 PROCEDURE — 94660 CPAP INITIATION&MGMT: CPT

## 2025-05-04 PROCEDURE — 94664 DEMO&/EVAL PT USE INHALER: CPT

## 2025-05-04 PROCEDURE — 85027 COMPLETE CBC AUTOMATED: CPT | Performed by: INTERNAL MEDICINE

## 2025-05-04 PROCEDURE — 25010000002 HEPARIN (PORCINE) PER 1000 UNITS: Performed by: INTERNAL MEDICINE

## 2025-05-04 PROCEDURE — 94761 N-INVAS EAR/PLS OXIMETRY MLT: CPT

## 2025-05-04 PROCEDURE — 82948 REAGENT STRIP/BLOOD GLUCOSE: CPT

## 2025-05-04 RX ADMIN — BUDESONIDE 0.5 MG: 0.5 SUSPENSION RESPIRATORY (INHALATION) at 10:44

## 2025-05-04 RX ADMIN — HEPARIN SODIUM 5000 UNITS: 5000 INJECTION INTRAVENOUS; SUBCUTANEOUS at 09:00

## 2025-05-04 RX ADMIN — ROPINIROLE HYDROCHLORIDE 0.5 MG: 0.25 TABLET, FILM COATED ORAL at 09:00

## 2025-05-04 RX ADMIN — FAMOTIDINE 20 MG: 20 TABLET, FILM COATED ORAL at 09:00

## 2025-05-04 RX ADMIN — ASPIRIN 81 MG: 81 TABLET, COATED ORAL at 09:00

## 2025-05-04 RX ADMIN — IPRATROPIUM BROMIDE AND ALBUTEROL SULFATE 3 ML: .5; 3 SOLUTION RESPIRATORY (INHALATION) at 10:44

## 2025-05-04 RX ADMIN — DULOXETINE 60 MG: 30 CAPSULE, DELAYED RELEASE ORAL at 09:00

## 2025-05-04 RX ADMIN — GABAPENTIN 100 MG: 100 CAPSULE ORAL at 09:00

## 2025-05-04 RX ADMIN — CALCITRIOL CAPSULES 0.25 MCG 0.5 MCG: 0.25 CAPSULE ORAL at 09:00

## 2025-05-04 RX ADMIN — ROSUVASTATIN CALCIUM 10 MG: 10 TABLET, FILM COATED ORAL at 09:00

## 2025-05-04 RX ADMIN — IPRATROPIUM BROMIDE AND ALBUTEROL SULFATE 3 ML: .5; 3 SOLUTION RESPIRATORY (INHALATION) at 14:24

## 2025-05-04 NOTE — CASE MANAGEMENT/SOCIAL WORK
Continued Stay Note  SHELDON Law     Patient Name: Lynne Cade  MRN: [id number]  Today's Date: 5/4/2025    Admit Date: 4/22/2025    Plan: [facility] (accepted, skilled). PASRR approved. Precert approved Valid 5/3-5/9. Home O2 2L [facility]. OPHD [facility] Start on Monday 5/5 (MWF 7:05 chair time)   Discharge Plan       Row Name 05/04/25 5295       Plan    Plan Comments CM received message from Britany MILLER stating report was given to [facility]. [facility] placed request for wheelchair van in [facility]. CM notified Britany MILLER and [facility] paramedic.             Expected Discharge Date and Time       Expected Discharge Date Expected Discharge Time    May 4, 2025           Kristy Carmona RN     Office Phone: [phone]  Office Cell: [phone]

## 2025-05-04 NOTE — PLAN OF CARE
Goal Outcome Evaluation:   Pt rested well during the night.  Spouse at bedside.  Pt denies any pain or discomfort and no s/s of pain noted.  Wound care completed.  Will continue current plan of care.

## 2025-05-04 NOTE — DISCHARGE SUMMARY
"             [facility] Medicine Services  Discharge Summary    Date of Service: 2025  Patient Name: Lynne Cade  : 1950  MRN: [id number]    Date of Admission: 2025  Discharge Diagnosis: Lethargy  Date of Discharge: 2025  Primary Care Physician: Lorraine Agarwal APRN      Presenting Problem:   Respiratory acidosis [E87.29]  Lethargy [R53.83]  Generalized weakness [R53.1]  Acute respiratory failure with hypoxia and hypercapnia [J96.01, J96.02]  Altered mental status, unspecified altered mental status type [R41.82]    Active and Resolved Hospital Problems:  Active Hospital Problems    Diagnosis POA    **Lethargy [R53.83] Yes      Resolved Hospital Problems   No resolved problems to display.         Hospital Course     HPI: Below is the admission HPI documented on     \"Lynne Cade is a 74 y.o. female with a CMH of COPD, CAD, hyperlipidemia, diabetes mellitus, CKD stage IV who presented to [facility] on 2025 with confusion and generalized weakness.     Patient currently awake, oriented to self only, with spouse present at bedside. Per spouse, patient has been experiencing generalized weakness and fatigue, with confusion and inability to recall the date, prompting hospital evaluation. Denies chest pain. On 2L nasal cannula, consistent with baseline home oxygen requirement. Follows with Dr. Peralta; recently diagnosed with interstitial lung disease. No history of tobacco use.    Spouse reports patient recently underwent a vascular procedure on 2025 for left upper extremity swelling due to venous stenosis. She previously had a graft placed for dialysis access due to CKD stage IV, though she is not currently on hemodialysis and continues to make urine. Swelling has slightly improved since the procedure, and patient is scheduled with outpatient vascular surgery follow-up in 2 weeks.    On presentation, vital stable, afebrile, ABG showing pH of 7.2, PCO2 of 76, " "bicarb of 31, labs remarkable for creatinine of 2.89, ammonia level 16, WBC normal, hemoglobin 10.9, chest x-ray showing cardiomegaly, no active disease per the radiologist reading, CT head was obtained negative for any acute findings.  EG showing sinus rhythm.  Patient now admitted to medicine team for further workup inpatient.\"      Hospital Course:      chronic respiratory failure with hypoxia and hypercapnia   Respiratory virus panel negative  Respiratory status gradually improved  on 2 L oxygen nasal cannula  Tolerated BiPAP during sleep  Sleep study as an outpatient  Continue bronchodilators  off antibiotics  Follow-up with pulmonologist in 4 weeks          chronic kidney injury, with progression to end-stage renal disease  NEW ESRD---HD done yesterday per [facility] schedule.   Outpatient HD arranged     Anemia-Anemia likely secondary to chronic disease.  Patient remains on IV iron as well as Epogen as per nephrology recommendations.    Status post 1 unit of packed RBCs on 5/3    - Diabetes mellitus  - Left upper extremity stenosis status post intervention per vascular surgery on 4/21/2025.  Follow-up with vascular surgery in 2 weeks          DISCHARGE Follow Up Recommendations for labs and diagnostics:  Follow-up with pulmonologist in 4 weeks  Follow-up with vascular surgery 2 weeks  Follow-up with nephrologist in 4 to 6 weeks        Day of Discharge     Vital Signs:  Temp:  [97.4 °F (36.3 °C)-100 °F (37.8 °C)] 97.7 °F (36.5 °C)  Heart Rate:  [79-93] 88  Resp:  [15-25] 18  BP: (108-145)/(46-99) 133/62  Flow (L/min) (Oxygen Therapy):  [3] 3    Physical Exam:   General: She is awake.      Appearance: She is well-developed and well-groomed. She is obese. She is ill-appearing.   HENT:      Head: Normocephalic and atraumatic.      Nose: Nose normal.      Mouth/Throat:      Mouth: Mucous membranes are moist.      Pharynx: Oropharynx is clear.   Eyes:      Extraocular Movements: Extraocular movements intact.      " Conjunctiva/sclera: Conjunctivae normal.      Pupils: Pupils are equal, round, and reactive to light.   Cardiovascular:      Rate and Rhythm: Normal rate and regular rhythm.      Pulses: Normal pulses.      Heart sounds: Normal heart sounds.   Pulmonary:      Effort: Pulmonary effort is normal.      Breath sounds: Normal breath sounds.   Abdominal:      General: Abdomen is flat. Bowel sounds are normal. There is distension.      Palpations: Abdomen is soft.   Musculoskeletal:         General: Normal range of motion.      Cervical back: Normal range of motion and neck supple.      Right lower leg: No edema.      Left lower leg: No edema.   Skin:     General: Skin is warm and dry.   Neurological:      General: No focal deficit present.      Mental Status: She is alert and oriented to person, place, and time. Mental status is at baseline.      Cranial Nerves: Cranial nerves 2-12 are intact.      Sensory: Sensation is intact.      Motor: Motor function is intact.   Psychiatric:         Mood and Affect: Mood normal.         Behavior: Behavior normal. Behavior is cooperative.         Thought Content: Thought content normal.         Judgment: Judgment normal.       Pertinent  and/or Most Recent Results     LAB RESULTS:      Lab 05/04/25  0101 05/03/25  0440 05/02/25  0846 04/30/25  0409 04/29/25  0400 04/28/25  1048 04/28/25  0946   WBC 12.44* 12.10* 11.68* 9.31 11.62*  --  10.02   HEMOGLOBIN 7.9* 7.0* 7.1* 7.3* 7.1*   < > 6.9*   HEMATOCRIT 26.8* 24.4* 24.4* 24.6* 24.4*   < > 23.5*   PLATELETS 194 243 235 242 222  --  210   NEUTROS ABS  --   --  7.68* 6.14 8.33*  --  7.64*   IMMATURE GRANS (ABS)  --   --  0.62* 0.20* 0.16*  --  0.13*   LYMPHS ABS  --   --  1.82 1.39 1.37  --  0.95   MONOS ABS  --   --  1.24* 1.30* 1.39*  --  1.03*   EOS ABS  --   --  0.27 0.25 0.32  --  0.23   .8* 113.0* 110.4* 106.5* 107.0*  --  106.8*    < > = values in this interval not displayed.         Lab 05/04/25  0101 05/03/25  0444  05/02/25  0501 05/01/25  0415 04/30/25  0409   SODIUM 135* 137 136 142 138   POTASSIUM 4.8 4.7 4.2 4.9 4.5   CHLORIDE 101 103 101 103 102   CO2 23.9 24.4 26.2 25.3 27.2   ANION GAP 10.1 9.6 8.8 13.7 8.8   BUN 35* 53* 39* 60* 48*   CREATININE 2.53* 2.94* 2.53* 3.16* 2.26*   EGFR 19.2* 16.0* 19.2* 14.7* 22.0*   GLUCOSE 167* 121* 150* 125* 147*   CALCIUM 8.6 9.0 8.6 8.5* 8.6   MAGNESIUM  --  2.9*  --   --   --    PHOSPHORUS 5.6* 7.4* 5.5* 6.8* 4.8*         Lab 05/04/25  0101 05/03/25  0440 05/02/25  0501 05/01/25  0415 04/30/25  0409   ALBUMIN 3.3* 3.4* 3.2* 3.1* 3.4*                 Lab 05/03/25  0442   ABO TYPING O   RH TYPING Positive   ANTIBODY SCREEN Negative         Brief Urine Lab Results  (Last result in the past 365 days)        Color   Clarity   Blood   Leuk Est   Nitrite   Protein   CREAT   Urine HCG        04/26/25 1023             37.8               Microbiology Results (last 10 days)       ** No results found for the last 240 hours. **            MRI Brain Without Contrast  Result Date: 4/24/2025  Impression: Impression: 1.No acute ischemic change noted. 2.Diffuse atrophy and white matter changes compatible sequela of small vessel ischemic disease in this patient. 3.Small bilateral mastoid effusions right greater than left. Electronically Signed: Carlos Manuel Dmuont MD  4/24/2025 6:22 PM EDT  Workstation ID: [id number]    XR Chest 1 View  Result Date: 4/22/2025  Impression: Impression: No active disease. Electronically Signed: Yfn Luna MD  4/22/2025 3:53 PM EDT  Workstation ID: [id number]    CT Head Without Contrast  Result Date: 4/22/2025  Impression: Impression: 1.No acute intracranial finding. 2.Chronic changes as above. Electronically Signed: Antonio Ceballos MD  4/22/2025 3:52 PM EDT  Workstation ID: [id number]    IR peripheral av dialysis perc angioplasty  Result Date: 4/22/2025  Impression: 1. Greater than 90% stenosis at the venous anastomosis was successfully treated with 8 mm angioplasty, with good  final angiographic results. Electronically Signed: Jp Hurst MD  4/22/2025 12:17 PM EDT  Workstation ID: [id number]      Results for orders placed during the hospital encounter of 04/22/25    Duplex Carotid Ultrasound CAR    Interpretation Summary    Right internal carotid artery demonstrates a less than 50% stenosis.    Antegrade right vertebral flow.    Left internal carotid artery demonstrates a less than 50% stenosis.    Antegrade left vertebral flow.      Results for orders placed during the hospital encounter of 04/22/25    Duplex Carotid Ultrasound CAR    Interpretation Summary    Right internal carotid artery demonstrates a less than 50% stenosis.    Antegrade right vertebral flow.    Left internal carotid artery demonstrates a less than 50% stenosis.    Antegrade left vertebral flow.      Results for orders placed during the hospital encounter of 02/16/25    Adult Transthoracic Echo Complete W/ Cont if Necessary Per Protocol    Interpretation Summary    Left ventricular systolic function is normal. Left ventricular ejection fraction appears to be 56 - 60%.    Left ventricular diastolic function is consistent with (grade I) impaired relaxation.    Estimated right ventricular systolic pressure from tricuspid regurgitation is normal (<35 mmHg).      Labs Pending at Discharge:  Pending Results       Procedure [Order ID] Specimen - Date/Time    CBC & Differential [[id number]]     Specimen: Blood     Narrative:      The following orders were created for panel order CBC & Differential.  Procedure                               Abnormality         Status                     ---------                               -----------         ------                     CBC Auto Differential[[id number]]                                                         Please view results for these tests on the individual orders.    CBC Auto Differential [[id number]]     Specimen: Blood     Renal Function Panel [[id number]]     Specimen:  Blood             Procedures Performed           Consults:   Consults       Date and Time Order Name Status Description    4/26/2025  1:40 PM Inpatient Nephrology Consult Completed     4/23/2025  7:53 AM Inpatient Pulmonology Consult Completed     4/22/2025  5:36 PM Hospitalist (on-call MD unless specified)                Discharge Details        Discharge Medications        New Medications        Instructions Start Date   budesonide 0.5 MG/2ML nebulizer solution  Commonly known as: PULMICORT   0.5 mg, Nebulization, 2 Times Daily - RT      insulin glargine 100 UNIT/ML injection  Commonly known as: Lantus   7 Units, Subcutaneous, Daily             Continue These Medications        Instructions Start Date   albuterol sulfate  (90 Base) MCG/ACT inhaler  Commonly known as: PROVENTIL HFA;VENTOLIN HFA;PROAIR HFA   2 puffs, Every 6 Hours      alendronate 70 MG tablet  Commonly known as: FOSAMAX   70 mg, Every 7 Days      calcitriol 0.25 MCG capsule  Commonly known as: ROCALTROL   0.5 mcg, Daily      dilTIAZem  MG 24 hr capsule  Commonly known as: CARDIZEM CD   300 mg, Daily      DULoxetine 60 MG capsule  Commonly known as: CYMBALTA   60 mg, Oral, 2 Times Daily      furosemide 40 MG tablet  Commonly known as: LASIX   40 mg, Oral, Daily      gabapentin 100 MG capsule  Commonly known as: NEURONTIN   100 mg, Oral, 2 Times Daily      hydrALAZINE 100 MG tablet  Commonly known as: APRESOLINE   50 mg, Oral, 2 Times Daily      hydrOXYzine 10 MG tablet  Commonly known as: ATARAX   10 mg, Oral, 3 Times Daily PRN      ipratropium-albuterol 0.5-2.5 mg/3 ml nebulizer  Commonly known as: DUO-NEB   0.5 mL, 2 Times Daily      Lidoderm 5 %  Generic drug: lidocaine   Apply  topically to the appropriate area as directed.      primidone 50 MG tablet  Commonly known as: MYSOLINE   50 mg, Nightly      rosuvastatin 10 MG tablet  Commonly known as: CRESTOR   10 mg, Oral, Daily             Stop These Medications      cyclobenzaprine 5  MG tablet  Commonly known as: FLEXERIL     glimepiride 2 MG tablet  Commonly known as: AMARYL     Januvia 25 MG tablet  Generic drug: SITagliptin     sodium bicarbonate 650 MG tablet     zolpidem 5 MG tablet  Commonly known as: AMBIEN              Allergies   Allergen Reactions    Carvedilol Shortness Of Breath    Morphine Itching    Sulfa Antibiotics GI Intolerance    Hydrocodone Nausea Only    Diphenhydramine Anxiety         Discharge Disposition:   Skilled Nursing Facility (DC - External)    Diet:  Hospital:  Diet Order   Procedures    Diet: Diabetic; Consistent Carbohydrate; Fluid Consistency: Thin (IDDSI 0)         Discharge Activity:         CODE STATUS:  Code Status and Medical Interventions: CPR (Attempt to Resuscitate); Full Support   Ordered at: 04/22/25 2246     Code Status (Patient has no pulse and is not breathing):    CPR (Attempt to Resuscitate)     Medical Interventions (Patient has pulse or is breathing):    Full Support         Future Appointments   Date Time Provider Department Center   5/29/2025  1:30 PM Zaina Bernard MD [facility]           Time spent on Discharge including face to face service: 65 minutes    Signature: Electronically signed by Narinder Mckeon MD, 05/04/25, 13:32 EDT.  [facility] Hospitalist Team

## 2025-05-04 NOTE — PROGRESS NOTES
"NEPHROLOGY PROGRESS NOTE------[facility]    [facility]  [phone]  Felisa Melendez MD      Patient Care Team:  Lorraine Agarwal APRN as PCP - General (Nurse Practitioner)  Rick Melendez MD as Consulting Physician (Nephrology)  Mayo Fregoso MD as Consulting Physician (Urology)  Fredi Pierce MD as Consulting Physician (Cardiology)  Cony Sanchez APRN as Nurse Practitioner (Vascular Surgery)      Provider:  Felisa Melendez MD  Patient Name: Lynne Cade  :  1950    SUBJECTIVE:    F/U NEW ESRD    S/P HD yesterday. Mild SOB. No angina. No palpitations.     Medication:  aspirin, 81 mg, Oral, Daily  budesonide, 0.5 mg, Nebulization, BID - RT  calcitriol, 0.5 mcg, Oral, Daily  dilTIAZem CD, 300 mg, Oral, Daily  DULoxetine, 60 mg, Oral, BID  famotidine, 20 mg, Oral, Daily  [Held by provider] furosemide, 40 mg, Oral, Daily  gabapentin, 100 mg, Oral, Q12H  heparin (porcine), 5,000 Units, Subcutaneous, Q12H  hydrALAZINE, 25 mg, Oral, BID  insulin lispro, 2-7 Units, Subcutaneous, 4x Daily AC & at Bedtime  ipratropium-albuterol, 3 mL, Nebulization, BID  lidocaine-prilocaine, , Topical, Once  primidone, 50 mg, Oral, Nightly  rOPINIRole, 0.5 mg, Oral, TID  rosuvastatin, 10 mg, Oral, Daily           OBJECTIVE    Vital Sign Min/Max for last 24 hours  Temp  Min: 97 °F (36.1 °C)  Max: 100 °F (37.8 °C)   BP  Min: 107/56  Max: 158/59   Pulse  Min: 79  Max: 95   Resp  Min: 15  Max: 25   SpO2  Min: 91 %  Max: 100 %   No data recorded   No data recorded     Flowsheet Rows      Flowsheet Row First Filed Value   Admission Height 167.6 cm (66\") Documented at 2025 1448   Admission Weight 96.2 kg (212 lb 1.3 oz) Documented at 2025 1448            I/O this shift:  In: 240 [P.O.:240]  Out: -   I/O last 3 completed shifts:  In: 1460 [P.O.:960; Blood:500]  Out: 3500 [Urine:500]    Physical Exam:  General " "Appearance: alert, appears stated age and cooperative  Head: normocephalic, without obvious abnormality and atraumatic  Eyes: conjunctivae and sclerae normal and no icterus  Neck: supple and no JVD  Lungs:    Heart: regular rhythm & normal rate and normal S1, S2 +CYNDY  Chest: Wall no abnormalities observed  Abdomen: normal bowel sounds and soft, nontender  Extremities: moves extremities well, no edema, no cyanosis and no redness.  Left arm some edema/good bruit over AV graft  Skin: no bleeding, bruising or rash, turgor normal, color normal and no lesions noted  Neurologic: Alert, and oriented. No focal deficits    Labs:    WBC WBC   Date Value Ref Range Status   05/04/2025 12.44 (H) 3.40 - 10.80 10*3/mm3 Final   05/03/2025 12.10 (H) 3.40 - 10.80 10*3/mm3 Final   05/02/2025 11.68 (H) 3.40 - 10.80 10*3/mm3 Final      HGB Hemoglobin   Date Value Ref Range Status   05/04/2025 7.9 (L) 12.0 - 15.9 g/dL Final   05/03/2025 7.0 (L) 12.0 - 15.9 g/dL Final   05/02/2025 7.1 (L) 12.0 - 15.9 g/dL Final      HCT Hematocrit   Date Value Ref Range Status   05/04/2025 26.8 (L) 34.0 - 46.6 % Final   05/03/2025 24.4 (L) 34.0 - 46.6 % Final   05/02/2025 24.4 (L) 34.0 - 46.6 % Final      Platelets No results found for: \"LABPLAT\"   MCV MCV   Date Value Ref Range Status   05/04/2025 106.8 (H) 79.0 - 97.0 fL Final   05/03/2025 113.0 (H) 79.0 - 97.0 fL Final   05/02/2025 110.4 (H) 79.0 - 97.0 fL Final          Sodium Sodium   Date Value Ref Range Status   05/04/2025 135 (L) 136 - 145 mmol/L Final   05/03/2025 137 136 - 145 mmol/L Final   05/02/2025 136 136 - 145 mmol/L Final      Potassium Potassium   Date Value Ref Range Status   05/04/2025 4.8 3.5 - 5.2 mmol/L Final     Comment:     Specimen hemolyzed.  Result may be falsely elevated.   05/03/2025 4.7 3.5 - 5.2 mmol/L Final   05/02/2025 4.2 3.5 - 5.2 mmol/L Final      Chloride Chloride   Date Value Ref Range Status   05/04/2025 101 98 - 107 mmol/L Final   05/03/2025 103 98 - 107 mmol/L " "Final   05/02/2025 101 98 - 107 mmol/L Final      CO2 CO2   Date Value Ref Range Status   05/04/2025 23.9 22.0 - 29.0 mmol/L Final   05/03/2025 24.4 22.0 - 29.0 mmol/L Final   05/02/2025 26.2 22.0 - 29.0 mmol/L Final      BUN BUN   Date Value Ref Range Status   05/04/2025 35 (H) 8 - 23 mg/dL Final   05/03/2025 53 (H) 8 - 23 mg/dL Final   05/02/2025 39 (H) 8 - 23 mg/dL Final      Creatinine Creatinine   Date Value Ref Range Status   05/04/2025 2.53 (H) 0.57 - 1.00 mg/dL Final   05/03/2025 2.94 (H) 0.57 - 1.00 mg/dL Final   05/02/2025 2.53 (H) 0.57 - 1.00 mg/dL Final      Calcium Calcium   Date Value Ref Range Status   05/04/2025 8.6 8.6 - 10.5 mg/dL Final   05/03/2025 9.0 8.6 - 10.5 mg/dL Final   05/02/2025 8.6 8.6 - 10.5 mg/dL Final      PO4 No components found for: \"PO4\"   Albumin Albumin   Date Value Ref Range Status   05/04/2025 3.3 (L) 3.5 - 5.2 g/dL Final   05/03/2025 3.4 (L) 3.5 - 5.2 g/dL Final   05/02/2025 3.2 (L) 3.5 - 5.2 g/dL Final      Magnesium Magnesium   Date Value Ref Range Status   05/03/2025 2.9 (H) 1.6 - 2.4 mg/dL Final      Uric Acid No components found for: \"URIC ACID\"     Imaging Results (Last 72 Hours)       ** No results found for the last 72 hours. **            Results for orders placed during the hospital encounter of 04/22/25    XR Chest 1 View    Narrative  XR CHEST 1 VW    Date of Exam: 4/22/2025 3:40 PM EDT    Indication: AMS Protocol  AMS Protocol    Comparison: 2/16/2025    Findings:  Heart size is within normal limits for the projection. The right hemidiaphragm is elevated, unchanged. The pulmonary vascular pattern appears normal and the lungs appear clear.    Impression  Impression:  No active disease.        Electronically Signed: Yfn Luna MD  4/22/2025 3:53 PM EDT  Workstation ID: [id number]      Results for orders placed during the hospital encounter of 02/16/25    XR Chest 2 View    Narrative  XR CHEST 2 VW    Date of Exam: 2/16/2025 4:12 PM EST    Indication: Short of " breath    Comparison: 2/5/2025    Findings:  Cardiomediastinal silhouette is unremarkable. Similar elevation of the right hemidiaphragm. No airspace disease, pneumothorax, nor pleural effusion. No acute osseous abnormality identified.    Impression  Impression:  No acute process identified      Electronically Signed: Antonio Ceballos MD  2/16/2025 4:13 PM EST  Workstation ID: [id number]      Results for orders placed in visit on 01/09/24    SCANNED - IMAGING      Results for orders placed during the hospital encounter of 04/22/25    Duplex Carotid Ultrasound CAR    Interpretation Summary    Right internal carotid artery demonstrates a less than 50% stenosis.    Antegrade right vertebral flow.    Left internal carotid artery demonstrates a less than 50% stenosis.    Antegrade left vertebral flow.        ASSESSMENT / PLAN      Lethargy    NEW ESRD--------HD done yesterday per Tu-Th-Sa schedule. Outpatient HD arranged    2. ANEMIA OF ESRD-----EPO/iron with HD as needed    3. HTN WITH ESRD-----BP ok    4. VOLUME EXCESS-----Continue to increase UF with HD as BP tolerates    5. DMII WITH RENAL MANIFESTATIONS-----BS ok. Glucometers, SSI    6. CAD-------No angina    7. PRIETO-----CPAP while asleep    8. DM PVD    9. OA/DJD      Felisa Melendez MD  [facility]  [phone]  05/04/25  09:37 EDT

## 2025-05-04 NOTE — PROGRESS NOTES
Daily Progress Note          Assessment  Acute/chronic hypercapnic/hypoxic respiratory insufficiency     Chronic elevated right hemidiaphragm, associated right lower lobe mild bronchiectasis     CT chest February 2025 no evidence of interstitial lung disease     Respiratory panel negative      non-smoker  No PFTs     Features of obstructive sleep apnea     CKD stage IV  Diabetes mellitus  Hyperlipidemia     Encephalopathy   TSH 3.1  Ammonia 16        Recommendations:        Respiratory status is gradually improving.    oxygen titration currently on 2 L nasal cannula     Tolerated BiPAP during sleep  Sleep study as an outpatient     Bronchodilators currently on Pulmicort and DuoNeb     Steroids discontinued since there is no evidence of interstitial lung disease on most recent CAT scan in February 2025     Minimize sedatives      Currently off antibiotics     Followed by nephrology for CKD/worsening azotemia /hemodialysis          CT chest 2/15/2025               LOS: 11 days     Subjective     Mild chronic shortness of breath    Objective     Vital signs for last 24 hours:  Vitals:    05/03/25 1927 05/03/25 2335 05/04/25 0442 05/04/25 0728   BP: 133/49 108/53 125/46 136/61   BP Location: Right leg Right leg Right leg Left leg   Patient Position: Lying Lying Lying Lying   Pulse: 85 93 87    Resp: 25 21 16 15   Temp: 98.2 °F (36.8 °C) 98.8 °F (37.1 °C) 98.7 °F (37.1 °C) 97.4 °F (36.3 °C)   TempSrc: Oral Axillary Axillary Axillary   SpO2: 93% 100% 100%    Weight:       Height:           Intake/Output last 3 shifts:  I/O last 3 completed shifts:  In: 1460 [P.O.:960; Blood:500]  Out: 3500 [Urine:500]  Intake/Output this shift:  I/O this shift:  In: 240 [P.O.:240]  Out: -       Radiology  Imaging Results (Last 24 Hours)       ** No results found for the last 24 hours. **            Labs:  Results from last 7 days   Lab Units 05/04/25  0101   WBC 10*3/mm3 12.44*   HEMOGLOBIN g/dL 7.9*   HEMATOCRIT % 26.8*   PLATELETS  10*3/mm3 194     Results from last 7 days   Lab Units 05/04/25  0101   SODIUM mmol/L 135*   POTASSIUM mmol/L 4.8   CHLORIDE mmol/L 101   CO2 mmol/L 23.9   BUN mg/dL 35*   CREATININE mg/dL 2.53*   CALCIUM mg/dL 8.6   GLUCOSE mg/dL 167*         Results from last 7 days   Lab Units 05/04/25  0101 05/03/25  0440 05/02/25  0501   ALBUMIN g/dL 3.3* 3.4* 3.2*               Results from last 7 days   Lab Units 05/03/25  0440   MAGNESIUM mg/dL 2.9*                   Meds:   SCHEDULE  aspirin, 81 mg, Oral, Daily  budesonide, 0.5 mg, Nebulization, BID - RT  calcitriol, 0.5 mcg, Oral, Daily  dilTIAZem CD, 300 mg, Oral, Daily  DULoxetine, 60 mg, Oral, BID  famotidine, 20 mg, Oral, Daily  [Held by provider] furosemide, 40 mg, Oral, Daily  gabapentin, 100 mg, Oral, Q12H  heparin (porcine), 5,000 Units, Subcutaneous, Q12H  hydrALAZINE, 25 mg, Oral, BID  insulin lispro, 2-7 Units, Subcutaneous, 4x Daily AC & at Bedtime  ipratropium-albuterol, 3 mL, Nebulization, BID  lidocaine-prilocaine, , Topical, Once  primidone, 50 mg, Oral, Nightly  rOPINIRole, 0.5 mg, Oral, TID  rosuvastatin, 10 mg, Oral, Daily      Infusions     PRNs    acetaminophen **OR** acetaminophen **OR** acetaminophen    senna-docusate sodium **AND** polyethylene glycol **AND** bisacodyl **AND** bisacodyl    Calcium Replacement - Follow Nurse / BPA Driven Protocol    dextrose    dextrose    glucagon (human recombinant)    hydrOXYzine    ipratropium-albuterol    Magnesium Low Dose Replacement - Follow Nurse / BPA Driven Protocol    ondansetron    prochlorperazine    sodium chloride    Physical Exam:  General Appearance:  Alert   HEENT:  Normocephalic, without obvious abnormality, Conjunctiva/corneas clear,.   Nares normal, no drainage     Neck:  Supple, symmetrical, trachea midline.   Lungs /Chest wall:   Mild bilateral basal rhonchi, respirations unlabored, symmetrical wall movement.     Heart:  Regular rate and rhythm, S1 S2 normal  Abdomen: Soft, non-tender, no  masses, no organomegaly.    Extremities: Trace bilateral lower extremity edema, no clubbing or cyanosis     ROS  Constitutional: Negative for chills, fever and malaise/fatigue.   HENT: Negative.    Eyes: Negative.    Cardiovascular: Shortness of breath and lower extremity edema  Respiratory: Positive for cough and shortness of breath.    Skin: Negative.    Musculoskeletal: Negative.    Gastrointestinal: Negative.    Genitourinary: Negative.    Neurological: Generalized weakness      I reviewed the recent clinical results  I personally reviewed the latest radiological studies    Part of this note may be an electronic transcription/translation of spoken language to printed text using the Dragon Dictation System.

## 2025-05-05 NOTE — CASE MANAGEMENT/SOCIAL WORK
Case Management Discharge Note      Final Note: [facility].      Selected Continued Care - Discharged on 5/4/2025 Admission date: 4/22/2025 - Discharge disposition: Skilled Nursing Facility (DC - External)      Destination Coordination complete.      Service Provider Services Address Phone Fax Patient Preferred    [facility] [address] [place] [address] [phone] --              Dialysis/Infusion Coordination complete.      Service Provider Services Address Phone Fax Patient Preferred    [facility] [address] [place] [address] [phone] -- --             Transportation Services  W/C Van: Steven Taylor    Final Discharge Disposition Code: 03 - skilled nursing facility (SNF)

## 2025-05-29 ENCOUNTER — OFFICE VISIT (OUTPATIENT)
Age: 75
End: 2025-05-29
Payer: MEDICARE

## 2025-05-29 VITALS
SYSTOLIC BLOOD PRESSURE: 118 MMHG | WEIGHT: 211 LBS | BODY MASS INDEX: 35.16 KG/M2 | DIASTOLIC BLOOD PRESSURE: 59 MMHG | HEIGHT: 65 IN

## 2025-05-29 DIAGNOSIS — Z99.2 ESRD (END STAGE RENAL DISEASE) ON DIALYSIS: ICD-10-CM

## 2025-05-29 DIAGNOSIS — E66.9 OBESITY (BMI 30-39.9): ICD-10-CM

## 2025-05-29 DIAGNOSIS — N18.6 ESRD (END STAGE RENAL DISEASE) ON DIALYSIS: ICD-10-CM

## 2025-05-29 DIAGNOSIS — I77.0 A-V FISTULA: Primary | ICD-10-CM

## 2025-05-29 RX ORDER — CALCIUM ACETATE 667 MG/1
2 CAPSULE ORAL 3 TIMES DAILY
COMMUNITY
Start: 2025-05-20 | End: 2026-05-19

## 2025-05-29 RX ORDER — CARVEDILOL 25 MG/1
TABLET ORAL 2 TIMES DAILY
COMMUNITY
Start: 2025-05-19

## 2025-05-29 RX ORDER — CYCLOBENZAPRINE HCL 5 MG
TABLET ORAL
COMMUNITY
Start: 2025-05-19

## 2025-05-29 RX ORDER — SITAGLIPTIN 25 MG/1
25 TABLET, FILM COATED ORAL
COMMUNITY
Start: 2025-05-19

## 2025-05-29 RX ORDER — SODIUM BICARBONATE 650 MG/1
TABLET ORAL
COMMUNITY
Start: 2025-05-19

## 2025-05-29 RX ORDER — GLIMEPIRIDE 2 MG/1
2 TABLET ORAL
COMMUNITY
Start: 2025-05-19

## 2025-05-29 NOTE — PROGRESS NOTES
[facility] VASCULAR SURGERY    Chief Complaint  Hemodialysis Access    Subjective          History of Present Illness  Lynne Cade is a 74 y.o. female with ESRD. She presents to the office today for follow up of her AV fistula.  She is followed by Dr. Bernard.  She has had the following vascular interventions performed:    Left brachiocephalic AVF creation by Dr. Riley on 2/24/2023   Left brachial to axillary loop PTFE graft placement on 2/27/2025  Fistulogram with angioplasty of the venous anastomosis with IR on 4/21/2025    Since she was last seen, she was hospitalized from 4/22-5/4 due to lethargy, generalized weakness, and acute respiratory failure with hypoxia and hypercapnia.  She was started on hemodialysis at that time.  She is still recovering from this illness and is regaining her strength.  She will be starting outpatient physical therapy soon.  She denies any symptoms of vascular steal syndrome.  She dialyzes on a MWF schedule. She denies any issues with dialysis treatments or with her fistula, including no recent issues accessing the fistula and denies frequent alarms during her treatments.  She denies any difficulty with prolonged bleeding.    Review of Systems   Musculoskeletal:  Negative for myalgias.   Skin:  Negative for color change, pallor and wound.   Neurological:  Positive for weakness (generalized). Negative for numbness.        Allergies: Carvedilol, Morphine, Sulfa antibiotics, Hydrocodone, and Diphenhydramine    Prior to Admission medications    Medication Sig Start Date End Date Taking? Authorizing Provider   calcium acetate (PHOS BINDER,) 667 MG capsule capsule 2 capsules 3 (Three) Times a Day. 5/20/25 5/19/26 Yes Provider, Historical, MD   carvedilol (COREG) 25 MG tablet Take  by mouth 2 (Two) Times a Day. 5/19/25  Yes Provider, Historical, MD   cyclobenzaprine (FLEXERIL) 5 MG tablet Take  by mouth. 5/19/25  Yes Provider, Historical, MD   glimepiride (AMARYL) 2  MG tablet Take 1 tablet by mouth. 5/19/25  Yes Suleman Hensley MD   Januvia 25 MG tablet Take 1 tablet by mouth. 5/19/25  Yes Suleman Hensley MD   sodium bicarbonate 650 MG tablet Take  by mouth. 5/19/25  Yes Suleman Hensley MD   albuterol sulfate  (90 Base) MCG/ACT inhaler Inhale 2 puffs Every 6 (Six) Hours. 12/30/24   Suleman Hensley MD   alendronate (FOSAMAX) 70 MG tablet Take 1 tablet by mouth Every 7 (Seven) Days. On Saturdays    Suleman Hensley MD   budesonide (PULMICORT) 0.5 MG/2ML nebulizer solution Take 2 mL by nebulization 2 (Two) Times a Day. 5/1/25   Pernell Trivedi MD   calcitriol (ROCALTROL) 0.25 MCG capsule Take 2 capsules by mouth Daily.    Suleman Hensley MD   dilTIAZem CD (CARDIZEM CD) 300 MG 24 hr capsule Take 1 capsule by mouth Daily. 3/19/25   Suleman Hensley MD   DULoxetine (CYMBALTA) 60 MG capsule Take 1 capsule by mouth 2 (Two) Times a Day. 2/15/23   Rose Robb MD   furosemide (LASIX) 40 MG tablet Take 1 tablet by mouth Daily for 30 days. 2/20/25 4/23/25  True Cyr MD   gabapentin (NEURONTIN) 100 MG capsule Take 1 capsule by mouth 2 (Two) Times a Day. 12/20/23   Suleman Hensley MD   hydrALAZINE (APRESOLINE) 100 MG tablet Take 0.5 tablets by mouth 2 (Two) Times a Day.    Suleman Hensley MD   hydrOXYzine (ATARAX) 10 MG tablet Take 1 tablet by mouth 3 (Three) Times a Day As Needed for Itching. 8/14/23   Becky Greco DO   insulin glargine (Lantus) 100 UNIT/ML injection Inject 7 Units under the skin into the appropriate area as directed Daily. 5/1/25   Pernell Trivedi MD   ipratropium-albuterol (DUO-NEB) 0.5-2.5 mg/3 ml nebulizer Take 0.5 mL by nebulization 2 (Two) Times a Day. 3/19/25   Suleman Hensley MD   lidocaine (Lidoderm) 5 % Apply  topically to the appropriate area as directed. 5/10/24   ProviderSuleman MD   primidone (MYSOLINE) 50 MG tablet Take 1 tablet by mouth Every Night.    Provider,  "MD Suleman   rosuvastatin (CRESTOR) 10 MG tablet Take 1 tablet by mouth Daily. 12/27/22   Rose Robb MD         Objective   Vital Signs:  /59 (BP Location: Right arm)   Ht 164.6 cm (64.8\")   Wt 95.7 kg (211 lb)   BMI 35.33 kg/m²   Estimated body mass index is 35.33 kg/m² as calculated from the following:    Height as of this encounter: 164.6 cm (64.8\").    Weight as of this encounter: 95.7 kg (211 lb).       Physical Exam  Vitals reviewed.   Constitutional:       General: She is not in acute distress.     Appearance: She is not ill-appearing.   Cardiovascular:      Rate and Rhythm: Normal rate.      Pulses:           Radial pulses are 2+ on the right side and 2+ on the left side.      Arteriovenous access: Left arteriovenous access is present.     Comments: Thrill and bruit present to the left arm AV graft  Pulmonary:      Effort: Pulmonary effort is normal. No respiratory distress.   Skin:     General: Skin is warm and dry.   Neurological:      General: No focal deficit present.      Mental Status: She is alert and oriented to person, place, and time.      GCS: GCS eye subscore is 4. GCS verbal subscore is 5. GCS motor subscore is 6.        Result Review :    The following data was reviewed by: EVERARDO Posada on 05/29/2025:  Duplex Hemodialysis Access CAR (04/08/2025 15:57)    Patent left axillary to axillary arteriovenous graft. Diameters, depth, and flow volumes are all adequate.     IR peripheral av dialysis perc angioplasty (04/21/2025 15:07)   Greater than 90% stenosis at the venous anastomosis was successfully treated with 8 mm angioplasty, with good final angiographic results.     Discharge Summary by Narinder Mckeon MD (05/04/2025 13:31)      Assessment and Plan     Diagnoses and all orders for this visit:    1. A-V fistula (Primary)  -     Duplex Hemodialysis Access CAR; Future    2. ESRD (end stage renal disease) on dialysis  -     Duplex Hemodialysis Access CAR; Future    3. " Obesity (BMI 30-39.9)    Class 2 Severe Obesity (BMI >=35 and <=39.9).  Information on healthy weight added to patient's after visit summary.         Lynne Cade is a 74 y.o. female with ESRD. She has been on dialysis for about 3 weeks now.  She denies any issues with her treatments. She denies any symptoms of steal syndrome. There is a thrill and bruit present in the AV graft.   Given that she is not currently having any difficulties with dialysis treatments including no prolonged bleeding, I will plan to see her back in the office in 2 months with a duplex of her fistula.  She was agreeable to this plan.  She was instructed to call our office if she had any questions or concerns.     Follow Up     Return in about 2 months (around 7/29/2025) for AV fistula duplex.    Patient was given instructions and counseling regarding her condition or for health maintenance advice. Please see specific information pulled into the AVS if appropriate.     EVERARDO Posada

## 2025-06-11 ENCOUNTER — DOCUMENTATION (OUTPATIENT)
Dept: WOUND CARE | Facility: HOSPITAL | Age: 75
End: 2025-06-11
Payer: MEDICARE

## 2025-06-25 ENCOUNTER — TELEPHONE (OUTPATIENT)
Age: 75
End: 2025-06-25
Payer: MEDICARE

## 2025-06-25 DIAGNOSIS — T82.590A MALFUNCTION OF ARTERIOVENOUS GRAFT, INITIAL ENCOUNTER: Primary | ICD-10-CM

## 2025-06-25 NOTE — TELEPHONE ENCOUNTER
[facility] stated that patients access is completely clotted and patient is not able to receive treatment. Patients last treatment was on Friday. Vicky stated that she called the hospital to see if she could get in with IR and they did not have anything available for several days     Vicky [phone]

## 2025-06-26 ENCOUNTER — ANESTHESIA (OUTPATIENT)
Dept: PERIOP | Facility: HOSPITAL | Age: 75
End: 2025-06-26
Payer: MEDICARE

## 2025-06-26 ENCOUNTER — APPOINTMENT (OUTPATIENT)
Dept: GENERAL RADIOLOGY | Facility: HOSPITAL | Age: 75
DRG: 252 | End: 2025-06-26
Payer: MEDICARE

## 2025-06-26 ENCOUNTER — ANESTHESIA EVENT (OUTPATIENT)
Dept: PERIOP | Facility: HOSPITAL | Age: 75
End: 2025-06-26
Payer: MEDICARE

## 2025-06-26 ENCOUNTER — HOSPITAL ENCOUNTER (OUTPATIENT)
Facility: HOSPITAL | Age: 75
Setting detail: HOSPITAL OUTPATIENT SURGERY
Discharge: HOME | DRG: 252 | End: 2025-06-26
Attending: STUDENT IN AN ORGANIZED HEALTH CARE EDUCATION/TRAINING PROGRAM | Admitting: STUDENT IN AN ORGANIZED HEALTH CARE EDUCATION/TRAINING PROGRAM
Payer: MEDICARE

## 2025-06-26 VITALS
HEART RATE: 80 BPM | RESPIRATION RATE: 16 BRPM | DIASTOLIC BLOOD PRESSURE: 78 MMHG | OXYGEN SATURATION: 92 % | TEMPERATURE: 97.7 F | SYSTOLIC BLOOD PRESSURE: 132 MMHG

## 2025-06-26 LAB
ANION GAP SERPL CALCULATED.3IONS-SCNC: 15.4 MMOL/L (ref 5–15)
BUN SERPL-MCNC: 54 MG/DL (ref 8–23)
BUN/CREAT SERPL: 11 (ref 7–25)
CALCIUM SPEC-SCNC: 9 MG/DL (ref 8.6–10.5)
CHLORIDE SERPL-SCNC: 104 MMOL/L (ref 98–107)
CO2 SERPL-SCNC: 22.6 MMOL/L (ref 22–29)
CREAT SERPL-MCNC: 4.93 MG/DL (ref 0.57–1)
DEPRECATED RDW RBC AUTO: 55.1 FL (ref 37–54)
EGFRCR SERPLBLD CKD-EPI 2021: 8.6 ML/MIN/1.73
ERYTHROCYTE [DISTWIDTH] IN BLOOD BY AUTOMATED COUNT: 14.6 % (ref 12.3–15.4)
GLUCOSE BLDC GLUCOMTR-MCNC: 120 MG/DL (ref 70–130)
GLUCOSE BLDC GLUCOMTR-MCNC: 126 MG/DL (ref 70–130)
GLUCOSE SERPL-MCNC: 131 MG/DL (ref 65–99)
HCT VFR BLD AUTO: 35.9 % (ref 34–46.6)
HGB BLD-MCNC: 11.5 G/DL (ref 12–15.9)
MCH RBC QN AUTO: 33.3 PG (ref 26.6–33)
MCHC RBC AUTO-ENTMCNC: 32 G/DL (ref 31.5–35.7)
MCV RBC AUTO: 104.1 FL (ref 79–97)
PLATELET # BLD AUTO: 315 10*3/MM3 (ref 140–450)
PMV BLD AUTO: 9.7 FL (ref 6–12)
POTASSIUM SERPL-SCNC: 4.3 MMOL/L (ref 3.5–5.2)
RBC # BLD AUTO: 3.45 10*6/MM3 (ref 3.77–5.28)
SODIUM SERPL-SCNC: 142 MMOL/L (ref 136–145)
WBC NRBC COR # BLD AUTO: 10.18 10*3/MM3 (ref 3.4–10.8)

## 2025-06-26 PROCEDURE — 25010000002 ALTEPLASE 2 MG RECONSTITUTED SOLUTION: Performed by: STUDENT IN AN ORGANIZED HEALTH CARE EDUCATION/TRAINING PROGRAM

## 2025-06-26 PROCEDURE — 82948 REAGENT STRIP/BLOOD GLUCOSE: CPT

## 2025-06-26 PROCEDURE — 25010000002 LIDOCAINE 1 % SOLUTION: Performed by: STUDENT IN AN ORGANIZED HEALTH CARE EDUCATION/TRAINING PROGRAM

## 2025-06-26 PROCEDURE — 25010000002 CEFAZOLIN PER 500 MG

## 2025-06-26 PROCEDURE — 25010000002 HEPARIN (PORCINE) PER 1000 UNITS

## 2025-06-26 PROCEDURE — S0260 H&P FOR SURGERY: HCPCS | Performed by: STUDENT IN AN ORGANIZED HEALTH CARE EDUCATION/TRAINING PROGRAM

## 2025-06-26 PROCEDURE — C1769 GUIDE WIRE: HCPCS | Performed by: STUDENT IN AN ORGANIZED HEALTH CARE EDUCATION/TRAINING PROGRAM

## 2025-06-26 PROCEDURE — 25810000003 LACTATED RINGERS PER 1000 ML: Performed by: ANESTHESIOLOGY

## 2025-06-26 PROCEDURE — C1894 INTRO/SHEATH, NON-LASER: HCPCS | Performed by: STUDENT IN AN ORGANIZED HEALTH CARE EDUCATION/TRAINING PROGRAM

## 2025-06-26 PROCEDURE — C1725 CATH, TRANSLUMIN NON-LASER: HCPCS | Performed by: STUDENT IN AN ORGANIZED HEALTH CARE EDUCATION/TRAINING PROGRAM

## 2025-06-26 PROCEDURE — 80048 BASIC METABOLIC PNL TOTAL CA: CPT | Performed by: STUDENT IN AN ORGANIZED HEALTH CARE EDUCATION/TRAINING PROGRAM

## 2025-06-26 PROCEDURE — 36906 THRMBC/NFS DIALYSIS CIRCUIT: CPT | Performed by: STUDENT IN AN ORGANIZED HEALTH CARE EDUCATION/TRAINING PROGRAM

## 2025-06-26 PROCEDURE — 25510000001 IOPAMIDOL PER 1 ML: Performed by: STUDENT IN AN ORGANIZED HEALTH CARE EDUCATION/TRAINING PROGRAM

## 2025-06-26 PROCEDURE — C1757 CATH, THROMBECTOMY/EMBOLECT: HCPCS | Performed by: STUDENT IN AN ORGANIZED HEALTH CARE EDUCATION/TRAINING PROGRAM

## 2025-06-26 PROCEDURE — 25010000002 FAMOTIDINE 10 MG/ML SOLUTION: Performed by: ANESTHESIOLOGY

## 2025-06-26 PROCEDURE — 25010000002 PROPOFOL 10 MG/ML EMULSION

## 2025-06-26 PROCEDURE — 25010000002 HEPARIN (PORCINE) PER 1000 UNITS: Performed by: STUDENT IN AN ORGANIZED HEALTH CARE EDUCATION/TRAINING PROGRAM

## 2025-06-26 PROCEDURE — C1876 STENT, NON-COA/NON-COV W/DEL: HCPCS | Performed by: STUDENT IN AN ORGANIZED HEALTH CARE EDUCATION/TRAINING PROGRAM

## 2025-06-26 PROCEDURE — 85027 COMPLETE CBC AUTOMATED: CPT | Performed by: STUDENT IN AN ORGANIZED HEALTH CARE EDUCATION/TRAINING PROGRAM

## 2025-06-26 PROCEDURE — 25010000002 LIDOCAINE 2% SOLUTION

## 2025-06-26 PROCEDURE — 25010000002 DEXAMETHASONE SODIUM PHOSPHATE 20 MG/5ML SOLUTION

## 2025-06-26 PROCEDURE — 25010000002 ONDANSETRON PER 1 MG

## 2025-06-26 DEVICE — LIGACLIP MCA MULTIPLE CLIP APPLIERS, 20 SMALL CLIPS
Type: IMPLANTABLE DEVICE | Site: ARM | Status: FUNCTIONAL
Brand: LIGACLIP

## 2025-06-26 DEVICE — STENT PRB35-08-040-080 PROTEGE EF V07
Type: IMPLANTABLE DEVICE | Site: ARM | Status: FUNCTIONAL
Brand: EVERFLEX™ PROTÉGÉ™ EVERFLEX™

## 2025-06-26 RX ORDER — LIDOCAINE HYDROCHLORIDE 20 MG/ML
INJECTION, SOLUTION INFILTRATION; PERINEURAL AS NEEDED
Status: DISCONTINUED | OUTPATIENT
Start: 2025-06-26 | End: 2025-06-26 | Stop reason: SURG

## 2025-06-26 RX ORDER — HYDROMORPHONE HYDROCHLORIDE 1 MG/ML
0.25 INJECTION, SOLUTION INTRAMUSCULAR; INTRAVENOUS; SUBCUTANEOUS
Status: DISCONTINUED | OUTPATIENT
Start: 2025-06-26 | End: 2025-06-26 | Stop reason: HOSPADM

## 2025-06-26 RX ORDER — ALLOPURINOL 100 MG/1
100 TABLET ORAL DAILY
Status: ON HOLD | COMMUNITY
End: 2025-07-01

## 2025-06-26 RX ORDER — ONDANSETRON 2 MG/ML
INJECTION INTRAMUSCULAR; INTRAVENOUS AS NEEDED
Status: DISCONTINUED | OUTPATIENT
Start: 2025-06-26 | End: 2025-06-26 | Stop reason: SURG

## 2025-06-26 RX ORDER — PROPOFOL 10 MG/ML
VIAL (ML) INTRAVENOUS AS NEEDED
Status: DISCONTINUED | OUTPATIENT
Start: 2025-06-26 | End: 2025-06-26 | Stop reason: SURG

## 2025-06-26 RX ORDER — MIDAZOLAM HYDROCHLORIDE 1 MG/ML
0.5 INJECTION, SOLUTION INTRAMUSCULAR; INTRAVENOUS
Status: DISCONTINUED | OUTPATIENT
Start: 2025-06-26 | End: 2025-06-26 | Stop reason: HOSPADM

## 2025-06-26 RX ORDER — SODIUM CHLORIDE, SODIUM LACTATE, POTASSIUM CHLORIDE, CALCIUM CHLORIDE 600; 310; 30; 20 MG/100ML; MG/100ML; MG/100ML; MG/100ML
9 INJECTION, SOLUTION INTRAVENOUS CONTINUOUS
Status: DISCONTINUED | OUTPATIENT
Start: 2025-06-26 | End: 2025-06-26 | Stop reason: HOSPADM

## 2025-06-26 RX ORDER — HYDROCODONE BITARTRATE AND ACETAMINOPHEN 5; 325 MG/1; MG/1
1 TABLET ORAL ONCE AS NEEDED
Status: DISCONTINUED | OUTPATIENT
Start: 2025-06-26 | End: 2025-06-26 | Stop reason: HOSPADM

## 2025-06-26 RX ORDER — VIBEGRON 75 MG/1
75 TABLET, FILM COATED ORAL DAILY
Status: ON HOLD | COMMUNITY
End: 2025-07-01

## 2025-06-26 RX ORDER — DEXAMETHASONE SODIUM PHOSPHATE 4 MG/ML
INJECTION, SOLUTION INTRA-ARTICULAR; INTRALESIONAL; INTRAMUSCULAR; INTRAVENOUS; SOFT TISSUE AS NEEDED
Status: DISCONTINUED | OUTPATIENT
Start: 2025-06-26 | End: 2025-06-26 | Stop reason: SURG

## 2025-06-26 RX ORDER — BUPIVACAINE HCL/0.9 % NACL/PF 0.125 %
PLASTIC BAG, INJECTION (ML) EPIDURAL AS NEEDED
Status: DISCONTINUED | OUTPATIENT
Start: 2025-06-26 | End: 2025-06-26 | Stop reason: SURG

## 2025-06-26 RX ORDER — HYDROCODONE BITARTRATE AND ACETAMINOPHEN 7.5; 325 MG/1; MG/1
1 TABLET ORAL EVERY 4 HOURS PRN
Status: DISCONTINUED | OUTPATIENT
Start: 2025-06-26 | End: 2025-06-26 | Stop reason: HOSPADM

## 2025-06-26 RX ORDER — LIDOCAINE HYDROCHLORIDE 10 MG/ML
0.5 INJECTION, SOLUTION INFILTRATION; PERINEURAL ONCE AS NEEDED
Status: DISCONTINUED | OUTPATIENT
Start: 2025-06-26 | End: 2025-06-26 | Stop reason: HOSPADM

## 2025-06-26 RX ORDER — FLUMAZENIL 0.1 MG/ML
0.2 INJECTION INTRAVENOUS AS NEEDED
Status: DISCONTINUED | OUTPATIENT
Start: 2025-06-26 | End: 2025-06-26 | Stop reason: HOSPADM

## 2025-06-26 RX ORDER — IOPAMIDOL 510 MG/ML
100 INJECTION, SOLUTION INTRAVASCULAR
Status: COMPLETED | OUTPATIENT
Start: 2025-06-26 | End: 2025-06-26

## 2025-06-26 RX ORDER — NALOXONE HCL 0.4 MG/ML
0.2 VIAL (ML) INJECTION AS NEEDED
Status: DISCONTINUED | OUTPATIENT
Start: 2025-06-26 | End: 2025-06-26 | Stop reason: HOSPADM

## 2025-06-26 RX ORDER — FENTANYL CITRATE 50 UG/ML
25 INJECTION, SOLUTION INTRAMUSCULAR; INTRAVENOUS
Status: DISCONTINUED | OUTPATIENT
Start: 2025-06-26 | End: 2025-06-26 | Stop reason: HOSPADM

## 2025-06-26 RX ORDER — LIDOCAINE HYDROCHLORIDE 10 MG/ML
INJECTION, SOLUTION INFILTRATION; PERINEURAL AS NEEDED
Status: DISCONTINUED | OUTPATIENT
Start: 2025-06-26 | End: 2025-06-26 | Stop reason: HOSPADM

## 2025-06-26 RX ORDER — DROPERIDOL 2.5 MG/ML
0.62 INJECTION, SOLUTION INTRAMUSCULAR; INTRAVENOUS
Status: DISCONTINUED | OUTPATIENT
Start: 2025-06-26 | End: 2025-06-26 | Stop reason: HOSPADM

## 2025-06-26 RX ORDER — HEPARIN SODIUM 1000 [USP'U]/ML
INJECTION, SOLUTION INTRAVENOUS; SUBCUTANEOUS AS NEEDED
Status: DISCONTINUED | OUTPATIENT
Start: 2025-06-26 | End: 2025-06-26 | Stop reason: SURG

## 2025-06-26 RX ORDER — ASPIRIN 81 MG/1
81 TABLET ORAL DAILY
Qty: 30 TABLET | Refills: 11 | Status: SHIPPED | OUTPATIENT
Start: 2025-06-26 | End: 2026-06-26

## 2025-06-26 RX ORDER — FAMOTIDINE 10 MG/ML
20 INJECTION, SOLUTION INTRAVENOUS ONCE
Status: COMPLETED | OUTPATIENT
Start: 2025-06-26 | End: 2025-06-26

## 2025-06-26 RX ORDER — LABETALOL HYDROCHLORIDE 5 MG/ML
5 INJECTION, SOLUTION INTRAVENOUS
Status: DISCONTINUED | OUTPATIENT
Start: 2025-06-26 | End: 2025-06-26 | Stop reason: HOSPADM

## 2025-06-26 RX ORDER — FENTANYL CITRATE 50 UG/ML
50 INJECTION, SOLUTION INTRAMUSCULAR; INTRAVENOUS ONCE AS NEEDED
Status: DISCONTINUED | OUTPATIENT
Start: 2025-06-26 | End: 2025-06-26 | Stop reason: HOSPADM

## 2025-06-26 RX ORDER — ATROPINE SULFATE 0.4 MG/ML
0.4 INJECTION, SOLUTION INTRAMUSCULAR; INTRAVENOUS; SUBCUTANEOUS ONCE AS NEEDED
Status: DISCONTINUED | OUTPATIENT
Start: 2025-06-26 | End: 2025-06-26 | Stop reason: HOSPADM

## 2025-06-26 RX ORDER — HYDRALAZINE HYDROCHLORIDE 20 MG/ML
5 INJECTION INTRAMUSCULAR; INTRAVENOUS
Status: DISCONTINUED | OUTPATIENT
Start: 2025-06-26 | End: 2025-06-26 | Stop reason: HOSPADM

## 2025-06-26 RX ORDER — DIPHENHYDRAMINE HYDROCHLORIDE 50 MG/ML
12.5 INJECTION, SOLUTION INTRAMUSCULAR; INTRAVENOUS
Status: DISCONTINUED | OUTPATIENT
Start: 2025-06-26 | End: 2025-06-26 | Stop reason: HOSPADM

## 2025-06-26 RX ORDER — ONDANSETRON 2 MG/ML
4 INJECTION INTRAMUSCULAR; INTRAVENOUS ONCE AS NEEDED
Status: DISCONTINUED | OUTPATIENT
Start: 2025-06-26 | End: 2025-06-26 | Stop reason: HOSPADM

## 2025-06-26 RX ORDER — SODIUM CHLORIDE 0.9 % (FLUSH) 0.9 %
3 SYRINGE (ML) INJECTION EVERY 12 HOURS SCHEDULED
Status: DISCONTINUED | OUTPATIENT
Start: 2025-06-26 | End: 2025-06-26 | Stop reason: HOSPADM

## 2025-06-26 RX ORDER — EPHEDRINE SULFATE 50 MG/ML
5 INJECTION, SOLUTION INTRAVENOUS ONCE AS NEEDED
Status: DISCONTINUED | OUTPATIENT
Start: 2025-06-26 | End: 2025-06-26 | Stop reason: HOSPADM

## 2025-06-26 RX ORDER — PROMETHAZINE HYDROCHLORIDE 25 MG/1
25 SUPPOSITORY RECTAL ONCE AS NEEDED
Status: DISCONTINUED | OUTPATIENT
Start: 2025-06-26 | End: 2025-06-26 | Stop reason: HOSPADM

## 2025-06-26 RX ORDER — PROMETHAZINE HYDROCHLORIDE 25 MG/1
25 TABLET ORAL ONCE AS NEEDED
Status: DISCONTINUED | OUTPATIENT
Start: 2025-06-26 | End: 2025-06-26 | Stop reason: HOSPADM

## 2025-06-26 RX ORDER — SODIUM CHLORIDE 0.9 % (FLUSH) 0.9 %
3-10 SYRINGE (ML) INJECTION AS NEEDED
Status: DISCONTINUED | OUTPATIENT
Start: 2025-06-26 | End: 2025-06-26 | Stop reason: HOSPADM

## 2025-06-26 RX ORDER — IPRATROPIUM BROMIDE AND ALBUTEROL SULFATE 2.5; .5 MG/3ML; MG/3ML
3 SOLUTION RESPIRATORY (INHALATION) ONCE AS NEEDED
Status: DISCONTINUED | OUTPATIENT
Start: 2025-06-26 | End: 2025-06-26 | Stop reason: HOSPADM

## 2025-06-26 RX ADMIN — CEFAZOLIN 2 G: 2 INJECTION, POWDER, LYOPHILIZED, FOR SOLUTION INTRAVENOUS at 11:32

## 2025-06-26 RX ADMIN — ONDANSETRON 4 MG: 2 INJECTION, SOLUTION INTRAMUSCULAR; INTRAVENOUS at 11:34

## 2025-06-26 RX ADMIN — IOPAMIDOL 65 ML: 510 INJECTION, SOLUTION INTRAVASCULAR at 12:32

## 2025-06-26 RX ADMIN — Medication 200 MCG: at 11:38

## 2025-06-26 RX ADMIN — Medication 100 MCG: at 12:03

## 2025-06-26 RX ADMIN — PROPOFOL 160 MG: 10 INJECTION, EMULSION INTRAVENOUS at 11:28

## 2025-06-26 RX ADMIN — Medication 100 MCG: at 12:00

## 2025-06-26 RX ADMIN — FAMOTIDINE 20 MG: 10 INJECTION INTRAVENOUS at 09:07

## 2025-06-26 RX ADMIN — Medication 200 MCG: at 11:56

## 2025-06-26 RX ADMIN — LIDOCAINE HYDROCHLORIDE 100 MG: 20 INJECTION, SOLUTION INFILTRATION; PERINEURAL at 11:28

## 2025-06-26 RX ADMIN — Medication 200 MCG: at 11:48

## 2025-06-26 RX ADMIN — Medication 200 MCG: at 12:10

## 2025-06-26 RX ADMIN — HEPARIN SODIUM 8000 UNITS: 1000 INJECTION, SOLUTION INTRAVENOUS; SUBCUTANEOUS at 11:56

## 2025-06-26 RX ADMIN — PROPOFOL 40 MG: 10 INJECTION, EMULSION INTRAVENOUS at 11:56

## 2025-06-26 RX ADMIN — DEXAMETHASONE SODIUM PHOSPHATE 4 MG: 4 INJECTION, SOLUTION INTRAMUSCULAR; INTRAVENOUS at 11:34

## 2025-06-26 RX ADMIN — SODIUM CHLORIDE, POTASSIUM CHLORIDE, SODIUM LACTATE AND CALCIUM CHLORIDE: 600; 310; 30; 20 INJECTION, SOLUTION INTRAVENOUS at 11:22

## 2025-06-26 NOTE — DISCHARGE INSTRUCTIONS
[facility] Vascular Surgery.   Guillaume Hankins, Joo Raines Scherrer, Thomas, [place]  [address]  [place] [address]  [phone]        Discharge Instructions for Fistulogram/ Shuntogram      Go home, rest and take it easy today.       You may experience some dizziness or memory loss from the anesthesia.  This may last for the next 24 hours.  Someone should plan on staying with you for the first 24 hours for your safety.    Do not make any important legal decisions or sign any legal papers for the next 24 hours.      Eat and drink lightly today.  Start off with liquids, jello, soup, crackers or other bland foods at first. You may advance your diet tomorrow as tolerated as long as you do not experience any nausea or vomiting.    No driving for the remainder of the day.  You may resume limited driving tomorrow, if necessary.     You may resume routine medications including blood thinners. However, Glucophage should not be started until 72 hours after the dye is given.      No lifting over 10 pounds and no strenuous activity for the next 2-3 days with the involved arm.        Limit going up and down steps for 2 days.    Leave the dressing on until tomorrow morning.  You may then take this off, as well as the small see through dressing with gauze, tomorrow.  If this does not come off easily, do not pull this off.  If it is stuck, shower and let the warm water loosen it before removal.     Check your groin/ arm dressing regularly for bleeding through the dressing (under the pressure dressing).  A small amount of blood contained by the gauze is normal: the whole dressing should not be filled with blood, or leaking out under the sides.  If the bandage is saturated, you may remove it and replace it with a new dressing.      If you experience bleeding through the gauze/ clear dressing, lay flat and have someone apply direct pressure for 15 minutes.  If bleeding has stopped after this, you may  put a clean gauze and tape over the area.  Continue to lie flat for 1-2 hours.  If bleeding continues after 15 minutes of pressure, call us at [phone]. There is an MD available after hours for urgent matters.            If you experience heavy bleeding continue to hold firm pressure and call 911.    Do not call the MD on call.     If your doctor gave you a prescription for a narcotic pill (Tylenol #3, Vicodin, Hydrocodone, Oxycodone or Percocet), you may not drive a vehicle or operate machinery while taking this type of medication.     13. Severe pain is not expected after this procedure.  If you experience severe pain, please call our office at [phone].          Remember to contact our office for any of the following:    Fever > 101 degrees  Severe pain   Severe nausea or vomiting   Significant bleeding of your incisions  Drainage that has a bad smell or is yellow or green in appearance  Any other questions or concerns

## 2025-06-26 NOTE — ANESTHESIA PREPROCEDURE EVALUATION
Anesthesia Evaluation     Patient summary reviewed and Nursing notes reviewed   history of anesthetic complications:  Prolonged sedation  NPO Solid Status: > 8 hours  NPO Liquid Status: > 4 hours           Airway   Mallampati: II  Neck ROM: full  No difficulty expected  Dental - normal exam     Pulmonary     breath sounds clear to auscultation  (+) COPD,shortness of breath  Cardiovascular     Rhythm: regular    (+) hypertension, CAD, CHF , hyperlipidemia      Neuro/Psych  (+) headaches, syncope, tremors, weakness, psychiatric history Anxiety and Depression  GI/Hepatic/Renal/Endo    (+) obesity, liver disease history of elevated LFT, renal disease- dialysis, diabetes mellitus    ROS Comment: S/p gastric bypass    Musculoskeletal     Abdominal   (+) obese   Substance History      OB/GYN          Other   arthritis,                 Anesthesia Plan    ASA 4     general     (No narcotics)  intravenous induction     Anesthetic plan, risks, benefits, and alternatives have been provided, discussed and informed consent has been obtained with: patient.    CODE STATUS:

## 2025-06-26 NOTE — ANESTHESIA PROCEDURE NOTES
Airway  Reason: elective    Date/Time: 6/26/2025 11:29 AM    General Information and Staff    Patient location during procedure: OR  CRNA/CAA: Carlos A Fang CRNA    Indications and Patient Condition  Indications for airway management: airway protection    Preoxygenated: yes  MILS not maintained throughout    Mask difficulty assessment: 1 - vent by mask    Final Airway Details    Final airway type: supraglottic airway      Successful airway: LMA  Size: 4   Number of attempts at approach: 1  Assessment: lips, teeth, and gum same as pre-op and atraumatic intubation

## 2025-06-26 NOTE — H&P
Name: Lynne Cade ADMIT: 2025   : 1950  PCP: Lorraine Agarwal APRN    MRN: [id number] LOS: 0 days   AGE/SEX: 75 y.o. female  ROOM: [facility] Main OR/MAIN OR     No chief complaint on file.      Subjective   Patient is a 75 y.o. female presents for left arm arteriovenous graft thrombectomy.  Patient with end-stage renal disease on dialysis.  Previous left arm loop graft placed by Dr. Bernard earlier this year.  Last had dialysis on Monday.  Otherwise feels well today.    History of Present Illness    Past Medical History:   Diagnosis Date    Anemia     Hx of anemia    Anxiety     Controled  xanax prn    Arthritis     Conditions. Abstracted from [email or web address].    Cataract     bilateral    CHF (congestive heart failure) 22    Low iron    Cholelithiasis 30 yrs ago    Had gb removed    CKD (chronic kidney disease), stage IV 2023    Colon polyp     Depression     Take cymbalta    Diabetes mellitus     type 2    Diarrhea     on and off    Edema of right foot 2021    Healthcare maintenance 2008    PAP. Abstracted from [email or web address].    Hip pain, bilateral     HL (hearing loss) 19    Have hearing aids    Hyperlipidemia     Hypertension     Knee pain, bilateral     Obesity     Osteopenia 22    On med    Other forms of dyspnea     Other specified soft tissue disorders     Pneumonia due to COVID-19 virus 2022    Renal insufficiency Stage 3    Has gone up to stage 4    Slow to wake up after anesthesia     Spinal headache     Stress headaches     Tremor     Benign    Tremor of both hands     Urinary tract infection 22    [place]    Visual impairment 2017    Cataracts    Vitamin D deficiency      Past Surgical History:   Procedure Laterality Date    ARTERIOVENOUS FISTULA/SHUNT SURGERY Left 2023    Procedure: BRACHIOCEPHALIC ARTERIOVENOUS FISTULA FORMATION;  Surgeon: Vignesh Riley MD;  Location: [facility] MAIN OR;  Service: Vascular;  Laterality: Left;     ARTERIOVENOUS FISTULA/SHUNT SURGERY Left 02/27/2025    Procedure: ARTERIOVENOUS SHUNT GRAFT;  Surgeon: Zaina Bernard MD;  Location: [facility] MAIN OR;  Service: Vascular;  Laterality: Left;    BREAST BIOPSY Right     CHOLECYSTECTOMY  1990    COLONOSCOPY      This year 2022    EYE SURGERY Bilateral     cataract removal    GASTRIC BYPASS  2002    HERNIA REPAIR  2005    umbilical hernia    HIP OPEN REDUCTION Right 03/02/2020    Procedure: FEMORAL NECK OPEN REDUCTION INTERNAL FIXATION;  Surgeon: Yfn Sifuentes MD;  Location: [facility] MAIN OR;  Service: Orthopedics;  Laterality: Right;  Cannulated screw fixation right femoral neck    JOINT REPLACEMENT  2020    R knee    LAPAROTOMY OOPHERECTOMY Bilateral 2010    OTHER SURGICAL HISTORY  04/20/2015    Lexiscan stress test, normal. Abstracted from [facility].    PANNICULECTOMY      TOTAL KNEE ARTHROPLASTY Right 04/29/2021    Procedure: RIGHT TOTAL KNEE REPLACEMENT;  Surgeon: Yfn Sifuentes MD;  Location: [facility] MAIN OR;  Service: Orthopedics;  Laterality: Right;     Family History   Problem Relation Age of Onset    Asthma Mother     Depression Mother     Kidney disease Mother         Kidney stones    Miscarriages / Stillbirths Mother     Hyperlipidemia Father     Hypertension Father     Diabetes Father     Anxiety disorder Daughter     Early death Daughter     Miscarriages / Stillbirths Daughter     Diabetes Daughter      Social History     Tobacco Use    Smoking status: Never     Passive exposure: Never    Smokeless tobacco: Never   Vaping Use    Vaping status: Never Used   Substance Use Topics    Alcohol use: Never    Drug use: Never     Medications Prior to Admission   Medication Sig Dispense Refill Last Dose/Taking    albuterol sulfate  (90 Base) MCG/ACT inhaler Inhale 2 puffs Every 6 (Six) Hours.   6/26/2025 Morning    allopurinol (ZYLOPRIM) 100 MG tablet Take 1 tablet by mouth Daily.   6/25/2025    budesonide (PULMICORT) 0.5 MG/2ML nebulizer solution Take  2 mL by nebulization 2 (Two) Times a Day.   6/25/2025    calcitriol (ROCALTROL) 0.25 MCG capsule Take 2 capsules by mouth Daily.   6/25/2025    calcium acetate (PHOS BINDER,) 667 MG capsule capsule 2 capsules 3 (Three) Times a Day.   6/25/2025    carvedilol (COREG) 25 MG tablet Take  by mouth 2 (Two) Times a Day.   6/25/2025    cyclobenzaprine (FLEXERIL) 5 MG tablet Take  by mouth.   6/25/2025    dilTIAZem CD (CARDIZEM CD) 300 MG 24 hr capsule Take 1 capsule by mouth Daily.   6/26/2025 Morning    DULoxetine (CYMBALTA) 60 MG capsule Take 1 capsule by mouth 2 (Two) Times a Day. 180 capsule 1 6/25/2025    furosemide (LASIX) 40 MG tablet Take 1 tablet by mouth Daily for 30 days. 30 tablet 0 6/25/2025    gabapentin (NEURONTIN) 100 MG capsule Take 1 capsule by mouth 2 (Two) Times a Day.   6/25/2025    glimepiride (AMARYL) 2 MG tablet Take 1 tablet by mouth.   6/25/2025    hydrALAZINE (APRESOLINE) 100 MG tablet Take 0.5 tablets by mouth 2 (Two) Times a Day.   6/26/2025 Morning    hydrOXYzine (ATARAX) 10 MG tablet Take 1 tablet by mouth 3 (Three) Times a Day As Needed for Itching. 90 tablet 1 Past Week    ipratropium-albuterol (DUO-NEB) 0.5-2.5 mg/3 ml nebulizer Take 0.5 mL by nebulization 2 (Two) Times a Day.   6/25/2025    Januvia 25 MG tablet Take 1 tablet by mouth.   6/25/2025    primidone (MYSOLINE) 50 MG tablet Take 1 tablet by mouth Every Night.   6/25/2025    rosuvastatin (CRESTOR) 10 MG tablet Take 1 tablet by mouth Daily. 90 tablet 1 6/25/2025    sodium bicarbonate 650 MG tablet Take  by mouth.   6/25/2025    alendronate (FOSAMAX) 70 MG tablet Take 1 tablet by mouth Every 7 (Seven) Days. On Saturdays 6/21/2025    lidocaine (Lidoderm) 5 % Apply  topically to the appropriate area as directed.   More than a month    Vibegron (Gemtesa) 75 MG tablet Take 1 tablet by mouth Daily.   More than a month     Allergies:  Carvedilol, Morphine, Sulfa antibiotics, Hydrocodone, and Diphenhydramine    Review of Systems      Objective    Vital Signs  Temp:  [98.8 °F (37.1 °C)] 98.8 °F (37.1 °C)  Heart Rate:  [87] 87  Resp:  [16] 16  BP: (144)/(85) 144/85  SpO2:  [90 %] 90 %  on   ;   Device (Oxygen Therapy): room air  There is no height or weight on file to calculate BMI.    Physical Exam  NAD  Respirations unlabored  L arm AV graft with no thrill.    Results Review:   I reviewed the patient's new clinical results.  Imaging Results (Last 24 Hours)       ** No results found for the last 24 hours. **            Assessment & Plan       Malfunction of arteriovenous graft      Assessment & Plan  75-year-old lady with end-stage renal disease on dialysis, hypertension, coronary artery disease, heart failure, COPD, and diabetes who has a thrombosed left arm AV graft.  Planning for percutaneous left arm graft thrombectomy today, possible open if necessary.  Details of this procedure including risks benefits and alternatives discussed with patient she verbalized understanding and agrees to proceed.      I discussed the patients findings and my recommendations with patient, family, and nursing staff.          Mahesh Haro II, MD  [facility]  [phone]  06/26/25  09:14 EDT

## 2025-06-26 NOTE — ANESTHESIA POSTPROCEDURE EVALUATION
Patient: Lynne Cade    Procedure Summary       Date: 06/26/25 Room / Location: [facility] OR  INV /  [facility] HYBRID OR    Anesthesia Start: 1122 Anesthesia Stop: 1330    Procedure: ARTERIOVENOUS GRAFT THROMBECTOMY (Left) Diagnosis:       Malfunction of arteriovenous graft, initial encounter      (Malfunction of arteriovenous graft, initial encounter [T82.590A])    Surgeons: Mahesh Haro II, MD Provider: Uvaldo Blair MD    Anesthesia Type: general ASA Status: 4            Anesthesia Type: general    Vitals  Vitals Value Taken Time   /62 06/26/25 15:15   Temp 36.5 °C (97.7 °F) 06/26/25 13:26   Pulse 77 06/26/25 15:22   Resp 21 06/26/25 15:15   SpO2 91 % 06/26/25 15:22   Vitals shown include unfiled device data.        Post Anesthesia Care and Evaluation    Patient location during evaluation: bedside  Patient participation: complete - patient participated  Level of consciousness: sleepy but conscious  Pain score: 0  Pain management: adequate    Airway patency: patent  Anesthetic complications: No anesthetic complications    Cardiovascular status: acceptable  Respiratory status: acceptable  Hydration status: acceptable    Comments: /62   Pulse 78   Temp 36.5 °C (97.7 °F) (Oral)   Resp 21   SpO2 94%

## 2025-06-27 ENCOUNTER — APPOINTMENT (OUTPATIENT)
Dept: GENERAL RADIOLOGY | Facility: HOSPITAL | Age: 75
DRG: 252 | End: 2025-06-27
Payer: MEDICARE

## 2025-06-27 ENCOUNTER — HOSPITAL ENCOUNTER (INPATIENT)
Facility: HOSPITAL | Age: 75
LOS: 9 days | Discharge: HOME | DRG: 252 | End: 2025-07-06
Attending: EMERGENCY MEDICINE | Admitting: FAMILY MEDICINE
Payer: MEDICARE

## 2025-06-27 ENCOUNTER — TELEPHONE (OUTPATIENT)
Age: 75
End: 2025-06-27
Payer: MEDICARE

## 2025-06-27 DIAGNOSIS — T82.898A OCCLUSION OF ARTERIOVENOUS DIALYSIS GRAFT: ICD-10-CM

## 2025-06-27 DIAGNOSIS — N18.6 CHRONIC KIDNEY DISEASE ON CHRONIC DIALYSIS: Primary | ICD-10-CM

## 2025-06-27 DIAGNOSIS — T82.868S AV GRAFT THROMBOSIS, SEQUELA: ICD-10-CM

## 2025-06-27 DIAGNOSIS — Z99.2 CHRONIC KIDNEY DISEASE ON CHRONIC DIALYSIS: Primary | ICD-10-CM

## 2025-06-27 LAB
ALBUMIN SERPL-MCNC: 3.6 G/DL (ref 3.5–5.2)
ALBUMIN/GLOB SERPL: 1.2 G/DL
ALP SERPL-CCNC: 58 U/L (ref 39–117)
ALT SERPL W P-5'-P-CCNC: <5 U/L (ref 1–33)
ANION GAP SERPL CALCULATED.3IONS-SCNC: 15.7 MMOL/L (ref 5–15)
ANION GAP SERPL CALCULATED.3IONS-SCNC: 17 MMOL/L (ref 10–20)
APTT PPP: 31 SECONDS (ref 22.7–35.4)
AST SERPL-CCNC: 14 U/L (ref 1–32)
BASOPHILS # BLD AUTO: 0.03 10*3/MM3 (ref 0–0.2)
BASOPHILS NFR BLD AUTO: 0.3 % (ref 0–1.5)
BILIRUB SERPL-MCNC: 0.2 MG/DL (ref 0–1.2)
BUN BLDA-MCNC: 39 MG/DL (ref 8–26)
BUN SERPL-MCNC: 41.1 MG/DL (ref 8–23)
BUN/CREAT SERPL: 9.6 (ref 7–25)
CA-I BLDA-SCNC: 1.01 MMOL/L (ref 1.12–1.32)
CALCIUM SPEC-SCNC: 8.4 MG/DL (ref 8.6–10.5)
CHLORIDE BLDA-SCNC: 102 MMOL/L (ref 98–109)
CHLORIDE SERPL-SCNC: 103 MMOL/L (ref 98–107)
CO2 BLDA-SCNC: 26 MMOL/L (ref 24–29)
CO2 SERPL-SCNC: 24.3 MMOL/L (ref 22–29)
CREAT BLDA-MCNC: 4.3 MG/DL (ref 0.6–1.3)
CREAT SERPL-MCNC: 4.28 MG/DL (ref 0.57–1)
DEPRECATED RDW RBC AUTO: 64.1 FL (ref 37–54)
EGFRCR SERPLBLD CKD-EPI 2021: 10.1 ML/MIN/1.73
EGFRCR SERPLBLD CKD-EPI 2021: 10.2 ML/MIN/1.73
EOSINOPHIL # BLD AUTO: 0.07 10*3/MM3 (ref 0–0.4)
EOSINOPHIL NFR BLD AUTO: 0.6 % (ref 0.3–6.2)
ERYTHROCYTE [DISTWIDTH] IN BLOOD BY AUTOMATED COUNT: 17 % (ref 12.3–15.4)
GLOBULIN UR ELPH-MCNC: 3 GM/DL
GLUCOSE BLDC GLUCOMTR-MCNC: 114 MG/DL (ref 70–105)
GLUCOSE BLDC GLUCOMTR-MCNC: 135 MG/DL (ref 70–105)
GLUCOSE SERPL-MCNC: 115 MG/DL (ref 65–99)
HBA1C MFR BLD: 5.7 % (ref 4.8–5.6)
HCT VFR BLD AUTO: 35.5 % (ref 34–46.6)
HCT VFR BLDA CALC: 33 % (ref 38–51)
HGB BLD-MCNC: 10.6 G/DL (ref 12–15.9)
HGB BLDA-MCNC: 11.2 G/DL (ref 12–17)
HOLD SPECIMEN: NORMAL
IMM GRANULOCYTES # BLD AUTO: 0.09 10*3/MM3 (ref 0–0.05)
IMM GRANULOCYTES NFR BLD AUTO: 0.8 % (ref 0–0.5)
INR PPP: 1.04 (ref 0.9–1.1)
LYMPHOCYTES # BLD AUTO: 1.32 10*3/MM3 (ref 0.7–3.1)
LYMPHOCYTES NFR BLD AUTO: 11.9 % (ref 19.6–45.3)
MAGNESIUM SERPL-MCNC: 2.4 MG/DL (ref 1.6–2.4)
MCH RBC QN AUTO: 32.1 PG (ref 26.6–33)
MCHC RBC AUTO-ENTMCNC: 29.9 G/DL (ref 31.5–35.7)
MCV RBC AUTO: 107.6 FL (ref 79–97)
MONOCYTES # BLD AUTO: 0.95 10*3/MM3 (ref 0.1–0.9)
MONOCYTES NFR BLD AUTO: 8.6 % (ref 5–12)
NEUTROPHILS NFR BLD AUTO: 77.8 % (ref 42.7–76)
NEUTROPHILS NFR BLD AUTO: 8.62 10*3/MM3 (ref 1.7–7)
NRBC BLD AUTO-RTO: 0.3 /100 WBC (ref 0–0.2)
PHOSPHATE SERPL-MCNC: 5.9 MG/DL (ref 2.5–4.5)
PLATELET # BLD AUTO: 232 10*3/MM3 (ref 140–450)
PMV BLD AUTO: 9.8 FL (ref 6–12)
POTASSIUM BLDA-SCNC: 3.7 MMOL/L (ref 3.5–4.9)
POTASSIUM SERPL-SCNC: 3.9 MMOL/L (ref 3.5–5.2)
PROT SERPL-MCNC: 6.6 G/DL (ref 6–8.5)
PROTHROMBIN TIME: 13.5 SECONDS (ref 11.7–14.2)
RBC # BLD AUTO: 3.3 10*6/MM3 (ref 3.77–5.28)
SODIUM BLD-SCNC: 141 MMOL/L (ref 138–146)
SODIUM SERPL-SCNC: 143 MMOL/L (ref 136–145)
WBC NRBC COR # BLD AUTO: 11.08 10*3/MM3 (ref 3.4–10.8)

## 2025-06-27 PROCEDURE — 83036 HEMOGLOBIN GLYCOSYLATED A1C: CPT

## 2025-06-27 PROCEDURE — 85025 COMPLETE CBC W/AUTO DIFF WBC: CPT | Performed by: EMERGENCY MEDICINE

## 2025-06-27 PROCEDURE — 85730 THROMBOPLASTIN TIME PARTIAL: CPT | Performed by: EMERGENCY MEDICINE

## 2025-06-27 PROCEDURE — 84100 ASSAY OF PHOSPHORUS: CPT | Performed by: EMERGENCY MEDICINE

## 2025-06-27 PROCEDURE — 99285 EMERGENCY DEPT VISIT HI MDM: CPT

## 2025-06-27 PROCEDURE — 82948 REAGENT STRIP/BLOOD GLUCOSE: CPT

## 2025-06-27 PROCEDURE — 93005 ELECTROCARDIOGRAM TRACING: CPT | Performed by: EMERGENCY MEDICINE

## 2025-06-27 PROCEDURE — 83735 ASSAY OF MAGNESIUM: CPT | Performed by: EMERGENCY MEDICINE

## 2025-06-27 PROCEDURE — 85610 PROTHROMBIN TIME: CPT | Performed by: EMERGENCY MEDICINE

## 2025-06-27 PROCEDURE — 85014 HEMATOCRIT: CPT

## 2025-06-27 PROCEDURE — 71045 X-RAY EXAM CHEST 1 VIEW: CPT

## 2025-06-27 PROCEDURE — 80053 COMPREHEN METABOLIC PANEL: CPT | Performed by: EMERGENCY MEDICINE

## 2025-06-27 PROCEDURE — 80047 BASIC METABLC PNL IONIZED CA: CPT

## 2025-06-27 RX ORDER — BISACODYL 5 MG/1
5 TABLET, DELAYED RELEASE ORAL DAILY PRN
Status: DISCONTINUED | OUTPATIENT
Start: 2025-06-27 | End: 2025-07-06 | Stop reason: HOSPADM

## 2025-06-27 RX ORDER — ONDANSETRON 2 MG/ML
4 INJECTION INTRAMUSCULAR; INTRAVENOUS EVERY 6 HOURS PRN
Status: DISCONTINUED | OUTPATIENT
Start: 2025-06-27 | End: 2025-07-06 | Stop reason: HOSPADM

## 2025-06-27 RX ORDER — IBUPROFEN 600 MG/1
1 TABLET ORAL
Status: DISCONTINUED | OUTPATIENT
Start: 2025-06-27 | End: 2025-07-06 | Stop reason: HOSPADM

## 2025-06-27 RX ORDER — ACETAMINOPHEN 325 MG/1
650 TABLET ORAL EVERY 4 HOURS PRN
Status: DISCONTINUED | OUTPATIENT
Start: 2025-06-27 | End: 2025-07-06 | Stop reason: HOSPADM

## 2025-06-27 RX ORDER — INSULIN LISPRO 100 [IU]/ML
2-7 INJECTION, SOLUTION INTRAVENOUS; SUBCUTANEOUS
Status: DISCONTINUED | OUTPATIENT
Start: 2025-06-27 | End: 2025-07-06 | Stop reason: HOSPADM

## 2025-06-27 RX ORDER — SODIUM CHLORIDE 0.9 % (FLUSH) 0.9 %
10 SYRINGE (ML) INJECTION EVERY 12 HOURS SCHEDULED
Status: DISCONTINUED | OUTPATIENT
Start: 2025-06-27 | End: 2025-07-06 | Stop reason: HOSPADM

## 2025-06-27 RX ORDER — NICOTINE POLACRILEX 4 MG
15 LOZENGE BUCCAL
Status: DISCONTINUED | OUTPATIENT
Start: 2025-06-27 | End: 2025-07-06 | Stop reason: HOSPADM

## 2025-06-27 RX ORDER — ACETAMINOPHEN 650 MG/1
650 SUPPOSITORY RECTAL EVERY 4 HOURS PRN
Status: DISCONTINUED | OUTPATIENT
Start: 2025-06-27 | End: 2025-07-06 | Stop reason: HOSPADM

## 2025-06-27 RX ORDER — ACETAMINOPHEN 160 MG/5ML
650 SOLUTION ORAL EVERY 4 HOURS PRN
Status: DISCONTINUED | OUTPATIENT
Start: 2025-06-27 | End: 2025-07-06 | Stop reason: HOSPADM

## 2025-06-27 RX ORDER — AMOXICILLIN 250 MG
2 CAPSULE ORAL 2 TIMES DAILY PRN
Status: DISCONTINUED | OUTPATIENT
Start: 2025-06-27 | End: 2025-07-06 | Stop reason: HOSPADM

## 2025-06-27 RX ORDER — ONDANSETRON 4 MG/1
4 TABLET, ORALLY DISINTEGRATING ORAL EVERY 6 HOURS PRN
Status: DISCONTINUED | OUTPATIENT
Start: 2025-06-27 | End: 2025-07-06 | Stop reason: HOSPADM

## 2025-06-27 RX ORDER — DEXTROSE MONOHYDRATE 25 G/50ML
25 INJECTION, SOLUTION INTRAVENOUS
Status: DISCONTINUED | OUTPATIENT
Start: 2025-06-27 | End: 2025-07-06 | Stop reason: HOSPADM

## 2025-06-27 RX ORDER — NITROGLYCERIN 0.4 MG/1
0.4 TABLET SUBLINGUAL
Status: DISCONTINUED | OUTPATIENT
Start: 2025-06-27 | End: 2025-07-06 | Stop reason: HOSPADM

## 2025-06-27 RX ORDER — POLYETHYLENE GLYCOL 3350 17 G/17G
17 POWDER, FOR SOLUTION ORAL DAILY PRN
Status: DISCONTINUED | OUTPATIENT
Start: 2025-06-27 | End: 2025-07-06 | Stop reason: HOSPADM

## 2025-06-27 RX ORDER — BISACODYL 10 MG
10 SUPPOSITORY, RECTAL RECTAL DAILY PRN
Status: DISCONTINUED | OUTPATIENT
Start: 2025-06-27 | End: 2025-07-06 | Stop reason: HOSPADM

## 2025-06-27 RX ORDER — SODIUM CHLORIDE 9 MG/ML
40 INJECTION, SOLUTION INTRAVENOUS AS NEEDED
Status: DISCONTINUED | OUTPATIENT
Start: 2025-06-27 | End: 2025-07-06 | Stop reason: HOSPADM

## 2025-06-27 RX ADMIN — ACETAMINOPHEN 650 MG: 325 TABLET, FILM COATED ORAL at 21:11

## 2025-06-27 RX ADMIN — Medication 10 ML: at 20:57

## 2025-06-27 NOTE — H&P
"[facility] Medicine Services  History & Physical    Patient Name: Lynne Cade  : 1950  MRN: [id number]  Primary Care Physician:  Lorraine Agarwal APRN  Date of admission: 2025  Date and Time of Service: 2025 at 1940    Subjective      Chief Complaint: AV fistula issue    History of Present Illness: Lynne Cade is a 75 y.o. female with a CMH of HTN, HLD, T2DM, CHF, CKD stage IV on HD M/W/F who presented to [facility] on 2025 with AV fistula problem.  Patient receives dialysis via AV graft Monday, Wednesday, Friday.  Reports that her last complete dialysis treatment was 4 days ago on Monday, 2025.  Patient went for dialysis on Wednesday and was unable to complete due to shunt occlusion.  Patient had  thrombectomy performed yesterday at [facility] in [place].  Was found to have adequate blood flow after the procedure.  Dialysis was attempted today, unable to complete due to recurrent shunt occlusion.  Denies fever, chills, chest pain, shortness of breath, abdominal pain, changes in bowel or bladder habits.    In the ED, patient was afebrile and hemodynamically stable.  Labs pertinent for Creatinine 4.3 (baseline 2.8), BUN 39, GFR 10.1.  AG 15.7. Calcium 8.4, ionized calcium 1.01, Phos 5.9. WBC 11.08. Chest x-ray: \"Mild right basilar opacity, which could reflect atelectasis or pneumonia.  Elevation of right hemodynamic. \" EKG: SR, HR 81. Vascular surgery consulted, not recommending thrombectomy at this time,  recommending placement of temporary if not tunneled hemodialysis catheter, possible repeat thrombectomy next week. Nephrology was consulted, recommended consulting IR. IR consulted, recommended catheter be placed tomorrow morning, pt does not need to be NPO.  Hospital service to admit for continued evaluation.      Review of Systems   Constitutional:  Negative for chills and fever.   Respiratory:  Negative for shortness of breath.  "   Cardiovascular:  Negative for chest pain.   Neurological:  Negative for dizziness and headaches.       Personal History     Past Medical History:   Diagnosis Date    Anemia     Hx of anemia    Anxiety     Controled  xanax prn    Arthritis     Conditions. Abstracted from [facility].    Cataract     bilateral    CHF (congestive heart failure) 6/01/22    Low iron    Cholelithiasis 30 yrs ago    Had gb removed    CKD (chronic kidney disease), stage IV 01/13/2023    Colon polyp     Depression 1995    Take cymbalta    Diabetes mellitus     type 2    Diarrhea     on and off    Edema of right foot 04/2021    Healthcare maintenance 2008    PAP. Abstracted from [facility].    Hip pain, bilateral     HL (hearing loss) 1/1/19    Have hearing aids    Hyperlipidemia     Hypertension     Knee pain, bilateral     Obesity     Osteopenia 11/1/22    On med    Other forms of dyspnea     Other specified soft tissue disorders     Pneumonia due to COVID-19 virus 06/26/2022    Renal insufficiency Stage 3    Has gone up to stage 4    Slow to wake up after anesthesia     Spinal headache     Stress headaches     Tremor     Benign    Tremor of both hands     Urinary tract infection 07/01/22    [place]    Visual impairment 6/1/2017    Cataracts    Vitamin D deficiency        Past Surgical History:   Procedure Laterality Date    ARTERIOVENOUS FISTULA/SHUNT SURGERY Left 02/24/2023    Procedure: BRACHIOCEPHALIC ARTERIOVENOUS FISTULA FORMATION;  Surgeon: Vignesh Riley MD;  Location: [facility] MAIN OR;  Service: Vascular;  Laterality: Left;    ARTERIOVENOUS FISTULA/SHUNT SURGERY Left 02/27/2025    Procedure: ARTERIOVENOUS SHUNT GRAFT;  Surgeon: Zaina Bernard MD;  Location: [facility] MAIN OR;  Service: Vascular;  Laterality: Left;    ARTERIOVENOUS FISTULA/SHUNT SURGERY Left 6/26/2025    Procedure: ARTERIOVENOUS GRAFT THROMBECTOMY;  Surgeon: Mahesh Haro II, MD;  Location: [facility] OR;  Service: Vascular;  Laterality: Left;    BREAST  BIOPSY Right     CHOLECYSTECTOMY  1990    COLONOSCOPY      This year 2022    EYE SURGERY Bilateral     cataract removal    GASTRIC BYPASS  2002    HERNIA REPAIR  2005    umbilical hernia    HIP OPEN REDUCTION Right 03/02/2020    Procedure: FEMORAL NECK OPEN REDUCTION INTERNAL FIXATION;  Surgeon: Yfn Sifuentes MD;  Location: [facility] MAIN OR;  Service: Orthopedics;  Laterality: Right;  Cannulated screw fixation right femoral neck    JOINT REPLACEMENT  2020    R knee    LAPAROTOMY OOPHERECTOMY Bilateral 2010    OTHER SURGICAL HISTORY  04/20/2015    Lexiscan stress test, normal. [facility] from [facility].    PANNICULECTOMY      TOTAL KNEE ARTHROPLASTY Right 04/29/2021    Procedure: RIGHT TOTAL KNEE REPLACEMENT;  Surgeon: Yfn Sifuentes MD;  Location: [facility] OR;  Service: Orthopedics;  Laterality: Right;       Family History: family history includes Anxiety disorder in her daughter; Asthma in her mother; Depression in her mother; Diabetes in her daughter and father; Early death in her daughter; Hyperlipidemia in her father; Hypertension in her father; Kidney disease in her mother; Miscarriages / Stillbirths in her daughter and mother. Otherwise pertinent FHx was reviewed and not pertinent to current issue.    Social History:  reports that she has never smoked. She has never been exposed to tobacco smoke. She has never used smokeless tobacco. She reports that she does not drink alcohol and does not use drugs.    Home Medications:  Prior to Admission Medications       Prescriptions Last Dose Informant Patient Reported? Taking?    albuterol sulfate  (90 Base) MCG/ACT inhaler   Yes No    Inhale 2 puffs Every 6 (Six) Hours.    alendronate (FOSAMAX) 70 MG tablet   Yes No    Take 1 tablet by mouth Every 7 (Seven) Days. On Saturdays    allopurinol (ZYLOPRIM) 100 MG tablet  Self Yes No    Take 1 tablet by mouth Daily.    aspirin 81 MG EC tablet   No No    Take 1 tablet by mouth Daily.    budesonide (PULMICORT) 0.5  MG/2ML nebulizer solution   No No    Take 2 mL by nebulization 2 (Two) Times a Day.    calcitriol (ROCALTROL) 0.25 MCG capsule   Yes No    Take 2 capsules by mouth Daily.    calcium acetate (PHOS BINDER,) 667 MG capsule capsule   Yes No    2 capsules 3 (Three) Times a Day.    carvedilol (COREG) 25 MG tablet   Yes No    Take  by mouth 2 (Two) Times a Day.    cyclobenzaprine (FLEXERIL) 5 MG tablet   Yes No    Take  by mouth.    dilTIAZem CD (CARDIZEM CD) 300 MG 24 hr capsule   Yes No    Take 1 capsule by mouth Daily.    DULoxetine (CYMBALTA) 60 MG capsule   No No    Take 1 capsule by mouth 2 (Two) Times a Day.    furosemide (LASIX) 40 MG tablet   No No    Take 1 tablet by mouth Daily for 30 days.    gabapentin (NEURONTIN) 100 MG capsule   Yes No    Take 1 capsule by mouth 2 (Two) Times a Day.    glimepiride (AMARYL) 2 MG tablet   Yes No    Take 1 tablet by mouth.    hydrALAZINE (APRESOLINE) 100 MG tablet   Yes No    Take 0.5 tablets by mouth 2 (Two) Times a Day.    hydrOXYzine (ATARAX) 10 MG tablet   No No    Take 1 tablet by mouth 3 (Three) Times a Day As Needed for Itching.    ipratropium-albuterol (DUO-NEB) 0.5-2.5 mg/3 ml nebulizer   Yes No    Take 0.5 mL by nebulization 2 (Two) Times a Day.    Januvia 25 MG tablet   Yes No    Take 1 tablet by mouth.    lidocaine (Lidoderm) 5 %   Yes No    Apply  topically to the appropriate area as directed.    primidone (MYSOLINE) 50 MG tablet   Yes No    Take 1 tablet by mouth Every Night.    rosuvastatin (CRESTOR) 10 MG tablet   No No    Take 1 tablet by mouth Daily.    sodium bicarbonate 650 MG tablet   Yes No    Take  by mouth.    Vibegron (Gemtesa) 75 MG tablet  Self Yes No    Take 1 tablet by mouth Daily.              Allergies:  Allergies   Allergen Reactions    Carvedilol Shortness Of Breath    Morphine Itching    Sulfa Antibiotics GI Intolerance    Hydrocodone Nausea Only    Diphenhydramine Anxiety       Objective      Vitals:   Temp:  [98.4 °F (36.9 °C)] 98.4 °F (36.9  °C)  Heart Rate:  [78-81] 78  Resp:  [18-20] 20  BP: (112-114)/(52-58) 112/58  Body mass index is 33.73 kg/m².  Physical Exam  General: 76 yo WF, Alert and oriented, well nourished, no acute distress.  HENT: Normocephalic, normal hearing, moist oral mucosa, no scleral icterus.  Neck: Supple, nontender, no carotid bruits, no JVD, no LAD.  Lungs: Clear to auscultation, nonlabored respiration.  Heart: RRR, no murmur, gallop or edema.  Abdomen: Soft, nontender, nondistended, + bowel sounds.  Musculoskeletal: Normal range of motion and strength, no tenderness or swelling.  Skin: Fistula present on LUE, bandaged. Skin is warm, dry and pink, no rashes or lesions.  Psychiatric: Cooperative, appropriate mood and affect.    Diagnostic Data:  Lab Results (last 24 hours)       Procedure Component Value Units Date/Time    Comprehensive Metabolic Panel [[id number]]  (Abnormal) Collected: 06/27/25 1653    Specimen: Blood Updated: 06/27/25 1754     Glucose 115 mg/dL      BUN 41.1 mg/dL      Creatinine 4.28 mg/dL      Sodium 143 mmol/L      Potassium 3.9 mmol/L      Chloride 103 mmol/L      CO2 24.3 mmol/L      Calcium 8.4 mg/dL      Total Protein 6.6 g/dL      Albumin 3.6 g/dL      ALT (SGPT) <5 U/L      AST (SGOT) 14 U/L      Alkaline Phosphatase 58 U/L      Total Bilirubin 0.2 mg/dL      Globulin 3.0 gm/dL      A/G Ratio 1.2 g/dL      BUN/Creatinine Ratio 9.6     Anion Gap 15.7 mmol/L      eGFR 10.2 mL/min/1.73     Narrative:      GFR Categories in Chronic Kidney Disease (CKD)              GFR Category          GFR (mL/min/1.73)    Interpretation  G1                    90 or greater        Normal or high (1)  G2                    60-89                Mild decrease (1)  G3a                   45-59                Mild to moderate decrease  G3b                   30-44                Moderate to severe decrease  G4                    15-29                Severe decrease  G5                    14 or less           Kidney  failure    (1)In the absence of evidence of kidney disease, neither GFR category G1 or G2 fulfill the criteria for CKD.    eGFR calculation 2021 CKD-EPI creatinine equation, which does not include race as a factor    Magnesium [[id number]]  (Normal) Collected: 06/27/25 1653    Specimen: Blood Updated: 06/27/25 1739     Magnesium 2.4 mg/dL     Phosphorus [[id number]]  (Abnormal) Collected: 06/27/25 1653    Specimen: Blood Updated: 06/27/25 1735     Phosphorus 5.9 mg/dL     aPTT [[id number]]  (Normal) Collected: 06/27/25 1653    Specimen: Blood Updated: 06/27/25 1716     PTT 31.0 seconds     Protime-INR [[id number]]  (Normal) Collected: 06/27/25 1653    Specimen: Blood Updated: 06/27/25 1715     Protime 13.5 Seconds      INR 1.04    CBC & Differential [[id number]]  (Abnormal) Collected: 06/27/25 1653    Specimen: Blood Updated: 06/27/25 1714    Narrative:      The following orders were created for panel order CBC & Differential.  Procedure                               Abnormality         Status                     ---------                               -----------         ------                     CBC Auto Differential[[id number]]        Abnormal            Final result               Scan Slide[[id number]]                                                                    Please view results for these tests on the individual orders.    CBC Auto Differential [[id number]]  (Abnormal) Collected: 06/27/25 1653    Specimen: Blood Updated: 06/27/25 1714     WBC 11.08 10*3/mm3      RBC 3.30 10*6/mm3      Hemoglobin 10.6 g/dL      Hematocrit 35.5 %      .6 fL      MCH 32.1 pg      MCHC 29.9 g/dL      RDW 17.0 %      RDW-SD 64.1 fl      MPV 9.8 fL      Platelets 232 10*3/mm3      Neutrophil % 77.8 %      Lymphocyte % 11.9 %      Monocyte % 8.6 %      Eosinophil % 0.6 %      Basophil % 0.3 %      Immature Grans % 0.8 %      Neutrophils, Absolute 8.62 10*3/mm3      Lymphocytes, Absolute 1.32 10*3/mm3      Monocytes, Absolute  0.95 10*3/mm3      Eosinophils, Absolute 0.07 10*3/mm3      Basophils, Absolute 0.03 10*3/mm3      Immature Grans, Absolute 0.09 10*3/mm3      nRBC 0.3 /100 WBC     POC CHEM 8 [[id number]]  (Abnormal) Collected: 06/27/25 1659    Specimen: Venous Blood Updated: 06/27/25 1703     Glucose 114 mg/dL      BUN 39 mg/dL      Creatinine 4.30 mg/dL      Sodium 141 mmol/L      POC Potassium 3.7 mmol/L      Chloride 102 mmol/L      Total CO2 26 mmol/L      Anion Gap 17.0 mmol/L      Comment: Serial Number: [id number]:  [id number]        Hemoglobin 11.2 g/dL      Hematocrit 33 %      Ionized Calcium 1.01 mmol/L      eGFR 10.1 mL/min/1.73     Extra Tubes [[id number]] Collected: 06/27/25 1657    Specimen: Blood, Venous Line Updated: 06/27/25 1700    Narrative:      The following orders were created for panel order Extra Tubes.  Procedure                               Abnormality         Status                     ---------                               -----------         ------                     Gold Top - SST[[id number]]                                   Final result                 Please view results for these tests on the individual orders.    Gold Top - SST [[id number]] Collected: 06/27/25 1657    Specimen: Blood Updated: 06/27/25 1700     Extra Tube Hold for add-ons.     Comment: Auto resulted.                Imaging Results (Last 24 Hours)       Procedure Component Value Units Date/Time    XR Chest 1 View [[id number]] Collected: 06/27/25 1853     Updated: 06/27/25 1856    Narrative:      XR CHEST 1 VW    Date of Exam: 6/27/2025 6:32 PM EDT    Indication: Unable to have dialysis Wednesday or today    Comparison: April 22, 2025    Findings:  There is elevation of the right hemidiaphragm. There is a mild right basilar opacity. The heart and mediastinal contours appear normal. The pulmonary vasculature appears normal. The osseous structures appear intact.      Impression:      Impression:  1.Mild right basilar opacity, which  "could reflect atelectasis or pneumonia.  2.Elevation of right hemidiaphragm.        Electronically Signed: Eitan Leach MD    6/27/2025 6:54 PM EDT    Workstation ID: [id number]              Assessment & Plan        This is a 75 y.o. female with:    Active and Resolved Problems  Active Hospital Problems    Diagnosis  POA    **AV fistula occlusion [T82.898A]  Yes    A-V fistula [I77.0]  Yes      Resolved Hospital Problems   No resolved problems to display.       Occlusion of AV dialysis graft  CKD stage IV on hemodialysis M/W/F  Creatinine 4.3 (baseline 2.8), BUN 39, GFR 10.1.  AG 15.7  Calcium 8.4, ionized calcium 1.01  Phos 5.9  WBC 11.08  Chest x-ray: \"Mild right basilar opacity, which could reflect atelectasis or pneumonia.  Elevation of right hemodynamic. \"  EKG: SR, HR 81  Last dialysis treatment 4 days ago Monday (6/23/2025), receives dialysis via AV graft  Thrombosed earlier this week, treated with thrombectomy  Vascular surgery consulted and following, not recommending thrombectomy at this time,  recommending placement of temporary if not tunneled hemodialysis catheter, possible repeat thrombectomy next week  Nephrology was consulted and following, recommended consulting IR  IR consulted and following, recommended catheter be placed tomorrow morning, pt does not need to be NPO    Congestive heart failure  Stable  Continue home dose meds    Type 2 diabetes mellitus  A1c pending   Low SSI   Acchu-check ACHS with diet  Heart-healthy, consistent carb diet   Hypoglycemia protocol     Hypertension  Hyperlipidemia  Continue home dose meds    Home meds to be restarted as appropriate once reviewed and reconciled by pharmacy.     VTE Prophylaxis:  Mechanical VTE prophylaxis orders are present.        The patient desires to be as follows:    CODE STATUS:           Gordy Cade, who can be contacted at [phone], is the designated person to make medical decisions on the patient's behalf if She is incapable of doing " so. This was clarified with patient and/or next of kin on 6/27/2025 during the course of this H&P.    Admission Status:  I believe this patient meets inpatient status.    Expected Length of Stay: 2-3 days    PDMP and Medication Dispenses via Sidebar reviewed and consistent with patient reported medications.    I discussed the patient's findings and my recommendations with patient.      Signature:     This document has been electronically signed by Becky Cortez PA-C on June 27, 2025 19:40 EDT   [facility], [place] Hospitalist Team

## 2025-06-27 NOTE — PROGRESS NOTES
Vascular Surgery    75-year-old woman with end-stage renal disease on hemodialysis whom I have not met but who was checked out to me over the weekend.  Last dialysis treatment 4 days ago Monday (today is Friday).  Receives dialysis via AV graft, thrombosed earlier this week, treated with thrombectomy.  Operation went well, and shunt was flowing fine.  Unfortunately patient appears to have experienced recurrent thrombosis before she could even be dialyzed.  Reason for recurrent thrombosis is unclear at this time.  Dr. Haro does not believe that thrombectomy ought to be attempted this evening but instead patient will require placement of at least temporary if not tunneled hemodialysis catheter for immediate term hemodialysis as appropriate in the judgment of nephrology.  May be a candidate for repeat thrombectomy next week.  Discussed with Dr. Galaviz, emergency department physician.

## 2025-06-27 NOTE — TELEPHONE ENCOUNTER
A nurse from [facility] called about this patient. They said she is clotted and needs to speak with someone ASAP, they need to know if she needs to go the Hospital. Please call [phone] and press option 2 for the nurses station.

## 2025-06-27 NOTE — ED PROVIDER NOTES
Subjective   History of Present Illness  75-year-old female complaining of difficulty with dialysis catheter the patient states that she is on Monday Wednesday Friday dialysis and last had complete dialysis on Monday she reports they were unable to do dialysis on Wednesday due to shunt occlusion thrombectomy yesterday at [facility] in [facility].  The patient had good flow after the procedure with dialysis was attempted today however shortly after the onset of dialysis the shunt again became occluded.  Department by the dialysis staff      Review of Systems   Constitutional:  Positive for fatigue. Negative for chills and fever.   Respiratory:  Negative for shortness of breath.    Gastrointestinal:  Positive for nausea.   Genitourinary:  Positive for decreased urine volume.   Hematological:  Does not bruise/bleed easily.       Past Medical History:   Diagnosis Date    Anemia     Hx of anemia    Anxiety     Controled  xanax prn    Arthritis     Conditions. Abstracted from [facility].    Cataract     bilateral    CHF (congestive heart failure) 6/01/22    Low iron    Cholelithiasis 30 yrs ago    Had gb removed    CKD (chronic kidney disease), stage IV 01/13/2023    Colon polyp     Depression 1995    Take cymbalta    Diabetes mellitus     type 2    Diarrhea     on and off    Edema of right foot 04/2021    Healthcare maintenance 2008    PAP. Abstracted from [facility].    Hip pain, bilateral     HL (hearing loss) 1/1/19    Have hearing aids    Hyperlipidemia     Hypertension     Knee pain, bilateral     Obesity     Osteopenia 11/1/22    On med    Other forms of dyspnea     Other specified soft tissue disorders     Pneumonia due to COVID-19 virus 06/26/2022    Renal insufficiency Stage 3    Has gone up to stage 4    Slow to wake up after anesthesia     Spinal headache     Stress headaches     Tremor     Benign    Tremor of both hands     Urinary tract infection 07/01/22    [place]    Visual impairment  6/1/2017    Cataracts    Vitamin D deficiency        Allergies   Allergen Reactions    Carvedilol Shortness Of Breath    Morphine Itching    Sulfa Antibiotics GI Intolerance    Hydrocodone Nausea Only    Diphenhydramine Anxiety       Past Surgical History:   Procedure Laterality Date    ARTERIOVENOUS FISTULA/SHUNT SURGERY Left 02/24/2023    Procedure: BRACHIOCEPHALIC ARTERIOVENOUS FISTULA FORMATION;  Surgeon: Vignesh Riley MD;  Location: [facility] MAIN OR;  Service: Vascular;  Laterality: Left;    ARTERIOVENOUS FISTULA/SHUNT SURGERY Left 02/27/2025    Procedure: ARTERIOVENOUS SHUNT GRAFT;  Surgeon: Zaina Bernard MD;  Location: [facility] MAIN OR;  Service: Vascular;  Laterality: Left;    ARTERIOVENOUS FISTULA/SHUNT SURGERY Left 6/26/2025    Procedure: ARTERIOVENOUS GRAFT THROMBECTOMY;  Surgeon: Mahesh Haro II, MD;  Location: [facility] OR;  Service: Vascular;  Laterality: Left;    BREAST BIOPSY Right     CHOLECYSTECTOMY  1990    COLONOSCOPY      This year 2022    EYE SURGERY Bilateral     cataract removal    GASTRIC BYPASS  2002    HERNIA REPAIR  2005    umbilical hernia    HIP OPEN REDUCTION Right 03/02/2020    Procedure: FEMORAL NECK OPEN REDUCTION INTERNAL FIXATION;  Surgeon: Yfn Sifuentes MD;  Location: [facility] MAIN OR;  Service: Orthopedics;  Laterality: Right;  Cannulated screw fixation right femoral neck    JOINT REPLACEMENT  2020    R knee    LAPAROTOMY OOPHERECTOMY Bilateral 2010    OTHER SURGICAL HISTORY  04/20/2015    Lexiscan stress test, normal. [facility].    PANNICULECTOMY      TOTAL KNEE ARTHROPLASTY Right 04/29/2021    Procedure: RIGHT TOTAL KNEE REPLACEMENT;  Surgeon: Yfn Sifuentes MD;  Location: [facility] MAIN OR;  Service: Orthopedics;  Laterality: Right;       Family History   Problem Relation Age of Onset    Asthma Mother     Depression Mother     Kidney disease Mother         Kidney stones    Miscarriages / Stillbirths Mother     Hyperlipidemia Father      Hypertension Father     Diabetes Father     Anxiety disorder Daughter     Early death Daughter     Miscarriages / Stillbirths Daughter     Diabetes Daughter        Social History     Socioeconomic History    Marital status:    Tobacco Use    Smoking status: Never     Passive exposure: Never    Smokeless tobacco: Never   Vaping Use    Vaping status: Never Used   Substance and Sexual Activity    Alcohol use: Never    Drug use: Never    Sexual activity: Not Currently     Partners: Male     Birth control/protection: Condom, Post-menopausal, Bilateral salpingectomy      Comment: monogamous with      Reports no recent unusual food water travel or activity      Objective   Physical Exam  Alert [place] Coma Scale 15 saturation is 94 to 95% on room air no respiratory distress   HEENT: Pupils equal and reactive to light. Conjunctivae are not injected. Normal tympanic membranes. Oropharynx and nares are normal.   Neck: Supple. Midline trachea. No JVD. No goiter.   Chest: Clear and equal breath sounds bilaterally, regular rate and rhythm without murmur or rub.   Abdomen: Positive bowel sounds, nontender, nondistended. No rebound or peritoneal signs. No CVA tenderness.   Extremities no clubbing. cyanosis or edema. Motor sensory exam is normal. The full range of motion is intact   Skin: Warm and dry, no rashes or petechia.   Lymphatic: No regional lymphadenopathy. No calf pain, swelling or Homans sign    Procedures           ED Course                                           Labs Reviewed   COMPREHENSIVE METABOLIC PANEL - Abnormal; Notable for the following components:       Result Value    Glucose 115 (*)     BUN 41.1 (*)     Creatinine 4.28 (*)     Calcium 8.4 (*)     Anion Gap 15.7 (*)     eGFR 10.2 (*)     All other components within normal limits    Narrative:     GFR Categories in Chronic Kidney Disease (CKD)              GFR Category          GFR (mL/min/1.73)    Interpretation  G1                    90 or  greater        Normal or high (1)  G2                    60-89                Mild decrease (1)  G3a                   45-59                Mild to moderate decrease  G3b                   30-44                Moderate to severe decrease  G4                    15-29                Severe decrease  G5                    14 or less           Kidney failure    (1)In the absence of evidence of kidney disease, neither GFR category G1 or G2 fulfill the criteria for CKD.    eGFR calculation 2021 CKD-EPI creatinine equation, which does not include race as a factor   PHOSPHORUS - Abnormal; Notable for the following components:    Phosphorus 5.9 (*)     All other components within normal limits   CBC WITH AUTO DIFFERENTIAL - Abnormal; Notable for the following components:    WBC 11.08 (*)     RBC 3.30 (*)     Hemoglobin 10.6 (*)     .6 (*)     MCHC 29.9 (*)     RDW 17.0 (*)     RDW-SD 64.1 (*)     Neutrophil % 77.8 (*)     Lymphocyte % 11.9 (*)     Immature Grans % 0.8 (*)     Neutrophils, Absolute 8.62 (*)     Monocytes, Absolute 0.95 (*)     Immature Grans, Absolute 0.09 (*)     nRBC 0.3 (*)     All other components within normal limits   POCT CHEM 8 - Abnormal; Notable for the following components:    Glucose 114 (*)     BUN 39 (*)     Creatinine 4.30 (*)     Hemoglobin 11.2 (*)     Hematocrit 33 (*)     Ionized Calcium 1.01 (*)     eGFR 10.1 (*)     All other components within normal limits   MAGNESIUM - Normal   PROTIME-INR - Normal   APTT - Normal   CBC AND DIFFERENTIAL    Narrative:     The following orders were created for panel order CBC & Differential.  Procedure                               Abnormality         Status                     ---------                               -----------         ------                     CBC Auto Differential[[id number]]        Abnormal            Final result               Scan Slide[[id number]]                                                                    Please view  results for these tests on the individual orders.   EXTRA TUBES    Narrative:     The following orders were created for panel order Extra Tubes.  Procedure                               Abnormality         Status                     ---------                               -----------         ------                     Gold Top - SST[[id number]]                                   Final result                 Please view results for these tests on the individual orders.   GOLD TOP - SST     Medications - No data to display  No radiology results for the last day              Medical Decision Making  The patient was evaluated in the emergency department Chem-8 was used to expedite care and found potassium to be 3.7 with a BUN of 39 and a creatinine of 4.3 CO2 of 26.  The vascular service was consulted and Dr. Raines indicated that they were not ready to abandon the graft but did not plan on corrective surgery until next week.  He recommended tunnel or Shiley catheter placement the case was then discussed with nephrology Dr. Gann who indicated that he did not feel that the patient would need dialysis before tomorrow.  He recommended we coordinate with interventional radiology tonight and the case was discussed with Dr. Hyde who indicated they would be able to place a catheter in the morning the patient did not need to be kept n.p.o. per Dr. Hyde.  This was communicated to the patient and her  and they were agreeable to this plan of treatment    Amount and/or Complexity of Data Reviewed  Independent Historian: spouse  Labs: ordered. Decision-making details documented in ED Course.  ECG/medicine tests: ordered and independent interpretation performed.    Risk  OTC drugs.  Prescription drug management.  Decision regarding hospitalization.        Final diagnoses:   Chronic kidney disease on chronic dialysis   Occlusion of arteriovenous dialysis graft       ED Disposition  ED Disposition       ED Disposition    Decision to Admit    Condition   --    Comment   --               No follow-up provider specified.       Medication List      No changes were made to your prescriptions during this visit.            Osvaldo Galaviz MD  06/27/25 1037

## 2025-06-27 NOTE — TELEPHONE ENCOUNTER
Dr. Haro performed a thrombectomy on this pt on 6/26/25. Dr. Haro is going to consult with Dr. Bernard and schedule pt in [place] as appropriate.

## 2025-06-27 NOTE — Clinical Note
Level of Care: Telemetry [5]   Diagnosis: AV fistula occlusion [226746]   Admitting Physician: BEKA DELCID [[id number]]   Certification: I Certify That Inpatient Hospital Services Are Medically Necessary For Greater Than 2 Midnights

## 2025-06-28 ENCOUNTER — APPOINTMENT (OUTPATIENT)
Dept: INTERVENTIONAL RADIOLOGY/VASCULAR | Facility: HOSPITAL | Age: 75
DRG: 252 | End: 2025-06-28
Payer: MEDICARE

## 2025-06-28 LAB
ANION GAP SERPL CALCULATED.3IONS-SCNC: 15.7 MMOL/L (ref 5–15)
BASOPHILS # BLD AUTO: 0.06 10*3/MM3 (ref 0–0.2)
BASOPHILS NFR BLD AUTO: 0.6 % (ref 0–1.5)
BUN SERPL-MCNC: 45.9 MG/DL (ref 8–23)
BUN/CREAT SERPL: 10.4 (ref 7–25)
CALCIUM SPEC-SCNC: 8 MG/DL (ref 8.6–10.5)
CHLORIDE SERPL-SCNC: 102 MMOL/L (ref 98–107)
CO2 SERPL-SCNC: 23.3 MMOL/L (ref 22–29)
CREAT SERPL-MCNC: 4.42 MG/DL (ref 0.57–1)
DEPRECATED RDW RBC AUTO: 65.2 FL (ref 37–54)
DEVICE COMMENT 2: ABNORMAL
DEVICE COMMENT: ABNORMAL
EGFRCR SERPLBLD CKD-EPI 2021: 9.8 ML/MIN/1.73
EOSINOPHIL # BLD AUTO: 0.14 10*3/MM3 (ref 0–0.4)
EOSINOPHIL NFR BLD AUTO: 1.5 % (ref 0.3–6.2)
ERYTHROCYTE [DISTWIDTH] IN BLOOD BY AUTOMATED COUNT: 16.9 % (ref 12.3–15.4)
GLUCOSE BLDC GLUCOMTR-MCNC: 124 MG/DL (ref 70–105)
GLUCOSE BLDC GLUCOMTR-MCNC: 195 MG/DL (ref 70–105)
GLUCOSE BLDC GLUCOMTR-MCNC: 318 MG/DL (ref 70–105)
GLUCOSE BLDC GLUCOMTR-MCNC: 56 MG/DL (ref 70–105)
GLUCOSE BLDC GLUCOMTR-MCNC: 81 MG/DL (ref 70–105)
GLUCOSE SERPL-MCNC: 133 MG/DL (ref 65–99)
HBV SURFACE AG SERPL QL IA: NORMAL
HCT VFR BLD AUTO: 31.6 % (ref 34–46.6)
HGB BLD-MCNC: 9.3 G/DL (ref 12–15.9)
IMM GRANULOCYTES # BLD AUTO: 0.08 10*3/MM3 (ref 0–0.05)
IMM GRANULOCYTES NFR BLD AUTO: 0.9 % (ref 0–0.5)
LYMPHOCYTES # BLD AUTO: 1.43 10*3/MM3 (ref 0.7–3.1)
LYMPHOCYTES NFR BLD AUTO: 15.3 % (ref 19.6–45.3)
MAGNESIUM SERPL-MCNC: 2.6 MG/DL (ref 1.6–2.4)
MCH RBC QN AUTO: 32.3 PG (ref 26.6–33)
MCHC RBC AUTO-ENTMCNC: 29.4 G/DL (ref 31.5–35.7)
MCV RBC AUTO: 109.7 FL (ref 79–97)
MONOCYTES # BLD AUTO: 0.93 10*3/MM3 (ref 0.1–0.9)
MONOCYTES NFR BLD AUTO: 10 % (ref 5–12)
NEUTROPHILS NFR BLD AUTO: 6.68 10*3/MM3 (ref 1.7–7)
NEUTROPHILS NFR BLD AUTO: 71.7 % (ref 42.7–76)
NRBC BLD AUTO-RTO: 0.2 /100 WBC (ref 0–0.2)
PHOSPHATE SERPL-MCNC: 6.5 MG/DL (ref 2.5–4.5)
PLATELET # BLD AUTO: 207 10*3/MM3 (ref 140–450)
PMV BLD AUTO: 10 FL (ref 6–12)
POTASSIUM SERPL-SCNC: 4.1 MMOL/L (ref 3.5–5.2)
RBC # BLD AUTO: 2.88 10*6/MM3 (ref 3.77–5.28)
SODIUM SERPL-SCNC: 141 MMOL/L (ref 136–145)
WBC NRBC COR # BLD AUTO: 9.32 10*3/MM3 (ref 3.4–10.8)

## 2025-06-28 PROCEDURE — 84100 ASSAY OF PHOSPHORUS: CPT

## 2025-06-28 PROCEDURE — 99024 POSTOP FOLLOW-UP VISIT: CPT | Performed by: SURGERY

## 2025-06-28 PROCEDURE — 80048 BASIC METABOLIC PNL TOTAL CA: CPT

## 2025-06-28 PROCEDURE — 82948 REAGENT STRIP/BLOOD GLUCOSE: CPT

## 2025-06-28 PROCEDURE — C1894 INTRO/SHEATH, NON-LASER: HCPCS

## 2025-06-28 PROCEDURE — 05HM33Z INSERTION OF INFUSION DEVICE INTO RIGHT INTERNAL JUGULAR VEIN, PERCUTANEOUS APPROACH: ICD-10-PCS | Performed by: RADIOLOGY

## 2025-06-28 PROCEDURE — 25010000002 LIDOCAINE 1 % SOLUTION: Performed by: RADIOLOGY

## 2025-06-28 PROCEDURE — 63710000001 INSULIN LISPRO (HUMAN) PER 5 UNITS

## 2025-06-28 PROCEDURE — 25010000002 GLUCAGON (RDNA) PER 1 MG

## 2025-06-28 PROCEDURE — 25010000002 HEPARIN (PORCINE) PER 1000 UNITS: Performed by: INTERNAL MEDICINE

## 2025-06-28 PROCEDURE — 5A1D70Z PERFORMANCE OF URINARY FILTRATION, INTERMITTENT, LESS THAN 6 HOURS PER DAY: ICD-10-PCS | Performed by: INTERNAL MEDICINE

## 2025-06-28 PROCEDURE — 87340 HEPATITIS B SURFACE AG IA: CPT | Performed by: INTERNAL MEDICINE

## 2025-06-28 PROCEDURE — 77001 FLUOROGUIDE FOR VEIN DEVICE: CPT

## 2025-06-28 PROCEDURE — 76937 US GUIDE VASCULAR ACCESS: CPT

## 2025-06-28 PROCEDURE — 85025 COMPLETE CBC W/AUTO DIFF WBC: CPT

## 2025-06-28 PROCEDURE — C1752 CATH,HEMODIALYSIS,SHORT-TERM: HCPCS

## 2025-06-28 PROCEDURE — 25010000002 HEPARIN (PORCINE) PER 1000 UNITS: Performed by: RADIOLOGY

## 2025-06-28 PROCEDURE — 83735 ASSAY OF MAGNESIUM: CPT

## 2025-06-28 PROCEDURE — B543ZZA ULTRASONOGRAPHY OF RIGHT JUGULAR VEINS, GUIDANCE: ICD-10-PCS | Performed by: RADIOLOGY

## 2025-06-28 PROCEDURE — 36556 INSERT NON-TUNNEL CV CATH: CPT

## 2025-06-28 RX ORDER — HEPARIN SODIUM 1000 [USP'U]/ML
3000 INJECTION, SOLUTION INTRAVENOUS; SUBCUTANEOUS AS NEEDED
Status: DISCONTINUED | OUTPATIENT
Start: 2025-06-28 | End: 2025-07-06 | Stop reason: HOSPADM

## 2025-06-28 RX ORDER — LIDOCAINE HYDROCHLORIDE 10 MG/ML
INJECTION, SOLUTION INFILTRATION; PERINEURAL AS NEEDED
Status: COMPLETED | OUTPATIENT
Start: 2025-06-28 | End: 2025-06-28

## 2025-06-28 RX ORDER — HEPARIN SODIUM 1000 [USP'U]/ML
INJECTION, SOLUTION INTRAVENOUS; SUBCUTANEOUS AS NEEDED
Status: COMPLETED | OUTPATIENT
Start: 2025-06-28 | End: 2025-06-28

## 2025-06-28 RX ADMIN — INSULIN LISPRO 5 UNITS: 100 INJECTION, SOLUTION INTRAVENOUS; SUBCUTANEOUS at 20:48

## 2025-06-28 RX ADMIN — Medication 10 ML: at 09:40

## 2025-06-28 RX ADMIN — ACETAMINOPHEN 650 MG: 325 TABLET, FILM COATED ORAL at 14:14

## 2025-06-28 RX ADMIN — ACETAMINOPHEN 650 MG: 325 TABLET, FILM COATED ORAL at 20:40

## 2025-06-28 RX ADMIN — Medication 10 ML: at 20:41

## 2025-06-28 RX ADMIN — Medication 1 MG: at 09:39

## 2025-06-28 RX ADMIN — HEPARIN SODIUM 2200 UNITS: 1000 INJECTION, SOLUTION INTRAVENOUS; SUBCUTANEOUS at 13:18

## 2025-06-28 RX ADMIN — LIDOCAINE HYDROCHLORIDE 4 ML: 10 INJECTION, SOLUTION INFILTRATION; PERINEURAL at 13:03

## 2025-06-28 RX ADMIN — HEPARIN SODIUM 3000 UNITS: 1000 INJECTION INTRAVENOUS; SUBCUTANEOUS at 18:25

## 2025-06-28 RX ADMIN — ACETAMINOPHEN 650 MG: 325 TABLET, FILM COATED ORAL at 03:25

## 2025-06-28 NOTE — PLAN OF CARE
Problem: Adult Inpatient Plan of Care  Goal: Absence of Hospital-Acquired Illness or Injury  Intervention: Identify and Manage Fall Risk  Recent Flowsheet Documentation  Taken 6/28/2025 1000 by Olu Guerrier RN  Safety Promotion/Fall Prevention:   assistive device/personal items within reach   clutter free environment maintained   safety round/check completed  Taken 6/28/2025 0800 by Olu Guerrier RN  Safety Promotion/Fall Prevention:   assistive device/personal items within reach   clutter free environment maintained   safety round/check completed     Problem: Adult Inpatient Plan of Care  Goal: Absence of Hospital-Acquired Illness or Injury  Intervention: Prevent Skin Injury  Recent Flowsheet Documentation  Taken 6/28/2025 0800 by Olu Guerrier RN  Body Position: position changed independently     Problem: Adult Inpatient Plan of Care  Goal: Absence of Hospital-Acquired Illness or Injury  Intervention: Prevent and Manage VTE (Venous Thromboembolism) Risk  Recent Flowsheet Documentation  Taken 6/28/2025 0800 by Olu Guerrier RN  VTE Prevention/Management:   bilateral   SCDs (sequential compression devices) off   patient refused intervention     Problem: Adult Inpatient Plan of Care  Goal: Absence of Hospital-Acquired Illness or Injury  Intervention: Prevent and Manage VTE (Venous Thromboembolism) Risk  Recent Flowsheet Documentation  Taken 6/28/2025 0800 by Olu Guerrier RN  VTE Prevention/Management:   bilateral   SCDs (sequential compression devices) off   patient refused intervention   Goal Outcome Evaluation:  Plan of Care Reviewed With: patient        Progress: improving

## 2025-06-28 NOTE — NURSING NOTE
Reached out Mds r/t diet status bf procedure today. Currently diabetic diet, changed to NPO until MD responds back with clarification.

## 2025-06-28 NOTE — POST-PROCEDURE NOTE
IR POST OP NOTE    Procedure: Temporary HD cath placement      Pre Op DX:ESRD, clotted access      Post Op DX:same      Anesthesia: Local      Findings:See dictation      Complications:None immediate      Provider Signature: Dr. Gaurav Hyde

## 2025-06-28 NOTE — OP NOTE
[facility] Vascular Surgery    Date of Admission:  6/26/2025  Today's Date:  06/28/25  Mahesh Haro II, MD  [facility]    Preoperative Diagnosis:   Thrombosed left arm arteriovenous graft  End-stage renal disease on dialysis    Postoperative Diagnosis:   same    Procedure Performed:   Ultrasound-guided access left arm graft x 2  Percutaneous pharmacomechanical thrombectomy of left arm graft using argon  rotational thrombectomy device with tPA, over-the-wire Brannon mechanical thrombectomy  Balloon angioplasty with 6 mm and 7 mm angioplasty balloon  8 x 40 protégé stent placement venous anastomosis  Central venogram    Surgeon:   Mahesh Haro II, MD    Assistant:    Sarah GUERRERO, Provided critical assistance in exposure, retraction, and suction that overall decrease blood loss and operative time.    Anesthesia:   MAC with local    Estimated Blood Loss:   Minimal    Findings:    Successful percutaneous thrombectomy of left arm arteriovenous graft with rotational thrombectomy of venous limb, Brannon thrombectomy of arterial limb, 6 mm balloon angioplasty of arterial anastomosis, 7 mm balloon angioplasty of venous limb and venous anastomosis with placement of 8 x 40 protégé stent at venous anastomosis.  Widely patent graft with thrill at conclusion of procedure, focal non-flow limiting dissection at perianastomotic brachial artery noted on completion angiography.    Central venogram: Widely patent subclavian vein, widely patent innominate vein, widely patent superior vena cava.  No central venous stenosis.    Implants:    Implant Name Type Inv. Item Serial No.  Lot No. LRB No. Used Action   CLIPAPPLR M/ ENDO LIGACLIP 9 3/8IN SM - [id number] Implant CLIPAPPLR M/ ENDO LIGACLIP 9 3/8IN SM  [employer]  DIV OF J AND J [id number] Left 1 Implanted   STNT PROTEGE EVERFLX SEXP .035 8X40 80 - [id number] Stent STNT PROTEGE EVERFLX SEXP .035 8X40 80  EV3  [employer] [id number] Left 1 Implanted        Staff:   Circulator: Melida Basurto, ANGELA; Jenny Moore RN  Scrub Person: Liberty Llamas; Fabi Weiss; Kwan Pineda  Assistant: Sarah Vásquez CSA  Vascular Radiology Technician: Solitario Montesinos    Specimen:   None    Complications:   None    Dispo:   to PACU    Indication for procedure:   75 y.o. female with incisional disease on dialysis for left arm arteriovenous graft that thrombosed before dialysis on Wednesday.  No OR time or surgeon availability at [facility] so patient referred to [facility] for left arm graft thrombectomy.  Details of this procedure including risks benefits and alternatives discussed with patient she verbalized understanding and agrees to proceed.     Description of procedure:   Patient is brought to the hybrid room placed supine on the table.  Sedation was initiated by the anesthesia service.  Patient's left arm was prepped circumferentially with ChloraPrep and she was draped in sterile fashion.  Timeout was performed.  I began procedure by performing ultrasound of the graft to determine which limb was the venous stent which was the arterial.  I then infiltrated local anesthetic over the graft and accessed it with a microneedle under ultrasound guidance oriented towards the venous limb.  Microwire was inserted and confirmed in the graft with fluoroscopy and ultrasound.  We then exchanged micro needle for microsheath and a 0.035 Glidewire was advanced into the graft.  Micro sheath was exchanged for a 10 cm 6 [place] sheath and catheter was used to.  Cross the venous anastomosis and angiography was performed distally to confirm intraluminal placement.  Central venogram was performed showing no central venous stenosis.  Glidewire was reinserted and to the difficulty of the crossing the venous anastomosis 7 mm angioplasty balloon was obtained and balloon angioplasty was performed of the venous anastomosis.  We then obtained a argon  rotational thrombectomy device and  this was advanced through the sheath and pharmacomechanical thrombectomy of the venous limb was performed using the  device and instilling 4 mg of tPA.  We then repeated 7 mm balloon angioplasty of the entire venous limb of venous anastomosis.  Balloon was used to clear venous limb of thrombus.  Angiography was performed that showed significant improvement of the venous limb with still small amount of residual thrombus and stenosis at the venous anastomosis.  I then accessed the graft oriented towards the arterial limb under ultrasound guidance with a microneedle after placing some lidocaine over the graft.  Microwire was inserted and confirmed in the graft with ultrasound and fluoroscopy.  Then exchanged microneedle for microsheath and then Glidewire was carefully crossed into the brachial artery with Kumpe catheter.  Angiography was performed to confirm intraluminal placement then exchanged for a 0.018 wire.  Over-the-wire thrombectomy was performed of the arterial limb.  We then performed 6 mm balloon angioplasty of the arterial anastomosis and perianastomotic graft.  Completion angiography showed some residual thrombus in the arterial limb of the graft and so the venous or any limb which was in this area was backed off the wire and we repeated mechanical thrombectomy which appeared to clear the thrombus.  We then had bleeding out of both of our sheaths so angiography of the graft was repeated.  Arterial anastomosis appeared widely patent but there was some residual thrombus in the mid fistula.  This was treated with repeat clearance using 7 mm angioplasty balloon from the venous oriented sheath and repeat rotational thrombectomy with the argon  device from arterial and venous sheaths.  Due to persistent stenosis in the venous anastomosis the obtained a 8 x 40 protégé stent and stented the venous anastomosis and it was postdilated with a 7 mm angioplasty balloon.  The arterial oriented sheath was  removed and bolster stitch was placed.  Completion angiography after this showed good flow through the graft with a small area of residual thrombus in the mid graft which was treated again with the rotational thrombectomy device.  At this point we had an excellent thrill in the graft with good bleeding from both of our sheath and completion angiography of both arterial and venous limbs looked excellent.  There was a small focal nonflow limiting dissection in the brachial artery around the area of the arterial anastomosis but this did not appear to be impeding flow through the graft or the artery.  The venous oriented sheath was removed and bolster stitch was placed.  I confirmed the patient had strong radial signal with Doppler probe.  Sterile dressings were placed and the patient was awakened from sedation and transferred to PACU in good condition.  Discussed the outcome of the procedure including the focal dissection the brachial artery with the patient's family and started her on aspirin for this.  All wires, catheters, sheaths, and other devices were removed and found to be whole and intact prior to the conclusion of the procedure.     Mahesh Haro II, MD  06/28/25     Active Hospital Problems    Diagnosis  POA    **Malfunction of arteriovenous graft [T82.590A]  Yes      Resolved Hospital Problems   No resolved problems to display.

## 2025-06-28 NOTE — CONSULTS
NEPHROLOGY CONSULTATION-----[facility]    [facility]  [phone]  Vipul Elias MD    Patient Care Team:  Lorraine Agarwal APRN as PCP - General (Nurse Practitioner)  Rick Melendez MD as Consulting Physician (Nephrology)  Mayo Fregoso MD as Consulting Physician (Urology)  Fredi Pierce MD as Consulting Physician (Cardiology)  Cony Sanchez APRN as Nurse Practitioner (Vascular Surgery)    CC/REASON FOR CONSULTATION: ESRD    PHYSICIAN REQUESTING CONSULTATION: Eusebio Durant MD     History of Present Illness  75 year old female with PMHx DM, HTN, ESRD on dialysis MWF presented with clotted access. She missed HD on Wednesday, had thrombectomy on Thursday, was on able to have 1 hr HD on Friday when access clotted. Sent to ER. No chest pain, some dyspnea, K was < 4.     Review of Systems   As noted above, otherwise 10 systems reviewed and were negative.    Past Medical History:   Diagnosis Date    Anemia     Hx of anemia    Anxiety     Controled  xanax prn    Arthritis     Conditions. Abstracted from [email or web address].    Cataract     bilateral    CHF (congestive heart failure) 6/01/22    Low iron    Cholelithiasis 30 yrs ago    Had gb removed    CKD (chronic kidney disease), stage IV 01/13/2023    Colon polyp     Depression 1995    Take cymbalta    Diabetes mellitus     type 2    Diarrhea     on and off    Edema of right foot 04/2021    Healthcare maintenance 2008    PAP. Abstracted from [employer].    Hip pain, bilateral     HL (hearing loss) 1/1/19    Have hearing aids    Hyperlipidemia     Hypertension     Knee pain, bilateral     Obesity     Osteopenia 11/1/22    On med    Other forms of dyspnea     Other specified soft tissue disorders     Pneumonia due to COVID-19 virus 06/26/2022    Renal insufficiency Stage 3    Has gone up to stage 4    Slow to wake up after anesthesia     Spinal headache     Stress  headaches     Tremor     Benign    Tremor of both hands     Urinary tract infection 07/01/22    [place]    Visual impairment 6/1/2017    Cataracts    Vitamin D deficiency        Past Surgical History:   Procedure Laterality Date    ARTERIOVENOUS FISTULA/SHUNT SURGERY Left 02/24/2023    Procedure: BRACHIOCEPHALIC ARTERIOVENOUS FISTULA FORMATION;  Surgeon: Vignesh Riley MD;  Location: [facility] MAIN OR;  Service: Vascular;  Laterality: Left;    ARTERIOVENOUS FISTULA/SHUNT SURGERY Left 02/27/2025    Procedure: ARTERIOVENOUS SHUNT GRAFT;  Surgeon: Zaina Bernard MD;  Location: [facility] MAIN OR;  Service: Vascular;  Laterality: Left;    ARTERIOVENOUS FISTULA/SHUNT SURGERY Left 6/26/2025    Procedure: ARTERIOVENOUS GRAFT THROMBECTOMY;  Surgeon: Mahesh Haro II, MD;  [facility]  Service: Vascular;  Laterality: Left;    BREAST BIOPSY Right     CHOLECYSTECTOMY  1990    COLONOSCOPY      This year 2022    EYE SURGERY Bilateral     cataract removal    GASTRIC BYPASS  2002    HERNIA REPAIR  2005    umbilical hernia    HIP OPEN REDUCTION Right 03/02/2020    Procedure: FEMORAL NECK OPEN REDUCTION INTERNAL FIXATION;  Surgeon: Yfn Sifuentes MD;  Location: [facility] MAIN OR;  Service: Orthopedics;  Laterality: Right;  Cannulated screw fixation right femoral neck    JOINT REPLACEMENT  2020    R knee    LAPAROTOMY OOPHERECTOMY Bilateral 2010    OTHER SURGICAL HISTORY  04/20/2015    Lexiscan stress test, normal. [facility].    PANNICULECTOMY      TOTAL KNEE ARTHROPLASTY Right 04/29/2021    Procedure: RIGHT TOTAL KNEE REPLACEMENT;  Surgeon: Yfn Sifuentes MD;  Location: [facility] MAIN OR;  Service: Orthopedics;  Laterality: Right;       Family History   Problem Relation Age of Onset    Asthma Mother     Depression Mother     Kidney disease Mother         Kidney stones    Miscarriages / Stillbirths Mother     Hyperlipidemia Father     Hypertension Father     Diabetes Father     Anxiety disorder  Daughter     Early death Daughter     Miscarriages / Stillbirths Daughter     Diabetes Daughter        Social History     Tobacco Use    Smoking status: Never     Passive exposure: Never    Smokeless tobacco: Never   Vaping Use    Vaping status: Never Used   Substance Use Topics    Alcohol use: Never    Drug use: Never       Home Meds:   Medications Prior to Admission   Medication Sig Dispense Refill Last Dose/Taking    albuterol sulfate  (90 Base) MCG/ACT inhaler Inhale 2 puffs Every 6 (Six) Hours.       alendronate (FOSAMAX) 70 MG tablet Take 1 tablet by mouth Every 7 (Seven) Days. On Saturdays       allopurinol (ZYLOPRIM) 100 MG tablet Take 1 tablet by mouth Daily.       aspirin 81 MG EC tablet Take 1 tablet by mouth Daily. 30 tablet 11     budesonide (PULMICORT) 0.5 MG/2ML nebulizer solution Take 2 mL by nebulization 2 (Two) Times a Day.       calcitriol (ROCALTROL) 0.25 MCG capsule Take 2 capsules by mouth Daily.       calcium acetate (PHOS BINDER,) 667 MG capsule capsule 2 capsules 3 (Three) Times a Day.       carvedilol (COREG) 25 MG tablet Take  by mouth 2 (Two) Times a Day.       cyclobenzaprine (FLEXERIL) 5 MG tablet Take  by mouth.       dilTIAZem CD (CARDIZEM CD) 300 MG 24 hr capsule Take 1 capsule by mouth Daily.       DULoxetine (CYMBALTA) 60 MG capsule Take 1 capsule by mouth 2 (Two) Times a Day. 180 capsule 1     furosemide (LASIX) 40 MG tablet Take 1 tablet by mouth Daily for 30 days. 30 tablet 0     gabapentin (NEURONTIN) 100 MG capsule Take 1 capsule by mouth 2 (Two) Times a Day.       glimepiride (AMARYL) 2 MG tablet Take 1 tablet by mouth.       hydrALAZINE (APRESOLINE) 100 MG tablet Take 0.5 tablets by mouth 2 (Two) Times a Day.       hydrOXYzine (ATARAX) 10 MG tablet Take 1 tablet by mouth 3 (Three) Times a Day As Needed for Itching. 90 tablet 1     ipratropium-albuterol (DUO-NEB) 0.5-2.5 mg/3 ml nebulizer Take 0.5 mL by nebulization 2 (Two) Times a Day.       Januvia 25 MG tablet Take  1 tablet by mouth.       lidocaine (Lidoderm) 5 % Apply  topically to the appropriate area as directed.       primidone (MYSOLINE) 50 MG tablet Take 1 tablet by mouth Every Night.       rosuvastatin (CRESTOR) 10 MG tablet Take 1 tablet by mouth Daily. 90 tablet 1     sodium bicarbonate 650 MG tablet Take  by mouth.       Vibegron (Gemtesa) 75 MG tablet Take 1 tablet by mouth Daily.          Scheduled Meds:  insulin lispro, 2-7 Units, Subcutaneous, 4x Daily AC & at Bedtime  sodium chloride, 10 mL, Intravenous, Q12H        Continuous Infusions:       PRN Meds:    acetaminophen **OR** acetaminophen **OR** acetaminophen    senna-docusate sodium **AND** polyethylene glycol **AND** bisacodyl **AND** bisacodyl    Calcium Replacement - Follow Nurse / BPA Driven Protocol    dextrose    dextrose    glucagon (human recombinant)    Magnesium Standard Dose Replacement - Follow Nurse / BPA Driven Protocol    melatonin    nitroglycerin    ondansetron ODT **OR** ondansetron    Phosphorus Replacement - Follow Nurse / BPA Driven Protocol    Potassium Replacement - Follow Nurse / BPA Driven Protocol    sodium chloride    Allergies:  Carvedilol, Morphine, Sulfa antibiotics, Hydrocodone, and Diphenhydramine    OBJECTIVE    Vital Signs  Temp:  [97.6 °F (36.4 °C)-98.4 °F (36.9 °C)] 98.2 °F (36.8 °C)  Heart Rate:  [70-84] 76  Resp:  [14-20] 16  BP: (112-149)/(52-78) 144/78    I/O this shift:  In: 120 [P.O.:120]  Out: 500 [Urine:500]  No intake/output data recorded.    Physical Exam:  General Appearance: alert, appears stated age and cooperative  Head: normocephalic, without obvious abnormality and atraumatic  Eyes: conjunctivae and sclerae normal and no icterus  Neck: supple and no JVD  Lungs: clear to auscultation and respirations regular  Heart: regular rhythm & normal rate and normal S1, S2  Chest Wall: no abnormalities observed  Abdomen: normal bowel sounds and soft, nontender  Extremities: moves extremities well, no edema, no  "cyanosis  Skin: no bleeding, bruising or rash  Neurologic: Alert, and oriented. No focal deficits    Results Review:    I reviewed the patient's new clinical results.    WBC WBC   Date Value Ref Range Status   06/28/2025 9.32 3.40 - 10.80 10*3/mm3 Final   06/27/2025 11.08 (H) 3.40 - 10.80 10*3/mm3 Final   06/26/2025 10.18 3.40 - 10.80 10*3/mm3 Final      HGB Hemoglobin   Date Value Ref Range Status   06/28/2025 9.3 (L) 12.0 - 15.9 g/dL Final   06/27/2025 11.2 (L) 12.0 - 17.0 g/dL Final   06/27/2025 10.6 (L) 12.0 - 15.9 g/dL Final   06/26/2025 11.5 (L) 12.0 - 15.9 g/dL Final      HCT Hematocrit   Date Value Ref Range Status   06/28/2025 31.6 (L) 34.0 - 46.6 % Final   06/27/2025 33 (L) 38 - 51 % Final   06/27/2025 35.5 34.0 - 46.6 % Final   06/26/2025 35.9 34.0 - 46.6 % Final      Platelets No results found for: \"LABPLAT\"   MCV MCV   Date Value Ref Range Status   06/28/2025 109.7 (H) 79.0 - 97.0 fL Final   06/27/2025 107.6 (H) 79.0 - 97.0 fL Final   06/26/2025 104.1 (H) 79.0 - 97.0 fL Final          Sodium Sodium   Date Value Ref Range Status   06/28/2025 141 136 - 145 mmol/L Final   06/27/2025 143 136 - 145 mmol/L Final   06/26/2025 142 136 - 145 mmol/L Final      Potassium Potassium   Date Value Ref Range Status   06/28/2025 4.1 3.5 - 5.2 mmol/L Final   06/27/2025 3.9 3.5 - 5.2 mmol/L Final   06/26/2025 4.3 3.5 - 5.2 mmol/L Final     Comment:     Slight hemolysis detected by analyzer. Result may be falsely elevated.      Chloride Chloride   Date Value Ref Range Status   06/28/2025 102 98 - 107 mmol/L Final   06/27/2025 103 98 - 107 mmol/L Final   06/26/2025 104 98 - 107 mmol/L Final      CO2 CO2   Date Value Ref Range Status   06/28/2025 23.3 22.0 - 29.0 mmol/L Final   06/27/2025 24.3 22.0 - 29.0 mmol/L Final   06/26/2025 22.6 22.0 - 29.0 mmol/L Final      BUN BUN   Date Value Ref Range Status   06/28/2025 45.9 (H) 8.0 - 23.0 mg/dL Final   06/27/2025 41.1 (H) 8.0 - 23.0 mg/dL Final   06/26/2025 54.0 (H) 8.0 - 23.0 " "mg/dL Final      Creatinine Creatinine   Date Value Ref Range Status   06/28/2025 4.42 (H) 0.57 - 1.00 mg/dL Final   06/27/2025 4.30 (H) 0.60 - 1.30 mg/dL Final   06/27/2025 4.28 (H) 0.57 - 1.00 mg/dL Final   06/26/2025 4.93 (H) 0.57 - 1.00 mg/dL Final      Calcium Calcium   Date Value Ref Range Status   06/28/2025 8.0 (L) 8.6 - 10.5 mg/dL Final   06/27/2025 8.4 (L) 8.6 - 10.5 mg/dL Final   06/26/2025 9.0 8.6 - 10.5 mg/dL Final      PO4 No results found for: \"CAPO4\"   Albumin Albumin   Date Value Ref Range Status   06/27/2025 3.6 3.5 - 5.2 g/dL Final      Magnesium Magnesium   Date Value Ref Range Status   06/28/2025 2.6 (H) 1.6 - 2.4 mg/dL Final   06/27/2025 2.4 1.6 - 2.4 mg/dL Final      Uric Acid No results found for: \"URICACID\"       Imaging Results (Last 72 Hours)       Procedure Component Value Units Date/Time    XR Chest 1 View [[id number]] Collected: 06/27/25 1853     Updated: 06/27/25 1856    Narrative:      XR CHEST 1 VW    Date of Exam: 6/27/2025 6:32 PM EDT    Indication: Unable to have dialysis Wednesday or today    Comparison: April 22, 2025    Findings:  There is elevation of the right hemidiaphragm. There is a mild right basilar opacity. The heart and mediastinal contours appear normal. The pulmonary vasculature appears normal. The osseous structures appear intact.      Impression:      Impression:  1.Mild right basilar opacity, which could reflect atelectasis or pneumonia.  2.Elevation of right hemidiaphragm.        Electronically Signed: Eitan Leach MD    6/27/2025 6:54 PM EDT    Workstation ID: [id number]              Results for orders placed during the hospital encounter of 06/27/25    XR Chest 1 View    Narrative  XR CHEST 1 VW    Date of Exam: 6/27/2025 6:32 PM EDT    Indication: Unable to have dialysis Wednesday or today    Comparison: April 22, 2025    Findings:  There is elevation of the right hemidiaphragm. There is a mild right basilar opacity. The heart and mediastinal contours appear " normal. The pulmonary vasculature appears normal. The osseous structures appear intact.    Impression  Impression:  1.Mild right basilar opacity, which could reflect atelectasis or pneumonia.  2.Elevation of right hemidiaphragm.        Electronically Signed: Eitan Leach MD  6/27/2025 6:54 PM EDT  Workstation ID: [id number]      Results for orders placed during the hospital encounter of 04/22/25    XR Chest 1 View    Narrative  XR CHEST 1 VW    Date of Exam: 4/22/2025 3:40 PM EDT    Indication: AMS Protocol  AMS Protocol    Comparison: 2/16/2025    Findings:  Heart size is within normal limits for the projection. The right hemidiaphragm is elevated, unchanged. The pulmonary vascular pattern appears normal and the lungs appear clear.    Impression  Impression:  No active disease.        Electronically Signed: Yfn Luna MD  4/22/2025 3:53 PM EDT  Workstation ID: [id number]      Results for orders placed during the hospital encounter of 02/16/25    XR Chest 2 View    Narrative  XR CHEST 2 VW    Date of Exam: 2/16/2025 4:12 PM EST    Indication: Short of breath    Comparison: 2/5/2025    Findings:  Cardiomediastinal silhouette is unremarkable. Similar elevation of the right hemidiaphragm. No airspace disease, pneumothorax, nor pleural effusion. No acute osseous abnormality identified.    Impression  Impression:  No acute process identified      Electronically Signed: Antonio Ceballos MD  2/16/2025 4:13 PM EST  Workstation ID: [id number]        Results for orders placed during the hospital encounter of 04/22/25    Duplex Carotid Ultrasound CAR    Interpretation Summary    Right internal carotid artery demonstrates a less than 50% stenosis.    Antegrade right vertebral flow.    Left internal carotid artery demonstrates a less than 50% stenosis.    Antegrade left vertebral flow.      ASSESSMENT / PLAN      AV fistula occlusion    A-V fistula    ESRD--HD MWF  HTN  T2DM  Clotted AVG--recent thrombectomy. IR for shiely/ then  TDC when able. Seen by vascular, will need open thrombectomy next week    HD today  Appreciate IR for access/ and Vascular evaluation        I discussed the patient's findings and my recommendations with patient    Will follow along closely. Thank you for allowing us to see this patient in renal consultation.    [facility]  [phone]  MD Vipul Velasco MD  06/28/25  11:59 EDT

## 2025-06-28 NOTE — NURSING NOTE
Dialysis delayed due to lack of working access. Communicated with primary RN Olu. Will dialyze patient once patient has a tunneled dialysis catheter as ordered. Patient is Aox4, not in distress.

## 2025-06-28 NOTE — PLAN OF CARE
Goal Outcome Evaluation:   Pt c/o headache and prn Tylenol given.  Pt states has slept off and on during the night.  Fistula to left arm has no thrill or bruit.  Pt up to BSC.  Will continue current plan of care.

## 2025-06-28 NOTE — PROGRESS NOTES
Vascular Surgery    Patient in radiology department having nontunneled hemodialysis catheter placed.  Has axilloaxillary loop AV graft, and recently underwent percutaneous thrombectomy, successful in restoring patency of the graft, with venous anastomotic stent angioplasty.  Despite a widely patent well-functioning graft, she experienced early term graft failure without even having an opportunity to have dialysis.  Will require open graft thrombectomy sometime next week.  Planning catheter-based dialysis for now.

## 2025-06-28 NOTE — PROGRESS NOTES
"    [facility] MEDICINE SERVICE  DAILY PROGRESS NOTE    NAME: Lynne Cade  : 1950  MRN: [id number]      LOS: 1 day     PROVIDER OF SERVICE: Eusebio Durant MD    Chief Complaint: AV fistula occlusion    Subjective:     Interval History:  History taken from: patient      Chief Complaint: AV fistula issue     History of Present Illness: Lynne Cade is a 75 y.o. female with a CMH of HTN, HLD, T2DM, CHF, CKD stage IV on HD M/W/F who presented to [facility] on 2025 with AV fistula problem.  Patient receives dialysis via AV graft Monday, Wednesday, Friday.  Reports that her last complete dialysis treatment was 4 days ago on Monday, 2025.  Patient went for dialysis on Wednesday and was unable to complete due to shunt occlusion.  Patient had  thrombectomy performed yesterday at [facility] in [place].  Was found to have adequate blood flow after the procedure.  Dialysis was attempted today, unable to complete due to recurrent shunt occlusion.  Denies fever, chills, chest pain, shortness of breath, abdominal pain, changes in bowel or bladder habits.     In the ED, patient was afebrile and hemodynamically stable.  Labs pertinent for Creatinine 4.3 (baseline 2.8), BUN 39, GFR 10.1.  AG 15.7. Calcium 8.4, ionized calcium 1.01, Phos 5.9. WBC 11.08. Chest x-ray: \"Mild right basilar opacity, which could reflect atelectasis or pneumonia.  Elevation of right hemodynamic. \" EKG: SR, HR 81. Vascular surgery consulted, not recommending thrombectomy at this time,  recommending placement of temporary if not tunneled hemodialysis catheter, possible repeat thrombectomy next week. Nephrology was consulted, recommended consulting IR. IR consulted, recommended catheter be placed tomorrow morning, pt does not need to be NPO.  Hospital service to admit for continued evaluation.         sen in bed NAD, VSS, No new complaints, vss  Dialysis delayed due to lack of working access. Pending  " tunneled dialysis catheter as ordered.       Review of Systems  Constitutional:  Negative for chills and fever.   Respiratory:  Negative for shortness of breath.    Cardiovascular:  Negative for chest pain.   Neurological:  Negative for dizziness and headaches.     Objective:     Vital Signs  Temp:  [97.6 °F (36.4 °C)-98.4 °F (36.9 °C)] 98.2 °F (36.8 °C)  Heart Rate:  [70-84] 76  Resp:  [14-20] 16  BP: (112-149)/(52-78) 144/78   Body mass index is 33.73 kg/m².    Physical Exam     General: 76 yo WF, Alert and oriented, well nourished, no acute distress.  HENT: Normocephalic, normal hearing, moist oral mucosa, no scleral icterus.  Neck: Supple, nontender, no carotid bruits, no JVD, no LAD.  Lungs: Clear to auscultation, nonlabored respiration.  Heart: RRR, no murmur, gallop or edema.  Abdomen: Soft, nontender, nondistended, + bowel sounds.  Musculoskeletal: Normal range of motion and strength, no tenderness or swelling.  Skin: Fistula present on LUE, bandaged. Skin is warm, dry and pink, no rashes or lesions.  Psychiatric: Cooperative, appropriate mood and affect.       Diagnostic Data    Results from last 7 days   Lab Units 06/28/25  0017 06/27/25  1659 06/27/25  1653   WBC 10*3/mm3 9.32  --  11.08*   HEMOGLOBIN g/dL 9.3*  --  10.6*   HEMOGLOBIN, POC   --    < >  --    HEMATOCRIT % 31.6*  --  35.5   HEMATOCRIT POC   --    < >  --    PLATELETS 10*3/mm3 207  --  232   GLUCOSE mg/dL 133*  --  115*   CREATININE mg/dL 4.42*   < > 4.28*   BUN mg/dL 45.9*  --  41.1*   SODIUM mmol/L 141  --  143   POTASSIUM mmol/L 4.1  --  3.9   AST (SGOT) U/L  --   --  14   ALT (SGPT) U/L  --   --  <5   ALK PHOS U/L  --   --  58   BILIRUBIN mg/dL  --   --  0.2   POC ANION GAP ISTAT   --    < >  --    ANION GAP mmol/L 15.7*  --  15.7*    < > = values in this interval not displayed.       XR Chest 1 View  Result Date: 6/27/2025  Impression: 1.Mild right basilar opacity, which could reflect atelectasis or pneumonia. 2.Elevation of right  "hemidiaphragm. Electronically Signed: Eitan Leach MD  6/27/2025 6:54 PM EDT  Workstation ID: [id number]        I reviewed the patient's new clinical results.    Assessment/Plan:     Active and Resolved Problems  Active Hospital Problems    Diagnosis  POA    **AV fistula occlusion [T82.898A]  Yes    A-V fistula [I77.0]  Yes      Resolved Hospital Problems   No resolved problems to display.     Occlusion of AV dialysis graft  CKD stage IV on hemodialysis M/W/F  Creatinine 4.3 (baseline 2.8), BUN 39, GFR 10.1.  AG 15.7  Calcium 8.4, ionized calcium 1.01  Phos 5.9  WBC 11.08  Chest x-ray: \"Mild right basilar opacity, which could reflect atelectasis or pneumonia.  Elevation of right hemodynamic. \"  EKG: SR, HR 81  Last dialysis treatment 4 days ago Monday (6/23/2025), receives dialysis via AV graft  Thrombosed earlier this week, treated with thrombectomy  Vascular surgery consulted and following, not recommending thrombectomy at this time,  recommending placement of temporary if not tunneled hemodialysis catheter, possible repeat thrombectomy next week  Nephrology was consulted and following, recommended consulting IR  IR consulted and following, recommended catheter be placed tomorrow morning, pt does not need to be NPO     Congestive heart failure  Stable  Continue home dose meds     Type 2 diabetes mellitus  A1c pending   Low SSI   Acchu-check ACHS with diet  Heart-healthy, consistent carb diet   Hypoglycemia protocol      Hypertension  Hyperlipidemia  Continue home dose meds    VTE Prophylaxis:  Mechanical VTE prophylaxis orders are present.    Disposition Planning:     Barriers to Discharge:HD  Anticipated Date of Discharge: 6/29  Place of Discharge: home      Time: 35 minutes     Code Status and Medical Interventions: CPR (Attempt to Resuscitate); Full Support   Ordered at: 06/27/25 2031     Code Status (Patient has no pulse and is not breathing):    CPR (Attempt to Resuscitate)     Medical Interventions " (Patient has pulse or is breathing):    Full Support       Signature: Electronically signed by Eusebio Durant MD, 06/28/25, 12:05 EDT.  [facility]ist Team

## 2025-06-28 NOTE — NURSING NOTE
Pt unable to recall meds.  Call out to pt's spouse and unable to reach .  Message left on voice mail asking spouse to call nursing station to go to meds.  Awaiting return call.

## 2025-06-28 NOTE — NURSING NOTE
Patient will be returning to room r/t no dialysis access. Spoke with ANGELA Coates from dialysis. Reached to communicate this to MD.

## 2025-06-29 PROBLEM — N18.6 ESRD (END STAGE RENAL DISEASE) ON DIALYSIS: Status: ACTIVE | Noted: 2020-03-01

## 2025-06-29 PROBLEM — Z99.2 ESRD (END STAGE RENAL DISEASE) ON DIALYSIS: Status: ACTIVE | Noted: 2020-03-01

## 2025-06-29 PROBLEM — I77.0 A-V FISTULA: Status: RESOLVED | Noted: 2024-06-05 | Resolved: 2025-06-29

## 2025-06-29 PROBLEM — T82.868S AV GRAFT THROMBOSIS, SEQUELA: Status: ACTIVE | Noted: 2025-06-27

## 2025-06-29 LAB
ANION GAP SERPL CALCULATED.3IONS-SCNC: 14.4 MMOL/L (ref 5–15)
ANION GAP SERPL CALCULATED.3IONS-SCNC: 15.4 MMOL/L (ref 5–15)
BASOPHILS # BLD AUTO: 0.05 10*3/MM3 (ref 0–0.2)
BASOPHILS # BLD AUTO: 0.06 10*3/MM3 (ref 0–0.2)
BASOPHILS NFR BLD AUTO: 0.5 % (ref 0–1.5)
BASOPHILS NFR BLD AUTO: 0.7 % (ref 0–1.5)
BUN SERPL-MCNC: 30.2 MG/DL (ref 8–23)
BUN SERPL-MCNC: 42.5 MG/DL (ref 8–23)
BUN/CREAT SERPL: 8.9 (ref 7–25)
BUN/CREAT SERPL: 9.1 (ref 7–25)
CALCIUM SPEC-SCNC: 7.9 MG/DL (ref 8.6–10.5)
CALCIUM SPEC-SCNC: 8.6 MG/DL (ref 8.6–10.5)
CHLORIDE SERPL-SCNC: 102 MMOL/L (ref 98–107)
CHLORIDE SERPL-SCNC: 106 MMOL/L (ref 98–107)
CO2 SERPL-SCNC: 19.6 MMOL/L (ref 22–29)
CO2 SERPL-SCNC: 21.6 MMOL/L (ref 22–29)
CREAT SERPL-MCNC: 3.38 MG/DL (ref 0.57–1)
CREAT SERPL-MCNC: 4.68 MG/DL (ref 0.57–1)
DEPRECATED RDW RBC AUTO: 64.9 FL (ref 37–54)
DEPRECATED RDW RBC AUTO: 65.9 FL (ref 37–54)
EGFRCR SERPLBLD CKD-EPI 2021: 13.5 ML/MIN/1.73
EGFRCR SERPLBLD CKD-EPI 2021: 9.1 ML/MIN/1.73
EOSINOPHIL # BLD AUTO: 0.22 10*3/MM3 (ref 0–0.4)
EOSINOPHIL # BLD AUTO: 0.22 10*3/MM3 (ref 0–0.4)
EOSINOPHIL NFR BLD AUTO: 2.2 % (ref 0.3–6.2)
EOSINOPHIL NFR BLD AUTO: 2.5 % (ref 0.3–6.2)
ERYTHROCYTE [DISTWIDTH] IN BLOOD BY AUTOMATED COUNT: 16.9 % (ref 12.3–15.4)
ERYTHROCYTE [DISTWIDTH] IN BLOOD BY AUTOMATED COUNT: 16.9 % (ref 12.3–15.4)
GLUCOSE BLDC GLUCOMTR-MCNC: 100 MG/DL (ref 70–105)
GLUCOSE BLDC GLUCOMTR-MCNC: 123 MG/DL (ref 70–105)
GLUCOSE BLDC GLUCOMTR-MCNC: 176 MG/DL (ref 70–105)
GLUCOSE BLDC GLUCOMTR-MCNC: 182 MG/DL (ref 70–105)
GLUCOSE BLDC GLUCOMTR-MCNC: 228 MG/DL (ref 70–105)
GLUCOSE SERPL-MCNC: 254 MG/DL (ref 65–99)
GLUCOSE SERPL-MCNC: 52 MG/DL (ref 65–99)
HCT VFR BLD AUTO: 33.6 % (ref 34–46.6)
HCT VFR BLD AUTO: 35.2 % (ref 34–46.6)
HGB BLD-MCNC: 10.2 G/DL (ref 12–15.9)
HGB BLD-MCNC: 10.5 G/DL (ref 12–15.9)
IMM GRANULOCYTES # BLD AUTO: 0.08 10*3/MM3 (ref 0–0.05)
IMM GRANULOCYTES # BLD AUTO: 0.08 10*3/MM3 (ref 0–0.05)
IMM GRANULOCYTES NFR BLD AUTO: 0.8 % (ref 0–0.5)
IMM GRANULOCYTES NFR BLD AUTO: 0.9 % (ref 0–0.5)
LYMPHOCYTES # BLD AUTO: 1.87 10*3/MM3 (ref 0.7–3.1)
LYMPHOCYTES # BLD AUTO: 2.12 10*3/MM3 (ref 0.7–3.1)
LYMPHOCYTES NFR BLD AUTO: 21.1 % (ref 19.6–45.3)
LYMPHOCYTES NFR BLD AUTO: 21.2 % (ref 19.6–45.3)
MAGNESIUM SERPL-MCNC: 2.3 MG/DL (ref 1.6–2.4)
MAGNESIUM SERPL-MCNC: 2.5 MG/DL (ref 1.6–2.4)
MCH RBC QN AUTO: 31.7 PG (ref 26.6–33)
MCH RBC QN AUTO: 32.6 PG (ref 26.6–33)
MCHC RBC AUTO-ENTMCNC: 29.8 G/DL (ref 31.5–35.7)
MCHC RBC AUTO-ENTMCNC: 30.4 G/DL (ref 31.5–35.7)
MCV RBC AUTO: 106.3 FL (ref 79–97)
MCV RBC AUTO: 107.3 FL (ref 79–97)
MONOCYTES # BLD AUTO: 0.93 10*3/MM3 (ref 0.1–0.9)
MONOCYTES # BLD AUTO: 1.04 10*3/MM3 (ref 0.1–0.9)
MONOCYTES NFR BLD AUTO: 11.8 % (ref 5–12)
MONOCYTES NFR BLD AUTO: 9.2 % (ref 5–12)
NEUTROPHILS NFR BLD AUTO: 5.54 10*3/MM3 (ref 1.7–7)
NEUTROPHILS NFR BLD AUTO: 6.66 10*3/MM3 (ref 1.7–7)
NEUTROPHILS NFR BLD AUTO: 62.9 % (ref 42.7–76)
NEUTROPHILS NFR BLD AUTO: 66.2 % (ref 42.7–76)
NRBC BLD AUTO-RTO: 0 /100 WBC (ref 0–0.2)
NRBC BLD AUTO-RTO: 0.2 /100 WBC (ref 0–0.2)
PHOSPHATE SERPL-MCNC: 4.5 MG/DL (ref 2.5–4.5)
PHOSPHATE SERPL-MCNC: 5.2 MG/DL (ref 2.5–4.5)
PLATELET # BLD AUTO: 184 10*3/MM3 (ref 140–450)
PLATELET # BLD AUTO: 195 10*3/MM3 (ref 140–450)
PMV BLD AUTO: 9.5 FL (ref 6–12)
PMV BLD AUTO: 9.8 FL (ref 6–12)
POTASSIUM SERPL-SCNC: 4.1 MMOL/L (ref 3.5–5.2)
POTASSIUM SERPL-SCNC: 4.4 MMOL/L (ref 3.5–5.2)
RBC # BLD AUTO: 3.13 10*6/MM3 (ref 3.77–5.28)
RBC # BLD AUTO: 3.31 10*6/MM3 (ref 3.77–5.28)
SODIUM SERPL-SCNC: 139 MMOL/L (ref 136–145)
SODIUM SERPL-SCNC: 140 MMOL/L (ref 136–145)
WBC NRBC COR # BLD AUTO: 10.06 10*3/MM3 (ref 3.4–10.8)
WBC NRBC COR # BLD AUTO: 8.81 10*3/MM3 (ref 3.4–10.8)

## 2025-06-29 PROCEDURE — 99232 SBSQ HOSP IP/OBS MODERATE 35: CPT | Performed by: SURGERY

## 2025-06-29 PROCEDURE — 84100 ASSAY OF PHOSPHORUS: CPT

## 2025-06-29 PROCEDURE — 80048 BASIC METABOLIC PNL TOTAL CA: CPT

## 2025-06-29 PROCEDURE — 85025 COMPLETE CBC W/AUTO DIFF WBC: CPT

## 2025-06-29 PROCEDURE — 82948 REAGENT STRIP/BLOOD GLUCOSE: CPT

## 2025-06-29 PROCEDURE — 83735 ASSAY OF MAGNESIUM: CPT

## 2025-06-29 PROCEDURE — 63710000001 INSULIN LISPRO (HUMAN) PER 5 UNITS

## 2025-06-29 RX ORDER — ZOLPIDEM TARTRATE 5 MG/1
2.5 TABLET ORAL ONCE
Status: COMPLETED | OUTPATIENT
Start: 2025-06-29 | End: 2025-06-29

## 2025-06-29 RX ORDER — DULOXETIN HYDROCHLORIDE 30 MG/1
60 CAPSULE, DELAYED RELEASE ORAL DAILY
Status: DISCONTINUED | OUTPATIENT
Start: 2025-06-29 | End: 2025-07-06 | Stop reason: HOSPADM

## 2025-06-29 RX ORDER — DILTIAZEM HYDROCHLORIDE 120 MG/1
120 CAPSULE, COATED, EXTENDED RELEASE ORAL
Status: DISCONTINUED | OUTPATIENT
Start: 2025-06-29 | End: 2025-07-02

## 2025-06-29 RX ORDER — HYDROXYZINE HYDROCHLORIDE 25 MG/1
25 TABLET, FILM COATED ORAL 3 TIMES DAILY PRN
Status: DISCONTINUED | OUTPATIENT
Start: 2025-06-29 | End: 2025-07-06 | Stop reason: HOSPADM

## 2025-06-29 RX ORDER — DIPHENHYDRAMINE HCL 25 MG
25 CAPSULE ORAL ONCE
Status: DISCONTINUED | OUTPATIENT
Start: 2025-06-29 | End: 2025-06-29

## 2025-06-29 RX ORDER — CYCLOBENZAPRINE HCL 10 MG
10 TABLET ORAL 3 TIMES DAILY
Status: DISCONTINUED | OUTPATIENT
Start: 2025-06-29 | End: 2025-07-06 | Stop reason: HOSPADM

## 2025-06-29 RX ORDER — GABAPENTIN 100 MG/1
100 CAPSULE ORAL 3 TIMES DAILY
Status: DISCONTINUED | OUTPATIENT
Start: 2025-06-29 | End: 2025-07-06 | Stop reason: HOSPADM

## 2025-06-29 RX ORDER — CYCLOBENZAPRINE HCL 10 MG
5 TABLET ORAL 3 TIMES DAILY PRN
Status: DISCONTINUED | OUTPATIENT
Start: 2025-06-29 | End: 2025-07-06 | Stop reason: HOSPADM

## 2025-06-29 RX ORDER — CARVEDILOL 6.25 MG/1
6.25 TABLET ORAL 2 TIMES DAILY WITH MEALS
Status: DISCONTINUED | OUTPATIENT
Start: 2025-06-29 | End: 2025-07-02

## 2025-06-29 RX ADMIN — Medication 5 MG: at 00:08

## 2025-06-29 RX ADMIN — ZOLPIDEM TARTRATE 2.5 MG: 5 TABLET ORAL at 22:48

## 2025-06-29 RX ADMIN — Medication 10 ML: at 20:38

## 2025-06-29 RX ADMIN — GABAPENTIN 100 MG: 100 CAPSULE ORAL at 20:34

## 2025-06-29 RX ADMIN — DULOXETINE 60 MG: 30 CAPSULE, DELAYED RELEASE ORAL at 18:00

## 2025-06-29 RX ADMIN — INSULIN LISPRO 2 UNITS: 100 INJECTION, SOLUTION INTRAVENOUS; SUBCUTANEOUS at 17:33

## 2025-06-29 RX ADMIN — CYCLOBENZAPRINE 10 MG: 10 TABLET, FILM COATED ORAL at 20:34

## 2025-06-29 RX ADMIN — Medication 10 ML: at 17:03

## 2025-06-29 RX ADMIN — ACETAMINOPHEN 650 MG: 325 TABLET, FILM COATED ORAL at 22:48

## 2025-06-29 RX ADMIN — DILTIAZEM HYDROCHLORIDE 120 MG: 180 CAPSULE, EXTENDED RELEASE ORAL at 18:00

## 2025-06-29 RX ADMIN — ACETAMINOPHEN 650 MG: 325 TABLET, FILM COATED ORAL at 10:00

## 2025-06-29 RX ADMIN — CARVEDILOL 6.25 MG: 6.25 TABLET, FILM COATED ORAL at 18:00

## 2025-06-29 NOTE — PROGRESS NOTES
Name: Lynne Cade ADMIT: 2025   : 1950  PCP: Lorraine Agarwal APRN    MRN: [id number] LOS: 2 days   AGE/SEX: 75 y.o. female  ROOM: [address] [place] [address]    75 y.o. left axilloaxillary loop AV graft.  Has experienced recurrent AV graft thrombosis following recent percutaneous thrombectomy which otherwise went well with end-stage renal disease maintained on hemodialysis via right.  Now has nontunneled hemodialysis catheter right neck, used yesterday.    Scheduled Medications:   insulin lispro, 2-7 Units, Subcutaneous, 4x Daily AC & at Bedtime  sodium chloride, 10 mL, Intravenous, Q12H    Vital Signs  Vital Signs Patient Vitals for the past 24 hrs:   BP Temp Temp src Pulse Resp SpO2   25 1120 124/59 98.7 °F (37.1 °C) Oral 89 18 95 %   25 0754 135/75 97.6 °F (36.4 °C) Oral 108 18 95 %   25 0417 168/83 98.1 °F (36.7 °C) Oral 89 15 93 %   25 0003 156/74 98.7 °F (37.1 °C) Oral 92 13 95 %   25 2014 148/78 97.5 °F (36.4 °C) Oral 106 14 93 %   25 1900 112/75 -- -- 98 17 97 %   25 1830 106/78 -- -- 101 18 96 %   25 1800 134/87 -- -- 95 18 98 %   25 1730 124/79 -- -- 87 15 97 %   25 1716 133/70 -- -- 83 15 96 %   25 1700 136/81 -- -- 94 13 96 %   25 1630 132/78 -- -- 98 17 98 %   25 1600 130/77 -- -- 81 16 96 %   25 1530 122/63 -- -- 80 17 95 %   25 1500 132/66 -- -- 80 17 95 %   25 1430 166/73 -- -- 83 12 95 %   25 1425 154/77 97.9 °F (36.6 °C) Oral 82 15 92 %   25 1340 161/91 -- -- 82 17 92 %   25 1313 -- -- -- 78 18 95 %   25 1306 -- -- -- 74 16 94 %   25 1256 -- -- -- 72 18 94 %     Physical Exam:      CBC    Results from last 7 days   Lab Units 25  0141 25  0017 25  1659 25  1653 25  0806   WBC 10*3/mm3 10.06 9.32  --  11.08* 10.18   HEMOGLOBIN g/dL 10.5* 9.3*  --  10.6* 11.5*   HEMOGLOBIN, POC g/dL  --   --  11.2*  --   --    PLATELETS 10*3/mm3 195  207  --  232 315     BMP   Results from last 7 days   Lab Units 06/29/25  0141 06/28/25  0017 06/27/25  1659 06/27/25  1653 06/26/25  0807   SODIUM mmol/L 139 141  --  143 142   POTASSIUM mmol/L 4.1 4.1  --  3.9 4.3   CHLORIDE mmol/L 102 102  --  103 104   CO2 mmol/L 21.6* 23.3  --  24.3 22.6   BUN mg/dL 30.2* 45.9*  --  41.1* 54.0*   CREATININE mg/dL 3.38* 4.42* 4.30* 4.28* 4.93*   GLUCOSE mg/dL 52* 133*  --  115* 131*   MAGNESIUM mg/dL 2.3 2.6*  --  2.4  --    PHOSPHORUS mg/dL 4.5 6.5*  --  5.9*  --      Cr Clearance Estimated Creatinine Clearance: 16.2 mL/min (A) (by C-G formula based on SCr of 3.38 mg/dL (H)).    Assessment & Plan   Assessment & Plan    AV fistula occlusion    A-V fistula    75 y.o. female with ESRD on HD via left axilloaxillary loop AV graft, admitted after developing recurrent AV graft thrombosis.  Now has nontunneled hemodialysis catheter.  Tentative plan for open graft thrombectomy, possibly Tuesday with Dr. Bernard.    Eitan Raines MD  06/29/25, 11:42 EDT    Office contact: [phone]

## 2025-06-29 NOTE — PROGRESS NOTES
"NEPHROLOGY PROGRESS NOTE------[facility]    [facility]  [phone]  Vipul Elias MD      Patient Care Team:  Lorraine Agarwal APRN as PCP - General (Nurse Practitioner)  Rick Melendez MD as Consulting Physician (Nephrology)  Mayo Fregoso MD as Consulting Physician (Urology)  Fredi Pierce MD as Consulting Physician (Cardiology)  Cony Sanchez APRN as Nurse Practitioner (Vascular Surgery)      Provider:  Vipul Elias MD  Patient Name: Lynne Cade  :  1950    SUBJECTIVE:  Follow-up ESRD  Dialyzed yesterday    Medication:  insulin lispro, 2-7 Units, Subcutaneous, 4x Daily AC & at Bedtime  sodium chloride, 10 mL, Intravenous, Q12H           OBJECTIVE    Vital Sign Min/Max for last 24 hours  Temp  Min: 97.5 °F (36.4 °C)  Max: 98.7 °F (37.1 °C)   BP  Min: 106/78  Max: 168/83   Pulse  Min: 80  Max: 108   Resp  Min: 12  Max: 18   SpO2  Min: 92 %  Max: 98 %   No data recorded   No data recorded     Flowsheet Rows      Flowsheet Row First Filed Value   Admission Height 167.6 cm (66\") Documented at 2025 1606   Admission Weight 94.8 kg (209 lb) Documented at 2025 1606            I/O this shift:  In: 700 [P.O.:700]  Out: -   I/O last 3 completed shifts:  In: 600 [P.O.:600]  Out: 4300 [Urine:500]    Physical Exam:  General Appearance: alert, appears stated age and cooperative  Head: normocephalic, without obvious abnormality and atraumatic  Eyes: conjunctivae and sclerae normal and no icterus  Neck: supple and no JVD  Lungs: clear to auscultation and respirations regular  Heart: regular rhythm & normal rate and normal S1, S2  Chest: Wall no abnormalities observed  Abdomen: normal bowel sounds and soft, nontender  Extremities: moves extremities well, no edema, no cyanosis and no redness  Skin: no bleeding, bruising or rash, turgor normal, color normal and no lesions noted  Neurologic: Alert, and " "oriented. No focal deficits    Labs:    WBC WBC   Date Value Ref Range Status   06/29/2025 10.06 3.40 - 10.80 10*3/mm3 Final   06/28/2025 9.32 3.40 - 10.80 10*3/mm3 Final   06/27/2025 11.08 (H) 3.40 - 10.80 10*3/mm3 Final      HGB Hemoglobin   Date Value Ref Range Status   06/29/2025 10.5 (L) 12.0 - 15.9 g/dL Final   06/28/2025 9.3 (L) 12.0 - 15.9 g/dL Final   06/27/2025 11.2 (L) 12.0 - 17.0 g/dL Final   06/27/2025 10.6 (L) 12.0 - 15.9 g/dL Final      HCT Hematocrit   Date Value Ref Range Status   06/29/2025 35.2 34.0 - 46.6 % Final   06/28/2025 31.6 (L) 34.0 - 46.6 % Final   06/27/2025 33 (L) 38 - 51 % Final   06/27/2025 35.5 34.0 - 46.6 % Final      Platelets No results found for: \"LABPLAT\"   MCV MCV   Date Value Ref Range Status   06/29/2025 106.3 (H) 79.0 - 97.0 fL Final   06/28/2025 109.7 (H) 79.0 - 97.0 fL Final   06/27/2025 107.6 (H) 79.0 - 97.0 fL Final          Sodium Sodium   Date Value Ref Range Status   06/29/2025 139 136 - 145 mmol/L Final   06/28/2025 141 136 - 145 mmol/L Final   06/27/2025 143 136 - 145 mmol/L Final      Potassium Potassium   Date Value Ref Range Status   06/29/2025 4.1 3.5 - 5.2 mmol/L Final   06/28/2025 4.1 3.5 - 5.2 mmol/L Final   06/27/2025 3.9 3.5 - 5.2 mmol/L Final      Chloride Chloride   Date Value Ref Range Status   06/29/2025 102 98 - 107 mmol/L Final   06/28/2025 102 98 - 107 mmol/L Final   06/27/2025 103 98 - 107 mmol/L Final      CO2 CO2   Date Value Ref Range Status   06/29/2025 21.6 (L) 22.0 - 29.0 mmol/L Final   06/28/2025 23.3 22.0 - 29.0 mmol/L Final   06/27/2025 24.3 22.0 - 29.0 mmol/L Final      BUN BUN   Date Value Ref Range Status   06/29/2025 30.2 (H) 8.0 - 23.0 mg/dL Final   06/28/2025 45.9 (H) 8.0 - 23.0 mg/dL Final   06/27/2025 41.1 (H) 8.0 - 23.0 mg/dL Final      Creatinine Creatinine   Date Value Ref Range Status   06/29/2025 3.38 (H) 0.57 - 1.00 mg/dL Final   06/28/2025 4.42 (H) 0.57 - 1.00 mg/dL Final   06/27/2025 4.30 (H) 0.60 - 1.30 mg/dL Final " "  06/27/2025 4.28 (H) 0.57 - 1.00 mg/dL Final      Calcium Calcium   Date Value Ref Range Status   06/29/2025 8.6 8.6 - 10.5 mg/dL Final   06/28/2025 8.0 (L) 8.6 - 10.5 mg/dL Final   06/27/2025 8.4 (L) 8.6 - 10.5 mg/dL Final      PO4 No components found for: \"PO4\"   Albumin Albumin   Date Value Ref Range Status   06/27/2025 3.6 3.5 - 5.2 g/dL Final      Magnesium Magnesium   Date Value Ref Range Status   06/29/2025 2.3 1.6 - 2.4 mg/dL Final   06/28/2025 2.6 (H) 1.6 - 2.4 mg/dL Final   06/27/2025 2.4 1.6 - 2.4 mg/dL Final      Uric Acid No components found for: \"URIC ACID\"     Imaging Results (Last 72 Hours)       Procedure Component Value Units Date/Time    IR NON-MIKE TEMP DIALYSIS ACC [[id number]] Collected: 06/28/25 1323     Updated: 06/28/25 1330    Narrative:      IR NON-TUNNELED TEMP DIALYSIS ACCESS    Date of Exam: 6/28/2025 12:41 PM EDT    Indication: Clotted left upper extremity shunt. Dialysis catheter requested for inpatient hemodialysis.    Comparison: None available.    Fluoroscopic Time: 43 seconds. 11 mGy.    Description of procedure: Informed consent was obtained following a detailed description of the procedure including all risks. The patient was placed supine on the fluoroscopy table. A successful timeout was performed. The right neck was evaluated with   ultrasound which demonstrates patent and normally compressible right IJ. Static image was saved.    The right neck was prepped and draped using maximum sterile barrier technique. Lidocaine was used for local anesthesia. Access was obtained of the right IJ under direct ultrasound visualization using a micropuncture set. Real-time ultrasound guidance was   utilized to confirm an intravascular location of the needle, wire and catheter during manipulation. A J-tipped wire was advanced centrally and the tract was dilated to accommodate a triple-lumen large bore temporary hemodialysis catheter which was   advanced into position to the high right atrial " level. The catheter was secured in place at the skin site using 0 Prolene suture material. Catheter flushes and aspirates appropriately and was heparinized in the usual manner. Sterile dressing was applied.   Spot fluoroscopy image obtained following placement shows the catheter tip in good position at the high right atrial level. The patient tolerated the procedure well.      Impression:      Impression:  Placement of temporary hemodialysis catheter via right IJ approach using ultrasound and fluoroscopic guidance.      Electronically Signed: Gaurav Hyde MD    6/28/2025 1:28 PM EDT    Workstation ID: [id number]    XR Chest 1 View [[id number]] Collected: 06/27/25 1853     Updated: 06/27/25 1856    Narrative:      XR CHEST 1 VW    Date of Exam: 6/27/2025 6:32 PM EDT    Indication: Unable to have dialysis Wednesday or today    Comparison: April 22, 2025    Findings:  There is elevation of the right hemidiaphragm. There is a mild right basilar opacity. The heart and mediastinal contours appear normal. The pulmonary vasculature appears normal. The osseous structures appear intact.      Impression:      Impression:  1.Mild right basilar opacity, which could reflect atelectasis or pneumonia.  2.Elevation of right hemidiaphragm.        Electronically Signed: Eitan Leach MD    6/27/2025 6:54 PM EDT    Workstation ID: [id number]            Results for orders placed during the hospital encounter of 06/27/25    XR Chest 1 View    Narrative  XR CHEST 1 VW    Date of Exam: 6/27/2025 6:32 PM EDT    Indication: Unable to have dialysis Wednesday or today    Comparison: April 22, 2025    Findings:  There is elevation of the right hemidiaphragm. There is a mild right basilar opacity. The heart and mediastinal contours appear normal. The pulmonary vasculature appears normal. The osseous structures appear intact.    Impression  Impression:  1.Mild right basilar opacity, which could reflect atelectasis or pneumonia.  2.Elevation of  right hemidiaphragm.        Electronically Signed: Eitan Leach MD  6/27/2025 6:54 PM EDT  Workstation ID: [id number]      Results for orders placed during the hospital encounter of 04/22/25    XR Chest 1 View    Narrative  XR CHEST 1 VW    Date of Exam: 4/22/2025 3:40 PM EDT    Indication: AMS Protocol  AMS Protocol    Comparison: 2/16/2025    Findings:  Heart size is within normal limits for the projection. The right hemidiaphragm is elevated, unchanged. The pulmonary vascular pattern appears normal and the lungs appear clear.    Impression  Impression:  No active disease.        Electronically Signed: Yfn Luna MD  4/22/2025 3:53 PM EDT  Workstation ID: [id number]      Results for orders placed during the hospital encounter of 02/16/25    XR Chest 2 View    Narrative  XR CHEST 2 VW    Date of Exam: 2/16/2025 4:12 PM EST    Indication: Short of breath    Comparison: 2/5/2025    Findings:  Cardiomediastinal silhouette is unremarkable. Similar elevation of the right hemidiaphragm. No airspace disease, pneumothorax, nor pleural effusion. No acute osseous abnormality identified.    Impression  Impression:  No acute process identified      Electronically Signed: Antonio Ceballos MD  2/16/2025 4:13 PM EST  Workstation ID: [id number]      Results for orders placed during the hospital encounter of 04/22/25    Duplex Carotid Ultrasound CAR    Interpretation Summary    Right internal carotid artery demonstrates a less than 50% stenosis.    Antegrade right vertebral flow.    Left internal carotid artery demonstrates a less than 50% stenosis.    Antegrade left vertebral flow.        ASSESSMENT / PLAN      AV graft thrombosis, sequela    ESRD (end stage renal disease) on dialysis    ESRD--HD MWF  HTN  T2DM  Clotted AVG--recent thrombectomy. IR for shiely/ then TDC when able. Seen by vascular, will need open thrombectomy next week     Dialysis yesterday  Discussed with vascular  Plans noted for open thrombectomy  Keep Jesus for  now  Plan dialysis in a.m. per Monday Wednesday Friday schedule           Vipul Elias MD  [facility]  [phone]  06/29/25  13:41 EDT

## 2025-06-29 NOTE — PLAN OF CARE
Problem: Adult Inpatient Plan of Care  Goal: Absence of Hospital-Acquired Illness or Injury  Outcome: Progressing  Intervention: Identify and Manage Fall Risk  Recent Flowsheet Documentation  Taken 6/29/2025 1000 by Olu Guerrier RN  Safety Promotion/Fall Prevention:   assistive device/personal items within reach   clutter free environment maintained   safety round/check completed  Intervention: Prevent Infection  Recent Flowsheet Documentation  Taken 6/29/2025 1400 by Olu Guerrier RN  Infection Prevention:   environmental surveillance performed   equipment surfaces disinfected   hand hygiene promoted   single patient room provided  Taken 6/29/2025 1000 by Olu Guerrier RN  Infection Prevention:   environmental surveillance performed   equipment surfaces disinfected   hand hygiene promoted   single patient room provided     Problem: Adult Inpatient Plan of Care  Goal: Absence of Hospital-Acquired Illness or Injury  Intervention: Identify and Manage Fall Risk  Recent Flowsheet Documentation  Taken 6/29/2025 1000 by Olu Guerrier RN  Safety Promotion/Fall Prevention:   assistive device/personal items within reach   clutter free environment maintained   safety round/check completed     Problem: Adult Inpatient Plan of Care  Goal: Absence of Hospital-Acquired Illness or Injury  Intervention: Prevent Infection  Recent Flowsheet Documentation  Taken 6/29/2025 1400 by Olu Guerrier RN  Infection Prevention:   environmental surveillance performed   equipment surfaces disinfected   hand hygiene promoted   single patient room provided  Taken 6/29/2025 1000 by Olu Guerrier RN  Infection Prevention:   environmental surveillance performed   equipment surfaces disinfected   hand hygiene promoted   single patient room provided   Goal Outcome Evaluation:  Plan of Care Reviewed With: patient        Progress: improving

## 2025-06-30 ENCOUNTER — APPOINTMENT (OUTPATIENT)
Dept: INTERVENTIONAL RADIOLOGY/VASCULAR | Facility: HOSPITAL | Age: 75
DRG: 252 | End: 2025-06-30
Payer: MEDICARE

## 2025-06-30 ENCOUNTER — APPOINTMENT (OUTPATIENT)
Dept: NEPHROLOGY | Facility: HOSPITAL | Age: 75
DRG: 252 | End: 2025-06-30
Payer: MEDICARE

## 2025-06-30 ENCOUNTER — APPOINTMENT (OUTPATIENT)
Dept: GENERAL RADIOLOGY | Facility: HOSPITAL | Age: 75
DRG: 252 | End: 2025-06-30
Payer: MEDICARE

## 2025-06-30 LAB
ABO GROUP BLD: NORMAL
BLD GP AB SCN SERPL QL: NEGATIVE
DEVICE COMMENT: ABNORMAL
GLUCOSE BLDC GLUCOMTR-MCNC: 136 MG/DL (ref 70–105)
GLUCOSE BLDC GLUCOMTR-MCNC: 142 MG/DL (ref 70–105)
GLUCOSE BLDC GLUCOMTR-MCNC: 145 MG/DL (ref 70–105)
GLUCOSE BLDC GLUCOMTR-MCNC: 162 MG/DL (ref 70–105)
GLUCOSE BLDC GLUCOMTR-MCNC: 246 MG/DL (ref 70–105)
GLUCOSE BLDC GLUCOMTR-MCNC: 62 MG/DL (ref 70–105)
RH BLD: POSITIVE
T&S EXPIRATION DATE: NORMAL

## 2025-06-30 PROCEDURE — 36556 INSERT NON-TUNNEL CV CATH: CPT

## 2025-06-30 PROCEDURE — 71045 X-RAY EXAM CHEST 1 VIEW: CPT

## 2025-06-30 PROCEDURE — 86901 BLOOD TYPING SEROLOGIC RH(D): CPT | Performed by: NURSE PRACTITIONER

## 2025-06-30 PROCEDURE — 82948 REAGENT STRIP/BLOOD GLUCOSE: CPT

## 2025-06-30 PROCEDURE — B543ZZA ULTRASONOGRAPHY OF RIGHT JUGULAR VEINS, GUIDANCE: ICD-10-PCS | Performed by: RADIOLOGY

## 2025-06-30 PROCEDURE — 25010000002 HEPARIN (PORCINE) PER 1000 UNITS: Performed by: INTERNAL MEDICINE

## 2025-06-30 PROCEDURE — 05HM33Z INSERTION OF INFUSION DEVICE INTO RIGHT INTERNAL JUGULAR VEIN, PERCUTANEOUS APPROACH: ICD-10-PCS | Performed by: RADIOLOGY

## 2025-06-30 PROCEDURE — 77001 FLUOROGUIDE FOR VEIN DEVICE: CPT

## 2025-06-30 PROCEDURE — 63710000001 INSULIN LISPRO (HUMAN) PER 5 UNITS

## 2025-06-30 PROCEDURE — 86850 RBC ANTIBODY SCREEN: CPT | Performed by: NURSE PRACTITIONER

## 2025-06-30 PROCEDURE — 25010000002 LIDOCAINE 1 % SOLUTION

## 2025-06-30 PROCEDURE — 76937 US GUIDE VASCULAR ACCESS: CPT

## 2025-06-30 PROCEDURE — C1894 INTRO/SHEATH, NON-LASER: HCPCS

## 2025-06-30 PROCEDURE — C1752 CATH,HEMODIALYSIS,SHORT-TERM: HCPCS

## 2025-06-30 PROCEDURE — 86900 BLOOD TYPING SEROLOGIC ABO: CPT | Performed by: NURSE PRACTITIONER

## 2025-06-30 PROCEDURE — 99232 SBSQ HOSP IP/OBS MODERATE 35: CPT | Performed by: NURSE PRACTITIONER

## 2025-06-30 RX ORDER — LIDOCAINE HYDROCHLORIDE 10 MG/ML
INJECTION, SOLUTION INFILTRATION; PERINEURAL AS NEEDED
Status: COMPLETED | OUTPATIENT
Start: 2025-06-30 | End: 2025-06-30

## 2025-06-30 RX ORDER — ALBUMIN (HUMAN) 12.5 G/50ML
12.5 SOLUTION INTRAVENOUS AS NEEDED
Status: DISCONTINUED | OUTPATIENT
Start: 2025-06-30 | End: 2025-07-01

## 2025-06-30 RX ORDER — ALBUMIN (HUMAN) 12.5 G/50ML
12.5 SOLUTION INTRAVENOUS AS NEEDED
Status: DISCONTINUED | OUTPATIENT
Start: 2025-06-30 | End: 2025-06-30

## 2025-06-30 RX ADMIN — Medication 10 ML: at 20:46

## 2025-06-30 RX ADMIN — DULOXETINE 60 MG: 30 CAPSULE, DELAYED RELEASE ORAL at 09:16

## 2025-06-30 RX ADMIN — GABAPENTIN 100 MG: 100 CAPSULE ORAL at 20:45

## 2025-06-30 RX ADMIN — DILTIAZEM HYDROCHLORIDE 120 MG: 180 CAPSULE, EXTENDED RELEASE ORAL at 09:16

## 2025-06-30 RX ADMIN — CYCLOBENZAPRINE 10 MG: 10 TABLET, FILM COATED ORAL at 20:45

## 2025-06-30 RX ADMIN — GABAPENTIN 100 MG: 100 CAPSULE ORAL at 09:17

## 2025-06-30 RX ADMIN — HEPARIN SODIUM 2200 UNITS: 1000 INJECTION INTRAVENOUS; SUBCUTANEOUS at 15:39

## 2025-06-30 RX ADMIN — INSULIN LISPRO 2 UNITS: 100 INJECTION, SOLUTION INTRAVENOUS; SUBCUTANEOUS at 20:45

## 2025-06-30 RX ADMIN — HEPARIN SODIUM 2200 UNITS: 1000 INJECTION INTRAVENOUS; SUBCUTANEOUS at 19:49

## 2025-06-30 RX ADMIN — Medication 10 ML: at 09:17

## 2025-06-30 RX ADMIN — Medication 5 MG: at 22:36

## 2025-06-30 RX ADMIN — LIDOCAINE HYDROCHLORIDE 3 ML: 10 INJECTION, SOLUTION INFILTRATION; PERINEURAL at 15:26

## 2025-06-30 RX ADMIN — CARVEDILOL 6.25 MG: 6.25 TABLET, FILM COATED ORAL at 09:16

## 2025-06-30 RX ADMIN — CYCLOBENZAPRINE 10 MG: 10 TABLET, FILM COATED ORAL at 09:17

## 2025-06-30 RX ADMIN — INSULIN LISPRO 4 UNITS: 100 INJECTION, SOLUTION INTRAVENOUS; SUBCUTANEOUS at 11:56

## 2025-06-30 RX ADMIN — ACETAMINOPHEN 650 MG: 325 TABLET, FILM COATED ORAL at 22:33

## 2025-06-30 RX ADMIN — HYDROXYZINE HYDROCHLORIDE 25 MG: 25 TABLET, FILM COATED ORAL at 20:50

## 2025-06-30 NOTE — PROGRESS NOTES
"NEPHROLOGY PROGRESS NOTE------[facility]    [facility]  [phone]  Felisa Melendez MD      Patient Care Team:  Lorraine Agarwal APRN as PCP - General (Nurse Practitioner)  Rick Melendez MD as Consulting Physician (Nephrology)  Mayo Fregoso MD as Consulting Physician (Urology)  Fredi Pierce MD as Consulting Physician (Cardiology)  Cony Sanchez APRN as Nurse Practitioner (Vascular Surgery)      Provider:  Felisa Melendez MD  Patient Name: Lynne Cade  :  1950    SUBJECTIVE:    F/U ESRD    Patient accidentally pulled out her temporary dialysis catheter while asleep last night. Mild SOB. Mild edema.     Medication:  carvedilol, 6.25 mg, Oral, BID With Meals  cyclobenzaprine, 10 mg, Oral, TID  dilTIAZem CD, 120 mg, Oral, Q24H  DULoxetine, 60 mg, Oral, Daily  gabapentin, 100 mg, Oral, TID  insulin lispro, 2-7 Units, Subcutaneous, 4x Daily AC & at Bedtime  sodium chloride, 10 mL, Intravenous, Q12H           OBJECTIVE    Vital Sign Min/Max for last 24 hours  Temp  Min: 97.9 °F (36.6 °C)  Max: 98.7 °F (37.1 °C)   BP  Min: 123/70  Max: 162/68   Pulse  Min: 84  Max: 100   Resp  Min: 16  Max: 18   SpO2  Min: 93 %  Max: 98 %   No data recorded   No data recorded     Flowsheet Rows      Flowsheet Row First Filed Value   Admission Height 167.6 cm (66\") Documented at 2025 1606   Admission Weight 94.8 kg (209 lb) Documented at 2025 1606            No intake/output data recorded.  I/O last 3 completed shifts:  In: 1380 [P.O.:1380]  Out: -     Physical Exam:  General Appearance: alert, appears stated age and cooperative  Head: normocephalic, without obvious abnormality and atraumatic  Eyes: conjunctivae and sclerae normal and no icterus  Neck: supple and +MILD JVD  Lungs: clear to auscultation and respirations regular  Heart: regular rhythm & normal rate and normal S1, S2 +CYNDY  Chest: Wall " "no abnormalities observed  Abdomen: normal bowel sounds and soft, nontender +OBESITY  Extremities: moves extremities well, +TR BILAT LE EDEMA, no cyanosis and no redness  Skin: no bleeding, bruising or rash, turgor normal, color normal and no lesions noted  Neurologic: Alert, and oriented. No focal deficits    Labs:    WBC WBC   Date Value Ref Range Status   06/29/2025 8.81 3.40 - 10.80 10*3/mm3 Final   06/29/2025 10.06 3.40 - 10.80 10*3/mm3 Final   06/28/2025 9.32 3.40 - 10.80 10*3/mm3 Final   06/27/2025 11.08 (H) 3.40 - 10.80 10*3/mm3 Final      HGB Hemoglobin   Date Value Ref Range Status   06/29/2025 10.2 (L) 12.0 - 15.9 g/dL Final   06/29/2025 10.5 (L) 12.0 - 15.9 g/dL Final   06/28/2025 9.3 (L) 12.0 - 15.9 g/dL Final   06/27/2025 11.2 (L) 12.0 - 17.0 g/dL Final   06/27/2025 10.6 (L) 12.0 - 15.9 g/dL Final      HCT Hematocrit   Date Value Ref Range Status   06/29/2025 33.6 (L) 34.0 - 46.6 % Final   06/29/2025 35.2 34.0 - 46.6 % Final   06/28/2025 31.6 (L) 34.0 - 46.6 % Final   06/27/2025 33 (L) 38 - 51 % Final   06/27/2025 35.5 34.0 - 46.6 % Final      Platelets No results found for: \"LABPLAT\"   MCV MCV   Date Value Ref Range Status   06/29/2025 107.3 (H) 79.0 - 97.0 fL Final   06/29/2025 106.3 (H) 79.0 - 97.0 fL Final   06/28/2025 109.7 (H) 79.0 - 97.0 fL Final   06/27/2025 107.6 (H) 79.0 - 97.0 fL Final          Sodium Sodium   Date Value Ref Range Status   06/29/2025 140 136 - 145 mmol/L Final   06/29/2025 139 136 - 145 mmol/L Final   06/28/2025 141 136 - 145 mmol/L Final   06/27/2025 143 136 - 145 mmol/L Final      Potassium Potassium   Date Value Ref Range Status   06/29/2025 4.4 3.5 - 5.2 mmol/L Final   06/29/2025 4.1 3.5 - 5.2 mmol/L Final   06/28/2025 4.1 3.5 - 5.2 mmol/L Final   06/27/2025 3.9 3.5 - 5.2 mmol/L Final      Chloride Chloride   Date Value Ref Range Status   06/29/2025 106 98 - 107 mmol/L Final   06/29/2025 102 98 - 107 mmol/L Final   06/28/2025 102 98 - 107 mmol/L Final   06/27/2025 103 98 " "- 107 mmol/L Final      CO2 CO2   Date Value Ref Range Status   06/29/2025 19.6 (L) 22.0 - 29.0 mmol/L Final   06/29/2025 21.6 (L) 22.0 - 29.0 mmol/L Final   06/28/2025 23.3 22.0 - 29.0 mmol/L Final   06/27/2025 24.3 22.0 - 29.0 mmol/L Final      BUN BUN   Date Value Ref Range Status   06/29/2025 42.5 (H) 8.0 - 23.0 mg/dL Final   06/29/2025 30.2 (H) 8.0 - 23.0 mg/dL Final   06/28/2025 45.9 (H) 8.0 - 23.0 mg/dL Final   06/27/2025 41.1 (H) 8.0 - 23.0 mg/dL Final      Creatinine Creatinine   Date Value Ref Range Status   06/29/2025 4.68 (H) 0.57 - 1.00 mg/dL Final   06/29/2025 3.38 (H) 0.57 - 1.00 mg/dL Final   06/28/2025 4.42 (H) 0.57 - 1.00 mg/dL Final   06/27/2025 4.30 (H) 0.60 - 1.30 mg/dL Final   06/27/2025 4.28 (H) 0.57 - 1.00 mg/dL Final      Calcium Calcium   Date Value Ref Range Status   06/29/2025 7.9 (L) 8.6 - 10.5 mg/dL Final   06/29/2025 8.6 8.6 - 10.5 mg/dL Final   06/28/2025 8.0 (L) 8.6 - 10.5 mg/dL Final   06/27/2025 8.4 (L) 8.6 - 10.5 mg/dL Final      PO4 No components found for: \"PO4\"   Albumin Albumin   Date Value Ref Range Status   06/27/2025 3.6 3.5 - 5.2 g/dL Final      Magnesium Magnesium   Date Value Ref Range Status   06/29/2025 2.5 (H) 1.6 - 2.4 mg/dL Final   06/29/2025 2.3 1.6 - 2.4 mg/dL Final   06/28/2025 2.6 (H) 1.6 - 2.4 mg/dL Final   06/27/2025 2.4 1.6 - 2.4 mg/dL Final      Uric Acid No components found for: \"URIC ACID\"     Imaging Results (Last 72 Hours)       Procedure Component Value Units Date/Time    XR Chest 1 View [[id number]] Collected: 06/30/25 0448     Updated: 06/30/25 0450    Narrative:      XR CHEST 1 VW    Date of Exam: 6/30/2025 4:36 AM EDT    Indication: pulled out dialysis cath    Comparison: 6/27/2025.    Findings:  There is stable asymmetric elevation of the right hemidiaphragm. There is no pneumothorax, pleural effusion or focal airspace consolidation. Heart size and pulmonary vasculature appear within normal limits. Regional bones appear intact.      Impression:      " Impression:  No acute cardiopulmonary abnormality.          Electronically Signed: Bran Kennedy MD    6/30/2025 4:48 AM EDT    Workstation ID: [id number]    IR NON-MIKE TEMP DIALYSIS ACC [[id number]] Collected: 06/28/25 1323     Updated: 06/28/25 1330    Narrative:      IR NON-TUNNELED TEMP DIALYSIS ACCESS    Date of Exam: 6/28/2025 12:41 PM EDT    Indication: Clotted left upper extremity shunt. Dialysis catheter requested for inpatient hemodialysis.    Comparison: None available.    Fluoroscopic Time: 43 seconds. 11 mGy.    Description of procedure: Informed consent was obtained following a detailed description of the procedure including all risks. The patient was placed supine on the fluoroscopy table. A successful timeout was performed. The right neck was evaluated with   ultrasound which demonstrates patent and normally compressible right IJ. Static image was saved.    The right neck was prepped and draped using maximum sterile barrier technique. Lidocaine was used for local anesthesia. Access was obtained of the right IJ under direct ultrasound visualization using a micropuncture set. Real-time ultrasound guidance was   utilized to confirm an intravascular location of the needle, wire and catheter during manipulation. A J-tipped wire was advanced centrally and the tract was dilated to accommodate a triple-lumen large bore temporary hemodialysis catheter which was   advanced into position to the high right atrial level. The catheter was secured in place at the skin site using 0 Prolene suture material. Catheter flushes and aspirates appropriately and was heparinized in the usual manner. Sterile dressing was applied.   Spot fluoroscopy image obtained following placement shows the catheter tip in good position at the high right atrial level. The patient tolerated the procedure well.      Impression:      Impression:  Placement of temporary hemodialysis catheter via right IJ approach using ultrasound and fluoroscopic  guidance.      Electronically Signed: Gaurav Hyde MD    6/28/2025 1:28 PM EDT    Workstation ID: [id number]    XR Chest 1 View [[id number]] Collected: 06/27/25 1853     Updated: 06/27/25 1856    Narrative:      XR CHEST 1 VW    Date of Exam: 6/27/2025 6:32 PM EDT    Indication: Unable to have dialysis Wednesday or today    Comparison: April 22, 2025    Findings:  There is elevation of the right hemidiaphragm. There is a mild right basilar opacity. The heart and mediastinal contours appear normal. The pulmonary vasculature appears normal. The osseous structures appear intact.      Impression:      Impression:  1.Mild right basilar opacity, which could reflect atelectasis or pneumonia.  2.Elevation of right hemidiaphragm.        Electronically Signed: Eitan Leach MD    6/27/2025 6:54 PM EDT    Workstation ID: [id number]            Results for orders placed during the hospital encounter of 06/27/25    XR Chest 1 View    Narrative  XR CHEST 1 VW    Date of Exam: 6/30/2025 4:36 AM EDT    Indication: pulled out dialysis cath    Comparison: 6/27/2025.    Findings:  There is stable asymmetric elevation of the right hemidiaphragm. There is no pneumothorax, pleural effusion or focal airspace consolidation. Heart size and pulmonary vasculature appear within normal limits. Regional bones appear intact.    Impression  Impression:  No acute cardiopulmonary abnormality.          Electronically Signed: Bran Kennedy MD  6/30/2025 4:48 AM EDT  Workstation ID: [id number]      XR Chest 1 View    Narrative  XR CHEST 1 VW    Date of Exam: 6/27/2025 6:32 PM EDT    Indication: Unable to have dialysis Wednesday or today    Comparison: April 22, 2025    Findings:  There is elevation of the right hemidiaphragm. There is a mild right basilar opacity. The heart and mediastinal contours appear normal. The pulmonary vasculature appears normal. The osseous structures appear intact.    Impression  Impression:  1.Mild right basilar opacity, which  could reflect atelectasis or pneumonia.  2.Elevation of right hemidiaphragm.        Electronically Signed: Eitan Leach MD  6/27/2025 6:54 PM EDT  Workstation ID: [id number]      Results for orders placed during the hospital encounter of 04/22/25    XR Chest 1 View    Narrative  XR CHEST 1 VW    Date of Exam: 4/22/2025 3:40 PM EDT    Indication: AMS Protocol  AMS Protocol    Comparison: 2/16/2025    Findings:  Heart size is within normal limits for the projection. The right hemidiaphragm is elevated, unchanged. The pulmonary vascular pattern appears normal and the lungs appear clear.    Impression  Impression:  No active disease.        Electronically Signed: Yfn Luna MD  4/22/2025 3:53 PM EDT  Workstation ID: [id number]      Results for orders placed during the hospital encounter of 04/22/25    Duplex Carotid Ultrasound CAR    Interpretation Summary    Right internal carotid artery demonstrates a less than 50% stenosis.    Antegrade right vertebral flow.    Left internal carotid artery demonstrates a less than 50% stenosis.    Antegrade left vertebral flow.        ASSESSMENT / PLAN      AV graft thrombosis, sequela    ESRD (end stage renal disease) on dialysis    ESRD------HD today once access replaced. Will reach out to vascular surgery but per their note they plan on open thrombectomy and access revision tomorrow. Will need temporary dialysis catheter in the mean time. IR to place today    2. HTN WITH CKD-----BP good. Avoid hypotension    3. DMII WITH RENAL MANIFESTATIONS-----BS ok. Glucometers, SSI    4. ANEMIA OF ESRD-----EPO/Iron with HD as needed    5. CLOTTED AV SHUNT------Open thrombectomy tomorrow per Vascular surgery    6. DEPRESSION------On Cymbalta    7. DM PERIPHERAL NEUROPATHY------On Neurontin    8. PVD    Felisa Melendez MD  [facility]  [phone]  06/30/25  08:08 EDT

## 2025-06-30 NOTE — NURSING NOTE
While pt was sleeping she pulled out dialysis cath.  Pressure held at insertion site until bleeding stopped.  MD notified and nephrology notified.  Will continue to monitor insertion site.

## 2025-06-30 NOTE — CASE MANAGEMENT/SOCIAL WORK
Discharge Planning Assessment   Thanh     Patient Name: Lynne Cade  MRN: [id number]  Today's Date: 6/30/2025    Admit Date: 6/27/2025    [facility] Current OP HD at [facility] M[facility].   Discharge Needs Assessment       Row Name 06/30/25 1210       Living Environment    People in Home spouse    Name(s) of People in Home -Gordy    Current Living Arrangements home    Potentially Unsafe Housing Conditions none    In the past 12 months has the electric, gas, oil, or water company threatened to shut off services in your home? No    Primary Care Provided by self    Provides Primary Care For no one    Family Caregiver if Needed spouse    Family Caregiver Names [facility]    Quality of Family Relationships helpful;involved;supportive    Able to Return to Prior Arrangements yes       Resource/Environmental Concerns    Resource/Environmental Concerns none    Transportation Concerns none       Transportation Needs    In the past 12 months, has lack of transportation kept you from medical appointments or from getting medications? no    In the past 12 months, has lack of transportation kept you from meetings, work, or from getting things needed for daily living? No       Food Insecurity    Within the past 12 months, you worried that your food would run out before you got the money to buy more. Never true    Within the past 12 months, the food you bought just didn't last and you didn't have money to get more. Never true       Transition Planning    Patient/Family Anticipates Transition to home with family;home with help/services    Patient/Family Anticipated Services at Transition none    Transportation Anticipated family or friend will provide       Discharge Needs Assessment    Readmission Within the Last 30 Days no previous admission in last 30 days    Current Outpatient/Agency/Support Group homecare agency;outpatient hemodialysis  PT weekly     Equipment Currently Used at Home walker, rolling;wheelchair;cane, straight;bp cuff;oxygen;glucometer    Concerns to be Addressed denies needs/concerns at this time;no discharge needs identified    Do you want help finding or keeping work or a job? Patient declined    Do you want help with school or training? For example, starting or completing job training or getting a high school diploma, GED or equivalent No    Anticipated Changes Related to Illness none    Equipment Needed After Discharge none    Outpatient/Agency/Support Group Needs homecare agency    Discharge Facility/Level of Care Needs home with home health    Provided Post Acute Provider List? N/A    Provided Post Acute Provider Quality & Resource List? N/A                   Discharge Plan       Row Name 06/30/25 1213       Plan    Plan Return home with spouse and [facility] (current, will need BLU order). Current OP HD at [facility].    Patient/Family in Agreement with Plan yes    Plan Comments CM met with patient and  Gordy at bedside. Pt lives at home, does not drive, independent with ADL's. PCP recently changed to Dr Jaspreet Schneider at [facility] in [place] and pharmacy confirmed as [facility]. DME includes walker, cane, wheelchair, BP cuff, glucometer, and 2L O2 HS supplied by [employer] (has portable tank and concentrator). Pt reports having home health PT services weekly but is not able to confirm name of agency. Pt goes to outpatient HD at [facility] on [address]. Spouse to provide transportation home at [place]. CM confirmed with liaison Vicenta that pt is current with [facility].                  Continued Care and Services - Admitted Since 6/27/2025       Home Medical Care       Service Provider Request Status Services Address Phone Fax Patient Preferred    [facility] - REHAB AT HOME Accepted -- [address] [place] [address] [phone] --    [facility] [address]  [facility] [address] [address] [phone] --                   Demographic Summary       Row Name 06/30/25 1210       General Information    Admission Type inpatient    Arrived From emergency department    Referral Source admission list    Reason for Consult care coordination/care conference;discharge planning    Preferred Language English       Contact Information    Permission Granted to Share Info With                    Functional Status       Row Name 06/30/25 1210       Functional Status    Usual Activity Tolerance moderate    Current Activity Tolerance moderate       Functional Status, IADL    Medications independent    Meal Preparation assistive person    Housekeeping assistive person    Laundry assistive person    Shopping assistive person    If for any reason you need help with day-to-day activities such as bathing, preparing meals, shopping, managing finances, etc., do you get the help you need? I get all the help I need             Megan Naegele, RN     Office Phone: [phone]  Office Cell: [phone]

## 2025-06-30 NOTE — PROGRESS NOTES
Name: Lynne Cade ADMIT: 2025   : 1950  PCP: Lorraine Agarwal APRN    MRN: [id number] LOS: 3 days   AGE/SEX: 75 y.o. female  ROOM: [address] [place] [address]    75 y.o. left axilloaxillary loop AV graft.  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 Understands plan for thrombectomy tomorrow.  at bedside.    Scheduled Medications:   carvedilol, 6.25 mg, Oral, BID With Meals  cyclobenzaprine, 10 mg, Oral, TID  dilTIAZem CD, 120 mg, Oral, Q24H  DULoxetine, 60 mg, Oral, Daily  gabapentin, 100 mg, Oral, TID  insulin lispro, 2-7 Units, Subcutaneous, 4x Daily AC & at Bedtime  sodium chloride, 10 mL, Intravenous, Q12H    Vital Signs  Vital Signs Patient Vitals for the past 24 hrs:   BP Temp Temp src Pulse Resp SpO2   25 0730 148/76 97.9 °F (36.6 °C) Oral 84 -- 98 %   25 0309 123/70 98.3 °F (36.8 °C) Axillary 96 -- 95 %   25 147/63 -- -- -- -- --   25 -- 98 °F (36.7 °C) Oral 100 18 93 %   25 162/68 97.9 °F (36.6 °C) Oral 89 16 94 %   25 1641 157/71 98.1 °F (36.7 °C) Oral 89 16 94 %   25 1120 124/59 98.7 °F (37.1 °C) Oral 89 18 95 %     Physical Exam:      CBC    Results from last 7 days   Lab Units 25  2255 25  0141 25  0017 25  1659 25  1653 25  0806   WBC 10*3/mm3 8.81 10.06 9.32  --  11.08* 10.18   HEMOGLOBIN g/dL 10.2* 10.5* 9.3*  --  10.6* 11.5*   HEMOGLOBIN, POC g/dL  --   --   --  11.2*  --   --    PLATELETS 10*3/mm3 184 195 207  --  232 315     BMP   Results from last 7 days   Lab Units 25  2255 25  0141 25  0017 25  1659 25  1653 25  0807   SODIUM mmol/L 140 139 141  --  143 142   POTASSIUM mmol/L 4.4 4.1 4.1  --  3.9 4.3   CHLORIDE mmol/L 106 102 102  --  103 104   CO2 mmol/L 19.6* 21.6* 23.3  --  24.3 22.6   BUN mg/dL 42.5* 30.2* 45.9*  --  41.1* 54.0*    CREATININE mg/dL 4.68* 3.38* 4.42* 4.30* 4.28* 4.93*   GLUCOSE mg/dL 254* 52* 133*  --  115* 131*   MAGNESIUM mg/dL 2.5* 2.3 2.6*  --  2.4  --    PHOSPHORUS mg/dL 5.2* 4.5 6.5*  --  5.9*  --      Cr Clearance Estimated Creatinine Clearance: 11.7 mL/min (A) (by C-G formula based on SCr of 4.68 mg/dL (H)).    Assessment & Plan   Assessment & Plan    AV graft thrombosis, sequela    ESRD (end stage renal disease) on dialysis    75 y.o. female with ESRD on HD via left axilloaxillary loop AV graft, admitted after developing recurrent AV graft thrombosis.    Plan to replace nontunneled hemodialysis catheter today with radiology  Plan for open graft thrombectomy tomorrow with Dr. Joana ESCOBAR after midnight  Nursing to obtain informed consent    EVERARDO Posada  06/30/25, 08:20 EDT    Office contact: [phone]

## 2025-06-30 NOTE — PROGRESS NOTES
"    [facility] MEDICINE SERVICE  DAILY PROGRESS NOTE    NAME: Lynne Cade  : 1950  MRN: [id number]      LOS: 3 days     PROVIDER OF SERVICE: Eusebio Durant MD    Chief Complaint: AV graft thrombosis, sequela    Subjective:     Interval History:  History taken from: patient      Chief Complaint: AV fistula issue     History of Present Illness: Lynne Cade is a 75 y.o. female with a CMH of HTN, HLD, T2DM, CHF, CKD stage IV on HD M/W/F who presented to [facility] on 2025 with AV fistula problem.  Patient receives dialysis via AV graft Monday, Wednesday, Friday.  Reports that her last complete dialysis treatment was 4 days ago on Monday, 2025.  Patient went for dialysis on Wednesday and was unable to complete due to shunt occlusion.  Patient had  thrombectomy performed yesterday at [facility] in [place].  Was found to have adequate blood flow after the procedure.  Dialysis was attempted today, unable to complete due to recurrent shunt occlusion.  Denies fever, chills, chest pain, shortness of breath, abdominal pain, changes in bowel or bladder habits.     In the ED, patient was afebrile and hemodynamically stable.  Labs pertinent for Creatinine 4.3 (baseline 2.8), BUN 39, GFR 10.1.  AG 15.7. Calcium 8.4, ionized calcium 1.01, Phos 5.9. WBC 11.08. Chest x-ray: \"Mild right basilar opacity, which could reflect atelectasis or pneumonia.  Elevation of right hemodynamic. \" EKG: SR, HR 81. Vascular surgery consulted, not recommending thrombectomy at this time,  recommending placement of temporary if not tunneled hemodialysis catheter, possible repeat thrombectomy next week. Nephrology was consulted, recommended consulting IR. IR consulted, recommended catheter be placed tomorrow morning, pt does not need to be NPO.  Hospital service to admit for continued evaluation.         sen in bed NAD, VSS, No new complaints, vss  Dialysis delayed due to lack of working access. " Pending  tunneled dialysis catheter as ordered.   6/29 seen today in bed SURAJ YOUNGBLOOD RN, VSS, Now has nontunneled hemodialysis catheter right neck, used yesterday.   6/30 patient seen and examined in bed no acute distress, vital signs stable, discussed with RN, patient removed her dialysis catheter overnight by accident.  IR consulted for replacement.    Plan to replace nontunneled hemodialysis catheter today with radiology  Plan for open graft thrombectomy tomorrow with Dr. Bernard    Plan: Return home with spouse and C PT     Review of Systems  Constitutional:  Negative for chills and fever.   Respiratory:  Negative for shortness of breath.    Cardiovascular:  Negative for chest pain.   Neurological:  Negative for dizziness and headaches.     Objective:     Vital Signs  Temp:  [97.9 °F (36.6 °C)-98.3 °F (36.8 °C)] 98.3 °F (36.8 °C)  Heart Rate:  [] 81  Resp:  [14-18] 14  BP: (117-162)/(60-76) 117/60   Body mass index is 33.73 kg/m².    Physical Exam     General: 74 yo WF, Alert and oriented, well nourished, no acute distress.  HENT: Normocephalic, normal hearing, moist oral mucosa, no scleral icterus.  Neck: Supple, nontender, no carotid bruits, no JVD, no LAD.  Lungs: Clear to auscultation, nonlabored respiration.  Heart: RRR, no murmur, gallop or edema.  Abdomen: Soft, nontender, nondistended, + bowel sounds.  Musculoskeletal: Normal range of motion and strength, no tenderness or swelling.  Skin: Fistula present on LUE, bandaged. Skin is warm, dry and pink, no rashes or lesions.  Psychiatric: Cooperative, appropriate mood and affect.       Diagnostic Data    Results from last 7 days   Lab Units 06/29/25  2255 06/27/25  1659 06/27/25  1653   WBC 10*3/mm3 8.81   < > 11.08*   HEMOGLOBIN g/dL 10.2*   < > 10.6*   HEMOGLOBIN, POC   --    < >  --    HEMATOCRIT % 33.6*   < > 35.5   HEMATOCRIT POC   --    < >  --    PLATELETS 10*3/mm3 184   < > 232   GLUCOSE mg/dL 254*   < > 115*   CREATININE mg/dL 4.68*   < > 4.28*  "  BUN mg/dL 42.5*   < > 41.1*   SODIUM mmol/L 140   < > 143   POTASSIUM mmol/L 4.4   < > 3.9   AST (SGOT) U/L  --   --  14   ALT (SGPT) U/L  --   --  <5   ALK PHOS U/L  --   --  58   BILIRUBIN mg/dL  --   --  0.2   POC ANION GAP ISTAT   --    < >  --    ANION GAP mmol/L 14.4   < > 15.7*    < > = values in this interval not displayed.       XR Chest 1 View  Result Date: 6/30/2025  Impression: No acute cardiopulmonary abnormality. Electronically Signed: Bran Kennedy MD  6/30/2025 4:48 AM EDT  Workstation ID: [id number]        I reviewed the patient's new clinical results.    Assessment/Plan:     Active and Resolved Problems  Active Hospital Problems    Diagnosis  POA    **AV graft thrombosis, sequela [T82.868S]  Not Applicable    ESRD (end stage renal disease) on dialysis [N18.6, Z99.2]  Not Applicable      Resolved Hospital Problems    Diagnosis Date Resolved POA    A-V fistula [I77.0] 06/29/2025 Yes     Occlusion of AV dialysis graft  CKD stage IV on hemodialysis M/W/F  Creatinine 4.3 (baseline 2.8), BUN 39, GFR 10.1.  AG 15.7  Calcium 8.4, ionized calcium 1.01  Phos 5.9  WBC 11.08  Chest x-ray: \"Mild right basilar opacity, which could reflect atelectasis or pneumonia.  Elevation of right hemodynamic. \"  EKG: SR, HR 81  Last dialysis treatment 4 days ago Monday (6/23/2025), receives dialysis via AV graft  Thrombosed earlier this week, treated with thrombectomy  Vascular surgery consulted and following, not recommending thrombectomy at this time,  recommending placement of temporary if not tunneled hemodialysis catheter, possible repeat thrombectomy next week  Nephrology was consulted and following, recommended consulting IR  IR consulted and following, Plan to replace nontunneled hemodialysis catheter today with radiology  Plan for open graft thrombectomy tomorrow with Dr. Bernard     Congestive heart failure  Stable  Continue home dose meds     Type 2 diabetes mellitus  A1c pending   Low SSI   Acchu-check ACHS with " diet  Heart-healthy, consistent carb diet   Hypoglycemia protocol      Hypertension  Hyperlipidemia  Continue home dose meds    VTE Prophylaxis:  Pharmacologic & mechanical VTE prophylaxis orders are present.    Disposition Planning:     Barriers to Discharge:HD  Anticipated Date of Discharge: 6/29  Place of Discharge: home      Time: 35 minutes     Code Status and Medical Interventions: CPR (Attempt to Resuscitate); Full Support   Ordered at: 06/27/25 2031     Code Status (Patient has no pulse and is not breathing):    CPR (Attempt to Resuscitate)     Medical Interventions (Patient has pulse or is breathing):    Full Support       Signature: Electronically signed by Eusebio Durant MD, 06/30/25, 13:24 EDT.  [facility] Hospitalist Team

## 2025-06-30 NOTE — POST-PROCEDURE NOTE
Procedure:Temporary dialysis catheter placement      Pre Op DX:ESRD, thrombosed AV graft      Post Op DX:ESRD, thrombosed AV graft      Anesthesia: local       Findings: Successful placement of Right IJ 16cm triple lumen temporary dialysis catheter utilizing US and fluoroscopic guidance.     Complications:No immediate post-procedure complications. Patient transported back to IP unit in stable condition.       Provider Signature: EVERARDO Garcia

## 2025-06-30 NOTE — PLAN OF CARE
Goal Outcome Evaluation:   Pt pulled out dialysis cath while sleeping.  MD and nephrology notified.  Chest x ray completed and negative.  Spouse at bedside.  Pt NPO at this time for possible cath replacement.

## 2025-06-30 NOTE — PROGRESS NOTES
"    [facility] MEDICINE SERVICE  DAILY PROGRESS NOTE    NAME: Lynne Cade  : 1950  MRN: [id number]      LOS: 2 days     PROVIDER OF SERVICE: Eusebio Durant MD    Chief Complaint: AV graft thrombosis, sequela    Subjective:     Interval History:  History taken from: patient      Chief Complaint: AV fistula issue     History of Present Illness: Lynne Cade is a 75 y.o. female with a CMH of HTN, HLD, T2DM, CHF, CKD stage IV on HD M/W/F who presented to [facility] on 2025 with AV fistula problem.  Patient receives dialysis via AV graft Monday, Wednesday, Friday.  Reports that her last complete dialysis treatment was 4 days ago on Monday, 2025.  Patient went for dialysis on Wednesday and was unable to complete due to shunt occlusion.  Patient had  thrombectomy performed yesterday at [facility] in [place].  Was found to have adequate blood flow after the procedure.  Dialysis was attempted today, unable to complete due to recurrent shunt occlusion.  Denies fever, chills, chest pain, shortness of breath, abdominal pain, changes in bowel or bladder habits.     In the ED, patient was afebrile and hemodynamically stable.  Labs pertinent for Creatinine 4.3 (baseline 2.8), BUN 39, GFR 10.1.  AG 15.7. Calcium 8.4, ionized calcium 1.01, Phos 5.9. WBC 11.08. Chest x-ray: \"Mild right basilar opacity, which could reflect atelectasis or pneumonia.  Elevation of right hemodynamic. \" EKG: SR, HR 81. Vascular surgery consulted, not recommending thrombectomy at this time,  recommending placement of temporary if not tunneled hemodialysis catheter, possible repeat thrombectomy next week. Nephrology was consulted, recommended consulting IR. IR consulted, recommended catheter be placed tomorrow morning, pt does not need to be NPO.  Hospital service to admit for continued evaluation.         sen in bed NAD, VSS, No new complaints, vss  Dialysis delayed due to lack of working access. " Pending  tunneled dialysis catheter as ordered.   6/29 seen today in bed RYLIE, SURAJ RN, VSS, Now has nontunneled hemodialysis catheter right neck, used yesterday.     Review of Systems  Constitutional:  Negative for chills and fever.   Respiratory:  Negative for shortness of breath.    Cardiovascular:  Negative for chest pain.   Neurological:  Negative for dizziness and headaches.     Objective:     Vital Signs  Temp:  [97.6 °F (36.4 °C)-98.7 °F (37.1 °C)] 97.9 °F (36.6 °C)  Heart Rate:  [] 89  Resp:  [13-18] 16  BP: (124-168)/(59-83) 162/68   Body mass index is 33.73 kg/m².    Physical Exam     General: 76 yo WF, Alert and oriented, well nourished, no acute distress.  HENT: Normocephalic, normal hearing, moist oral mucosa, no scleral icterus.  Neck: Supple, nontender, no carotid bruits, no JVD, no LAD.  Lungs: Clear to auscultation, nonlabored respiration.  Heart: RRR, no murmur, gallop or edema.  Abdomen: Soft, nontender, nondistended, + bowel sounds.  Musculoskeletal: Normal range of motion and strength, no tenderness or swelling.  Skin: Fistula present on LUE, bandaged. Skin is warm, dry and pink, no rashes or lesions.  Psychiatric: Cooperative, appropriate mood and affect.       Diagnostic Data    Results from last 7 days   Lab Units 06/29/25  0141 06/27/25  1659 06/27/25  1653   WBC 10*3/mm3 10.06   < > 11.08*   HEMOGLOBIN g/dL 10.5*   < > 10.6*   HEMOGLOBIN, POC   --    < >  --    HEMATOCRIT % 35.2   < > 35.5   HEMATOCRIT POC   --    < >  --    PLATELETS 10*3/mm3 195   < > 232   GLUCOSE mg/dL 52*   < > 115*   CREATININE mg/dL 3.38*   < > 4.28*   BUN mg/dL 30.2*   < > 41.1*   SODIUM mmol/L 139   < > 143   POTASSIUM mmol/L 4.1   < > 3.9   AST (SGOT) U/L  --   --  14   ALT (SGPT) U/L  --   --  <5   ALK PHOS U/L  --   --  58   BILIRUBIN mg/dL  --   --  0.2   POC ANION GAP ISTAT   --    < >  --    ANION GAP mmol/L 15.4*   < > 15.7*    < > = values in this interval not displayed.       IR NON-MIKE TEMP DIALYSIS  "ACC  Result Date: 6/28/2025  Impression: Placement of temporary hemodialysis catheter via right IJ approach using ultrasound and fluoroscopic guidance. Electronically Signed: Gaurav Hyde MD  6/28/2025 1:28 PM EDT  Workstation ID: [id number]        I reviewed the patient's new clinical results.    Assessment/Plan:     Active and Resolved Problems  Active Hospital Problems    Diagnosis  POA    **AV graft thrombosis, sequela [T82.868S]  Not Applicable    ESRD (end stage renal disease) on dialysis [N18.6, Z99.2]  Not Applicable      Resolved Hospital Problems    Diagnosis Date Resolved POA    A-V fistula [I77.0] 06/29/2025 Yes     Occlusion of AV dialysis graft  CKD stage IV on hemodialysis M/W/F  Creatinine 4.3 (baseline 2.8), BUN 39, GFR 10.1.  AG 15.7  Calcium 8.4, ionized calcium 1.01  Phos 5.9  WBC 11.08  Chest x-ray: \"Mild right basilar opacity, which could reflect atelectasis or pneumonia.  Elevation of right hemodynamic. \"  EKG: SR, HR 81  Last dialysis treatment 4 days ago Monday (6/23/2025), receives dialysis via AV graft  Thrombosed earlier this week, treated with thrombectomy  Vascular surgery consulted and following, not recommending thrombectomy at this time,  recommending placement of temporary if not tunneled hemodialysis catheter, possible repeat thrombectomy next week  Nephrology was consulted and following, recommended consulting IR  IR consulted and following, recommended catheter be placed tomorrow morning, pt does not need to be NPO     Congestive heart failure  Stable  Continue home dose meds     Type 2 diabetes mellitus  A1c pending   Low SSI   Acchu-check ACHS with diet  Heart-healthy, consistent carb diet   Hypoglycemia protocol      Hypertension  Hyperlipidemia  Continue home dose meds    VTE Prophylaxis:  Pharmacologic & mechanical VTE prophylaxis orders are present.    Disposition Planning:     Barriers to Discharge:HD  Anticipated Date of Discharge: 6/29  Place of Discharge: home      Time: " 35 minutes     Code Status and Medical Interventions: CPR (Attempt to Resuscitate); Full Support   Ordered at: 06/27/25 2031     Code Status (Patient has no pulse and is not breathing):    CPR (Attempt to Resuscitate)     Medical Interventions (Patient has pulse or is breathing):    Full Support       Signature: Electronically signed by Eusebio Durant MD, 06/29/25, 22:41 EDT.  [facility] Hospitalist Team

## 2025-06-30 NOTE — DISCHARGE PLACEMENT REQUEST
"Lynne Cade S \"NAVIN\" (75 y.o. Female)       Date of Birth   1950    Social Security Number       Address   [address] [place] [address]    Home Phone   [phone]    MRN   [id number]       [facility]    Marital Status                               Admission Date   6/27/2025    Admission Type   Emergency    Admitting Provider   Darci Leach DO    Attending Provider   Eusebio Durant MD    Department, Room/Bed   [facility] INPATIENT, 378/1       Discharge Date       Discharge Disposition       Discharge Destination                                 Attending Provider: Eusebio Durant MD    Allergies: Carvedilol, Morphine, Sulfa Antibiotics, Hydrocodone, Diphenhydramine    Isolation: None   Infection: None   Code Status: CPR    Ht: 167.6 cm (66\")   Wt: 94.8 kg (209 lb)    Admission Cmt: None   Principal Problem: AV graft thrombosis, sequela [T82.868S]                   Active Insurance as of 6/27/2025       Primary Coverage       Payor Plan Insurance Group Employer/Plan Group    ANTHEM MEDICARE REPLACEMENT ANTHEM MEDICARE ADVANTAGE HMO [id number]       Payor Plan Address Payor Plan Phone Number Payor Plan Fax Number Effective Dates    [address] [phone]  1/1/2025 - None Entered    [facility] [address]         Subscriber Name Subscriber Birth Date Member ID       LYNNE CADE 1950 [id number]                     Emergency Contacts        (Rel.) Home Phone Work Phone Mobile Phone    AMINTA CADE (Spouse) [phone] -- [phone]    JOSIE()YOAV (Other) -- -- [phone]          "

## 2025-07-01 ENCOUNTER — ANCILLARY PROCEDURE (OUTPATIENT)
Dept: PERIOP | Facility: HOSPITAL | Age: 75
DRG: 252 | End: 2025-07-01
Payer: MEDICARE

## 2025-07-01 ENCOUNTER — ANESTHESIA EVENT (OUTPATIENT)
Dept: PERIOP | Facility: HOSPITAL | Age: 75
End: 2025-07-01
Payer: MEDICARE

## 2025-07-01 ENCOUNTER — ANESTHESIA (OUTPATIENT)
Dept: PERIOP | Facility: HOSPITAL | Age: 75
End: 2025-07-01
Payer: MEDICARE

## 2025-07-01 LAB
ANION GAP SERPL CALCULATED.3IONS-SCNC: 12.9 MMOL/L (ref 5–15)
BUN SERPL-MCNC: 23.4 MG/DL (ref 8–23)
BUN/CREAT SERPL: 7.5 (ref 7–25)
CALCIUM SPEC-SCNC: 8.5 MG/DL (ref 8.6–10.5)
CHLORIDE SERPL-SCNC: 101 MMOL/L (ref 98–107)
CO2 SERPL-SCNC: 23.1 MMOL/L (ref 22–29)
CREAT SERPL-MCNC: 3.1 MG/DL (ref 0.57–1)
DEPRECATED RDW RBC AUTO: 67.6 FL (ref 37–54)
EGFRCR SERPLBLD CKD-EPI 2021: 15 ML/MIN/1.73
ERYTHROCYTE [DISTWIDTH] IN BLOOD BY AUTOMATED COUNT: 16.9 % (ref 12.3–15.4)
GLUCOSE BLDC GLUCOMTR-MCNC: 135 MG/DL (ref 70–105)
GLUCOSE BLDC GLUCOMTR-MCNC: 142 MG/DL (ref 70–105)
GLUCOSE BLDC GLUCOMTR-MCNC: 146 MG/DL (ref 70–105)
GLUCOSE BLDC GLUCOMTR-MCNC: 179 MG/DL (ref 70–105)
GLUCOSE BLDC GLUCOMTR-MCNC: 184 MG/DL (ref 70–105)
GLUCOSE BLDC GLUCOMTR-MCNC: 387 MG/DL (ref 70–105)
GLUCOSE SERPL-MCNC: 141 MG/DL (ref 65–99)
HCT VFR BLD AUTO: 36.8 % (ref 34–46.6)
HGB BLD-MCNC: 10.9 G/DL (ref 12–15.9)
MAGNESIUM SERPL-MCNC: 2.4 MG/DL (ref 1.6–2.4)
MCH RBC QN AUTO: 32.3 PG (ref 26.6–33)
MCHC RBC AUTO-ENTMCNC: 29.6 G/DL (ref 31.5–35.7)
MCV RBC AUTO: 109.2 FL (ref 79–97)
PHOSPHATE SERPL-MCNC: 4.4 MG/DL (ref 2.5–4.5)
PLATELET # BLD AUTO: 195 10*3/MM3 (ref 140–450)
PMV BLD AUTO: 9.8 FL (ref 6–12)
POTASSIUM SERPL-SCNC: 3.8 MMOL/L (ref 3.5–5.2)
QT INTERVAL: 408 MS
QTC INTERVAL: 463 MS
RBC # BLD AUTO: 3.37 10*6/MM3 (ref 3.77–5.28)
SODIUM SERPL-SCNC: 137 MMOL/L (ref 136–145)
WBC NRBC COR # BLD AUTO: 11.36 10*3/MM3 (ref 3.4–10.8)

## 2025-07-01 PROCEDURE — 84100 ASSAY OF PHOSPHORUS: CPT | Performed by: INTERNAL MEDICINE

## 2025-07-01 PROCEDURE — C1769 GUIDE WIRE: HCPCS | Performed by: SURGERY

## 2025-07-01 PROCEDURE — 25810000003 SODIUM CHLORIDE 0.9 % SOLUTION

## 2025-07-01 PROCEDURE — 25010000002 CEFAZOLIN PER 500 MG: Performed by: NURSE PRACTITIONER

## 2025-07-01 PROCEDURE — 82948 REAGENT STRIP/BLOOD GLUCOSE: CPT

## 2025-07-01 PROCEDURE — 25010000002 ONDANSETRON PER 1 MG

## 2025-07-01 PROCEDURE — C1757 CATH, THROMBECTOMY/EMBOLECT: HCPCS | Performed by: SURGERY

## 2025-07-01 PROCEDURE — 25010000002 HEPARIN (PORCINE) PER 1000 UNITS

## 2025-07-01 PROCEDURE — 25810000003 SODIUM CHLORIDE 0.9 % SOLUTION 250 ML FLEX CONT

## 2025-07-01 PROCEDURE — 85027 COMPLETE CBC AUTOMATED: CPT | Performed by: INTERNAL MEDICINE

## 2025-07-01 PROCEDURE — 25010000002 PHENYLEPHRINE 10 MG/ML SOLUTION 5 ML VIAL

## 2025-07-01 PROCEDURE — 05780ZZ DILATION OF LEFT AXILLARY VEIN, OPEN APPROACH: ICD-10-PCS | Performed by: SURGERY

## 2025-07-01 PROCEDURE — 25010000002 DEXAMETHASONE PER 1 MG

## 2025-07-01 PROCEDURE — 25010000002 PROPOFOL 10 MG/ML EMULSION

## 2025-07-01 PROCEDURE — B51WYZZ FLUOROSCOPY OF DIALYSIS SHUNT/FISTULA USING OTHER CONTRAST: ICD-10-PCS | Performed by: SURGERY

## 2025-07-01 PROCEDURE — 36833 AV FISTULA REVISION: CPT | Performed by: SURGERY

## 2025-07-01 PROCEDURE — C1894 INTRO/SHEATH, NON-LASER: HCPCS | Performed by: SURGERY

## 2025-07-01 PROCEDURE — C1725 CATH, TRANSLUMIN NON-LASER: HCPCS | Performed by: SURGERY

## 2025-07-01 PROCEDURE — 36833 AV FISTULA REVISION: CPT | Performed by: SPECIALIST/TECHNOLOGIST, OTHER

## 2025-07-01 PROCEDURE — 25010000002 PHENYLEPHRINE 10 MG/ML SOLUTION

## 2025-07-01 PROCEDURE — 05C80ZZ EXTIRPATION OF MATTER FROM LEFT AXILLARY VEIN, OPEN APPROACH: ICD-10-PCS | Performed by: SURGERY

## 2025-07-01 PROCEDURE — 80048 BASIC METABOLIC PNL TOTAL CA: CPT | Performed by: INTERNAL MEDICINE

## 2025-07-01 PROCEDURE — 25010000002 HEPARIN (PORCINE) PER 1000 UNITS: Performed by: SURGERY

## 2025-07-01 PROCEDURE — 25510000001 IOPAMIDOL PER 1 ML: Performed by: SURGERY

## 2025-07-01 PROCEDURE — 03C60ZZ EXTIRPATION OF MATTER FROM LEFT AXILLARY ARTERY, OPEN APPROACH: ICD-10-PCS | Performed by: SURGERY

## 2025-07-01 PROCEDURE — 25010000002 LIDOCAINE PF 2% 2 % SOLUTION

## 2025-07-01 PROCEDURE — 25010000002 FENTANYL CITRATE (PF) 100 MCG/2ML SOLUTION

## 2025-07-01 PROCEDURE — 25010000002 HYDROMORPHONE PER 4 MG

## 2025-07-01 PROCEDURE — 83735 ASSAY OF MAGNESIUM: CPT | Performed by: INTERNAL MEDICINE

## 2025-07-01 PROCEDURE — 25010000002 CEFAZOLIN PER 500 MG: Performed by: SURGERY

## 2025-07-01 DEVICE — LIGACLIP MCA MULTIPLE CLIP APPLIERS, 20 SMALL CLIPS
Type: IMPLANTABLE DEVICE | Site: ARM | Status: FUNCTIONAL
Brand: LIGACLIP

## 2025-07-01 DEVICE — ABSORBABLE HEMOSTAT (OXIDIZED REGENERATED CELLULOSE, U.S.P.)
Type: IMPLANTABLE DEVICE | Site: ARM | Status: FUNCTIONAL
Brand: SURGICEL FIBRILLAR

## 2025-07-01 RX ORDER — LIDOCAINE HYDROCHLORIDE 10 MG/ML
0.5 INJECTION, SOLUTION INFILTRATION; PERINEURAL ONCE AS NEEDED
Status: DISCONTINUED | OUTPATIENT
Start: 2025-07-01 | End: 2025-07-01 | Stop reason: HOSPADM

## 2025-07-01 RX ORDER — ONDANSETRON 2 MG/ML
INJECTION INTRAMUSCULAR; INTRAVENOUS AS NEEDED
Status: DISCONTINUED | OUTPATIENT
Start: 2025-07-01 | End: 2025-07-01 | Stop reason: SURG

## 2025-07-01 RX ORDER — FENTANYL CITRATE 50 UG/ML
50 INJECTION, SOLUTION INTRAMUSCULAR; INTRAVENOUS
Status: DISCONTINUED | OUTPATIENT
Start: 2025-07-01 | End: 2025-07-01 | Stop reason: HOSPADM

## 2025-07-01 RX ORDER — OXYCODONE HYDROCHLORIDE 5 MG/1
5 TABLET ORAL ONCE AS NEEDED
Status: DISCONTINUED | OUTPATIENT
Start: 2025-07-01 | End: 2025-07-01 | Stop reason: HOSPADM

## 2025-07-01 RX ORDER — SODIUM CHLORIDE 9 MG/ML
INJECTION, SOLUTION INTRAVENOUS CONTINUOUS PRN
Status: DISCONTINUED | OUTPATIENT
Start: 2025-07-01 | End: 2025-07-01 | Stop reason: SURG

## 2025-07-01 RX ORDER — ONDANSETRON 4 MG/1
4 TABLET, ORALLY DISINTEGRATING ORAL EVERY 8 HOURS PRN
COMMUNITY

## 2025-07-01 RX ORDER — DIPHENHYDRAMINE HYDROCHLORIDE 50 MG/ML
12.5 INJECTION, SOLUTION INTRAMUSCULAR; INTRAVENOUS
Status: DISCONTINUED | OUTPATIENT
Start: 2025-07-01 | End: 2025-07-01 | Stop reason: HOSPADM

## 2025-07-01 RX ORDER — PHENYLEPHRINE HYDROCHLORIDE 10 MG/ML
INJECTION INTRAVENOUS AS NEEDED
Status: DISCONTINUED | OUTPATIENT
Start: 2025-07-01 | End: 2025-07-01 | Stop reason: SURG

## 2025-07-01 RX ORDER — PROPOFOL 10 MG/ML
VIAL (ML) INTRAVENOUS AS NEEDED
Status: DISCONTINUED | OUTPATIENT
Start: 2025-07-01 | End: 2025-07-01 | Stop reason: SURG

## 2025-07-01 RX ORDER — LIDOCAINE HYDROCHLORIDE 20 MG/ML
INJECTION, SOLUTION EPIDURAL; INFILTRATION; INTRACAUDAL; PERINEURAL AS NEEDED
Status: DISCONTINUED | OUTPATIENT
Start: 2025-07-01 | End: 2025-07-01 | Stop reason: SURG

## 2025-07-01 RX ORDER — HEPARIN SODIUM 1000 [USP'U]/ML
INJECTION, SOLUTION INTRAVENOUS; SUBCUTANEOUS AS NEEDED
Status: DISCONTINUED | OUTPATIENT
Start: 2025-07-01 | End: 2025-07-01 | Stop reason: SURG

## 2025-07-01 RX ORDER — ONDANSETRON 2 MG/ML
4 INJECTION INTRAMUSCULAR; INTRAVENOUS ONCE AS NEEDED
Status: DISCONTINUED | OUTPATIENT
Start: 2025-07-01 | End: 2025-07-01 | Stop reason: HOSPADM

## 2025-07-01 RX ORDER — IPRATROPIUM BROMIDE AND ALBUTEROL SULFATE 2.5; .5 MG/3ML; MG/3ML
3 SOLUTION RESPIRATORY (INHALATION) ONCE AS NEEDED
Status: DISCONTINUED | OUTPATIENT
Start: 2025-07-01 | End: 2025-07-01 | Stop reason: HOSPADM

## 2025-07-01 RX ORDER — HYDRALAZINE HYDROCHLORIDE 20 MG/ML
5 INJECTION INTRAMUSCULAR; INTRAVENOUS
Status: DISCONTINUED | OUTPATIENT
Start: 2025-07-01 | End: 2025-07-01 | Stop reason: HOSPADM

## 2025-07-01 RX ORDER — SODIUM CHLORIDE 0.9 % (FLUSH) 0.9 %
10 SYRINGE (ML) INJECTION AS NEEDED
Status: DISCONTINUED | OUTPATIENT
Start: 2025-07-01 | End: 2025-07-01 | Stop reason: HOSPADM

## 2025-07-01 RX ORDER — FENTANYL CITRATE 50 UG/ML
INJECTION, SOLUTION INTRAMUSCULAR; INTRAVENOUS AS NEEDED
Status: DISCONTINUED | OUTPATIENT
Start: 2025-07-01 | End: 2025-07-01 | Stop reason: SURG

## 2025-07-01 RX ORDER — DIPHENHYDRAMINE HYDROCHLORIDE 50 MG/ML
12.5 INJECTION, SOLUTION INTRAMUSCULAR; INTRAVENOUS ONCE AS NEEDED
Status: DISCONTINUED | OUTPATIENT
Start: 2025-07-01 | End: 2025-07-01 | Stop reason: HOSPADM

## 2025-07-01 RX ORDER — ZOLPIDEM TARTRATE 5 MG/1
5 TABLET ORAL NIGHTLY
COMMUNITY

## 2025-07-01 RX ORDER — IOPAMIDOL 755 MG/ML
INJECTION, SOLUTION INTRAVASCULAR AS NEEDED
Status: DISCONTINUED | OUTPATIENT
Start: 2025-07-01 | End: 2025-07-01 | Stop reason: HOSPADM

## 2025-07-01 RX ORDER — NALOXONE HCL 0.4 MG/ML
0.4 VIAL (ML) INJECTION AS NEEDED
Status: DISCONTINUED | OUTPATIENT
Start: 2025-07-01 | End: 2025-07-01 | Stop reason: HOSPADM

## 2025-07-01 RX ORDER — OXYCODONE HYDROCHLORIDE 5 MG/1
10 TABLET ORAL EVERY 4 HOURS PRN
Status: DISCONTINUED | OUTPATIENT
Start: 2025-07-01 | End: 2025-07-01 | Stop reason: HOSPADM

## 2025-07-01 RX ORDER — FLUMAZENIL 0.1 MG/ML
0.2 INJECTION INTRAVENOUS AS NEEDED
Status: DISCONTINUED | OUTPATIENT
Start: 2025-07-01 | End: 2025-07-01 | Stop reason: HOSPADM

## 2025-07-01 RX ORDER — HYDROMORPHONE HYDROCHLORIDE 1 MG/ML
0.5 INJECTION, SOLUTION INTRAMUSCULAR; INTRAVENOUS; SUBCUTANEOUS
Status: DISCONTINUED | OUTPATIENT
Start: 2025-07-01 | End: 2025-07-01 | Stop reason: HOSPADM

## 2025-07-01 RX ORDER — SODIUM CHLORIDE 9 MG/ML
500 INJECTION, SOLUTION INTRAVENOUS ONCE
Status: DISCONTINUED | OUTPATIENT
Start: 2025-07-01 | End: 2025-07-01 | Stop reason: HOSPADM

## 2025-07-01 RX ORDER — DEXAMETHASONE SODIUM PHOSPHATE 4 MG/ML
INJECTION, SOLUTION INTRA-ARTICULAR; INTRALESIONAL; INTRAMUSCULAR; INTRAVENOUS; SOFT TISSUE AS NEEDED
Status: DISCONTINUED | OUTPATIENT
Start: 2025-07-01 | End: 2025-07-01 | Stop reason: SURG

## 2025-07-01 RX ORDER — EPHEDRINE SULFATE 5 MG/ML
5 INJECTION INTRAVENOUS ONCE AS NEEDED
Status: DISCONTINUED | OUTPATIENT
Start: 2025-07-01 | End: 2025-07-01 | Stop reason: HOSPADM

## 2025-07-01 RX ADMIN — ONDANSETRON 4 MG: 2 INJECTION, SOLUTION INTRAMUSCULAR; INTRAVENOUS at 17:48

## 2025-07-01 RX ADMIN — GABAPENTIN 100 MG: 100 CAPSULE ORAL at 09:44

## 2025-07-01 RX ADMIN — HEPARIN SODIUM 8000 UNITS: 1000 INJECTION INTRAVENOUS; SUBCUTANEOUS at 17:41

## 2025-07-01 RX ADMIN — ACETAMINOPHEN 650 MG: 325 TABLET, FILM COATED ORAL at 22:34

## 2025-07-01 RX ADMIN — DEXAMETHASONE SODIUM PHOSPHATE 4 MG: 4 INJECTION, SOLUTION INTRA-ARTICULAR; INTRALESIONAL; INTRAMUSCULAR; INTRAVENOUS; SOFT TISSUE at 17:48

## 2025-07-01 RX ADMIN — LIDOCAINE HYDROCHLORIDE 40 MG: 20 INJECTION, SOLUTION EPIDURAL; INFILTRATION; INTRACAUDAL; PERINEURAL at 17:29

## 2025-07-01 RX ADMIN — FENTANYL CITRATE 25 MCG: 50 INJECTION, SOLUTION INTRAMUSCULAR; INTRAVENOUS at 17:49

## 2025-07-01 RX ADMIN — DILTIAZEM HYDROCHLORIDE 120 MG: 180 CAPSULE, EXTENDED RELEASE ORAL at 09:44

## 2025-07-01 RX ADMIN — CYCLOBENZAPRINE 10 MG: 10 TABLET, FILM COATED ORAL at 09:44

## 2025-07-01 RX ADMIN — PROPOFOL 130 MG: 10 INJECTION, EMULSION INTRAVENOUS at 17:29

## 2025-07-01 RX ADMIN — PHENYLEPHRINE HYDROCHLORIDE 0.5 MCG/KG/MIN: 10 INJECTION INTRAVENOUS at 17:59

## 2025-07-01 RX ADMIN — SODIUM CHLORIDE: 9 INJECTION, SOLUTION INTRAVENOUS at 17:29

## 2025-07-01 RX ADMIN — CEFAZOLIN 2000 MG: 2 INJECTION, POWDER, FOR SOLUTION INTRAMUSCULAR; INTRAVENOUS at 17:17

## 2025-07-01 RX ADMIN — GABAPENTIN 100 MG: 100 CAPSULE ORAL at 22:11

## 2025-07-01 RX ADMIN — FENTANYL CITRATE 25 MCG: 50 INJECTION, SOLUTION INTRAMUSCULAR; INTRAVENOUS at 18:21

## 2025-07-01 RX ADMIN — Medication 10 ML: at 09:45

## 2025-07-01 RX ADMIN — DULOXETINE 60 MG: 30 CAPSULE, DELAYED RELEASE ORAL at 09:44

## 2025-07-01 RX ADMIN — HYDROMORPHONE HYDROCHLORIDE 0.5 MG: 1 INJECTION, SOLUTION INTRAMUSCULAR; INTRAVENOUS; SUBCUTANEOUS at 19:01

## 2025-07-01 RX ADMIN — PHENYLEPHRINE HYDROCHLORIDE 100 MCG: 10 INJECTION INTRAVENOUS at 17:53

## 2025-07-01 RX ADMIN — CARVEDILOL 6.25 MG: 6.25 TABLET, FILM COATED ORAL at 09:44

## 2025-07-01 NOTE — PROGRESS NOTES
"    [facility] MEDICINE SERVICE  DAILY PROGRESS NOTE    NAME: Lynne Cade  : 1950  MRN: [id number]      LOS: 4 days     PROVIDER OF SERVICE: Eusebio Durant MD    Chief Complaint: AV graft thrombosis, sequela    Subjective:     Interval History:  History taken from: patient      Chief Complaint: AV fistula issue     History of Present Illness: Lynne Cade is a 75 y.o. female with a CMH of HTN, HLD, T2DM, CHF, CKD stage IV on HD M/W/F who presented to [facility] on 2025 with AV fistula problem.  Patient receives dialysis via AV graft Monday, Wednesday, Friday.  Reports that her last complete dialysis treatment was 4 days ago on Monday, 2025.  Patient went for dialysis on Wednesday and was unable to complete due to shunt occlusion.  Patient had  thrombectomy performed yesterday at [facility] in [place].  Was found to have adequate blood flow after the procedure.  Dialysis was attempted today, unable to complete due to recurrent shunt occlusion.  Denies fever, chills, chest pain, shortness of breath, abdominal pain, changes in bowel or bladder habits.     In the ED, patient was afebrile and hemodynamically stable.  Labs pertinent for Creatinine 4.3 (baseline 2.8), BUN 39, GFR 10.1.  AG 15.7. Calcium 8.4, ionized calcium 1.01, Phos 5.9. WBC 11.08. Chest x-ray: \"Mild right basilar opacity, which could reflect atelectasis or pneumonia.  Elevation of right hemodynamic. \" EKG: SR, HR 81. Vascular surgery consulted, not recommending thrombectomy at this time,  recommending placement of temporary if not tunneled hemodialysis catheter, possible repeat thrombectomy next week. Nephrology was consulted, recommended consulting IR. IR consulted, recommended catheter be placed tomorrow morning, pt does not need to be NPO.  Hospital service to admit for continued evaluation.      sen in bed NAD, VSS, No new complaints, vss  Dialysis delayed due to lack of working access. " Pending  tunneled dialysis catheter as ordered.   6/29 seen today in bed NAD, DW RN, VSS, Now has nontunneled hemodialysis catheter right neck, used yesterday.   6/30 patient seen and examined in bed no acute distress, vital signs stable, discussed with RN, patient removed her dialysis catheter overnight by accident.  IR consulted for replacement.    Plan to replace nontunneled hemodialysis catheter today with radiology  7/1/2025 patient seen and examined in bed no acute distress, vital signs stable, no complaints, plan for open graft thrombectomy today with Dr. Bernard    Plan: Return home with spouse and [facility] PT     Review of Systems  Constitutional:  Negative for chills and fever.   Respiratory:  Negative for shortness of breath.    Cardiovascular:  Negative for chest pain.   Neurological:  Negative for dizziness and headaches.     Objective:     Vital Signs  Temp:  [97.3 °F (36.3 °C)-98.3 °F (36.8 °C)] 97.5 °F (36.4 °C)  Heart Rate:  [73-97] 93  Resp:  [10-25] 16  BP: (103-151)/(61-81) 142/76   Body mass index is 33.73 kg/m².    Physical Exam     General: 76 yo WF, Alert and oriented, well nourished, no acute distress.  HENT: Normocephalic, normal hearing, moist oral mucosa, no scleral icterus.  Neck: Supple, nontender, no carotid bruits, no JVD, no LAD.  Lungs: Clear to auscultation, nonlabored respiration.  Heart: RRR, no murmur, gallop or edema.  Abdomen: Soft, nontender, nondistended, + bowel sounds.  Musculoskeletal: Normal range of motion and strength, no tenderness or swelling.  Skin: Fistula present on LUE, bandaged. Skin is warm, dry and pink, no rashes or lesions.  Psychiatric: Cooperative, appropriate mood and affect.       Diagnostic Data    Results from last 7 days   Lab Units 07/01/25  0107 06/27/25  1659 06/27/25  1653   WBC 10*3/mm3 11.36*   < > 11.08*   HEMOGLOBIN g/dL 10.9*   < > 10.6*   HEMOGLOBIN, POC   --    < >  --    HEMATOCRIT % 36.8   < > 35.5   HEMATOCRIT POC   --    < >  --    PLATELETS  "10*3/mm3 195   < > 232   GLUCOSE mg/dL 141*   < > 115*   CREATININE mg/dL 3.10*   < > 4.28*   BUN mg/dL 23.4*   < > 41.1*   SODIUM mmol/L 137   < > 143   POTASSIUM mmol/L 3.8   < > 3.9   AST (SGOT) U/L  --   --  14   ALT (SGPT) U/L  --   --  <5   ALK PHOS U/L  --   --  58   BILIRUBIN mg/dL  --   --  0.2   POC ANION GAP ISTAT   --    < >  --    ANION GAP mmol/L 12.9   < > 15.7*    < > = values in this interval not displayed.       IR NON-MIKE TEMP DIALYSIS ACC  Result Date: 6/30/2025  Successful placement of right IJ 16 cm triple-lumen temporary dialysis catheter utilizing ultrasound and fluoroscopic guidance. Report dictated by: Fernando Sanchez DNP  I have personally reviewed this case and agree with the findings above: Electronically Signed: Gaurav Hyde MD  6/30/2025 3:55 PM EDT  Workstation ID: [id number]    XR Chest 1 View  Result Date: 6/30/2025  Impression: No acute cardiopulmonary abnormality. Electronically Signed: Bran Kennedy MD  6/30/2025 4:48 AM EDT  Workstation ID: [id number]        I reviewed the patient's new clinical results.    Assessment/Plan:     Active and Resolved Problems  Active Hospital Problems    Diagnosis  POA    **AV graft thrombosis, sequela [T82.868S]  Not Applicable    ESRD (end stage renal disease) on dialysis [N18.6, Z99.2]  Not Applicable      Resolved Hospital Problems    Diagnosis Date Resolved POA    A-V fistula [I77.0] 06/29/2025 Yes     Occlusion of AV dialysis graft  CKD stage IV on hemodialysis M/W/F  Creatinine 4.3 (baseline 2.8), BUN 39, GFR 10.1.  AG 15.7  Calcium 8.4, ionized calcium 1.01  Phos 5.9  WBC 11.08  Chest x-ray: \"Mild right basilar opacity, which could reflect atelectasis or pneumonia.  Elevation of right hemodynamic. \"  EKG: SR, HR 81  Last dialysis treatment 4 days ago Monday (6/23/2025), receives dialysis via AV graft  Thrombosed earlier this week, treated with thrombectomy  Vascular surgery consulted and following, not recommending thrombectomy at this time,  " recommending placement of temporary if not tunneled hemodialysis catheter, possible repeat thrombectomy next week  Nephrology was consulted and following, recommended consulting IR  IR consulted and following, Plan to replace nontunneled hemodialysis catheter today with radiology  Plan for open graft thrombectomy today with Dr. Bernard     Congestive heart failure  Stable  Continue home dose meds     Type 2 diabetes mellitus  A1c pending   Low SSI   Acchu-check ACHS with diet  Heart-healthy, consistent carb diet   Hypoglycemia protocol      Hypertension  Hyperlipidemia  Continue home dose meds    VTE Prophylaxis:  Pharmacologic & mechanical VTE prophylaxis orders are present.    Disposition Planning:   Barriers to Discharge:HD  Anticipated Date of Discharge: 6/29  Place of Discharge: home  Time: 35 minutes     Code Status and Medical Interventions: CPR (Attempt to Resuscitate); Full Support   Ordered at: 06/27/25 2031     Code Status (Patient has no pulse and is not breathing):    CPR (Attempt to Resuscitate)     Medical Interventions (Patient has pulse or is breathing):    Full Support       Signature: Electronically signed by Eusebio Durant MD, 07/01/25, 13:19 EDT.  [facility] Hospitalist Team

## 2025-07-01 NOTE — ANESTHESIA PREPROCEDURE EVALUATION
Anesthesia Evaluation     Patient summary reviewed and Nursing notes reviewed   NPO Solid Status: > 8 hours  NPO Liquid Status: > 8 hours           Airway   Mallampati: II  TM distance: >3 FB  Neck ROM: full  No difficulty expected  Dental - normal exam     Pulmonary - normal exam   (+) pneumonia , COPD,shortness of breath  Cardiovascular - normal exam    (+) hypertension, CAD, CHF , hyperlipidemia      Neuro/Psych  (+) headaches, syncope, tremors, weakness, psychiatric history  GI/Hepatic/Renal/Endo    (+) obesity, renal disease-, diabetes mellitus    Musculoskeletal     Abdominal  - normal exam    Bowel sounds: normal.   Substance History      OB/GYN          Other   arthritis,     ROS/Med Hx Other: Sinus rhythm  Atrial premature complexes  Consider  anteroseptal infarct      2/16/25     ·  Left ventricular systolic function is normal. Left ventricular ejection fraction appears to be 56 - 60%.  ·  Left ventricular diastolic function is consistent with (grade I) impaired relaxation.  ·  Estimated right ventricular systolic pressure from tricuspid regurgitation is normal (<35 mmHg).       Findings:  There is stable asymmetric elevation of the right hemidiaphragm. There is no pneumothorax, pleural effusion or focal airspace consolidation. Heart size and pulmonary vasculature appear within normal limits. Regional bones appear intact.     IMPRESSION:  Impression:  No acute cardiopulmonary abnormality.                           Anesthesia Plan    ASA 3     general     intravenous induction     Anesthetic plan, risks, benefits, and alternatives have been provided, discussed and informed consent has been obtained with: patient.    Plan discussed with CRNA.        CODE STATUS:    Code Status (Patient has no pulse and is not breathing): CPR (Attempt to Resuscitate)  Medical Interventions (Patient has pulse or is breathing): Full Support

## 2025-07-01 NOTE — OP NOTE
Operative Note  Location: [facility]  Date of Admission:  6/27/2025  OR Date: 7/1/2025    Pre-op Diagnosis:  Immediate term AV graft thrombosis  End-stage renal disease requiring hemodialysis    Post-op Diagnosis:  Same    Procedure:   Open left axilloaxillary loop AV shunt thrombectomy  Shuntogram with venous anastomotic balloon angioplasty to 8 mm    Surgeon: Eitan Raines MD    Assistant: Daniella Castorena KCSA, Provided critical assistance in exposure, retraction, and suction that overall decrease blood loss and operative time.    Anesthesia: General Via LMA    Staff:   Circulator: Beth Campo RN; Evelyn Cummins RN  Radiology Technologist: Lorri Gaxiola  Scrub Person: Ghislaine Leach  Assistant: Daniella Castorena CSA was responsible for performing the following activities: Retraction, Suction, Closing, and Placing Dressing and their skilled assistance was necessary for the success of this case.    Estimated Blood Loss: 400 mL    Specimen: None    Complications: None immediate    Findings: Thrombosed shunt.  Initial venous limb shuntogram with residual thrombus and venous anastomosis.  No central vein stenosis.  Residual thrombus cleared with subsequent passage of thrombectomy catheter.  Perform balloon angioplasty of venous anastomotic stent to 8 mm.  Arterial limb shuntogram with widely patent shunt and no residual thrombus.  No arterial or anastomotic stenosis.  Satisfactory flow by Doppler following thrombectomy.  Add lower dose apixaban 2.5 mg twice daily at least for a little while to help promote graft patency.  Will keep temporary dialysis catheter, but attempt to use AV graft for next dialysis.    Implants:   Implant Name Type Inv. Item Serial No.  Lot No. LRB No. Used Action   CLIPAPPLR M/ ENDO LIGACLIP 9 3/8IN  - [id number] Implant CLIPAPPLR M/ ENDO LIGACLIP 9 3/8IN   [employer]  DIV OF J AND J [id number] Left 1 Implanted   HEMOST ABS SURGICEL FIBRILLAR 4X4IN -  [id number] Implant HEMOST ABS SURGICEL FIBRILLAR 4X4IN  [employer]  DIV OF J AND J [id number] Left 1 Implanted     Indications: 75-year-old woman end-stage renal disease on hemodialysis.  Has left axilloaxillary loop AV graft.  Experienced graft thrombectomy and underwent percutaneous thrombectomy with venous anastomotic balloon angioplasty with stent placement.  Patient experienced immediate term graft thrombosis.  Underwent placement of temporary dialysis catheter and submits for redo shuntogram.       Procedure:  Patient was brought to the operating room and positioned supine the operating table.  After the induction of general endotracheal anesthesia, heparin 8000 units was administered IV.  Her arm was extended onto an arm table and her breast retracted anteriorly.  The arm was washed first with Hibiclens and then prepared with ChloraPrep and draped in a sterile fashion.  A longitudinally oriented incision was made in the distal arm over the apex of the graft.  The graft was isolated.  There was a hole in the graft from previous thrombolysis, and I made an arteriotomy to incorporate that puncture site.  Balloon catheter thrombectomy first of the lateral limb, which was the arterial limb was performed with removal of large amount of thrombus.  Punched through the anastomosis and restored pulsatile inflow and recovered the arterial plug.  The limb was clamped.  Balloon catheter thrombectomy of the venous limb was undertaken next with return of central venous backbleeding.  An additional catheter passage produced no more clots.  An 11 Palauan sheath was passed into the venous limb of the shunt after being preloaded onto a wire.  Shuntogram demonstrated residual thrombus and possible residual stenosis at the anastomosis.  I passed the Brannon catheter through the sheath and performed thrombectomy.  The sheath and catheter were removed as a unit and I retrieved the thrombus.  The sheath was replaced into the  graft limb and a follow-up shuntogram demonstrated no residual thrombus.  Because of unexplained early term thrombosis, I passed the wire across the venous anastomosis and performed venous anastomotic balloon angioplasty to 8 mm with an 8 x 60 mm balloon with complete balloon expansion.  The limb was flushed with heparinized saline.  An arterial limb shuntogram was performed by injecting contrast into the graft limb with a Flores tree.  There was no residual thrombus or stenosis.  The graft was clamped and the graftotomy was repaired with a 5-0 Prolene suture.  Following release of clamps the soft thrill was palpated in the graft with good flow by Doppler.  I do not have an explanation about why her graft thrombosed.  The heparin was not reversed with protamine.  Wound and suture line hemostasis were complete.  The wound was closed in layers with Vicryl sutures.  Dermal adhesive was applied.  At its conclusion the patient had tolerated the procedure well, and without apparent complications.  Sponge and needle counts were correct.  The patient was taken to the recovery area in stable condition.    Eitan Raines MD     7/1/2025, 18:38 EDT

## 2025-07-01 NOTE — PROGRESS NOTES
"NEPHROLOGY PROGRESS NOTE------[facility]    [facility]  [phone]  Felisa Melendez MD      Patient Care Team:  Lorraine Agarwal APRN as PCP - General (Nurse Practitioner)  Rick Melendez MD as Consulting Physician (Nephrology)  Mayo Fregoso MD as Consulting Physician (Urology)  Fredi Pierce MD as Consulting Physician (Cardiology)  Cony Sanchez APRN as Nurse Practitioner (Vascular Surgery)      Provider:  Felisa Melendez MD  Patient Name: Lynne Cade  :  1950    SUBJECTIVE:    F/U ESRD    Awaiting surgical thrombectomy this afternoon. No SOB, CP, cramping.     Medication:  carvedilol, 6.25 mg, Oral, BID With Meals  ceFAZolin, 2,000 mg, Intravenous, On Call to OR  cyclobenzaprine, 10 mg, Oral, TID  dilTIAZem CD, 120 mg, Oral, Q24H  DULoxetine, 60 mg, Oral, Daily  gabapentin, 100 mg, Oral, TID  insulin lispro, 2-7 Units, Subcutaneous, 4x Daily AC & at Bedtime  sodium chloride, 10 mL, Intravenous, Q12H           OBJECTIVE    Vital Sign Min/Max for last 24 hours  Temp  Min: 97.3 °F (36.3 °C)  Max: 98.3 °F (36.8 °C)   BP  Min: 103/64  Max: 151/80   Pulse  Min: 73  Max: 97   Resp  Min: 10  Max: 25   SpO2  Min: 92 %  Max: 98 %   No data recorded   No data recorded     Flowsheet Rows      Flowsheet Row First Filed Value   Admission Height 167.6 cm (66\") Documented at 2025 1606   Admission Weight 94.8 kg (209 lb) Documented at 2025 1606            No intake/output data recorded.  I/O last 3 completed shifts:  In: 240 [P.O.:240]  Out: 2100     Physical Exam:  General Appearance: alert, appears stated age and cooperative  Head: normocephalic, without obvious abnormality and atraumatic  Eyes: conjunctivae and sclerae normal and no icterus  Neck: supple and +MILD JVD  Lungs: clear to auscultation and respirations regular  Heart: regular rhythm & normal rate and normal S1, S2 +CYNDY  Chest: " "Wall no abnormalities observed  Abdomen: normal bowel sounds and soft, nontender +OBESITY  Extremities: moves extremities well, +TR BILAT LE EDEMA, no cyanosis and no redness  Skin: no bleeding, bruising or rash, turgor normal, color normal and no lesions noted  Neurologic: Alert, and oriented. No focal deficits    Labs:    WBC WBC   Date Value Ref Range Status   07/01/2025 11.36 (H) 3.40 - 10.80 10*3/mm3 Final   06/29/2025 8.81 3.40 - 10.80 10*3/mm3 Final   06/29/2025 10.06 3.40 - 10.80 10*3/mm3 Final      HGB Hemoglobin   Date Value Ref Range Status   07/01/2025 10.9 (L) 12.0 - 15.9 g/dL Final   06/29/2025 10.2 (L) 12.0 - 15.9 g/dL Final   06/29/2025 10.5 (L) 12.0 - 15.9 g/dL Final      HCT Hematocrit   Date Value Ref Range Status   07/01/2025 36.8 34.0 - 46.6 % Final   06/29/2025 33.6 (L) 34.0 - 46.6 % Final   06/29/2025 35.2 34.0 - 46.6 % Final      Platelets No results found for: \"LABPLAT\"   MCV MCV   Date Value Ref Range Status   07/01/2025 109.2 (H) 79.0 - 97.0 fL Final   06/29/2025 107.3 (H) 79.0 - 97.0 fL Final   06/29/2025 106.3 (H) 79.0 - 97.0 fL Final          Sodium Sodium   Date Value Ref Range Status   07/01/2025 137 136 - 145 mmol/L Final   06/29/2025 140 136 - 145 mmol/L Final   06/29/2025 139 136 - 145 mmol/L Final      Potassium Potassium   Date Value Ref Range Status   07/01/2025 3.8 3.5 - 5.2 mmol/L Final   06/29/2025 4.4 3.5 - 5.2 mmol/L Final   06/29/2025 4.1 3.5 - 5.2 mmol/L Final      Chloride Chloride   Date Value Ref Range Status   07/01/2025 101 98 - 107 mmol/L Final   06/29/2025 106 98 - 107 mmol/L Final   06/29/2025 102 98 - 107 mmol/L Final      CO2 CO2   Date Value Ref Range Status   07/01/2025 23.1 22.0 - 29.0 mmol/L Final   06/29/2025 19.6 (L) 22.0 - 29.0 mmol/L Final   06/29/2025 21.6 (L) 22.0 - 29.0 mmol/L Final      BUN BUN   Date Value Ref Range Status   07/01/2025 23.4 (H) 8.0 - 23.0 mg/dL Final   06/29/2025 42.5 (H) 8.0 - 23.0 mg/dL Final   06/29/2025 30.2 (H) 8.0 - 23.0 mg/dL " "Final      Creatinine Creatinine   Date Value Ref Range Status   07/01/2025 3.10 (H) 0.57 - 1.00 mg/dL Final   06/29/2025 4.68 (H) 0.57 - 1.00 mg/dL Final   06/29/2025 3.38 (H) 0.57 - 1.00 mg/dL Final      Calcium Calcium   Date Value Ref Range Status   07/01/2025 8.5 (L) 8.6 - 10.5 mg/dL Final   06/29/2025 7.9 (L) 8.6 - 10.5 mg/dL Final   06/29/2025 8.6 8.6 - 10.5 mg/dL Final      PO4 No components found for: \"PO4\"   Albumin No results found for: \"ALBUMIN\"     Magnesium Magnesium   Date Value Ref Range Status   07/01/2025 2.4 1.6 - 2.4 mg/dL Final   06/29/2025 2.5 (H) 1.6 - 2.4 mg/dL Final   06/29/2025 2.3 1.6 - 2.4 mg/dL Final      Uric Acid No components found for: \"URIC ACID\"     Imaging Results (Last 72 Hours)       Procedure Component Value Units Date/Time    IR NON-MIKE TEMP DIALYSIS ACC [[id number]] Collected: 06/30/25 1542     Updated: 06/30/25 1557    Narrative:      DATE OF EXAM:  6/30/2025 3:08 PM EDT    PROCEDURE:  IR NON-TUNNELED TEMP DIALYSIS ACCESS    INDICATIONS:  temporary dialysis cahteter today    COMPARISON:  No Comparisons Available    FLUOROSCOPIC TIME:  24 seconds    PHYSICIAN MONITORED CONSCIOUS SEDATION TIME:  None minutes    TECHNIQUE:   A detailed explanation of the procedure, including the risks, benefits, and alternatives was provided. Informed written consent was obtained from the patient. A preprocedure timeout was performed. The interventional radiology nurse monitored the patient   at all times. The patient was placed supine on the fluoroscopy table and the right neck was ultrasounded, demonstrating a patent right internal jugular vein, and an image was obtained. The neck was then prepped and draped using maximum sterile barrier   technique. The skin was anesthetized with 1% lidocaine, and under direct ultrasound guidance the access needle was advanced into the right internal jugular vein. A guidewire was manipulated centrally.    The tract was serially dilated over the guidewire and " a 16 cm triple-lumen temporary dialysis catheter was advanced over the wire. Fluoroscopy confirmed appropriate position. The catheter was secured to the skin with Prolene. All 3 ports were aspirated   with adequate blood return noted and flushed with heparinized saline. The patient tolerated the procedure well without immediate complication. Patient was transported back to the inpatient unit in stable condition for recovery.      FINDINGS:  See above      Impression:      Successful placement of right IJ 16 cm triple-lumen temporary dialysis catheter utilizing ultrasound and fluoroscopic guidance.      Report dictated by: Fernando Sanchez DNP      I have personally reviewed this case and agree with the findings above:    Electronically Signed: Gaurav Hyde MD    6/30/2025 3:55 PM EDT    Workstation ID: [id number]    XR Chest 1 View [[id number]] Collected: 06/30/25 0448     Updated: 06/30/25 0450    Narrative:      XR CHEST 1 VW    Date of Exam: 6/30/2025 4:36 AM EDT    Indication: pulled out dialysis cath    Comparison: 6/27/2025.    Findings:  There is stable asymmetric elevation of the right hemidiaphragm. There is no pneumothorax, pleural effusion or focal airspace consolidation. Heart size and pulmonary vasculature appear within normal limits. Regional bones appear intact.      Impression:      Impression:  No acute cardiopulmonary abnormality.          Electronically Signed: Bran Kennedy MD    6/30/2025 4:48 AM EDT    Workstation ID: [id number]    IR NON-MIKE TEMP DIALYSIS ACC [[id number]] Collected: 06/28/25 1323     Updated: 06/28/25 1330    Narrative:      IR NON-TUNNELED TEMP DIALYSIS ACCESS    Date of Exam: 6/28/2025 12:41 PM EDT    Indication: Clotted left upper extremity shunt. Dialysis catheter requested for inpatient hemodialysis.    Comparison: None available.    Fluoroscopic Time: 43 seconds. 11 mGy.    Description of procedure: Informed consent was obtained following a detailed description of the procedure  including all risks. The patient was placed supine on the fluoroscopy table. A successful timeout was performed. The right neck was evaluated with   ultrasound which demonstrates patent and normally compressible right IJ. Static image was saved.    The right neck was prepped and draped using maximum sterile barrier technique. Lidocaine was used for local anesthesia. Access was obtained of the right IJ under direct ultrasound visualization using a micropuncture set. Real-time ultrasound guidance was   utilized to confirm an intravascular location of the needle, wire and catheter during manipulation. A J-tipped wire was advanced centrally and the tract was dilated to accommodate a triple-lumen large bore temporary hemodialysis catheter which was   advanced into position to the high right atrial level. The catheter was secured in place at the skin site using 0 Prolene suture material. Catheter flushes and aspirates appropriately and was heparinized in the usual manner. Sterile dressing was applied.   Spot fluoroscopy image obtained following placement shows the catheter tip in good position at the high right atrial level. The patient tolerated the procedure well.      Impression:      Impression:  Placement of temporary hemodialysis catheter via right IJ approach using ultrasound and fluoroscopic guidance.      Electronically Signed: Gaurav Hyde MD    6/28/2025 1:28 PM EDT    Workstation ID: [id number]            Results for orders placed during the hospital encounter of 06/27/25    XR Chest 1 View 06/30/2025  4:48 AM    Narrative  XR CHEST 1 VW    Date of Exam: 6/30/2025 4:36 AM EDT    Indication: pulled out dialysis cath    Comparison: 6/27/2025.    Findings:  There is stable asymmetric elevation of the right hemidiaphragm. There is no pneumothorax, pleural effusion or focal airspace consolidation. Heart size and pulmonary vasculature appear within normal limits. Regional bones appear  intact.    Impression  Impression:  No acute cardiopulmonary abnormality.          Electronically Signed: Bran Kennedy MD  6/30/2025 4:48 AM EDT  Workstation ID: [id number]      XR Chest 1 View 06/27/2025  6:54 PM    Narrative  XR CHEST 1 VW    Date of Exam: 6/27/2025 6:32 PM EDT    Indication: Unable to have dialysis Wednesday or today    Comparison: April 22, 2025    Findings:  There is elevation of the right hemidiaphragm. There is a mild right basilar opacity. The heart and mediastinal contours appear normal. The pulmonary vasculature appears normal. The osseous structures appear intact.    Impression  Impression:  1.Mild right basilar opacity, which could reflect atelectasis or pneumonia.  2.Elevation of right hemidiaphragm.        Electronically Signed: Eitan Leach MD  6/27/2025 6:54 PM EDT  Workstation ID: [id number]      Results for orders placed during the hospital encounter of 04/22/25    XR Chest 1 View 04/22/2025  3:53 PM    Narrative  XR CHEST 1 VW    Date of Exam: 4/22/2025 3:40 PM EDT    Indication: AMS Protocol  AMS Protocol    Comparison: 2/16/2025    Findings:  Heart size is within normal limits for the projection. The right hemidiaphragm is elevated, unchanged. The pulmonary vascular pattern appears normal and the lungs appear clear.    Impression  Impression:  No active disease.        Electronically Signed: Yfn Luna MD  4/22/2025 3:53 PM EDT  Workstation ID: [id number]      Results for orders placed during the hospital encounter of 04/22/25    Duplex Carotid Ultrasound CAR 04/24/2025  2:19 PM    Interpretation Summary    Right internal carotid artery demonstrates a less than 50% stenosis.    Antegrade right vertebral flow.    Left internal carotid artery demonstrates a less than 50% stenosis.    Antegrade left vertebral flow.        ASSESSMENT / PLAN      AV graft thrombosis, sequela    ESRD (end stage renal disease) on dialysis    ESRD------HD tomorrow    2. HTN WITH CKD-----BP good.  Avoid hypotension    3. DMII WITH RENAL MANIFESTATIONS-----BS ok. Glucometers, SSI    4. ANEMIA OF ESRD-----EPO/Iron with HD as needed    5. CLOTTED AV SHUNT------Open thrombectomy today per Vascular surgery    6. DEPRESSION------On Cymbalta    7. DM PERIPHERAL NEUROPATHY------On Neurontin    8. PVD    Felisa Melendez MD  [facility]  [phone]  07/01/25  07:44 EDT

## 2025-07-01 NOTE — ANESTHESIA PROCEDURE NOTES
Airway  Reason: elective    Date/Time: 7/1/2025 5:30 PM  Airway not difficult    General Information and Staff    Patient location during procedure: OR  CRNA/CAA: Ravinder Grigsby CRNA    Indications and Patient Condition  Indications for airway management: airway protection    Preoxygenated: yes    Mask difficulty assessment: 0 - not attempted    Final Airway Details    Final airway type: supraglottic airway      Successful airway: I-gel  Size: 4   Number of attempts at approach: 1  Assessment: lips, teeth, and gum same as pre-op and atraumatic intubation

## 2025-07-01 NOTE — ANESTHESIA POSTPROCEDURE EVALUATION
Patient: Lynne Cade    Procedure Summary       Date: 07/01/25 Room / Location: [facility] OR  / [facility] HYBRID OR    Anesthesia Start: 1720 Anesthesia Stop: 1839    Procedure: Thrombectomy of left arm arteriovenous graft, shuntogram with angioplasty (Left: Arm Upper) Diagnosis:       AV graft thrombosis, sequela      (AV graft thrombosis, sequela [T82.868S])    Surgeons: Eitan Raines MD Provider: Garcia Culp MD    Anesthesia Type: general ASA Status: 3            Anesthesia Type: general    Vitals  Vitals Value Taken Time   /48 07/01/25 19:14   Temp 98.8 °F (37.1 °C) 07/01/25 18:40   Pulse 82 07/01/25 19:16   Resp 11 07/01/25 19:10   SpO2 98 % 07/01/25 19:16   Vitals shown include unfiled device data.        Post Anesthesia Care and Evaluation    Patient location during evaluation: PACU  Patient participation: complete - patient participated  Level of consciousness: awake  Pain scale: See nurse's notes for pain score.  Pain management: adequate    Airway patency: patent  Anesthetic complications: No anesthetic complications  PONV Status: none  Cardiovascular status: acceptable  Respiratory status: acceptable and spontaneous ventilation  Hydration status: acceptable    Comments: Patient seen and examined postoperatively; vital signs stable; SpO2 greater than or equal to 90%; cardiopulmonary status stable; nausea/vomiting adequately controlled; pain adequately controlled; no apparent anesthesia complications; patient discharged from anesthesia care when discharge criteria were met

## 2025-07-01 NOTE — PLAN OF CARE
Goal Outcome Evaluation:            Patient has remained NPO since midnight. Will continue with current plan of care.

## 2025-07-02 ENCOUNTER — APPOINTMENT (OUTPATIENT)
Dept: NEPHROLOGY | Facility: HOSPITAL | Age: 75
DRG: 252 | End: 2025-07-02
Payer: MEDICARE

## 2025-07-02 DIAGNOSIS — Z99.2 ESRD (END STAGE RENAL DISEASE) ON DIALYSIS: ICD-10-CM

## 2025-07-02 DIAGNOSIS — N18.6 ESRD (END STAGE RENAL DISEASE) ON DIALYSIS: ICD-10-CM

## 2025-07-02 DIAGNOSIS — Z99.2 HEMODIALYSIS ACCESS, AV GRAFT: Primary | ICD-10-CM

## 2025-07-02 LAB
ANION GAP SERPL CALCULATED.3IONS-SCNC: 13.4 MMOL/L (ref 5–15)
BUN SERPL-MCNC: 43.9 MG/DL (ref 8–23)
BUN/CREAT SERPL: 8.4 (ref 7–25)
CALCIUM SPEC-SCNC: 8.5 MG/DL (ref 8.6–10.5)
CHLORIDE SERPL-SCNC: 101 MMOL/L (ref 98–107)
CO2 SERPL-SCNC: 19.6 MMOL/L (ref 22–29)
CREAT SERPL-MCNC: 5.21 MG/DL (ref 0.57–1)
DEPRECATED RDW RBC AUTO: 67.9 FL (ref 37–54)
DEVICE COMMENT: ABNORMAL
DEVICE COMMENT: ABNORMAL
EGFRCR SERPLBLD CKD-EPI 2021: 8 ML/MIN/1.73
ERYTHROCYTE [DISTWIDTH] IN BLOOD BY AUTOMATED COUNT: 16.4 % (ref 12.3–15.4)
GLUCOSE BLDC GLUCOMTR-MCNC: 134 MG/DL (ref 70–105)
GLUCOSE BLDC GLUCOMTR-MCNC: 209 MG/DL (ref 70–105)
GLUCOSE BLDC GLUCOMTR-MCNC: 319 MG/DL (ref 70–105)
GLUCOSE BLDC GLUCOMTR-MCNC: 454 MG/DL (ref 70–105)
GLUCOSE SERPL-MCNC: 211 MG/DL (ref 65–99)
HCT VFR BLD AUTO: 32 % (ref 34–46.6)
HGB BLD-MCNC: 9.3 G/DL (ref 12–15.9)
MCH RBC QN AUTO: 32.5 PG (ref 26.6–33)
MCHC RBC AUTO-ENTMCNC: 29.1 G/DL (ref 31.5–35.7)
MCV RBC AUTO: 111.9 FL (ref 79–97)
PLATELET # BLD AUTO: 187 10*3/MM3 (ref 140–450)
PMV BLD AUTO: 10.2 FL (ref 6–12)
POTASSIUM SERPL-SCNC: 5.7 MMOL/L (ref 3.5–5.2)
RBC # BLD AUTO: 2.86 10*6/MM3 (ref 3.77–5.28)
SODIUM SERPL-SCNC: 134 MMOL/L (ref 136–145)
WBC NRBC COR # BLD AUTO: 10.52 10*3/MM3 (ref 3.4–10.8)

## 2025-07-02 PROCEDURE — 25010000002 EPOETIN ALFA-EPBX 2000 UNIT/ML SOLUTION 1 ML VIAL: Performed by: INTERNAL MEDICINE

## 2025-07-02 PROCEDURE — 25010000002 EPOETIN ALFA-EPBX 3000 UNIT/ML SOLUTION 1 ML VIAL: Performed by: INTERNAL MEDICINE

## 2025-07-02 PROCEDURE — 99024 POSTOP FOLLOW-UP VISIT: CPT | Performed by: NURSE PRACTITIONER

## 2025-07-02 PROCEDURE — 25010000002 ONDANSETRON PER 1 MG: Performed by: SURGERY

## 2025-07-02 PROCEDURE — P9047 ALBUMIN (HUMAN), 25%, 50ML: HCPCS | Performed by: INTERNAL MEDICINE

## 2025-07-02 PROCEDURE — 25010000002 ALBUMIN HUMAN 25% PER 50 ML: Performed by: INTERNAL MEDICINE

## 2025-07-02 PROCEDURE — 82948 REAGENT STRIP/BLOOD GLUCOSE: CPT | Performed by: SURGERY

## 2025-07-02 PROCEDURE — 82948 REAGENT STRIP/BLOOD GLUCOSE: CPT

## 2025-07-02 PROCEDURE — 80048 BASIC METABOLIC PNL TOTAL CA: CPT | Performed by: SURGERY

## 2025-07-02 PROCEDURE — 85027 COMPLETE CBC AUTOMATED: CPT | Performed by: SURGERY

## 2025-07-02 PROCEDURE — 25010000002 HEPARIN (PORCINE) PER 1000 UNITS: Performed by: SURGERY

## 2025-07-02 PROCEDURE — 63710000001 INSULIN LISPRO (HUMAN) PER 5 UNITS: Performed by: SURGERY

## 2025-07-02 RX ORDER — OXYCODONE HYDROCHLORIDE 5 MG/1
5 TABLET ORAL EVERY 4 HOURS PRN
Status: DISCONTINUED | OUTPATIENT
Start: 2025-07-02 | End: 2025-07-06 | Stop reason: HOSPADM

## 2025-07-02 RX ORDER — DILTIAZEM HYDROCHLORIDE 30 MG/1
30 TABLET, FILM COATED ORAL EVERY 12 HOURS SCHEDULED
Status: DISCONTINUED | OUTPATIENT
Start: 2025-07-03 | End: 2025-07-02

## 2025-07-02 RX ORDER — DILTIAZEM HYDROCHLORIDE 30 MG/1
30 TABLET, FILM COATED ORAL EVERY 12 HOURS SCHEDULED
Status: DISCONTINUED | OUTPATIENT
Start: 2025-07-02 | End: 2025-07-04

## 2025-07-02 RX ORDER — ALBUMIN (HUMAN) 12.5 G/50ML
12.5 SOLUTION INTRAVENOUS ONCE
Status: COMPLETED | OUTPATIENT
Start: 2025-07-02 | End: 2025-07-02

## 2025-07-02 RX ORDER — ALBUMIN (HUMAN) 12.5 G/50ML
12.5 SOLUTION INTRAVENOUS AS NEEDED
Status: ACTIVE | OUTPATIENT
Start: 2025-07-02 | End: 2025-07-02

## 2025-07-02 RX ORDER — CARVEDILOL 3.12 MG/1
3.12 TABLET ORAL 2 TIMES DAILY WITH MEALS
Status: DISCONTINUED | OUTPATIENT
Start: 2025-07-02 | End: 2025-07-04

## 2025-07-02 RX ADMIN — GABAPENTIN 100 MG: 100 CAPSULE ORAL at 20:51

## 2025-07-02 RX ADMIN — ACETAMINOPHEN 650 MG: 325 TABLET, FILM COATED ORAL at 12:30

## 2025-07-02 RX ADMIN — Medication 10 ML: at 12:31

## 2025-07-02 RX ADMIN — APIXABAN 2.5 MG: 2.5 TABLET, FILM COATED ORAL at 12:30

## 2025-07-02 RX ADMIN — EPOETIN ALFA-EPBX 5000 UNITS: 3000 INJECTION, SOLUTION INTRAVENOUS; SUBCUTANEOUS at 14:11

## 2025-07-02 RX ADMIN — INSULIN LISPRO 5 UNITS: 100 INJECTION, SOLUTION INTRAVENOUS; SUBCUTANEOUS at 17:24

## 2025-07-02 RX ADMIN — ONDANSETRON 4 MG: 2 INJECTION INTRAMUSCULAR; INTRAVENOUS at 09:17

## 2025-07-02 RX ADMIN — INSULIN LISPRO 3 UNITS: 100 INJECTION, SOLUTION INTRAVENOUS; SUBCUTANEOUS at 20:51

## 2025-07-02 RX ADMIN — DILTIAZEM HYDROCHLORIDE 30 MG: 30 TABLET, FILM COATED ORAL at 20:51

## 2025-07-02 RX ADMIN — DULOXETINE 60 MG: 30 CAPSULE, DELAYED RELEASE ORAL at 12:30

## 2025-07-02 RX ADMIN — CARVEDILOL 3.12 MG: 3.12 TABLET, FILM COATED ORAL at 17:28

## 2025-07-02 RX ADMIN — OXYCODONE 5 MG: 5 TABLET ORAL at 17:25

## 2025-07-02 RX ADMIN — CYCLOBENZAPRINE 10 MG: 10 TABLET, FILM COATED ORAL at 20:51

## 2025-07-02 RX ADMIN — GABAPENTIN 100 MG: 100 CAPSULE ORAL at 10:10

## 2025-07-02 RX ADMIN — ALBUMIN (HUMAN) 12.5 G: 0.25 INJECTION, SOLUTION INTRAVENOUS at 20:51

## 2025-07-02 RX ADMIN — APIXABAN 2.5 MG: 2.5 TABLET, FILM COATED ORAL at 20:51

## 2025-07-02 RX ADMIN — CYCLOBENZAPRINE 10 MG: 10 TABLET, FILM COATED ORAL at 15:55

## 2025-07-02 RX ADMIN — HYDROXYZINE HYDROCHLORIDE 25 MG: 25 TABLET, FILM COATED ORAL at 03:34

## 2025-07-02 RX ADMIN — Medication 10 ML: at 20:52

## 2025-07-02 RX ADMIN — GABAPENTIN 100 MG: 100 CAPSULE ORAL at 15:55

## 2025-07-02 RX ADMIN — HEPARIN SODIUM 3000 UNITS: 1000 INJECTION INTRAVENOUS; SUBCUTANEOUS at 09:20

## 2025-07-02 RX ADMIN — CYCLOBENZAPRINE 10 MG: 10 TABLET, FILM COATED ORAL at 10:10

## 2025-07-02 NOTE — PHARMACY PATIENT ASSISTANCE
Transitions of Care (test claim):    Drug Apixaban:  2.5 mg PO BID   Covered/PA Required: Covered without PA  Patient Cost: $145.67  Is the medication eligible for a trial card/copay card? Free trial available from         Patient cost may be due to deductibles associated with her health insurance plan. Patient cost may vary over time. The patient is eligible to use a one-time 30-day free trial card, but not eligible to use a monthly discount card. The patient may apply for financial assistance from the drug company or for the Medicare Prescription Payment Plan program.         Please note that when it states medication is eligible for a trial card that this only means that the medication has a free trial available. This does not assess if the patient has already utilized the once in a lifetime free trial.     This test claim coverage is only valid on the date the note is published. Copay/coverage could vary depending on patient coverage at a later date.  Additionally this test claim is for the sole purpose of a price check not a clinical recommendation.     For billing questions please call [facility] Pharmacist at ext: [phone]  For M2B questions please call [facility] at ext: [phone]      Rodney Good, [place], BCPS

## 2025-07-02 NOTE — SIGNIFICANT NOTE
07/02/25 1021   OTHER   Discipline physical therapist   Rehab Time/Intention   Session Not Performed patient unavailable for evaluation  (Pt recieving HD; will attempt in p.m.)         Addendum: Pt had a rapid response while in HD and was transferred to PCU.  Will attempt as medically appropriate.

## 2025-07-02 NOTE — PLAN OF CARE
Goal Outcome Evaluation:              Outcome Evaluation: Pt admitted for AV graft thrombosis. Pt is alert and oriented, 2L nc, family member at bedside. No new complaint at this time.

## 2025-07-02 NOTE — CODE DOCUMENTATION
Rapid response called while patient was receiving HD. Patient became hypotensive, bradycardiac, and unresponsive. Patient was then placed in trendelenburg positioning and painful stimuli utilized to assess response. Upon arrival of rapid response team, patient BP previously read 41/22. Patient arouses to verbal stimuli and HR and BP improved. Remains drowsy. Dr. Durant updated and transfer orders to PCU received.

## 2025-07-02 NOTE — CASE MANAGEMENT/SOCIAL WORK
Continued Stay Note  SHELDON Law     Patient Name: Lynne Cade  MRN: [id number]  Today's Date: 7/2/2025    Admit Date: 6/27/2025    Plan: [facility], will need [facility]). Current OP HD at [facility].   Discharge Plan       Row Name 07/02/25 1619       Plan    Plan Comments DC barriers: 2L NC, electrolyte managment, nephro/vascular surgery following, RR called on 7/2 during diaylsis.               Expected Discharge Date and Time       Expected Discharge Date Expected Discharge Time    Jul 3, 2025           Phone communication or documentation only - no physical contact with patient or family.     Mariama Griffith RN

## 2025-07-02 NOTE — PROGRESS NOTES
"NEPHROLOGY PROGRESS NOTE------[facility]    [facility]  [phone]  Felisa Melendez MD      Patient Care Team:  Lorraine Agarwal APRN as PCP - General (Nurse Practitioner)  Rick Melendez MD as Consulting Physician (Nephrology)  Mayo Fregoso MD as Consulting Physician (Urology)  Fredi Pierce MD as Consulting Physician (Cardiology)  Cony Sanchez APRN as Nurse Practitioner (Vascular Surgery)      Provider:  Felisa Melendez MD  Patient Name: Lynne Cade  :  1950    SUBJECTIVE:    F/U ESRD    Seen and examined during HD tx this AM. Using AV shunt. No major SOB. No angina.     Medication:  apixaban, 2.5 mg, Oral, Q12H  carvedilol, 6.25 mg, Oral, BID With Meals  cyclobenzaprine, 10 mg, Oral, TID  dilTIAZem CD, 120 mg, Oral, Q24H  DULoxetine, 60 mg, Oral, Daily  gabapentin, 100 mg, Oral, TID  insulin lispro, 2-7 Units, Subcutaneous, 4x Daily AC & at Bedtime  sodium chloride, 10 mL, Intravenous, Q12H           OBJECTIVE    Vital Sign Min/Max for last 24 hours  Temp  Min: 97.5 °F (36.4 °C)  Max: 98.8 °F (37.1 °C)   BP  Min: 87/56  Max: 142/76   Pulse  Min: 81  Max: 97   Resp  Min: 11  Max: 23   SpO2  Min: 94 %  Max: 100 %   No data recorded   No data recorded     Flowsheet Rows      Flowsheet Row First Filed Value   Admission Height 167.6 cm (66\") Documented at 2025 1606   Admission Weight 94.8 kg (209 lb) Documented at 2025 1606            No intake/output data recorded.  I/O last 3 completed shifts:  In: 300 [I.V.:300]  Out: 2500 [Blood:400]    Physical Exam:  General Appearance: alert, appears stated age and cooperative  Head: normocephalic, without obvious abnormality and atraumatic  Eyes: conjunctivae and sclerae normal and no icterus  Neck: supple and NO GROSS JVD  Lungs: clear to auscultation and respirations regular  Heart: regular rhythm & normal rate and normal S1, S2 " "+CYNDY  Chest: Wall no abnormalities observed  Abdomen: normal bowel sounds and soft, nontender +OBESITY  Extremities: moves extremities well, +TR BILAT LE EDEMA, no cyanosis and no redness  Skin: no bleeding, bruising or rash, turgor normal, color normal and no lesions noted  Neurologic: Alert, and oriented. No focal deficits    Labs:    WBC WBC   Date Value Ref Range Status   07/02/2025 10.52 3.40 - 10.80 10*3/mm3 Final   07/01/2025 11.36 (H) 3.40 - 10.80 10*3/mm3 Final   06/29/2025 8.81 3.40 - 10.80 10*3/mm3 Final      HGB Hemoglobin   Date Value Ref Range Status   07/02/2025 9.3 (L) 12.0 - 15.9 g/dL Final   07/01/2025 10.9 (L) 12.0 - 15.9 g/dL Final   06/29/2025 10.2 (L) 12.0 - 15.9 g/dL Final      HCT Hematocrit   Date Value Ref Range Status   07/02/2025 32.0 (L) 34.0 - 46.6 % Final   07/01/2025 36.8 34.0 - 46.6 % Final   06/29/2025 33.6 (L) 34.0 - 46.6 % Final      Platelets No results found for: \"LABPLAT\"   MCV MCV   Date Value Ref Range Status   07/02/2025 111.9 (H) 79.0 - 97.0 fL Final   07/01/2025 109.2 (H) 79.0 - 97.0 fL Final   06/29/2025 107.3 (H) 79.0 - 97.0 fL Final          Sodium Sodium   Date Value Ref Range Status   07/02/2025 134 (L) 136 - 145 mmol/L Final   07/01/2025 137 136 - 145 mmol/L Final   06/29/2025 140 136 - 145 mmol/L Final      Potassium Potassium   Date Value Ref Range Status   07/02/2025 5.7 (H) 3.5 - 5.2 mmol/L Final   07/01/2025 3.8 3.5 - 5.2 mmol/L Final   06/29/2025 4.4 3.5 - 5.2 mmol/L Final      Chloride Chloride   Date Value Ref Range Status   07/02/2025 101 98 - 107 mmol/L Final   07/01/2025 101 98 - 107 mmol/L Final   06/29/2025 106 98 - 107 mmol/L Final      CO2 CO2   Date Value Ref Range Status   07/02/2025 19.6 (L) 22.0 - 29.0 mmol/L Final   07/01/2025 23.1 22.0 - 29.0 mmol/L Final   06/29/2025 19.6 (L) 22.0 - 29.0 mmol/L Final      BUN BUN   Date Value Ref Range Status   07/02/2025 43.9 (H) 8.0 - 23.0 mg/dL Final   07/01/2025 23.4 (H) 8.0 - 23.0 mg/dL Final   06/29/2025 " "42.5 (H) 8.0 - 23.0 mg/dL Final      Creatinine Creatinine   Date Value Ref Range Status   07/02/2025 5.21 (H) 0.57 - 1.00 mg/dL Final   07/01/2025 3.10 (H) 0.57 - 1.00 mg/dL Final   06/29/2025 4.68 (H) 0.57 - 1.00 mg/dL Final      Calcium Calcium   Date Value Ref Range Status   07/02/2025 8.5 (L) 8.6 - 10.5 mg/dL Final   07/01/2025 8.5 (L) 8.6 - 10.5 mg/dL Final   06/29/2025 7.9 (L) 8.6 - 10.5 mg/dL Final      PO4 No components found for: \"PO4\"   Albumin No results found for: \"ALBUMIN\"     Magnesium Magnesium   Date Value Ref Range Status   07/01/2025 2.4 1.6 - 2.4 mg/dL Final   06/29/2025 2.5 (H) 1.6 - 2.4 mg/dL Final      Uric Acid No components found for: \"URIC ACID\"     Imaging Results (Last 72 Hours)       Procedure Component Value Units Date/Time    Hybrid Vascular OR Imaging ([employer]) [[id number]] Resulted: 07/01/25 1846     Updated: 07/01/25 1846    Narrative:      Please see performing physician's note for result.    IR NON-MIKE TEMP DIALYSIS ACC [[id number]] Collected: 06/30/25 1542     Updated: 06/30/25 1557    Narrative:      DATE OF EXAM:  6/30/2025 3:08 PM EDT    PROCEDURE:  IR NON-TUNNELED TEMP DIALYSIS ACCESS    INDICATIONS:  temporary dialysis cahteter today    COMPARISON:  No Comparisons Available    FLUOROSCOPIC TIME:  24 seconds    PHYSICIAN MONITORED CONSCIOUS SEDATION TIME:  None minutes    TECHNIQUE:   A detailed explanation of the procedure, including the risks, benefits, and alternatives was provided. Informed written consent was obtained from the patient. A preprocedure timeout was performed. The interventional radiology nurse monitored the patient   at all times. The patient was placed supine on the fluoroscopy table and the right neck was ultrasounded, demonstrating a patent right internal jugular vein, and an image was obtained. The neck was then prepped and draped using maximum sterile barrier   technique. The skin was anesthetized with 1% lidocaine, and under direct ultrasound " guidance the access needle was advanced into the right internal jugular vein. A guidewire was manipulated centrally.    The tract was serially dilated over the guidewire and a 16 cm triple-lumen temporary dialysis catheter was advanced over the wire. Fluoroscopy confirmed appropriate position. The catheter was secured to the skin with Prolene. All 3 ports were aspirated   with adequate blood return noted and flushed with heparinized saline. The patient tolerated the procedure well without immediate complication. Patient was transported back to the inpatient unit in stable condition for recovery.      FINDINGS:  See above      Impression:      Successful placement of right IJ 16 cm triple-lumen temporary dialysis catheter utilizing ultrasound and fluoroscopic guidance.      Report dictated by: Fernando Sanchez DNP      I have personally reviewed this case and agree with the findings above:    Electronically Signed: Gaurav Hyde MD    6/30/2025 3:55 PM EDT    Workstation ID: [id number]    XR Chest 1 View [[id number]] Collected: 06/30/25 0448     Updated: 06/30/25 0450    Narrative:      XR CHEST 1 VW    Date of Exam: 6/30/2025 4:36 AM EDT    Indication: pulled out dialysis cath    Comparison: 6/27/2025.    Findings:  There is stable asymmetric elevation of the right hemidiaphragm. There is no pneumothorax, pleural effusion or focal airspace consolidation. Heart size and pulmonary vasculature appear within normal limits. Regional bones appear intact.      Impression:      Impression:  No acute cardiopulmonary abnormality.          Electronically Signed: Bran Kennedy MD    6/30/2025 4:48 AM EDT    Workstation ID: [id number]            Results for orders placed during the hospital encounter of 06/27/25    XR Chest 1 View 06/30/2025  4:48 AM    Narrative  XR CHEST 1 VW    Date of Exam: 6/30/2025 4:36 AM EDT    Indication: pulled out dialysis cath    Comparison: 6/27/2025.    Findings:  There is stable asymmetric elevation of the  right hemidiaphragm. There is no pneumothorax, pleural effusion or focal airspace consolidation. Heart size and pulmonary vasculature appear within normal limits. Regional bones appear intact.    Impression  Impression:  No acute cardiopulmonary abnormality.          Electronically Signed: Bran Kennedy MD  6/30/2025 4:48 AM EDT  Workstation ID: [id number]      XR Chest 1 View 06/27/2025  6:54 PM    Narrative  XR CHEST 1 VW    Date of Exam: 6/27/2025 6:32 PM EDT    Indication: Unable to have dialysis Wednesday or today    Comparison: April 22, 2025    Findings:  There is elevation of the right hemidiaphragm. There is a mild right basilar opacity. The heart and mediastinal contours appear normal. The pulmonary vasculature appears normal. The osseous structures appear intact.    Impression  Impression:  1.Mild right basilar opacity, which could reflect atelectasis or pneumonia.  2.Elevation of right hemidiaphragm.        Electronically Signed: Eitan Leach MD  6/27/2025 6:54 PM EDT  Workstation ID: [id number]      Results for orders placed during the hospital encounter of 04/22/25    XR Chest 1 View 04/22/2025  3:53 PM    Narrative  XR CHEST 1 VW    Date of Exam: 4/22/2025 3:40 PM EDT    Indication: AMS Protocol  AMS Protocol    Comparison: 2/16/2025    Findings:  Heart size is within normal limits for the projection. The right hemidiaphragm is elevated, unchanged. The pulmonary vascular pattern appears normal and the lungs appear clear.    Impression  Impression:  No active disease.        Electronically Signed: Yfn Luna MD  4/22/2025 3:53 PM EDT  Workstation ID: [id number]      Results for orders placed during the hospital encounter of 04/22/25    Duplex Carotid Ultrasound CAR 04/24/2025  2:19 PM    Interpretation Summary    Right internal carotid artery demonstrates a less than 50% stenosis.    Antegrade right vertebral flow.    Left internal carotid artery demonstrates a less than 50% stenosis.    Antegrade  left vertebral flow.        ASSESSMENT / PLAN      AV graft thrombosis, sequela    ESRD (end stage renal disease) on dialysis    ESRD------HD today    2. HTN WITH CKD-----BP good. Avoid hypotension    3. DMII WITH RENAL MANIFESTATIONS-----BS ok. Glucometers, SSI    4. ANEMIA OF ESRD-----EPO/Iron with HD as needed    5. CLOTTED AV SHUNT------Open thrombectomy done per Vascular surgery    6. DEPRESSION------On Cymbalta    7. DM PERIPHERAL NEUROPATHY------On Neurontin    8. PVD    9. HYPERKALEMIA-----K+ restrict diet and adjust K+ bath with HD and follow    Felisa Melendez MD  [facility]  [phone]  07/02/25  07:15 EDT

## 2025-07-02 NOTE — PROGRESS NOTES
[facility] Vascular Surgery Progress Note    Name: Lynne Cade ADMIT: 2025   : 1950  PCP: Lorraine Agarwal APRN    MRN: [id number] LOS: 5 days   AGE/SEX: 75 y.o. female  ROOM: [address] [place] [address]    CC: Postop; status post open left axilloaxillary loop AV shunt thrombectomy, shuntogram with venous anastomotic balloon angioplasty on     Subjective     Pt seen on dialysis.  Using AV graft without issue.    Objective     Scheduled Medications:   apixaban, 2.5 mg, Oral, Q12H  carvedilol, 6.25 mg, Oral, BID With Meals  cyclobenzaprine, 10 mg, Oral, TID  dilTIAZem CD, 120 mg, Oral, Q24H  DULoxetine, 60 mg, Oral, Daily  gabapentin, 100 mg, Oral, TID  insulin lispro, 2-7 Units, Subcutaneous, 4x Daily AC & at Bedtime  sodium chloride, 10 mL, Intravenous, Q12H        Active Infusions:       As Needed Medications:    acetaminophen **OR** acetaminophen **OR** acetaminophen    senna-docusate sodium **AND** polyethylene glycol **AND** bisacodyl **AND** bisacodyl    cyclobenzaprine    dextrose    dextrose    glucagon (human recombinant)    heparin (porcine)    hydrOXYzine    Magnesium Standard Dose Replacement - Follow Nurse / BPA Driven Protocol    melatonin    nitroglycerin    ondansetron ODT **OR** ondansetron    sodium chloride    Vital Signs  Vitals:    25 0253   BP: 101/57   Pulse: 82   Resp: 13   Temp: 97.5 °F (36.4 °C)   SpO2: 94%      Body mass index is 33.73 kg/m².     Physical Exam:  NAD, alert and oriented  RRR  Lungs clear  Abd soft, benign  Vascular: Palpable left radial pulse  Skin: Left arm incision is CDI and soft, no signs of hematoma  Left arm AV graft accessed for dialysis    Results Review:     CBC    Results from last 7 days   Lab Units 25  0315 25  0107 25  2255 25  0141 25  0017 25  1659 25  1653 25  0806   WBC 10*3/mm3 10.52 11.36* 8.81 10.06 9.32  --  11.08* 10.18   HEMOGLOBIN g/dL 9.3* 10.9* 10.2* 10.5* 9.3*  --  10.6*  11.5*   HEMOGLOBIN, POC g/dL  --   --   --   --   --  11.2*  --   --    PLATELETS 10*3/mm3 187 195 184 195 207  --  232 315     BMP   Results from last 7 days   Lab Units 07/02/25  0430 07/01/25  0107 06/29/25  2255 06/29/25  0141 06/28/25  0017 06/27/25  1659 06/27/25  1653 06/26/25  0807   SODIUM mmol/L 134* 137 140 139 141  --  143 142   POTASSIUM mmol/L 5.7* 3.8 4.4 4.1 4.1  --  3.9 4.3   CHLORIDE mmol/L 101 101 106 102 102  --  103 104   CO2 mmol/L 19.6* 23.1 19.6* 21.6* 23.3  --  24.3 22.6   BUN mg/dL 43.9* 23.4* 42.5* 30.2* 45.9*  --  41.1* 54.0*   CREATININE mg/dL 5.21* 3.10* 4.68* 3.38* 4.42* 4.30* 4.28* 4.93*   GLUCOSE mg/dL 211* 141* 254* 52* 133*  --  115* 131*   MAGNESIUM mg/dL  --  2.4 2.5* 2.3 2.6*  --  2.4  --    PHOSPHORUS mg/dL  --  4.4 5.2* 4.5 6.5*  --  5.9*  --      Coag   Results from last 7 days   Lab Units 06/27/25  1653   INR  1.04   APTT seconds 31.0     HbA1C   Lab Results   Component Value Date    HGBA1C 5.70 (H) 06/27/2025    HGBA1C 7.72 (H) 02/16/2025    HGBA1C 7.80 (H) 09/01/2023     Infection     Radiology(recent) IR NON-MIKE TEMP DIALYSIS ACC  Result Date: 6/30/2025  Successful placement of right IJ 16 cm triple-lumen temporary dialysis catheter utilizing ultrasound and fluoroscopic guidance. Report dictated by: Fernando Sanchez DNP  I have personally reviewed this case and agree with the findings above: Electronically Signed: Gaurav Hyde MD  6/30/2025 3:55 PM EDT  Workstation ID: [id number]     VTE Prophylaxis:  Pharmacologic & mechanical VTE prophylaxis orders are present.         Problems:    Active Hospital Problems:  Active Hospital Problems    Diagnosis  POA    **AV graft thrombosis, sequela [T82.868S]  Not Applicable    ESRD (end stage renal disease) on dialysis [N18.6, Z99.2]  Not Applicable     Priority: High      Resolved Hospital Problems    Diagnosis Date Resolved POA    A-V fistula [I77.0] 06/29/2025 Yes     Priority: High        Assessment & Plan   Assessment / Plan     AV  graft thrombosis, sequela    ESRD (end stage renal disease) on dialysis      7/1/2025 - open left axilloaxillary loop AV shunt thrombectomy, shuntogram with venous anastomotic balloon angioplasty by Dr. Raines    POD1  VSS  Pain controlled  Left arm incision is CDI and soft  Left arm AV graft is currently being used for dialysis without issue  Okay to remove temporary dialysis catheter  She has been started on 2.5 mg Eliquis twice daily to promote graft patency  Okay to dc home from our standpoint  Follow up with Dr. Raines in 1 month with pre-clinic duplex  DC instructions are in Epic  We will sign off        EVERARDO Posada  [facility] Vascular Surgery  07/02/25   O: [phone]  F: [phone]

## 2025-07-02 NOTE — PROGRESS NOTES
"    [facility] MEDICINE SERVICE  DAILY PROGRESS NOTE    NAME: Lynne Cade  : 1950  MRN: [id number]      LOS: 5 days     PROVIDER OF SERVICE: Eusebio Durant MD    Chief Complaint: AV graft thrombosis, sequela    Subjective:     Interval History:  History taken from: patient      Chief Complaint: AV fistula issue     History of Present Illness: Lynne Cade is a 75 y.o. female with a CMH of HTN, HLD, T2DM, CHF, CKD stage IV on HD M/W/F who presented to [facility] on 2025 with AV fistula problem.  Patient receives dialysis via AV graft Monday, Wednesday, Friday.  Reports that her last complete dialysis treatment was 4 days ago on Monday, 2025.  Patient went for dialysis on Wednesday and was unable to complete due to shunt occlusion.  Patient had  thrombectomy performed yesterday at [facility] in [place].  Was found to have adequate blood flow after the procedure.  Dialysis was attempted today, unable to complete due to recurrent shunt occlusion.  Denies fever, chills, chest pain, shortness of breath, abdominal pain, changes in bowel or bladder habits.     In the ED, patient was afebrile and hemodynamically stable.  Labs pertinent for Creatinine 4.3 (baseline 2.8), BUN 39, GFR 10.1.  AG 15.7. Calcium 8.4, ionized calcium 1.01, Phos 5.9. WBC 11.08. Chest x-ray: \"Mild right basilar opacity, which could reflect atelectasis or pneumonia.  Elevation of right hemodynamic. \" EKG: SR, HR 81. Vascular surgery consulted, not recommending thrombectomy at this time,  recommending placement of temporary if not tunneled hemodialysis catheter, possible repeat thrombectomy next week. Nephrology was consulted, recommended consulting IR. IR consulted, recommended catheter be placed tomorrow morning, pt does not need to be NPO.  Hospital service to admit for continued evaluation.      sen in bed NAD, VSS, No new complaints, vss  Dialysis delayed due to lack of working access. " Pending  tunneled dialysis catheter as ordered.   6/29 seen today in bed NAD, SURAJ RN, VSS, Now has nontunneled hemodialysis catheter right neck, used yesterday.   6/30 patient seen and examined in bed no acute distress, vital signs stable, discussed with RN, patient removed her dialysis catheter overnight by accident.  IR consulted for replacement.    Plan to replace nontunneled hemodialysis catheter today with radiology  7/1/2025 patient seen and examined in bed no acute distress, vital signs stable, no complaints, plan for open graft thrombectomy today with Dr. Bernard    7/2-Rapid response called while patient was receiving HD. Patient became hypotensive, bradycardiac, and unresponsive. Patient was then placed in trendelenburg positioning and painful stimuli utilized to assess response. Upon arrival of rapid response team, patient BP previously read 41/22. Patient arouses to verbal stimuli and HR and BP improved. Remains drowsy, Patient transported to 2105 by RN.     Plan: Return home with spouse and C PT     Review of Systems  Constitutional:  Negative for chills and fever.   Respiratory:  Negative for shortness of breath.    Cardiovascular:  Negative for chest pain.   Neurological:  Negative for dizziness and headaches.     Objective:     Vital Signs  Temp:  [97.5 °F (36.4 °C)-98.8 °F (37.1 °C)] 97.7 °F (36.5 °C)  Heart Rate:  [44-97] 80  Resp:  [11-23] 14  BP: ()/(22-62) 109/49  Flow (L/min) (Oxygen Therapy):  [2-6] 5   Body mass index is 33.73 kg/m².    Physical Exam   General: 76 yo WF, Alert and oriented, well nourished, no acute distress.  HENT: Normocephalic, normal hearing, moist oral mucosa, no scleral icterus.  Neck: Supple, nontender, no carotid bruits, no JVD, no LAD.  Lungs: Clear to auscultation, nonlabored respiration.  Heart: RRR, no murmur, gallop or edema.  Abdomen: Soft, nontender, nondistended, + bowel sounds.  Musculoskeletal: Normal range of motion and strength, no tenderness or  swelling.  Skin: Fistula present on LUE, bandaged. Skin is warm, dry and pink, no rashes or lesions.  Psychiatric: Cooperative, appropriate mood and affect.       Diagnostic Data    Results from last 7 days   Lab Units 07/02/25  0430 07/02/25  0315 06/27/25  1659 06/27/25  1653   WBC 10*3/mm3  --  10.52   < > 11.08*   HEMOGLOBIN g/dL  --  9.3*   < > 10.6*   HEMOGLOBIN, POC   --   --    < >  --    HEMATOCRIT %  --  32.0*   < > 35.5   HEMATOCRIT POC   --   --    < >  --    PLATELETS 10*3/mm3  --  187   < > 232   GLUCOSE mg/dL 211*  --    < > 115*   CREATININE mg/dL 5.21*  --    < > 4.28*   BUN mg/dL 43.9*  --    < > 41.1*   SODIUM mmol/L 134*  --    < > 143   POTASSIUM mmol/L 5.7*  --    < > 3.9   AST (SGOT) U/L  --   --   --  14   ALT (SGPT) U/L  --   --   --  <5   ALK PHOS U/L  --   --   --  58   BILIRUBIN mg/dL  --   --   --  0.2   POC ANION GAP ISTAT   --   --    < >  --    ANION GAP mmol/L 13.4  --    < > 15.7*    < > = values in this interval not displayed.       [facility]  Result Date: 6/30/2025  Successful placement of right IJ 16 cm triple-lumen temporary dialysis catheter utilizing ultrasound and fluoroscopic guidance. Report dictated by: Fernando Sanchez DNP  I have personally reviewed this case and agree with the findings above: Electronically Signed: Gaurav Hyde MD  6/30/2025 3:55 PM EDT  Workstation ID: [id number]  Scheduled Meds:apixaban, 2.5 mg, Oral, Q12H  carvedilol, 6.25 mg, Oral, BID With Meals  cyclobenzaprine, 10 mg, Oral, TID  dilTIAZem CD, 120 mg, Oral, Q24H  DULoxetine, 60 mg, Oral, Daily  epoetin baldemar/baldemar-epbx, 5,000 Units, Intravenous, Once  gabapentin, 100 mg, Oral, TID  insulin lispro, 2-7 Units, Subcutaneous, 4x Daily AC & at Bedtime  sodium chloride, 10 mL, Intravenous, Q12H      Continuous Infusions:   PRN Meds:.  acetaminophen **OR** acetaminophen **OR** acetaminophen    albumin human    senna-docusate sodium **AND** polyethylene glycol **AND** bisacodyl **AND**  "bisacodyl    cyclobenzaprine    dextrose    dextrose    glucagon (human recombinant)    heparin (porcine)    hydrOXYzine    Magnesium Standard Dose Replacement - Follow Nurse / BPA Driven Protocol    melatonin    nitroglycerin    ondansetron ODT **OR** ondansetron    sodium chloride       I reviewed the patient's new clinical results.    Assessment/Plan:     Active and Resolved Problems  Active Hospital Problems    Diagnosis  POA    **AV graft thrombosis, sequela [T82.868S]  Not Applicable    ESRD (end stage renal disease) on dialysis [N18.6, Z99.2]  Not Applicable      Resolved Hospital Problems    Diagnosis Date Resolved POA    A-V fistula [I77.0] 06/29/2025 Yes     Occlusion of AV dialysis graft  CKD stage IV on hemodialysis M/W/F  Creatinine 4.3 (baseline 2.8), BUN 39, GFR 10.1.  AG 15.7  Calcium 8.4, ionized calcium 1.01  Phos 5.9  WBC 11.08  Chest x-ray: \"Mild right basilar opacity, which could reflect atelectasis or pneumonia.  Elevation of right hemodynamic. \"  EKG: SR, HR 81  Last dialysis treatment 4 days ago Monday (6/23/2025), receives dialysis via AV graft  Thrombosed earlier this week, treated with thrombectomy  Nephrology was consulted and following, recommended consulting IR  IR consulted s/p replace nontunneled hemodialysis catheter with radiology  Vascular surgery following>7/1/2025 - open left axilloaxillary loop AV shunt thrombectomy, shuntogram with venous anastomotic balloon angioplasty by Dr. Raines      Congestive heart failure-Stable  Continue home dose meds     Type 2 diabetes mellitus  A1c-5.7   Low SSI   Acchu-check ACHS with diet  Heart-healthy, consistent carb diet   Hypoglycemia protocol      Hypertension>hypotension, hold bp meds  Hyperlipidemia  Continue home dose meds    VTE Prophylaxis:  Pharmacologic & mechanical VTE prophylaxis orders are present.    Disposition Planning:   Barriers to Discharge:HD  Anticipated Date of Discharge: 6/29  Place of Discharge: home  Time: 35 minutes "     Code Status and Medical Interventions: CPR (Attempt to Resuscitate); Full Support   Ordered at: 06/27/25 2031     Code Status (Patient has no pulse and is not breathing):    CPR (Attempt to Resuscitate)     Medical Interventions (Patient has pulse or is breathing):    Full Support       Signature: Electronically signed by Eusebio Durant MD, 07/02/25, 11:34 EDT.  [facility] Hospitalist Team

## 2025-07-03 ENCOUNTER — APPOINTMENT (OUTPATIENT)
Dept: CARDIOLOGY | Facility: HOSPITAL | Age: 75
DRG: 252 | End: 2025-07-03
Payer: MEDICARE

## 2025-07-03 ENCOUNTER — APPOINTMENT (OUTPATIENT)
Dept: CT IMAGING | Facility: HOSPITAL | Age: 75
DRG: 252 | End: 2025-07-03
Payer: MEDICARE

## 2025-07-03 LAB
ANION GAP SERPL CALCULATED.3IONS-SCNC: 15.3 MMOL/L (ref 5–15)
ARTERIAL PATENCY WRIST A: POSITIVE
ATMOSPHERIC PRESS: ABNORMAL MM[HG]
BASE EXCESS BLDA CALC-SCNC: -4 MMOL/L (ref 0–3)
BDY SITE: ABNORMAL
BUN SERPL-MCNC: 36 MG/DL (ref 8–23)
BUN/CREAT SERPL: 7.6 (ref 7–25)
CALCIUM SPEC-SCNC: 8.2 MG/DL (ref 8.6–10.5)
CHLORIDE SERPL-SCNC: 101 MMOL/L (ref 98–107)
CO2 BLDA-SCNC: 27.2 MMOL/L (ref 22–29)
CO2 SERPL-SCNC: 23.7 MMOL/L (ref 22–29)
CREAT SERPL-MCNC: 4.72 MG/DL (ref 0.57–1)
DEPRECATED RDW RBC AUTO: 67.5 FL (ref 37–54)
DEVICE COMMENT: ABNORMAL
EGFRCR SERPLBLD CKD-EPI 2021: 9 ML/MIN/1.73
ERYTHROCYTE [DISTWIDTH] IN BLOOD BY AUTOMATED COUNT: 16.6 % (ref 12.3–15.4)
GLUCOSE BLDC GLUCOMTR-MCNC: 139 MG/DL (ref 70–105)
GLUCOSE BLDC GLUCOMTR-MCNC: 155 MG/DL (ref 70–105)
GLUCOSE BLDC GLUCOMTR-MCNC: 191 MG/DL (ref 70–105)
GLUCOSE BLDC GLUCOMTR-MCNC: 208 MG/DL (ref 70–105)
GLUCOSE SERPL-MCNC: 205 MG/DL (ref 65–99)
HCO3 BLDA-SCNC: 25.1 MMOL/L (ref 21–28)
HCT VFR BLD AUTO: 31.5 % (ref 34–46.6)
HEMODILUTION: NO
HGB BLD-MCNC: 9.2 G/DL (ref 12–15.9)
INHALED O2 CONCENTRATION: 29 %
Lab: ABNORMAL
MCH RBC QN AUTO: 32.2 PG (ref 26.6–33)
MCHC RBC AUTO-ENTMCNC: 29.2 G/DL (ref 31.5–35.7)
MCV RBC AUTO: 110.1 FL (ref 79–97)
MODALITY: ABNORMAL
NOTIFIED WHO: ABNORMAL
PCO2 BLDA: 69.7 MM HG (ref 35–48)
PH BLDA: 7.16 PH UNITS (ref 7.35–7.45)
PLATELET # BLD AUTO: 200 10*3/MM3 (ref 140–450)
PMV BLD AUTO: 10.2 FL (ref 6–12)
PO2 BLD: 360 MM[HG] (ref 0–500)
PO2 BLDA: 104.3 MM HG (ref 83–108)
POTASSIUM SERPL-SCNC: 4.6 MMOL/L (ref 3.5–5.2)
RBC # BLD AUTO: 2.86 10*6/MM3 (ref 3.77–5.28)
READ BACK: ABNORMAL
SAO2 % BLDCOA: 95.8 % (ref 94–98)
SODIUM SERPL-SCNC: 140 MMOL/L (ref 136–145)
TROPONIN T SERPL HS-MCNC: 79 NG/L
WBC NRBC COR # BLD AUTO: 11.65 10*3/MM3 (ref 3.4–10.8)

## 2025-07-03 PROCEDURE — 93306 TTE W/DOPPLER COMPLETE: CPT

## 2025-07-03 PROCEDURE — 94660 CPAP INITIATION&MGMT: CPT

## 2025-07-03 PROCEDURE — 93306 TTE W/DOPPLER COMPLETE: CPT | Performed by: STUDENT IN AN ORGANIZED HEALTH CARE EDUCATION/TRAINING PROGRAM

## 2025-07-03 PROCEDURE — 94799 UNLISTED PULMONARY SVC/PX: CPT

## 2025-07-03 PROCEDURE — 80048 BASIC METABOLIC PNL TOTAL CA: CPT | Performed by: INTERNAL MEDICINE

## 2025-07-03 PROCEDURE — 70450 CT HEAD/BRAIN W/O DYE: CPT

## 2025-07-03 PROCEDURE — 63710000001 INSULIN LISPRO (HUMAN) PER 5 UNITS: Performed by: SURGERY

## 2025-07-03 PROCEDURE — 84484 ASSAY OF TROPONIN QUANT: CPT | Performed by: HOSPITALIST

## 2025-07-03 PROCEDURE — 97162 PT EVAL MOD COMPLEX 30 MIN: CPT

## 2025-07-03 PROCEDURE — 85027 COMPLETE CBC AUTOMATED: CPT | Performed by: INTERNAL MEDICINE

## 2025-07-03 PROCEDURE — 82948 REAGENT STRIP/BLOOD GLUCOSE: CPT

## 2025-07-03 PROCEDURE — 93356 MYOCRD STRAIN IMG SPCKL TRCK: CPT

## 2025-07-03 PROCEDURE — 82803 BLOOD GASES ANY COMBINATION: CPT

## 2025-07-03 PROCEDURE — 82948 REAGENT STRIP/BLOOD GLUCOSE: CPT | Performed by: SURGERY

## 2025-07-03 PROCEDURE — 36600 WITHDRAWAL OF ARTERIAL BLOOD: CPT

## 2025-07-03 PROCEDURE — 93356 MYOCRD STRAIN IMG SPCKL TRCK: CPT | Performed by: STUDENT IN AN ORGANIZED HEALTH CARE EDUCATION/TRAINING PROGRAM

## 2025-07-03 RX ORDER — MIDODRINE HYDROCHLORIDE 5 MG/1
5 TABLET ORAL
Status: DISCONTINUED | OUTPATIENT
Start: 2025-07-03 | End: 2025-07-03

## 2025-07-03 RX ORDER — MIDODRINE HYDROCHLORIDE 5 MG/1
10 TABLET ORAL
Status: DISCONTINUED | OUTPATIENT
Start: 2025-07-03 | End: 2025-07-05

## 2025-07-03 RX ADMIN — Medication 10 ML: at 08:14

## 2025-07-03 RX ADMIN — APIXABAN 2.5 MG: 2.5 TABLET, FILM COATED ORAL at 20:36

## 2025-07-03 RX ADMIN — MIDODRINE HYDROCHLORIDE 5 MG: 5 TABLET ORAL at 08:13

## 2025-07-03 RX ADMIN — DULOXETINE 60 MG: 30 CAPSULE, DELAYED RELEASE ORAL at 08:13

## 2025-07-03 RX ADMIN — ACETAMINOPHEN 650 MG: 325 TABLET, FILM COATED ORAL at 16:53

## 2025-07-03 RX ADMIN — MIDODRINE HYDROCHLORIDE 10 MG: 5 TABLET ORAL at 11:50

## 2025-07-03 RX ADMIN — INSULIN LISPRO 2 UNITS: 100 INJECTION, SOLUTION INTRAVENOUS; SUBCUTANEOUS at 11:50

## 2025-07-03 RX ADMIN — Medication 10 ML: at 20:37

## 2025-07-03 RX ADMIN — APIXABAN 2.5 MG: 2.5 TABLET, FILM COATED ORAL at 08:13

## 2025-07-03 RX ADMIN — ACETAMINOPHEN 650 MG: 325 TABLET, FILM COATED ORAL at 08:26

## 2025-07-03 RX ADMIN — CYCLOBENZAPRINE 10 MG: 10 TABLET, FILM COATED ORAL at 08:13

## 2025-07-03 RX ADMIN — DILTIAZEM HYDROCHLORIDE 30 MG: 30 TABLET, FILM COATED ORAL at 08:13

## 2025-07-03 RX ADMIN — GABAPENTIN 100 MG: 100 CAPSULE ORAL at 08:13

## 2025-07-03 RX ADMIN — INSULIN LISPRO 3 UNITS: 100 INJECTION, SOLUTION INTRAVENOUS; SUBCUTANEOUS at 20:36

## 2025-07-03 RX ADMIN — MIDODRINE HYDROCHLORIDE 10 MG: 5 TABLET ORAL at 16:49

## 2025-07-03 RX ADMIN — GABAPENTIN 100 MG: 100 CAPSULE ORAL at 20:36

## 2025-07-03 RX ADMIN — CYCLOBENZAPRINE 10 MG: 10 TABLET, FILM COATED ORAL at 20:36

## 2025-07-03 NOTE — PLAN OF CARE
Goal Outcome Evaluation:  Plan of Care Reviewed With: patient           Outcome Evaluation: Lynne Cade is a 75 y.o. female with  CMH of HTN, HLD, T2DM, CHF, CKD stage IV on HD who presents with dialysis shunt occlusion after thrombectomy performed at OSH. Tunnel cath was placed but pt removed accidentally. Thrombectomy preformed 7/1. Pt had a rapid reponse 7/2 during HD with hypotension, bradycardia and unresponsiveness.   At baseline pt lives with her spouse in a H with 1 [place] as well as ramps. She reports that she requires assist from her spouse to get in/out of w/c but is usually able to dress herself.  She is current with HH PT.  At time of PT evaluation, she is alert & oriented to person, place and time but somulent with myoclonic jerking noted intermittantly.  She performs bed mobility with min A and no significant change in BP.  Pt is too fatigued to attempt standing.  Pt remains weak and would benefit from continued HH PT at discharge.    Anticipated Discharge Disposition (PT): home with assist

## 2025-07-03 NOTE — PLAN OF CARE
Goal Outcome Evaluation:      Pt resting overnight. Remains on 2L NC. 12.5g albumin given this shift for low blood pressure. Blood pressures remain soft overnight with MAP>= 65. WBC trending up on morning labs. Pt able to make needs known.  at bedside. VSS. No complaints at this time.         Problem: Adult Inpatient Plan of Care  Goal: Absence of Hospital-Acquired Illness or Injury  Intervention: Identify and Manage Fall Risk  Recent Flowsheet Documentation  Taken 7/3/2025 0600 by Rochelle Lees, RN  Safety Promotion/Fall Prevention:   safety round/check completed   room organization consistent   nonskid shoes/slippers when out of bed   fall prevention program maintained   clutter free environment maintained   assistive device/personal items within reach  Taken 7/3/2025 0400 by Rochelle Lees, RN  Safety Promotion/Fall Prevention:   safety round/check completed   room organization consistent   nonskid shoes/slippers when out of bed   fall prevention program maintained   clutter free environment maintained   assistive device/personal items within reach  Taken 7/3/2025 0215 by Rochelle Lees, RN  Safety Promotion/Fall Prevention:   safety round/check completed   room organization consistent   nonskid shoes/slippers when out of bed   fall prevention program maintained   clutter free environment maintained   assistive device/personal items within reach  Taken 7/3/2025 0030 by Rochelle Lees, RN  Safety Promotion/Fall Prevention:   safety round/check completed   room organization consistent   nonskid shoes/slippers when out of bed   fall prevention program maintained   clutter free environment maintained   assistive device/personal items within reach  Taken 7/2/2025 2230 by Rochelle Lees, RN  Safety Promotion/Fall Prevention:   safety round/check completed   room organization consistent   nonskid shoes/slippers when out of bed   fall prevention program maintained   clutter free  environment maintained   assistive device/personal items within reach  Taken 7/2/2025 2015 by Rochelle Lees RN  Safety Promotion/Fall Prevention:   safety round/check completed   room organization consistent   nonskid shoes/slippers when out of bed   fall prevention program maintained   clutter free environment maintained   assistive device/personal items within reach  Intervention: Prevent Skin Injury  Recent Flowsheet Documentation  Taken 7/3/2025 0600 by Rochelle Lees RN  Body Position:   position changed independently   sitting up in bed  Taken 7/3/2025 0400 by Rochelle Lees RN  Body Position:   position changed independently   sitting up in bed  Skin Protection: transparent dressing maintained  Taken 7/3/2025 0215 by Rochelle Lees RN  Body Position:   position changed independently   sitting up in bed  Taken 7/3/2025 0030 by Rochelle Lees RN  Body Position:   position changed independently   sitting up in bed  Skin Protection: transparent dressing maintained  Taken 7/2/2025 2230 by Rochelle Lees RN  Body Position:   position changed independently   sitting up in bed  Taken 7/2/2025 2015 by Rochelle Lees RN  Body Position:   position changed independently   sitting up in bed  Skin Protection: transparent dressing maintained  Intervention: Prevent and Manage VTE (Venous Thromboembolism) Risk  Recent Flowsheet Documentation  Taken 7/2/2025 2015 by Rochelle Lees RN  VTE Prevention/Management:   bilateral   SCDs (sequential compression devices) off   patient refused intervention  Intervention: Prevent Infection  Recent Flowsheet Documentation  Taken 7/3/2025 0600 by Rochelle Lees RN  Infection Prevention:   single patient room provided   personal protective equipment utilized   hand hygiene promoted   equipment surfaces disinfected   rest/sleep promoted  Taken 7/3/2025 0400 by Rochelle Lees RN  Infection Prevention:   single patient room  provided   personal protective equipment utilized   hand hygiene promoted   equipment surfaces disinfected   rest/sleep promoted  Taken 7/3/2025 0215 by Rochelle Lees RN  Infection Prevention:   single patient room provided   personal protective equipment utilized   hand hygiene promoted   equipment surfaces disinfected   rest/sleep promoted  Taken 7/3/2025 0030 by Rochelle Lees RN  Infection Prevention:   single patient room provided   personal protective equipment utilized   hand hygiene promoted   equipment surfaces disinfected   rest/sleep promoted  Taken 7/2/2025 2230 by Rochelle Lees RN  Infection Prevention:   single patient room provided   personal protective equipment utilized   hand hygiene promoted   equipment surfaces disinfected   rest/sleep promoted  Taken 7/2/2025 2015 by Rochelle Lees RN  Infection Prevention:   single patient room provided   personal protective equipment utilized   hand hygiene promoted   equipment surfaces disinfected   rest/sleep promoted  Goal: Optimal Comfort and Wellbeing  Intervention: Monitor Pain and Promote Comfort  Recent Flowsheet Documentation  Taken 7/3/2025 0400 by Rochelle Lees RN  Pain Management Interventions:   care clustered   pain management plan reviewed with patient/caregiver   pillow support provided   position adjusted   relaxation techniques promoted   quiet environment facilitated  Taken 7/3/2025 0030 by Rochelle Lees RN  Pain Management Interventions:   care clustered   pain management plan reviewed with patient/caregiver   pillow support provided   position adjusted   quiet environment facilitated   relaxation techniques promoted  Taken 7/2/2025 2015 by Rochelle Lees RN  Pain Management Interventions:   care clustered   pain management plan reviewed with patient/caregiver   pillow support provided   position adjusted   quiet environment facilitated   relaxation techniques promoted  Intervention:  Provide Person-Centered Care  Recent Flowsheet Documentation  Taken 7/2/2025 2015 by Rochelle Lees RN  Trust Relationship/Rapport:   care explained   questions answered   questions encouraged     Problem: Comorbidity Management  Goal: Blood Pressure in Desired [place]  Intervention: Maintain Blood Pressure Management  Recent Flowsheet Documentation  Taken 7/3/2025 0600 by Rochelle Lees RN  Medication Review/Management: medications reviewed  Taken 7/3/2025 0400 by Rochelle Lees RN  Medication Review/Management: medications reviewed  Taken 7/3/2025 0215 by Rochelle Lees RN  Medication Review/Management: medications reviewed  Taken 7/3/2025 0030 by Rochelle Lees RN  Medication Review/Management: medications reviewed  Taken 7/2/2025 2230 by Rochelle Lees RN  Medication Review/Management: medications reviewed  Taken 7/2/2025 2015 by Rochelle Lees, RN  Medication Review/Management: medications reviewed

## 2025-07-03 NOTE — PROGRESS NOTES
"    [facility] MEDICINE SERVICE  DAILY PROGRESS NOTE    NAME: Lynne Cade  : 1950  MRN: [id number]      LOS: 6 days     PROVIDER OF SERVICE: Hailey Rosales MD    Chief Complaint: AV graft thrombosis, sequela    Subjective:     Interval History:  History taken from: patient      Chief Complaint: AV fistula issue     History of Present Illness: Lynne Cade is a 75 y.o. female with a CMH of HTN, HLD, T2DM, CHF, CKD stage IV on HD M/W/F who presented to [facility] on 2025 with AV fistula problem.  Patient receives dialysis via AV graft Monday, Wednesday, Friday.  Reports that her last complete dialysis treatment was 4 days ago on Monday, 2025.  Patient went for dialysis on Wednesday and was unable to complete due to shunt occlusion.  Patient had  thrombectomy performed yesterday at [facility] in [place].  Was found to have adequate blood flow after the procedure.  Dialysis was attempted today, unable to complete due to recurrent shunt occlusion.  Denies fever, chills, chest pain, shortness of breath, abdominal pain, changes in bowel or bladder habits.     In the ED, patient was afebrile and hemodynamically stable.  Labs pertinent for Creatinine 4.3 (baseline 2.8), BUN 39, GFR 10.1.  AG 15.7. Calcium 8.4, ionized calcium 1.01, Phos 5.9. WBC 11.08. Chest x-ray: \"Mild right basilar opacity, which could reflect atelectasis or pneumonia.  Elevation of right hemodynamic. \" EKG: SR, HR 81. Vascular surgery consulted, not recommending thrombectomy at this time,  recommending placement of temporary if not tunneled hemodialysis catheter, possible repeat thrombectomy next week. Nephrology was consulted, recommended consulting IR. IR consulted, recommended catheter be placed tomorrow morning, pt does not need to be NPO.  Hospital service to admit for continued evaluation.      sen in bed NAD, VSS, No new complaints, vss  Dialysis delayed due to lack of working access. Pending  " tunneled dialysis catheter as ordered.   6/29 seen today in bed RYLIE, SURAJ RN, VSS, Now has nontunneled hemodialysis catheter right neck, used yesterday.   6/30 patient seen and examined in bed no acute distress, vital signs stable, discussed with RN, patient removed her dialysis catheter overnight by accident.  IR consulted for replacement.    Plan to replace nontunneled hemodialysis catheter today with radiology  7/1/2025 patient seen and examined in bed no acute distress, vital signs stable, no complaints, plan for open graft thrombectomy today with Dr. Bernard    7/2-Rapid response called while patient was receiving HD. Patient became hypotensive, bradycardiac, and unresponsive. Patient was then placed in trendelenburg positioning and painful stimuli utilized to assess response. Upon arrival of rapid response team, patient BP previously read 41/22. Patient arouses to verbal stimuli and HR and BP improved. Remains drowsy, Patient transported to 2105 by RN.     7/3 patient appears somnolent and lethargic this morning and still quite altered.  Obtain CT head.  ABG.  Was also dizzy.  Blood pressure on the softer side.  Discontinue Cardizem and other blood pressure medications for now.  Increase the dose of midodrine      Plan: Return home with spouse and C PT     Review of Systems  Constitutional:  Negative for chills and fever.   Respiratory:  Negative for shortness of breath.    Cardiovascular:  Negative for chest pain.   Neurological:  Negative for dizziness and headaches.     Objective:     Vital Signs  Temp:  [97.5 °F (36.4 °C)-99.6 °F (37.6 °C)] 97.5 °F (36.4 °C)  Heart Rate:  [] 92  Resp:  [14-18] 16  BP: ()/(22-66) 107/47  Flow (L/min) (Oxygen Therapy):  [2-5] 2   Body mass index is 34.35 kg/m².    Physical Exam   General: 74 yo WF, lethargic and somnolent.  HENT: Normocephalic, normal hearing, moist oral mucosa, no scleral icterus.  Neck: Supple, nontender, no carotid bruits, no JVD, no  LAD.  Lungs: Clear to auscultation, nonlabored respiration.  Heart: RRR, no murmur, gallop or edema.  Abdomen: Soft, nontender, nondistended, + bowel sounds.  Musculoskeletal: Normal range of motion and strength, no tenderness or swelling.  Skin: Fistula present on LUE, bandaged. Skin is warm, dry and pink, no rashes or lesions.  Psychiatric: Unable to obtain       Diagnostic Data    Results from last 7 days   Lab Units 07/03/25  0345 06/27/25  1659 06/27/25  1653   WBC 10*3/mm3 11.65*   < > 11.08*   HEMOGLOBIN g/dL 9.2*   < > 10.6*   HEMOGLOBIN, POC   --    < >  --    HEMATOCRIT % 31.5*   < > 35.5   HEMATOCRIT POC   --    < >  --    PLATELETS 10*3/mm3 200   < > 232   GLUCOSE mg/dL 205*   < > 115*   CREATININE mg/dL 4.72*   < > 4.28*   BUN mg/dL 36.0*   < > 41.1*   SODIUM mmol/L 140   < > 143   POTASSIUM mmol/L 4.6   < > 3.9   AST (SGOT) U/L  --   --  14   ALT (SGPT) U/L  --   --  <5   ALK PHOS U/L  --   --  58   BILIRUBIN mg/dL  --   --  0.2   POC ANION GAP ISTAT   --    < >  --    ANION GAP mmol/L 15.3*   < > 15.7*    < > = values in this interval not displayed.       No radiology results for the last day  Scheduled Meds:apixaban, 2.5 mg, Oral, Q12H  [Held by provider] carvedilol, 3.125 mg, Oral, BID With Meals  cyclobenzaprine, 10 mg, Oral, TID  dilTIAZem, 30 mg, Oral, Q12H  DULoxetine, 60 mg, Oral, Daily  gabapentin, 100 mg, Oral, TID  insulin lispro, 2-7 Units, Subcutaneous, 4x Daily AC & at Bedtime  midodrine, 5 mg, Oral, TID AC  sodium chloride, 10 mL, Intravenous, Q12H      Continuous Infusions:   PRN Meds:.  acetaminophen **OR** acetaminophen **OR** acetaminophen    senna-docusate sodium **AND** polyethylene glycol **AND** bisacodyl **AND** bisacodyl    cyclobenzaprine    dextrose    dextrose    glucagon (human recombinant)    heparin (porcine)    hydrOXYzine    Magnesium Standard Dose Replacement - Follow Nurse / BPA Driven Protocol    melatonin    nitroglycerin    ondansetron ODT **OR** ondansetron     "oxyCODONE    sodium chloride       I reviewed the patient's new clinical results.    Assessment/Plan:     Active and Resolved Problems  Active Hospital Problems    Diagnosis  POA    **AV graft thrombosis, sequela [T82.868S]  Not Applicable    ESRD (end stage renal disease) on dialysis [N18.6, Z99.2]  Not Applicable      Resolved Hospital Problems    Diagnosis Date Resolved POA    A-V fistula [I77.0] 06/29/2025 Yes     Occlusion of AV dialysis graft  CKD stage IV on hemodialysis M/W/F  Creatinine 4.3 (baseline 2.8), BUN 39, GFR 10.1.  AG 15.7  Calcium 8.4, ionized calcium 1.01  Phos 5.9  WBC 11.08  Chest x-ray: \"Mild right basilar opacity, which could reflect atelectasis or pneumonia.  Elevation of right hemodynamic. \"  EKG: SR, HR 81  Last dialysis treatment 4 days ago Monday (6/23/2025), receives dialysis via AV graft  Thrombosed earlier this week, treated with thrombectomy  Nephrology was consulted and following, recommended consulting IR  IR consulted s/p replace nontunneled hemodialysis catheter with radiology  Vascular surgery following>7/1/2025 - open left axilloaxillary loop AV shunt thrombectomy, shuntogram with venous anastomotic balloon angioplasty by Dr. Raines      Congestive heart failure-Stable  Continue home dose meds     Type 2 diabetes mellitus  A1c-5.7   Low SSI   Acchu-check ACHS with diet  Heart-healthy, consistent carb diet   Hypoglycemia protocol      Hypertension>hypotension, hold bp meds--patient was still receiving Cardizem, put that on hold.  Increase the dose of midodrine.  Patient had episode of dizziness this morning, check head CT and echocardiogram.  Check troponin.    Acute metabolic encephalopathy patient lethargic and somnolent this morning.  Complaining of some dizziness .check ABG and CT head.  Possibly due to low blood pressure?  -- Orthostatics positive, discontinue all blood pressure medications.  Increase the dose of midodrine.      Hyperlipidemia  Continue home dose " meds    VTE Prophylaxis:  Pharmacologic & mechanical VTE prophylaxis orders are present.    Disposition Planning:   Barriers to Discharge:HD  Anticipated Date of Discharge: Pending clinical course  Place of Discharge: home  Time: 35 minutes     Code Status and Medical Interventions: CPR (Attempt to Resuscitate); Full Support   Ordered at: 06/27/25 2031     Code Status (Patient has no pulse and is not breathing):    CPR (Attempt to Resuscitate)     Medical Interventions (Patient has pulse or is breathing):    Full Support       Signature: Electronically signed by Hailey Rosales MD, 07/03/25, 09:31 EDT.  [facility] Hospitalist Team

## 2025-07-03 NOTE — THERAPY EVALUATION
Patient Name: Lynne Cade  : 1950    MRN: [id number]                              Today's Date: 7/3/2025       Admit Date: 2025    Visit Dx:     ICD-10-CM ICD-9-CM   1. Chronic kidney disease on chronic dialysis  N18.6 585.9    Z99.2 V45.11   2. Occlusion of arteriovenous dialysis graft  T82.898A 996.74   3. AV graft thrombosis, sequela  T82.868S 909.3     Patient Active Problem List   Diagnosis    Acute kidney injury superimposed on CKD    Absolute anemia    Anxiety    Benign essential tremor    Chronic obstructive pulmonary disease    Family tension    Hyperlipidemia    Insomnia    Iron deficiency anemia    Other amnesia    Diabetic nephropathy    Renal insufficiency    Type 2 diabetes mellitus with hyperglycemia    Grief at loss of child    Closed subcapital fracture of right femur    Essential hypertension    Transition of care performed with sharing of clinical summary    Vitamin D deficiency    Age-related osteoporosis without current pathological fracture    History of total knee arthroplasty    Dyspnea on exertion    S/P gastric bypass    Obesity (BMI 30-39.9)    Elevated LFTs    Weakness    Syncope, unspecified syncope type    Right leg swelling    Folliculitis    Elevated troponin    Coronary artery disease involving native coronary artery of native heart without angina pectoris    Dyspnea    Dyspnea and respiratory abnormalities    Hemodialysis access, AV graft    S/P arteriovenous (AV) graft placement    Venous stenosis of left upper extremity    Lethargy    ESRD (end stage renal disease) on dialysis    Malfunction of arteriovenous graft    AV graft thrombosis, sequela     Past Medical History:   Diagnosis Date    Anemia     Hx of anemia    Anxiety     Controled  xanax prn    Arthritis     [facility]    Cataract     bilateral    CHF (congestive heart failure) 22    Low iron    Cholelithiasis 30 yrs ago    Had gb removed    CKD (chronic kidney disease),  stage IV 01/13/2023    Colon polyp     Depression 1995    Take cymbalta    Diabetes mellitus     type 2    Diarrhea     on and off    Edema of right foot 04/2021    Healthcare maintenance 2008    [facility]    Hip pain, bilateral     HL (hearing loss) 1/1/19    Have hearing aids    Hyperlipidemia     Hypertension     Knee pain, bilateral     Obesity     Osteopenia 11/1/22    On med    Other forms of dyspnea     Other specified soft tissue disorders     Pneumonia due to COVID-19 virus 06/26/2022    Renal insufficiency Stage 3    Has gone up to stage 4    Slow to wake up after anesthesia     Spinal headache     Stress headaches     Tremor     Benign    Tremor of both hands     Urinary tract infection 07/01/22    [place]    Visual impairment 6/1/2017    Cataracts    Vitamin D deficiency      Past Surgical History:   Procedure Laterality Date    ARTERIOVENOUS FISTULA/SHUNT SURGERY Left 02/24/2023    Procedure: BRACHIOCEPHALIC ARTERIOVENOUS FISTULA FORMATION;  Surgeon: Vignesh Riley MD;  Location: [facility] MAIN OR;  Service: Vascular;  Laterality: Left;    ARTERIOVENOUS FISTULA/SHUNT SURGERY Left 02/27/2025    Procedure: ARTERIOVENOUS SHUNT GRAFT;  Surgeon: Zaina Bernard MD;  Location: [facility] MAIN OR;  Service: Vascular;  Laterality: Left;    ARTERIOVENOUS FISTULA/SHUNT SURGERY Left 6/26/2025    Procedure: ARTERIOVENOUS GRAFT THROMBECTOMY;  Surgeon: Mahesh Haro II, MD;  Location: [facility] OR;  Service: Vascular;  Laterality: Left;    BREAST BIOPSY Right     CHOLECYSTECTOMY  1990    COLONOSCOPY      This year 2022    EYE SURGERY Bilateral     cataract removal    GASTRIC BYPASS  2002    HERNIA REPAIR  2005    umbilical hernia    HIP OPEN REDUCTION Right 03/02/2020    Procedure: FEMORAL NECK OPEN REDUCTION INTERNAL FIXATION;  Surgeon: Yfn Sifuentes MD;  Location: [facility] MAIN OR;  Service: Orthopedics;  Laterality: Right;  Cannulated screw fixation right femoral neck    JOINT  REPLACEMENT  2020    R knee    LAPAROTOMY OOPHERECTOMY Bilateral 2010    OTHER SURGICAL HISTORY  04/20/2015    Lexiscan stress test, normal. Abstracted from [facility].    PANNICULECTOMY      TOTAL KNEE ARTHROPLASTY Right 04/29/2021    Procedure: RIGHT TOTAL KNEE REPLACEMENT;  Surgeon: Yfn Sifuentes MD;  Location: [facility] MAIN OR;  Service: Orthopedics;  Laterality: Right;      General Information       Row Name 07/03/25 1036          Physical Therapy Time and Intention    Document Type evaluation  -CL     Mode of Treatment individual therapy;physical therapy  -CL       Row Name 07/03/25 1036          General Information    Patient Profile Reviewed yes  -CL     Prior Level of Function transfer;min assist:;w/c or scooter  -CL     Existing Precautions/Restrictions fall  -CL     Barriers to Rehab medically complex  -CL       Row Name 07/03/25 1036          Living Environment    Current Living Arrangements home  -CL     People in Home spouse  -CL     Name(s) of People in Home : G[place]y  -CL       Row Name 07/03/25 1036          Home Main Entrance    Number of Stairs, Main Entrance one  -CL       Row Name 07/03/25 1036          Stairs Within Home, Primary    Number of Stairs, Within Home, Primary none  -CL       Row Name 07/03/25 1036          Cognition    Orientation Status (Cognition) oriented x 3;verbal cues/prompts needed for orientation;situation  -CL       Row Name 07/03/25 1036          Safety Issues/Impairments Affecting Functional Mobility    Impairments Affecting Function (Mobility) endurance/activity tolerance  -CL               User Key  (r) = Recorded By, (t) = Taken By, (c) = Cosigned By      Initials Name Provider Type    CL Olivia Lucio PT Physical Therapist                   Mobility       Row Name 07/03/25 1047          Bed Mobility    Bed Mobility bed mobility (all) activities  -CL     All Activities, [place] (Bed Mobility) minimum assist (75% patient effort)  -CL       Row Name  07/03/25 1047          Bed-Chair Transfer    Bed-Chair [place] (Transfers) not tested  -CL     Comment, (Bed-Chair Transfer) fatigue  -CL       Row Name 07/03/25 1047          Sit-Stand Transfer    Sit-Stand [place] (Transfers) not tested  -CL       Row Name 07/03/25 1047          Gait/Stairs (Locomotion)    [place] Level (Gait) not tested  -CL     Patient was able to Ambulate no, other medical factors prevent ambulation  -CL     Reason Patient was unable to Ambulate Excessive Weakness;Excessive Sedation/Somnolence  -CL               User Key  (r) = Recorded By, (t) = Taken By, (c) = Cosigned By      Initials Name Provider Type    CL Olivia Lucio, PT Physical Therapist                   Obj/Interventions       Row Name 07/03/25 1048          Range of Motion Comprehensive    General Range of Motion no range of motion deficits identified  -CL       Row Name 07/03/25 1048          Strength Comprehensive (MMT)    Comment, General Manual Muscle Testing (MMT) Assessment BLEs grossly 3+/5  -CL       Row Name 07/03/25 1048          Balance    Balance Assessment sitting static balance;sitting dynamic balance  -CL     Static Sitting Balance supervision  -CL     Dynamic Sitting Balance contact guard  -CL     Position, Sitting Balance unsupported;sitting edge of bed  -CL       Row Name 07/03/25 1048          Sensory Assessment (Somatosensory)    Sensory Assessment (Somatosensory) sensation intact  -CL               User Key  (r) = Recorded By, (t) = Taken By, (c) = Cosigned By      Initials Name Provider Type    Olivia Willett, PT Physical Therapist                   Goals/Plan       Row Name 07/03/25 1056          Bed Mobility Goal 1 (PT)    Activity/Assistive Device (Bed Mobility Goal 1, PT) bed mobility activities, all  -CL     [place] Level/Cues Needed (Bed Mobility Goal 1, PT) modified independence  -CL     Time Frame (Bed Mobility Goal 1, PT) long term goal (LTG);2 weeks  -       Row  Name 07/03/25 1056          Transfer Goal 1 (PT)    Activity/Assistive Device (Transfer Goal 1, PT) sit-to-stand/stand-to-sit;bed-to-chair/chair-to-bed  -CL     [place] Level/Cues Needed (Transfer Goal 1, PT) modified independence  -CL     Time Frame (Transfer Goal 1, PT) long term goal (LTG);2 weeks  -CL       Row Name 07/03/25 1056          Gait Training Goal 1 (PT)    Activity/Assistive Device (Gait Training Goal 1, PT) gait (walking locomotion);assistive device use  -CL     [place] Level (Gait Training Goal 1, PT) minimum assist (75% or more patient effort)  -CL     Distance (Gait Training Goal 1, PT) 15'  -CL     Time Frame (Gait Training Goal 1, PT) long term goal (LTG);2 weeks  -CL       Row Name 07/03/25 1056          Therapy Assessment/Plan (PT)    Planned Therapy Interventions (PT) balance training;bed mobility training;gait training;patient/family education;strengthening;transfer training  -CL               User Key  (r) = Recorded By, (t) = Taken By, (c) = Cosigned By      Initials Name Provider Type    CL Olivia Lucio, PT Physical Therapist                   Clinical Impression       Row Name 07/03/25 1049          Pain    Pretreatment Pain Rating 0/10 - no pain  -CL     Posttreatment Pain Rating 0/10 - no pain  -CL       Row Name 07/03/25 1049          Plan of Care Review    Plan of Care Reviewed With patient  -CL     Outcome Evaluation Lynne Cade is a 75 y.o. female with  CMH of HTN, HLD, T2DM, CHF, CKD stage IV on HD who presents with dialysis shunt occlusion after thrombectomy performed at OSH. Tunnel cath was placed but pt removed accidentally. Thrombectomy preformed 7/1. Pt had a rapid reponse 7/2 during HD with hypotension, bradycardia and unresponsiveness.   At baseline pt lives with her spouse in a H with 1 [place] as well as ramps. She reports that she requires assist from her spouse to get in/out of w/c but is usually able to dress herself.  She is current with  PT.  At  time of PT evaluation, she is alert & oriented to person, place and time but somulent with myoclonic jerking noted intermittantly.  She performs bed mobility with min A and no significant change in BP.  Pt is too fatigued to attempt standing.  Pt remains weak and would benefit from continued  PT at discharge.  -CL       Row Name 07/03/25 1049          Therapy Assessment/Plan (PT)    Rehab Potential (PT) good  -CL     Criteria for Skilled Interventions Met (PT) yes;meets criteria  -CL     Therapy Frequency (PT) 5 times/wk  -CL     Predicted Duration of Therapy Intervention (PT) Until discharge  -CL       Row Name 07/03/25 1044 07/03/25 1037       Vital Signs    Pre Systolic BP Rehab -- 107  -CL    Pre Treatment Diastolic BP -- 51  -CL    Intra Systolic BP Rehab 105  -CL --    Intra Treatment Diastolic BP 52  -CL --    Pretreatment Heart Rate (beats/min) -- 80  -CL    Intratreatment Heart Rate (beats/min) 81  -CL --    Pre SpO2 (%) -- 99  -CL    O2 Delivery Pre Treatment -- supplemental O2  2L  -CL    Intra SpO2 (%) 99  -CL --    O2 Delivery Intra Treatment supplemental O2  -CL --    Pre Patient Position Supine  -CL --    Intra Patient Position Sitting  -CL --    Post Patient Position Supine  -CL --      Row Name 07/03/25 1049 07/03/25 1044       Positioning and Restraints    Pre-Treatment Position in bed  -CL in bed  -CL    Post Treatment Position bed  -CL bed  -CL    In Bed supine  Pt taken by transport at end of session  -CL with other staff  -CL              User Key  (r) = Recorded By, (t) = Taken By, (c) = Cosigned By      Initials Name Provider Type    Olivia Willett, PT Physical Therapist                   Outcome Measures       Row Name 07/03/25 1057 07/03/25 0813       How much help from another person do you currently need...    Turning from your back to your side while in flat bed without using bedrails? 4  -CL 4  -KR    Moving from lying on back to sitting on the side of a flat bed without  bedrails? 3  -CL 4  -KR    Moving to and from a bed to a chair (including a wheelchair)? 2  -CL 4  -KR    Standing up from a chair using your arms (e.g., wheelchair, bedside chair)? 2  -CL 3  -KR    Climbing 3-5 steps with a railing? 1  -CL 3  -KR    To walk in hospital room? 2  -CL 3  -KR    AM-PAC 6 Clicks Score (PT) 14  -CL 21  -KR    Highest Level of Mobility Goal Move to Chair/Commode-4  -CL Walk 10 Steps or More-6  -KR      Row Name 07/03/25 1057          Functional Assessment    Outcome Measure Options AM-PAC 6 Clicks Basic Mobility (PT)  -               User Key  (r) = Recorded By, (t) = Taken By, (c) = Cosigned By      Initials Name Provider Type    KR Trinidad Rodriguez, RN Registered Nurse    CL Olivia Lucio, PT Physical Therapist                                 Physical Therapy Education       Title: PT OT SLP Therapies (Done)       Topic: Physical Therapy (Done)       Point: Mobility training (Done)       Learning Progress Summary            Patient Acceptance, E,TB, VU by CL at 7/3/2025 1057                                      User Key       Initials Effective Dates Name Provider Type Discipline     03/02/22 -  Olivia Lucio, PT Physical Therapist PT                  PT Recommendation and Plan  Planned Therapy Interventions (PT): balance training, bed mobility training, gait training, patient/family education, strengthening, transfer training  Outcome Evaluation: Lynne Cade is a 75 y.o. female with  CMH of HTN, HLD, T2DM, CHF, CKD stage IV on HD who presents with dialysis shunt occlusion after thrombectomy performed at OSH. Tunnel cath was placed but pt removed accidentally. Thrombectomy preformed 7/1. Pt had a rapid reponse 7/2 during HD with hypotension, bradycardia and unresponsiveness.   At baseline pt lives with her spouse in a H with 1 [place] as well as ramps. She reports that she requires assist from her spouse to get in/out of w/c but is usually able to dress herself.  She is  current with HH PT.  At time of PT evaluation, she is alert & oriented to person, place and time but somulent with myoclonic jerking noted intermittantly.  She performs bed mobility with min A and no significant change in BP.  Pt is too fatigued to attempt standing.  Pt remains weak and would benefit from continued HH PT at discharge.     Time Calculation:   PT Evaluation Complexity  History, PT Evaluation Complexity: 3 or more personal factors and/or comorbidities  Examination of Body Systems (PT Eval Complexity): total of 3 or more elements  Clinical Presentation (PT Evaluation Complexity): evolving  Clinical Decision Making (PT Evaluation Complexity): moderate complexity  Overall Complexity (PT Evaluation Complexity): moderate complexity     PT Charges       Row Name 07/03/25 1057             Time Calculation    Start Time 1033  -CL      Stop Time 1051  -CL      Time Calculation (min) 18 min  -CL      PT Received On 07/03/25  -CL      PT - Next Appointment 07/05/25  -CL      PT Goal Re-Cert Due Date 07/17/25  -CL         Time Calculation- PT    Total Timed Code Minutes- PT 18 minute(s)  -CL                User Key  (r) = Recorded By, (t) = Taken By, (c) = Cosigned By      Initials Name Provider Type    CL Olivia Lucio, PT Physical Therapist                  Therapy Charges for Today       Code Description Service Date Service Provider Modifiers Qty    [id number] HC PT EVAL MOD COMPLEXITY 3 7/3/2025 Olivia Lucio, PT GP 1            PT G-Codes  Outcome Measure Options: AM-PAC 6 Clicks Basic Mobility (PT)  AM-PAC 6 Clicks Score (PT): 14  PT Discharge Summary  Anticipated Discharge Disposition (PT): home with assist    Olivia Lucio PT  7/3/2025

## 2025-07-03 NOTE — CONSULTS
Patient Name: Lynne Cade  YOB: 1950  MRN: [id number]  Admission date: 6/27/2025  Reason for Encounter: [facility] Clinical Nutrition Screening Note       Nutrition Intervention Updates: Continue current po diet and encourage good po intake.       Subjective: 7/3: Pt presented to ED on 6/27 with AV fistula problem. Pt receives HD on M,W,F. Pt was unable to complete HD on 6/27 r/t shunt occlusion. Pt had thrombectomy performed 6/26. Pt is s/p thrombectomy of left arm arteriovenous graft, shuntogram with angioplasty on 7/1. Pt is tolerating po diet has been able to resume HD treatments. HD completed yesterday with 1000 mL fluid removed.        PO Diet: Diet: Renal, Diabetic; Consistent Carbohydrate; Low Sodium (2-3g), Low Potassium; Fluid Consistency: Thin (IDDSI 0)   PO Supplements: None    PO Intake:  7/3: 70% average po intake x last 5 documented meals        Current nutrition support: -   Nutrition support review: -       Labs: Reviewed. Management per attending        GI Function:  Last documented BM 7/1        Brief Weight Review:    Wt Readings from Last 1 Encounters:   07/03/25 0330 96.5 kg (212 lb 11.9 oz)   07/02/25 1100 94.5 kg (208 lb 5.4 oz)   06/27/25 1606 94.8 kg (209 lb)        Results from last 7 days   Lab Units 07/03/25  0345 07/02/25  0430 07/01/25  0107 06/27/25  1659 06/27/25  1653   SODIUM mmol/L 140 134* 137   < > 143   POTASSIUM mmol/L 4.6 5.7* 3.8   < > 3.9   CHLORIDE mmol/L 101 101 101   < > 103   CO2 mmol/L 23.7 19.6* 23.1   < > 24.3   BUN mg/dL 36.0* 43.9* 23.4*   < > 41.1*   CREATININE mg/dL 4.72* 5.21* 3.10*   < > 4.28*   CALCIUM mg/dL 8.2* 8.5* 8.5*   < > 8.4*   BILIRUBIN mg/dL  --   --   --   --  0.2   ALK PHOS U/L  --   --   --   --  58   ALT (SGPT) U/L  --   --   --   --  <5   AST (SGOT) U/L  --   --   --   --  14   GLUCOSE mg/dL 205* 211* 141*   < > 115*    < > = values in this interval not displayed.     Results from last 7 days   Lab Units  07/03/25  0345 07/02/25  0315 07/01/25  0107 06/29/25  2255 06/29/25  0141   MAGNESIUM mg/dL  --   --  2.4 2.5* 2.3   PHOSPHORUS mg/dL  --   --  4.4 5.2* 4.5   PLATELETS 10*3/mm3 200   < > 195 184 195   HEMOGLOBIN g/dL 9.2*   < > 10.9* 10.2* 10.5*   HEMATOCRIT % 31.5*   < > 36.8 33.6* 35.2    < > = values in this interval not displayed.     Lab Results   Component Value Date    HGBA1C 5.70 (H) 06/27/2025       Electronically signed by:  Pia Pate RD  07/03/25 07:21 EDT

## 2025-07-03 NOTE — PROGRESS NOTES
"NEPHROLOGY PROGRESS NOTE------[facility]    [facility]  [phone]  Felisa Melendez MD      Patient Care Team:  Lorraine Agarwal APRN as PCP - General (Nurse Practitioner)  Rick Melendez MD as Consulting Physician (Nephrology)  Mayo Fregoso MD as Consulting Physician (Urology)  Freid Pierce MD as Consulting Physician (Cardiology)  Cony Sanchez APRN as Nurse Practitioner (Vascular Surgery)      Provider:  Felisa Melendez MD  Patient Name: Lynne Cade  :  1950    SUBJECTIVE:    F/U ESRD    Low BP s/p HD yesterday. No angina.     Medication:  apixaban, 2.5 mg, Oral, Q12H  [Held by provider] carvedilol, 3.125 mg, Oral, BID With Meals  cyclobenzaprine, 10 mg, Oral, TID  dilTIAZem, 30 mg, Oral, Q12H  DULoxetine, 60 mg, Oral, Daily  gabapentin, 100 mg, Oral, TID  insulin lispro, 2-7 Units, Subcutaneous, 4x Daily AC & at Bedtime  sodium chloride, 10 mL, Intravenous, Q12H           OBJECTIVE    Vital Sign Min/Max for last 24 hours  Temp  Min: 97.5 °F (36.4 °C)  Max: 99.6 °F (37.6 °C)   BP  Min: 41/22  Max: 128/59   Pulse  Min: 44  Max: 109   Resp  Min: 13  Max: 18   SpO2  Min: 88 %  Max: 100 %   No data recorded   Weight  Min: 94.5 kg (208 lb 5.4 oz)  Max: 96.5 kg (212 lb 11.9 oz)     Flowsheet Rows      Flowsheet Row First Filed Value   Admission Height 167.6 cm (66\") Documented at 2025 1606   Admission Weight 94.8 kg (209 lb) Documented at 2025 1606            No intake/output data recorded.  I/O last 3 completed shifts:  In: 120 [P.O.:120]  Out: 1000     Physical Exam:  General Appearance: alert, appears stated age and cooperative  Head: normocephalic, without obvious abnormality and atraumatic  Eyes: conjunctivae and sclerae normal and no icterus  Neck: supple and NO GROSS JVD  Lungs: clear to auscultation and respirations regular  Heart: regular rhythm & normal rate and normal " "S1, S2 +CYNDY  Chest: Wall no abnormalities observed  Abdomen: normal bowel sounds and soft, nontender +OBESITY  Extremities: moves extremities well, +TR BILAT LE EDEMA, no cyanosis and no redness  Skin: no bleeding, bruising or rash, turgor normal, color normal and no lesions noted  Neurologic: Alert, and oriented. No focal deficits    Labs:    WBC WBC   Date Value Ref Range Status   07/03/2025 11.65 (H) 3.40 - 10.80 10*3/mm3 Final   07/02/2025 10.52 3.40 - 10.80 10*3/mm3 Final   07/01/2025 11.36 (H) 3.40 - 10.80 10*3/mm3 Final      HGB Hemoglobin   Date Value Ref Range Status   07/03/2025 9.2 (L) 12.0 - 15.9 g/dL Final   07/02/2025 9.3 (L) 12.0 - 15.9 g/dL Final   07/01/2025 10.9 (L) 12.0 - 15.9 g/dL Final      HCT Hematocrit   Date Value Ref Range Status   07/03/2025 31.5 (L) 34.0 - 46.6 % Final   07/02/2025 32.0 (L) 34.0 - 46.6 % Final   07/01/2025 36.8 34.0 - 46.6 % Final      Platelets No results found for: \"LABPLAT\"   MCV MCV   Date Value Ref Range Status   07/03/2025 110.1 (H) 79.0 - 97.0 fL Final   07/02/2025 111.9 (H) 79.0 - 97.0 fL Final   07/01/2025 109.2 (H) 79.0 - 97.0 fL Final          Sodium Sodium   Date Value Ref Range Status   07/03/2025 140 136 - 145 mmol/L Final   07/02/2025 134 (L) 136 - 145 mmol/L Final   07/01/2025 137 136 - 145 mmol/L Final      Potassium Potassium   Date Value Ref Range Status   07/03/2025 4.6 3.5 - 5.2 mmol/L Final     Comment:     Specimen hemolyzed.  Result may be falsely elevated.   07/02/2025 5.7 (H) 3.5 - 5.2 mmol/L Final   07/01/2025 3.8 3.5 - 5.2 mmol/L Final      Chloride Chloride   Date Value Ref Range Status   07/03/2025 101 98 - 107 mmol/L Final   07/02/2025 101 98 - 107 mmol/L Final   07/01/2025 101 98 - 107 mmol/L Final      CO2 CO2   Date Value Ref Range Status   07/03/2025 23.7 22.0 - 29.0 mmol/L Final   07/02/2025 19.6 (L) 22.0 - 29.0 mmol/L Final   07/01/2025 23.1 22.0 - 29.0 mmol/L Final      BUN BUN   Date Value Ref Range Status   07/03/2025 36.0 (H) 8.0 - " "23.0 mg/dL Final   07/02/2025 43.9 (H) 8.0 - 23.0 mg/dL Final   07/01/2025 23.4 (H) 8.0 - 23.0 mg/dL Final      Creatinine Creatinine   Date Value Ref Range Status   07/03/2025 4.72 (H) 0.57 - 1.00 mg/dL Final   07/02/2025 5.21 (H) 0.57 - 1.00 mg/dL Final   07/01/2025 3.10 (H) 0.57 - 1.00 mg/dL Final      Calcium Calcium   Date Value Ref Range Status   07/03/2025 8.2 (L) 8.6 - 10.5 mg/dL Final   07/02/2025 8.5 (L) 8.6 - 10.5 mg/dL Final   07/01/2025 8.5 (L) 8.6 - 10.5 mg/dL Final      PO4 No components found for: \"PO4\"   Albumin No results found for: \"ALBUMIN\"     Magnesium Magnesium   Date Value Ref Range Status   07/01/2025 2.4 1.6 - 2.4 mg/dL Final      Uric Acid No components found for: \"URIC ACID\"     Imaging Results (Last 72 Hours)       Procedure Component Value Units Date/Time    Hybrid Vascular OR Imaging ([employer]) [[id number]] Resulted: 07/01/25 1846     Updated: 07/01/25 1846    Narrative:      Please see performing physician's note for result.    IR NON-MIKE TEMP DIALYSIS ACC [[id number]] Collected: 06/30/25 1542     Updated: 06/30/25 1557    Narrative:      DATE OF EXAM:  6/30/2025 3:08 PM EDT    PROCEDURE:  IR NON-TUNNELED TEMP DIALYSIS ACCESS    INDICATIONS:  temporary dialysis cahteter today    COMPARISON:  No Comparisons Available    FLUOROSCOPIC TIME:  24 seconds    PHYSICIAN MONITORED CONSCIOUS SEDATION TIME:  None minutes    TECHNIQUE:   A detailed explanation of the procedure, including the risks, benefits, and alternatives was provided. Informed written consent was obtained from the patient. A preprocedure timeout was performed. The interventional radiology nurse monitored the patient   at all times. The patient was placed supine on the fluoroscopy table and the right neck was ultrasounded, demonstrating a patent right internal jugular vein, and an image was obtained. The neck was then prepped and draped using maximum sterile barrier   technique. The skin was anesthetized with 1% lidocaine, " and under direct ultrasound guidance the access needle was advanced into the right internal jugular vein. A guidewire was manipulated centrally.    The tract was serially dilated over the guidewire and a 16 cm triple-lumen temporary dialysis catheter was advanced over the wire. Fluoroscopy confirmed appropriate position. The catheter was secured to the skin with Prolene. All 3 ports were aspirated   with adequate blood return noted and flushed with heparinized saline. The patient tolerated the procedure well without immediate complication. Patient was transported back to the inpatient unit in stable condition for recovery.      FINDINGS:  See above      Impression:      Successful placement of right IJ 16 cm triple-lumen temporary dialysis catheter utilizing ultrasound and fluoroscopic guidance.      Report dictated by: Fernando Sanchez DNP      I have personally reviewed this case and agree with the findings above:    Electronically Signed: Gaurav Hyde MD    6/30/2025 3:55 PM EDT    Workstation ID: [id number]            Results for orders placed during the hospital encounter of 06/27/25    XR Chest 1 View 06/30/2025  4:48 AM    Narrative  XR CHEST 1 VW    Date of Exam: 6/30/2025 4:36 AM EDT    Indication: pulled out dialysis cath    Comparison: 6/27/2025.    Findings:  There is stable asymmetric elevation of the right hemidiaphragm. There is no pneumothorax, pleural effusion or focal airspace consolidation. Heart size and pulmonary vasculature appear within normal limits. Regional bones appear intact.    Impression  Impression:  No acute cardiopulmonary abnormality.          Electronically Signed: Bran Kennedy MD  6/30/2025 4:48 AM EDT  Workstation ID: [id number]      XR Chest 1 View 06/27/2025  6:54 PM    Narrative  XR CHEST 1 VW    Date of Exam: 6/27/2025 6:32 PM EDT    Indication: Unable to have dialysis Wednesday or today    Comparison: April 22, 2025    Findings:  There is elevation of the right hemidiaphragm. There  is a mild right basilar opacity. The heart and mediastinal contours appear normal. The pulmonary vasculature appears normal. The osseous structures appear intact.    Impression  Impression:  1.Mild right basilar opacity, which could reflect atelectasis or pneumonia.  2.Elevation of right hemidiaphragm.        Electronically Signed: Eitan Leach MD  6/27/2025 6:54 PM EDT  Workstation ID: [id number]      Results for orders placed during the hospital encounter of 04/22/25    XR Chest 1 View 04/22/2025  3:53 PM    Narrative  XR CHEST 1 VW    Date of Exam: 4/22/2025 3:40 PM EDT    Indication: AMS Protocol  AMS Protocol    Comparison: 2/16/2025    Findings:  Heart size is within normal limits for the projection. The right hemidiaphragm is elevated, unchanged. The pulmonary vascular pattern appears normal and the lungs appear clear.    Impression  Impression:  No active disease.        Electronically Signed: Yfn Luna MD  4/22/2025 3:53 PM EDT  Workstation ID: [id number]      Results for orders placed during the hospital encounter of 04/22/25    Duplex Carotid Ultrasound CAR 04/24/2025  2:19 PM    Interpretation Summary    Right internal carotid artery demonstrates a less than 50% stenosis.    Antegrade right vertebral flow.    Left internal carotid artery demonstrates a less than 50% stenosis.    Antegrade left vertebral flow.        ASSESSMENT / PLAN      AV graft thrombosis, sequela    ESRD (end stage renal disease) on dialysis    ESRD------HD tomorrow per usual [facility] outpatient schedule    2. HTN WITH CKD-----BP soft. Add Midodrine    3. DMII WITH RENAL MANIFESTATIONS-----BS ok. Glucometers, SSI    4. ANEMIA OF ESRD-----EPO/Iron with HD as needed    5. CLOTTED AV SHUNT------Open thrombectomy done per Vascular surgery    6. DEPRESSION------On Cymbalta    7. DM PERIPHERAL NEUROPATHY------On Neurontin    8. PVD    9. HYPERKALEMIA-----Better with K+ restrict diet and adjusting K+ bath with HD and follow    Felisa  MD Al  [facility]  [phone]  07/03/25  07:20 EDT

## 2025-07-03 NOTE — CASE MANAGEMENT/SOCIAL WORK
Continued Stay Note  SHELDON Law     Patient Name: Lynne Cade  MRN: [id number]  Today's Date: 7/3/2025    Admit Date: 6/27/2025    Plan: Return home with spouse and [facility] (current, will need BLU order). Current OP HD at [facility] Kerby Vincent MW.   Discharge Plan       Row Name 07/03/25 1703       Plan    Plan Comments CM went to the bedside and spoke with patient about Eliquis pricing, $145.67. Patient states she can not pay that monthly and would just rather do Coumadin like her husabnd does. CM made Vascular surgery aware patient can not afford Eliquis and wants to go on Warfarin. CM updated hospitalist during rounds. CM given parameters by vascular for INR to be 1.5-2.5. CM reached out to [facility] liasion, Janet asking if their SN could do the INR checks once dc, per Janet, they can not perform checks until after 7/9 due to SN being out. CM sent secure chat to Sharlene vaughn in case patient were to dc on 7/4 she will have to f/u with her PCP for INR check. DC barriers: 2L NC, increased WBC, nephro/vascular surg/ dizziness, CT pending, echo pending.                 Expected Discharge Date and Time       Expected Discharge Date Expected Discharge Time    Jul 5, 2025           Met with patient in room wearing PPE: mask.    Maintained distance greater than six feet and spent less than 15 minutes in the room.       Mariama Griffith RN

## 2025-07-03 NOTE — PLAN OF CARE
Goal Outcome Evaluation:         Bi pap r/t abg 7.1 co2 69. Lethargic today, dizziness, headache

## 2025-07-04 LAB
ANION GAP SERPL CALCULATED.3IONS-SCNC: 16.4 MMOL/L (ref 5–15)
AORTIC DIMENSIONLESS INDEX: 0.79 (DI)
AV MEAN PRESS GRAD SYS DOP V1V2: 8 MMHG
AV VMAX SYS DOP: 216 CM/SEC
BH CV ECHO LEFT VENTRICLE GLOBAL LONGITUDINAL STRAIN: -18.5 %
BH CV ECHO MEAS - ACS: 1.9 CM
BH CV ECHO MEAS - AO MAX PG: 18.7 MMHG
BH CV ECHO MEAS - AO V2 VTI: 36.2 CM
BH CV ECHO MEAS - AVA(I,D): 2.21 CM2
BH CV ECHO MEAS - EDV(CUBED): 97.3 ML
BH CV ECHO MEAS - EDV(MOD-SP4): 65 ML
BH CV ECHO MEAS - EF(MOD-SP4): 62.9 %
BH CV ECHO MEAS - ESV(CUBED): 29.8 ML
BH CV ECHO MEAS - ESV(MOD-SP4): 24.1 ML
BH CV ECHO MEAS - FS: 32.6 %
BH CV ECHO MEAS - IVS/LVPW: 1 CM
BH CV ECHO MEAS - IVSD: 1.1 CM
BH CV ECHO MEAS - LA DIMENSION: 3.2 CM
BH CV ECHO MEAS - LAT PEAK E' VEL: 11.6 CM/SEC
BH CV ECHO MEAS - LV DIASTOLIC VOL/BSA (35-75): 31.7 CM2
BH CV ECHO MEAS - LV MASS(C)D: 181.2 GRAMS
BH CV ECHO MEAS - LV MAX PG: 11.7 MMHG
BH CV ECHO MEAS - LV MEAN PG: 5 MMHG
BH CV ECHO MEAS - LV SYSTOLIC VOL/BSA (12-30): 11.8 CM2
BH CV ECHO MEAS - LV V1 MAX: 171 CM/SEC
BH CV ECHO MEAS - LV V1 VTI: 28.2 CM
BH CV ECHO MEAS - LVIDD: 4.6 CM
BH CV ECHO MEAS - LVIDS: 3.1 CM
BH CV ECHO MEAS - LVOT AREA: 2.8 CM2
BH CV ECHO MEAS - LVOT DIAM: 1.9 CM
BH CV ECHO MEAS - LVPWD: 1.1 CM
BH CV ECHO MEAS - MED PEAK E' VEL: 7.6 CM/SEC
BH CV ECHO MEAS - MV A DUR: 0.1 SEC
BH CV ECHO MEAS - MV A MAX VEL: 139 CM/SEC
BH CV ECHO MEAS - MV DEC SLOPE: 631 CM/SEC2
BH CV ECHO MEAS - MV DEC TIME: 0.24 SEC
BH CV ECHO MEAS - MV E MAX VEL: 95 CM/SEC
BH CV ECHO MEAS - MV E/A: 0.68
BH CV ECHO MEAS - MV MAX PG: 7.8 MMHG
BH CV ECHO MEAS - MV MEAN PG: 4 MMHG
BH CV ECHO MEAS - MV P1/2T: 52.5 MSEC
BH CV ECHO MEAS - MV V2 VTI: 40.1 CM
BH CV ECHO MEAS - MVA(P1/2T): 4.2 CM2
BH CV ECHO MEAS - MVA(VTI): 1.99 CM2
BH CV ECHO MEAS - PA ACC TIME: 0.12 SEC
BH CV ECHO MEAS - PA V2 MAX: 117 CM/SEC
BH CV ECHO MEAS - RV MAX PG: 4.6 MMHG
BH CV ECHO MEAS - RV V1 MAX: 107 CM/SEC
BH CV ECHO MEAS - RV V1 VTI: 20.9 CM
BH CV ECHO MEAS - SV(LVOT): 80 ML
BH CV ECHO MEAS - SV(MOD-SP4): 40.9 ML
BH CV ECHO MEAS - SVI(LVOT): 39 ML/M2
BH CV ECHO MEAS - SVI(MOD-SP4): 19.9 ML/M2
BH CV ECHO MEAS - TAPSE (>1.6): 1.65 CM
BH CV ECHO MEAS - TR MAX PG: 11.2 MMHG
BH CV ECHO MEAS - TR MAX VEL: 167 CM/SEC
BH CV ECHO MEASUREMENTS AVERAGE E/E' RATIO: 9.9
BH CV XLRA - RV BASE: 2.7 CM
BH CV XLRA - RV LENGTH: 5.6 CM
BH CV XLRA - RV MID: 2 CM
BH CV XLRA - TDI S': 10.4 CM/SEC
BUN SERPL-MCNC: 57.9 MG/DL (ref 8–23)
BUN/CREAT SERPL: 9.5 (ref 7–25)
CALCIUM SPEC-SCNC: 8 MG/DL (ref 8.6–10.5)
CHLORIDE SERPL-SCNC: 100 MMOL/L (ref 98–107)
CO2 SERPL-SCNC: 22.6 MMOL/L (ref 22–29)
CREAT SERPL-MCNC: 6.1 MG/DL (ref 0.57–1)
DEPRECATED RDW RBC AUTO: 65.8 FL (ref 37–54)
DEVICE COMMENT: ABNORMAL
EGFRCR SERPLBLD CKD-EPI 2021: 6.6 ML/MIN/1.73
ERYTHROCYTE [DISTWIDTH] IN BLOOD BY AUTOMATED COUNT: 16.3 % (ref 12.3–15.4)
FERRITIN SERPL-MCNC: 317 NG/ML (ref 13–150)
GEN 5 1HR TROPONIN T REFLEX: 61 NG/L
GLUCOSE BLDC GLUCOMTR-MCNC: 119 MG/DL (ref 70–105)
GLUCOSE BLDC GLUCOMTR-MCNC: 148 MG/DL (ref 70–105)
GLUCOSE BLDC GLUCOMTR-MCNC: 210 MG/DL (ref 70–105)
GLUCOSE BLDC GLUCOMTR-MCNC: 238 MG/DL (ref 70–105)
GLUCOSE SERPL-MCNC: 148 MG/DL (ref 65–99)
HCT VFR BLD AUTO: 29.9 % (ref 34–46.6)
HGB BLD-MCNC: 8.9 G/DL (ref 12–15.9)
INR PPP: 1.14 (ref 2–3)
IRON 24H UR-MRATE: 51 MCG/DL (ref 37–145)
IRON SATN MFR SERPL: 24 % (ref 20–50)
LEFT ATRIUM VOLUME INDEX: 27.7 ML/M2
LV EF 3D SEGMENTATION: 60 %
MCH RBC QN AUTO: 32.5 PG (ref 26.6–33)
MCHC RBC AUTO-ENTMCNC: 29.8 G/DL (ref 31.5–35.7)
MCV RBC AUTO: 109.1 FL (ref 79–97)
PLATELET # BLD AUTO: 235 10*3/MM3 (ref 140–450)
PMV BLD AUTO: 10 FL (ref 6–12)
POTASSIUM SERPL-SCNC: 5.4 MMOL/L (ref 3.5–5.2)
PROTHROMBIN TIME: 14.6 SECONDS (ref 19.4–28.5)
RBC # BLD AUTO: 2.74 10*6/MM3 (ref 3.77–5.28)
SINUS: 2.7 CM
SODIUM SERPL-SCNC: 139 MMOL/L (ref 136–145)
STJ: 2.6 CM
TIBC SERPL-MCNC: 210 MCG/DL (ref 298–536)
TRANSFERRIN SERPL-MCNC: 141 MG/DL (ref 200–360)
TROPONIN T % DELTA: -23
TROPONIN T NUMERIC DELTA: -18 NG/L
WBC NRBC COR # BLD AUTO: 11.26 10*3/MM3 (ref 3.4–10.8)

## 2025-07-04 PROCEDURE — 85610 PROTHROMBIN TIME: CPT | Performed by: HOSPITALIST

## 2025-07-04 PROCEDURE — 94660 CPAP INITIATION&MGMT: CPT

## 2025-07-04 PROCEDURE — 83540 ASSAY OF IRON: CPT | Performed by: INTERNAL MEDICINE

## 2025-07-04 PROCEDURE — 25010000002 EPOETIN ALFA-EPBX 10000 UNIT/ML SOLUTION: Performed by: INTERNAL MEDICINE

## 2025-07-04 PROCEDURE — 82948 REAGENT STRIP/BLOOD GLUCOSE: CPT | Performed by: SURGERY

## 2025-07-04 PROCEDURE — 63710000001 INSULIN LISPRO (HUMAN) PER 5 UNITS: Performed by: SURGERY

## 2025-07-04 PROCEDURE — 25010000002 NA FERRIC GLUC CPLX PER 12.5 MG: Performed by: INTERNAL MEDICINE

## 2025-07-04 PROCEDURE — 84466 ASSAY OF TRANSFERRIN: CPT | Performed by: INTERNAL MEDICINE

## 2025-07-04 PROCEDURE — 82728 ASSAY OF FERRITIN: CPT | Performed by: INTERNAL MEDICINE

## 2025-07-04 PROCEDURE — 94799 UNLISTED PULMONARY SVC/PX: CPT

## 2025-07-04 PROCEDURE — 84484 ASSAY OF TROPONIN QUANT: CPT | Performed by: HOSPITALIST

## 2025-07-04 PROCEDURE — 80048 BASIC METABOLIC PNL TOTAL CA: CPT | Performed by: INTERNAL MEDICINE

## 2025-07-04 PROCEDURE — 82948 REAGENT STRIP/BLOOD GLUCOSE: CPT

## 2025-07-04 PROCEDURE — 85027 COMPLETE CBC AUTOMATED: CPT | Performed by: INTERNAL MEDICINE

## 2025-07-04 RX ORDER — ALBUMIN (HUMAN) 12.5 G/50ML
12.5 SOLUTION INTRAVENOUS AS NEEDED
Status: ACTIVE | OUTPATIENT
Start: 2025-07-04 | End: 2025-07-04

## 2025-07-04 RX ORDER — WARFARIN SODIUM 5 MG/1
5 TABLET ORAL
Status: DISCONTINUED | OUTPATIENT
Start: 2025-07-04 | End: 2025-07-05

## 2025-07-04 RX ORDER — WARFARIN SODIUM 5 MG/1
10 TABLET ORAL
Status: DISCONTINUED | OUTPATIENT
Start: 2025-07-04 | End: 2025-07-04

## 2025-07-04 RX ADMIN — MIDODRINE HYDROCHLORIDE 10 MG: 5 TABLET ORAL at 18:10

## 2025-07-04 RX ADMIN — MIDODRINE HYDROCHLORIDE 10 MG: 5 TABLET ORAL at 12:26

## 2025-07-04 RX ADMIN — MIDODRINE HYDROCHLORIDE 10 MG: 5 TABLET ORAL at 07:14

## 2025-07-04 RX ADMIN — INSULIN LISPRO 3 UNITS: 100 INJECTION, SOLUTION INTRAVENOUS; SUBCUTANEOUS at 18:11

## 2025-07-04 RX ADMIN — GABAPENTIN 100 MG: 100 CAPSULE ORAL at 12:28

## 2025-07-04 RX ADMIN — EPOETIN ALFA-EPBX 10000 UNITS: 10000 INJECTION, SOLUTION INTRAVENOUS; SUBCUTANEOUS at 08:20

## 2025-07-04 RX ADMIN — DULOXETINE 60 MG: 30 CAPSULE, DELAYED RELEASE ORAL at 13:17

## 2025-07-04 RX ADMIN — INSULIN LISPRO 3 UNITS: 100 INJECTION, SOLUTION INTRAVENOUS; SUBCUTANEOUS at 20:40

## 2025-07-04 RX ADMIN — SODIUM CHLORIDE 125 MG: 9 INJECTION, SOLUTION INTRAVENOUS at 08:20

## 2025-07-04 RX ADMIN — APIXABAN 2.5 MG: 2.5 TABLET, FILM COATED ORAL at 12:28

## 2025-07-04 RX ADMIN — CYCLOBENZAPRINE 10 MG: 10 TABLET, FILM COATED ORAL at 20:41

## 2025-07-04 RX ADMIN — Medication 10 ML: at 12:34

## 2025-07-04 RX ADMIN — WARFARIN SODIUM 5 MG: 5 TABLET ORAL at 19:03

## 2025-07-04 RX ADMIN — ACETAMINOPHEN 650 MG: 325 TABLET, FILM COATED ORAL at 14:04

## 2025-07-04 RX ADMIN — GABAPENTIN 100 MG: 100 CAPSULE ORAL at 20:40

## 2025-07-04 RX ADMIN — Medication 10 ML: at 20:40

## 2025-07-04 RX ADMIN — CYCLOBENZAPRINE 10 MG: 10 TABLET, FILM COATED ORAL at 18:10

## 2025-07-04 RX ADMIN — ACETAMINOPHEN 650 MG: 325 TABLET, FILM COATED ORAL at 09:58

## 2025-07-04 RX ADMIN — CYCLOBENZAPRINE 10 MG: 10 TABLET, FILM COATED ORAL at 12:27

## 2025-07-04 RX ADMIN — GABAPENTIN 100 MG: 100 CAPSULE ORAL at 18:10

## 2025-07-04 NOTE — PLAN OF CARE
Goal Outcome Evaluation:         Patient alert and oriented x4,  at the bedside. Dialysis today. Need othostatic BP this afternoon.

## 2025-07-04 NOTE — PROGRESS NOTES
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 Test claim shows cost $145/month.   Decision made to change to warfarin due to cost.  D/W PCP and Vascular surgeon (Dr Ge) - no bridge necessary.    INR Goal: 2 - 3  Home medication?: New Start  Bridge Therapy Present?:  No  Interacting Medications Evaluation (New/Present/Discontinued): Recent DOAC use. Cymbalta can increase AC effects of Warfarin.  Additional Contributing Factors: Age, ESRD on HD    Assessment/Plan:  New Start Warfarin with pharmacy to dose.  STAT INR Ordered.  Warfarin 5mg ordered pending INR Results.  Will CTM INR Trend and adjust dosing PRN based on that trend.    Continue to monitor and adjust based on INR.     Date 7/4           INR 1.14           Dose 5 mg             Diet Order   Procedures    Diet: Renal, Diabetic; Consistent Carbohydrate; Low Sodium (2-3g), Low Potassium; Fluid Consistency: Thin (IDDSI 0)      Objective:  [Ht: 167.6 cm (65.98\"); Wt: 96.5 kg (212 lb 11.9 oz); BMI: Body mass index is 34.35 kg/m².]    Lab Results   Component Value Date    ALBUMIN 3.6 06/27/2025     Lab Results   Component Value Date    INR 1.04 06/27/2025    INR 1.05 06/28/2022    INR 1.06 06/26/2022    PROTIME 13.5 06/27/2025    PROTIME 10.8 06/28/2022    PROTIME 10.9 06/26/2022     Lab Results   Component Value Date    HGB 8.9 (L) 07/04/2025    HGB 9.2 (L) 07/03/2025    HGB 9.3 (L) 07/02/2025     Lab Results   Component Value Date    HCT 29.9 (L) 07/04/2025    HCT 31.5 (L) 07/03/2025    HCT 32.0 (L) 07/02/2025     Torin Hurst RPH  07/04/25 16:28 EDT       "

## 2025-07-04 NOTE — PROGRESS NOTES
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 Test claim shows cost $145/month.   Decision made to change to warfarin due to cost.  D/W PCP and Vascular surgeon (Dr Ge) - no bridge necessary.    INR Goal: 2 - 3  Home medication?: New Start  Bridge Therapy Present?:  No  Interacting Medications Evaluation (New/Present/Discontinued): Recent DOAC use. Cymbalta can increase AC effects of Warfarin.  Additional Contributing Factors: Age, ESRD on HD    Assessment/Plan:  New Start Warfarin with pharmacy to dose.  STAT INR Ordered.  Warfarin 5mg ordered pending INR Results.  Will CTM INR Trend and adjust dosing PRN based on that trend.    Continue to monitor and adjust based on INR.     Date 7/4           INR            Dose 5 mg             Diet Order   Procedures    Diet: Renal, Diabetic; Consistent Carbohydrate; Low Sodium (2-3g), Low Potassium; Fluid Consistency: Thin (IDDSI 0)      Objective:  [Ht: 167.6 cm (65.98\"); Wt: 96.5 kg (212 lb 11.9 oz); BMI: Body mass index is 34.35 kg/m².]    Lab Results   Component Value Date    ALBUMIN 3.6 06/27/2025     Lab Results   Component Value Date    INR 1.04 06/27/2025    INR 1.05 06/28/2022    INR 1.06 06/26/2022    PROTIME 13.5 06/27/2025    PROTIME 10.8 06/28/2022    PROTIME 10.9 06/26/2022     Lab Results   Component Value Date    HGB 8.9 (L) 07/04/2025    HGB 9.2 (L) 07/03/2025    HGB 9.3 (L) 07/02/2025     Lab Results   Component Value Date    HCT 29.9 (L) 07/04/2025    HCT 31.5 (L) 07/03/2025    HCT 32.0 (L) 07/02/2025     Torin Hurst RPH  07/04/25 16:28 EDT     "

## 2025-07-04 NOTE — PROGRESS NOTES
"    [facility] MEDICINE SERVICE  DAILY PROGRESS NOTE    NAME: Lynne Cade  : 1950  MRN: [id number]      LOS: 7 days     PROVIDER OF SERVICE: Hailey Rosales MD    Chief Complaint: AV graft thrombosis, sequela    Subjective:     Interval History:  History taken from: patient      Chief Complaint: AV fistula issue     History of Present Illness: Lynne Cade is a 75 y.o. female with a CMH of HTN, HLD, T2DM, CHF, CKD stage IV on HD M/W/F who presented to [facility] on 2025 with AV fistula problem.  Patient receives dialysis via AV graft Monday, Wednesday, Friday.  Reports that her last complete dialysis treatment was 4 days ago on Monday, 2025.  Patient went for dialysis on Wednesday and was unable to complete due to shunt occlusion.  Patient had  thrombectomy performed yesterday at [facility] in [place].  Was found to have adequate blood flow after the procedure.  Dialysis was attempted today, unable to complete due to recurrent shunt occlusion.  Denies fever, chills, chest pain, shortness of breath, abdominal pain, changes in bowel or bladder habits.     In the ED, patient was afebrile and hemodynamically stable.  Labs pertinent for Creatinine 4.3 (baseline 2.8), BUN 39, GFR 10.1.  AG 15.7. Calcium 8.4, ionized calcium 1.01, Phos 5.9. WBC 11.08. Chest x-ray: \"Mild right basilar opacity, which could reflect atelectasis or pneumonia.  Elevation of right hemodynamic. \" EKG: SR, HR 81. Vascular surgery consulted, not recommending thrombectomy at this time,  recommending placement of temporary if not tunneled hemodialysis catheter, possible repeat thrombectomy next week. Nephrology was consulted, recommended consulting IR. IR consulted, recommended catheter be placed tomorrow morning, pt does not need to be NPO.  Hospital service to admit for continued evaluation.      sen in bed NAD, VSS, No new complaints, vss  Dialysis delayed due to lack of working access. Pending  " tunneled dialysis catheter as ordered.   6/29 seen today in bed SURAJ YOUNGBLOOD RN, VSS, Now has nontunneled hemodialysis catheter right neck, used yesterday.   6/30 patient seen and examined in bed no acute distress, vital signs stable, discussed with RN, patient removed her dialysis catheter overnight by accident.  IR consulted for replacement.    Plan to replace nontunneled hemodialysis catheter today with radiology  7/1/2025 patient seen and examined in bed no acute distress, vital signs stable, no complaints, plan for open graft thrombectomy today with Dr. Bernard    7/2-Rapid response called while patient was receiving HD. Patient became hypotensive, bradycardiac, and unresponsive. Patient was then placed in trendelenburg positioning and painful stimuli utilized to assess response. Upon arrival of rapid response team, patient BP previously read 41/22. Patient arouses to verbal stimuli and HR and BP improved. Remains drowsy, Patient transported to 2105 by RN.     7/3 patient appears somnolent and lethargic this morning and still quite altered.  Obtain CT head.  ABG.  Was also dizzy.  Blood pressure on the softer side.  Discontinue Cardizem and other blood pressure medications for now.  Increase the dose of midodrine    7/4 patient seen and examined, more awake and alert today.  Ordering lunch.  Just came back from hemodialysis.      Plan: Return home with spouse and [facility] PT     Review of Systems  Constitutional:  Negative for chills and fever.   Respiratory:  Negative for shortness of breath.    Cardiovascular:  Negative for chest pain.   Neurological:  Negative for dizziness and headaches.     Objective:     Vital Signs  Temp:  [97.5 °F (36.4 °C)-98.1 °F (36.7 °C)] 98 °F (36.7 °C)  Heart Rate:  [70-92] 78  Resp:  [13-28] 13  BP: (106-132)/(54-92) 124/57  Flow (L/min) (Oxygen Therapy):  [2] 2   Body mass index is 34.35 kg/m².    Physical Exam   General: 74 yo WF,awake alert.  HENT: Normocephalic, normal hearing, moist  oral mucosa, no scleral icterus.  Neck: Supple, nontender, no carotid bruits, no JVD, no LAD.  Lungs: Clear to auscultation, nonlabored respiration.  Heart: RRR, no murmur, gallop or edema.  Abdomen: Soft, nontender, nondistended, + bowel sounds.  Musculoskeletal: Normal range of motion and strength, no tenderness or swelling.  Skin: Fistula present on LUE, bandaged. Skin is warm, dry and pink, no rashes or lesions.  Psychiatric: Calm       Diagnostic Data    Results from last 7 days   Lab Units 07/04/25  0428 06/27/25  1659 06/27/25  1653   WBC 10*3/mm3 11.26*   < > 11.08*   HEMOGLOBIN g/dL 8.9*   < > 10.6*   HEMOGLOBIN, POC   --    < >  --    HEMATOCRIT % 29.9*   < > 35.5   HEMATOCRIT POC   --    < >  --    PLATELETS 10*3/mm3 235   < > 232   GLUCOSE mg/dL 148*   < > 115*   CREATININE mg/dL 6.10*   < > 4.28*   BUN mg/dL 57.9*   < > 41.1*   SODIUM mmol/L 139   < > 143   POTASSIUM mmol/L 5.4*   < > 3.9   AST (SGOT) U/L  --   --  14   ALT (SGPT) U/L  --   --  <5   ALK PHOS U/L  --   --  58   BILIRUBIN mg/dL  --   --  0.2   POC ANION GAP ISTAT   --    < >  --    ANION GAP mmol/L 16.4*   < > 15.7*    < > = values in this interval not displayed.       CT Head Without Contrast  Result Date: 7/3/2025  Impression: 1.No acute intracranial process identified. 2.Findings suggestive of mild chronic small vessel ischemic disease. 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 4.Mild mucosal disease within left sphenoid sinus. Electronically Signed: Eitan Leach MD  7/3/2025 11:43 AM EDT  Workstation ID: [id number]    Scheduled Meds:apixaban, 2.5 mg, Oral, Q12H  cyclobenzaprine, 10 mg, Oral, TID  DULoxetine, 60 mg, Oral, Daily  gabapentin, 100 mg, Oral, TID  insulin lispro, 2-7 Units, Subcutaneous, 4x Daily AC & at Bedtime  midodrine, 10 mg, Oral, TID AC  sodium chloride, 10 mL, Intravenous, Q12H      Continuous Infusions:   PRN Meds:.  acetaminophen **OR** acetaminophen **OR** acetaminophen    albumin human    senna-docusate  "sodium **AND** polyethylene glycol **AND** bisacodyl **AND** bisacodyl    cyclobenzaprine    dextrose    dextrose    glucagon (human recombinant)    heparin (porcine)    hydrOXYzine    Magnesium Standard Dose Replacement - Follow Nurse / BPA Driven Protocol    melatonin    nitroglycerin    ondansetron ODT **OR** ondansetron    oxyCODONE    sodium chloride       I reviewed the patient's new clinical results.    Assessment/Plan:     Active and Resolved Problems  Active Hospital Problems    Diagnosis  POA    **AV graft thrombosis, sequela [T82.868S]  Not Applicable    ESRD (end stage renal disease) on dialysis [N18.6, Z99.2]  Not Applicable      Resolved Hospital Problems    Diagnosis Date Resolved POA    A-V fistula [I77.0] 06/29/2025 Yes     Occlusion of AV dialysis graft  CKD stage IV on hemodialysis M/W/F  Creatinine 4.3 (baseline 2.8), BUN 39, GFR 10.1.  AG 15.7  Calcium 8.4, ionized calcium 1.01  Phos 5.9  WBC 11.08  Chest x-ray: \"Mild right basilar opacity, which could reflect atelectasis or pneumonia.  Elevation of right hemodynamic. \"  EKG: SR, HR 81  Last dialysis treatment 4 days ago Monday (6/23/2025), receives dialysis via AV graft  Thrombosed earlier this week, treated with thrombectomy  Nephrology was consulted and following, recommended consulting IR  IR consulted s/p replace nontunneled hemodialysis catheter with radiology  Vascular surgery following>7/1/2025 - open left axilloaxillary loop AV shunt thrombectomy, shuntogram with venous anastomotic balloon angioplasty by Dr. Raines      Congestive heart failure-Stable  Continue home dose meds     Type 2 diabetes mellitus  A1c-5.7   Low SSI   Acchu-check ACHS with diet  Heart-healthy, consistent carb diet   Hypoglycemia protocol      Hypertension>hypotension, continue BP meds, midodrine at 10 mg p.o. 3 times daily.  Blood pressure stable today.  CT head negative for anything acute.  Echocardiogram pending.  Troponin was mildly elevated however patient " has CKD and is now trending down.  Denies any chest pain.      Acute metabolic encephalopathy --lethargy improved after putting patient on BiPAP after reviewing ABG results yesterday.  CT head negative.  Repeat orthostatics today after blood pressure better after discontinuing blood pressure medications and also  increasing the dose of midodrine .      Hyperlipidemia  Continue home dose meds    VTE Prophylaxis:  Pharmacologic & mechanical VTE prophylaxis orders are present.    Disposition Planning:   Barriers to Discharge:HD  Anticipated Date of Discharge: Pending clinical course  Place of Discharge: home  Time: 35 minutes     Code Status and Medical Interventions: CPR (Attempt to Resuscitate); Full Support   Ordered at: 06/27/25 2031     Code Status (Patient has no pulse and is not breathing):    CPR (Attempt to Resuscitate)     Medical Interventions (Patient has pulse or is breathing):    Full Support       Signature: Electronically signed by Hailey Rosales MD, 07/04/25, 10:17 EDT.  [facility] Hospitalist Team

## 2025-07-04 NOTE — PLAN OF CARE
Goal Outcome Evaluation:         Pt resting this shift. Bipap HS. Pt able to make needs known. Spouse at bedside. VSS. No complaints at this time. Plan for HD today.           Problem: Adult Inpatient Plan of Care  Goal: Absence of Hospital-Acquired Illness or Injury  Intervention: Identify and Manage Fall Risk  Recent Flowsheet Documentation  Taken 7/4/2025 0415 by Rochelle Lees, RN  Safety Promotion/Fall Prevention:   safety round/check completed   room organization consistent   nonskid shoes/slippers when out of bed   fall prevention program maintained   clutter free environment maintained   assistive device/personal items within reach  Taken 7/4/2025 0230 by Rochelle Lees RN  Safety Promotion/Fall Prevention:   safety round/check completed   room organization consistent   nonskid shoes/slippers when out of bed   fall prevention program maintained   clutter free environment maintained   assistive device/personal items within reach  Taken 7/4/2025 0030 by Rochelle Lees RN  Safety Promotion/Fall Prevention:   safety round/check completed   room organization consistent   nonskid shoes/slippers when out of bed   fall prevention program maintained   clutter free environment maintained   assistive device/personal items within reach  Taken 7/3/2025 2215 by Rochelle Lees RN  Safety Promotion/Fall Prevention:   safety round/check completed   room organization consistent   nonskid shoes/slippers when out of bed   fall prevention program maintained   clutter free environment maintained   assistive device/personal items within reach  Taken 7/3/2025 2015 by Rochelle Lees, RN  Safety Promotion/Fall Prevention:   safety round/check completed   room organization consistent   nonskid shoes/slippers when out of bed   fall prevention program maintained   clutter free environment maintained   assistive device/personal items within reach  Intervention: Prevent Skin Injury  Recent Flowsheet  Documentation  Taken 7/4/2025 0415 by Rochelle Lees RN  Body Position: position changed independently  Skin Protection: transparent dressing maintained  Taken 7/4/2025 0230 by Rochelle Lees RN  Body Position: position changed independently  Taken 7/4/2025 0030 by Rochelle Lees RN  Body Position: position changed independently  Skin Protection: transparent dressing maintained  Taken 7/3/2025 2215 by Rochelle Lees RN  Body Position:   position changed independently   sitting up in bed  Taken 7/3/2025 2015 by Rochelle Lees RN  Body Position:   position changed independently   sitting up in bed  Skin Protection: transparent dressing maintained  Intervention: Prevent and Manage VTE (Venous Thromboembolism) Risk  Recent Flowsheet Documentation  Taken 7/3/2025 2015 by Rochelle Lees RN  VTE Prevention/Management:   bilateral   SCDs (sequential compression devices) off   patient refused intervention  Intervention: Prevent Infection  Recent Flowsheet Documentation  Taken 7/4/2025 0415 by Rochelle Lees RN  Infection Prevention:   single patient room provided   personal protective equipment utilized   hand hygiene promoted   equipment surfaces disinfected   rest/sleep promoted  Taken 7/4/2025 0230 by Rochelle Lees RN  Infection Prevention:   single patient room provided   personal protective equipment utilized   hand hygiene promoted   equipment surfaces disinfected   rest/sleep promoted  Taken 7/4/2025 0030 by Rochelle Lees RN  Infection Prevention:   single patient room provided   personal protective equipment utilized   hand hygiene promoted   equipment surfaces disinfected   rest/sleep promoted  Taken 7/3/2025 2215 by Rochelle Lees RN  Infection Prevention:   single patient room provided   personal protective equipment utilized   hand hygiene promoted   equipment surfaces disinfected   rest/sleep promoted  Taken 7/3/2025 2015 by Yoana  ANGELA Byrd  Infection Prevention:   single patient room provided   personal protective equipment utilized   hand hygiene promoted   equipment surfaces disinfected   rest/sleep promoted  Goal: Optimal Comfort and Wellbeing  Intervention: Monitor Pain and Promote Comfort  Recent Flowsheet Documentation  Taken 7/4/2025 0415 by Rochelle Lees RN  Pain Management Interventions:   care clustered   pain management plan reviewed with patient/caregiver   pillow support provided   position adjusted   quiet environment facilitated   relaxation techniques promoted  Taken 7/4/2025 0030 by Rochelle Lees RN  Pain Management Interventions:   care clustered   pain management plan reviewed with patient/caregiver   pillow support provided   position adjusted   quiet environment facilitated   relaxation techniques promoted  Taken 7/3/2025 2015 by Rochelle Lees RN  Pain Management Interventions:   care clustered   pain management plan reviewed with patient/caregiver   position adjusted   pillow support provided   quiet environment facilitated   relaxation techniques promoted  Intervention: Provide Person-Centered Care  Recent Flowsheet Documentation  Taken 7/3/2025 2015 by Rochelle Lees RN  Trust Relationship/Rapport:   care explained   questions encouraged   questions answered

## 2025-07-04 NOTE — PLAN OF CARE
Problem: Adult Inpatient Plan of Care  Goal: Plan of Care Review  Outcome: Progressing  Flowsheets (Taken 7/4/2025 4449)  Progress: no change  Plan of Care Reviewed With: patient  Goal: Patient-Specific Goal (Individualized)  Outcome: Progressing  Goal: Absence of Hospital-Acquired Illness or Injury  Outcome: Progressing  Intervention: Identify and Manage Fall Risk  Description: Perform standard risk assessment on admission using a validated tool or comprehensive approach appropriate to the patient; reassess fall risk frequently, with change in status or transfer to another level of care.Communicate risk to interprofessional healthcare team; ensure fall risk visible cue.Determine need for increased observation, equipment and environmental modification, as well as use of supportive, nonskid footwear.Adjust safety measures to individual needs and identified risk factors.Reinforce the importance of active participation with fall risk prevention, safety, and physical activity with the patient and family.Perform regular intentional rounding to assess need for position change, pain assessment and personal needs, including assistance with toileting.  Recent Flowsheet Documentation  Taken 7/4/2025 1600 by Gabrielle Miller RN  Safety Promotion/Fall Prevention:   safety round/check completed   fall prevention program maintained  Intervention: Prevent Skin Injury  Description: Perform a screening for skin injury risk, such as pressure or moisture-associated skin damage on admission and at regular intervals throughout hospital stay.Keep all areas of skin (especially folds) clean and dry.Maintain adequate skin hydration.Relieve and redistribute pressure and protect bony prominences and skin at risk for injury; implement measures based on patient-specific risk factors.Match turning and repositioning schedule to clinical condition.Encourage weight shift frequently; assist with reposition if unable to complete  independently.Float heels off bed; avoid pressure on the Achilles tendon.Keep skin free from extended contact with medical devices.Optimize nutrition and hydration.Encourage functional activity and mobility, as early as tolerated.Use aids (e.g., slide boards, mechanical lift) during transfer.  Recent Flowsheet Documentation  Taken 7/4/2025 1600 by Gabrielle Miller, RN  Skin Protection: incontinence pads utilized  Goal: Optimal Comfort and Wellbeing  Outcome: Progressing  Intervention: Provide Person-Centered Care  Description: Use a family-focused approach to care; encourage support system presence and participation.Develop trust and rapport by proactively providing information, encouraging questions, addressing concerns and offering reassurance.Acknowledge emotional response to hospitalization.Recognize and utilize personal coping strategies and strengths; develop goals via shared decision-making.Honor spiritual and cultural preferences.  Recent Flowsheet Documentation  Taken 7/4/2025 1600 by Gabrielle Miller, RN  Trust Relationship/Rapport:   care explained   choices provided  Goal: Readiness for Transition of Care  Outcome: Progressing     Problem: Fall Injury Risk  Goal: Absence of Fall and Fall-Related Injury  Outcome: Progressing  Intervention: Promote Injury-Free Environment  Description: Provide a safe, barrier-free environment that encourages independent activity.Keep care area uncluttered and well-lighted.Determine need for increased observation or monitoring.Avoid use of devices that minimize mobility, such as restraints or indwelling urinary catheter.  Recent Flowsheet Documentation  Taken 7/4/2025 1600 by Gabrielle Miller, RN  Safety Promotion/Fall Prevention:   safety round/check completed   fall prevention program maintained     Problem: Skin Injury Risk Increased  Goal: Skin Health and Integrity  Outcome: Progressing  Intervention: Optimize Skin Protection  Description: Perform a full pressure injury  risk assessment, as indicated by screening, upon admission to care unit.Reassess skin (full inspection and injury risk, including skin temperature, consistency and color) frequently (e.g., scheduled interval, with change in condition) to provide optimal early detection and prevention.Maintain adequate tissue perfusion (e.g., encourage fluid balance; avoid crossing legs, constrictive clothing or devices) to promote tissue oxygenation.Maintain head of bed at lowest degree of elevation tolerated, considering medical condition and other restrictions. Use positioning supports to prevent sliding and friction. Consider low friction textiles.Avoid positioning onto an area that remains reddened or on bony prominences.Minimize incontinence and moisture (e.g., toileting schedule; moisture-wicking pad, diaper or incontinence collection device; skin moisture barrier).Cleanse skin promptly and gently, when soiled, utilizing a pH-balanced cleanser.Relieve and redistribute pressure (e.g., scheduled position changes, weight shifts, use of support surface, medical device repositioning, protective dressing application, use of positioning device, microclimate control, use of pressure-injury-monitorEncourage increased activity, such as sitting in a chair at the bedside or early mobilization, when able to tolerate. Avoid prolonged sitting.  Recent Flowsheet Documentation  Taken 7/4/2025 1600 by Gabrielle Miller RN  Pressure Reduction Techniques:   frequent weight shift encouraged   weight shift assistance provided  Pressure Reduction Devices: positioning supports utilized  Skin Protection: incontinence pads utilized     Problem: Noninvasive Ventilation Acute  Goal: Effective Unassisted Ventilation and Oxygenation  Outcome: Progressing   Goal Outcome Evaluation:  Plan of Care Reviewed With: patient        Progress: no change

## 2025-07-04 NOTE — PROGRESS NOTES
"NEPHROLOGY PROGRESS NOTE------[facility]    [facility]  [phone]  Felisa Melendez MD      Patient Care Team:  Lorraine Agarwal APRN as PCP - General (Nurse Practitioner)  Rick Melendez MD as Consulting Physician (Nephrology)  Mayo Fregoso MD as Consulting Physician (Urology)  Fredi Pierce MD as Consulting Physician (Cardiology)  Cony Sanchez APRN as Nurse Practitioner (Vascular Surgery)      Provider:  Felisa Melendez MD  Patient Name: Lynne Cade  :  1950    SUBJECTIVE:    F/U ESRD    Seen and examined during HD tx this AM. No CP. BP better off of meds    Medication:  apixaban, 2.5 mg, Oral, Q12H  [Held by provider] carvedilol, 3.125 mg, Oral, BID With Meals  cyclobenzaprine, 10 mg, Oral, TID  [Held by provider] dilTIAZem, 30 mg, Oral, Q12H  DULoxetine, 60 mg, Oral, Daily  epoetin baldemar/baldemar-epbx, 10,000 Units, Intravenous, Once  ferric gluconate, 125 mg, Intravenous, Once in Dialysis  gabapentin, 100 mg, Oral, TID  insulin lispro, 2-7 Units, Subcutaneous, 4x Daily AC & at Bedtime  midodrine, 10 mg, Oral, TID AC  sodium chloride, 10 mL, Intravenous, Q12H           OBJECTIVE    Vital Sign Min/Max for last 24 hours  Temp  Min: 97.5 °F (36.4 °C)  Max: 98.1 °F (36.7 °C)   BP  Min: 96/50  Max: 132/58   Pulse  Min: 77  Max: 92   Resp  Min: 13  Max: 28   SpO2  Min: 94 %  Max: 100 %   No data recorded   No data recorded     Flowsheet Rows      Flowsheet Row First Filed Value   Admission Height 167.6 cm (66\") Documented at 2025 1606   Admission Weight 94.8 kg (209 lb) Documented at 2025 1606            No intake/output data recorded.  I/O last 3 completed shifts:  In: 666 [P.O.:666]  Out: -     Physical Exam:  General Appearance: alert, appears stated age and cooperative  Head: normocephalic, without obvious abnormality and atraumatic  Eyes: conjunctivae and sclerae normal and no " "icterus  Neck: supple and NO GROSS JVD  Lungs: clear to auscultation and respirations regular  Heart: regular rhythm & normal rate and normal S1, S2 +CYNDY  Chest: Wall no abnormalities observed  Abdomen: normal bowel sounds and soft, nontender +OBESITY  Extremities: moves extremities well, +TR BILAT LE EDEMA, no cyanosis and no redness  Skin: no bleeding, bruising or rash, turgor normal, color normal and no lesions noted  Neurologic: Alert, and oriented. No focal deficits    Labs:    WBC WBC   Date Value Ref Range Status   07/04/2025 11.26 (H) 3.40 - 10.80 10*3/mm3 Final   07/03/2025 11.65 (H) 3.40 - 10.80 10*3/mm3 Final   07/02/2025 10.52 3.40 - 10.80 10*3/mm3 Final      HGB Hemoglobin   Date Value Ref Range Status   07/04/2025 8.9 (L) 12.0 - 15.9 g/dL Final   07/03/2025 9.2 (L) 12.0 - 15.9 g/dL Final   07/02/2025 9.3 (L) 12.0 - 15.9 g/dL Final      HCT Hematocrit   Date Value Ref Range Status   07/04/2025 29.9 (L) 34.0 - 46.6 % Final   07/03/2025 31.5 (L) 34.0 - 46.6 % Final   07/02/2025 32.0 (L) 34.0 - 46.6 % Final      Platelets No results found for: \"LABPLAT\"   MCV MCV   Date Value Ref Range Status   07/04/2025 109.1 (H) 79.0 - 97.0 fL Final   07/03/2025 110.1 (H) 79.0 - 97.0 fL Final   07/02/2025 111.9 (H) 79.0 - 97.0 fL Final          Sodium Sodium   Date Value Ref Range Status   07/04/2025 139 136 - 145 mmol/L Final   07/03/2025 140 136 - 145 mmol/L Final   07/02/2025 134 (L) 136 - 145 mmol/L Final      Potassium Potassium   Date Value Ref Range Status   07/04/2025 5.4 (H) 3.5 - 5.2 mmol/L Final     Comment:     Specimen hemolyzed.  Result may be falsely elevated.   07/03/2025 4.6 3.5 - 5.2 mmol/L Final     Comment:     Specimen hemolyzed.  Result may be falsely elevated.   07/02/2025 5.7 (H) 3.5 - 5.2 mmol/L Final      Chloride Chloride   Date Value Ref Range Status   07/04/2025 100 98 - 107 mmol/L Final   07/03/2025 101 98 - 107 mmol/L Final   07/02/2025 101 98 - 107 mmol/L Final      CO2 CO2   Date Value " "Ref Range Status   07/04/2025 22.6 22.0 - 29.0 mmol/L Final   07/03/2025 23.7 22.0 - 29.0 mmol/L Final   07/02/2025 19.6 (L) 22.0 - 29.0 mmol/L Final      BUN BUN   Date Value Ref Range Status   07/04/2025 57.9 (H) 8.0 - 23.0 mg/dL Final   07/03/2025 36.0 (H) 8.0 - 23.0 mg/dL Final   07/02/2025 43.9 (H) 8.0 - 23.0 mg/dL Final      Creatinine Creatinine   Date Value Ref Range Status   07/04/2025 6.10 (H) 0.57 - 1.00 mg/dL Final   07/03/2025 4.72 (H) 0.57 - 1.00 mg/dL Final   07/02/2025 5.21 (H) 0.57 - 1.00 mg/dL Final      Calcium Calcium   Date Value Ref Range Status   07/04/2025 8.0 (L) 8.6 - 10.5 mg/dL Final   07/03/2025 8.2 (L) 8.6 - 10.5 mg/dL Final   07/02/2025 8.5 (L) 8.6 - 10.5 mg/dL Final      PO4 No components found for: \"PO4\"   Albumin No results found for: \"ALBUMIN\"     Magnesium No results found for: \"MG\"     Uric Acid No components found for: \"URIC ACID\"     Imaging Results (Last 72 Hours)       Procedure Component Value Units Date/Time    CT Head Without Contrast [[id number]] Collected: 07/03/25 1136     Updated: 07/03/25 1145    Narrative:      CT HEAD WO CONTRAST    Date of Exam: 7/3/2025 11:15 AM EDT    Indication: dizziness.    Comparison: MRI brain from April 24, 2025 and CT head from April 22, 2025    Technique: Axial CT images were obtained of the head without contrast administration.  Coronal reconstructions were performed.  Automated exposure control and iterative reconstruction methods were used.      Findings:  No acute intracranial hemorrhage or extra-axial collection is identified. The ventricles appear normal in caliber, with no evidence of mass effect or midline shift. The basal cisterns appear patent. The gray-white differentiation appears preserved.    The calvarium appear intact. There is mild mucosal disease in the left sphenoid sinus. The mastoid air cells are well-aerated. 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 There is mild generalized parenchymal atrophy. " Subtle foci of   periventricular and subcortical white matter hypodensities are nonspecific, but likely the sequela of mild chronic small vessel ischemic disease.      Impression:      Impression:  1.No acute intracranial process identified.  2.Findings suggestive of mild chronic small vessel ischemic disease.  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  4.Mild mucosal disease within left sphenoid sinus.        Electronically Signed: Eitan Leach MD    7/3/2025 11:43 AM EDT    Workstation ID: [id number]    [facility]brid [facility] OR Imaging ([employer]) [[id number]] Resulted: 07/01/25 1846     Updated: 07/01/25 1846    Narrative:      Please see performing physician's note for result.            Results for orders placed during the hospital encounter of 06/27/25    XR Chest 1 View 06/30/2025  4:48 AM    Narrative  XR CHEST 1 VW    Date of Exam: 6/30/2025 4:36 AM EDT    Indication: pulled out dialysis cath    Comparison: 6/27/2025.    Findings:  There is stable asymmetric elevation of the right hemidiaphragm. There is no pneumothorax, pleural effusion or focal airspace consolidation. Heart size and pulmonary vasculature appear within normal limits. Regional bones appear intact.    Impression  Impression:  No acute cardiopulmonary abnormality.          Electronically Signed: Bran Kennedy MD  6/30/2025 4:48 AM EDT  Workstation ID: [id number]      XR Chest 1 View 06/27/2025  6:54 PM    Narrative  XR CHEST 1 VW    Date of Exam: 6/27/2025 6:32 PM EDT    Indication: Unable to have dialysis Wednesday or today    Comparison: April 22, 2025    Findings:  There is elevation of the right hemidiaphragm. There is a mild right basilar opacity. The heart and mediastinal contours appear normal. The pulmonary vasculature appears normal. The osseous structures appear intact.    Impression  Impression:  1.Mild right basilar opacity, which could reflect atelectasis or pneumonia.  2.Elevation of right hemidiaphragm.        Electronically  Signed: Eitan Leach MD  6/27/2025 6:54 PM EDT  Workstation ID: [id number]      Results for orders placed during the hospital encounter of 04/22/25    XR Chest 1 View 04/22/2025  3:53 PM    Narrative  XR CHEST 1 VW    Date of Exam: 4/22/2025 3:40 PM EDT    Indication: AMS Protocol  AMS Protocol    Comparison: 2/16/2025    Findings:  Heart size is within normal limits for the projection. The right hemidiaphragm is elevated, unchanged. The pulmonary vascular pattern appears normal and the lungs appear clear.    Impression  Impression:  No active disease.        Electronically Signed: Yfn Luna MD  4/22/2025 3:53 PM EDT  Workstation ID: [id number]      Results for orders placed during the hospital encounter of 04/22/25    Duplex Carotid Ultrasound CAR 04/24/2025  2:19 PM    Interpretation Summary    Right internal carotid artery demonstrates a less than 50% stenosis.    Antegrade right vertebral flow.    Left internal carotid artery demonstrates a less than 50% stenosis.    Antegrade left vertebral flow.        ASSESSMENT / PLAN      AV graft thrombosis, sequela    ESRD (end stage renal disease) on dialysis    ESRD------HD today per usual [facility] outpatient schedule    2. HTN WITH CKD-----BP better off of BP meds and with addition of Midodrine    3. DMII WITH RENAL MANIFESTATIONS-----BS ok. Glucometers, SSI    4. ANEMIA OF ESRD-----EPO/Iron with HD as needed    5. CLOTTED AV SHUNT------Open thrombectomy done per Vascular surgery    6. DEPRESSION------On Cymbalta    7. DM PERIPHERAL NEUROPATHY------On Neurontin    8. PVD    9. HYPERKALEMIA-----Better with K+ restrict diet and adjusting K+ bath with HD and follow    Okay from RENAL standpoint to d/c today    Felisa Melendez MD  [facility]  [phone]  07/04/25  08:05 EDT

## 2025-07-04 NOTE — NURSING NOTE
Dialysis treatment completed. Patient is awake, not in distress, VS stable.  UF limited to 1.5L to support BP. Nephrologist aware, saw BP at bedside.  Access is patent, intact, pressure dressing in place.  Report given to primary RN.

## 2025-07-05 LAB
ANION GAP SERPL CALCULATED.3IONS-SCNC: 16 MMOL/L (ref 5–15)
BUN SERPL-MCNC: 45.9 MG/DL (ref 8–23)
BUN/CREAT SERPL: 10.6 (ref 7–25)
CALCIUM SPEC-SCNC: 7.7 MG/DL (ref 8.6–10.5)
CHLORIDE SERPL-SCNC: 100 MMOL/L (ref 98–107)
CO2 SERPL-SCNC: 22 MMOL/L (ref 22–29)
CREAT SERPL-MCNC: 4.34 MG/DL (ref 0.57–1)
DEPRECATED RDW RBC AUTO: 64.2 FL (ref 37–54)
EGFRCR SERPLBLD CKD-EPI 2021: 10 ML/MIN/1.73
ERYTHROCYTE [DISTWIDTH] IN BLOOD BY AUTOMATED COUNT: 16.2 % (ref 12.3–15.4)
GLUCOSE BLDC GLUCOMTR-MCNC: 149 MG/DL (ref 70–105)
GLUCOSE BLDC GLUCOMTR-MCNC: 239 MG/DL (ref 70–105)
GLUCOSE BLDC GLUCOMTR-MCNC: 275 MG/DL (ref 70–105)
GLUCOSE BLDC GLUCOMTR-MCNC: 81 MG/DL (ref 70–105)
GLUCOSE SERPL-MCNC: 158 MG/DL (ref 65–99)
HCT VFR BLD AUTO: 29.1 % (ref 34–46.6)
HGB BLD-MCNC: 8.7 G/DL (ref 12–15.9)
INR PPP: 1.17 (ref 2–3)
MAGNESIUM SERPL-MCNC: 2.5 MG/DL (ref 1.6–2.4)
MCH RBC QN AUTO: 32.8 PG (ref 26.6–33)
MCHC RBC AUTO-ENTMCNC: 29.9 G/DL (ref 31.5–35.7)
MCV RBC AUTO: 109.8 FL (ref 79–97)
PHOSPHATE SERPL-MCNC: 7.6 MG/DL (ref 2.5–4.5)
PLATELET # BLD AUTO: 246 10*3/MM3 (ref 140–450)
PMV BLD AUTO: 10 FL (ref 6–12)
POTASSIUM SERPL-SCNC: 4.4 MMOL/L (ref 3.5–5.2)
PROTHROMBIN TIME: 14.9 SECONDS (ref 19.4–28.5)
RBC # BLD AUTO: 2.65 10*6/MM3 (ref 3.77–5.28)
SODIUM SERPL-SCNC: 138 MMOL/L (ref 136–145)
WBC NRBC COR # BLD AUTO: 12.24 10*3/MM3 (ref 3.4–10.8)

## 2025-07-05 PROCEDURE — 85610 PROTHROMBIN TIME: CPT | Performed by: HOSPITALIST

## 2025-07-05 PROCEDURE — 80048 BASIC METABOLIC PNL TOTAL CA: CPT | Performed by: INTERNAL MEDICINE

## 2025-07-05 PROCEDURE — 83735 ASSAY OF MAGNESIUM: CPT | Performed by: INTERNAL MEDICINE

## 2025-07-05 PROCEDURE — 94799 UNLISTED PULMONARY SVC/PX: CPT

## 2025-07-05 PROCEDURE — 82948 REAGENT STRIP/BLOOD GLUCOSE: CPT | Performed by: SURGERY

## 2025-07-05 PROCEDURE — 82948 REAGENT STRIP/BLOOD GLUCOSE: CPT

## 2025-07-05 PROCEDURE — 63710000001 INSULIN LISPRO (HUMAN) PER 5 UNITS: Performed by: SURGERY

## 2025-07-05 PROCEDURE — 85027 COMPLETE CBC AUTOMATED: CPT | Performed by: INTERNAL MEDICINE

## 2025-07-05 PROCEDURE — 84100 ASSAY OF PHOSPHORUS: CPT | Performed by: INTERNAL MEDICINE

## 2025-07-05 RX ORDER — MIDODRINE HYDROCHLORIDE 5 MG/1
15 TABLET ORAL
Status: DISCONTINUED | OUTPATIENT
Start: 2025-07-05 | End: 2025-07-06 | Stop reason: HOSPADM

## 2025-07-05 RX ORDER — WARFARIN SODIUM 5 MG/1
5 TABLET ORAL
Status: COMPLETED | OUTPATIENT
Start: 2025-07-05 | End: 2025-07-05

## 2025-07-05 RX ADMIN — CYCLOBENZAPRINE 10 MG: 10 TABLET, FILM COATED ORAL at 21:11

## 2025-07-05 RX ADMIN — SENNOSIDES AND DOCUSATE SODIUM 2 TABLET: 50; 8.6 TABLET ORAL at 18:13

## 2025-07-05 RX ADMIN — DULOXETINE 60 MG: 30 CAPSULE, DELAYED RELEASE ORAL at 09:29

## 2025-07-05 RX ADMIN — MIDODRINE HYDROCHLORIDE 15 MG: 5 TABLET ORAL at 18:06

## 2025-07-05 RX ADMIN — CYCLOBENZAPRINE 10 MG: 10 TABLET, FILM COATED ORAL at 09:29

## 2025-07-05 RX ADMIN — ACETAMINOPHEN 650 MG: 325 TABLET, FILM COATED ORAL at 00:54

## 2025-07-05 RX ADMIN — MIDODRINE HYDROCHLORIDE 15 MG: 5 TABLET ORAL at 13:58

## 2025-07-05 RX ADMIN — GABAPENTIN 100 MG: 100 CAPSULE ORAL at 18:06

## 2025-07-05 RX ADMIN — INSULIN LISPRO 4 UNITS: 100 INJECTION, SOLUTION INTRAVENOUS; SUBCUTANEOUS at 13:57

## 2025-07-05 RX ADMIN — Medication 10 ML: at 21:11

## 2025-07-05 RX ADMIN — WARFARIN SODIUM 5 MG: 5 TABLET ORAL at 18:06

## 2025-07-05 RX ADMIN — Medication 10 ML: at 10:38

## 2025-07-05 RX ADMIN — CYCLOBENZAPRINE 10 MG: 10 TABLET, FILM COATED ORAL at 18:06

## 2025-07-05 RX ADMIN — GABAPENTIN 100 MG: 100 CAPSULE ORAL at 21:11

## 2025-07-05 RX ADMIN — INSULIN LISPRO 3 UNITS: 100 INJECTION, SOLUTION INTRAVENOUS; SUBCUTANEOUS at 21:20

## 2025-07-05 RX ADMIN — GABAPENTIN 100 MG: 100 CAPSULE ORAL at 09:30

## 2025-07-05 NOTE — PLAN OF CARE
Problem: Adult Inpatient Plan of Care  Goal: Plan of Care Review  Outcome: Progressing  Flowsheets (Taken 7/5/2025 1616)  Progress: no change  Outcome Evaluation: patient has been up OOB in chair for approx 1 hr today. patient has been slightly drowsy and napping on/off during shift. patient compliant with BIPAP during this shift while sleeping. plan of care ongoing, no complaints from patient as of present.  Plan of Care Reviewed With: patient  Goal: Patient-Specific Goal (Individualized)  Outcome: Progressing  Goal: Absence of Hospital-Acquired Illness or Injury  Outcome: Progressing  Intervention: Identify and Manage Fall Risk  Description: Perform standard risk assessment on admission using a validated tool or comprehensive approach appropriate to the patient; reassess fall risk frequently, with change in status or transfer to another level of care.Communicate risk to interprofessional healthcare team; ensure fall risk visible cue.Determine need for increased observation, equipment and environmental modification, as well as use of supportive, nonskid footwear.Adjust safety measures to individual needs and identified risk factors.Reinforce the importance of active participation with fall risk prevention, safety, and physical activity with the patient and family.Perform regular intentional rounding to assess need for position change, pain assessment and personal needs, including assistance with toileting.  Recent Flowsheet Documentation  Taken 7/5/2025 1600 by Gabrielle Miller, RN  Safety Promotion/Fall Prevention:   safety round/check completed   fall prevention program maintained  Taken 7/5/2025 1400 by Gabrielle Miller, RN  Safety Promotion/Fall Prevention:   safety round/check completed   fall prevention program maintained  Taken 7/5/2025 1200 by Gabrielle Miller, RN  Safety Promotion/Fall Prevention:   safety round/check completed   fall prevention program maintained  Taken 7/5/2025 1000 by Gabrielle Miller  Addended by: REMY LEONARDO on: 8/2/2023 09:52 PM     Modules accepted: Orders     FELICIA RN  Safety Promotion/Fall Prevention:   safety round/check completed   fall prevention program maintained  Taken 7/5/2025 0800 by Gabrielle Miller RN  Safety Promotion/Fall Prevention:   safety round/check completed   fall prevention program maintained  Intervention: Prevent Skin Injury  Description: Perform a screening for skin injury risk, such as pressure or moisture-associated skin damage on admission and at regular intervals throughout hospital stay.Keep all areas of skin (especially folds) clean and dry.Maintain adequate skin hydration.Relieve and redistribute pressure and protect bony prominences and skin at risk for injury; implement measures based on patient-specific risk factors.Match turning and repositioning schedule to clinical condition.Encourage weight shift frequently; assist with reposition if unable to complete independently.Float heels off bed; avoid pressure on the Achilles tendon.Keep skin free from extended contact with medical devices.Optimize nutrition and hydration.Encourage functional activity and mobility, as early as tolerated.Use aids (e.g., slide boards, mechanical lift) during transfer.  Recent Flowsheet Documentation  Taken 7/5/2025 1600 by Gabrielle Miller, RN  Skin Protection: transparent dressing maintained  Taken 7/5/2025 1200 by Gabrielle Miller, RN  Skin Protection: transparent dressing maintained  Taken 7/5/2025 0800 by Gabrielle Miller, RN  Skin Protection:   transparent dressing maintained   incontinence pads utilized  Intervention: Prevent and Manage VTE (Venous Thromboembolism) Risk  Description: Assess for VTE (venous thromboembolism) risk.Promote early mobilization; encourage both active and passive leg exercises, if unable to ambulate.Initiate and maintain compression or other therapy, as indicated, based on identified risk in accordance with organizational protocol and provider order.Recognize the patient's individual risk for bleeding before initiating  pharmacologic thromboprophylaxis.  Recent Flowsheet Documentation  Taken 7/5/2025 0800 by Gabrielle Miller RN  VTE Prevention/Management: SCDs (sequential compression devices) off  Goal: Optimal Comfort and Wellbeing  Outcome: Progressing  Intervention: Provide Person-Centered Care  Description: Use a family-focused approach to care; encourage support system presence and participation.Develop trust and rapport by proactively providing information, encouraging questions, addressing concerns and offering reassurance.Acknowledge emotional response to hospitalization.Recognize and utilize personal coping strategies and strengths; develop goals via shared decision-making.Honor spiritual and cultural preferences.  Recent Flowsheet Documentation  Taken 7/5/2025 1600 by Gabrielle Miller RN  Trust Relationship/Rapport:   care explained   choices provided  Taken 7/5/2025 1200 by Gabrielle Miller RN  Trust Relationship/Rapport:   choices provided   care explained  Taken 7/5/2025 0800 by Gabrielle Miller RN  Trust Relationship/Rapport:   choices provided   care explained  Goal: Readiness for Transition of Care  Outcome: Progressing     Problem: Fall Injury Risk  Goal: Absence of Fall and Fall-Related Injury  Outcome: Progressing  Intervention: Promote Injury-Free Environment  Description: Provide a safe, barrier-free environment that encourages independent activity.Keep care area uncluttered and well-lighted.Determine need for increased observation or monitoring.Avoid use of devices that minimize mobility, such as restraints or indwelling urinary catheter.  Recent Flowsheet Documentation  Taken 7/5/2025 1600 by Gabrielle Miller, RN  Safety Promotion/Fall Prevention:   safety round/check completed   fall prevention program maintained  Taken 7/5/2025 1400 by Gabrielle Miller RN  Safety Promotion/Fall Prevention:   safety round/check completed   fall prevention program maintained  Taken 7/5/2025 1200 by Gabrielle Miller  RN  Safety Promotion/Fall Prevention:   safety round/check completed   fall prevention program maintained  Taken 7/5/2025 1000 by Gabrielle Miller RN  Safety Promotion/Fall Prevention:   safety round/check completed   fall prevention program maintained  Taken 7/5/2025 0800 by Gabrielle Miller RN  Safety Promotion/Fall Prevention:   safety round/check completed   fall prevention program maintained     Problem: Skin Injury Risk Increased  Goal: Skin Health and Integrity  Outcome: Progressing  Intervention: Optimize Skin Protection  Description: Perform a full pressure injury risk assessment, as indicated by screening, upon admission to care unit.Reassess skin (full inspection and injury risk, including skin temperature, consistency and color) frequently (e.g., scheduled interval, with change in condition) to provide optimal early detection and prevention.Maintain adequate tissue perfusion (e.g., encourage fluid balance; avoid crossing legs, constrictive clothing or devices) to promote tissue oxygenation.Maintain head of bed at lowest degree of elevation tolerated, considering medical condition and other restrictions. Use positioning supports to prevent sliding and friction. Consider low friction textiles.Avoid positioning onto an area that remains reddened or on bony prominences.Minimize incontinence and moisture (e.g., toileting schedule; moisture-wicking pad, diaper or incontinence collection device; skin moisture barrier).Cleanse skin promptly and gently, when soiled, utilizing a pH-balanced cleanser.Relieve and redistribute pressure (e.g., scheduled position changes, weight shifts, use of support surface, medical device repositioning, protective dressing application, use of positioning device, microclimate control, use of pressure-injury-monitorEncourage increased activity, such as sitting in a chair at the bedside or early mobilization, when able to tolerate. Avoid prolonged sitting.  Recent Flowsheet  Documentation  Taken 7/5/2025 1600 by Gabrielle Miller RN  Pressure Reduction Techniques: frequent weight shift encouraged  Pressure Reduction Devices: positioning supports utilized  Skin Protection: transparent dressing maintained  Taken 7/5/2025 1200 by Gabrielle Miller RN  Pressure Reduction Techniques:   frequent weight shift encouraged   weight shift assistance provided  Pressure Reduction Devices: positioning supports utilized  Skin Protection: transparent dressing maintained  Taken 7/5/2025 0800 by Gabrielle Miller RN  Pressure Reduction Techniques: frequent weight shift encouraged  Pressure Reduction Devices: positioning supports utilized  Skin Protection:   transparent dressing maintained   incontinence pads utilized     Problem: Noninvasive Ventilation Acute  Goal: Effective Unassisted Ventilation and Oxygenation  Outcome: Progressing  Intervention: Monitor and Manage Noninvasive Ventilation  Description: Monitor patient response, such as gas exchange and ventilatory parameters, neurologic and hemodynamic status, flow and pressure waveforms, to determine interface tolerance; recognize need for intubation.Acknowledge, normalize and validate the intensity of fear and anxiety associated with the use of noninvasive ventilation therapy.Adjust pressure support gradually to promote tolerance and comfort; provide coaching and reassurance.Keep exhalation port free from obstruction to maintain carbon dioxide elimination; port may be in the circuit or in the interface device.Ensure that nasal prongs, mask or helmet fit properly; adjust strap tension to avoid leaks without overtightening.Consider heated humidification for prolonged use of positive pressure, thick secretions, mucosal dryness or comfort.Assess pressure points regularly to promote skin and mucous membrane integrity; provide frequent oral care.Maintain head of bed elevation to promote ventilation-perfusion and reduce risk of aspiration.Evaluate  nutrition status; consider nutritional supplementation.Promote pulmonary hygiene regularly to promote mucociliary clearance and prevent sputum retention.Utilize nonpharmacologic measures, such as music or presence at the bedside and medication, to reduce pain and anxiety; monitor medication effects closely for respiratory depression.  Recent Flowsheet Documentation  Taken 7/5/2025 1600 by Gabrielle Miller, RN  Airway/Ventilation Management: airway patency maintained  NPPV/CPAP Maintenance: proper fit/secure  Taken 7/5/2025 0800 by Gabrielle Miller RN  Airway/Ventilation Management: airway patency maintained  NPPV/CPAP Maintenance: proper fit/secure   Goal Outcome Evaluation:  Plan of Care Reviewed With: patient        Progress: no change  Outcome Evaluation: patient has been up OOB in chair for approx 1 hr today. patient has been slightly drowsy and napping on/off during shift. patient compliant with BIPAP during this shift while sleeping. plan of care ongoing, no complaints from patient as of present.

## 2025-07-05 NOTE — PROGRESS NOTES
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 Test claim shows cost $145/month.   Decision made to change to warfarin due to cost.  D/W PCP and Vascular surgeon (Dr Ge) - no bridge necessary.    INR Goal: 2 - 3  Home medication?: New Start  Bridge Therapy Present?:  No  Interacting Medications Evaluation (New/Present/Discontinued): Recent DOAC use. Cymbalta can increase AC effects of Warfarin.  Additional Contributing Factors: Age, ESRD on HD    Assessment/Plan:  New Start Warfarin with pharmacy to dose.  STAT INR Ordered.  Warfarin 5mg ordered pending INR Results.  Will CTM INR Trend and adjust dosing PRN based on that trend.    Continue to monitor and adjust based on INR.     Date 7/4 7/5          INR 1.14 1.17          Dose 5 mg 5 mg            Diet Order   Procedures    Diet: Renal, Diabetic; Consistent Carbohydrate; Low Sodium (2-3g), Low Potassium; Fluid Consistency: Thin (IDDSI 0)      Objective:  [Ht: 167.6 cm (65.98\"); Wt: 94.6 kg (208 lb 8.9 oz); BMI: Body mass index is 33.68 kg/m².]    Lab Results   Component Value Date    ALBUMIN 3.6 06/27/2025     Lab Results   Component Value Date    INR 1.17 (L) 07/05/2025    INR 1.14 (L) 07/04/2025    INR 1.04 06/27/2025    PROTIME 14.9 (L) 07/05/2025    PROTIME 14.6 (L) 07/04/2025    PROTIME 13.5 06/27/2025     Lab Results   Component Value Date    HGB 8.7 (L) 07/05/2025    HGB 8.9 (L) 07/04/2025    HGB 9.2 (L) 07/03/2025     Lab Results   Component Value Date    HCT 29.1 (L) 07/05/2025    HCT 29.9 (L) 07/04/2025    HCT 31.5 (L) 07/03/2025     Torin Hurst RPH  07/05/25 10:55 EDT         "

## 2025-07-05 NOTE — PROGRESS NOTES
"NEPHROLOGY PROGRESS NOTE------[facility]    [facility]  [phone]  Vipul Elias MD      Patient Care Team:  Lorraine Agarwal APRN as PCP - General (Nurse Practitioner)  Rick Melendez MD as Consulting Physician (Nephrology)  Mayo Fregoso MD as Consulting Physician (Urology)  Fredi Pierce MD as Consulting Physician (Cardiology)  Cony Sanchez APRN as Nurse Practitioner (Vascular Surgery)      Provider:  Vipul Elias MD  Patient Name: Lynne Cade  :  1950    SUBJECTIVE:    F/U ESRD    No CP. BP better off of meds    Medication:  cyclobenzaprine, 10 mg, Oral, TID  DULoxetine, 60 mg, Oral, Daily  gabapentin, 100 mg, Oral, TID  insulin lispro, 2-7 Units, Subcutaneous, 4x Daily AC & at Bedtime  midodrine, 15 mg, Oral, TID AC  sodium chloride, 10 mL, Intravenous, Q12H  warfarin, 5 mg, Oral, Once      Pharmacy to dose warfarin,         OBJECTIVE    Vital Sign Min/Max for last 24 hours  Temp  Min: 97.7 °F (36.5 °C)  Max: 99.4 °F (37.4 °C)   BP  Min: 90/48  Max: 130/64   Pulse  Min: 79  Max: 96   Resp  Min: 16  Max: 20   SpO2  Min: 90 %  Max: 99 %   No data recorded   Weight  Min: 94.6 kg (208 lb 8.9 oz)  Max: 94.6 kg (208 lb 8.9 oz)     Flowsheet Rows      Flowsheet Row First Filed Value   Admission Height 167.6 cm (66\") Documented at 2025 1606   Admission Weight 94.8 kg (209 lb) Documented at 2025 1606            No intake/output data recorded.  I/O last 3 completed shifts:  In: 240 [P.O.:240]  Out: 1500     Physical Exam:  General Appearance: alert, appears stated age and cooperative  Head: normocephalic, without obvious abnormality and atraumatic  Eyes: conjunctivae and sclerae normal and no icterus  Neck: supple and NO GROSS JVD  Lungs: clear to auscultation and respirations regular  Heart: regular rhythm & normal rate and normal S1, S2 +CYNDY  Chest: Wall no abnormalities " "observed  Abdomen: normal bowel sounds and soft, nontender +OBESITY  Extremities: moves extremities well, +TR BILAT LE EDEMA, no cyanosis and no redness  Skin: no bleeding, bruising or rash, turgor normal, color normal and no lesions noted  Neurologic: Alert, and oriented. No focal deficits    Labs:    WBC WBC   Date Value Ref Range Status   07/05/2025 12.24 (H) 3.40 - 10.80 10*3/mm3 Final   07/04/2025 11.26 (H) 3.40 - 10.80 10*3/mm3 Final   07/03/2025 11.65 (H) 3.40 - 10.80 10*3/mm3 Final      HGB Hemoglobin   Date Value Ref Range Status   07/05/2025 8.7 (L) 12.0 - 15.9 g/dL Final   07/04/2025 8.9 (L) 12.0 - 15.9 g/dL Final   07/03/2025 9.2 (L) 12.0 - 15.9 g/dL Final      HCT Hematocrit   Date Value Ref Range Status   07/05/2025 29.1 (L) 34.0 - 46.6 % Final   07/04/2025 29.9 (L) 34.0 - 46.6 % Final   07/03/2025 31.5 (L) 34.0 - 46.6 % Final      Platelets No results found for: \"LABPLAT\"   MCV MCV   Date Value Ref Range Status   07/05/2025 109.8 (H) 79.0 - 97.0 fL Final   07/04/2025 109.1 (H) 79.0 - 97.0 fL Final   07/03/2025 110.1 (H) 79.0 - 97.0 fL Final          Sodium Sodium   Date Value Ref Range Status   07/05/2025 138 136 - 145 mmol/L Final   07/04/2025 139 136 - 145 mmol/L Final   07/03/2025 140 136 - 145 mmol/L Final      Potassium Potassium   Date Value Ref Range Status   07/05/2025 4.4 3.5 - 5.2 mmol/L Final   07/04/2025 5.4 (H) 3.5 - 5.2 mmol/L Final     Comment:     Specimen hemolyzed.  Result may be falsely elevated.   07/03/2025 4.6 3.5 - 5.2 mmol/L Final     Comment:     Specimen hemolyzed.  Result may be falsely elevated.      Chloride Chloride   Date Value Ref Range Status   07/05/2025 100 98 - 107 mmol/L Final   07/04/2025 100 98 - 107 mmol/L Final   07/03/2025 101 98 - 107 mmol/L Final      CO2 CO2   Date Value Ref Range Status   07/05/2025 22.0 22.0 - 29.0 mmol/L Final   07/04/2025 22.6 22.0 - 29.0 mmol/L Final   07/03/2025 23.7 22.0 - 29.0 mmol/L Final      BUN BUN   Date Value Ref Range Status " "  07/05/2025 45.9 (H) 8.0 - 23.0 mg/dL Final   07/04/2025 57.9 (H) 8.0 - 23.0 mg/dL Final   07/03/2025 36.0 (H) 8.0 - 23.0 mg/dL Final      Creatinine Creatinine   Date Value Ref Range Status   07/05/2025 4.34 (H) 0.57 - 1.00 mg/dL Final   07/04/2025 6.10 (H) 0.57 - 1.00 mg/dL Final   07/03/2025 4.72 (H) 0.57 - 1.00 mg/dL Final      Calcium Calcium   Date Value Ref Range Status   07/05/2025 7.7 (L) 8.6 - 10.5 mg/dL Final   07/04/2025 8.0 (L) 8.6 - 10.5 mg/dL Final   07/03/2025 8.2 (L) 8.6 - 10.5 mg/dL Final      PO4 No components found for: \"PO4\"   Albumin No results found for: \"ALBUMIN\"     Magnesium Magnesium   Date Value Ref Range Status   07/05/2025 2.5 (H) 1.6 - 2.4 mg/dL Final        Uric Acid No components found for: \"URIC ACID\"     Imaging Results (Last 72 Hours)       Procedure Component Value Units Date/Time    CT Head Without Contrast [[id number]] Collected: 07/03/25 1136     Updated: 07/03/25 1145    Narrative:      CT HEAD WO CONTRAST    Date of Exam: 7/3/2025 11:15 AM EDT    Indication: dizziness.    Comparison: MRI brain from April 24, 2025 and CT head from April 22, 2025    Technique: Axial CT images were obtained of the head without contrast administration.  Coronal reconstructions were performed.  Automated exposure control and iterative reconstruction methods were used.      Findings:  No acute intracranial hemorrhage or extra-axial collection is identified. The ventricles appear normal in caliber, with no evidence of mass effect or midline shift. The basal cisterns appear patent. The gray-white differentiation appears preserved.    The calvarium appear intact. There is mild mucosal disease in the left sphenoid sinus. The mastoid air cells are well-aerated. 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 There is mild generalized parenchymal atrophy. Subtle foci of   periventricular and subcortical white matter hypodensities are nonspecific, but likely the sequela of mild chronic small " vessel ischemic disease.      Impression:      Impression:  1.No acute intracranial process identified.  2.Findings suggestive of mild chronic small vessel ischemic disease.  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  4.Mild mucosal disease within left sphenoid sinus.        Electronically Signed: Eitan Leach MD    7/3/2025 11:43 AM EDT    Workstation ID: [id number]            Results for orders placed during the hospital encounter of 06/27/25    XR Chest 1 View 06/30/2025  4:48 AM    Narrative  XR CHEST 1 VW    Date of Exam: 6/30/2025 4:36 AM EDT    Indication: pulled out dialysis cath    Comparison: 6/27/2025.    Findings:  There is stable asymmetric elevation of the right hemidiaphragm. There is no pneumothorax, pleural effusion or focal airspace consolidation. Heart size and pulmonary vasculature appear within normal limits. Regional bones appear intact.    Impression  Impression:  No acute cardiopulmonary abnormality.          Electronically Signed: Bran Kennedy MD  6/30/2025 4:48 AM EDT  Workstation ID: [id number]      XR Chest 1 View 06/27/2025  6:54 PM    Narrative  XR CHEST 1 VW    Date of Exam: 6/27/2025 6:32 PM EDT    Indication: Unable to have dialysis Wednesday or today    Comparison: April 22, 2025    Findings:  There is elevation of the right hemidiaphragm. There is a mild right basilar opacity. The heart and mediastinal contours appear normal. The pulmonary vasculature appears normal. The osseous structures appear intact.    Impression  Impression:  1.Mild right basilar opacity, which could reflect atelectasis or pneumonia.  2.Elevation of right hemidiaphragm.        Electronically Signed: Eitan Leach MD  6/27/2025 6:54 PM EDT  Workstation ID: [id number]      Results for orders placed during the hospital encounter of 04/22/25    XR Chest 1 View 04/22/2025  3:53 PM    Narrative  XR CHEST 1 VW    Date of Exam: 4/22/2025 3:40 PM EDT    Indication: AMS Protocol  AMS Protocol    Comparison:  2/16/2025    Findings:  Heart size is within normal limits for the projection. The right hemidiaphragm is elevated, unchanged. The pulmonary vascular pattern appears normal and the lungs appear clear.    Impression  Impression:  No active disease.        Electronically Signed: Yfn Luna MD  4/22/2025 3:53 PM EDT  Workstation ID: [id number]      Results for orders placed during the hospital encounter of 04/22/25    Duplex Carotid Ultrasound CAR 04/24/2025  2:19 PM    Interpretation Summary    Right internal carotid artery demonstrates a less than 50% stenosis.    Antegrade right vertebral flow.    Left internal carotid artery demonstrates a less than 50% stenosis.    Antegrade left vertebral flow.        ASSESSMENT / PLAN      AV graft thrombosis, sequela    ESRD (end stage renal disease) on dialysis    ESRD------HD per usual [facility] outpatient schedule    2. HTN WITH CKD-----BP better off of BP meds and with addition of Midodrine    3. DMII WITH RENAL MANIFESTATIONS-----BS ok. Glucometers, SSI    4. ANEMIA OF ESRD-----EPO/Iron with HD as needed    5. CLOTTED AV SHUNT------Open thrombectomy done per Vascular surgery.  On blood thinners now    6. DEPRESSION------On Cymbalta    7. DM PERIPHERAL NEUROPATHY------On Neurontin    8. PVD    9. HYPERKALEMIA-----Better with K+ restrict diet and adjusting K+ bath with HD and follow    Okay from RENAL standpoint to d/c     Vipul Elias MD  [facility]  [phone]  07/05/25  12:57 EDT

## 2025-07-05 NOTE — PLAN OF CARE
Goal Outcome Evaluation:      Pt is refusing to wear bipap at night

## 2025-07-05 NOTE — PROGRESS NOTES
"    [facility] MEDICINE SERVICE  DAILY PROGRESS NOTE    NAME: Lynne Cade  : 1950  MRN: [id number]      LOS: 8 days     PROVIDER OF SERVICE: Hailey Rosales MD    Chief Complaint: AV graft thrombosis, sequela    Subjective:     Interval History:  History taken from: patient      Chief Complaint: AV fistula issue     History of Present Illness: Lynne Cade is a 75 y.o. female with a CMH of HTN, HLD, T2DM, CHF, CKD stage IV on HD M/W/F who presented to [facility] on 2025 with AV fistula problem.  Patient receives dialysis via AV graft Monday, Wednesday, Friday.  Reports that her last complete dialysis treatment was 4 days ago on Monday, 2025.  Patient went for dialysis on Wednesday and was unable to complete due to shunt occlusion.  Patient had  thrombectomy performed yesterday at [facility] in [place].  Was found to have adequate blood flow after the procedure.  Dialysis was attempted today, unable to complete due to recurrent shunt occlusion.  Denies fever, chills, chest pain, shortness of breath, abdominal pain, changes in bowel or bladder habits.     In the ED, patient was afebrile and hemodynamically stable.  Labs pertinent for Creatinine 4.3 (baseline 2.8), BUN 39, GFR 10.1.  AG 15.7. Calcium 8.4, ionized calcium 1.01, Phos 5.9. WBC 11.08. Chest x-ray: \"Mild right basilar opacity, which could reflect atelectasis or pneumonia.  Elevation of right hemodynamic. \" EKG: SR, HR 81. Vascular surgery consulted, not recommending thrombectomy at this time,  recommending placement of temporary if not tunneled hemodialysis catheter, possible repeat thrombectomy next week. Nephrology was consulted, recommended consulting IR. IR consulted, recommended catheter be placed tomorrow morning, pt does not need to be NPO.  Hospital service to admit for continued evaluation.      sen in bed NAD, VSS, No new complaints, vss  Dialysis delayed due to lack of working access. Pending  " tunneled dialysis catheter as ordered.   6/29 seen today in bed SURAJ YOUNGBLOOD RN, VSS, Now has nontunneled hemodialysis catheter right neck, used yesterday.   6/30 patient seen and examined in bed no acute distress, vital signs stable, discussed with RN, patient removed her dialysis catheter overnight by accident.  IR consulted for replacement.    Plan to replace nontunneled hemodialysis catheter today with radiology  7/1/2025 patient seen and examined in bed no acute distress, vital signs stable, no complaints, plan for open graft thrombectomy today with Dr. Bernard    7/2-Rapid response called while patient was receiving HD. Patient became hypotensive, bradycardiac, and unresponsive. Patient was then placed in trendelenburg positioning and painful stimuli utilized to assess response. Upon arrival of rapid response team, patient BP previously read 41/22. Patient arouses to verbal stimuli and HR and BP improved. Remains drowsy, Patient transported to 2105 by RN.     7/3 patient appears somnolent and lethargic this morning and still quite altered.  Obtain CT head.  ABG.  Was also dizzy.  Blood pressure on the softer side.  Discontinue Cardizem and other blood pressure medications for now.  Increase the dose of midodrine    7/4 patient seen and examined, more awake and alert today.  Ordering lunch.  Just came back from hemodialysis.    7/5 lying in bed comfortably however still not able to stand up.  PT OT ordered.      Plan: Return home with spouse and C PT     Review of Systems  Constitutional:  Negative for chills and fever.   Respiratory:  Negative for shortness of breath.    Cardiovascular:  Negative for chest pain.   Neurological:  Negative for dizziness and headaches.     Objective:     Vital Signs  Temp:  [97.5 °F (36.4 °C)-99.4 °F (37.4 °C)] 98.8 °F (37.1 °C)  Heart Rate:  [77-96] 89  Resp:  [13-20] 17  BP: ()/(48-78) 118/65  Flow (L/min) (Oxygen Therapy):  [2] 2   Body mass index is 33.68  kg/m².    Physical Exam   General: 76 yo WF,awake alert.  HENT: Normocephalic, normal hearing, moist oral mucosa, no scleral icterus.  Neck: Supple, nontender, no carotid bruits, no JVD, no LAD.  Lungs: Clear to auscultation, nonlabored respiration.  Heart: RRR, no murmur, gallop or edema.  Abdomen: Soft, nontender, nondistended, + bowel sounds.  Musculoskeletal: Normal range of motion and strength, no tenderness or swelling.  Skin: Fistula present on LUE, bandaged. Skin is warm, dry and pink, no rashes or lesions.  Psychiatric: Calm       Diagnostic Data    Results from last 7 days   Lab Units 07/05/25  0052   WBC 10*3/mm3 12.24*   HEMOGLOBIN g/dL 8.7*   HEMATOCRIT % 29.1*   PLATELETS 10*3/mm3 246   GLUCOSE mg/dL 158*   CREATININE mg/dL 4.34*   BUN mg/dL 45.9*   SODIUM mmol/L 138   POTASSIUM mmol/L 4.4   ANION GAP mmol/L 16.0*       CT Head Without Contrast  Result Date: 7/3/2025  Impression: 1.No acute intracranial process identified. 2.Findings suggestive of mild chronic small vessel ischemic disease. 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 4.Mild mucosal disease within left sphenoid sinus. Electronically Signed: Eitan Leach MD  7/3/2025 11:43 AM EDT  Workstation ID: [id number]    Scheduled Meds:cyclobenzaprine, 10 mg, Oral, TID  DULoxetine, 60 mg, Oral, Daily  gabapentin, 100 mg, Oral, TID  insulin lispro, 2-7 Units, Subcutaneous, 4x Daily AC & at Bedtime  midodrine, 10 mg, Oral, TID AC  sodium chloride, 10 mL, Intravenous, Q12H  warfarin, 5 mg, Oral, Daily      Continuous Infusions:Pharmacy to dose warfarin,       PRN Meds:.  acetaminophen **OR** acetaminophen **OR** acetaminophen    senna-docusate sodium **AND** polyethylene glycol **AND** bisacodyl **AND** bisacodyl    cyclobenzaprine    dextrose    dextrose    glucagon (human recombinant)    heparin (porcine)    hydrOXYzine    Magnesium Standard Dose Replacement - Follow Nurse / BPA Driven Protocol    melatonin    nitroglycerin    ondansetron ODT  "**OR** ondansetron    oxyCODONE    Pharmacy to dose warfarin    sodium chloride       I reviewed the patient's new clinical results.    Assessment/Plan:     Active and Resolved Problems  Active Hospital Problems    Diagnosis  POA    **AV graft thrombosis, sequela [T82.868S]  Not Applicable    ESRD (end stage renal disease) on dialysis [N18.6, Z99.2]  Not Applicable      Resolved Hospital Problems    Diagnosis Date Resolved POA    A-V fistula [I77.0] 06/29/2025 Yes     Occlusion of AV dialysis graft  CKD stage IV on hemodialysis M/W/F  Creatinine 4.3 (baseline 2.8), BUN 39, GFR 10.1.  AG 15.7  Calcium 8.4, ionized calcium 1.01  Phos 5.9  WBC 11.08  Chest x-ray: \"Mild right basilar opacity, which could reflect atelectasis or pneumonia.  Elevation of right hemodynamic. \"  EKG: SR, HR 81  Last dialysis treatment 4 days ago Monday (6/23/2025), receives dialysis via AV graft  Thrombosed earlier this week, treated with thrombectomy  Nephrology was consulted and following, recommended consulting IR  IR consulted s/p replace nontunneled hemodialysis catheter with radiology  Vascular surgery following>7/1/2025 - open left axilloaxillary loop AV shunt thrombectomy, shuntogram with venous anastomotic balloon angioplasty by Dr. Raines      Congestive heart failure-Stable  Continue home dose meds     Type 2 diabetes mellitus  A1c-5.7   Low SSI   Acchu-check ACHS with diet  Heart-healthy, consistent carb diet   Hypoglycemia protocol      Hypertension>hypotension, continue BP meds, midodrine at 10 mg p.o. 3 times daily.  Blood pressure stable today.  CT head negative for anything acute.  Echocardiogram pending.  Troponin was mildly elevated however patient has CKD and is now trending down.  Denies any chest pain.      Acute metabolic encephalopathy --lethargy improved after putting patient on BiPAP after reviewing ABG results yesterday.  CT head negative.  Repeat orthostatics today after blood pressure better after discontinuing " blood pressure medications and also  increasing the dose of midodrine   - Patient still too weak to stand and therefore orthostatic standing could not be completed.  Will further increase the dose of midodrine.  PT OT.  Patient will likely need rehab upon discharge.  Unable to send her home with home health at this time.      Hyperlipidemia  Continue home dose meds    VTE Prophylaxis:  Pharmacologic & mechanical VTE prophylaxis orders are present.    Disposition Planning:   Barriers to Discharge:HD  Anticipated Date of Discharge: Pending clinical course  Place of Discharge: rehab  Time: 35 minutes     Code Status and Medical Interventions: CPR (Attempt to Resuscitate); Full Support   Ordered at: 06/27/25 2031     Code Status (Patient has no pulse and is not breathing):    CPR (Attempt to Resuscitate)     Medical Interventions (Patient has pulse or is breathing):    Full Support       Signature: Electronically signed by Hailey Rosales MD, 07/05/25, 10:06 EDT.  [facility] Hospitalist Team

## 2025-07-06 VITALS
RESPIRATION RATE: 16 BRPM | SYSTOLIC BLOOD PRESSURE: 97 MMHG | TEMPERATURE: 97.7 F | HEART RATE: 87 BPM | OXYGEN SATURATION: 99 % | DIASTOLIC BLOOD PRESSURE: 76 MMHG | BODY MASS INDEX: 33.98 KG/M2 | WEIGHT: 211.42 LBS | HEIGHT: 66 IN

## 2025-07-06 LAB
DEVICE COMMENT: ABNORMAL
DEVICE COMMENT: ABNORMAL
GLUCOSE BLDC GLUCOMTR-MCNC: 129 MG/DL (ref 70–105)
GLUCOSE BLDC GLUCOMTR-MCNC: 181 MG/DL (ref 70–105)
INR PPP: 1.47 (ref 2–3)
PROTHROMBIN TIME: 17.8 SECONDS (ref 19.4–28.5)

## 2025-07-06 PROCEDURE — 94799 UNLISTED PULMONARY SVC/PX: CPT

## 2025-07-06 PROCEDURE — 97110 THERAPEUTIC EXERCISES: CPT

## 2025-07-06 PROCEDURE — 97530 THERAPEUTIC ACTIVITIES: CPT

## 2025-07-06 PROCEDURE — 82948 REAGENT STRIP/BLOOD GLUCOSE: CPT | Performed by: SURGERY

## 2025-07-06 PROCEDURE — 85610 PROTHROMBIN TIME: CPT | Performed by: HOSPITALIST

## 2025-07-06 PROCEDURE — 97112 NEUROMUSCULAR REEDUCATION: CPT

## 2025-07-06 PROCEDURE — 97165 OT EVAL LOW COMPLEX 30 MIN: CPT

## 2025-07-06 PROCEDURE — 63710000001 INSULIN LISPRO (HUMAN) PER 5 UNITS: Performed by: SURGERY

## 2025-07-06 PROCEDURE — 82948 REAGENT STRIP/BLOOD GLUCOSE: CPT

## 2025-07-06 PROCEDURE — 94660 CPAP INITIATION&MGMT: CPT

## 2025-07-06 RX ORDER — WARFARIN SODIUM 5 MG/1
TABLET ORAL
Qty: 30 TABLET | Refills: 0 | Status: SHIPPED | OUTPATIENT
Start: 2025-07-06

## 2025-07-06 RX ORDER — MIDODRINE HYDROCHLORIDE 5 MG/1
15 TABLET ORAL
Qty: 90 TABLET | Refills: 0 | Status: SHIPPED | OUTPATIENT
Start: 2025-07-06

## 2025-07-06 RX ORDER — WARFARIN SODIUM 5 MG/1
5 TABLET ORAL
Status: DISCONTINUED | OUTPATIENT
Start: 2025-07-06 | End: 2025-07-06 | Stop reason: HOSPADM

## 2025-07-06 RX ADMIN — Medication 10 ML: at 09:33

## 2025-07-06 RX ADMIN — GABAPENTIN 100 MG: 100 CAPSULE ORAL at 09:33

## 2025-07-06 RX ADMIN — CYCLOBENZAPRINE 10 MG: 10 TABLET, FILM COATED ORAL at 09:33

## 2025-07-06 RX ADMIN — INSULIN LISPRO 2 UNITS: 100 INJECTION, SOLUTION INTRAVENOUS; SUBCUTANEOUS at 14:01

## 2025-07-06 RX ADMIN — DULOXETINE 60 MG: 30 CAPSULE, DELAYED RELEASE ORAL at 09:33

## 2025-07-06 RX ADMIN — MIDODRINE HYDROCHLORIDE 15 MG: 5 TABLET ORAL at 14:01

## 2025-07-06 RX ADMIN — MIDODRINE HYDROCHLORIDE 15 MG: 5 TABLET ORAL at 09:32

## 2025-07-06 NOTE — THERAPY TREATMENT NOTE
"Subjective: Pt agreeable to therapeutic plan of care. Pt stated she still has jerking from anesthesia a week ago. Stated she walks some in home with rwx.     Objective:     Precautions - falls    Bed mobility - SBA sit>supine  Transfers - CGA  Ambulation -  feet N/A or Not attempted.    Therapeutic Exercise - 10 Reps B LE AROM lying supine    Vitals: WNL on 2L O2    Pain: 0 VAS     Education: Provided education on the importance of mobility in the acute care setting, Verbal/Tactile Cues, and Transfer Training    Assessment: Lynne Cade presents with functional mobility impairments which indicate the need for skilled intervention. Tolerating session today without incident. Pt able to CTS from chair  with CGA but knees dipping and not safe to try and amb. Fatigues easily, had just got to chair and had a bath,after working on stance with  UE support was totally worn out and wanted to lie back down. Performed LE seated  exs with vc's to incr end ranges.Will continue to follow and progress as tolerated.     Plan/Recommendations:   If medically appropriate, Low Intensity Therapy recommended post-acute care - This is recommended as therapy feels this patient would require 2-3 visits per week. OP or HH would be the best option depending on patient's home bound status. Consider, if the patient has other  \"skilled\" needs such as wounds, IV antibiotics, etc. Combined with \"low intensity\" could also equate to a SNF. If patient is medically complex, consider LTAC. Pt requires no DME at discharge.     Pt desires Home Health at discharge. Pt cooperative; agreeable to therapeutic recommendations and plan of care.         Basic Mobility 6-click:  Rollin = Total, A lot = 2, A little = 3; 4 = None  Supine>Sit:   1 = Total, A lot = 2, A little = 3; 4 = None   Sit>Stand with arms:  1 = Total, A lot = 2, A little = 3; 4 = None  Bed>Chair:   1 = Total, A lot = 2, A little = 3; 4 = None  Ambulate in room:  1 = Total, A " lot = 2, A little = 3; 4 = None  3-5 Steps with railin = Total, A lot = 2, A little = 3; 4 = None  Score: 14      Post-Tx Position: Supine with HOB Elevated, Alarms activated, and Call light and personal items within reach  PPE: gloves    Therapy Charges for Today       Code Description Service Date Service Provider Modifiers Qty    45955550774 HC PT NEUROMUSC RE EDUCATION EA 15 MIN 2025 Ayana Hunt, PTA GP 1    30771103568 HC PT THERAPEUTIC ACT EA 15 MIN 2025 Ayana Hunt, PTA GP 1    71046045948 HC PT THER PROC EA 15 MIN 2025 Ayana Hunt, PTA GP 1           PT Charges       Row Name 25 1242             Time Calculation    Start Time 1133  -      Stop Time 1159  -      Time Calculation (min) 26 min  -      PT Received On 25  -      PT - Next Appointment 25  -         Time Calculation- PT    Total Timed Code Minutes- PT 26 minute(s)  -                User Key  (r) = Recorded By, (t) = Taken By, (c) = Cosigned By      Initials Name Provider Type    Ayana Shoemaker, RANDY Physical Therapist Assistant

## 2025-07-06 NOTE — CONSULTS
Patient Name: Lynne Cade  YOB: 1950  MRN: [id number]  Admission date: 6/27/2025  Reason for Encounter: Follow-up/Progress Note      [facility] Clinical Nutrition Screening Note       Nutrition Intervention Updates: Continue current po diet and encourage good po intake.       Subjective: Check on for PO intakes.         PO Diet: Diet: Renal, Diabetic; Consistent Carbohydrate; Low Sodium (2-3g), Low Potassium; Fluid Consistency: Thin (IDDSI 0)   PO Supplements: None    PO Intake:  100% average po intake x last 5 documented meals        Current nutrition support: -   Nutrition support review: -       Labs: Reviewed. Management per attending        GI Function:  Last documented BM 7/2 (x 4 days ago)        Brief Weight Review:    Wt Readings from Last 1 Encounters:   07/06/25 0412 95.9 kg (211 lb 6.7 oz)   07/05/25 0457 94.6 kg (208 lb 8.9 oz)   07/03/25 0330 96.5 kg (212 lb 11.9 oz)   07/02/25 1100 94.5 kg (208 lb 5.4 oz)   06/27/25 1606 94.8 kg (209 lb)        Results from last 7 days   Lab Units 07/05/25  0052 07/04/25  0428 07/03/25  0345   SODIUM mmol/L 138 139 140   POTASSIUM mmol/L 4.4 5.4* 4.6   CHLORIDE mmol/L 100 100 101   CO2 mmol/L 22.0 22.6 23.7   BUN mg/dL 45.9* 57.9* 36.0*   CREATININE mg/dL 4.34* 6.10* 4.72*   CALCIUM mg/dL 7.7* 8.0* 8.2*   GLUCOSE mg/dL 158* 148* 205*     Results from last 7 days   Lab Units 07/05/25  0052 07/02/25  0315 07/01/25  0107 06/29/25  2255   MAGNESIUM mg/dL 2.5*  --  2.4 2.5*   PHOSPHORUS mg/dL 7.6*  --  4.4 5.2*   PLATELETS 10*3/mm3 246   < > 195 184   HEMOGLOBIN g/dL 8.7*   < > 10.9* 10.2*   HEMATOCRIT % 29.1*   < > 36.8 33.6*    < > = values in this interval not displayed.     Lab Results   Component Value Date    HGBA1C 5.70 (H) 06/27/2025       Electronically signed by:  Mireya Duarte RD  07/06/25 11:16 EDT

## 2025-07-06 NOTE — PROGRESS NOTES
"NEPHROLOGY PROGRESS NOTE------[facility]    [facility]  [phone]  Vipul Elias MD      Patient Care Team:  Lorraine Agarwal APRN as PCP - General (Nurse Practitioner)  Rick Melendez MD as Consulting Physician (Nephrology)  Mayo Fregoso MD as Consulting Physician (Urology)  Fredi Pierce MD as Consulting Physician (Cardiology)  Cony Sanchez APRN as Nurse Practitioner (Vascular Surgery)      Provider:  Vipul Elias MD  Patient Name: Lynne Cade  :  1950    SUBJECTIVE:    F/U ESRD    No CP. BP better off of meds    Medication:  cyclobenzaprine, 10 mg, Oral, TID  DULoxetine, 60 mg, Oral, Daily  gabapentin, 100 mg, Oral, TID  insulin lispro, 2-7 Units, Subcutaneous, 4x Daily AC & at Bedtime  midodrine, 15 mg, Oral, TID AC  sodium chloride, 10 mL, Intravenous, Q12H  warfarin, 5 mg, Oral, Once      Pharmacy to dose warfarin,         OBJECTIVE    Vital Sign Min/Max for last 24 hours  Temp  Min: 97.4 °F (36.3 °C)  Max: 98.2 °F (36.8 °C)   BP  Min: 90/60  Max: 137/51   Pulse  Min: 78  Max: 90   Resp  Min: 14  Max: 22   SpO2  Min: 92 %  Max: 100 %   No data recorded   Weight  Min: 95.9 kg (211 lb 6.7 oz)  Max: 95.9 kg (211 lb 6.7 oz)     Flowsheet Rows      Flowsheet Row First Filed Value   Admission Height 167.6 cm (66\") Documented at 2025 1606   Admission Weight 94.8 kg (209 lb) Documented at 2025 1606            I/O this shift:  In: 240 [P.O.:240]  Out: -   I/O last 3 completed shifts:  In: 600 [P.O.:600]  Out: -     Physical Exam:  General Appearance: alert, appears stated age and cooperative  Head: normocephalic, without obvious abnormality and atraumatic  Eyes: conjunctivae and sclerae normal and no icterus  Neck: supple and NO GROSS JVD  Lungs: clear to auscultation and respirations regular  Heart: regular rhythm & normal rate and normal S1, S2 +CYNDY  Chest: Wall no abnormalities " "observed  Abdomen: normal bowel sounds and soft, nontender +OBESITY  Extremities: moves extremities well, +TR BILAT LE EDEMA, no cyanosis and no redness  Skin: no bleeding, bruising or rash, turgor normal, color normal and no lesions noted  Neurologic: Alert, and oriented. No focal deficits    Labs:    WBC WBC   Date Value Ref Range Status   07/05/2025 12.24 (H) 3.40 - 10.80 10*3/mm3 Final   07/04/2025 11.26 (H) 3.40 - 10.80 10*3/mm3 Final      HGB Hemoglobin   Date Value Ref Range Status   07/05/2025 8.7 (L) 12.0 - 15.9 g/dL Final   07/04/2025 8.9 (L) 12.0 - 15.9 g/dL Final      HCT Hematocrit   Date Value Ref Range Status   07/05/2025 29.1 (L) 34.0 - 46.6 % Final   07/04/2025 29.9 (L) 34.0 - 46.6 % Final      Platelets No results found for: \"LABPLAT\"   MCV MCV   Date Value Ref Range Status   07/05/2025 109.8 (H) 79.0 - 97.0 fL Final   07/04/2025 109.1 (H) 79.0 - 97.0 fL Final          Sodium Sodium   Date Value Ref Range Status   07/05/2025 138 136 - 145 mmol/L Final   07/04/2025 139 136 - 145 mmol/L Final      Potassium Potassium   Date Value Ref Range Status   07/05/2025 4.4 3.5 - 5.2 mmol/L Final   07/04/2025 5.4 (H) 3.5 - 5.2 mmol/L Final     Comment:     Specimen hemolyzed.  Result may be falsely elevated.      Chloride Chloride   Date Value Ref Range Status   07/05/2025 100 98 - 107 mmol/L Final   07/04/2025 100 98 - 107 mmol/L Final      CO2 CO2   Date Value Ref Range Status   07/05/2025 22.0 22.0 - 29.0 mmol/L Final   07/04/2025 22.6 22.0 - 29.0 mmol/L Final      BUN BUN   Date Value Ref Range Status   07/05/2025 45.9 (H) 8.0 - 23.0 mg/dL Final   07/04/2025 57.9 (H) 8.0 - 23.0 mg/dL Final      Creatinine Creatinine   Date Value Ref Range Status   07/05/2025 4.34 (H) 0.57 - 1.00 mg/dL Final   07/04/2025 6.10 (H) 0.57 - 1.00 mg/dL Final      Calcium Calcium   Date Value Ref Range Status   07/05/2025 7.7 (L) 8.6 - 10.5 mg/dL Final   07/04/2025 8.0 (L) 8.6 - 10.5 mg/dL Final      PO4 No components found for: " "\"PO4\"   Albumin No results found for: \"ALBUMIN\"     Magnesium Magnesium   Date Value Ref Range Status   07/05/2025 2.5 (H) 1.6 - 2.4 mg/dL Final        Uric Acid No components found for: \"URIC ACID\"     Imaging Results (Last 72 Hours)       ** No results found for the last 72 hours. **            Results for orders placed during the hospital encounter of 06/27/25    XR Chest 1 View 06/30/2025  4:48 AM    Narrative  XR CHEST 1 VW    Date of Exam: 6/30/2025 4:36 AM EDT    Indication: pulled out dialysis cath    Comparison: 6/27/2025.    Findings:  There is stable asymmetric elevation of the right hemidiaphragm. There is no pneumothorax, pleural effusion or focal airspace consolidation. Heart size and pulmonary vasculature appear within normal limits. Regional bones appear intact.    Impression  Impression:  No acute cardiopulmonary abnormality.          Electronically Signed: Bran Kennedy MD  6/30/2025 4:48 AM EDT  Workstation ID: [id number]      XR Chest 1 View 06/27/2025  6:54 PM    Narrative  XR CHEST 1 VW    Date of Exam: 6/27/2025 6:32 PM EDT    Indication: Unable to have dialysis Wednesday or today    Comparison: April 22, 2025    Findings:  There is elevation of the right hemidiaphragm. There is a mild right basilar opacity. The heart and mediastinal contours appear normal. The pulmonary vasculature appears normal. The osseous structures appear intact.    Impression  Impression:  1.Mild right basilar opacity, which could reflect atelectasis or pneumonia.  2.Elevation of right hemidiaphragm.        Electronically Signed: Eitan Leach MD  6/27/2025 6:54 PM EDT  Workstation ID: [id number]      Results for orders placed during the hospital encounter of 04/22/25    XR Chest 1 View 04/22/2025  3:53 PM    Narrative  XR CHEST 1 VW    Date of Exam: 4/22/2025 3:40 PM EDT    Indication: AMS Protocol  AMS Protocol    Comparison: 2/16/2025    Findings:  Heart size is within normal limits for the projection. The right " hemidiaphragm is elevated, unchanged. The pulmonary vascular pattern appears normal and the lungs appear clear.    Impression  Impression:  No active disease.        Electronically Signed: Yfn Luna MD  4/22/2025 3:53 PM EDT  Workstation ID: [id number]      Results for orders placed during the hospital encounter of 04/22/25    Duplex Carotid Ultrasound CAR 04/24/2025  2:19 PM    Interpretation Summary    Right internal carotid artery demonstrates a less than 50% stenosis.    Antegrade right vertebral flow.    Left internal carotid artery demonstrates a less than 50% stenosis.    Antegrade left vertebral flow.        ASSESSMENT / PLAN      AV graft thrombosis, sequela    ESRD (end stage renal disease) on dialysis    ESRD------HD per usual [facility] outpatient schedule    2. HTN WITH CKD-----BP better off of BP meds and with addition of Midodrine    3. DMII WITH RENAL MANIFESTATIONS-----BS ok. Glucometers, SSI    4. ANEMIA OF ESRD-----EPO/Iron with HD as needed    5. CLOTTED AV SHUNT------Open thrombectomy done per Vascular surgery.  On blood thinners now    6. DEPRESSION------On Cymbalta    7. DM PERIPHERAL NEUROPATHY------On Neurontin    8. PVD    9. HYPERKALEMIA-----Better with K+ restrict diet and adjusting K+ bath with HD and follow    Okay from RENAL standpoint to d/c     Vipul Elias MD  [facility]  [phone]  07/06/25  14:15 EDT

## 2025-07-06 NOTE — PLAN OF CARE
Goal Outcome Evaluation:  Plan of Care Reviewed With: patient, spouse        Progress: improving  Outcome Evaluation: Patient has worn her BIPAP most of shift and has voiced no complaints of discomfort. Safety measures in place and call light within reach.

## 2025-07-06 NOTE — DISCHARGE SUMMARY
[facility] Medicine Services  Discharge Summary    Date of Service: 2025  Patient Name: Lynne Cade  : 1950  MRN: [id number]    Date of Admission: 2025  Discharge Diagnosis: AV graft thrombosis, sequela  Date of Discharge: 2025  Primary Care Physician: Lorraine Agarwal APRN      Presenting Problem:   A-V fistula [I77.0]  AV fistula occlusion [T82.898A]  Chronic kidney disease on chronic dialysis [N18.6, Z99.2]  Occlusion of arteriovenous dialysis graft [T82.898A]  Hypotension [I95.9]    Active and Resolved Hospital Problems:  Active Hospital Problems    Diagnosis POA    **AV graft thrombosis, sequela [T82.868S] Not Applicable    ESRD (end stage renal disease) on dialysis [N18.6, Z99.2] Not Applicable      Resolved Hospital Problems    Diagnosis POA    A-V fistula [I77.0] Yes         Hospital Course         Hospital Course:  Patient is a 75-year-old female, with  history of end-stage renal disease on hemodialysis, presented to the hospital with occlusion of AV dialysis graft --this was thrombosed earlier a week and was treated with thrombectomy.  Nephrology and vascular were consulted.    IR was also consulted and patient now status post replace nontunneled hemodialysis catheter  Vascular surgery following>2025 - open left axilloaxillary loop AV shunt thrombectomy, shuntogram with venous anastomotic balloon angioplasty by Dr. Raines   -Now has been started on warfarin.   since patient unable to afford Eliquis -warfarin prescribed upon discharge.     Congestive heart failure-Stable  Continue home dose meds  Blood pressure medications and Lasix have been discontinued given patient is on dialysis and blood pressures have been soft  along without positive orthostatics.  After discontinuing all blood pressure medications patient's dizziness has resolved and orthostatics are now negative.  Follow-up with cardiology outpatient       Type 2 diabetes mellitus  Home  medications resumed       Hypertension> all home blood pressure medications have been discontinued.  CT head was done given patient was altered and lethargic given low blood pressure.  Now blood pressure stable with midodrine.  Midodrine prescription provided.         Acute metabolic encephalopathy resolved.    Repeat orthostatics negative.  Patient has been seen by PT OT and is currently medically stable for discharge home with home health        Hyperlipidemia  Continue home dose meds               Day of Discharge     Vital Signs:  Temp:  [97.4 °F (36.3 °C)-98.2 °F (36.8 °C)] 97.7 °F (36.5 °C)  Heart Rate:  [78-90] 87  Resp:  [14-22] 16  BP: ()/(51-85) 130/77  Flow (L/min) (Oxygen Therapy):  [2-4] 2    Physical Exam:  Physical Exam   General: 74 yo WF,awake alert.  HENT: Normocephalic, normal hearing, moist oral mucosa, no scleral icterus.  Neck: Supple, nontender, no carotid bruits, no JVD, no LAD.  Lungs: Clear to auscultation, nonlabored respiration.  Heart: RRR, no murmur, gallop or edema.  Abdomen: Soft, nontender, nondistended, + bowel sounds.  Musculoskeletal: Normal range of motion and strength, no tenderness or swelling.  Skin: Fistula present on LUE, bandaged. Skin is warm, dry and pink, no rashes or lesions.  Psychiatric: Calm     Pertinent  and/or Most Recent Results     LAB RESULTS:      Lab 07/06/25  0350 07/05/25  0052 07/04/25  1711 07/04/25  0428 07/03/25  0345 07/02/25  0315 07/01/25  0107 06/29/25  2255   WBC  --  12.24*  --  11.26* 11.65* 10.52 11.36* 8.81   HEMOGLOBIN  --  8.7*  --  8.9* 9.2* 9.3* 10.9* 10.2*   HEMATOCRIT  --  29.1*  --  29.9* 31.5* 32.0* 36.8 33.6*   PLATELETS  --  246  --  235 200 187 195 184   NEUTROS ABS  --   --   --   --   --   --   --  5.54   IMMATURE GRANS (ABS)  --   --   --   --   --   --   --  0.08*   LYMPHS ABS  --   --   --   --   --   --   --  1.87   MONOS ABS  --   --   --   --   --   --   --  1.04*   EOS ABS  --   --   --   --   --   --   --  0.22   MCV   --  109.8*  --  109.1* 110.1* 111.9* 109.2* 107.3*   PROTIME 17.8* 14.9* 14.6*  --   --   --   --   --          Lab 07/05/25  0052 07/04/25  0428 07/03/25  0345 07/02/25  0430 07/01/25  0107 06/29/25  2255   SODIUM 138 139 140 134* 137 140   POTASSIUM 4.4 5.4* 4.6 5.7* 3.8 4.4   CHLORIDE 100 100 101 101 101 106   CO2 22.0 22.6 23.7 19.6* 23.1 19.6*   ANION GAP 16.0* 16.4* 15.3* 13.4 12.9 14.4   BUN 45.9* 57.9* 36.0* 43.9* 23.4* 42.5*   CREATININE 4.34* 6.10* 4.72* 5.21* 3.10* 4.68*   EGFR 10.0* 6.6* 9.0* 8.0* 15.0* 9.1*   GLUCOSE 158* 148* 205* 211* 141* 254*   CALCIUM 7.7* 8.0* 8.2* 8.5* 8.5* 7.9*   MAGNESIUM 2.5*  --   --   --  2.4 2.5*   PHOSPHORUS 7.6*  --   --   --  4.4 5.2*             Lab 07/06/25  0350 07/05/25 0052 07/04/25  1711 07/04/25 0428 07/03/25  0345   HSTROP T  --   --   --  61* 79*   PROTIME 17.8* 14.9* 14.6*  --   --    INR 1.47* 1.17* 1.14*  --   --              Lab 07/04/25 0428 06/30/25  1644   IRON 51  --    IRON SATURATION (TSAT) 24  --    TIBC 210*  --    TRANSFERRIN 141*  --    FERRITIN 317.00*  --    ABO TYPING  --  O   RH TYPING  --  Positive   ANTIBODY SCREEN  --  Negative         Lab 07/03/25  1437   PH, ARTERIAL 7.164*   PCO2, ARTERIAL 69.7*   PO2 .3   O2 SATURATION ART 95.8   FIO2 29   HCO3 ART 25.1   BASE EXCESS ART -4.0*     Brief Urine Lab Results  (Last result in the past 365 days)        Color   Clarity   Blood   Leuk Est   Nitrite   Protein   CREAT   Urine HCG        04/26/25 1023             37.8               Microbiology Results (last 10 days)       ** No results found for the last 240 hours. **            CT Head Without Contrast  Result Date: 7/3/2025  Impression: Impression: 1.No acute intracranial process identified. 2.Findings suggestive of mild chronic small vessel ischemic disease. 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 4.Mild mucosal disease within left sphenoid sinus. Electronically Signed: Eitan Leach MD  7/3/2025 11:43 AM EDT  Workstation  ID: [id number]    IR NON-MIEK TEMP DIALYSIS ACC  Result Date: 6/30/2025  Impression: Successful placement of right IJ 16 cm triple-lumen temporary dialysis catheter utilizing ultrasound and fluoroscopic guidance. Report dictated by: Fernando Sanchez DNP  I have personally reviewed this case and agree with the findings above: Electronically Signed: Gaurav Hyde MD  6/30/2025 3:55 PM EDT  Workstation ID: [id number]    XR Chest 1 View  Result Date: 6/30/2025  Impression: Impression: No acute cardiopulmonary abnormality. Electronically Signed: Bran Kennedy MD  6/30/2025 4:48 AM EDT  Workstation ID: [id number]    IR NON-MIKE TEMP DIALYSIS ACC  Result Date: 6/28/2025  Impression: Impression: Placement of temporary hemodialysis catheter via right IJ approach using ultrasound and fluoroscopic guidance. Electronically Signed: Gaurav Hyde MD  6/28/2025 1:28 PM EDT  Workstation ID: [id number]    XR Chest 1 View  Result Date: 6/27/2025  Impression: Impression: 1.Mild right basilar opacity, which could reflect atelectasis or pneumonia. 2.Elevation of right hemidiaphragm. Electronically Signed: Eitan Leach MD  6/27/2025 6:54 PM EDT  Workstation ID: [id number]      Results for orders placed during the hospital encounter of 04/22/25    Duplex Carotid Ultrasound CAR 04/24/2025  2:19 PM    Interpretation Summary    Right internal carotid artery demonstrates a less than 50% stenosis.    Antegrade right vertebral flow.    Left internal carotid artery demonstrates a less than 50% stenosis.    Antegrade left vertebral flow.      Results for orders placed during the hospital encounter of 04/22/25    Duplex Carotid Ultrasound CAR 04/24/2025  2:19 PM    Interpretation Summary    Right internal carotid artery demonstrates a less than 50% stenosis.    Antegrade right vertebral flow.    Left internal carotid artery demonstrates a less than 50% stenosis.    Antegrade left vertebral flow.      Results for orders placed during the hospital encounter of  06/27/25    Adult Transthoracic Echo Complete W/ Cont if Necessary Per Protocol 07/04/2025 12:20 PM    Interpretation Summary    Left ventricular systolic function is normal. Calculated left ventricular 3D EF = 60%    Left ventricular diastolic function was normal.    Normal RV systolic function.    Mitral valve calcification noted.  Mild mitral valve regurgitation.    Estimated right ventricular systolic pressure from tricuspid regurgitation is normal (<35 mmHg).    Electronically signed by Scar Li MD, 07/04/25, 12:20 PM EDT.      Labs Pending at Discharge:  Pending Results       None            Procedures Performed  Procedure(s):  Thrombectomy of left arm arteriovenous graft, shuntogram with angioplasty         Consults:   Consults       Date and Time Order Name Status Description    6/27/2025  5:50 PM Vascular Surgery (on-call MD unless specified) Completed     6/27/2025  5:50 PM Nephrology (on -call MD unless specified) Completed               Discharge Details        Discharge Medications        New Medications        Instructions Start Date   midodrine 5 MG tablet  Commonly known as: PROAMATINE   15 mg, Oral, 3 Times Daily Before Meals             Continue These Medications        Instructions Start Date   albuterol sulfate  (90 Base) MCG/ACT inhaler  Commonly known as: PROVENTIL HFA;VENTOLIN HFA;PROAIR HFA   2 puffs, Every 6 Hours      alendronate 70 MG tablet  Commonly known as: FOSAMAX   70 mg, Every 7 Days      Aspirin Low Dose 81 MG EC tablet  Generic drug: aspirin   81 mg, Oral, Daily      calcitriol 0.25 MCG capsule  Commonly known as: ROCALTROL   0.25 mcg, Daily      calcium acetate 667 MG capsule capsule  Commonly known as: PHOS BINDER)   2 capsules, 3 Times Daily      cyclobenzaprine 5 MG tablet  Commonly known as: FLEXERIL   5 mg, Oral, 3 Times Daily PRN      DULoxetine 60 MG capsule  Commonly known as: CYMBALTA   60 mg, Oral, 2 Times Daily      gabapentin 100  MG capsule  Commonly known as: NEURONTIN   100 mg, Oral, Daily      glimepiride 2 MG tablet  Commonly known as: AMARYL   4 mg, 2 Times Daily      ipratropium-albuterol 0.5-2.5 mg/3 ml nebulizer  Commonly known as: DUO-NEB   0.5 mL, 2 Times Daily      Januvia 25 MG tablet  Generic drug: SITagliptin   25 mg      Lidoderm 5 %  Generic drug: lidocaine   Apply  topically to the appropriate area as directed.      ondansetron ODT 4 MG disintegrating tablet  Commonly known as: ZOFRAN-ODT   4 mg, Translingual, Every 8 Hours PRN      primidone 50 MG tablet  Commonly known as: MYSOLINE   50 mg, Nightly      rosuvastatin 10 MG tablet  Commonly known as: CRESTOR   10 mg, Oral, Daily      sodium bicarbonate 650 MG tablet   650 mg, 2 Times Daily      zolpidem 5 MG tablet  Commonly known as: AMBIEN   5 mg, Nightly             Stop These Medications      carvedilol 25 MG tablet  Commonly known as: COREG     dilTIAZem  MG 24 hr capsule  Commonly known as: CARDIZEM CD     furosemide 40 MG tablet  Commonly known as: LASIX     hydrALAZINE 100 MG tablet  Commonly known as: APRESOLINE              Allergies   Allergen Reactions    Carvedilol Shortness Of Breath    Morphine Itching    Sulfa Antibiotics GI Intolerance    Hydrocodone Nausea Only    Diphenhydramine Anxiety         Discharge Disposition:   Home-Health Care Svc    Diet:  Hospital:  Diet Order   Procedures    Diet: Renal, Diabetic; Consistent Carbohydrate; Low Sodium (2-3g), Low Potassium; Fluid Consistency: Thin (IDDSI 0)         Discharge Activity:   as tolerated       CODE STATUS:  Code Status and Medical Interventions: CPR (Attempt to Resuscitate); Full Support   Ordered at: 06/27/25 2031     Code Status (Patient has no pulse and is not breathing):    CPR (Attempt to Resuscitate)     Medical Interventions (Patient has pulse or is breathing):    Full Support         Future Appointments   Date Time Provider Department Center   7/29/2025  8:30 AM [facility]   7/29/2025  9:15 AM Eitan Raines MD [facility]       Additional Instructions for the Follow-ups that You Need to Schedule       Discharge Follow-up with PCP   As directed       Currently Documented PCP:    Lorraine Agarwal APRN    PCP Phone Number:    [phone]     Follow Up Details: 1 week        Discharge Follow-up with Specified Provider: nephrology; 1 Week   As directed      To: nephrology   Follow Up: 1 Week                Time spent on Discharge including face to face service:  35 minutes    Signature: Electronically signed by Hailey Rosales MD, 07/06/25, 13:33 EDT.  [facility] Hospitalist Team

## 2025-07-06 NOTE — CASE MANAGEMENT/SOCIAL WORK
Continued Stay Note  [facility]     Patient Name: Lynne Cade  MRN: [id number]  Today's Date: 7/6/2025    Admit Date: 6/27/2025    Plan: Home with spouse and [facility]; current and BLU order in place. Current OP HD at [facility].   Discharge Plan       Row Name 07/06/25 1530       Plan    Plan Home with spouse and [facility]; current and BLU order in place. Current OP HD at [facility].    Plan Comments Dc orders noted. Pt current with [facility]; BLU order obtained. [facility] notified of dc.             Lisa Gonzalez RN BSN  Weekend   [facility]  Phone: [phone]  Fax: [phone]

## 2025-07-06 NOTE — THERAPY EVALUATION
Patient Name: Lynne Cade  : 1950    MRN: [id number]                              Today's Date: 2025       Admit Date: 2025    Visit Dx:     ICD-10-CM ICD-9-CM   1. Chronic kidney disease on chronic dialysis  N18.6 585.9    Z99.2 V45.11   2. Occlusion of arteriovenous dialysis graft  T82.898A 996.74   3. AV graft thrombosis, sequela  T82.868S 909.3     Patient Active Problem List   Diagnosis    Acute kidney injury superimposed on CKD    Absolute anemia    Anxiety    Benign essential tremor    Chronic obstructive pulmonary disease    Family tension    Hyperlipidemia    Insomnia    Iron deficiency anemia    Other amnesia    Diabetic nephropathy    Renal insufficiency    Type 2 diabetes mellitus with hyperglycemia    Grief at loss of child    Closed subcapital fracture of right femur    Essential hypertension    Transition of care performed with sharing of clinical summary    Vitamin D deficiency    Age-related osteoporosis without current pathological fracture    History of total knee arthroplasty    Dyspnea on exertion    S/P gastric bypass    Obesity (BMI 30-39.9)    Elevated LFTs    Weakness    Syncope, unspecified syncope type    Right leg swelling    Folliculitis    Elevated troponin    Coronary artery disease involving native coronary artery of native heart without angina pectoris    Dyspnea    Dyspnea and respiratory abnormalities    Hemodialysis access, AV graft    S/P arteriovenous (AV) graft placement    Venous stenosis of left upper extremity    Lethargy    ESRD (end stage renal disease) on dialysis    Malfunction of arteriovenous graft    AV graft thrombosis, sequela     Past Medical History:   Diagnosis Date    Anemia     Hx of anemia    Anxiety     Controled  xanax prn    Arthritis     [facility]    Cataract     bilateral    CHF (congestive heart failure) 22    Low iron    Cholelithiasis 30 yrs ago    Had gb removed    CKD (chronic kidney disease),  stage IV 01/13/2023    Colon polyp     Depression 1995    Take cymbalta    Diabetes mellitus     type 2    Diarrhea     on and off    Edema of right foot 04/2021    Healthcare maintenance 2008    [facility]    Hip pain, bilateral     HL (hearing loss) 1/1/19    Have hearing aids    Hyperlipidemia     Hypertension     Knee pain, bilateral     Obesity     Osteopenia 11/1/22    On med    Other forms of dyspnea     Other specified soft tissue disorders     Pneumonia due to COVID-19 virus 06/26/2022    Renal insufficiency Stage 3    Has gone up to stage 4    Slow to wake up after anesthesia     Spinal headache     Stress headaches     Tremor     Benign    Tremor of both hands     Urinary tract infection 07/01/22    [place]    Visual impairment 6/1/2017    Cataracts    Vitamin D deficiency      Past Surgical History:   Procedure Laterality Date    ARM THROMBECTOMY/EMBOLECTOMY Left 7/1/2025    Procedure: Thrombectomy of left arm arteriovenous graft, shuntogram with angioplasty;  Surgeon: Eitan Raines MD;  Location: [facility] OR;  Service: Vascular;  Laterality: Left;    ARTERIOVENOUS FISTULA/SHUNT SURGERY Left 02/24/2023    Procedure: BRACHIOCEPHALIC ARTERIOVENOUS FISTULA FORMATION;  Surgeon: Vignesh Riley MD;  Location: [facility] MAIN OR;  Service: Vascular;  Laterality: Left;    ARTERIOVENOUS FISTULA/SHUNT SURGERY Left 02/27/2025    Procedure: ARTERIOVENOUS SHUNT GRAFT;  Surgeon: Zaina Bernard MD;  Location: [facility] MAIN OR;  Service: Vascular;  Laterality: Left;    ARTERIOVENOUS FISTULA/SHUNT SURGERY Left 6/26/2025    Procedure: ARTERIOVENOUS GRAFT THROMBECTOMY;  Surgeon: Mahesh Haro II, MD;  Location: [facility] HYBRID OR;  Service: Vascular;  Laterality: Left;    BREAST BIOPSY Right     CHOLECYSTECTOMY  1990    COLONOSCOPY      This year 2022    EYE SURGERY Bilateral     cataract removal    GASTRIC BYPASS  2002    HERNIA REPAIR  2005    umbilical hernia    HIP OPEN  REDUCTION Right 03/02/2020    Procedure: FEMORAL NECK OPEN REDUCTION INTERNAL FIXATION;  Surgeon: Yfn Sifuentes MD;  Location: [facility] MAIN OR;  Service: Orthopedics;  Laterality: Right;  Cannulated screw fixation right femoral neck    JOINT REPLACEMENT  2020    R knee    LAPAROTOMY OOPHERECTOMY Bilateral 2010    OTHER SURGICAL HISTORY  04/20/2015    Lexiscan stress test, normal. Abstracted from [facility].    PANNICULECTOMY      TOTAL KNEE ARTHROPLASTY Right 04/29/2021    Procedure: RIGHT TOTAL KNEE REPLACEMENT;  Surgeon: Yfn Sifuentes MD;  Location: [facility] MAIN OR;  Service: Orthopedics;  Laterality: Right;      General Information       Row Name 07/06/25 1310          OT Time and Intention    Document Type evaluation  -SP     Mode of Treatment individual therapy;occupational therapy  -SP     Symptoms Noted During/After Treatment none  -SP       Row Name 07/06/25 1310          General Information    Patient Profile Reviewed yes  -SP     Prior Level of Function independent:;bathing;dressing;grooming;feeding;min assist:;transfer  IND ADLs, spouse assist with cleaning, cooking, and driving. manual w/c wtihin community, FWW within home  -SP     Existing Precautions/Restrictions fall  -SP     Barriers to Rehab medically complex  -SP       Row Name 07/06/25 1310          Living Environment    Current Living Arrangements home  -SP     People in Home spouse  -SP       Row Name 07/06/25 1310          Home Main Entrance    Number of Stairs, Main Entrance other (see comments)  ramp access  -SP       Row Name 07/06/25 1310          Stairs Within Home, Primary    Number of Stairs, Within Home, Primary none  -SP       Row Name 07/06/25 1310          Cognition    Orientation Status (Cognition) oriented x 4  -SP       Row Name 07/06/25 1310          Safety Issues/Impairments Affecting Functional Mobility    Impairments Affecting Function (Mobility) endurance/activity tolerance  -SP               User Key  (r) = Recorded By,  (t) = Taken By, (c) = Cosigned By      Initials Name Provider Type    SP Kyle Toney OT Occupational Therapist                     Mobility/ADL's       Row Name 07/06/25 1312          Bed Mobility    Bed Mobility bed mobility (all) activities  -SP     All Activities, [place] (Bed Mobility) minimum assist (75% patient effort)  -SP       Row Name 07/06/25 1312          Transfers    Transfers sit-stand transfer  -SP       Row Name 07/06/25 1312          Sit-Stand Transfer    Sit-Stand [place] (Transfers) contact guard  -SP       Row Name 07/06/25 1312          Functional Mobility    Functional Mobility- Ind. Level contact guard assist  -SP     Functional Mobility- Comment with HHA  -SP     Patient was able to Ambulate yes  -SP       Row Name 07/06/25 1312          Activities of Daily Living    BADL Assessment/Intervention toileting  -SP       Row Name 07/06/25 1312          Toileting Assessment/Training    [place] Level (Toileting) perform perineal hygiene;contact guard assist  -SP     Position (Toileting) unsupported standing  -SP               User Key  (r) = Recorded By, (t) = Taken By, (c) = Cosigned By      Initials Name Provider Type    SP Kyle Toney OT Occupational Therapist                   Obj/Interventions       Row Name 07/06/25 1313          Sensory Assessment (Somatosensory)    Sensory Assessment (Somatosensory) UE sensation intact  -SP       Row Name 07/06/25 1313          Range of Motion Comprehensive    General Range of Motion no range of motion deficits identified  -SP       Row Name 07/06/25 1313          Strength Comprehensive (MMT)    Comment, General Manual Muscle Testing (MMT) Assessment BUE grossly 4-/5  -SP       Row Name 07/06/25 1313          Motor Skills    Motor Skills functional endurance  -SP       Row Name 07/06/25 1313          Balance    Balance Assessment sitting dynamic balance;sit to stand dynamic balance;standing dynamic balance  -SP     Dynamic Sitting  Balance standby assist  -SP     Position, Sitting Balance unsupported;sitting edge of bed  -SP     Sit to Stand Dynamic Balance contact guard  -SP     Dynamic Standing Balance contact guard  -SP               User Key  (r) = Recorded By, (t) = Taken By, (c) = Cosigned By      Initials Name Provider Type    SP Kyle Toney OT Occupational Therapist                   Goals/Plan       Row Name 07/06/25 1314          Bathing Goal 1 (OT)    Activity/Device (Bathing Goal 1, OT) bathing skills, all  -SP     [place] Level/Cues Needed (Bathing Goal 1, OT) modified independence  -SP     Time Frame (Bathing Goal 1, OT) 2 weeks  -SP     Strategies/Barriers (Bathing Goal 1, OT) until d/c  -SP       Row Name 07/06/25 1314          Dressing Goal 1 (OT)    Activity/Device (Dressing Goal 1, OT) dressing skills, all  -SP     [place]/Cues Needed (Dressing Goal 1, OT) modified independence  -SP     Time Frame (Dressing Goal 1, OT) 2 weeks  -SP     Strategies/Barriers (Dressing Goal 1, OT) until d/c  -SP       Row Name 07/06/25 1314          Toileting Goal 1 (OT)    Activity/Device (Toileting Goal 1, OT) toileting skills, all  -SP     [place] Level/Cues Needed (Toileting Goal 1, OT) modified independence  -SP     Time Frame (Toileting Goal 1, OT) 2 weeks  -SP     Strategies/Barriers (Toileting Goal 1, OT) until d/c  -SP       Row Name 07/06/25 1314          Therapy Assessment/Plan (OT)    Planned Therapy Interventions (OT) activity tolerance training;adaptive equipment training;BADL retraining;functional balance retraining;IADL retraining;patient/caregiver education/training;passive ROM/stretching;strengthening exercise;occupation/activity based interventions;transfer/mobility retraining;ROM/therapeutic exercise  -SP               User Key  (r) = Recorded By, (t) = Taken By, (c) = Cosigned By      Initials Name Provider Type    SP Kyle Toney OT Occupational Therapist                   Clinical Impression        "Row Name 07/06/25 1314          Pain Assessment    Pretreatment Pain Rating 0/10 - no pain  -SP     Posttreatment Pain Rating 0/10 - no pain  -SP       Row Name 07/06/25 1314          Plan of Care Review    Plan of Care Reviewed With patient  -SP     Outcome Evaluation Pt is a 76 y/o female admitted to [facility] on 6/27/25 presenting with dialysis shunt occlusion after thrombectomy performed at OSH. Tunnel cath was placed but pt removed accidentally. Thrombectomy preformed 7/1. Pt had a rapid reponse 7/2 during HD with hypotension, bradycardia and unresponsiveness. PMHx significant for HTN, HLD, T2DM, CHF, CKD stage IV on HD. At baseline, pt resides with spouse who is her caregiver (assists with IADLs) in a H with ramp entry. Pt reports she uses a FWW within home and manual w/c within community, will use electric scooter when grocery shopping with spouse. She reports she is IND with ADLs with as dressing, bathing, and toileting. Pt on 2L O2 NC at night. Upon assessment, pt on 2L O2 NC, tell, and external cath. She completed bed mobility in supine with min A as she reports she \"sleeps in flat bed at home\". Pt sat EOB with SBA wehre she demos AROM WFLs and BUE MMT grossly 4-/5. She comes to standing with CGA and HHA to take x4 side steps towards HOB. Pt returned to supine with SBA. VSS with no LOB observed. Pt noted with decreased activity tolerance and endurance warranting the need for continued skilled OT services. OT will continue to follow and recommending home with HHOT when medically appropriate.  -SP       Row Name 07/06/25 1314          Therapy Assessment/Plan (OT)    Criteria for Skilled Therapeutic Interventions Met (OT) yes;meets criteria;skilled treatment is necessary  -SP     Therapy Frequency (OT) 3 times/wk  -SP     Predicted Duration of Therapy Intervention (OT) until d/c  -SP       Row Name 07/06/25 1314          Therapy Plan Review/Discharge Plan (OT)    Anticipated Discharge Disposition (OT) home with " home health  -SP       Row Name 07/06/25 1314          Vital Signs    Pre Systolic BP Rehab 126  -SP     Pre Treatment Diastolic BP 97  -SP     Intra Systolic BP Rehab 119  -SP     Intra Treatment Diastolic BP 67  -SP     Pre SpO2 (%) 97  -SP     O2 Delivery Pre Treatment supplemental O2  -SP     O2 Delivery Intra Treatment supplemental O2  -SP     Post SpO2 (%) 98  -SP     O2 Delivery Post Treatment supplemental O2  -SP     Pre Patient Position Supine  -SP     Intra Patient Position Standing  -SP     Post Patient Position Supine  -SP       Row Name 07/06/25 1314          Positioning and Restraints    Pre-Treatment Position in bed  -SP     Post Treatment Position bed  -SP     In Bed notified nsg;fowlers;call light within reach;encouraged to call for assist;exit alarm on  -SP               User Key  (r) = Recorded By, (t) = Taken By, (c) = Cosigned By      Initials Name Provider Type    Kyle Lazar OT Occupational Therapist                   Outcome Measures       Row Name 07/06/25 1315          How much help from another is currently needed...    Putting on and taking off regular lower body clothing? 3  -SP     Bathing (including washing, rinsing, and drying) 3  -SP     Toileting (which includes using toilet bed pan or urinal) 3  -SP     Putting on and taking off regular upper body clothing 4  -SP     Taking care of personal grooming (such as brushing teeth) 4  -SP     Eating meals 4  -SP     AM-PAC 6 Clicks Score (OT) 21  -SP       Row Name 07/06/25 1315          Functional Assessment    Outcome Measure Options AM-PAC 6 Clicks Daily Activity (OT)  -SP               User Key  (r) = Recorded By, (t) = Taken By, (c) = Cosigned By      Initials Name Provider Type    Kyle Lazar OT Occupational Therapist                    Occupational Therapy Education       Title: PT OT SLP Therapies (Done)       Topic: Occupational Therapy (Done)       Point: ADL training (Done)       Learning Progress Summary             Patient Acceptance, E,TB, VU by SP at 7/6/2025 1315                      Point: Home exercise program (Done)       Learning Progress Summary            Patient Acceptance, E,TB, VU by SP at 7/6/2025 1315                      Point: Precautions (Done)       Learning Progress Summary            Patient Acceptance, E,TB, VU by SP at 7/6/2025 1315                      Point: Body mechanics (Done)       Learning Progress Summary            Patient Acceptance, E,TB, VU by SP at 7/6/2025 1315                                      User Key       Initials Effective Dates Name Provider Type Discipline    SP 11/15/23 -  Kyle Toney, DORIS Occupational Therapist OT                  OT Recommendation and Plan  Planned Therapy Interventions (OT): activity tolerance training, adaptive equipment training, BADL retraining, functional balance retraining, IADL retraining, patient/caregiver education/training, passive ROM/stretching, strengthening exercise, occupation/activity based interventions, transfer/mobility retraining, ROM/therapeutic exercise  Therapy Frequency (OT): 3 times/wk  Plan of Care Review  Plan of Care Reviewed With: patient  Outcome Evaluation: Pt is a 74 y/o female admitted to [facility] on 6/27/25 presenting with dialysis shunt occlusion after thrombectomy performed at OSH. Tunnel cath was placed but pt removed accidentally. Thrombectomy preformed 7/1. Pt had a rapid reponse 7/2 during HD with hypotension, bradycardia and unresponsiveness. PMHx significant for HTN, HLD, T2DM, CHF, CKD stage IV on HD. At baseline, pt resides with spouse who is her caregiver (assists with IADLs) in a [facility] with ramp entry. Pt reports she uses a FWW within home and manual w/c within community, will use electric scooter when grocery shopping with spouse. She reports she is IND with ADLs with as dressing, bathing, and toileting. Pt on 2L O2 NC at night. Upon assessment, pt on 2L O2 NC, tell, and external cath. She completed bed mobility in  "supine with min A as she reports she \"sleeps in flat bed at home\". Pt sat EOB with SBA wehre she demos AROM WFLs and BUE MMT grossly 4-/5. She comes to standing with CGA and HHA to take x4 side steps towards HOB. Pt returned to supine with SBA. VSS with no LOB observed. Pt noted with decreased activity tolerance and endurance warranting the need for continued skilled OT services. OT will continue to follow and recommending home with HHOT when medically appropriate.     Time Calculation:         Time Calculation- OT       Row Name 07/06/25 1315             Time Calculation- OT    OT Start Time 1158  -SP      OT Stop Time 1222  -SP      OT Time Calculation (min) 24 min  -SP      OT Received On 07/06/25  -SP      OT - Next Appointment 07/08/25  -SP      OT Goal Re-Cert Due Date 07/20/25  -SP                User Key  (r) = Recorded By, (t) = Taken By, (c) = Cosigned By      Initials Name Provider Type    SP Kyle Toney OT Occupational Therapist                  Therapy Charges for Today       Code Description Service Date Service Provider Modifiers Qty    [id number]  OT EVAL LOW COMPLEXITY 4 7/6/2025 Kyle Toney OT GO 1                 Kyle Toney OT  7/6/2025  "

## 2025-07-06 NOTE — PROGRESS NOTES
"    [facility] MEDICINE SERVICE  DAILY PROGRESS NOTE    NAME: Lynne Cade  : 1950  MRN: [id number]      LOS: 9 days     PROVIDER OF SERVICE: Hailey Rosales MD    Chief Complaint: AV graft thrombosis, sequela    Subjective:     Interval History:  History taken from: patient      Chief Complaint: AV fistula issue     History of Present Illness: Lynne Cade is a 75 y.o. female with a CMH of HTN, HLD, T2DM, CHF, CKD stage IV on HD M/W/F who presented to [facility] on 2025 with AV fistula problem.  Patient receives dialysis via AV graft Monday, Wednesday, Friday.  Reports that her last complete dialysis treatment was 4 days ago on Monday, 2025.  Patient went for dialysis on Wednesday and was unable to complete due to shunt occlusion.  Patient had  thrombectomy performed yesterday at [facility] in [place].  Was found to have adequate blood flow after the procedure.  Dialysis was attempted today, unable to complete due to recurrent shunt occlusion.  Denies fever, chills, chest pain, shortness of breath, abdominal pain, changes in bowel or bladder habits.     In the ED, patient was afebrile and hemodynamically stable.  Labs pertinent for Creatinine 4.3 (baseline 2.8), BUN 39, GFR 10.1.  AG 15.7. Calcium 8.4, ionized calcium 1.01, Phos 5.9. WBC 11.08. Chest x-ray: \"Mild right basilar opacity, which could reflect atelectasis or pneumonia.  Elevation of right hemodynamic. \" EKG: SR, HR 81. Vascular surgery consulted, not recommending thrombectomy at this time,  recommending placement of temporary if not tunneled hemodialysis catheter, possible repeat thrombectomy next week. Nephrology was consulted, recommended consulting IR. IR consulted, recommended catheter be placed tomorrow morning, pt does not need to be NPO.  Hospital service to admit for continued evaluation.      sen in bed NAD, VSS, No new complaints, vss  Dialysis delayed due to lack of working access. Pending  " tunneled dialysis catheter as ordered.   6/29 seen today in bed SURAJ YOUNGBLOOD RN, VSS, Now has nontunneled hemodialysis catheter right neck, used yesterday.   6/30 patient seen and examined in bed no acute distress, vital signs stable, discussed with RN, patient removed her dialysis catheter overnight by accident.  IR consulted for replacement.    Plan to replace nontunneled hemodialysis catheter today with radiology  7/1/2025 patient seen and examined in bed no acute distress, vital signs stable, no complaints, plan for open graft thrombectomy today with Dr. Bernard    7/2-Rapid response called while patient was receiving HD. Patient became hypotensive, bradycardiac, and unresponsive. Patient was then placed in trendelenburg positioning and painful stimuli utilized to assess response. Upon arrival of rapid response team, patient BP previously read 41/22. Patient arouses to verbal stimuli and HR and BP improved. Remains drowsy, Patient transported to 2105 by RN.     7/3 patient appears somnolent and lethargic this morning and still quite altered.  Obtain CT head.  ABG.  Was also dizzy.  Blood pressure on the softer side.  Discontinue Cardizem and other blood pressure medications for now.  Increase the dose of midodrine    7/4 patient seen and examined, more awake and alert today.  Ordering lunch.  Just came back from hemodialysis.    7/5 lying in bed comfortably however still not able to stand up.  PT OT ordered.    7/6 will recheck Ortho's today and wait for PT OT recommendations        Review of Systems  Constitutional:  Negative for chills and fever.   Respiratory:  Negative for shortness of breath.    Cardiovascular:  Negative for chest pain.   Neurological:  Negative for dizziness and headaches.     Objective:     Vital Signs  Temp:  [97.4 °F (36.3 °C)-98.2 °F (36.8 °C)] 98.2 °F (36.8 °C)  Heart Rate:  [78-90] 78  Resp:  [14-22] 16  BP: ()/(51-85) 122/55  Flow (L/min) (Oxygen Therapy):  [2-4] 4   Body mass  index is 34.14 kg/m².    Physical Exam   General: 74 yo WF,awake alert.  HENT: Normocephalic, normal hearing, moist oral mucosa, no scleral icterus.  Neck: Supple, nontender, no carotid bruits, no JVD, no LAD.  Lungs: Clear to auscultation, nonlabored respiration.  Heart: RRR, no murmur, gallop or edema.  Abdomen: Soft, nontender, nondistended, + bowel sounds.  Musculoskeletal: Normal range of motion and strength, no tenderness or swelling.  Skin: Fistula present on LUE, bandaged. Skin is warm, dry and pink, no rashes or lesions.  Psychiatric: Calm       Diagnostic Data    Results from last 7 days   Lab Units 07/05/25  0052   WBC 10*3/mm3 12.24*   HEMOGLOBIN g/dL 8.7*   HEMATOCRIT % 29.1*   PLATELETS 10*3/mm3 246   GLUCOSE mg/dL 158*   CREATININE mg/dL 4.34*   BUN mg/dL 45.9*   SODIUM mmol/L 138   POTASSIUM mmol/L 4.4   ANION GAP mmol/L 16.0*       No radiology results for the last day    Scheduled Meds:cyclobenzaprine, 10 mg, Oral, TID  DULoxetine, 60 mg, Oral, Daily  gabapentin, 100 mg, Oral, TID  insulin lispro, 2-7 Units, Subcutaneous, 4x Daily AC & at Bedtime  midodrine, 15 mg, Oral, TID AC  sodium chloride, 10 mL, Intravenous, Q12H      Continuous Infusions:Pharmacy to dose warfarin,       PRN Meds:.  acetaminophen **OR** acetaminophen **OR** acetaminophen    senna-docusate sodium **AND** polyethylene glycol **AND** bisacodyl **AND** bisacodyl    cyclobenzaprine    dextrose    dextrose    glucagon (human recombinant)    heparin (porcine)    hydrOXYzine    Magnesium Standard Dose Replacement - Follow Nurse / BPA Driven Protocol    melatonin    nitroglycerin    ondansetron ODT **OR** ondansetron    oxyCODONE    Pharmacy to dose warfarin    sodium chloride       I reviewed the patient's new clinical results.    Assessment/Plan:     Active and Resolved Problems  Active Hospital Problems    Diagnosis  POA    **AV graft thrombosis, sequela [T82.868S]  Not Applicable    ESRD (end stage renal disease) on dialysis  "[N18.6, Z99.2]  Not Applicable      Resolved Hospital Problems    Diagnosis Date Resolved POA    A-V fistula [I77.0] 06/29/2025 Yes     Occlusion of AV dialysis graft  CKD stage IV on hemodialysis M/W/F  Creatinine 4.3 (baseline 2.8), BUN 39, GFR 10.1.  AG 15.7  Calcium 8.4, ionized calcium 1.01  Phos 5.9  WBC 11.08  Chest x-ray: \"Mild right basilar opacity, which could reflect atelectasis or pneumonia.  Elevation of right hemodynamic. \"  EKG: SR, HR 81  Last dialysis treatment 4 days ago Monday (6/23/2025), receives dialysis via AV graft  Thrombosed earlier this week, treated with thrombectomy  Nephrology was consulted and following, recommended consulting IR  IR consulted s/p replace nontunneled hemodialysis catheter with radiology  Vascular surgery following>7/1/2025 - open left axilloaxillary loop AV shunt thrombectomy, shuntogram with venous anastomotic balloon angioplasty by Dr. Raines      Congestive heart failure-Stable  Continue home dose meds     Type 2 diabetes mellitus  A1c-5.7   Low SSI   Acchu-check ACHS with diet  Heart-healthy, consistent carb diet   Hypoglycemia protocol      Hypertension>hypotension, continue BP meds, midodrine at 10 mg p.o. 3 times daily.  Blood pressure stable today.  CT head negative for anything acute.  Echocardiogram pending.  Troponin was mildly elevated however patient has CKD and is now trending down.  Denies any chest pain.      Acute metabolic encephalopathy --lethargy improved after putting patient on BiPAP after reviewing ABG results.  CT head negative.  Repeat orthostatics today after blood pressure better after discontinuing blood pressure medications and also  increasing the dose of midodrine   - Patient still too weak to stand and therefore orthostatic standing could not be completed.  further increased the dose of midodrine.  PT OT.  Patient will likely need rehab upon discharge.  Unable to send her home with home health at this time.  - Now repeat orthostatic " after increasing dose of midodrine.  Awaiting PT OT eval.      Hyperlipidemia  Continue home dose meds    VTE Prophylaxis:  Pharmacologic & mechanical VTE prophylaxis orders are present.    Disposition Planning:   Barriers to Discharge:HD  Anticipated Date of Discharge: Pending clinical course  Place of Discharge: rehab  Time: 35 minutes     Code Status and Medical Interventions: CPR (Attempt to Resuscitate); Full Support   Ordered at: 06/27/25 2031     Code Status (Patient has no pulse and is not breathing):    CPR (Attempt to Resuscitate)     Medical Interventions (Patient has pulse or is breathing):    Full Support       Signature: Electronically signed by Hailey Rosales MD, 07/06/25, 10:16 EDT.  [facility] Hospitalist Team

## 2025-07-06 NOTE — PLAN OF CARE
"Goal Outcome Evaluation:  Plan of Care Reviewed With: patient           Outcome Evaluation: Pt is a 74 y/o female admitted to [facility] on 6/27/25 presenting with dialysis shunt occlusion after thrombectomy performed at OSH. Tunnel cath was placed but pt removed accidentally. Thrombectomy preformed 7/1. Pt had a rapid reponse 7/2 during HD with hypotension, bradycardia and unresponsiveness. PMHx significant for HTN, HLD, T2DM, CHF, CKD stage IV on HD. At baseline, pt resides with spouse who is her caregiver (assists with IADLs) in a H with ramp entry. Pt reports she uses a FWW within home and manual w/c within community, will use electric scooter when grocery shopping with spouse. She reports she is IND with ADLs with as dressing, bathing, and toileting. Pt on 2L O2 NC at night. Upon assessment, pt on 2L O2 NC, tell, and external cath. She completed bed mobility in supine with min A as she reports she \"sleeps in flat bed at home\". Pt sat EOB with SBA wehre she demos AROM WFLs and BUE MMT grossly 4-/5. She comes to standing with CGA and HHA to take x4 side steps towards HOB. Pt returned to supine with SBA. VSS with no LOB observed. Pt noted with decreased activity tolerance and endurance warranting the need for continued skilled OT services. OT will continue to follow and recommending home with HHOT when medically appropriate.    Anticipated Discharge Disposition (OT): home with home health                        "

## 2025-07-06 NOTE — PLAN OF CARE
Assessment: Lynne Cade presents with functional mobility impairments which indicate the need for skilled intervention. Tolerating session today without incident. Pt able to CTS from chair  with CGA but knees dipping and not safe to try and amb. Fatigues easily, had just got to chair and had a bath,after working on stance with  UE support was totally worn out and wanted to lie back down. Performed LE seated  exs with vc's to incr end ranges.Will continue to follow and progress as tolerated.

## 2025-07-07 ENCOUNTER — NURSE TRIAGE (OUTPATIENT)
Dept: CALL CENTER | Facility: HOSPITAL | Age: 75
End: 2025-07-07
Payer: MEDICARE

## 2025-07-07 ENCOUNTER — READMISSION MANAGEMENT (OUTPATIENT)
Dept: CALL CENTER | Facility: HOSPITAL | Age: 75
End: 2025-07-07
Payer: MEDICARE

## 2025-07-07 NOTE — OUTREACH NOTE
Prep Survey      Flowsheet Row Responses   [facility] facility patient discharged from? [facility]   Is LACE score < 7 ? No   Eligibility Readm Mgmt   Discharge diagnosis Thrombectomy of left arm arteriovenous graft, shuntogram with angioplasty   Does the patient have one of the following disease processes/diagnoses(primary or secondary)? General Surgery   Does the patient have Home health ordered? Yes   What is the Home health agency?  [facility]   Prep survey completed? Yes            Flor DUARTE - Registered Nurse

## 2025-07-07 NOTE — TELEPHONE ENCOUNTER
Caller is asking when patient should take her warfarin.  AVS reviewed with caller and questions answered.  Reason for Disposition  • Caller has medicine question only, adult not sick, AND triager answers question    Additional Information  • Negative: [1] Intentional drug overdose AND [2] suicidal thoughts or ideas  • Negative: Drug overdose and triager unable to answer question  • Negative: Caller requesting a renewal or refill of a medicine patient is currently taking  • Negative: Caller requesting information unrelated to medicine  • Negative: Caller requesting information about COVID-19 Vaccine  • Negative: Caller requesting information about Emergency Contraception  • Negative: Caller requesting information about Combined Birth Control Pills  • Negative: Caller requesting information about Progestin Birth Control Pills  • Negative: Caller requesting information about Post-Op pain or medicines  • Negative: Caller requesting a prescription antibiotic (such as Penicillin) for Strep throat and has a positive culture result  • Negative: Caller requesting a prescription anti-viral med (such as Tamiflu) and has influenza (flu) symptoms  • Negative: Immunization reaction suspected  • Negative: Rash while taking a medicine or within 3 days of stopping it  • Negative: [1] Asthma and [2] having symptoms of asthma (cough, wheezing, etc.)  • Negative: [1] Symptom of illness (e.g., headache, abdominal pain, earache, vomiting) AND [2] more than mild  • Negative: Breastfeeding questions about mother's medicines and diet  • Negative: MORE THAN A DOUBLE DOSE of a prescription or over-the-counter (OTC) drug  • Negative: [1] DOUBLE DOSE (an extra dose or lesser amount) of prescription drug AND [2] any symptoms (e.g., dizziness, nausea, pain, sleepiness)  • Negative: [1] DOUBLE DOSE (an extra dose or lesser amount) of over-the-counter (OTC) drug AND [2] any symptoms (e.g., dizziness, nausea, pain, sleepiness)  • Negative: Took  "another person's prescription drug  • Negative: [1] DOUBLE DOSE (an extra dose or lesser amount) of prescription drug AND [2] NO symptoms  (Exception: A double dose of antibiotics.)  • Negative: Diabetes drug error or overdose (e.g., took wrong type of insulin or took extra dose)  • Negative: [1] Prescription not at pharmacy AND [2] was prescribed by PCP recently (Exception: Triager has access to EMR and prescription is recorded there. Go to Home Care and confirm for pharmacy.)  • Negative: [1] Pharmacy calling with prescription question AND [2] triager unable to answer question  • Negative: [1] Caller has URGENT medicine question about med that PCP or specialist prescribed AND [2] triager unable to answer question  • Negative: Medicine patch causing local rash or itching  • Negative: [1] Caller has medicine question about med NOT prescribed by PCP AND [2] triager unable to answer question (e.g., compatibility with other med, storage)  • Negative: Prescription request for new medicine (not a refill)  • Negative: [1] Caller has NON-URGENT medicine question about med that PCP prescribed AND [2] triager unable to answer question  • Negative: Caller wants to use a complementary or alternative medicine  • Negative: [1] Prescription prescribed recently is not at pharmacy AND [2] triager has access to patient's EMR AND [3] prescription is recorded in the EMR  • Negative: [1] DOUBLE DOSE (an extra dose or lesser amount) of over-the-counter (OTC) drug AND [2] NO symptoms  • Negative: [1] DOUBLE DOSE (an extra dose or lesser amount) of antibiotic drug AND [2] NO symptoms  • Negative: Caller has medicine question, adult has minor symptoms, caller declines triage, AND triager answers question    Answer Assessment - Initial Assessment Questions  1. NAME of MEDICINE: \"What medicine(s) are you calling about?\"      warfarin  2. QUESTION: \"What is your question?\" (e.g., double dose of medicine, side effect)      What time is she " "supposed to take it?  3. PRESCRIBER: \"Who prescribed the medicine?\" Reason: if prescribed by specialist, call should be referred to that group.      Hospitalist  4. SYMPTOMS: \"Do you have any symptoms?\" If Yes, ask: \"What symptoms are you having?\"  \"How bad are the symptoms (e.g., mild, moderate, severe)      no  5. PREGNANCY:  \"Is there any chance that you are pregnant?\" \"When was your last menstrual period?\"      na    Protocols used: Medication Question Call-ADULT-    "

## 2025-07-08 NOTE — CASE MANAGEMENT/SOCIAL WORK
Case Management Discharge Note      Final Note: Home with [facility]    Provided Post Acute Provider List?: N/A  Provided Post Acute Provider Quality & Resource List?: N/A    Selected Continued Care - Discharged on 7/6/2025 Admission date: 6/27/2025 - Discharge disposition: Home-Health Care [facility]      Home Medical Care Coordination complete.      Service Provider Services Address Phone Fax Patient Preferred    [facility] [address] [place] [id number] [phone] --               Transportation Services  Transportation: Private Transportation  Private: Car    Final Discharge Disposition Code: 01 - home or self-care

## 2025-07-11 ENCOUNTER — READMISSION MANAGEMENT (OUTPATIENT)
Dept: CALL CENTER | Facility: HOSPITAL | Age: 75
End: 2025-07-11
Payer: MEDICARE

## 2025-07-11 NOTE — OUTREACH NOTE
General Surgery Week 1 Survey      Flowsheet Row Responses   [employer] facility patient discharged from? [facility]   Does the patient have one of the following disease processes/diagnoses(primary or secondary)? General Surgery   Week 1 attempt successful? No   Unsuccessful attempts Attempt 1            JULIANA NUÑEZ - Registered Nurse

## 2025-07-17 ENCOUNTER — READMISSION MANAGEMENT (OUTPATIENT)
Dept: CALL CENTER | Facility: HOSPITAL | Age: 75
End: 2025-07-17
Payer: MEDICARE

## 2025-07-17 NOTE — OUTREACH NOTE
General Surgery Week 1 Survey      Flowsheet Row Responses   [facility] [place], operated by [facility] patient discharged from? [facility]   Does the patient have one of the following disease processes/diagnoses(primary or secondary)? General Surgery   Week 1 attempt successful? Yes   Call start time 1728   Call end time 1740   Discharge diagnosis Thrombectomy of left arm arteriovenous graft, shuntogram with angioplasty   Person spoke with today (if not patient) and relationship spouse   Meds reviewed with patient/caregiver? Yes   Is the patient having any side effects they believe may be caused by any medication additions or changes? No   Does the patient have all medications related to this admission filled (includes all antibiotics, pain medications, etc.) Yes   Is the patient taking all medications as directed (includes completed medication regime)? Yes   Does the patient have a follow up appointment scheduled with their surgeon? Yes   Has the patient kept scheduled appointments due by today? N/A   Comments Pt sees Jaspreet Schneider[address] ,  [address], [place] [id number] [phone]   What is the Home health agency?  [facility]   Has home health visited the patient within 72 hours of discharge? Yes   Psychosocial issues? No   Did the patient receive a copy of their discharge instructions? Yes   Nursing interventions Reviewed instructions with patient   Is the patient /caregiver able to teach back basic post-op care? Take showers only when approved by MD-sponge bathe until then, Keep incision areas clean,dry and protected, Lifting as instructed by MD in discharge instructions, No tub bath, swimming, or hot tub until instructed by MD   Is the patient/caregiver able to teach back signs and symptoms of incisional infection? Increased drainage or bleeding, Increased redness, swelling or pain at the incisonal site, Incisional warmth, Pus or odor from incision   Is the patient/caregiver able to teach back steps to recovery at home?  Rest and rebuild strength, gradually increase activity   If the patient is a current smoker, are they able to teach back resources for cessation? Not a smoker   Is the patient/caregiver able to teach back the hierarchy of who to call/visit for symptoms/problems? PCP, Specialist, Home health nurse, Urgent Care, ED, 911 Yes   Week 1 call completed? Yes   Is the patient interested in additional calls from an ambulatory ? No   Would this patient benefit from a Referral to Amb Social Work? No   Wrap up additional comments spouse states pt is doing better with no fistula issues at this time. Reviewed AVS/meds with spouse. Spouse verfied PCP fu appt   Call end time 1740            Radha TIDWELL - Registered Nurse

## 2025-07-24 ENCOUNTER — HOSPITAL ENCOUNTER (OUTPATIENT)
Dept: GENERAL RADIOLOGY | Facility: HOSPITAL | Age: 75
Discharge: HOME | End: 2025-07-24
Admitting: INTERNAL MEDICINE
Payer: MEDICARE

## 2025-07-24 ENCOUNTER — TRANSCRIBE ORDERS (OUTPATIENT)
Dept: ADMINISTRATIVE | Facility: HOSPITAL | Age: 75
End: 2025-07-24
Payer: MEDICARE

## 2025-07-24 DIAGNOSIS — R06.02 SHORTNESS OF BREATH: Primary | ICD-10-CM

## 2025-07-24 DIAGNOSIS — R06.02 SHORTNESS OF BREATH: ICD-10-CM

## 2025-07-24 PROCEDURE — 71046 X-RAY EXAM CHEST 2 VIEWS: CPT

## 2025-08-05 ENCOUNTER — HOSPITAL ENCOUNTER (EMERGENCY)
Facility: HOSPITAL | Age: 75
Discharge: HOME | End: 2025-08-06
Attending: EMERGENCY MEDICINE
Payer: MEDICARE

## 2025-08-05 DIAGNOSIS — K59.00 CONSTIPATION, UNSPECIFIED CONSTIPATION TYPE: Primary | ICD-10-CM

## 2025-08-05 PROCEDURE — 99284 EMERGENCY DEPT VISIT MOD MDM: CPT

## 2025-08-05 RX ORDER — SORBITOL SOLUTION 70 %
15 SOLUTION, ORAL MISCELLANEOUS DAILY PRN
Qty: 474 ML | Refills: 0 | Status: SHIPPED | OUTPATIENT
Start: 2025-08-05 | End: 2025-08-06

## 2025-08-06 VITALS
WEIGHT: 211.64 LBS | HEART RATE: 85 BPM | BODY MASS INDEX: 34.01 KG/M2 | DIASTOLIC BLOOD PRESSURE: 63 MMHG | OXYGEN SATURATION: 99 % | SYSTOLIC BLOOD PRESSURE: 161 MMHG | TEMPERATURE: 97.7 F | RESPIRATION RATE: 18 BRPM | HEIGHT: 66 IN

## 2025-08-06 RX ORDER — SORBITOL SOLUTION 70 %
30 SOLUTION, ORAL MISCELLANEOUS ONCE
Status: COMPLETED | OUTPATIENT
Start: 2025-08-06 | End: 2025-08-06

## 2025-08-06 RX ORDER — SORBITOL SOLUTION 70 %
15 SOLUTION, ORAL MISCELLANEOUS DAILY PRN
Qty: 473 ML | Refills: 0 | Status: SHIPPED | OUTPATIENT
Start: 2025-08-06

## 2025-08-06 RX ADMIN — SORBITOL SOLUTION (BULK) 30 ML: 70 SOLUTION at 00:14

## (undated) DEVICE — RADIFOCUS GLIDEWIRE ADVANTAGE GUIDEWIRE: Brand: GLIDEWIRE ADVANTAGE

## (undated) DEVICE — DRSNG GZ CURAD XEROFORM NONADHR OVERWRAP 5X9IN

## (undated) DEVICE — TOTAL TRAY, DB, 100% SILI FOLEY, 16FR 10: Brand: MEDLINE

## (undated) DEVICE — SOL IRRIG H2O 1000ML STRL

## (undated) DEVICE — PTA BALLOON DILATATION CATHETER: Brand: MUSTANG™

## (undated) DEVICE — SUT ETHLN 2/0 PS 18IN 585H

## (undated) DEVICE — CVR PROB 96IN LF STRL

## (undated) DEVICE — DRAPE,HAND,STERILE: Brand: MEDLINE

## (undated) DEVICE — GLV SURG SENSICARE PI LF PF 7.5 GRN STRL

## (undated) DEVICE — SUT PROLN 6/0 BV1 D/A 30IN 8709H

## (undated) DEVICE — CANNULATED DRILL

## (undated) DEVICE — UNDRGLV SURG BIOGEL PIMICROINDICATOR SYNTH SZ8 LF STRL

## (undated) DEVICE — FOGARTY ARTERIAL EMBOLECTOMY CATHETER 3F 40CM: Brand: FOGARTY

## (undated) DEVICE — PCH INST SURG INVISISHIELD 2PCKT

## (undated) DEVICE — MARKR SKIN W/RULR

## (undated) DEVICE — PK TOTL KN 50

## (undated) DEVICE — SYS COLD/THRP POLAR/CARE/CUBE

## (undated) DEVICE — PK MINOR VASC 50

## (undated) DEVICE — APPL CHLORAPREP HI/LITE TINTED 10.5ML ORNG

## (undated) DEVICE — THREADED GUIDE WIRE

## (undated) DEVICE — 3F 80CM OVER-THE-WIRE EMBOLECTOMY CATHETER: Brand: LEMAITRE EMBOLECTOMY CATHETER

## (undated) DEVICE — PK GAM NAIL 50

## (undated) DEVICE — KT SURG TURNOVER 050

## (undated) DEVICE — SUT VIC COAT 1 CP-1 27IN

## (undated) DEVICE — PK AV FISTL/SHNT 40

## (undated) DEVICE — SPNG LAP PREWSH SFTPK 18X18IN STRL PK/5

## (undated) DEVICE — SUT VIC 5/0 PS2 18IN UD VCP495H

## (undated) DEVICE — SUT SILK 3/0 TIES 18IN A184H

## (undated) DEVICE — SUT VIC COAT 2 CP 27IN

## (undated) DEVICE — 1 ML TUBERCULIN SYRINGE REGULAR TIP: Brand: MONOJECT

## (undated) DEVICE — SUT SILK 2/0 TIES 18IN A185H

## (undated) DEVICE — RADIFOCUS GLIDEWIRE: Brand: GLIDEWIRE

## (undated) DEVICE — THE STERILE CAMERA HANDLE COVER IS FOR USE WITH THE STERIS SURGICAL LIGHTING AND VISUALIZATION SYSTEMS.

## (undated) DEVICE — PAD COLD/THRP KN POLAR/CARE WRAP/ON XL

## (undated) DEVICE — ANTIBACTERIAL UNDYED BRAIDED (POLYGLACTIN 910), SYNTHETIC ABSORBABLE SUTURE: Brand: COATED VICRYL

## (undated) DEVICE — PINNACLE R/O II INTRODUCER SHEATH WITH RADIOPAQUE MARKER: Brand: PINNACLE

## (undated) DEVICE — CEMENT MIXING SYSTEM WITH FEMORAL BREAKWAY NOZZLE: Brand: REVOLUTION

## (undated) DEVICE — PAD UNDERCAST WYTEX 6IN 4YD LF STRL

## (undated) DEVICE — CVR HNDL LT SURG ACCSSRY BLU STRL

## (undated) DEVICE — GLV SURG SENSICARE PI ORTHO SZ8 LF STRL

## (undated) DEVICE — PENCL HND ROCKRSWTCH HOLSTR EZ CLEAN TP CRD 10FT

## (undated) DEVICE — GLV SURG BIOGEL LTX PF 7 1/2

## (undated) DEVICE — ADHS SKIN PREMIERPRO EXOFIN TOPICAL HI/VISC .5ML

## (undated) DEVICE — FOGARTY ARTERIAL EMBOLECTOMY CATHETER 4F 40CM: Brand: FOGARTY

## (undated) DEVICE — Device

## (undated) DEVICE — SPNG GZ AVANT 6PLY 4X4IN STRL PK/2

## (undated) DEVICE — SUT SILK 3/0 30IN A304H

## (undated) DEVICE — SOL IRR NACL 0.9PCT BO 1000ML

## (undated) DEVICE — PINNACLE INTRODUCER SHEATH: Brand: PINNACLE

## (undated) DEVICE — 9165 UNIVERSAL PATIENT PLATE: Brand: 3M™

## (undated) DEVICE — TRAP FLD MINIVAC MEGADYNE 100ML

## (undated) DEVICE — 3M™ IOBAN™ 2 ANTIMICROBIAL INCISE DRAPE 6650EZ: Brand: IOBAN™ 2

## (undated) DEVICE — PENCL SMOKE/EVAC MEGADYNE TELESCP 10FT

## (undated) DEVICE — MICRO HVTSA, 0.5G AND HVTSA SOURCEMARK PRODUCT CODE M1206 AND M1206-01: Brand: EXOFIN MICRO HVTSA, 0.5G

## (undated) DEVICE — SUT VIC 2/0 CT1 36IN

## (undated) DEVICE — VESSEL LOOPS X-RAY DETECTABLE: Brand: DEROYAL

## (undated) DEVICE — DUAL CUT SAGITTAL BLADE

## (undated) DEVICE — ANESTH CIRCUIT 60IN 3LTR-LF: Brand: MEDLINE INDUSTRIES, INC.

## (undated) DEVICE — DRSNG BARR INSUL KN POLAR/CARE S/ADHS LG

## (undated) DEVICE — MICRO TIP WIPE: Brand: DEVON

## (undated) DEVICE — PATIENT RETURN ELECTRODE, SINGLE-USE, CONTACT QUALITY MONITORING, ADULT, WITH 9FT CORD, FOR PATIENTS WEIGING OVER 33LBS. (15KG): Brand: MEGADYNE

## (undated) DEVICE — SUT PROLN 5/0 C1 D/A 36IN 8720H

## (undated) DEVICE — SUT PROLN SURGILENE 5.0 24IN BLU 9702H

## (undated) DEVICE — PAD UNDERCAST WYTEX 4IN 4YD LF STRL

## (undated) DEVICE — GLV SURG SENSICARE PI LF PF 8 GRN STRL

## (undated) DEVICE — GLV SURG BIOGEL LTX PF 6 1/2

## (undated) DEVICE — SUT ETHIB 2 CV V37 MS/4 30IN MX69G

## (undated) DEVICE — STERILE POLYISOPRENE POWDER-FREE SURGICAL GLOVES: Brand: PROTEXIS

## (undated) DEVICE — BANDAGE ROLL,100% COTTON, 6 PLY, SMALL: Brand: KERLIX

## (undated) DEVICE — SOLUTION,WATER,IRRIGATION,1000ML,STERILE: Brand: MEDLINE

## (undated) DEVICE — SUT MNCRYL PLS ANTIB UD 4/0 PS2 18IN

## (undated) DEVICE — PK PROC TURNOVER

## (undated) DEVICE — DRAPE,REIN 53X77,STERILE: Brand: MEDLINE

## (undated) DEVICE — THE STERILE LIGHT HANDLE COVER IS USED WITH STERIS SURGICAL LIGHTING AND VISUALIZATION SYSTEMS.

## (undated) DEVICE — BG ISOL DRWSTR INVISISHIELD 20X20IN

## (undated) DEVICE — DEV INFL COMPAK W/ACCESSPLUS IN4530

## (undated) DEVICE — SYR LUERLOK 20CC BX/50

## (undated) DEVICE — CLEANER XT™ ROTATIONAL THROMBECTOMY SYSTEM 6F X 65CM: Brand: CLEANER XT™ ROTATIONAL THROMBECTOMY SYSTEM

## (undated) DEVICE — SOL IRR NACL 0.9PCT ARTHROMATIC 3000ML

## (undated) DEVICE — PROVE COVER: Brand: UNBRANDED

## (undated) DEVICE — PIN HOLD TEMP NOHEAD FLUT 1/8X3.5IN

## (undated) DEVICE — PAD PERI POST

## (undated) DEVICE — SOL IRRIG NACL 1000ML

## (undated) DEVICE — SLV SCD CALF HEMOFORCE DVT THERP REPROC MD

## (undated) DEVICE — COVER,LIGHT HANDLE,FLX,1/PK: Brand: MEDLINE INDUSTRIES, INC.

## (undated) DEVICE — MAT PREVALON MOBL TRANSFR AIR W/PAD REPROC 39X81IN

## (undated) DEVICE — COVER,MAYO STAND,STERILE: Brand: MEDLINE

## (undated) DEVICE — SYR LUERLOK 30CC

## (undated) DEVICE — SUT SILK 3/0 SH CR8 18IN C013D

## (undated) DEVICE — ST ACC MICROPUNCTURE STFF/CANN PLAT/TP 4F 21G 40CM

## (undated) DEVICE — SOL IRRIG NACL 9PCT 1000ML BTL

## (undated) DEVICE — SNAP KAP: Brand: UNBRANDED

## (undated) DEVICE — PISTOL GRIP SKIN STAPLER: Brand: MULTIFIRE PREMIUM

## (undated) DEVICE — GOWN,REINFRCE,POLY,SIRUS,BREATH SLV,XXLG: Brand: MEDLINE

## (undated) DEVICE — SOL NACL INJ USP 0.9PCT 500ML STRL

## (undated) DEVICE — TOWEL,OR,DSP,ST,NATURAL,DLX,4/PK,20PK/CS: Brand: MEDLINE

## (undated) DEVICE — INFLATION DEVICE: Brand: ENCORE™ 26

## (undated) DEVICE — LP VESL MAXI 2.5X1MM RED 2PK

## (undated) DEVICE — OCCLUSIVE GAUZE STRIP OVERWRAP,3% BISMUTH TRIBROMOPHENATE IN PETROLATUM BLEND: Brand: XEROFORM

## (undated) DEVICE — ST ACC MICROPUNCTURE STFF .018 ECHO/PLDM/TP 4F/10CM 21G/7CM

## (undated) DEVICE — STERILE PATIENT PROTECTIVE PAD FOR IMP® KNEE POSITIONERS & COHESIVE WRAP (10 / CASE): Brand: DE MAYO KNEE POSITIONER®

## (undated) DEVICE — CUFF TOURNI 1BLADDER 1PRT 30IN STRL

## (undated) DEVICE — CATH ANGIO BEACON TP/KMP 5F 65CM .038IN

## (undated) DEVICE — SUT MNCRYL 3/0 PS2 18IN MCP497G

## (undated) DEVICE — SYR LL TP 10ML STRL

## (undated) DEVICE — SOL NS 500ML